# Patient Record
Sex: MALE | Race: ASIAN | NOT HISPANIC OR LATINO
[De-identification: names, ages, dates, MRNs, and addresses within clinical notes are randomized per-mention and may not be internally consistent; named-entity substitution may affect disease eponyms.]

---

## 2015-05-08 RX ORDER — INSULIN GLARGINE 100 [IU]/ML
35 INJECTION, SOLUTION SUBCUTANEOUS
Qty: 0 | Refills: 0 | DISCHARGE
Start: 2015-05-08

## 2017-04-20 ENCOUNTER — APPOINTMENT (OUTPATIENT)
Dept: UROLOGY | Facility: CLINIC | Age: 72
End: 2017-04-20

## 2017-04-20 VITALS
DIASTOLIC BLOOD PRESSURE: 74 MMHG | RESPIRATION RATE: 17 BRPM | BODY MASS INDEX: 24.88 KG/M2 | SYSTOLIC BLOOD PRESSURE: 159 MMHG | WEIGHT: 168 LBS | HEART RATE: 79 BPM | HEIGHT: 69 IN | TEMPERATURE: 98 F

## 2017-10-26 ENCOUNTER — APPOINTMENT (OUTPATIENT)
Dept: UROLOGY | Facility: CLINIC | Age: 72
End: 2017-10-26
Payer: MEDICARE

## 2017-10-26 VITALS
HEART RATE: 80 BPM | TEMPERATURE: 97.9 F | HEIGHT: 69 IN | DIASTOLIC BLOOD PRESSURE: 68 MMHG | BODY MASS INDEX: 24.88 KG/M2 | WEIGHT: 168 LBS | SYSTOLIC BLOOD PRESSURE: 125 MMHG | RESPIRATION RATE: 17 BRPM

## 2017-10-26 PROCEDURE — 99214 OFFICE O/P EST MOD 30 MIN: CPT

## 2018-04-09 ENCOUNTER — APPOINTMENT (OUTPATIENT)
Dept: VASCULAR SURGERY | Facility: CLINIC | Age: 73
End: 2018-04-09

## 2018-05-29 ENCOUNTER — APPOINTMENT (OUTPATIENT)
Dept: VASCULAR SURGERY | Facility: CLINIC | Age: 73
End: 2018-05-29
Payer: MEDICARE

## 2018-05-29 VITALS
TEMPERATURE: 98 F | HEIGHT: 69 IN | SYSTOLIC BLOOD PRESSURE: 165 MMHG | HEART RATE: 71 BPM | WEIGHT: 168 LBS | BODY MASS INDEX: 24.88 KG/M2 | DIASTOLIC BLOOD PRESSURE: 74 MMHG

## 2018-05-29 DIAGNOSIS — Z86.39 PERSONAL HISTORY OF OTHER ENDOCRINE, NUTRITIONAL AND METABOLIC DISEASE: ICD-10-CM

## 2018-05-29 DIAGNOSIS — R07.89 OTHER CHEST PAIN: ICD-10-CM

## 2018-05-29 DIAGNOSIS — E11.9 TYPE 2 DIABETES MELLITUS W/OUT COMPLICATIONS: ICD-10-CM

## 2018-05-29 DIAGNOSIS — Z86.79 PERSONAL HISTORY OF OTHER DISEASES OF THE CIRCULATORY SYSTEM: ICD-10-CM

## 2018-05-29 PROCEDURE — 93923 UPR/LXTR ART STDY 3+ LVLS: CPT

## 2018-05-29 PROCEDURE — 99202 OFFICE O/P NEW SF 15 MIN: CPT

## 2018-10-23 ENCOUNTER — APPOINTMENT (OUTPATIENT)
Dept: UROLOGY | Facility: CLINIC | Age: 73
End: 2018-10-23
Payer: MEDICARE

## 2018-10-23 VITALS
HEIGHT: 69 IN | RESPIRATION RATE: 17 BRPM | SYSTOLIC BLOOD PRESSURE: 152 MMHG | DIASTOLIC BLOOD PRESSURE: 81 MMHG | BODY MASS INDEX: 24.44 KG/M2 | HEART RATE: 78 BPM | WEIGHT: 165 LBS | TEMPERATURE: 97.9 F

## 2018-10-23 DIAGNOSIS — N52.01 ERECTILE DYSFUNCTION DUE TO ARTERIAL INSUFFICIENCY: ICD-10-CM

## 2018-10-23 PROCEDURE — 99213 OFFICE O/P EST LOW 20 MIN: CPT

## 2019-04-01 ENCOUNTER — NON-APPOINTMENT (OUTPATIENT)
Age: 74
End: 2019-04-01

## 2019-04-01 ENCOUNTER — APPOINTMENT (OUTPATIENT)
Dept: CARDIOLOGY | Facility: CLINIC | Age: 74
End: 2019-04-01
Payer: MEDICARE

## 2019-04-01 VITALS
DIASTOLIC BLOOD PRESSURE: 81 MMHG | OXYGEN SATURATION: 99 % | HEART RATE: 76 BPM | HEIGHT: 69 IN | SYSTOLIC BLOOD PRESSURE: 149 MMHG | WEIGHT: 160 LBS | BODY MASS INDEX: 23.7 KG/M2

## 2019-04-01 VITALS — SYSTOLIC BLOOD PRESSURE: 156 MMHG | DIASTOLIC BLOOD PRESSURE: 79 MMHG

## 2019-04-01 VITALS — SYSTOLIC BLOOD PRESSURE: 130 MMHG | DIASTOLIC BLOOD PRESSURE: 70 MMHG

## 2019-04-01 PROCEDURE — 99204 OFFICE O/P NEW MOD 45 MIN: CPT

## 2019-04-01 PROCEDURE — 93000 ELECTROCARDIOGRAM COMPLETE: CPT

## 2019-04-01 RX ORDER — OLMESARTAN MEDOXOMIL-HYDROCHLOROTHIAZIDE 12.5; 2 MG/1; MG/1
20-12.5 TABLET, FILM COATED ORAL
Qty: 90 | Refills: 1 | Status: DISCONTINUED | COMMUNITY
Start: 2019-04-01 | End: 2019-04-01

## 2019-04-01 RX ORDER — BLOOD SUGAR DIAGNOSTIC
STRIP MISCELLANEOUS
Qty: 200 | Refills: 0 | Status: DISCONTINUED | COMMUNITY
Start: 2019-03-19

## 2019-04-01 NOTE — HISTORY OF PRESENT ILLNESS
[FreeTextEntry1] : 74 year old male with about 20 years of CAD, type II diabetes, elevated cholesterol.  s/p  two stenting episodes.  in mid 90s and about 3 years ago.\par lv function appears to be normal based on echo and stress from 3 years ago.\par he appears to be asymptomatic at this time.  took last nitro over a year ago.\par he is establishing care at this time (change from [facility]).\par last carotid 2-3 years ago.  last hgba1c 7%\par lipiids are well controlled.  20 year hx of LBBB.   no syncope.

## 2019-04-01 NOTE — PHYSICAL EXAM
[General Appearance - Well Developed] : well developed [Normal Appearance] : normal appearance [Well Groomed] : well groomed [General Appearance - Well Nourished] : well nourished [No Deformities] : no deformities [General Appearance - In No Acute Distress] : no acute distress [Normal Conjunctiva] : the conjunctiva exhibited no abnormalities [Eyelids - No Xanthelasma] : the eyelids demonstrated no xanthelasmas [Normal Oral Mucosa] : normal oral mucosa [No Oral Pallor] : no oral pallor [No Oral Cyanosis] : no oral cyanosis [Normal Jugular Venous A Waves Present] : normal jugular venous A waves present [Normal Jugular Venous V Waves Present] : normal jugular venous V waves present [No Jugular Venous Engle A Waves] : no jugular venous engle A waves [Respiration, Rhythm And Depth] : normal respiratory rhythm and effort [Exaggerated Use Of Accessory Muscles For Inspiration] : no accessory muscle use [Auscultation Breath Sounds / Voice Sounds] : lungs were clear to auscultation bilaterally [Normal] : normal [Normal Rate] : normal [Rhythm Regular] : regular [Normal S1] : normal S1 [Normal S2] : normal S2 [I] : a grade 1 [Right Carotid Bruit] : no bruit heard over the right carotid [Left Carotid Bruit] : no bruit heard over the left carotid [No Abnormalities] : the abdominal aorta was not enlarged and no bruit was heard [Bruit] : no bruit heard [No Pitting Edema] : no pitting edema present [Rt] : no varicose veins of the right leg [Lt] : no varicose veins of the left leg [Abdomen Soft] : soft [Abdomen Tenderness] : non-tender [Abdomen Mass (___ Cm)] : no abdominal mass palpated [Abnormal Walk] : normal gait [Gait - Sufficient For Exercise Testing] : the gait was sufficient for exercise testing [Nail Clubbing] : no clubbing of the fingernails [Cyanosis, Localized] : no localized cyanosis [Petechial Hemorrhages (___cm)] : no petechial hemorrhages [Skin Color & Pigmentation] : normal skin color and pigmentation [] : no rash [No Venous Stasis] : no venous stasis [Skin Lesions] : no skin lesions [No Skin Ulcers] : no skin ulcer [No Xanthoma] : no  xanthoma was observed [Oriented To Time, Place, And Person] : oriented to person, place, and time [Affect] : the affect was normal [Mood] : the mood was normal [No Anxiety] : not feeling anxious

## 2019-04-01 NOTE — REASON FOR VISIT
[Follow-Up - Clinic] : a clinic follow-up of [Coronary Artery Disease] : coronary artery disease [Hyperlipidemia] : hyperlipidemia [Hypertension] : hypertension [Spouse] : spouse [Family Member] : family member

## 2019-04-01 NOTE — DISCUSSION/SUMMARY
[Coronary Artery Disease] : coronary artery disease [Hyperlipidemia] : hyperlipidemia [None] : none [Essential Hypertension] : essential hypertension [Stable] : stable [___ Month(s)] : [unfilled] month(s) [FreeTextEntry1] : 74 year old with CAD, essential htn, lipids.  all well controlled.  most recent echo and Holter are normal.  no active sx of ischemia.  carotid Doppler. \par hold off on stress test.

## 2019-04-08 ENCOUNTER — MEDICATION RENEWAL (OUTPATIENT)
Age: 74
End: 2019-04-08

## 2019-04-08 ENCOUNTER — APPOINTMENT (OUTPATIENT)
Dept: CARDIOLOGY | Facility: CLINIC | Age: 74
End: 2019-04-08
Payer: MEDICARE

## 2019-04-08 ENCOUNTER — NON-APPOINTMENT (OUTPATIENT)
Age: 74
End: 2019-04-08

## 2019-04-08 VITALS
BODY MASS INDEX: 24.44 KG/M2 | WEIGHT: 165 LBS | DIASTOLIC BLOOD PRESSURE: 66 MMHG | OXYGEN SATURATION: 99 % | HEART RATE: 73 BPM | HEIGHT: 69 IN | SYSTOLIC BLOOD PRESSURE: 137 MMHG

## 2019-04-08 PROCEDURE — 99214 OFFICE O/P EST MOD 30 MIN: CPT

## 2019-04-08 PROCEDURE — 93000 ELECTROCARDIOGRAM COMPLETE: CPT

## 2019-04-08 NOTE — HISTORY OF PRESENT ILLNESS
[FreeTextEntry1] : 74 year old male with about 20 years of CAD, type II diabetes, elevated cholesterol.  s/p  two stenting episodes.  in mid 90s and about 3 years ago.\par lv function appears to be normal based on echo and stress from 3 years ago.\par he appears to be asymptomatic at this time.  took last nitro over a year ago.\par he is establishing care at this time (change from [facility]).\par last carotid 2-3 years ago.  last hgba1c 7%\par lipids are well controlled.  20 year hx of LBBB.   no syncope.\par \par 4/8/2019 presents today for evaluation of left back pain under the left  arm and left scapula that started yesterday as a spasm,.  sx lasted for about an hour and resolved on its own no associated sx.  did not feel like he needed to go to the emergency room.  did not take benicar yesterday and then sx happened afterwards.

## 2019-04-08 NOTE — REASON FOR VISIT
[Follow-Up - Clinic] : a clinic follow-up of [Coronary Artery Disease] : coronary artery disease [Hyperlipidemia] : hyperlipidemia [Hypertension] : hypertension [Spouse] : spouse

## 2019-04-08 NOTE — PHYSICAL EXAM
[General Appearance - Well Developed] : well developed [Normal Appearance] : normal appearance [Well Groomed] : well groomed [General Appearance - Well Nourished] : well nourished [No Deformities] : no deformities [General Appearance - In No Acute Distress] : no acute distress [Normal Conjunctiva] : the conjunctiva exhibited no abnormalities [Eyelids - No Xanthelasma] : the eyelids demonstrated no xanthelasmas [Normal Oral Mucosa] : normal oral mucosa [No Oral Pallor] : no oral pallor [No Oral Cyanosis] : no oral cyanosis [Normal Jugular Venous A Waves Present] : normal jugular venous A waves present [Normal Jugular Venous V Waves Present] : normal jugular venous V waves present [No Jugular Venous Engle A Waves] : no jugular venous engle A waves [Respiration, Rhythm And Depth] : normal respiratory rhythm and effort [Exaggerated Use Of Accessory Muscles For Inspiration] : no accessory muscle use [Auscultation Breath Sounds / Voice Sounds] : lungs were clear to auscultation bilaterally [Normal] : normal [Normal Rate] : normal [Rhythm Regular] : regular [Normal S1] : normal S1 [Normal S2] : normal S2 [No Murmur] : no murmurs heard [Right Carotid Bruit] : no bruit heard over the right carotid [Left Carotid Bruit] : no bruit heard over the left carotid [No Abnormalities] : the abdominal aorta was not enlarged and no bruit was heard [Bruit] : no bruit heard [No Pitting Edema] : no pitting edema present [Rt] : no varicose veins of the right leg [Lt] : no varicose veins of the left leg [Abdomen Soft] : soft [Abdomen Tenderness] : non-tender [Abdomen Mass (___ Cm)] : no abdominal mass palpated [Abnormal Walk] : normal gait [Gait - Sufficient For Exercise Testing] : the gait was sufficient for exercise testing [Nail Clubbing] : no clubbing of the fingernails [Cyanosis, Localized] : no localized cyanosis [Petechial Hemorrhages (___cm)] : no petechial hemorrhages [Skin Color & Pigmentation] : normal skin color and pigmentation [] : no rash [No Venous Stasis] : no venous stasis [Skin Lesions] : no skin lesions [No Skin Ulcers] : no skin ulcer [No Xanthoma] : no  xanthoma was observed [Oriented To Time, Place, And Person] : oriented to person, place, and time [Affect] : the affect was normal [Mood] : the mood was normal [No Anxiety] : not feeling anxious

## 2019-06-21 ENCOUNTER — MEDICATION RENEWAL (OUTPATIENT)
Age: 74
End: 2019-06-21

## 2019-08-16 ENCOUNTER — OTHER (OUTPATIENT)
Age: 74
End: 2019-08-16

## 2019-08-18 ENCOUNTER — FORM ENCOUNTER (OUTPATIENT)
Age: 74
End: 2019-08-18

## 2019-08-19 ENCOUNTER — NON-APPOINTMENT (OUTPATIENT)
Age: 74
End: 2019-08-19

## 2019-08-19 ENCOUNTER — APPOINTMENT (OUTPATIENT)
Dept: ULTRASOUND IMAGING | Facility: HOSPITAL | Age: 74
End: 2019-08-19
Payer: MEDICARE

## 2019-08-19 ENCOUNTER — OUTPATIENT (OUTPATIENT)
Dept: OUTPATIENT SERVICES | Facility: HOSPITAL | Age: 74
LOS: 1 days | End: 2019-08-19
Payer: MEDICARE

## 2019-08-19 ENCOUNTER — APPOINTMENT (OUTPATIENT)
Dept: CARDIOLOGY | Facility: CLINIC | Age: 74
End: 2019-08-19
Payer: MEDICARE

## 2019-08-19 VITALS
OXYGEN SATURATION: 100 % | RESPIRATION RATE: 10 BRPM | SYSTOLIC BLOOD PRESSURE: 168 MMHG | DIASTOLIC BLOOD PRESSURE: 79 MMHG | HEART RATE: 72 BPM

## 2019-08-19 VITALS — DIASTOLIC BLOOD PRESSURE: 74 MMHG | SYSTOLIC BLOOD PRESSURE: 148 MMHG

## 2019-08-19 VITALS
HEIGHT: 69 IN | BODY MASS INDEX: 24.14 KG/M2 | WEIGHT: 163 LBS | SYSTOLIC BLOOD PRESSURE: 176 MMHG | DIASTOLIC BLOOD PRESSURE: 77 MMHG | HEART RATE: 70 BPM | OXYGEN SATURATION: 98 %

## 2019-08-19 VITALS — DIASTOLIC BLOOD PRESSURE: 70 MMHG | SYSTOLIC BLOOD PRESSURE: 144 MMHG

## 2019-08-19 DIAGNOSIS — Z95.5 PRESENCE OF CORONARY ANGIOPLASTY IMPLANT AND GRAFT: Chronic | ICD-10-CM

## 2019-08-19 DIAGNOSIS — Z00.00 ENCOUNTER FOR GENERAL ADULT MEDICAL EXAMINATION WITHOUT ABNORMAL FINDINGS: ICD-10-CM

## 2019-08-19 PROCEDURE — 93880 EXTRACRANIAL BILAT STUDY: CPT | Mod: 26

## 2019-08-19 PROCEDURE — 93000 ELECTROCARDIOGRAM COMPLETE: CPT

## 2019-08-19 PROCEDURE — 93880 EXTRACRANIAL BILAT STUDY: CPT

## 2019-08-19 PROCEDURE — 99214 OFFICE O/P EST MOD 30 MIN: CPT

## 2019-08-19 NOTE — PHYSICAL EXAM
[General Appearance - Well Developed] : well developed [Normal Appearance] : normal appearance [Well Groomed] : well groomed [General Appearance - Well Nourished] : well nourished [No Deformities] : no deformities [General Appearance - In No Acute Distress] : no acute distress [Normal Conjunctiva] : the conjunctiva exhibited no abnormalities [Eyelids - No Xanthelasma] : the eyelids demonstrated no xanthelasmas [Normal Oral Mucosa] : normal oral mucosa [No Oral Pallor] : no oral pallor [No Oral Cyanosis] : no oral cyanosis [Normal Jugular Venous V Waves Present] : normal jugular venous V waves present [Normal Jugular Venous A Waves Present] : normal jugular venous A waves present [No Jugular Venous Engle A Waves] : no jugular venous engle A waves [Exaggerated Use Of Accessory Muscles For Inspiration] : no accessory muscle use [Respiration, Rhythm And Depth] : normal respiratory rhythm and effort [Auscultation Breath Sounds / Voice Sounds] : lungs were clear to auscultation bilaterally [Normal] : normal [Normal Rate] : normal [Rhythm Regular] : regular [Normal S1] : normal S1 [Normal S2] : normal S2 [No Murmur] : no murmurs heard [No Abnormalities] : the abdominal aorta was not enlarged and no bruit was heard [No Pitting Edema] : no pitting edema present [Abdomen Soft] : soft [Abdomen Tenderness] : non-tender [Abdomen Mass (___ Cm)] : no abdominal mass palpated [Abnormal Walk] : normal gait [Gait - Sufficient For Exercise Testing] : the gait was sufficient for exercise testing [Nail Clubbing] : no clubbing of the fingernails [Petechial Hemorrhages (___cm)] : no petechial hemorrhages [Cyanosis, Localized] : no localized cyanosis [Skin Color & Pigmentation] : normal skin color and pigmentation [] : no rash [No Venous Stasis] : no venous stasis [Skin Lesions] : no skin lesions [No Skin Ulcers] : no skin ulcer [No Xanthoma] : no  xanthoma was observed [Oriented To Time, Place, And Person] : oriented to person, place, and time [Affect] : the affect was normal [Mood] : the mood was normal [No Anxiety] : not feeling anxious [Right Carotid Bruit] : no bruit heard over the right carotid [Left Carotid Bruit] : no bruit heard over the left carotid [Bruit] : no bruit heard [Rt] : no varicose veins of the right leg [Lt] : no varicose veins of the left leg

## 2019-08-19 NOTE — REASON FOR VISIT
[Follow-Up - Clinic] : a clinic follow-up of [Hyperlipidemia] : hyperlipidemia [Coronary Artery Disease] : coronary artery disease [Hypertension] : hypertension [Spouse] : spouse

## 2019-08-19 NOTE — DISCUSSION/SUMMARY
[Coronary Artery Disease] : coronary artery disease [Hyperlipidemia] : hyperlipidemia [Essential Hypertension] : essential hypertension [None] : none [Stable] : stable [___ Month(s)] : [unfilled] month(s) [FreeTextEntry1] : 74 year old with CAD, essential htn, lipids.  all well controlled.  most recent echo and Holter are normal.  no active sx of ischemia.  carotid Doppler. \par hold off on stress test.\par \par 8/19/2019  no complaints today.  BP is acceptable after several reading.  follow up on carotid Doppler.

## 2019-08-19 NOTE — HISTORY OF PRESENT ILLNESS
[FreeTextEntry1] : 74 year old male with about 20 years of CAD, type II diabetes, elevated cholesterol.  s/p  two stenting episodes.  in mid 90s and about 3 years ago.\par lv function appears to be normal based on echo and stress from 3 years ago.\par he appears to be asymptomatic at this time.  took last nitro over a year ago.\par he is establishing care at this time (change from [facility]).\par last carotid 2-3 years ago.  last hgba1c 7%\par lipids are well controlled.  20 year hx of LBBB.   no syncope.\par \par 4/8/2019 presents today for evaluation of left back pain under the left  arm and left scapula that started yesterday as a spasm,.  sx lasted for about an hour and resolved on its own no associated sx.  did not feel like he needed to go to the emergency room.  did not take benicar yesterday and then sx happened afterwards. \par \par 8/19/2019  presents today for scheduled checkup.   denies any symptoms.  took all medications and no issues.  had carotid doppler today.

## 2019-08-21 ENCOUNTER — EMERGENCY (EMERGENCY)
Facility: HOSPITAL | Age: 74
LOS: 1 days | Discharge: HOME | End: 2019-08-21
Attending: STUDENT IN AN ORGANIZED HEALTH CARE EDUCATION/TRAINING PROGRAM | Admitting: INTERNAL MEDICINE
Payer: MEDICARE

## 2019-08-21 ENCOUNTER — INPATIENT (INPATIENT)
Facility: HOSPITAL | Age: 74
LOS: 12 days | Discharge: NURSING, REHAB OR LONG-TERM CARE | DRG: 83 | End: 2019-09-03
Attending: SURGERY | Admitting: SURGERY
Payer: MEDICARE

## 2019-08-21 VITALS
RESPIRATION RATE: 18 BRPM | HEART RATE: 101 BPM | TEMPERATURE: 98 F | DIASTOLIC BLOOD PRESSURE: 70 MMHG | SYSTOLIC BLOOD PRESSURE: 147 MMHG | OXYGEN SATURATION: 98 % | HEIGHT: 67 IN | WEIGHT: 179.9 LBS

## 2019-08-21 VITALS
HEART RATE: 96 BPM | OXYGEN SATURATION: 100 % | SYSTOLIC BLOOD PRESSURE: 150 MMHG | RESPIRATION RATE: 16 BRPM | TEMPERATURE: 99 F | DIASTOLIC BLOOD PRESSURE: 74 MMHG

## 2019-08-21 VITALS
TEMPERATURE: 98 F | RESPIRATION RATE: 16 BRPM | HEART RATE: 79 BPM | OXYGEN SATURATION: 100 % | SYSTOLIC BLOOD PRESSURE: 189 MMHG | DIASTOLIC BLOOD PRESSURE: 88 MMHG

## 2019-08-21 DIAGNOSIS — Z95.5 PRESENCE OF CORONARY ANGIOPLASTY IMPLANT AND GRAFT: Chronic | ICD-10-CM

## 2019-08-21 DIAGNOSIS — S06.5X9A TRAUMATIC SUBDURAL HEMORRHAGE WITH LOSS OF CONSCIOUSNESS OF UNSPECIFIED DURATION, INITIAL ENCOUNTER: ICD-10-CM

## 2019-08-21 LAB
ALBUMIN SERPL ELPH-MCNC: 4 G/DL — SIGNIFICANT CHANGE UP (ref 3.3–5)
ALBUMIN SERPL ELPH-MCNC: 4.4 G/DL — SIGNIFICANT CHANGE UP (ref 3.3–5)
ALP SERPL-CCNC: 56 U/L — SIGNIFICANT CHANGE UP (ref 40–120)
ALP SERPL-CCNC: 61 U/L — SIGNIFICANT CHANGE UP (ref 40–120)
ALT FLD-CCNC: 24 U/L — SIGNIFICANT CHANGE UP (ref 10–45)
ALT FLD-CCNC: 26 U/L — SIGNIFICANT CHANGE UP (ref 4–41)
ANION GAP SERPL CALC-SCNC: 14 MMOL/L — SIGNIFICANT CHANGE UP (ref 5–17)
ANION GAP SERPL CALC-SCNC: 15 MMO/L — HIGH (ref 7–14)
APTT BLD: 24.2 SEC — LOW (ref 27.5–36.3)
APTT BLD: 27.9 SEC — SIGNIFICANT CHANGE UP (ref 27.5–36.3)
AST SERPL-CCNC: 31 U/L — SIGNIFICANT CHANGE UP (ref 10–40)
AST SERPL-CCNC: 34 U/L — SIGNIFICANT CHANGE UP (ref 4–40)
BASE EXCESS BLDV CALC-SCNC: -0.1 MMOL/L — SIGNIFICANT CHANGE UP
BASOPHILS # BLD AUTO: 0 K/UL — SIGNIFICANT CHANGE UP (ref 0–0.2)
BASOPHILS # BLD AUTO: 0.03 K/UL — SIGNIFICANT CHANGE UP (ref 0–0.2)
BASOPHILS NFR BLD AUTO: 0.3 % — SIGNIFICANT CHANGE UP (ref 0–2)
BASOPHILS NFR BLD AUTO: 0.4 % — SIGNIFICANT CHANGE UP (ref 0–2)
BILIRUB SERPL-MCNC: 0.9 MG/DL — SIGNIFICANT CHANGE UP (ref 0.2–1.2)
BILIRUB SERPL-MCNC: 1.4 MG/DL — HIGH (ref 0.2–1.2)
BLD GP AB SCN SERPL QL: NEGATIVE — SIGNIFICANT CHANGE UP
BLD GP AB SCN SERPL QL: NEGATIVE — SIGNIFICANT CHANGE UP
BLOOD GAS VENOUS - CREATININE: 1.11 MG/DL — SIGNIFICANT CHANGE UP (ref 0.5–1.3)
BUN SERPL-MCNC: 13 MG/DL — SIGNIFICANT CHANGE UP (ref 7–23)
BUN SERPL-MCNC: 17 MG/DL — SIGNIFICANT CHANGE UP (ref 7–23)
CALCIUM SERPL-MCNC: 8.9 MG/DL — SIGNIFICANT CHANGE UP (ref 8.4–10.5)
CALCIUM SERPL-MCNC: 9.9 MG/DL — SIGNIFICANT CHANGE UP (ref 8.4–10.5)
CHLORIDE BLDV-SCNC: 107 MMOL/L — SIGNIFICANT CHANGE UP (ref 96–108)
CHLORIDE SERPL-SCNC: 105 MMOL/L — SIGNIFICANT CHANGE UP (ref 96–108)
CHLORIDE SERPL-SCNC: 99 MMOL/L — SIGNIFICANT CHANGE UP (ref 98–107)
CO2 SERPL-SCNC: 21 MMOL/L — LOW (ref 22–31)
CO2 SERPL-SCNC: 23 MMOL/L — SIGNIFICANT CHANGE UP (ref 22–31)
CREAT SERPL-MCNC: 0.97 MG/DL — SIGNIFICANT CHANGE UP (ref 0.5–1.3)
CREAT SERPL-MCNC: 1.1 MG/DL — SIGNIFICANT CHANGE UP (ref 0.5–1.3)
EOSINOPHIL # BLD AUTO: 0 K/UL — SIGNIFICANT CHANGE UP (ref 0–0.5)
EOSINOPHIL # BLD AUTO: 0.13 K/UL — SIGNIFICANT CHANGE UP (ref 0–0.5)
EOSINOPHIL NFR BLD AUTO: 0.2 % — SIGNIFICANT CHANGE UP (ref 0–6)
EOSINOPHIL NFR BLD AUTO: 1.6 % — SIGNIFICANT CHANGE UP (ref 0–6)
GAS PNL BLDV: 135 MMOL/L — LOW (ref 136–146)
GLUCOSE BLDV-MCNC: 143 MG/DL — HIGH (ref 70–99)
GLUCOSE SERPL-MCNC: 133 MG/DL — HIGH (ref 70–99)
GLUCOSE SERPL-MCNC: 148 MG/DL — HIGH (ref 70–99)
HCO3 BLDV-SCNC: 24 MMOL/L — SIGNIFICANT CHANGE UP (ref 20–27)
HCT VFR BLD CALC: 38.9 % — LOW (ref 39–50)
HCT VFR BLD CALC: 40 % — SIGNIFICANT CHANGE UP (ref 39–50)
HCT VFR BLDV CALC: 41.9 % — SIGNIFICANT CHANGE UP (ref 39–51)
HGB BLD-MCNC: 13.1 G/DL — SIGNIFICANT CHANGE UP (ref 13–17)
HGB BLD-MCNC: 13.1 G/DL — SIGNIFICANT CHANGE UP (ref 13–17)
HGB BLDV-MCNC: 13.7 G/DL — SIGNIFICANT CHANGE UP (ref 13–17)
IMM GRANULOCYTES NFR BLD AUTO: 0.4 % — SIGNIFICANT CHANGE UP (ref 0–1.5)
INR BLD: 0.99 — SIGNIFICANT CHANGE UP (ref 0.88–1.17)
INR BLD: 1.03 RATIO — SIGNIFICANT CHANGE UP (ref 0.88–1.16)
LACTATE BLDV-MCNC: 2.5 MMOL/L — HIGH (ref 0.5–2)
LYMPHOCYTES # BLD AUTO: 1.12 K/UL — SIGNIFICANT CHANGE UP (ref 1–3.3)
LYMPHOCYTES # BLD AUTO: 1.3 K/UL — SIGNIFICANT CHANGE UP (ref 1–3.3)
LYMPHOCYTES # BLD AUTO: 12 % — LOW (ref 13–44)
LYMPHOCYTES # BLD AUTO: 13.9 % — SIGNIFICANT CHANGE UP (ref 13–44)
MCHC RBC-ENTMCNC: 28.4 PG — SIGNIFICANT CHANGE UP (ref 27–34)
MCHC RBC-ENTMCNC: 29.8 PG — SIGNIFICANT CHANGE UP (ref 27–34)
MCHC RBC-ENTMCNC: 32.8 % — SIGNIFICANT CHANGE UP (ref 32–36)
MCHC RBC-ENTMCNC: 33.7 GM/DL — SIGNIFICANT CHANGE UP (ref 32–36)
MCV RBC AUTO: 86.6 FL — SIGNIFICANT CHANGE UP (ref 80–100)
MCV RBC AUTO: 88.3 FL — SIGNIFICANT CHANGE UP (ref 80–100)
MONOCYTES # BLD AUTO: 0.7 K/UL — SIGNIFICANT CHANGE UP (ref 0–0.9)
MONOCYTES # BLD AUTO: 0.7 K/UL — SIGNIFICANT CHANGE UP (ref 0–0.9)
MONOCYTES NFR BLD AUTO: 6.8 % — SIGNIFICANT CHANGE UP (ref 2–14)
MONOCYTES NFR BLD AUTO: 8.7 % — SIGNIFICANT CHANGE UP (ref 2–14)
NEUTROPHILS # BLD AUTO: 6.06 K/UL — SIGNIFICANT CHANGE UP (ref 1.8–7.4)
NEUTROPHILS # BLD AUTO: 8.6 K/UL — HIGH (ref 1.8–7.4)
NEUTROPHILS NFR BLD AUTO: 75 % — SIGNIFICANT CHANGE UP (ref 43–77)
NEUTROPHILS NFR BLD AUTO: 80.7 % — HIGH (ref 43–77)
NRBC # FLD: 0 K/UL — SIGNIFICANT CHANGE UP (ref 0–0)
PCO2 BLDV: 41 MMHG — SIGNIFICANT CHANGE UP (ref 41–51)
PH BLDV: 7.39 PH — SIGNIFICANT CHANGE UP (ref 7.32–7.43)
PLATELET # BLD AUTO: 156 K/UL — SIGNIFICANT CHANGE UP (ref 150–400)
PLATELET # BLD AUTO: 206 K/UL — SIGNIFICANT CHANGE UP (ref 150–400)
PMV BLD: 10.5 FL — SIGNIFICANT CHANGE UP (ref 7–13)
PO2 BLDV: 122 MMHG — HIGH (ref 35–40)
POTASSIUM BLDV-SCNC: 4.2 MMOL/L — SIGNIFICANT CHANGE UP (ref 3.4–4.5)
POTASSIUM SERPL-MCNC: 3.9 MMOL/L — SIGNIFICANT CHANGE UP (ref 3.5–5.3)
POTASSIUM SERPL-MCNC: 4.4 MMOL/L — SIGNIFICANT CHANGE UP (ref 3.5–5.3)
POTASSIUM SERPL-SCNC: 3.9 MMOL/L — SIGNIFICANT CHANGE UP (ref 3.5–5.3)
POTASSIUM SERPL-SCNC: 4.4 MMOL/L — SIGNIFICANT CHANGE UP (ref 3.5–5.3)
PROT SERPL-MCNC: 7 G/DL — SIGNIFICANT CHANGE UP (ref 6–8.3)
PROT SERPL-MCNC: 7.4 G/DL — SIGNIFICANT CHANGE UP (ref 6–8.3)
PROTHROM AB SERPL-ACNC: 11.3 SEC — SIGNIFICANT CHANGE UP (ref 9.8–13.1)
PROTHROM AB SERPL-ACNC: 11.7 SEC — SIGNIFICANT CHANGE UP (ref 10–12.9)
RBC # BLD: 4.4 M/UL — SIGNIFICANT CHANGE UP (ref 4.2–5.8)
RBC # BLD: 4.62 M/UL — SIGNIFICANT CHANGE UP (ref 4.2–5.8)
RBC # FLD: 13 % — SIGNIFICANT CHANGE UP (ref 10.3–14.5)
RBC # FLD: 13.6 % — SIGNIFICANT CHANGE UP (ref 10.3–14.5)
RH IG SCN BLD-IMP: POSITIVE — SIGNIFICANT CHANGE UP
RH IG SCN BLD-IMP: POSITIVE — SIGNIFICANT CHANGE UP
SAO2 % BLDV: 96.4 % — HIGH (ref 60–85)
SODIUM SERPL-SCNC: 137 MMOL/L — SIGNIFICANT CHANGE UP (ref 135–145)
SODIUM SERPL-SCNC: 140 MMOL/L — SIGNIFICANT CHANGE UP (ref 135–145)
TROPONIN T, HIGH SENSITIVITY: 15 NG/L — SIGNIFICANT CHANGE UP (ref ?–14)
TROPONIN T, HIGH SENSITIVITY: 17 NG/L — SIGNIFICANT CHANGE UP (ref ?–14)
WBC # BLD: 10.7 K/UL — HIGH (ref 3.8–10.5)
WBC # BLD: 8.07 K/UL — SIGNIFICANT CHANGE UP (ref 3.8–10.5)
WBC # FLD AUTO: 10.7 K/UL — HIGH (ref 3.8–10.5)
WBC # FLD AUTO: 8.07 K/UL — SIGNIFICANT CHANGE UP (ref 3.8–10.5)

## 2019-08-21 PROCEDURE — 99291 CRITICAL CARE FIRST HOUR: CPT | Mod: 25

## 2019-08-21 PROCEDURE — 70450 CT HEAD/BRAIN W/O DYE: CPT | Mod: 26

## 2019-08-21 PROCEDURE — 93010 ELECTROCARDIOGRAM REPORT: CPT

## 2019-08-21 PROCEDURE — 99285 EMERGENCY DEPT VISIT HI MDM: CPT

## 2019-08-21 PROCEDURE — 99291 CRITICAL CARE FIRST HOUR: CPT

## 2019-08-21 RX ORDER — ONDANSETRON 8 MG/1
4 TABLET, FILM COATED ORAL ONCE
Refills: 0 | Status: COMPLETED | OUTPATIENT
Start: 2019-08-21 | End: 2019-08-21

## 2019-08-21 RX ORDER — SODIUM CHLORIDE 9 MG/ML
1000 INJECTION INTRAMUSCULAR; INTRAVENOUS; SUBCUTANEOUS ONCE
Refills: 0 | Status: COMPLETED | OUTPATIENT
Start: 2019-08-21 | End: 2019-08-21

## 2019-08-21 RX ORDER — METOCLOPRAMIDE HCL 10 MG
10 TABLET ORAL ONCE
Refills: 0 | Status: COMPLETED | OUTPATIENT
Start: 2019-08-21 | End: 2019-08-21

## 2019-08-21 RX ORDER — NICARDIPINE HYDROCHLORIDE 30 MG/1
5 CAPSULE, EXTENDED RELEASE ORAL
Qty: 40 | Refills: 0 | Status: DISCONTINUED | OUTPATIENT
Start: 2019-08-21 | End: 2019-08-21

## 2019-08-21 RX ORDER — ACETAMINOPHEN 500 MG
650 TABLET ORAL ONCE
Refills: 0 | Status: COMPLETED | OUTPATIENT
Start: 2019-08-21 | End: 2019-08-21

## 2019-08-21 RX ORDER — FAMOTIDINE 10 MG/ML
20 INJECTION INTRAVENOUS ONCE
Refills: 0 | Status: COMPLETED | OUTPATIENT
Start: 2019-08-21 | End: 2019-08-21

## 2019-08-21 RX ADMIN — NICARDIPINE HYDROCHLORIDE 25 MG/HR: 30 CAPSULE, EXTENDED RELEASE ORAL at 12:03

## 2019-08-21 RX ADMIN — ONDANSETRON 4 MILLIGRAM(S): 8 TABLET, FILM COATED ORAL at 12:03

## 2019-08-21 RX ADMIN — Medication 650 MILLIGRAM(S): at 17:18

## 2019-08-21 RX ADMIN — SODIUM CHLORIDE 1000 MILLILITER(S): 9 INJECTION INTRAMUSCULAR; INTRAVENOUS; SUBCUTANEOUS at 11:00

## 2019-08-21 RX ADMIN — Medication 650 MILLIGRAM(S): at 17:59

## 2019-08-21 RX ADMIN — FAMOTIDINE 20 MILLIGRAM(S): 10 INJECTION INTRAVENOUS at 11:00

## 2019-08-21 RX ADMIN — Medication 10 MILLIGRAM(S): at 11:00

## 2019-08-21 RX ADMIN — ONDANSETRON 4 MILLIGRAM(S): 8 TABLET, FILM COATED ORAL at 17:05

## 2019-08-21 NOTE — ED PROVIDER NOTE - CLINICAL SUMMARY MEDICAL DECISION MAKING FREE TEXT BOX
75 yo M, with PMH of CAD w/ x3 stents, IDDM, HTN, HLD, presents to ER s/p unwitnessed fall 2 steps with head injury and LOC a/w today. Elderly male s/p syncope unwitnessed fall, +LOC, head trauma, unable to recall events, hypertensive, actively vomiting, on ASA, concern for ICH. Spoke with CT tech, one patient ahead prior to take patient for urgent CT head. EKG nonischemic, Plan: CT head to r/o ICH, CT cervical, Xray hip/pelvis, labs, trop, CXR, treat hypertension, antiemetic, give IVF, and likely admit for syncope workup.

## 2019-08-21 NOTE — ED PROVIDER NOTE - OBJECTIVE STATEMENT
73 yo M hx CAD s/p PCI on baby asa, HTN,HLD, BPH, DM2, transferred from [facility] for intracranial bleed with increase blood on interval scan. Patient initially presented s/p unwitnessed fall, ? syncopal episode this morning, with complaints of dizziness after fall and initial difficulty speaking. Patient does not recall event. CTH shows L hemispheric SDH with SAH and calvarium fx. Patient was started on cardene drip for elevated BP >180 and received platelets.

## 2019-08-21 NOTE — ED PROVIDER NOTE - ATTENDING CONTRIBUTION TO CARE
GEN: no acute respiratory distress. nontoxic, speaking comfortably in full sentences, ambulating with steady gait.  HEENT: NCAT. face symmetrical. PERRL 4mm, EOMI, normal auditory canal b/l, normal TM b/l. no hemotympanum. nose midline and without discharge,  MMM, oropharynx wnl.  Neck: no JVD, trachea midline, no LAD  CV: RRR. +S1S2, no murmur. 2+ pulses in 4 extremities, cap refill <2 sec  Chest: CTA B/l. no wheezing, rales, rhonchi. no retractions. good air movement. no tenderness. no rash or ecchymosis  ABD: +BS, soft, non distended, non tender. No guarding/rebound. No lesions, ecchymosis, surgical scar  : no cva or suprapubic tenderness  MSK: No clubbing, cyanosis, edema. FROM of all extremities. no tenderness to palpation. No midline or paraspinal tenderness. no spinal step-offs.  Neuro: AOOX3.  CN 2-12 intact; Sensation intact, motor 5/5 throughout. finger-nose/heal-shin intact. no ataxia  SKIN: No erythema, lesions or rash    only asa htn, hld, cad, dm GEN: no acute respiratory distress. nontoxic, speaking comfortably in full sentences, ambulating with steady gait.  HEENT: NCAT. face symmetrical. PERRL 4mm, EOMI, normal auditory canal b/l, normal TM b/l. no hemotympanum. nose midline and without discharge,  MMM, oropharynx wnl.  Neck: no JVD, trachea midline, no LAD  CV: RRR. +S1S2, no murmur. 2+ pulses in 4 extremities, cap refill <2 sec  Chest: CTA B/l. no wheezing, rales, rhonchi. no retractions. good air movement. no tenderness. no rash or ecchymosis  ABD: +BS, soft, non distended, non tender. No guarding/rebound. No lesions, ecchymosis, surgical scar  : no cva or suprapubic tenderness  MSK: No clubbing, cyanosis, edema. FROM of all extremities. no tenderness to palpation. No midline or paraspinal tenderness. no spinal step-offs.  Neuro: AOOX3.  CN 2-12 intact; Sensation intact, motor 5/5 throughout. finger-nose/heal-shin intact. no ataxia  SKIN: No erythema, lesions or rash    75 yo htn, hld, cad, dm on asa with syncope/fall. +HA, nausea, and multiple episodes of vomiting. Was welll prior to episode. Unclear cause or fall, concern for significant head trauma post fall. plan: cv monitoring, labs ct, xr, reassess.   CT showing ICH, non-depressed skull fx. patient started on nicardipine gtt for BP control and Neurosurgery called for eval. GEN: no acute respiratory distress. nontoxic, speaking comfortably in full sentences, ambulating with steady gait.  HEENT: NCAT. face symmetrical. PERRL 4mm, EOMI, normal auditory canal b/l, normal TM b/l. no hemotympanum. nose midline and without discharge,  MMM, oropharynx wnl.  Neck: no JVD, trachea midline, no LAD  CV: RRR. +S1S2, no murmur. 2+ pulses in 4 extremities, cap refill <2 sec  Chest: CTA B/l. no wheezing, rales, rhonchi. no retractions. good air movement. no tenderness. no rash or ecchymosis  ABD: +BS, soft, non distended, non tender. No guarding/rebound. No lesions, ecchymosis, surgical scar  : no cva or suprapubic tenderness  MSK: No clubbing, cyanosis, edema. FROM of all extremities. no tenderness to palpation. No midline or paraspinal tenderness. no spinal step-offs.  Neuro: AOOX3.  CN 2-12 intact; Sensation intact, motor 5/5 throughout. finger-nose/heal-shin intact. no ataxia  SKIN: No erythema, lesions or rash    75 yo htn, hld, cad, dm on asa with syncope/fall. +HA, nausea, and multiple episodes of vomiting. Was well prior to episode. Unclear cause or fall, concern for significant head trauma post fall. plan: cv monitoring, labs ct, xr, reassess.   CT showing ICH, non-depressed skull fx. patient started on nicardipine gtt for BP control and Neurosurgery called for eval.

## 2019-08-21 NOTE — H&P ADULT - NSHPPHYSICALEXAM_GEN_ALL_CORE
PHYSICAL EXAM:  Vital Signs Last 24 Hrs  T(C): 36.6 (08-21-19 @ 16:43)  T(F): 97.8 (08-21-19 @ 16:43), Max: 98.2 (08-21-19 @ 11:12)  HR: 91 (08-21-19 @ 16:43) (75 - 94)  BP: 166/61 (08-21-19 @ 16:43)  BP(mean): --  RR: 16 (08-21-19 @ 16:43) (16 - 18)  SpO2: 100% (08-21-19 @ 16:43) (98% - 100%)  Wt(kg): --    Constitutional: NAD, awake and alert, comfortable in C-collar  EYES: EOMI  ENT:  Normal Hearing, no tonsillar exudates   Neck: Soft and supple , no thyromegaly   Respiratory: Breath sounds are clear bilaterally, No wheezing, rales or rhonchi  Cardiovascular: S1 and S2, regular rate and rhythm, no Murmurs, gallops or rubs, no JVD,    Gastrointestinal: Bowel Sounds present, soft, nontender, nondistended, no guarding, no rebound  Extremities: No cyanosis or clubbing; warm to touch  Vascular: 2+ peripheral pulses lower ex  Neurological: No focal deficits, CN II-XII intact bilaterally, sensation to light touch intact in all extremities, gait intact. Pupils are equally reactive to light and symmetrical in size.   Musculoskeletal: 5/5 strength b/l upper and lower extremities; no joint swelling.  Skin: No rashes  Psych: no depression or anhedonia, AAOx3  HEME: no bruises, no nose bleeds

## 2019-08-21 NOTE — ED ADULT NURSE REASSESSMENT NOTE - NS ED NURSE REASSESS COMMENT FT1
pt alert and oriented to person place time and self. pt able to answer all questions appropriately on neuro exam. no deficits present at this time. bp is in goal range. will continue to monitor.

## 2019-08-21 NOTE — ED PROVIDER NOTE - CLINICAL SUMMARY MEDICAL DECISION MAKING FREE TEXT BOX
73 yo M tx from [facility] for NSGY eval, increased extra-axial hematoma from .5 cm to 1 cm, will obtain repeat CT-H @22:30, repeat CBC and coags

## 2019-08-21 NOTE — ED ADULT NURSE NOTE - NSIMPLEMENTINTERV_GEN_ALL_ED
Implemented All Fall Risk Interventions:  [place] to call system. Call bell, personal items and telephone within reach. Instruct patient to call for assistance. Room bathroom lighting operational. Non-slip footwear when patient is off stretcher. Physically safe environment: no spills, clutter or unnecessary equipment. Stretcher in lowest position, wheels locked, appropriate side rails in place. Provide visual cue, wrist band, yellow gown, etc. Monitor gait and stability. Monitor for mental status changes and reorient to person, place, and time. Review medications for side effects contributing to fall risk. Reinforce activity limits and safety measures with patient and family.

## 2019-08-21 NOTE — H&P ADULT - ATTENDING COMMENTS
Patient to be transferred to [facility] due to traumatic SAH/SDH with worsening ct scan findings  HTN emergency  Neuro checks q1

## 2019-08-21 NOTE — CONSULT NOTE ADULT - ASSESSMENT
74y male s/p unwitnessed fall, + nondisplaced skull fracture, TSAH  -No acute neurosurgical intervention   -Rpt CT head in 24 hrs or sooner if change in mental status  -ASA reversal with platelets  -Monitored in the ER or the ICU for q 1 hour neuro checks x 4 hours,    If stable with no mental status changes can be transferred to the floor for q 2 hour neuro checks x 8 hours   - continuous telemetry monitoring on the floor  -Syncopal work up  -Will d./w attending

## 2019-08-21 NOTE — ED ADULT NURSE NOTE - OBJECTIVE STATEMENT
Pt a&ox3 had unwitnessed fall today, pt has laceration to back of head, not on anti-coagulation, pt c/o nausea, denies any present pain, pt nsr on monitor, denies headache/dizziness, breathing even and unlabored, abd soft, non-tender, non-distended, skin is cool dry and intact, ivl placed, labs sent, md at bedside, will continue to monitor.

## 2019-08-21 NOTE — H&P ADULT - CLICK TO LAUNCH ORM
GASTROINTESTINAL ENDOSCOPY  04/10/2017    schatzki's ring; chronic peptic duodenitis       Family History   Problem Relation Age of Onset    Heart Disease Mother     Asthma Mother     Heart Disease Father        Social History     Tobacco Use    Smoking status: Current Every Day Smoker     Packs/day: 0.50     Years: 50.00     Pack years: 25.00     Types: Cigarettes    Smokeless tobacco: Never Used   Substance Use Topics    Alcohol use: Yes     Alcohol/week: 4.2 oz     Types: 7 Glasses of wine per week     Comment: 1/2 glass wine daily      Current Outpatient Medications   Medication Sig Dispense Refill    gabapentin (NEURONTIN) 100 MG capsule Take 3 tabs at HS and one tablet  capsule 1    Fluticasone Furoate-Vilanterol (BREO ELLIPTA) 200-25 MCG/INH AEPB Inhale 1 Inhaler into the lungs daily 28 each 5    citalopram (CELEXA) 40 MG tablet TAKE ONE TABLET BY MOUTH DAILY 30 tablet 5    pantoprazole (PROTONIX) 40 MG tablet Take 1 tablet by mouth daily 30 tablet 3    albuterol sulfate HFA (PROVENTIL HFA) 108 (90 Base) MCG/ACT inhaler Inhale 1-2 puffs into the lungs every 4 hours as needed for Wheezing or Shortness of Breath (Space out to every 6 hours as symptoms improve) 1 Inhaler 2    Elastic Bandages & Supports (LUMBAR BACK BRACE/SUPPORT PAD) MISC 1 each by Does not apply route daily as needed (pain) 1 each 0    levothyroxine (SYNTHROID) 50 MCG tablet Take 1 tablet daily on Monday through Saturday, Skip on Sunday. 90 tablet 1    acetaminophen (TYLENOL) 325 MG tablet Take 2 tablets by mouth every 4 hours as needed for Pain or Fever 120 tablet 3    clonazePAM (KLONOPIN) 0.5 MG tablet Take 0.5 mg by mouth 3 times daily as needed (tremors) .       propranolol (INDERAL) 10 MG tablet Take 1 tablet by mouth 3 times daily For tremors, tachycardia 90 tablet 3    vitamin B-12 (CYANOCOBALAMIN) 1000 MCG tablet Take 1,000 mcg by mouth daily      Vitamin D (CHOLECALCIFEROL) 1000 UNITS CAPS capsule Take 1,000 Units by mouth daily      folic acid (FOLVITE) 1 MG tablet Take 1 mg by mouth daily      denosumab (PROLIA) 60 MG/ML SOLN SC injection Inject 1 mL into the skin once for 1 dose 1 mL 0     No current facility-administered medications for this visit. Allergies   Allergen Reactions    Bactrim [Sulfamethoxazole-Trimethoprim]     Sulfa Antibiotics     Motrin [Ibuprofen] Other (See Comments)     Diarrhea         Health Maintenance   Topic Date Due    Hepatitis C screen  1947    Lipid screen  12/24/1985    Shingles Vaccine (1 of 2) 12/24/1995    DEXA (modify frequency per FRAX score)  12/24/2010    Colon cancer screen colonoscopy  04/10/2019    TSH testing  06/04/2019    Breast cancer screen  01/21/2021    DTaP/Tdap/Td vaccine (2 - Td) 09/19/2027    Flu vaccine  Completed    Pneumococcal 65+ years Vaccine  Completed       Subjective:      Review of Systems   Eyes: Positive for visual disturbance. Respiratory: Negative for shortness of breath. sleeps a lot day and night. Objective:     /82   Pulse 67   Wt 147 lb 6.4 oz (66.9 kg)   SpO2 95%   BMI 27.85 kg/m²   Physical Exam   Constitutional: No distress. HENT:   Head: Normocephalic. Right Ear: External ear normal.   Left Ear: External ear normal.   Eyes: Pupils are equal, round, and reactive to light. Conjunctivae are normal.   Cardiovascular: Normal rate, regular rhythm and normal heart sounds. Pulmonary/Chest: Effort normal and breath sounds normal. No respiratory distress. She exhibits tenderness (left lower ant ribs and just below the 12 th). Skin: Capillary refill takes less than 2 seconds. She is not diaphoretic. Assessment:       Diagnosis Orders   1. Rib pain on left side     2. Chronic obstructive pulmonary disease, unspecified COPD type ([facility] [address]     3. Other chronic pain  gabapentin (NEURONTIN) 100 MG capsule        Plan:      Return in about 2 months (around 7/23/2019) for rib pain and weight loss.  .  Change gabapentin dose: take 3 at HS and one BID and see if sleepiness is better. Call prn  See eye doctor. No orders of the defined types were placed in this encounter. Orders Placed This Encounter   Medications    gabapentin (NEURONTIN) 100 MG capsule     Sig: Take 3 tabs at HS and one tablet BID     Dispense:  150 capsule     Refill:  1       Patient given educational materials - see patient instructions. Discussed use, benefit, and side effects of prescribed medications. All patient questions answered. Pt voiced understanding. Reviewed health maintenance. Instructed to continue current medications, improve diet. Patient agreed with treatment plan. Follow up as directed.      Electronicallysigned by Mike Lemon MD on 5/23/2019 at 2:56 PM .

## 2019-08-21 NOTE — H&P ADULT - HISTORY OF PRESENT ILLNESS
73 yo man with PMH of CAD s/p PCI (RYAN to Ramus 4/15 and 2 stents 95'), HTN, HLD, BPH, T2DM p/w unwitnessed fall. Patient reports being in normal state of health this morning, was outside and had unwitnessed syncopal event w/ LOC. Pt does not recall event. Per family at bedside, the patient was standing on the steps outside of his house speaking with a  when event occurred The family reports LOC for 2 minutes, when he awoke was not speaking, only in ambulance was able to verbalize what had happened. He denies history of seizure, stroke, syncope. At this time pt with mild headache, no changes in vision, no SOB, no CP, no motor or sensory deficits. In the ED on admission pt afebrile w/ /88 w/ CTH showing holohemispheric left subdural hematoma with sub-arachonoid blood and CT spine showing no acute fractures or subluxations. The patient was started on a cardeine gtt and transferred to MICU for neuro checks.

## 2019-08-21 NOTE — ED PROVIDER NOTE - CARE PLAN
Principal Discharge DX:	Post-traumatic subdural hematoma with loss of consciousness, initial encounter

## 2019-08-21 NOTE — ED PROVIDER NOTE - ATTENDING CONTRIBUTION TO CARE
74 YOM pmh CAD s/p PCI on aspirin 81mg, HTN,HLD, BPH, DM2, transferred from [facility] for intracranial bleed with increase blood on interval scan. Initially presented to [facility] for unwitnessed fall with +LOC. Found to have L hemispheric SDH with SAH and calvarium fx worsening on interval scan. Cspine CT negative for acute fx. Patient was started on cardene drip for elevated BP >180 and received 1U platelets prior to transfer. Here only endorsing headache and dizziness. Denies nausea, vomiting, chest pain, sob, abd pain, numbness, tingling    Vital Signs Stable  Gen: well appearing, NAD  HEENT: PERRL, MM, neck supple  Cardiac: regular rate rhythm, normal S1S2  Chest: CTA BL, no wheezes or crackles  Abdomen: normal BS, soft, non tender non distended  Extremity: no gross deformity, good perfusion  Skin: no rash  Neuro: AOx3, PERRL, moving all extremities, no obvious focal deficits    AP: neurosurgery eval. patient HD stable while on cardene drip. repeat scans, labs. q1h neuro checks. trauma eval as well. anticipate admission

## 2019-08-21 NOTE — H&P ADULT - NSICDXPASTSURGICALHX_GEN_ALL_CORE_FT
PAST SURGICAL HISTORY:  S/P drug eluting coronary stent placement Ramus    S/P primary angioplasty with coronary stent 1990s

## 2019-08-21 NOTE — ED PROVIDER NOTE - OBJECTIVE STATEMENT
73 yo M, with PMH of CAD w/ x3 stents, IDDM, HTN, HLD, presents to ER s/p unwitnessed fall 2 steps with head injury and LOC a/w today. Pt states he doesn't remember what happened. As per wife, patient was standing on the steps outside the house talking with a , found patient lying on his back, unconscious, woke up after 2 min. 73 yo M, with PMH of CAD w/ x3 stents, IDDM, HTN, HLD, presents to ER s/p unwitnessed fall 2 steps with head injury and LOC a/w today. Pt states he doesn't remember what happened. As per wife, patient was standing on the steps outside the house talking with a , found patient lying on his back, unconscious, woke up after 2 min. Pt reports having mild headache, nausea and vomiting after head injury. As per wife, no signs of seizure activity. Denies any visual disturbances, neck pain, chest pain, sob, back pain, abdominal pain, hip pain, leg pain, weakness, numbness, or any other complaints.

## 2019-08-21 NOTE — ED PROVIDER NOTE - PROGRESS NOTE DETAILS
USHA PETERSON: spoke with MICU, accepted patient, states to admit to Dr. Parrsih. Vladislav: PT signed out to me pending repeat CT and MICU evaluation. repeat CT worsened since previous. neurosurgery PA made aware and still no indication for surgery at this time. Pt okay to go to MICU. MICU also notified. Vladislav: PT signed out to me pending repeat CT and MICU evaluation. repeat CT worsened since previous. neurosurgery PA made aware and still no indication for surgery at this time. Pt okay to go to MICU. MICU also notified- discussed with DR Snow. Vladislav: discussed with Dr Valle of trauma at [facility], agreeable with transfer, discussed with ED attending DR Maddox also agreeable. USHA PETERSON: Pt evaluated by neurosurgery, no surgical intervention, recommends neuro checks and repeat head CT. Spoke with MICU, accepted patient, states to admit to Dr. Parrish.

## 2019-08-21 NOTE — ED ADULT NURSE NOTE - OBJECTIVE STATEMENT
74y male arrived to ED transfer from [facility] for SDH. Patient PMHx DM, CAD. Patient had unwitnessed fall today +LOC around 9am. Patient endorses headache. Patient denies CP, SOB, n/v/d, ab pain, chills, fever, blurred vision. Patient neurologically intact, strength and sensation intact. Patient A&Ox4. Patient given 1L NS, 1 unit of platelets, zofran PTA [facility]. Patient arrived on Cardene. Patient VS stable. Family at the bedside.

## 2019-08-21 NOTE — H&P ADULT - ASSESSMENT
73 yo man with PMH of CAD s/p PCI (RYAN to Ramus 4/15 and 2 stents 95'), HTN, HLD, BPH, T2DM p/w unwitnessed fall found to have SDH and subarachoid blood on cardeine gtt for blood pressure control transferred to MICU for further care.     #Neuro  - pt w/ syncopal event CTH showing SDH and subarachnoid blood. No motor or sensory deficts on exam.  - repeat CTH at 11:25 AM on 8/22  - pt reports taking ASA yesterday, did not take dose day  - Q1 neuro checks in ICU, if no change in mental status can be transferred to floor for q2 neuro checks  - unlikely pt had seizure event    #Pulm  - pt b/l lung sounds CTABL  - breathing RR 18, sat 100 on RA    #Cardiac  - pt hx of CAD w/ 2 PCI events. Trop 15-> 17. EkG shwoing NSR w/ LBBB  - no TTE to review  - patient takes coreg 25 mg bid, ASA, and crestor. C/w  coreg 25 mg bid, benecar 20 mg and Crestor, hold ASA  - pt on cardeine gtt for BP control, systolic range 189/88 - 166/61, goal SBP < 180  - can initiate lisinopril 5 mg daily    #GI  - initiate DASH carb-consistent     #Renal  - continue to monitor electrolyte, no abnormalities at this time  - continue to monitor urine output    #Endo  - pt w/ hx of diabetes, on insulin, reports taking 35 units lantus at night    #ID  - afebrile, monitor off abx    #Heme  - blood counts wnl, platelet 156, INR 0.99    #DVT  - hold in setting of active bleed

## 2019-08-21 NOTE — ED PROVIDER NOTE - CARE PLAN
Principal Discharge DX:	SDH (subdural hematoma)  Secondary Diagnosis:	SAH (subarachnoid hemorrhage)  Secondary Diagnosis:	Calvarial fracture

## 2019-08-21 NOTE — ED ADULT TRIAGE NOTE - CHIEF COMPLAINT QUOTE
Pt fell down two steps. Unwitnessed. Pt with laceration to back of head. Pt vomiting. C collar in place. Pt takes baby ASA.

## 2019-08-21 NOTE — ED ADULT NURSE NOTE - NSIMPLEMENTINTERV_GEN_ALL_ED
Implemented All Universal Safety Interventions:  [place] to call system. Call bell, personal items and telephone within reach. Instruct patient to call for assistance. Room bathroom lighting operational. Non-slip footwear when patient is off stretcher. Physically safe environment: no spills, clutter or unnecessary equipment. Stretcher in lowest position, wheels locked, appropriate side rails in place.

## 2019-08-21 NOTE — H&P ADULT - NSHPLABSRESULTS_GEN_ALL_CORE
13.1   8.07  )-----------( 156      ( 21 Aug 2019 11:03 )             40.0   08-21    137  |  99  |  17  ----------------------------<  148<H>  4.4   |  23  |  1.10    Ca    9.9      21 Aug 2019 11:03    TPro  7.4  /  Alb  4.4  /  TBili  0.9  /  DBili  x   /  AST  34  /  ALT  26  /  AlkPhos  56  08-21    < from: CT Head No Cont (08.21.19 @ 11:25) >      IMPRESSION:  Degenerative changes of the spine. No acute fractures or   subluxations.    These findings were discussed with Dr. Dickey at 12:16 PM with read   back confirmation.    < end of copied text >    < from: CT Head No Cont (08.21.19 @ 11:25) >    IMPRESSION:  Holohemispheric left subdural hematoma with adjacent   subarachnoid blood.    Subdural and/or subarachnoid blood within the right inferior frontal   region. Additional areas of subarachnoid blood as above.    < end of copied text >

## 2019-08-21 NOTE — ED ADULT NURSE REASSESSMENT NOTE - NS ED NURSE REASSESS COMMENT FT1
report given to transfer center by previous RN Ozzy pt transferred in no apparent distress aox4 VSS medication running as ordered transferred with critical care medic for medication titration

## 2019-08-21 NOTE — CONSULT NOTE ADULT - SUBJECTIVE AND OBJECTIVE BOX
HPI: 74y male s/p unwitnessed fall down 2 steps today, + hit his head. + laceration to the back of the head, on baby ASA    PAST MEDICAL & SURGICAL HISTORY:  BPH (benign prostatic hypertrophy)  Hyperlipidemia  CAD (coronary artery disease)  Diabetes mellitus: Type 2  HTN (hypertension)  S/P drug eluting coronary stent placement: Ramus  S/P primary angioplasty with coronary stent: 1990s    Allergies    No Known Allergies      niCARdipine Infusion 5 mG/Hr IV Continuous <Continuous>    SOCIAL HISTORY:  FAMILY HISTORY:  Family history of diabetes mellitus (Sibling): 3 brothers alive with Diabetes    Vital Signs Last 24 Hrs  T(C): 36.8 (21 Aug 2019 11:12), Max: 36.8 (21 Aug 2019 11:12)  T(F): 98.2 (21 Aug 2019 11:12), Max: 98.2 (21 Aug 2019 11:12)  HR: 80 (21 Aug 2019 12:49) (75 - 80)  BP: 155/84 (21 Aug 2019 12:49) (155/84 - 189/88)  BP(mean): --  RR: 18 (21 Aug 2019 12:49) (16 - 18)  SpO2: 98% (21 Aug 2019 12:49) (98% - 100%)    PHYSICAL EXAM:  Awake Alert Attentive Affect appropriate, OX3   Cranial Nerves II-XII Intact  Pupils: PERRL  Motor- Moving all extremities well        LABS:                          13.1   8.07  )-----------( 156      ( 21 Aug 2019 11:03 )             40.0     08-21    137  |  99  |  17  ----------------------------<  148<H>  4.4   |  23  |  1.10    Ca    9.9      21 Aug 2019 11:03    TPro  7.4  /  Alb  4.4  /  TBili  0.9  /  DBili  x   /  AST  34  /  ALT  26  /  AlkPhos  56  08-21    PT/INR - ( 21 Aug 2019 11:50 )   PT: 11.3 SEC;   INR: 0.99          PTT - ( 21 Aug 2019 11:50 )  PTT:24.2 SEC      RADIOLOGY & ADDITIONAL STUDIES:    < from: CT Head No Cont (08.21.19 @ 11:25) >    IMPRESSION:  Degenerative changes of the spine. No acute fractures or   subluxations.    These findings were discussed with Dr. Dickey at 12:16 PM with read   back confirmation.    < end of copied text >    < from: CT Head No Cont (08.21.19 @ 11:25) >  IMPRESSION:  Holohemispheric left subdural hematoma with adjacent   subarachnoid blood.    Subdural and/or subarachnoid blood within the right inferior frontal   region. Additional areas of subarachnoid blood as above.    Nondisplaced calvarial fracture.These findings were discussed with Dr. Dickey at 12:16 PM with read back confirmation.    < end of copied text >  < from: CT Head No Cont (08.21.19 @ 11:25) >  CALVARIUM:  Nondisplaced fracture seen through the sagittal suture which   extends into the right parietal bone and slightly into the superior left   parietal bone.    < end of copied text >

## 2019-08-21 NOTE — ED PROVIDER NOTE - CONSTITUTIONAL, MLM
normal... Elderly male in C-collar, awake, alert, oriented to person, place, time/situation and appears in discomfort with nausea & actively vomiting, hypertensive on cardiac monitor.

## 2019-08-21 NOTE — ED PROVIDER NOTE - PHYSICAL EXAMINATION
VITALS: reviewed  GEN: NAD, A & O x 4  HEAD/EYES: NCAT, PERRL, EOMI, anicteric sclerae, no conjunctival pallor  ENT: mucus membranes moist, oropharynx WNL, trachea midline,   RESP: lungs CTA with equal breath sounds bilaterally, chest wall nontender and atraumatic  CV: heart with reg rhythm S1, S2, distal pulses intact and symmetric bilaterally  ABDOMEN:  soft, nondistended, nontender, no palpable masses  : no CVAT  MSK: extremities atraumatic and nontender, no edema, no asymmetry.  SKIN: warm, dry, no rash, no bruising, no cyanosis. color appropriate for ethnicity  NEURO: alert, mentating appropriately, no facial asymmetry. gross sensation, motor, coordination are intact  PSYCH: Affect appropriate

## 2019-08-21 NOTE — ED ADULT NURSE NOTE - PMH
BPH (benign prostatic hypertrophy)    CAD (coronary artery disease)    Diabetes mellitus  Type 2  HTN (hypertension)    Hyperlipidemia

## 2019-08-21 NOTE — ED ADULT NURSE REASSESSMENT NOTE - NS ED NURSE REASSESS COMMENT FT1
Pt started on cardene infusion for bp control, pt awaiting consult from neurosx, pt nauseous and given zofran IVP, nsr on monitor, denies any further complaints, will continue to monitor.

## 2019-08-21 NOTE — ED ADULT NURSE REASSESSMENT NOTE - NS ED NURSE REASSESS COMMENT FT1
Pt a&ox3, no new neuro deficits noted, pt had c-collar removed by MD, pt reports having headache given Tylenol PO, pt also has nausea given zofran ivp, pt remains on cardene infusion infusing at 2.5mg/hr, pt breathing even and unlabored, nsr on monitor, will continue to monitor.

## 2019-08-21 NOTE — ED PROVIDER NOTE - PSH
S/P drug eluting coronary stent placement  Ramus  S/P primary angioplasty with coronary stent  1990s

## 2019-08-21 NOTE — H&P ADULT - NSICDXPASTMEDICALHX_GEN_ALL_CORE_FT
PAST MEDICAL HISTORY:  BPH (benign prostatic hypertrophy)     CAD (coronary artery disease)     Diabetes mellitus Type 2    HTN (hypertension)     Hyperlipidemia

## 2019-08-21 NOTE — H&P ADULT - NSICDXFAMILYHX_GEN_ALL_CORE_FT
FAMILY HISTORY:  Sibling  Still living? Yes, Estimated age: Age Unknown  Family history of diabetes mellitus, Age at diagnosis: Age Unknown

## 2019-08-22 DIAGNOSIS — S06.5X9A TRAUMATIC SUBDURAL HEMORRHAGE WITH LOSS OF CONSCIOUSNESS OF UNSPECIFIED DURATION, INITIAL ENCOUNTER: ICD-10-CM

## 2019-08-22 DIAGNOSIS — E11.69 TYPE 2 DIABETES MELLITUS WITH OTHER SPECIFIED COMPLICATION: ICD-10-CM

## 2019-08-22 DIAGNOSIS — S02.0XXA FRACTURE OF VAULT OF SKULL, INITIAL ENCOUNTER FOR CLOSED FRACTURE: ICD-10-CM

## 2019-08-22 DIAGNOSIS — N40.0 BENIGN PROSTATIC HYPERPLASIA WITHOUT LOWER URINARY TRACT SYMPTOMS: ICD-10-CM

## 2019-08-22 DIAGNOSIS — I25.10 ATHEROSCLEROTIC HEART DISEASE OF NATIVE CORONARY ARTERY WITHOUT ANGINA PECTORIS: ICD-10-CM

## 2019-08-22 DIAGNOSIS — E78.5 HYPERLIPIDEMIA, UNSPECIFIED: ICD-10-CM
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
## 2019-08-22 LAB
ANION GAP SERPL CALC-SCNC: 12 MMOL/L — SIGNIFICANT CHANGE UP (ref 5–17)
BUN SERPL-MCNC: 15 MG/DL — SIGNIFICANT CHANGE UP (ref 7–23)
CALCIUM SERPL-MCNC: 10.2 MG/DL — SIGNIFICANT CHANGE UP (ref 8.4–10.5)
CHLORIDE SERPL-SCNC: 101 MMOL/L — SIGNIFICANT CHANGE UP (ref 96–108)
CO2 SERPL-SCNC: 24 MMOL/L — SIGNIFICANT CHANGE UP (ref 22–31)
CREAT SERPL-MCNC: 1.08 MG/DL — SIGNIFICANT CHANGE UP (ref 0.5–1.3)
GLUCOSE BLDC GLUCOMTR-MCNC: 159 MG/DL — HIGH (ref 70–99)
GLUCOSE BLDC GLUCOMTR-MCNC: 179 MG/DL — HIGH (ref 70–99)
GLUCOSE BLDC GLUCOMTR-MCNC: 208 MG/DL — HIGH (ref 70–99)
GLUCOSE SERPL-MCNC: 190 MG/DL — HIGH (ref 70–99)
HBA1C BLD-MCNC: 7 % — HIGH (ref 4–5.6)
HCT VFR BLD CALC: 41.4 % — SIGNIFICANT CHANGE UP (ref 39–50)
HGB BLD-MCNC: 13.1 G/DL — SIGNIFICANT CHANGE UP (ref 13–17)
MAGNESIUM SERPL-MCNC: 1.7 MG/DL — SIGNIFICANT CHANGE UP (ref 1.6–2.6)
MCHC RBC-ENTMCNC: 27.9 PG — SIGNIFICANT CHANGE UP (ref 27–34)
MCHC RBC-ENTMCNC: 31.6 GM/DL — LOW (ref 32–36)
MCV RBC AUTO: 88.4 FL — SIGNIFICANT CHANGE UP (ref 80–100)
PHOSPHATE SERPL-MCNC: 2.4 MG/DL — LOW (ref 2.5–4.5)
PLATELET # BLD AUTO: 208 K/UL — SIGNIFICANT CHANGE UP (ref 150–400)
POTASSIUM SERPL-MCNC: 3.8 MMOL/L — SIGNIFICANT CHANGE UP (ref 3.5–5.3)
POTASSIUM SERPL-SCNC: 3.8 MMOL/L — SIGNIFICANT CHANGE UP (ref 3.5–5.3)
RBC # BLD: 4.69 M/UL — SIGNIFICANT CHANGE UP (ref 4.2–5.8)
RBC # FLD: 13.1 % — SIGNIFICANT CHANGE UP (ref 10.3–14.5)
SODIUM SERPL-SCNC: 137 MMOL/L — SIGNIFICANT CHANGE UP (ref 135–145)
TROPONIN T, HIGH SENSITIVITY RESULT: 31 NG/L — SIGNIFICANT CHANGE UP (ref 0–51)
WBC # BLD: 8.3 K/UL — SIGNIFICANT CHANGE UP (ref 3.8–10.5)
WBC # FLD AUTO: 8.3 K/UL — SIGNIFICANT CHANGE UP (ref 3.8–10.5)

## 2019-08-22 PROCEDURE — 93306 TTE W/DOPPLER COMPLETE: CPT | Mod: 26

## 2019-08-22 PROCEDURE — 70450 CT HEAD/BRAIN W/O DYE: CPT | Mod: 26

## 2019-08-22 PROCEDURE — 99223 1ST HOSP IP/OBS HIGH 75: CPT

## 2019-08-22 PROCEDURE — 99222 1ST HOSP IP/OBS MODERATE 55: CPT

## 2019-08-22 RX ORDER — FINASTERIDE 5 MG/1
5 TABLET, FILM COATED ORAL DAILY
Refills: 0 | Status: DISCONTINUED | OUTPATIENT
Start: 2019-08-22 | End: 2019-09-03

## 2019-08-22 RX ORDER — MAGNESIUM SULFATE 500 MG/ML
2 VIAL (ML) INJECTION ONCE
Refills: 0 | Status: COMPLETED | OUTPATIENT
Start: 2019-08-22 | End: 2019-08-22

## 2019-08-22 RX ORDER — CARVEDILOL PHOSPHATE 80 MG/1
25 CAPSULE, EXTENDED RELEASE ORAL EVERY 12 HOURS
Refills: 0 | Status: DISCONTINUED | OUTPATIENT
Start: 2019-08-22 | End: 2019-08-29

## 2019-08-22 RX ORDER — TAMSULOSIN HYDROCHLORIDE 0.4 MG/1
0.4 CAPSULE ORAL AT BEDTIME
Refills: 0 | Status: DISCONTINUED | OUTPATIENT
Start: 2019-08-22 | End: 2019-09-03

## 2019-08-22 RX ORDER — SODIUM CHLORIDE 9 MG/ML
1000 INJECTION INTRAMUSCULAR; INTRAVENOUS; SUBCUTANEOUS
Refills: 0 | Status: DISCONTINUED | OUTPATIENT
Start: 2019-08-22 | End: 2019-08-24

## 2019-08-22 RX ORDER — POTASSIUM PHOSPHATE, MONOBASIC POTASSIUM PHOSPHATE, DIBASIC 236; 224 MG/ML; MG/ML
15 INJECTION, SOLUTION INTRAVENOUS ONCE
Refills: 0 | Status: COMPLETED | OUTPATIENT
Start: 2019-08-22 | End: 2019-08-22

## 2019-08-22 RX ORDER — GABAPENTIN 400 MG/1
300 CAPSULE ORAL
Refills: 0 | Status: DISCONTINUED | OUTPATIENT
Start: 2019-08-22 | End: 2019-09-03

## 2019-08-22 RX ORDER — PANTOPRAZOLE SODIUM 20 MG/1
40 TABLET, DELAYED RELEASE ORAL DAILY
Refills: 0 | Status: DISCONTINUED | OUTPATIENT
Start: 2019-08-22 | End: 2019-08-29

## 2019-08-22 RX ORDER — DEXTROSE 50 % IN WATER 50 %
15 SYRINGE (ML) INTRAVENOUS ONCE
Refills: 0 | Status: DISCONTINUED | OUTPATIENT
Start: 2019-08-22 | End: 2019-08-28

## 2019-08-22 RX ORDER — LEVETIRACETAM 250 MG/1
500 TABLET, FILM COATED ORAL EVERY 12 HOURS
Refills: 0 | Status: COMPLETED | OUTPATIENT
Start: 2019-08-22 | End: 2019-08-29

## 2019-08-22 RX ORDER — INSULIN LISPRO 100/ML
VIAL (ML) SUBCUTANEOUS
Refills: 0 | Status: DISCONTINUED | OUTPATIENT
Start: 2019-08-22 | End: 2019-08-29

## 2019-08-22 RX ORDER — INSULIN GLARGINE 100 [IU]/ML
35 INJECTION, SOLUTION SUBCUTANEOUS AT BEDTIME
Refills: 0 | Status: DISCONTINUED | OUTPATIENT
Start: 2019-08-22 | End: 2019-09-03

## 2019-08-22 RX ORDER — ATORVASTATIN CALCIUM 80 MG/1
20 TABLET, FILM COATED ORAL AT BEDTIME
Refills: 0 | Status: DISCONTINUED | OUTPATIENT
Start: 2019-08-22 | End: 2019-09-02

## 2019-08-22 RX ORDER — GLUCAGON INJECTION, SOLUTION 0.5 MG/.1ML
1 INJECTION, SOLUTION SUBCUTANEOUS ONCE
Refills: 0 | Status: DISCONTINUED | OUTPATIENT
Start: 2019-08-22 | End: 2019-08-29

## 2019-08-22 RX ORDER — DEXAMETHASONE 0.5 MG/5ML
10 ELIXIR ORAL ONCE
Refills: 0 | Status: DISCONTINUED | OUTPATIENT
Start: 2019-08-22 | End: 2019-08-22

## 2019-08-22 RX ORDER — NICARDIPINE HYDROCHLORIDE 30 MG/1
5 CAPSULE, EXTENDED RELEASE ORAL
Qty: 40 | Refills: 0 | Status: DISCONTINUED | OUTPATIENT
Start: 2019-08-22 | End: 2019-08-22

## 2019-08-22 RX ORDER — SODIUM CHLORIDE 9 MG/ML
1000 INJECTION, SOLUTION INTRAVENOUS
Refills: 0 | Status: DISCONTINUED | OUTPATIENT
Start: 2019-08-22 | End: 2019-08-22

## 2019-08-22 RX ORDER — DEXTROSE 50 % IN WATER 50 %
25 SYRINGE (ML) INTRAVENOUS ONCE
Refills: 0 | Status: DISCONTINUED | OUTPATIENT
Start: 2019-08-22 | End: 2019-08-28

## 2019-08-22 RX ORDER — ONDANSETRON 8 MG/1
4 TABLET, FILM COATED ORAL ONCE
Refills: 0 | Status: COMPLETED | OUTPATIENT
Start: 2019-08-22 | End: 2019-08-23

## 2019-08-22 RX ORDER — SODIUM CHLORIDE 9 MG/ML
1000 INJECTION, SOLUTION INTRAVENOUS
Refills: 0 | Status: DISCONTINUED | OUTPATIENT
Start: 2019-08-22 | End: 2019-08-28

## 2019-08-22 RX ORDER — INSULIN LISPRO 100/ML
VIAL (ML) SUBCUTANEOUS EVERY 6 HOURS
Refills: 0 | Status: DISCONTINUED | OUTPATIENT
Start: 2019-08-22 | End: 2019-08-22

## 2019-08-22 RX ORDER — LEVETIRACETAM 250 MG/1
500 TABLET, FILM COATED ORAL ONCE
Refills: 0 | Status: COMPLETED | OUTPATIENT
Start: 2019-08-22 | End: 2019-08-22

## 2019-08-22 RX ORDER — DEXTROSE 50 % IN WATER 50 %
12.5 SYRINGE (ML) INTRAVENOUS ONCE
Refills: 0 | Status: DISCONTINUED | OUTPATIENT
Start: 2019-08-22 | End: 2019-08-28

## 2019-08-22 RX ADMIN — TAMSULOSIN HYDROCHLORIDE 0.4 MILLIGRAM(S): 0.4 CAPSULE ORAL at 22:30

## 2019-08-22 RX ADMIN — LEVETIRACETAM 400 MILLIGRAM(S): 250 TABLET, FILM COATED ORAL at 02:07

## 2019-08-22 RX ADMIN — SODIUM CHLORIDE 100 MILLILITER(S): 9 INJECTION INTRAMUSCULAR; INTRAVENOUS; SUBCUTANEOUS at 11:30

## 2019-08-22 RX ADMIN — Medication 50 GRAM(S): at 12:35

## 2019-08-22 RX ADMIN — FINASTERIDE 5 MILLIGRAM(S): 5 TABLET, FILM COATED ORAL at 05:26

## 2019-08-22 RX ADMIN — INSULIN GLARGINE 35 UNIT(S): 100 INJECTION, SOLUTION SUBCUTANEOUS at 22:29

## 2019-08-22 RX ADMIN — CARVEDILOL PHOSPHATE 25 MILLIGRAM(S): 80 CAPSULE, EXTENDED RELEASE ORAL at 18:34

## 2019-08-22 RX ADMIN — POTASSIUM PHOSPHATE, MONOBASIC POTASSIUM PHOSPHATE, DIBASIC 62.5 MILLIMOLE(S): 236; 224 INJECTION, SOLUTION INTRAVENOUS at 12:33

## 2019-08-22 RX ADMIN — GABAPENTIN 300 MILLIGRAM(S): 400 CAPSULE ORAL at 05:25

## 2019-08-22 RX ADMIN — Medication 1: at 06:01

## 2019-08-22 RX ADMIN — NICARDIPINE HYDROCHLORIDE 25 MG/HR: 30 CAPSULE, EXTENDED RELEASE ORAL at 00:39

## 2019-08-22 RX ADMIN — CARVEDILOL PHOSPHATE 25 MILLIGRAM(S): 80 CAPSULE, EXTENDED RELEASE ORAL at 05:26

## 2019-08-22 RX ADMIN — Medication 2: at 18:33

## 2019-08-22 RX ADMIN — LEVETIRACETAM 400 MILLIGRAM(S): 250 TABLET, FILM COATED ORAL at 22:30

## 2019-08-22 RX ADMIN — Medication 1: at 22:28

## 2019-08-22 RX ADMIN — LEVETIRACETAM 400 MILLIGRAM(S): 250 TABLET, FILM COATED ORAL at 11:31

## 2019-08-22 RX ADMIN — Medication 1: at 12:32

## 2019-08-22 RX ADMIN — ATORVASTATIN CALCIUM 20 MILLIGRAM(S): 80 TABLET, FILM COATED ORAL at 22:30

## 2019-08-22 RX ADMIN — GABAPENTIN 300 MILLIGRAM(S): 400 CAPSULE ORAL at 18:34

## 2019-08-22 RX ADMIN — SODIUM CHLORIDE 75 MILLILITER(S): 9 INJECTION, SOLUTION INTRAVENOUS at 05:39

## 2019-08-22 RX ADMIN — PANTOPRAZOLE SODIUM 40 MILLIGRAM(S): 20 TABLET, DELAYED RELEASE ORAL at 05:48

## 2019-08-22 NOTE — H&P ADULT - NSHPLABSRESULTS_GEN_ALL_CORE
Labs:                        13.1   10.7  )-----------( 206      ( 21 Aug 2019 21:54 )             38.9     PT/INR - ( 21 Aug 2019 21:54 )   PT: 11.7 sec;   INR: 1.03 ratio         PTT - ( 21 Aug 2019 21:54 )  PTT:27.9 sec  08-21    140  |  105  |  13  ----------------------------<  133<H>  3.9   |  21<L>  |  0.97    Ca    8.9      21 Aug 2019 21:54    TPro  7.0  /  Alb  4.0  /  TBili  1.4<H>  /  DBili  x   /  AST  31  /  ALT  24  /  AlkPhos  61  08-21            Imaging and other studies:  < from: CT Head No Cont (08.21.19 @ 11:25) >  Holohemispheric left subdural hematoma with adjacent   subarachnoid blood.    Subdural and/or subarachnoid blood within the right inferior frontal   region. Additional areas of subarachnoid blood as above.    Nondisplaced calvarial fracture    C Spine: Degenerative changes of the spine. No acute fractures or   subluxations.    < from: CT Head No Cont (08.21.19 @ 18:19) >    Left holohemispheric acute subdural hemorrhage is without significant   interval change. However, the right anterior/inferior frontal region   extra-axial blood has increased insize, now measuring a maximal   thickness of 1 cm, previously 0.5 cm. Additionally there are new   subcentimeter parenchymal hemorrhagic contusions within the inferior   right frontal lobe.    < from: CT Head No Cont (08.21.19 @ 23:30) >      No interval change.      < end of copied text >

## 2019-08-22 NOTE — CONSULT NOTE ADULT - SUBJECTIVE AND OBJECTIVE BOX
TRAUMA COMANAGEMENT MEDICINE ATTENDING INITIAL CONSULT NOTE    HPI:  TRAUMA SURGERY H&P    HPI:  This is a 75 y/o Male with a h/o CAD s/p PCI, HTN, HLD, BPH, DM II who initially presented to [facility] after a unwitnessed fall. Pt and wife stated that he was in his usual health, went for a 20 minute walk yesterday morning, returned home and while his wife was making tea he began to walk outside and the next thing he remembers is being down on the sidewalk. Does not remember falling. Denied any prodromal symptoms. As per wife, patient was standing on the steps outside the house talking with a , found patient lying on his back, unconscious, woke up after approx 2. No loss of bowel or bladder contents. Pt reports checking his BP in the morning daily and yesterday it was 120's. Pt was brought to [facility] and on CTH was found to have a Holohemispheric left subdural hematoma with adjacent subarachnoid blood intracranial bleed with increased blood on interval scan. At the OSH he was initially confused and altered. Pt was subsequently transferred to [facility] for trauma eval.       SUBJECTIVE: Seen at bedside with wife, daughter present. Reporting some dizziness and headache. No chest pain/SOb, palpitations. No n/V. No fever/chills    Home Medications:  aspirin 81 mg oral delayed release tablet: 1 tab(s) orally once a day (01 Nov 2015 00:47)  carvedilol 25 mg oral tablet: 1 tab(s) orally 2 times a day (22 Aug 2019 04:23)  Crestor 5 mg oral tablet: 1 tab(s) orally once a day (at bedtime) (01 Nov 2015 00:47)  finasteride 5 mg oral tablet: 1 tab(s) orally once a day (01 Nov 2015 00:47)  folic acid:  orally once a day (at bedtime) (01 Nov 2015 00:47)  gabapentin 300 mg oral tablet: orally 2 times a day (22 Aug 2019 04:21)  insulin glargine 100 units/mL subcutaneous solution: 35 unit(s) subcutaneous once a day (at bedtime) (22 Aug 2019 03:46)  Janumet 1000 mg-50 mg oral tablet: 1 tab(s) orally 2 times a day   (01 Nov 2015 00:47)  olmesartan 20 mg oral tablet: 1 tab(s) orally once a day (22 Aug 2019 03:51)  tamsulosin 0.4 mg oral capsule: 1 cap(s) orally once a day (at bedtime) (08 May 2015 13:43)      PAST MEDICAL & SURGICAL HISTORY:  BPH (benign prostatic hypertrophy)  Hyperlipidemia  CAD (coronary artery disease)  Diabetes mellitus: Type 2  HTN (hypertension)  S/P drug eluting coronary stent placement: Ram  S/P primary angioplasty with coronary stent: 1990s      Review of Systems:   CONSTITUTIONAL: No fever, weight loss, or fatigue  EYES: No eye pain, visual disturbances, or discharge  ENMT:  No difficulty hearing, tinnitus, vertigo; No sinus or throat pain  NECK: No pain or stiffness  RESPIRATORY: No cough, wheezing, chills or hemoptysis; No shortness of breath  CARDIOVASCULAR: No chest pain, palpitations, dizziness, or leg swelling  GASTROINTESTINAL: No abdominal or epigastric pain. No nausea, vomiting, or hematemesis; No diarrhea or constipation. No melena or hematochezia.  GENITOURINARY: No dysuria, frequency, hematuria, or incontinence  NEUROLOGICAL: + headache, dizziness, no memory loss, loss of strength, numbness, or tremors  SKIN: No itching, burning, rashes, or lesions   LYMPH NODES: No enlarged glands  MUSCULOSKELETAL: No joint pain or swelling; No muscle, back, or extremity pain  PSYCHIATRIC: No depression, anxiety, mood swings, or difficulty sleeping  HEME/LYMPH: No easy bruising, or bleeding gums  ALLERGY AND IMMUNOLOGIC: No hives or eczema    Allergies    No Known Allergies    Intolerances        Social History: Nonsmoker, no ETOH    FAMILY HISTORY:  Family history of diabetes mellitus (Sibling): 3 brothers alive with Diabetes    Vital Signs Last 24 Hrs  T(C): 36.1 (22 Aug 2019 10:24), Max: 37.2 (22 Aug 2019 04:47)  T(F): 97 (22 Aug 2019 10:24), Max: 98.9 (22 Aug 2019 04:47)  HR: 80 (22 Aug 2019 07:21) (80 - 104)  BP: 153/71 (22 Aug 2019 07:21) (125/60 - 175/65)  BP(mean): --  RR: 18 (22 Aug 2019 07:21) (16 - 20)  SpO2: 96% (22 Aug 2019 07:21) (96% - 100%)  CAPILLARY BLOOD GLUCOSE      POCT Blood Glucose.: 159 mg/dL (22 Aug 2019 12:13)  POCT Blood Glucose.: 179 mg/dL (22 Aug 2019 05:59)  POCT Blood Glucose.: 128 mg/dL (21 Aug 2019 21:21)  POCT Blood Glucose.: 127 mg/dL (21 Aug 2019 15:20)      PHYSICAL EXAM:  GENERAL: NAD, well-developed  HEAD:  NCAT  EYES: EOMI  NECK: Supple, No JVD  CHEST/LUNG: Clear to auscultation bilaterally; No wheeze  HEART: Reg rate. No M/R/G.  ABDOMEN: SNTND, Bowel sounds present?  EXTREMITIES:  2+ Peripheral Pulses, No clubbing, cyanosis, or edema  PSYCH: AAOx3  NEUROLOGY: non-focal  SKIN: No rashes or lesions    LABS:                        13.1   8.3   )-----------( 208      ( 22 Aug 2019 07:03 )             41.4     08-22    137  |  101  |  15  ----------------------------<  190<H>  3.8   |  24  |  1.08    Ca    10.2      22 Aug 2019 07:03  Phos  2.4     08-22  Mg     1.7     08-22    TPro  7.0  /  Alb  4.0  /  TBili  1.4<H>  /  DBili  x   /  AST  31  /  ALT  24  /  AlkPhos  61  08-21    PT/INR - ( 21 Aug 2019 21:54 )   PT: 11.7 sec;   INR: 1.03 ratio         PTT - ( 21 Aug 2019 21:54 )  PTT:27.9 sec            MEDICATIONS  (STANDING):  atorvastatin 20 milliGRAM(s) Oral at bedtime  carvedilol 25 milliGRAM(s) Oral every 12 hours  dextrose 5%. 1000 milliLiter(s) (50 mL/Hr) IV Continuous <Continuous>  dextrose 50% Injectable 12.5 Gram(s) IV Push once  dextrose 50% Injectable 25 Gram(s) IV Push once  dextrose 50% Injectable 25 Gram(s) IV Push once  finasteride 5 milliGRAM(s) Oral daily  gabapentin 300 milliGRAM(s) Oral two times a day  insulin glargine Injectable (LANTUS) 35 Unit(s) SubCutaneous at bedtime  insulin lispro (HumaLOG) corrective regimen sliding scale   SubCutaneous every 6 hours  levETIRAcetam  IVPB 500 milliGRAM(s) IV Intermittent every 12 hours  ondansetron    Tablet 4 milliGRAM(s) Oral once  sodium chloride 0.9%. 1000 milliLiter(s) (100 mL/Hr) IV Continuous <Continuous>  tamsulosin 0.4 milliGRAM(s) Oral at bedtime    MEDICATIONS  (PRN):  dextrose 40% Gel 15 Gram(s) Oral once PRN Blood Glucose LESS THAN 70 milliGRAM(s)/deciliter  glucagon  Injectable 1 milliGRAM(s) IntraMuscular once PRN Glucose LESS THAN 70 milligrams/deciliter  pantoprazole    Tablet 40 milliGRAM(s) Oral daily PRN Reflux    CT HEAD: Holohemispheric left subdural hematoma with adjacent   subarachnoid blood.    Subdural and/or subarachnoid blood within the right inferior frontal   region

## 2019-08-22 NOTE — PHYSICAL THERAPY INITIAL EVALUATION ADULT - GAIT TRAINING, PT EVAL
GOAL: Pt will ambulate 150 feet w/contact guard assist, w/use of appropriate assistive device in 3 weeks.

## 2019-08-22 NOTE — CONSULT NOTE ADULT - PROBLEM SELECTOR RECOMMENDATION 2
CT head with nondisplaced fracture seen through the sagittal suture which   extends into the right parietal bone and slightly into the superior left parietal bone.  - Pain control

## 2019-08-22 NOTE — PHYSICAL THERAPY INITIAL EVALUATION ADULT - TRANSFER TRAINING, PT EVAL
GOAL: Pt will perform ALL transfers with contact guard assist, w/use of appropriate assistive device as needed, in 3 weeks.

## 2019-08-22 NOTE — PHYSICAL THERAPY INITIAL EVALUATION ADULT - LIVES WITH, PROFILE
As per chart pt resides in a private home with spouse, has 3 steps to enter and bedroom on 1st floor.  Prior to admission pt independent with all functional mobility including ambulation without AD./spouse

## 2019-08-22 NOTE — H&P ADULT - HISTORY OF PRESENT ILLNESS
TRAUMA SURGERY H&P    HPI:  74M hx CAD s/p PCI on ASA, HTN,HLD, BPH, DM2, transferred from [facility] s/p unwitnessed fall with finding of intracranial bleed and increased blood on interval scan. Patient initially presented s/p unwitnessed fall, ?syncopal episode this morning, with complaints of dizziness after fall and initial difficulty speaking. Patient does not recall event. CTH shows L hemispheric SDH with SAH and calvarium fx. Patient was started on cardene drip for elevated BP >180 and received platelets.    PMHx: BPH (benign prostatic hypertrophy)  Hyperlipidemia  CAD (coronary artery disease)  Diabetes mellitus  HTN (hypertension)    PSHx: S/P drug eluting coronary stent placement  S/P primary angioplasty with coronary stent    Medications (inpatient): niCARdipine Infusion 5 mG/Hr IV Continuous <Continuous>    Medications (PRN):  Allergies: No Known Allergies  (Intolerances: )  Social Hx:   Family Hx: Family history of diabetes mellitus (Sibling): 3 brothers alive with Diabetes      Physical Exam  T(C): 37  HR: 101 (75 - 104)  BP: 158/68 (125/60 - 189/88)  RR: 18 (16 - 20)  SpO2: 99% (98% - 100%)  Tmax: T(C): , Max: 37 (08-21-19 @ 20:30)    General: well developed, well nourished, NAD  Neuro: alert and oriented, no focal deficits, moves all extremities spontaneously  HEENT: NCAT, EOMI, anicteric, mucosa moist  Respiratory: airway patent, respirations unlabored  CVS: regular rate and rhythm  Abdomen: soft, nontender, nondistended  Extremities: no edema, sensation and movement grossly intact  Skin: warm, dry, appropriate color    Labs:                        13.1   10.7  )-----------( 206      ( 21 Aug 2019 21:54 )             38.9     PT/INR - ( 21 Aug 2019 21:54 )   PT: 11.7 sec;   INR: 1.03 ratio         PTT - ( 21 Aug 2019 21:54 )  PTT:27.9 sec  08-21    140  |  105  |  13  ----------------------------<  133<H>  3.9   |  21<L>  |  0.97    Ca    8.9      21 Aug 2019 21:54    TPro  7.0  /  Alb  4.0  /  TBili  1.4<H>  /  DBili  x   /  AST  31  /  ALT  24  /  AlkPhos  61  08-21            Imaging and other studies: TRAUMA SURGERY H&P    HPI:  74M hx CAD s/p PCI on ASA, HTN,HLD, BPH, DM2, transferred from [facility] s/p unwitnessed fall with finding of intracranial bleed and increased blood on interval scan. Patient initially presented s/p unwitnessed fall, ?syncopal episode this morning, with complaints of dizziness after fall and initial difficulty speaking. Patient does not recall event. CTH shows L hemispheric SDH with SAH and calvarium fx. Patient was started on cardene drip for elevated BP >180 and received platelets.    PMHx: BPH (benign prostatic hypertrophy)  Hyperlipidemia  CAD (coronary artery disease)  Diabetes mellitus  HTN (hypertension)    PSHx: S/P drug eluting coronary stent placement  S/P primary angioplasty with coronary stent    Medications (inpatient): niCARdipine Infusion 5 mG/Hr IV Continuous <Continuous>    Medications (PRN):  Allergies: No Known Allergies  (Intolerances: )  Family Hx: Family history of diabetes mellitus (Sibling): 3 brothers alive with Diabetes TRAUMA SURGERY H&P    HPI:  74M hx CAD s/p PCI on ASA, HTN,HLD, BPH, DM2, transferred from [facility] s/p unwitnessed fall with finding of intracranial bleed and increased blood on interval scan. Patient initially presented s/p unwitnessed fall, ?syncopal episode this morning, with complaints of dizziness after fall and initial difficulty speaking. Patient does not recall event. CTH shows L hemispheric SDH with SAH and calvarium fx. Per family, at the OSH he was initially confused and altered. In the [facility], was complaining of headache and dizziness, no neurologic deficits. Denies any other injuries, denies pain to his chest, abd, pelvis, or extremities. Denies shortness of breath or difficulty breathing.    PMHx: BPH (benign prostatic hypertrophy)  Hyperlipidemia  CAD (coronary artery disease)  Diabetes mellitus  HTN (hypertension)    PSHx: S/P drug eluting coronary stent placement  S/P primary angioplasty with coronary stent    Medications (inpatient): niCARdipine Infusion 5 mG/Hr IV Continuous <Continuous>    Medications (PRN):  Allergies: No Known Allergies  (Intolerances: )  Family Hx: Family history of diabetes mellitus (Sibling): 3 brothers alive with Diabetes TRAUMA SURGERY H&P    HPI:  74M hx CAD s/p PCI on ASA, HTN,HLD, BPH, DM2, on ASA 81mg daily transferred from [facility] s/p unwitnessed fall with finding of intracranial bleed and increased blood on interval scan. Patient initially presented s/p unwitnessed fall, ?syncopal episode this morning, with complaints of dizziness after fall and initial difficulty speaking. Patient does not recall event. CTH shows L hemispheric SDH with SAH and calvarium fx. Per family, at the OSH he was initially confused and altered. In the [facility] ED, was complaining of headache and dizziness since his fall, no associated neurologic deficits, no aggravating/alleviating factors. Denies any other injuries, denies pain to his chest, abd, pelvis, or extremities. Denies shortness of breath or difficulty breathing.    PMHx: BPH (benign prostatic hypertrophy)  Hyperlipidemia  CAD (coronary artery disease)  Diabetes mellitus  HTN (hypertension)    PSHx: S/P drug eluting coronary stent placement  S/P primary angioplasty with coronary stent    Medications (inpatient): niCARdipine Infusion 5 mG/Hr IV Continuous <Continuous>    Medications (PRN):  Allergies: No Known Allergies  (Intolerances: )  Family Hx: Family history of diabetes mellitus (Sibling): 3 brothers alive with Diabetes

## 2019-08-22 NOTE — CONSULT NOTE ADULT - PROBLEM SELECTOR RECOMMENDATION 3
Holohemispheric left subdural hematoma with adjacent subarachnoid blood. Repeat CTH with Left holohemispheric acute subdural hemorrhage is without significant interval change. However, the right anterior/inferior frontal region extra-axial blood has increased in size, now measuring a maximal thickness of 1 cm, previously 0.5cm. Subsequent f/u CT with no interval change.   - Seen by neurosurg who recommend Keppra  - f/u further neurosurg rec's  - Would allow for permissive hypertension given SDH/SAH

## 2019-08-22 NOTE — PHYSICAL THERAPY INITIAL EVALUATION ADULT - GAIT DEVIATIONS NOTED, PT EVAL
decreased jesus alberto/decreased step length/decreased weight-shifting ability/decreased stride length

## 2019-08-22 NOTE — PROGRESS NOTE ADULT - ASSESSMENT
73yo male s/p fall from standing, unclear cause, initially taken to [facility] where pt had CT head and C-spine, demonstrating L SDH and R SAH. Initially admitted to MICU for Cardene drip for SBP >180mm Hg. Per report, patient received platelets Repeat head CT demonstrated increased SAH and new R frontal IPH, for which patient was transferred to [facility]. CT Head this am stable from prior.     - Acute L SDH, R SAH, R frontal PIH:  -- head CT at 11:30pm stable, CT head this AM is stable   -- Hold ASA and Brilinta  -- Start chemical VTE prophylaxis 24 hours after stable head CT  -- Appreciate NSG follow up  -- Q4 neurochecks  -- Keppra for post-traumatic seizure prophylaxis  - continue metoprolol   - Continue statin  - TTE  - PT Evaluation  - NPO      ACS Surgery [phone]

## 2019-08-22 NOTE — PHYSICAL THERAPY INITIAL EVALUATION ADULT - TRANSFER SAFETY CONCERNS NOTED: SIT/STAND, REHAB EVAL
decreased safety awareness/decreased step length/losing balance/inability to maintain weight-bearing restrictions w/o assist

## 2019-08-22 NOTE — CONSULT NOTE ADULT - ASSESSMENT
Markell, Evan  74M pmhx angina status s/p cath w/ RYAN to Ramus (4/30/15), PCI/stents x2 (1995), HTN, CAD, HLD, BPH, DMT2 s/p collapse in driveway, found to have R frontal contusion and L small SDH, on ASA81, received platelets at OSH. Intact on exam.  - blossoming of contusion on interval scan from OSH, repeat interval grossly stable, f/u final read  - Repeat CTH in AM  - q4h neurochecks / keppra 500mg BID  - Medicine for syncopal workup  - Maintain normal sodium levels  - pt may f/u outpatient with Dr. Biggs after discharge if all imaging remains stable

## 2019-08-22 NOTE — H&P ADULT - ATTENDING COMMENTS
Pt seen and examined, agree with above. Pt s/p fall from standing, unclear cause, initially taken to [facility] where pt had CT head and C-spine, demonstrating L SDH and R SAH. Initially admitted to MICU for Cardene drip for SBP >180mm Hg. Per report, patient received platelets, although I cannot find this documented in the EMR. Repeat head CT demonstrated increased SAH and new R frontal IPH, for which patient was transferred to [facility]. Chart and notes from EM and NSG reviewed, images personally reviewed, labs reviewed.    1. Acute L SDH, R SAH, R frontal PIH:  - Repeat head CT at 11:30pm stable, repeat CT this AM appears stable on my review of the images, but will follow up Radiology read  - Hold ASA and Brilinta  - Will try to clarify regarding platelet transfusion, although efficacy of platelet transfusion to reverse effects of antiplatelet agents is unclear  - Start chemical VTE prophylaxis 24 hours after stable head CT  - Appreciate NSG follow up  - Q4 neurochecks  - Keppra for post-traumatic seizure prophylaxis    2. HTN:  - Would prefer to keep SBP on higher side given acute ICH  - Pt on metoprolol, losartan, amlodipine at home  - Would continue metoprolol to avoid beta-blocker withdrawal in patient with CAD, but would hold other medications for now    3. HLD:  - Continue statin    4. DM type 2 on long-term insulin, with hyperglycemia  - As pt is NPO currently, would give 2/3 of home Lantus dose  - SSI  - Check HbA1c  - Hold oral diabetes medications while in-patient    5. CAD s/p multiple stents:  - Hold ASA and Brilinta in setting of acute ICH  - Continue metoprolol as above    6. BPH:  - Continue home meds

## 2019-08-22 NOTE — PROGRESS NOTE ADULT - SUBJECTIVE AND OBJECTIVE BOX
Trauma Team Surgery Progress Note     Subjective/24hour Events: No acute events overnight. Patient is now nauseous this morning on AM rounds with Trauma team. He reports a headache and dizziness. Denies emesis. Has no other complaints. His CT head scans have been stable.     MEDICATIONS  (STANDING):  atorvastatin 20 milliGRAM(s) Oral at bedtime  carvedilol 25 milliGRAM(s) Oral every 12 hours  dextrose 5%. 1000 milliLiter(s) (50 mL/Hr) IV Continuous <Continuous>  dextrose 50% Injectable 12.5 Gram(s) IV Push once  dextrose 50% Injectable 25 Gram(s) IV Push once  dextrose 50% Injectable 25 Gram(s) IV Push once  finasteride 5 milliGRAM(s) Oral daily  gabapentin 300 milliGRAM(s) Oral two times a day  insulin glargine Injectable (LANTUS) 35 Unit(s) SubCutaneous at bedtime  insulin lispro (HumaLOG) corrective regimen sliding scale   SubCutaneous every 6 hours  levETIRAcetam  IVPB 500 milliGRAM(s) IV Intermittent every 12 hours  ondansetron    Tablet 4 milliGRAM(s) Oral once  sodium chloride 0.9%. 1000 milliLiter(s) (100 mL/Hr) IV Continuous <Continuous>  tamsulosin 0.4 milliGRAM(s) Oral at bedtime    MEDICATIONS  (PRN):  dextrose 40% Gel 15 Gram(s) Oral once PRN Blood Glucose LESS THAN 70 milliGRAM(s)/deciliter  glucagon  Injectable 1 milliGRAM(s) IntraMuscular once PRN Glucose LESS THAN 70 milligrams/deciliter  pantoprazole    Tablet 40 milliGRAM(s) Oral daily PRN Reflux      Vital Signs:  Vital Signs Last 24 Hrs  T(C): 36.1 (22 Aug 2019 10:24), Max: 37.2 (22 Aug 2019 04:47)  T(F): 97 (22 Aug 2019 10:24), Max: 98.9 (22 Aug 2019 04:47)  HR: 80 (22 Aug 2019 07:21) (75 - 104)  BP: 153/71 (22 Aug 2019 07:21) (125/60 - 180/78)  BP(mean): --  RR: 18 (22 Aug 2019 07:21) (16 - 20)  SpO2: 96% (22 Aug 2019 07:21) (96% - 100%)    CAPILLARY BLOOD GLUCOSE    POCT Blood Glucose.: 179 mg/dL (22 Aug 2019 05:59)  POCT Blood Glucose.: 128 mg/dL (21 Aug 2019 21:21)  POCT Blood Glucose.: 127 mg/dL (21 Aug 2019 15:20)      I&O's Detail    21 Aug 2019 07:01  -  22 Aug 2019 07:00  --------------------------------------------------------  IN:    lactated ringers.: 75 mL  Total IN: 75 mL    OUT:  Total OUT: 0 mL    Total NET: 75 mL      22 Aug 2019 07:01  -  22 Aug 2019 10:52  --------------------------------------------------------  IN:  Total IN: 0 mL    OUT:    Voided: 200 mL  Total OUT: 200 mL    Total NET: -200 mL        PHYSICAL EXAM:  	General: NAD, sleeping comfortably  	HEENT: Normocephalic, atraumatic  	Neuro: GCS 15, moves all extremities  	Eyes: EOMI, PEERL  	Neck: Soft, midline trachea, no c-collar  	Cardiac: S1, S2, RRR  	Respiratory: Bilateral breath sounds, clear and equal bilaterally  	Abdomen: Soft, non-distended, non-tender, no rebound, no guarding, no masses palpated    Ext: palpable radial pulses b/l palpable DP and PT pulses b/l, motor and sensory grossly intact in all 4 extremities    Labs:    08-22    137  |  101  |  15  ----------------------------<  190<H>  3.8   |  24  |  1.08    Ca    10.2      22 Aug 2019 07:03  Phos  2.4     08-22  Mg     1.7     08-22    TPro  7.0  /  Alb  4.0  /  TBili  1.4<H>  /  DBili  x   /  AST  31  /  ALT  24  /  AlkPhos  61  08-21    LIVER FUNCTIONS - ( 21 Aug 2019 21:54 )  Alb: 4.0 g/dL / Pro: 7.0 g/dL / ALK PHOS: 61 U/L / ALT: 24 U/L / AST: 31 U/L / GGT: x                                 13.1   8.3   )-----------( 208      ( 22 Aug 2019 07:03 )             41.4     PT/INR - ( 21 Aug 2019 21:54 )   PT: 11.7 sec;   INR: 1.03 ratio         PTT - ( 21 Aug 2019 21:54 )  PTT:27.9 sec          Imaging:      EXAM:  CT BRAIN                            PROCEDURE DATE:  08/22/2019            INTERPRETATION:  INDICATIONS:  Patient with SDH, had been expanding but   stable on most recent CT, need follow up    TECHNIQUE:  Serial axial images were obtained from the skull base to the   vertex without intravenous contrast.    COMPARISON EXAMINATION: 8/21/2019    FINDINGS:    VENTRICLES AND SULCI: Symmetric mass effect on the lateral ventricles   related to bilateral extra-axial collections.  INTRA-AXIAL: No change in appearance of right frontal hemorrhagic   contusion compared with the prior. Subtle left parietal hemorrhagic   contusions suggested as well unchanged compared with 8/21/2019.  EXTRA-AXIAL:  No change in extra-axial hemorrhages mixed attenuation in   appearance in the left parietal and right frontal territories. No new   regions of hemorrhage. Left frontal and right frontal subdural fluid   collections noted as well with focal areas of acute hemorrhage noted   within.  VISUALIZED SINUSES:  Clear.  VISUALIZED MASTOIDS:  Clear.  CALVARIUM: Nondisplaced fracture in the region of the cranial vertex   involving the right parietal bone again recognized as described previously  MISCELLANEOUS:  None.    IMPRESSION:  Interval stability compared with patient's prior 8/21/2019                    JUSTYN VALDEZ M.D., ATTENDING RADIOLOGIST  This document has been electronically signed. Aug 22 2019  9:32AM

## 2019-08-22 NOTE — CONSULT NOTE ADULT - PROBLEM SELECTOR RECOMMENDATION 4
On lantus 35u QHS and Janumet  - Would hold Janumet  - Monitor FS, ISS  - A1c 7.0  - WOuld continue lantus at 35u

## 2019-08-22 NOTE — PHYSICAL THERAPY INITIAL EVALUATION ADULT - IMPAIRMENTS FOUND, PT EVAL
Clear bilaterally, pupils equal, round and reactive to light. gait, locomotion, and balance/aerobic capacity/endurance

## 2019-08-22 NOTE — CONSULT NOTE ADULT - PROBLEM SELECTOR RECOMMENDATION 9
With fall at home. Pt does not entirely remember events leading up to fall. Denied prodromal symptoms. Potential syncopal episode given cardiac history.  Saw his cardiologist on 8/19. Was in his usual state of health. Most recently had carotid dopplers which had no significant stenosis. EKG with LBBB although as per chart review pt with chronic LBBB. SBP in 180's on presentation  - Would monitor on tele to r/o arrhythmia  - Check TTE. Last TTE done three years prior  - Check Orthostatics

## 2019-08-22 NOTE — CONSULT NOTE ADULT - PROBLEM SELECTOR RECOMMENDATION 5
s/p two stenting episodes. First in mid 90s and second in 2015. No chest pain at present EKG with LBBB and on chart review this is chronic.  - c/w statin  - Hold Aspirin until cleared from neurosurgical perspective  - TTE pending

## 2019-08-22 NOTE — PHYSICAL THERAPY INITIAL EVALUATION ADULT - ADDITIONAL COMMENTS
CT Head No Cont (08.21.19 @ 18:19) Left holohemispheric acute subdural hemorrhage is without significant interval change. However, the right anterior/inferior frontal region extra-axial blood has increased in size, now measuring a maximal   thickness of 1 cm, previously 0.5 cm. Additionally there are new subcentimeter parenchymal hemorrhagic contusions within the inferior right frontal lobe.  C Spine: Degenerative changes of the spine. No acute fractures or subluxations.  CT Head No Cont (08.21.19 @ 23:30) No interval change.

## 2019-08-22 NOTE — PHYSICAL THERAPY INITIAL EVALUATION ADULT - PERTINENT HX OF CURRENT PROBLEM, REHAB EVAL
73yo male s/p fall from standing, unclear cause, initially taken to [facility] where CT head and C-spine, demonstrating L SDH and R SAH. Initially admitted to MICU for Cardene drip for SBP >180mm Hg. Transferred to [facility] due to imaging showing L SDH that had initially been increasing in size, now stable from most recent scan

## 2019-08-22 NOTE — PHYSICAL THERAPY INITIAL EVALUATION ADULT - CRITERIA FOR SKILLED THERAPEUTIC INTERVENTIONS
impairments found/risk reduction/prevention/therapy frequency/anticipated discharge recommendation/predicted duration of therapy intervention/functional limitations in following categories/rehab potential

## 2019-08-22 NOTE — H&P ADULT - ASSESSMENT
ASSESSMENT  74M hx CAD s/p PCI on ASA, HTN, HLD, BPH, DM2, transferred from [facility] s/p fall, imaging showing L SDH that had initially been increasing in size, now stable from most recent scan    PLAN  - Admit to ACS under Dr. Valle  - NPO IVF until next repeat CT Head  - Cont home medications  - Pain control  - Repeat Head CT in AM  - Appreciate Neurosurgery recs  - Discussed with Dr. Valle    Acute Care Surgery  [id number] ASSESSMENT  74M hx CAD s/p PCI on ASA, HTN, HLD, BPH, DM2, transferred from [facility] s/p fall, imaging showing L SDH that had initially been increasing in size, now stable from most recent scan    PLAN  - Admit to ACS under Dr. Valle  - NPO IVF until next repeat CT Head  - Cont home medications  - Pain control  - Repeat Head CT in AM  - Hold lovenox prior to CT  - Appreciate Neurosurgery recs  - Discussed with Dr. Valle    Acute Care Surgery  [id number] ASSESSMENT  74M hx CAD s/p PCI on ASA and Brilinta, HTN, HLD, BPH, DM2, transferred from [facility] s/p fall, imaging showing L SDH that had initially been increasing in size, now stable from most recent scan    PLAN  - Admit to ACS under Dr. Valle  - NPO IVF until next repeat CT Head  - Cont home medications except ASA and Brilinta  - Pain control  - Repeat Head CT in AM  - Hold lovenox prior to CT; will plan to start 24 hours after stable head CT per Trauma service protocol  - Appreciate Neurosurgery recs  - Discussed with Dr. Valle    Acute Care Surgery  [id number]

## 2019-08-22 NOTE — PHYSICAL THERAPY INITIAL EVALUATION ADULT - MANUAL MUSCLE TESTING RESULTS, REHAB EVAL
grossly assessed due to/RUE 3+/5, LUE 4-/5, BLE grossly at least 4/5; appears effort limited, pt lethargic

## 2019-08-22 NOTE — H&P ADULT - NSHPPHYSICALEXAM_GEN_ALL_CORE
Physical Exam  T(C): 37  HR: 101 (75 - 104)  BP: 158/68 (125/60 - 189/88)  RR: 18 (16 - 20)  SpO2: 99% (98% - 100%)  Tmax: T(C): , Max: 37 (08-21-19 @ 20:30)    General: NAD  HEENT: Normocephalic, atraumatic, EOMI, PEERLA.  Neck: Soft, midline trachea.  Chest: No chest wall tenderness.   Cardiac: S1, S2, RRR  Respiratory: Bilateral breath sounds, clear and equal bilaterally  Abdomen: Soft, non-distended, non-tender, no rebound, no guarding, no masses palpated  Groin: Normal appearing  Ext: palpable radial pulses b/l palpable DP and PT pulses b/l, motor and sensory grossly intact in all 4 extremities Physical Exam  T(C): 37  HR: 101 (75 - 104)  BP: 158/68 (125/60 - 189/88)  RR: 18 (16 - 20)  SpO2: 99% (98% - 100%)  Tmax: T(C): , Max: 37 (08-21-19 @ 20:30)    General: NAD  HEENT: Normocephalic, atraumatic  Neuro: GCS 15, moves all extremities  Eyes: EOMI, PEERL  Neck: Soft, midline trachea.  C-spine: no tenderness, deformities, or step-offs  Chest: No chest wall tenderness  Cardiac: S1, S2, RRR  Respiratory: Bilateral breath sounds, clear and equal bilaterally  Abdomen: Soft, non-distended, non-tender, no rebound, no guarding, no masses palpated  Groin: Normal appearing  Ext: palpable radial pulses b/l palpable DP and PT pulses b/l, motor and sensory grossly intact in all 4 extremities

## 2019-08-22 NOTE — CONSULT NOTE ADULT - ASSESSMENT
73 y/o Male with a h/o CAD s/p PCI, HTN, HLD, BPH, DM II who initially presented to [facility] after a unwitnessed fall found to have SDH and SAH

## 2019-08-23 DIAGNOSIS — Z29.9 ENCOUNTER FOR PROPHYLACTIC MEASURES, UNSPECIFIED: ICD-10-CM

## 2019-08-23 DIAGNOSIS — R42 DIZZINESS AND GIDDINESS: ICD-10-CM

## 2019-08-23 LAB
ANION GAP SERPL CALC-SCNC: 10 MMOL/L — SIGNIFICANT CHANGE UP (ref 5–17)
BUN SERPL-MCNC: 16 MG/DL — SIGNIFICANT CHANGE UP (ref 7–23)
CALCIUM SERPL-MCNC: 8.4 MG/DL — SIGNIFICANT CHANGE UP (ref 8.4–10.5)
CHLORIDE SERPL-SCNC: 106 MMOL/L — SIGNIFICANT CHANGE UP (ref 96–108)
CO2 SERPL-SCNC: 23 MMOL/L — SIGNIFICANT CHANGE UP (ref 22–31)
CREAT SERPL-MCNC: 0.9 MG/DL — SIGNIFICANT CHANGE UP (ref 0.5–1.3)
GLUCOSE BLDC GLUCOMTR-MCNC: 142 MG/DL — HIGH (ref 70–99)
GLUCOSE BLDC GLUCOMTR-MCNC: 164 MG/DL — HIGH (ref 70–99)
GLUCOSE BLDC GLUCOMTR-MCNC: 175 MG/DL — HIGH (ref 70–99)
GLUCOSE BLDC GLUCOMTR-MCNC: 204 MG/DL — HIGH (ref 70–99)
GLUCOSE BLDC GLUCOMTR-MCNC: 220 MG/DL — HIGH (ref 70–99)
GLUCOSE SERPL-MCNC: 162 MG/DL — HIGH (ref 70–99)
HCT VFR BLD CALC: 36.7 % — LOW (ref 39–50)
HGB BLD-MCNC: 12 G/DL — LOW (ref 13–17)
MAGNESIUM SERPL-MCNC: 1.9 MG/DL — SIGNIFICANT CHANGE UP (ref 1.6–2.6)
MCHC RBC-ENTMCNC: 29.2 PG — SIGNIFICANT CHANGE UP (ref 27–34)
MCHC RBC-ENTMCNC: 32.7 GM/DL — SIGNIFICANT CHANGE UP (ref 32–36)
MCV RBC AUTO: 89.4 FL — SIGNIFICANT CHANGE UP (ref 80–100)
PHOSPHATE SERPL-MCNC: 2.3 MG/DL — LOW (ref 2.5–4.5)
PLATELET # BLD AUTO: 159 K/UL — SIGNIFICANT CHANGE UP (ref 150–400)
POTASSIUM SERPL-MCNC: 3.9 MMOL/L — SIGNIFICANT CHANGE UP (ref 3.5–5.3)
POTASSIUM SERPL-SCNC: 3.9 MMOL/L — SIGNIFICANT CHANGE UP (ref 3.5–5.3)
RBC # BLD: 4.11 M/UL — LOW (ref 4.2–5.8)
RBC # FLD: 13.1 % — SIGNIFICANT CHANGE UP (ref 10.3–14.5)
SODIUM SERPL-SCNC: 139 MMOL/L — SIGNIFICANT CHANGE UP (ref 135–145)
WBC # BLD: 8.4 K/UL — SIGNIFICANT CHANGE UP (ref 3.8–10.5)
WBC # FLD AUTO: 8.4 K/UL — SIGNIFICANT CHANGE UP (ref 3.8–10.5)

## 2019-08-23 PROCEDURE — 99223 1ST HOSP IP/OBS HIGH 75: CPT

## 2019-08-23 PROCEDURE — 99232 SBSQ HOSP IP/OBS MODERATE 35: CPT

## 2019-08-23 PROCEDURE — 99222 1ST HOSP IP/OBS MODERATE 55: CPT

## 2019-08-23 RX ORDER — LOSARTAN POTASSIUM 100 MG/1
50 TABLET, FILM COATED ORAL ONCE
Refills: 0 | Status: COMPLETED | OUTPATIENT
Start: 2019-08-23 | End: 2019-08-23

## 2019-08-23 RX ORDER — SODIUM,POTASSIUM PHOSPHATES 278-250MG
1 POWDER IN PACKET (EA) ORAL
Refills: 0 | Status: COMPLETED | OUTPATIENT
Start: 2019-08-23 | End: 2019-08-25

## 2019-08-23 RX ORDER — MECLIZINE HCL 12.5 MG
12.5 TABLET ORAL EVERY 8 HOURS
Refills: 0 | Status: DISCONTINUED | OUTPATIENT
Start: 2019-08-23 | End: 2019-08-29

## 2019-08-23 RX ORDER — ENOXAPARIN SODIUM 100 MG/ML
40 INJECTION SUBCUTANEOUS DAILY
Refills: 0 | Status: DISCONTINUED | OUTPATIENT
Start: 2019-08-23 | End: 2019-09-03

## 2019-08-23 RX ORDER — LOSARTAN POTASSIUM 100 MG/1
50 TABLET, FILM COATED ORAL DAILY
Refills: 0 | Status: DISCONTINUED | OUTPATIENT
Start: 2019-08-23 | End: 2019-08-25

## 2019-08-23 RX ADMIN — Medication 1 PACKET(S): at 17:56

## 2019-08-23 RX ADMIN — ONDANSETRON 4 MILLIGRAM(S): 8 TABLET, FILM COATED ORAL at 16:14

## 2019-08-23 RX ADMIN — CARVEDILOL PHOSPHATE 25 MILLIGRAM(S): 80 CAPSULE, EXTENDED RELEASE ORAL at 18:01

## 2019-08-23 RX ADMIN — Medication 12.5 MILLIGRAM(S): at 16:16

## 2019-08-23 RX ADMIN — ENOXAPARIN SODIUM 40 MILLIGRAM(S): 100 INJECTION SUBCUTANEOUS at 12:58

## 2019-08-23 RX ADMIN — Medication 2: at 13:21

## 2019-08-23 RX ADMIN — FINASTERIDE 5 MILLIGRAM(S): 5 TABLET, FILM COATED ORAL at 12:53

## 2019-08-23 RX ADMIN — LEVETIRACETAM 400 MILLIGRAM(S): 250 TABLET, FILM COATED ORAL at 22:17

## 2019-08-23 RX ADMIN — ATORVASTATIN CALCIUM 20 MILLIGRAM(S): 80 TABLET, FILM COATED ORAL at 22:17

## 2019-08-23 RX ADMIN — TAMSULOSIN HYDROCHLORIDE 0.4 MILLIGRAM(S): 0.4 CAPSULE ORAL at 22:17

## 2019-08-23 RX ADMIN — SODIUM CHLORIDE 100 MILLILITER(S): 9 INJECTION INTRAMUSCULAR; INTRAVENOUS; SUBCUTANEOUS at 06:08

## 2019-08-23 RX ADMIN — GABAPENTIN 300 MILLIGRAM(S): 400 CAPSULE ORAL at 17:56

## 2019-08-23 RX ADMIN — LEVETIRACETAM 400 MILLIGRAM(S): 250 TABLET, FILM COATED ORAL at 12:56

## 2019-08-23 RX ADMIN — Medication 1 PACKET(S): at 15:14

## 2019-08-23 RX ADMIN — Medication 1: at 22:16

## 2019-08-23 RX ADMIN — Medication 12.5 MILLIGRAM(S): at 22:17

## 2019-08-23 RX ADMIN — Medication 1: at 17:55

## 2019-08-23 RX ADMIN — CARVEDILOL PHOSPHATE 25 MILLIGRAM(S): 80 CAPSULE, EXTENDED RELEASE ORAL at 06:08

## 2019-08-23 RX ADMIN — GABAPENTIN 300 MILLIGRAM(S): 400 CAPSULE ORAL at 06:08

## 2019-08-23 RX ADMIN — LOSARTAN POTASSIUM 50 MILLIGRAM(S): 100 TABLET, FILM COATED ORAL at 15:50

## 2019-08-23 RX ADMIN — INSULIN GLARGINE 35 UNIT(S): 100 INJECTION, SOLUTION SUBCUTANEOUS at 22:17

## 2019-08-23 RX ADMIN — SODIUM CHLORIDE 100 MILLILITER(S): 9 INJECTION INTRAMUSCULAR; INTRAVENOUS; SUBCUTANEOUS at 12:58

## 2019-08-23 NOTE — PROGRESS NOTE ADULT - ASSESSMENT
75yo male s/p fall from standing, unclear cause, found to have L SDH and R SAH. Initially admitted to MICU for Cardene drip for SBP >180mm Hg. Per report, patient received platelets Repeat head CT demonstrated increased SAH and new R frontal IPH, for which patient was transferred to [facility]. CT Head stable x2 from prior.    - Acute L SDH, R SAH, R frontal PIH:  -- head CT stable x2   -- Hold ASA and Brilinta  -- Start chemical VTE prophylaxis: 24 hours after stable head CT (8/22/2019)  -- Appreciate NSG follow up  -- Q4 neurochecks  -- Keppra for post-traumatic seizure prophylaxis    - continue metoprolol     - Continue statin    - TTE:  -- Mitral annular calcification and calcified posterior mitral leaflet. Moderate mitral regurgitation.  -- Calcified aortic valve, mild aortic stenosis. Minimal aortic regurgitation.  -- Moderate concentric left ventricular hypertrophy.  -- Moderate diastolic dysfunction (Stage II).  -- interval progressionof the aortic valve calcification.    - PT Evaluation: patient declined 8/22/2019    - Regular Diet: consistent carbohydrates      ACS Surgery [id number] 75yo male s/p fall from standing, unclear cause, found to have L SDH and R SAH. Initially admitted to MICU for Cardene drip for SBP >180mm Hg. Per report, patient received platelets Repeat head CT demonstrated increased SAH and new R frontal IPH, for which patient was transferred to [facility]. CT Head stable x2 from prior.    - Acute L SDH, R SAH, R frontal PIH:  -- head CT stable x2   -- Hold ASA and Brilinta  -- Start chemical VTE prophylaxis: 24 hours after stable head CT (8/22/2019)  -- Appreciate NSG follow up  -- Q4 neurochecks  -- Keppra for post-traumatic seizure prophylaxis    - continue metoprolol     - Continue statin    - TTE:  -- Mitral annular calcification and calcified posterior mitral leaflet. Moderate mitral regurgitation.  -- Calcified aortic valve, mild aortic stenosis. Minimal aortic regurgitation.  -- Moderate concentric left ventricular hypertrophy.  -- Moderate diastolic dysfunction (Stage II).  -- interval progressionof the aortic valve calcification.    - PT Evaluation: patient declined 8/22/2019, re-evaluate today    - Regular Diet: consistent carbohydrates      ACS Surgery [phone]

## 2019-08-23 NOTE — CONSULT NOTE ADULT - ASSESSMENT
74y with dizziness, pending to be seen 74y with vertigo, found to have rotary nystagmus with head turning to the left with aubree krause pike maneuver.

## 2019-08-23 NOTE — PROGRESS NOTE ADULT - SUBJECTIVE AND OBJECTIVE BOX
Patient seen and examined at bedside.    --Anticoagulation--    T(C): 36.7 (08-23-19 @ 01:52), Max: 37.1 (08-22-19 @ 21:05)  HR: 72 (08-23-19 @ 01:52) (69 - 80)  BP: 165/68 (08-23-19 @ 01:52) (146/73 - 167/75)  RR: 18 (08-23-19 @ 01:52) (17 - 18)  SpO2: 95% (08-23-19 @ 01:52) (95% - 97%)  Wt(kg): --    Exam:  AOx3, FC, EOMI, no facial   5/5 throughout, no drift  SILT

## 2019-08-23 NOTE — PROGRESS NOTE ADULT - SUBJECTIVE AND OBJECTIVE BOX
Arnaldo Ha MD  Division of Hospital Medicine  Pager [phone]  If no response or off hours page: [phone]  ---------------------------------------------------------    JHON LUO  74y  Male      Patient is a 74y old  Male who presents with a chief complaint of S/p Fall (23 Aug 2019 11:04)      INTERVAL HPI/OVERNIGHT EVENTS:  Still with dizziness. Did not want to work with PT yesterday as a result      REVIEW OF SYSTEMS: 14 point ROS negative unless listed above    T(C): 36.5 (08-23-19 @ 09:24), Max: 37.1 (08-22-19 @ 21:05)  HR: 65 (08-23-19 @ 05:50) (65 - 74)  BP: 164/68 (08-23-19 @ 05:50) (150/71 - 167/75)  RR: 18 (08-23-19 @ 09:24) (17 - 18)  SpO2: 97% (08-23-19 @ 09:24) (95% - 97%)  Wt(kg): --Vital Signs Last 24 Hrs  T(C): 36.5 (23 Aug 2019 09:24), Max: 37.1 (22 Aug 2019 21:05)  T(F): 97.7 (23 Aug 2019 09:24), Max: 98.7 (22 Aug 2019 21:05)  HR: 65 (23 Aug 2019 05:50) (65 - 74)  BP: 164/68 (23 Aug 2019 05:50) (150/71 - 167/75)  BP(mean): --  RR: 18 (23 Aug 2019 09:24) (17 - 18)  SpO2: 97% (23 Aug 2019 09:24) (95% - 97%)    PHYSICAL EXAM:  GENERAL: NAD, well-developed  HEAD:  NCAT  NECK: Supple, No JVD  CHEST/LUNG: Clear to auscultation bilaterally; No wheeze  HEART: Reg rate. No M/R/G.  ABDOMEN: SNTND, Bowel sounds present  EXTREMITIES:  2+ Peripheral Pulses, No clubbing, cyanosis, or edema  PSYCH: AAOx3  NEUROLOGY: non-focal  SKIN: No rashes or lesions    Consultant(s) Notes Reviewed:  [x ] YES  [ ] NO  Care Discussed with Consultants/Other Providers [ x] YES  [ ] NO    LABS:                        12.0   8.4   )-----------( 159      ( 23 Aug 2019 06:35 )             36.7     08-23    139  |  106  |  16  ----------------------------<  162<H>  3.9   |  23  |  0.90    Ca    8.4      23 Aug 2019 06:35  Phos  2.3     08-23  Mg     1.9     08-23    TPro  7.0  /  Alb  4.0  /  TBili  1.4<H>  /  DBili  x   /  AST  31  /  ALT  24  /  AlkPhos  61  08-21    PT/INR - ( 21 Aug 2019 21:54 )   PT: 11.7 sec;   INR: 1.03 ratio         PTT - ( 21 Aug 2019 21:54 )  PTT:27.9 sec    CAPILLARY BLOOD GLUCOSE      POCT Blood Glucose.: 204 mg/dL (23 Aug 2019 13:03)  POCT Blood Glucose.: 142 mg/dL (23 Aug 2019 09:04)  POCT Blood Glucose.: 179 mg/dL (22 Aug 2019 21:31)  POCT Blood Glucose.: 208 mg/dL (22 Aug 2019 18:26)  POCT Blood Glucose.: 179 mg/dL (22 Aug 2019 17:05)            RADIOLOGY & ADDITIONAL TESTS:    Imaging Personally Reviewed:  [ x] YES  [ ] NO

## 2019-08-23 NOTE — CONSULT NOTE ADULT - SUBJECTIVE AND OBJECTIVE BOX
CC: vertigo     HPI:  Patient is a 74M pmhx angina status s/p cath w/ RYAN to [facility] (4/30/15), PCI/stents x2 (1995), HTN, CAD, HLD, BPH, DMT2 s/p collapse in driveway, found to have R frontal contusion and L small SDH, on ASA81, received platelets at OSH. Intact on exam. ENT called for room spinning symptoms. Pt denies any n/v, tinnitus, ear pain, congestion, recent URI, otorrhea, hearing loss, hx of sx or trauma or recent travel.       PAST MEDICAL & SURGICAL HISTORY:  BPH (benign prostatic hypertrophy)  Hyperlipidemia  CAD (coronary artery disease)  Diabetes mellitus: Type 2  HTN (hypertension)  S/P drug eluting coronary stent placement: [facility]  S/P primary angioplasty with coronary stent: 1990s    Allergies    No Known Allergies    Intolerances      MEDICATIONS  (STANDING):  atorvastatin 20 milliGRAM(s) Oral at bedtime  carvedilol 25 milliGRAM(s) Oral every 12 hours  dextrose 5%. 1000 milliLiter(s) (50 mL/Hr) IV Continuous <Continuous>  dextrose 50% Injectable 12.5 Gram(s) IV Push once  dextrose 50% Injectable 25 Gram(s) IV Push once  dextrose 50% Injectable 25 Gram(s) IV Push once  enoxaparin Injectable 40 milliGRAM(s) SubCutaneous daily  finasteride 5 milliGRAM(s) Oral daily  gabapentin 300 milliGRAM(s) Oral two times a day  insulin glargine Injectable (LANTUS) 35 Unit(s) SubCutaneous at bedtime  insulin lispro (HumaLOG) corrective regimen sliding scale   SubCutaneous Before meals and at bedtime  levETIRAcetam  IVPB 500 milliGRAM(s) IV Intermittent every 12 hours  ondansetron    Tablet 4 milliGRAM(s) Oral once  sodium chloride 0.9%. 1000 milliLiter(s) (100 mL/Hr) IV Continuous <Continuous>  tamsulosin 0.4 milliGRAM(s) Oral at bedtime    MEDICATIONS  (PRN):  dextrose 40% Gel 15 Gram(s) Oral once PRN Blood Glucose LESS THAN 70 milliGRAM(s)/deciliter  glucagon  Injectable 1 milliGRAM(s) IntraMuscular once PRN Glucose LESS THAN 70 milligrams/deciliter  pantoprazole    Tablet 40 milliGRAM(s) Oral daily PRN Reflux      Social History: **??**    Family history: Pt denies any sign FHx    ROS:   ENT: all negative except as noted in HPI   CV: denies palpitations  Pulm: denies SOB, cough, hemoptysis  GI: denies change in apetite, indigestion, n/v  : denies pertinent urinary symptoms, urgency  Neuro: denies numbness/tingling, loss of sensation  Psych: denies anxiety  MS: denies muscle weakness, instability  Heme: denies easy bruising or bleeding  Endo: denies heat/cold intolerance, excessive sweating  Vascular: denies LE edema    Vital Signs Last 24 Hrs  T(C): 36.5 (23 Aug 2019 09:24), Max: 37.1 (22 Aug 2019 21:05)  T(F): 97.7 (23 Aug 2019 09:24), Max: 98.7 (22 Aug 2019 21:05)  HR: 65 (23 Aug 2019 05:50) (65 - 74)  BP: 164/68 (23 Aug 2019 05:50) (150/71 - 167/75)  BP(mean): --  RR: 18 (23 Aug 2019 09:24) (17 - 18)  SpO2: 97% (23 Aug 2019 09:24) (95% - 97%)                          12.0   8.4   )-----------( 159      ( 23 Aug 2019 06:35 )             36.7    08-23    139  |  106  |  16  ----------------------------<  162<H>  3.9   |  23  |  0.90    Ca    8.4      23 Aug 2019 06:35  Phos  2.3     08-23  Mg     1.9     08-23    TPro  7.0  /  Alb  4.0  /  TBili  1.4<H>  /  DBili  x   /  AST  31  /  ALT  24  /  AlkPhos  61  08-21   PT/INR - ( 21 Aug 2019 21:54 )   PT: 11.7 sec;   INR: 1.03 ratio         PTT - ( 21 Aug 2019 21:54 )  PTT:27.9 sec    PHYSICAL EXAM:  Gen: NAD  Skin: No rashes, bruises, or lesions  Head: Normocephalic, Atraumatic  Face: no edema, erythema, or fluctuance. Parotid glands soft without mass  Eyes: no scleral injection  Ears: Right - ear canal clear, TM intact without effusion or erythema. No evidence of any fluid drainage. No mastoid tenderness, erythema, or ear bulging            Left - ear canal clear, TM intact without effusion or erythema. No evidence of any fluid drainage. No mastoid tenderness, erythema, or ear bulging  Nose: Nares bilaterally patent, no discharge  Mouth: No Stridor / Drooling / Trismus.  Mucosa moist, tongue/uvula midline, oropharynx clear  Neck: Flat, supple, no lymphadenopathy, trachea midline, no masses  Lymphatic: No lymphadenopathy  Resp: breathing easily, no stridor  CV: no peripheral edema/cyanosis  GI: nondistended   Peripheral vascular: no JVD or edema  Neuro: facial nerve intact, no facial droop        Diagnostic Nasal Endoscopy: (Scope #2 used)    Fiberoptic Indirect laryngoscopy:  (Scope #2 used)        IMAGING/ADDITIONAL STUDIES: CC: vertigo     HPI:  Patient is a 74M pmhx angina status s/p cath w/ RYAN to [facility] (4/30/15), PCI/stents x2 (1995), HTN, CAD, HLD, BPH, DMT2 s/p collapse in driveway, found to have R frontal contusion and L small SDH, on ASA81, received platelets at OSH. Intact on exam. ENT called for room spinning symptoms. Pt denies any n/v, tinnitus, ear pain, congestion, recent URI, otorrhea, hearing loss, hx of sx or trauma or recent travel.       PAST MEDICAL & SURGICAL HISTORY:  BPH (benign prostatic hypertrophy)  Hyperlipidemia  CAD (coronary artery disease)  Diabetes mellitus: Type 2  HTN (hypertension)  S/P drug eluting coronary stent placement: [facility]  S/P primary angioplasty with coronary stent: 1990s    Allergies    No Known Allergies    Intolerances      MEDICATIONS  (STANDING):  atorvastatin 20 milliGRAM(s) Oral at bedtime  carvedilol 25 milliGRAM(s) Oral every 12 hours  dextrose 5%. 1000 milliLiter(s) (50 mL/Hr) IV Continuous <Continuous>  dextrose 50% Injectable 12.5 Gram(s) IV Push once  dextrose 50% Injectable 25 Gram(s) IV Push once  dextrose 50% Injectable 25 Gram(s) IV Push once  enoxaparin Injectable 40 milliGRAM(s) SubCutaneous daily  finasteride 5 milliGRAM(s) Oral daily  gabapentin 300 milliGRAM(s) Oral two times a day  insulin glargine Injectable (LANTUS) 35 Unit(s) SubCutaneous at bedtime  insulin lispro (HumaLOG) corrective regimen sliding scale   SubCutaneous Before meals and at bedtime  levETIRAcetam  IVPB 500 milliGRAM(s) IV Intermittent every 12 hours  ondansetron    Tablet 4 milliGRAM(s) Oral once  sodium chloride 0.9%. 1000 milliLiter(s) (100 mL/Hr) IV Continuous <Continuous>  tamsulosin 0.4 milliGRAM(s) Oral at bedtime    MEDICATIONS  (PRN):  dextrose 40% Gel 15 Gram(s) Oral once PRN Blood Glucose LESS THAN 70 milliGRAM(s)/deciliter  glucagon  Injectable 1 milliGRAM(s) IntraMuscular once PRN Glucose LESS THAN 70 milligrams/deciliter  pantoprazole    Tablet 40 milliGRAM(s) Oral daily PRN Reflux      Social History: no tobacco, no etoh     Family history: Pt denies any sign FHx    ROS:   ENT: all negative except as noted in HPI   CV: denies palpitations  Pulm: denies SOB, cough, hemoptysis  GI: denies change in apetite, indigestion, n/v  : denies pertinent urinary symptoms, urgency  Neuro: denies numbness/tingling, loss of sensation  Psych: denies anxiety  MS: denies muscle weakness, instability  Heme: denies easy bruising or bleeding  Endo: denies heat/cold intolerance, excessive sweating  Vascular: denies LE edema    Vital Signs Last 24 Hrs  T(C): 36.5 (23 Aug 2019 09:24), Max: 37.1 (22 Aug 2019 21:05)  T(F): 97.7 (23 Aug 2019 09:24), Max: 98.7 (22 Aug 2019 21:05)  HR: 65 (23 Aug 2019 05:50) (65 - 74)  BP: 164/68 (23 Aug 2019 05:50) (150/71 - 167/75)  BP(mean): --  RR: 18 (23 Aug 2019 09:24) (17 - 18)  SpO2: 97% (23 Aug 2019 09:24) (95% - 97%)                          12.0   8.4   )-----------( 159      ( 23 Aug 2019 06:35 )             36.7    08-23    139  |  106  |  16  ----------------------------<  162<H>  3.9   |  23  |  0.90    Ca    8.4      23 Aug 2019 06:35  Phos  2.3     08-23  Mg     1.9     08-23    TPro  7.0  /  Alb  4.0  /  TBili  1.4<H>  /  DBili  x   /  AST  31  /  ALT  24  /  AlkPhos  61  08-21   PT/INR - ( 21 Aug 2019 21:54 )   PT: 11.7 sec;   INR: 1.03 ratio         PTT - ( 21 Aug 2019 21:54 )  PTT:27.9 sec    PHYSICAL EXAM:  Gen: NAD  Skin: No rashes, bruises, or lesions  Head: Normocephalic, Atraumatic  Face: no edema, erythema, or fluctuance. Parotid glands soft without mass  Eyes: no scleral injection, aubree krause pike + rotary nystagmus present with head turning to the left,   Ears: Right - ear canal clear, TM intact without effusion or erythema. No evidence of any fluid drainage. No mastoid tenderness, erythema, or ear bulging            Left - ear canal with minimal cerumen non occluding , TM intact without effusion or erythema. No evidence of any fluid drainage. No mastoid tenderness, erythema, or ear bulging  Nose: Nares bilaterally patent, no discharge  Mouth: No Stridor / Drooling / Trismus.  Mucosa moist, tongue/uvula midline, oropharynx clear  Neck: Flat, supple, no lymphadenopathy, trachea midline, no masses  Lymphatic: No lymphadenopathy  Resp: breathing easily, no stridor  CV: no peripheral edema/cyanosis  GI: nondistended   Peripheral vascular: no JVD or edema  Neuro: facial nerve intact, no facial droop CC: vertigo     HPI:  Patient is a 74M pmhx angina status s/p cath w/ RYAN to [facility] (4/30/15), PCI/stents x2 (1995), HTN, CAD, HLD, BPH, DMT2 s/p collapse in driveway, found to have R frontal contusion and L small SDH, on ASA81, received platelets at OSH. Intact on exam. ENT called for room spinning symptoms. Pt denies any n/v, tinnitus, ear pain, congestion, recent URI, otorrhea, hearing loss, hx of sx or trauma or recent travel.       PAST MEDICAL & SURGICAL HISTORY:  BPH (benign prostatic hypertrophy)  Hyperlipidemia  CAD (coronary artery disease)  Diabetes mellitus: Type 2  HTN (hypertension)  S/P drug eluting coronary stent placement: [facility]  S/P primary angioplasty with coronary stent: 1990s    Allergies    No Known Allergies    Intolerances      MEDICATIONS  (STANDING):  atorvastatin 20 milliGRAM(s) Oral at bedtime  carvedilol 25 milliGRAM(s) Oral every 12 hours  dextrose 5%. 1000 milliLiter(s) (50 mL/Hr) IV Continuous <Continuous>  dextrose 50% Injectable 12.5 Gram(s) IV Push once  dextrose 50% Injectable 25 Gram(s) IV Push once  dextrose 50% Injectable 25 Gram(s) IV Push once  enoxaparin Injectable 40 milliGRAM(s) SubCutaneous daily  finasteride 5 milliGRAM(s) Oral daily  gabapentin 300 milliGRAM(s) Oral two times a day  insulin glargine Injectable (LANTUS) 35 Unit(s) SubCutaneous at bedtime  insulin lispro (HumaLOG) corrective regimen sliding scale   SubCutaneous Before meals and at bedtime  levETIRAcetam  IVPB 500 milliGRAM(s) IV Intermittent every 12 hours  ondansetron    Tablet 4 milliGRAM(s) Oral once  sodium chloride 0.9%. 1000 milliLiter(s) (100 mL/Hr) IV Continuous <Continuous>  tamsulosin 0.4 milliGRAM(s) Oral at bedtime    MEDICATIONS  (PRN):  dextrose 40% Gel 15 Gram(s) Oral once PRN Blood Glucose LESS THAN 70 milliGRAM(s)/deciliter  glucagon  Injectable 1 milliGRAM(s) IntraMuscular once PRN Glucose LESS THAN 70 milligrams/deciliter  pantoprazole    Tablet 40 milliGRAM(s) Oral daily PRN Reflux      Social History: no tobacco, no etoh     Family history: Pt denies any sign FHx    ROS:   ENT: all negative except as noted in HPI   CV: denies palpitations  Pulm: denies SOB, cough, hemoptysis  GI: denies change in apetite, indigestion, n/v  : denies pertinent urinary symptoms, urgency  Neuro: denies numbness/tingling, loss of sensation  Psych: denies anxiety  MS: denies muscle weakness, instability  Heme: denies easy bruising or bleeding  Endo: denies heat/cold intolerance, excessive sweating  Vascular: denies LE edema    Vital Signs Last 24 Hrs  T(C): 36.5 (23 Aug 2019 09:24), Max: 37.1 (22 Aug 2019 21:05)  T(F): 97.7 (23 Aug 2019 09:24), Max: 98.7 (22 Aug 2019 21:05)  HR: 65 (23 Aug 2019 05:50) (65 - 74)  BP: 164/68 (23 Aug 2019 05:50) (150/71 - 167/75)  BP(mean): --  RR: 18 (23 Aug 2019 09:24) (17 - 18)  SpO2: 97% (23 Aug 2019 09:24) (95% - 97%)                          12.0   8.4   )-----------( 159      ( 23 Aug 2019 06:35 )             36.7    08-23    139  |  106  |  16  ----------------------------<  162<H>  3.9   |  23  |  0.90    Ca    8.4      23 Aug 2019 06:35  Phos  2.3     08-23  Mg     1.9     08-23    TPro  7.0  /  Alb  4.0  /  TBili  1.4<H>  /  DBili  x   /  AST  31  /  ALT  24  /  AlkPhos  61  08-21   PT/INR - ( 21 Aug 2019 21:54 )   PT: 11.7 sec;   INR: 1.03 ratio         PTT - ( 21 Aug 2019 21:54 )  PTT:27.9 sec    PHYSICAL EXAM:  Gen: NAD  Skin: No rashes, bruises, or lesions  Head: Normocephalic, Atraumatic  Face: no edema, erythema, or fluctuance. Parotid glands soft without mass  Eyes: no scleral injection,  Ears: Right - ear canal clear, TM intact without effusion or erythema. No evidence of any fluid drainage. No mastoid tenderness, erythema, or ear bulging            Left - ear canal with minimal cerumen non occluding , TM intact without effusion or erythema. No evidence of any fluid drainage. No mastoid tenderness, erythema, or ear bulging  Nose: Nares bilaterally patent, no discharge  Mouth: No Stridor / Drooling / Trismus.  Mucosa moist, tongue/uvula midline, oropharynx clear  Neck: Flat, supple, no lymphadenopathy, trachea midline, no masses  Lymphatic: No lymphadenopathy  Resp: breathing easily, no stridor  CV: no peripheral edema/cyanosis  GI: nondistended   Peripheral vascular: no JVD or edema  Neuro: facial nerve intact, no facial droop,   Aubree Hallpike: rotatory nystagmus with left gaze, full aubree hallpike deferred due to florid positive nystagmus with head turning alone.

## 2019-08-23 NOTE — PROGRESS NOTE ADULT - PROBLEM SELECTOR PLAN 3
Holohemispheric left subdural hematoma with adjacent subarachnoid blood. Repeat CTH with Left holohemispheric acute subdural hemorrhage is without significant interval change. However, the right anterior/inferior frontal region extra-axial blood has increased in size, now measuring a maximal thickness of 1 cm, previously 0.5cm. Subsequent f/u CT with no interval change.   - Seen by neurosurg who recommend Keppra  - f/u further neurosurg rec's

## 2019-08-23 NOTE — CONSULT NOTE ADULT - PROBLEM SELECTOR RECOMMENDATION 9
- pending to be seen - meclazine   - vestibular rehab with pt   - will continue to improve overtime   - no further ent intervention at this time Patient should follow up in ENT office as an outpatient. May see Dr. Johnson or Leon. Call [phone]. - meclizine   - vestibular rehab with PT if possible while in house  - will continue to improve overtime  - no further ent intervention at this time Patient should follow up in ENT office as an outpatient. May see Dr. Johnson or Leon. Call [phone]. - meclizine 12.5 q8hrs  - vestibular rehab with PT if possible while in house  - will continue to improve overtime  - no further ent intervention at this time Patient should follow up in ENT office as an outpatient. May see Dr. Johnson or Leon. Call [phone].

## 2019-08-23 NOTE — CONSULT NOTE ADULT - SUBJECTIVE AND OBJECTIVE BOX
Patient is a 74y old  Male who presents with a chief complaint of S/p Fall (23 Aug 2019 11:04)    Admission HPI:  74M hx CAD s/p PCI on ASA, HTN,HLD, BPH, DM2, on ASA 81mg daily transferred from [facility] s/p unwitnessed fall with finding of intracranial bleed and increased blood on interval scan. Patient initially presented s/p unwitnessed fall, ?syncopal episode this morning, with complaints of dizziness after fall and initial difficulty speaking. Patient does not recall event. CTH shows L hemispheric SDH with SAH and calvarium fx. Per family, at the OSH he was initially confused and altered. In the [facility] ED, was complaining of headache and dizziness since his fall, no associated neurologic deficits, no aggravating/alleviating factors. Denies any other injuries, denies pain to his chest, abd, pelvis, or extremities. Denies shortness of breath or difficulty breathing.    Interval History:  Patient given platelets.  f/u CT stable.  Evaluated by neurosurgery- no surgical intervention deemed necessary.    REVIEW OF SYSTEMS: No chest pain, shortness of breath, nausea, vomiting or diarhea; other ROS neg     PAST MEDICAL & SURGICAL HISTORY  BPH (benign prostatic hypertrophy)  Hyperlipidemia  CAD (coronary artery disease)  Diabetes mellitus  HTN (hypertension)  S/P drug eluting coronary stent placement  S/P primary angioplasty with coronary stent    FUNCTIONAL HISTORY:   Lives w spouse in home w 3 SKYLER.  PTA Independent    FAMILY HISTORY   Family history of diabetes mellitus (Sibling)    RECENT LABS/IMAGING  CBC Full  -  ( 23 Aug 2019 06:35 )  WBC Count : 8.4 K/uL  RBC Count : 4.11 M/uL  Hemoglobin : 12.0 g/dL  Hematocrit : 36.7 %  Platelet Count - Automated : 159 K/uL  Mean Cell Volume : 89.4 fl  Mean Cell Hemoglobin : 29.2 pg  Mean Cell Hemoglobin Concentration : 32.7 gm/dL  Auto Neutrophil # : x  Auto Lymphocyte # : x  Auto Monocyte # : x  Auto Eosinophil # : x  Auto Basophil # : x  Auto Neutrophil % : x  Auto Lymphocyte % : x  Auto Monocyte % : x  Auto Eosinophil % : x  Auto Basophil % : x    08-23    139  |  106  |  16  ----------------------------<  162<H>  3.9   |  23  |  0.90    Ca    8.4      23 Aug 2019 06:35  Phos  2.3     08-23  Mg     1.9     08-23    TPro  7.0  /  Alb  4.0  /  TBili  1.4<H>  /  DBili  x   /  AST  31  /  ALT  24  /  AlkPhos  61  08-21        VITALS  T(C): 36.5 (08-23-19 @ 09:24), Max: 37.1 (08-22-19 @ 21:05)  HR: 65 (08-23-19 @ 05:50) (65 - 74)  BP: 164/68 (08-23-19 @ 05:50) (150/71 - 167/75)  RR: 18 (08-23-19 @ 09:24) (17 - 18)  SpO2: 97% (08-23-19 @ 09:24) (95% - 97%)  Wt(kg): --    ALLERGIES  No Known Allergies      MEDICATIONS   atorvastatin 20 milliGRAM(s) Oral at bedtime  carvedilol 25 milliGRAM(s) Oral every 12 hours  dextrose 40% Gel 15 Gram(s) Oral once PRN  dextrose 5%. 1000 milliLiter(s) IV Continuous <Continuous>  dextrose 50% Injectable 12.5 Gram(s) IV Push once  dextrose 50% Injectable 25 Gram(s) IV Push once  dextrose 50% Injectable 25 Gram(s) IV Push once  enoxaparin Injectable 40 milliGRAM(s) SubCutaneous daily  finasteride 5 milliGRAM(s) Oral daily  gabapentin 300 milliGRAM(s) Oral two times a day  glucagon  Injectable 1 milliGRAM(s) IntraMuscular once PRN  insulin glargine Injectable (LANTUS) 35 Unit(s) SubCutaneous at bedtime  insulin lispro (HumaLOG) corrective regimen sliding scale   SubCutaneous Before meals and at bedtime  levETIRAcetam  IVPB 500 milliGRAM(s) IV Intermittent every 12 hours  ondansetron    Tablet 4 milliGRAM(s) Oral once  pantoprazole    Tablet 40 milliGRAM(s) Oral daily PRN  potassium phosphate / sodium phosphate powder 1 Packet(s) Oral three times a day before meals  sodium chloride 0.9%. 1000 milliLiter(s) IV Continuous <Continuous>  tamsulosin 0.4 milliGRAM(s) Oral at bedtime      ----------------------------------------------------------------------------------------  PHYSICAL EXAM  Constitutional - NAD, Comfortable  HEENT - NCAT, EOMI  Neck - Supple, No limited ROM  Chest - CTA bilaterally, No wheeze, No rhonchi, No crackles  Cardiovascular - RRR, S1S2, No murmurs  Abdomen - BS+, Soft, NTND  Extremities - No C/C/E, No calf tenderness   Neurologic Exam -                    Cognitive - Awake, Alert, AAO to self, place, date, year, situation     Communication - Fluent, No dysarthria, no aphasia     Cranial Nerves - CN 2-12 intact     Motor - No focal deficits      Sensory - Intact to LT     Reflexes - DTR Intact, No primitive reflexive       Psychiatric - Mood stable, Affect WNL    Impression:  73 yo with functional deficits secondary to diagnosis of TBI    Plan:  PT- ROM, Bed Mob, Transfers, Amb w AD and bracing as needed  OT- ADLs, bracing  SLP- Dysphagia eval and treat  Prec- Falls, Cardiac  DVT Prophylaxis- Lovenox  Skin- Turn q2 h  Dispo- Patient is a 74y old  Male who presents with a chief complaint of S/p Fall (23 Aug 2019 11:04)    Admission HPI:  74M hx CAD s/p PCI on ASA, HTN,HLD, BPH, DM2, on ASA 81mg daily transferred from [facility] s/p unwitnessed fall with finding of intracranial bleed and increased blood on interval scan. Patient initially presented s/p unwitnessed fall, ?syncopal episode this morning, with complaints of dizziness after fall and initial difficulty speaking. Patient does not recall event. CTH shows L hemispheric SDH with SAH and calvarium fx. Per family, at the OSH he was initially confused and altered. In the [facility] ED, was complaining of headache and dizziness since his fall, no associated neurologic deficits, no aggravating/alleviating factors. Denies any other injuries, denies pain to his chest, abd, pelvis, or extremities. Denies shortness of breath or difficulty breathing.    Interval History:  Patient given platelets.  f/u CT stable.  Evaluated by neurosurgery- no surgical intervention deemed necessary.    REVIEW OF SYSTEMS: + dizzy, + poor balance, + HA (controlled), No chest pain, shortness of breath, nausea, vomiting or diarhea; other ROS neg     PAST MEDICAL & SURGICAL HISTORY  BPH (benign prostatic hypertrophy)  Hyperlipidemia  CAD (coronary artery disease)  Diabetes mellitus  HTN (hypertension)  S/P drug eluting coronary stent placement  S/P primary angioplasty with coronary stent    FUNCTIONAL HISTORY:   Lives w spouse in home w 3 SKYLER.  PTA Independent    FAMILY HISTORY   Family history of diabetes mellitus (Sibling)    RECENT LABS/IMAGING  CBC Full  -  ( 23 Aug 2019 06:35 )  WBC Count : 8.4 K/uL  RBC Count : 4.11 M/uL  Hemoglobin : 12.0 g/dL  Hematocrit : 36.7 %  Platelet Count - Automated : 159 K/uL  Mean Cell Volume : 89.4 fl  Mean Cell Hemoglobin : 29.2 pg  Mean Cell Hemoglobin Concentration : 32.7 gm/dL  Auto Neutrophil # : x  Auto Lymphocyte # : x  Auto Monocyte # : x  Auto Eosinophil # : x  Auto Basophil # : x  Auto Neutrophil % : x  Auto Lymphocyte % : x  Auto Monocyte % : x  Auto Eosinophil % : x  Auto Basophil % : x    08-23    139  |  106  |  16  ----------------------------<  162<H>  3.9   |  23  |  0.90    Ca    8.4      23 Aug 2019 06:35  Phos  2.3     08-23  Mg     1.9     08-23    TPro  7.0  /  Alb  4.0  /  TBili  1.4<H>  /  DBili  x   /  AST  31  /  ALT  24  /  AlkPhos  61  08-21        VITALS  T(C): 36.5 (08-23-19 @ 09:24), Max: 37.1 (08-22-19 @ 21:05)  HR: 65 (08-23-19 @ 05:50) (65 - 74)  BP: 164/68 (08-23-19 @ 05:50) (150/71 - 167/75)  RR: 18 (08-23-19 @ 09:24) (17 - 18)  SpO2: 97% (08-23-19 @ 09:24) (95% - 97%)  Wt(kg): --    ALLERGIES  No Known Allergies      MEDICATIONS   atorvastatin 20 milliGRAM(s) Oral at bedtime  carvedilol 25 milliGRAM(s) Oral every 12 hours  dextrose 40% Gel 15 Gram(s) Oral once PRN  dextrose 5%. 1000 milliLiter(s) IV Continuous <Continuous>  dextrose 50% Injectable 12.5 Gram(s) IV Push once  dextrose 50% Injectable 25 Gram(s) IV Push once  dextrose 50% Injectable 25 Gram(s) IV Push once  enoxaparin Injectable 40 milliGRAM(s) SubCutaneous daily  finasteride 5 milliGRAM(s) Oral daily  gabapentin 300 milliGRAM(s) Oral two times a day  glucagon  Injectable 1 milliGRAM(s) IntraMuscular once PRN  insulin glargine Injectable (LANTUS) 35 Unit(s) SubCutaneous at bedtime  insulin lispro (HumaLOG) corrective regimen sliding scale   SubCutaneous Before meals and at bedtime  levETIRAcetam  IVPB 500 milliGRAM(s) IV Intermittent every 12 hours  ondansetron    Tablet 4 milliGRAM(s) Oral once  pantoprazole    Tablet 40 milliGRAM(s) Oral daily PRN  potassium phosphate / sodium phosphate powder 1 Packet(s) Oral three times a day before meals  sodium chloride 0.9%. 1000 milliLiter(s) IV Continuous <Continuous>  tamsulosin 0.4 milliGRAM(s) Oral at bedtime      ----------------------------------------------------------------------------------------  PHYSICAL EXAM  Constitutional - NAD, Comfortable  HEENT - NCAT, EOMI  Neck - Supple, No limited ROM  Chest - CTA bilaterally, No wheeze, No rhonchi, No crackles  Cardiovascular - RRR, S1S2, No murmurs  Abdomen - BS+, Soft, NTND  Extremities - No C/C/E, No calf tenderness   Neurologic Exam -                    AAO x 3     Follows verbal instruction     Slow processing     ST memory 0/3; limited insight     VILCHIS x 4 non-focal     DTRs 2+     Psychiatric - Mood stable, Affect WNL    Impression:  75 yo with functional deficits secondary to diagnosis of TBI    Plan:  PT- ROM, Bed Mob, Transfers, Amb w AD and bracing as needed  OT- ADLs, bracing  SLP- Dysphagia eval and treat  Prec- Falls, Cardiac  DVT Prophylaxis- Lovenox  Skin- Turn q2 h  Dispo- Acute TBI Rehab- can tolerate 3h/d PT/OT/SLP and requires daily physician visits  D/W patient rehab options and functional prognosis

## 2019-08-23 NOTE — PROGRESS NOTE ADULT - PROBLEM SELECTOR PLAN 4
On lantus 35u QHS and Janumet. FS relatively well controlled  - Would hold Janumet  - Monitor FS, ISS  - A1c 7.0

## 2019-08-23 NOTE — PROGRESS NOTE ADULT - ASSESSMENT
Markell, Evan  74M pmhx angina status s/p cath w/ RYAN to Ramus (4/30/15), PCI/stents x2 (1995), HTN, CAD, HLD, BPH, DMT2 s/p collapse in driveway, found to have R frontal contusion and L small SDH, on ASA81, received platelets at OSH. Intact on exam.  - Yesterday AM scan stable. No acute NSGY interventions   - US with no significant Carotid artery stenosis  - Keppra 500 bid for 2 weeks   - f/u outpatient with Dr. Camargo

## 2019-08-23 NOTE — PROGRESS NOTE ADULT - SUBJECTIVE AND OBJECTIVE BOX
Trauma Team Surgery Progress Note     Subjective/24hour Events: No acute events overnight. Patient refused physical therapy participation yesterday. Vitally stable, afebrile overnight. Denies emesis. Has no other complaints. His CT head scans have been stable x2.       MEDICATIONS  (STANDING):  atorvastatin 20 milliGRAM(s) Oral at bedtime  carvedilol 25 milliGRAM(s) Oral every 12 hours  dextrose 5%. 1000 milliLiter(s) (50 mL/Hr) IV Continuous <Continuous>  dextrose 50% Injectable 12.5 Gram(s) IV Push once  dextrose 50% Injectable 25 Gram(s) IV Push once  dextrose 50% Injectable 25 Gram(s) IV Push once  finasteride 5 milliGRAM(s) Oral daily  gabapentin 300 milliGRAM(s) Oral two times a day  insulin glargine Injectable (LANTUS) 35 Unit(s) SubCutaneous at bedtime  insulin lispro (HumaLOG) corrective regimen sliding scale   SubCutaneous Before meals and at bedtime  levETIRAcetam  IVPB 500 milliGRAM(s) IV Intermittent every 12 hours  ondansetron    Tablet 4 milliGRAM(s) Oral once  sodium chloride 0.9%. 1000 milliLiter(s) (100 mL/Hr) IV Continuous <Continuous>  tamsulosin 0.4 milliGRAM(s) Oral at bedtime    MEDICATIONS  (PRN):  dextrose 40% Gel 15 Gram(s) Oral once PRN Blood Glucose LESS THAN 70 milliGRAM(s)/deciliter  glucagon  Injectable 1 milliGRAM(s) IntraMuscular once PRN Glucose LESS THAN 70 milligrams/deciliter  pantoprazole    Tablet 40 milliGRAM(s) Oral daily PRN Reflux      Vital Signs:  Vital Signs Last 24 Hrs  T(C): 36.7 (23 Aug 2019 01:52), Max: 37.1 (22 Aug 2019 21:05)  T(F): 98.1 (23 Aug 2019 01:52), Max: 98.7 (22 Aug 2019 21:05)  HR: 72 (23 Aug 2019 01:52) (69 - 80)  BP: 165/68 (23 Aug 2019 01:52) (146/73 - 167/75)  BP(mean): --  RR: 18 (23 Aug 2019 01:52) (17 - 18)  SpO2: 95% (23 Aug 2019 01:52) (95% - 97%)    CAPILLARY BLOOD GLUCOSE      POCT Blood Glucose.: 179 mg/dL (22 Aug 2019 21:31)  POCT Blood Glucose.: 208 mg/dL (22 Aug 2019 18:26)  POCT Blood Glucose.: 179 mg/dL (22 Aug 2019 17:05)  POCT Blood Glucose.: 159 mg/dL (22 Aug 2019 12:13)  POCT Blood Glucose.: 179 mg/dL (22 Aug 2019 05:59)      I&O's Detail    21 Aug 2019 07:01  -  22 Aug 2019 07:00  --------------------------------------------------------  IN:    lactated ringers.: 75 mL  Total IN: 75 mL    OUT:  Total OUT: 0 mL    Total NET: 75 mL      22 Aug 2019 07:01  -  23 Aug 2019 04:47  --------------------------------------------------------  IN:    IV PiggyBack: 300 mL    sodium chloride 0.9%.: 100 mL  Total IN: 400 mL    OUT:    Voided: 900 mL  Total OUT: 900 mL    Total NET: -500 mL      PHYSICAL EXAM:  	General: NAD, sleeping comfortably  	HEENT: Normocephalic, atraumatic  	Neuro: GCS 15, moves all extremities  	Eyes: EOMI, PEERL  	Neck: Soft, midline trachea, no c-collar  	Cardiac: S1, S2, RRR  	Respiratory: Bilateral breath sounds, clear and equal bilaterally  	Abdomen: Soft, non-distended, non-tender, no rebound, no guarding, no masses palpated    Ext: palpable radial pulses b/l palpable DP and PT pulses b/l, motor and sensory grossly intact in all 4 extremities      Labs:    08-22    137  |  101  |  15  ----------------------------<  190<H>  3.8   |  24  |  1.08    Ca    10.2      22 Aug 2019 07:03  Phos  2.4     08-22  Mg     1.7     08-22    TPro  7.0  /  Alb  4.0  /  TBili  1.4<H>  /  DBili  x   /  AST  31  /  ALT  24  /  AlkPhos  61  08-21    LIVER FUNCTIONS - ( 21 Aug 2019 21:54 )  Alb: 4.0 g/dL / Pro: 7.0 g/dL / ALK PHOS: 61 U/L / ALT: 24 U/L / AST: 31 U/L / GGT: x                                 13.1   8.3   )-----------( 208      ( 22 Aug 2019 07:03 )             41.4     PT/INR - ( 21 Aug 2019 21:54 )   PT: 11.7 sec;   INR: 1.03 ratio         PTT - ( 21 Aug 2019 21:54 )  PTT:27.9 sec      Imaging:    Patient name: JHON LUO  ------------------------------------------------------------------------  PROCEDURE: Transthoracic echocardiogram with 2-D, M-Mode  and complete spectral and color flow Doppler.  INDICATION: Syncope and collapse (R55)  ------------------------------------------------------------------------  Conclusions:  1. Mitral annular calcification and calcified posterior  mitral leaflet with normal diastolic opening. Moderate  mitral regurgitation.  2. Calcified aortic valve. Peak transaortic valve gradient  equals 7 mm Hg, estimated aortic valve area equals 1.9 sqcm  (by continuity equation), aortic valve velocity time  integral equals 25 cm, consistent with mild aortic  stenosis. Minimal aortic regurgitation.  3. Moderate concentric left ventricular hypertrophy.  4. Normal left ventricular systolic function. No segmental  wall motion abnormalities.  5. Moderate diastolic dysfunction (Stage II).  6. Normal right ventricular size and function.  *** Compared with echocardiogram of 5/8/2015,  there has  been interval progression of the aortic valve calcification.    ------------------------------------------------------------------------  Confirmed on  8/22/2019 - 17:13:44 by David Rocha M.D.  ------------------------------------------------------------------------        EXAM:  CT BRAIN                          PROCEDURE DATE:  08/22/2019      INTERPRETATION:  INDICATIONS:  Patient with SDH, had been expanding but   stable on most recent CT, need follow up    COMPARISON EXAMINATION: 8/21/2019    FINDINGS:    VENTRICLES AND SULCI: Symmetric mass effect on the lateral ventricles   related to bilateral extra-axial collections.  INTRA-AXIAL: No change in appearance of right frontal hemorrhagic   contusion compared with the prior. Subtle left parietal hemorrhagic   contusions suggested as well unchanged compared with 8/21/2019.  EXTRA-AXIAL:  No change in extra-axial hemorrhages mixed attenuation in   appearance in the left parietal and right frontal territories. No new   regions of hemorrhage. Left frontal and right frontal subdural fluid   collections noted as well with focal areas of acute hemorrhage noted   within.  VISUALIZED SINUSES:  Clear.  VISUALIZED MASTOIDS:  Clear.  CALVARIUM: Nondisplaced fracture in the region of the cranial vertex   involving the right parietal bone again recognized as described previously  MISCELLANEOUS:  None.    IMPRESSION:  Interval stability compared with patient's prior 8/21/2019        JUSTYN VALDEZ M.D., ATTENDING RADIOLOGIST  This document has been electronically signed. Aug 22 2019  9:32AM          EXAM:  CAROTID DUPLEX BILATERAL                        PROCEDURE DATE:  08/19/2019      HISTORY:  Cervical bruit    There is increased tortuosity to the right and left internal carotid   arteries.    There is mild to moderate atheromatous intimal thickening and   irregularity, somewhat more severe on the left, affecting the carotid   arteries in the neck.    Blood flow velocities are as follows:    RIGHT:    PROX CCA = 52 ;  DIST CCA = 47 ;  PROX ICA = 58 ;  DIST ICA =   88 ;  ECA = 85    LEFT   :    PROX CCA = 72 ;  DIST CCA = 76 ;  PROX ICA = 82 ;  DIST ICA =   116 ;  ECA = 69    There is antegrade flow through both vertebral arteries.    IMPRESSION: No hemodynamically significant carotid artery stenoses.    Measurement of carotid stenosis is based on velocity parameters that   correlate the residual internal carotid diameter with that of the more   distal vessel in accordance with a method such as the [place] American   Symptomatic Carotid Endarterectomy Trial (NASCET).      LUCRECIA MCMAHON M.D., ATTENDING RADIOLOGIST  This document has been electronically signed. Aug 19 2019 11:03AM Trauma Team Surgery Progress Note     Subjective/24hour Events: No acute events overnight. Patient refused physical therapy participation yesterday. Vitally stable, afebrile overnight. He reports dizziness this morning but no nausea. Denies emesis. Has no other complaints. His CT head scans have been stable x2.       MEDICATIONS  (STANDING):  atorvastatin 20 milliGRAM(s) Oral at bedtime  carvedilol 25 milliGRAM(s) Oral every 12 hours  dextrose 5%. 1000 milliLiter(s) (50 mL/Hr) IV Continuous <Continuous>  dextrose 50% Injectable 12.5 Gram(s) IV Push once  dextrose 50% Injectable 25 Gram(s) IV Push once  dextrose 50% Injectable 25 Gram(s) IV Push once  finasteride 5 milliGRAM(s) Oral daily  gabapentin 300 milliGRAM(s) Oral two times a day  insulin glargine Injectable (LANTUS) 35 Unit(s) SubCutaneous at bedtime  insulin lispro (HumaLOG) corrective regimen sliding scale   SubCutaneous Before meals and at bedtime  levETIRAcetam  IVPB 500 milliGRAM(s) IV Intermittent every 12 hours  ondansetron    Tablet 4 milliGRAM(s) Oral once  sodium chloride 0.9%. 1000 milliLiter(s) (100 mL/Hr) IV Continuous <Continuous>  tamsulosin 0.4 milliGRAM(s) Oral at bedtime    MEDICATIONS  (PRN):  dextrose 40% Gel 15 Gram(s) Oral once PRN Blood Glucose LESS THAN 70 milliGRAM(s)/deciliter  glucagon  Injectable 1 milliGRAM(s) IntraMuscular once PRN Glucose LESS THAN 70 milligrams/deciliter  pantoprazole    Tablet 40 milliGRAM(s) Oral daily PRN Reflux      Vital Signs:  Vital Signs Last 24 Hrs  T(C): 36.7 (23 Aug 2019 01:52), Max: 37.1 (22 Aug 2019 21:05)  T(F): 98.1 (23 Aug 2019 01:52), Max: 98.7 (22 Aug 2019 21:05)  HR: 72 (23 Aug 2019 01:52) (69 - 80)  BP: 165/68 (23 Aug 2019 01:52) (146/73 - 167/75)  BP(mean): --  RR: 18 (23 Aug 2019 01:52) (17 - 18)  SpO2: 95% (23 Aug 2019 01:52) (95% - 97%)    CAPILLARY BLOOD GLUCOSE  POCT Blood Glucose.: 179 mg/dL (22 Aug 2019 21:31)  POCT Blood Glucose.: 208 mg/dL (22 Aug 2019 18:26)  POCT Blood Glucose.: 179 mg/dL (22 Aug 2019 17:05)  POCT Blood Glucose.: 159 mg/dL (22 Aug 2019 12:13)  POCT Blood Glucose.: 179 mg/dL (22 Aug 2019 05:59)      I&O's Detail    21 Aug 2019 07:01  -  22 Aug 2019 07:00  --------------------------------------------------------  IN:    lactated ringers.: 75 mL  Total IN: 75 mL    OUT:  Total OUT: 0 mL    Total NET: 75 mL      22 Aug 2019 07:01  -  23 Aug 2019 04:47  --------------------------------------------------------  IN:    IV PiggyBack: 300 mL    sodium chloride 0.9%.: 100 mL  Total IN: 400 mL    OUT:    Voided: 900 mL  Total OUT: 900 mL    Total NET: -500 mL      PHYSICAL EXAM:  	General: NAD, sleeping comfortably  	HEENT: Normocephalic, atraumatic  	Neuro: GCS 15, moves all extremities  	Eyes: EOMI, PEERL  	Neck: Soft, midline trachea, no c-collar  	Cardiac: S1, S2, RRR  	Respiratory: Bilateral breath sounds, clear and equal bilaterally  	Abdomen: Soft, non-distended, non-tender, no rebound, no guarding, no masses palpated    Ext: palpable radial pulses b/l palpable DP and PT pulses b/l, motor and sensory grossly intact in all 4 extremities      Labs:    08-22    137  |  101  |  15  ----------------------------<  190<H>  3.8   |  24  |  1.08    Ca    10.2      22 Aug 2019 07:03  Phos  2.4     08-22  Mg     1.7     08-22    TPro  7.0  /  Alb  4.0  /  TBili  1.4<H>  /  DBili  x   /  AST  31  /  ALT  24  /  AlkPhos  61  08-21    LIVER FUNCTIONS - ( 21 Aug 2019 21:54 )  Alb: 4.0 g/dL / Pro: 7.0 g/dL / ALK PHOS: 61 U/L / ALT: 24 U/L / AST: 31 U/L / GGT: x                                 13.1   8.3   )-----------( 208      ( 22 Aug 2019 07:03 )             41.4     PT/INR - ( 21 Aug 2019 21:54 )   PT: 11.7 sec;   INR: 1.03 ratio         PTT - ( 21 Aug 2019 21:54 )  PTT:27.9 sec      Imaging:    Patient name: JHON LUO  ------------------------------------------------------------------------  PROCEDURE: Transthoracic echocardiogram with 2-D, M-Mode  and complete spectral and color flow Doppler.  INDICATION: Syncope and collapse (R55)  ------------------------------------------------------------------------  Conclusions:  1. Mitral annular calcification and calcified posterior  mitral leaflet with normal diastolic opening. Moderate  mitral regurgitation.  2. Calcified aortic valve. Peak transaortic valve gradient  equals 7 mm Hg, estimated aortic valve area equals 1.9 sqcm  (by continuity equation), aortic valve velocity time  integral equals 25 cm, consistent with mild aortic  stenosis. Minimal aortic regurgitation.  3. Moderate concentric left ventricular hypertrophy.  4. Normal left ventricular systolic function. No segmental  wall motion abnormalities.  5. Moderate diastolic dysfunction (Stage II).  6. Normal right ventricular size and function.  *** Compared with echocardiogram of 5/8/2015,  there has  been interval progression of the aortic valve calcification.    ------------------------------------------------------------------------  Confirmed on  8/22/2019 - 17:13:44 by David Rocha M.D.  ------------------------------------------------------------------------        EXAM:  CT BRAIN                          PROCEDURE DATE:  08/22/2019      INTERPRETATION:  INDICATIONS:  Patient with SDH, had been expanding but   stable on most recent CT, need follow up    COMPARISON EXAMINATION: 8/21/2019    FINDINGS:    VENTRICLES AND SULCI: Symmetric mass effect on the lateral ventricles   related to bilateral extra-axial collections.  INTRA-AXIAL: No change in appearance of right frontal hemorrhagic   contusion compared with the prior. Subtle left parietal hemorrhagic   contusions suggested as well unchanged compared with 8/21/2019.  EXTRA-AXIAL:  No change in extra-axial hemorrhages mixed attenuation in   appearance in the left parietal and right frontal territories. No new   regions of hemorrhage. Left frontal and right frontal subdural fluid   collections noted as well with focal areas of acute hemorrhage noted   within.  VISUALIZED SINUSES:  Clear.  VISUALIZED MASTOIDS:  Clear.  CALVARIUM: Nondisplaced fracture in the region of the cranial vertex   involving the right parietal bone again recognized as described previously  MISCELLANEOUS:  None.    IMPRESSION:  Interval stability compared with patient's prior 8/21/2019        JUSTYN VALDEZ M.D., ATTENDING RADIOLOGIST  This document has been electronically signed. Aug 22 2019  9:32AM          EXAM:  CAROTID DUPLEX BILATERAL                        PROCEDURE DATE:  08/19/2019      HISTORY:  Cervical bruit    There is increased tortuosity to the right and left internal carotid   arteries.    There is mild to moderate atheromatous intimal thickening and   irregularity, somewhat more severe on the left, affecting the carotid   arteries in the neck.    Blood flow velocities are as follows:    RIGHT:    PROX CCA = 52 ;  DIST CCA = 47 ;  PROX ICA = 58 ;  DIST ICA =   88 ;  ECA = 85    LEFT   :    PROX CCA = 72 ;  DIST CCA = 76 ;  PROX ICA = 82 ;  DIST ICA =   116 ;  ECA = 69    There is antegrade flow through both vertebral arteries.    IMPRESSION: No hemodynamically significant carotid artery stenoses.    Measurement of carotid stenosis is based on velocity parameters that   correlate the residual internal carotid diameter with that of the more   distal vessel in accordance with a method such as the North American   Symptomatic Carotid Endarterectomy Trial (NASCET).      LUCRECIA MCMAHON M.D., ATTENDING RADIOLOGIST  This document has been electronically signed. Aug 19 2019 11:03AM

## 2019-08-23 NOTE — PROGRESS NOTE ADULT - ASSESSMENT
75 y/o Male with a h/o CAD s/p PCI, HTN, HLD, BPH, DM II who initially presented to [facility] after a unwitnessed fall found to have SDH and SAH

## 2019-08-23 NOTE — PROGRESS NOTE ADULT - PROBLEM SELECTOR PLAN 5
s/p two stenting episodes. First in mid 90s and second in 2015. No chest pain at present EKG with LBBB and on chart review this is chronic.  - c/w statin  - Hold Aspirin until cleared from neurosurgical perspective

## 2019-08-23 NOTE — PROGRESS NOTE ADULT - PROBLEM SELECTOR PLAN 1
With fall at home. Pt does not entirely remember events leading up to fall. Denied prodromal symptoms. Potential syncopal episode given cardiac history.  Saw his cardiologist on 8/19. Was in his usual state of health. Most recently had carotid dopplers which had no significant stenosis. EKG with LBBB although as per chart review pt with chronic LBBB. SBP in 180's on presentation  - c/w tele to monitor for arrhythmia  - TTE grossly similar to prior, however now with mild AS  - Check Orthostatics.  - May require outpatient cardiology f/u for potential holter to r/o arrhythmia

## 2019-08-24 LAB
ANION GAP SERPL CALC-SCNC: 11 MMOL/L — SIGNIFICANT CHANGE UP (ref 5–17)
BUN SERPL-MCNC: 11 MG/DL — SIGNIFICANT CHANGE UP (ref 7–23)
CALCIUM SERPL-MCNC: 8.9 MG/DL — SIGNIFICANT CHANGE UP (ref 8.4–10.5)
CHLORIDE SERPL-SCNC: 107 MMOL/L — SIGNIFICANT CHANGE UP (ref 96–108)
CO2 SERPL-SCNC: 24 MMOL/L — SIGNIFICANT CHANGE UP (ref 22–31)
CREAT SERPL-MCNC: 0.94 MG/DL — SIGNIFICANT CHANGE UP (ref 0.5–1.3)
GLUCOSE BLDC GLUCOMTR-MCNC: 138 MG/DL — HIGH (ref 70–99)
GLUCOSE BLDC GLUCOMTR-MCNC: 153 MG/DL — HIGH (ref 70–99)
GLUCOSE BLDC GLUCOMTR-MCNC: 172 MG/DL — HIGH (ref 70–99)
GLUCOSE BLDC GLUCOMTR-MCNC: 219 MG/DL — HIGH (ref 70–99)
GLUCOSE SERPL-MCNC: 129 MG/DL — HIGH (ref 70–99)
HCT VFR BLD CALC: 37.1 % — LOW (ref 39–50)
HGB BLD-MCNC: 11.6 G/DL — LOW (ref 13–17)
MAGNESIUM SERPL-MCNC: 1.8 MG/DL — SIGNIFICANT CHANGE UP (ref 1.6–2.6)
MCHC RBC-ENTMCNC: 28.1 PG — SIGNIFICANT CHANGE UP (ref 27–34)
MCHC RBC-ENTMCNC: 31.4 GM/DL — LOW (ref 32–36)
MCV RBC AUTO: 89.5 FL — SIGNIFICANT CHANGE UP (ref 80–100)
PHOSPHATE SERPL-MCNC: 2.6 MG/DL — SIGNIFICANT CHANGE UP (ref 2.5–4.5)
PLATELET # BLD AUTO: 159 K/UL — SIGNIFICANT CHANGE UP (ref 150–400)
POTASSIUM SERPL-MCNC: 3.8 MMOL/L — SIGNIFICANT CHANGE UP (ref 3.5–5.3)
POTASSIUM SERPL-SCNC: 3.8 MMOL/L — SIGNIFICANT CHANGE UP (ref 3.5–5.3)
RBC # BLD: 4.15 M/UL — LOW (ref 4.2–5.8)
RBC # FLD: 13.1 % — SIGNIFICANT CHANGE UP (ref 10.3–14.5)
SODIUM SERPL-SCNC: 142 MMOL/L — SIGNIFICANT CHANGE UP (ref 135–145)
WBC # BLD: 8.3 K/UL — SIGNIFICANT CHANGE UP (ref 3.8–10.5)
WBC # FLD AUTO: 8.3 K/UL — SIGNIFICANT CHANGE UP (ref 3.8–10.5)

## 2019-08-24 PROCEDURE — 70450 CT HEAD/BRAIN W/O DYE: CPT | Mod: 26

## 2019-08-24 PROCEDURE — 99232 SBSQ HOSP IP/OBS MODERATE 35: CPT

## 2019-08-24 RX ORDER — HYDROCHLOROTHIAZIDE 25 MG
12.5 TABLET ORAL DAILY
Refills: 0 | Status: DISCONTINUED | OUTPATIENT
Start: 2019-08-24 | End: 2019-08-28

## 2019-08-24 RX ORDER — HYDRALAZINE HCL 50 MG
10 TABLET ORAL ONCE
Refills: 0 | Status: COMPLETED | OUTPATIENT
Start: 2019-08-24 | End: 2019-08-24

## 2019-08-24 RX ORDER — ONDANSETRON 8 MG/1
4 TABLET, FILM COATED ORAL ONCE
Refills: 0 | Status: COMPLETED | OUTPATIENT
Start: 2019-08-24 | End: 2019-08-24

## 2019-08-24 RX ORDER — AMLODIPINE BESYLATE 2.5 MG/1
2.5 TABLET ORAL DAILY
Refills: 0 | Status: DISCONTINUED | OUTPATIENT
Start: 2019-08-24 | End: 2019-08-25

## 2019-08-24 RX ORDER — ACETAMINOPHEN 500 MG
1000 TABLET ORAL ONCE
Refills: 0 | Status: COMPLETED | OUTPATIENT
Start: 2019-08-24 | End: 2019-08-24

## 2019-08-24 RX ORDER — HYDRALAZINE HCL 50 MG
29 TABLET ORAL ONCE
Refills: 0 | Status: DISCONTINUED | OUTPATIENT
Start: 2019-08-24 | End: 2019-08-24

## 2019-08-24 RX ADMIN — SODIUM CHLORIDE 100 MILLILITER(S): 9 INJECTION INTRAMUSCULAR; INTRAVENOUS; SUBCUTANEOUS at 09:11

## 2019-08-24 RX ADMIN — Medication 1: at 22:04

## 2019-08-24 RX ADMIN — GABAPENTIN 300 MILLIGRAM(S): 400 CAPSULE ORAL at 06:17

## 2019-08-24 RX ADMIN — AMLODIPINE BESYLATE 2.5 MILLIGRAM(S): 2.5 TABLET ORAL at 15:25

## 2019-08-24 RX ADMIN — ONDANSETRON 4 MILLIGRAM(S): 8 TABLET, FILM COATED ORAL at 18:01

## 2019-08-24 RX ADMIN — Medication 10 MILLIGRAM(S): at 16:54

## 2019-08-24 RX ADMIN — CARVEDILOL PHOSPHATE 25 MILLIGRAM(S): 80 CAPSULE, EXTENDED RELEASE ORAL at 06:17

## 2019-08-24 RX ADMIN — LOSARTAN POTASSIUM 50 MILLIGRAM(S): 100 TABLET, FILM COATED ORAL at 06:17

## 2019-08-24 RX ADMIN — Medication 2: at 12:40

## 2019-08-24 RX ADMIN — LEVETIRACETAM 400 MILLIGRAM(S): 250 TABLET, FILM COATED ORAL at 12:42

## 2019-08-24 RX ADMIN — FINASTERIDE 5 MILLIGRAM(S): 5 TABLET, FILM COATED ORAL at 12:39

## 2019-08-24 RX ADMIN — Medication 1000 MILLIGRAM(S): at 19:10

## 2019-08-24 RX ADMIN — PANTOPRAZOLE SODIUM 40 MILLIGRAM(S): 20 TABLET, DELAYED RELEASE ORAL at 12:39

## 2019-08-24 RX ADMIN — TAMSULOSIN HYDROCHLORIDE 0.4 MILLIGRAM(S): 0.4 CAPSULE ORAL at 22:03

## 2019-08-24 RX ADMIN — Medication 12.5 MILLIGRAM(S): at 06:17

## 2019-08-24 RX ADMIN — Medication 1 PACKET(S): at 12:39

## 2019-08-24 RX ADMIN — Medication 12.5 MILLIGRAM(S): at 12:39

## 2019-08-24 RX ADMIN — INSULIN GLARGINE 35 UNIT(S): 100 INJECTION, SOLUTION SUBCUTANEOUS at 22:03

## 2019-08-24 RX ADMIN — SODIUM CHLORIDE 100 MILLILITER(S): 9 INJECTION INTRAMUSCULAR; INTRAVENOUS; SUBCUTANEOUS at 12:44

## 2019-08-24 RX ADMIN — LEVETIRACETAM 400 MILLIGRAM(S): 250 TABLET, FILM COATED ORAL at 22:07

## 2019-08-24 RX ADMIN — ENOXAPARIN SODIUM 40 MILLIGRAM(S): 100 INJECTION SUBCUTANEOUS at 12:41

## 2019-08-24 RX ADMIN — Medication 12.5 MILLIGRAM(S): at 22:03

## 2019-08-24 RX ADMIN — Medication 12.5 MILLIGRAM(S): at 14:22

## 2019-08-24 RX ADMIN — Medication 400 MILLIGRAM(S): at 18:03

## 2019-08-24 RX ADMIN — Medication 1 PACKET(S): at 09:11

## 2019-08-24 NOTE — PROGRESS NOTE ADULT - ASSESSMENT
Problem/Plan - 1:  ·  Problem: Fall.  Plan: With fall at home. Pt does not entirely remember events leading up to fall. Denied prodromal symptoms. Potential syncopal episode given cardiac history.  Saw his cardiologist on 8/19. Was in his usual state of health. Most recently had carotid dopplers which had no significant stenosis. EKG with LBBB although as per chart review pt with chronic LBBB. SBP in 180's on presentation  - c/w tele to monitor for arrhythmia  - TTE grossly similar to prior, however now with mild AS  - Check Orthostatics.  - May require outpatient cardiology f/u for potential holter to r/o arrhythmia.     Problem/Plan - 2:  ·  Problem: Closed fracture of vault of skull, initial encounter.  Plan: CT head with nondisplaced fracture seen through the sagittal suture which   extends into the right parietal bone and slightly into the superior left parietal bone.  - Pain control.  - ENT consulted for vertigo- recommending meclizine and vestibular rehab.     Problem/Plan - 3:  ·  Problem: SDH (subdural hematoma).  Plan: Holohemispheric left subdural hematoma with adjacent subarachnoid blood. Repeat CTH with Left holohemispheric acute subdural hemorrhage is without significant interval change. However, the right anterior/inferior frontal region extra-axial blood has increased in size, now measuring a maximal thickness of 1 cm, previously 0.5cm. Subsequent f/u CT with no interval change.   - Seen by neurosurg who recommend Keppra  - f/u further neurosurg rec's.     Problem/Plan - 4:  ·  Problem: Type 2 diabetes mellitus with other specified complication, with long-term current use of insulin.  Plan: On lantus 35u QHS and Janumet. FS relatively well controlled  - Would hold Janumet  - Monitor FS, ISS  - A1c 7.0.     Problem/Plan - 5:  ·  Problem: CAD (coronary artery disease).  Plan: s/p two stenting episodes. First in mid 90s and second in 2015. No chest pain at present EKG with LBBB and on chart review this is chronic.  - c/w statin  - Hold Aspirin until cleared from neurosurgical perspective.   - HCTZ added for high BP     Problem/Plan - 6:  Problem: Hyperlipidemia. Plan: c/w statin.    Problem/Plan - 7:  ·  Problem: BPH (benign prostatic hyperplasia).  Plan: c/w Flomax and Finasteride.

## 2019-08-24 NOTE — PROGRESS NOTE ADULT - SUBJECTIVE AND OBJECTIVE BOX
Trauma Team Surgery Progress Note     Subjective/24hour Events: No acute events overnight. Patient refused physical therapy participation yesterday. Vitally stable, afebrile overnight. He reports dizziness this morning but no nausea. Denies emesis. Has no other complaints. His CT head scans have been stable x2.     MEDICATIONS  (STANDING):  atorvastatin 20 milliGRAM(s) Oral at bedtime  carvedilol 25 milliGRAM(s) Oral every 12 hours  dextrose 5%. 1000 milliLiter(s) (50 mL/Hr) IV Continuous <Continuous>  dextrose 50% Injectable 12.5 Gram(s) IV Push once  dextrose 50% Injectable 25 Gram(s) IV Push once  dextrose 50% Injectable 25 Gram(s) IV Push once  enoxaparin Injectable 40 milliGRAM(s) SubCutaneous daily  finasteride 5 milliGRAM(s) Oral daily  gabapentin 300 milliGRAM(s) Oral two times a day  hydrochlorothiazide 12.5 milliGRAM(s) Oral daily  insulin glargine Injectable (LANTUS) 35 Unit(s) SubCutaneous at bedtime  insulin lispro (HumaLOG) corrective regimen sliding scale   SubCutaneous Before meals and at bedtime  levETIRAcetam  IVPB 500 milliGRAM(s) IV Intermittent every 12 hours  losartan 50 milliGRAM(s) Oral daily  meclizine 12.5 milliGRAM(s) Oral every 8 hours  potassium phosphate / sodium phosphate powder 1 Packet(s) Oral three times a day before meals  sodium chloride 0.9%. 1000 milliLiter(s) (100 mL/Hr) IV Continuous <Continuous>  tamsulosin 0.4 milliGRAM(s) Oral at bedtime    MEDICATIONS  (PRN):  dextrose 40% Gel 15 Gram(s) Oral once PRN Blood Glucose LESS THAN 70 milliGRAM(s)/deciliter  glucagon  Injectable 1 milliGRAM(s) IntraMuscular once PRN Glucose LESS THAN 70 milligrams/deciliter  pantoprazole    Tablet 40 milliGRAM(s) Oral daily PRN Reflux      Vital Signs:  Vital Signs Last 24 Hrs  T(C): 36.9 (24 Aug 2019 09:14), Max: 37.1 (23 Aug 2019 22:04)  T(F): 98.5 (24 Aug 2019 09:14), Max: 98.8 (23 Aug 2019 22:04)  HR: 62 (24 Aug 2019 09:14) (62 - 76)  BP: 176/70 (24 Aug 2019 09:14) (161/65 - 184/69)  BP(mean): --  RR: 18 (24 Aug 2019 09:14) (17 - 18)  SpO2: 96% (24 Aug 2019 09:14) (92% - 99%)    CAPILLARY BLOOD GLUCOSE      POCT Blood Glucose.: 219 mg/dL (24 Aug 2019 12:33)  POCT Blood Glucose.: 138 mg/dL (24 Aug 2019 09:02)  POCT Blood Glucose.: 175 mg/dL (23 Aug 2019 22:00)  POCT Blood Glucose.: 220 mg/dL (23 Aug 2019 18:49)  POCT Blood Glucose.: 164 mg/dL (23 Aug 2019 17:52)      I&O's Detail    23 Aug 2019 07:01  -  24 Aug 2019 07:00  --------------------------------------------------------  IN:    Oral Fluid: 930 mL    sodium chloride 0.9%.: 1900 mL  Total IN: 2830 mL    OUT:    Voided: 900 mL  Total OUT: 900 mL    Total NET: 1930 mL      24 Aug 2019 07:01  -  24 Aug 2019 13:33  --------------------------------------------------------  IN:    Oral Fluid: 720 mL  Total IN: 720 mL    OUT:    Voided: 500 mL  Total OUT: 500 mL    Total NET: 220 mL      PHYSICAL EXAM:  	General: NAD, sleeping comfortably  	HEENT: Normocephalic, atraumatic  	Neuro: GCS 15, moves all extremities  	Eyes: EOMI, PEERL  	Neck: Soft, midline trachea, no c-collar  	Cardiac: S1, S2, RRR  	Respiratory: Bilateral breath sounds, clear and equal bilaterally  	Abdomen: Soft, non-distended, non-tender, no rebound, no guarding, no masses palpated    Ext: palpable radial pulses b/l palpable DP and PT pulses b/l, motor and sensory grossly intact in all 4 extremities      Labs:    08-24    142  |  107  |  11  ----------------------------<  129<H>  3.8   |  24  |  0.94    Ca    8.9      24 Aug 2019 07:40  Phos  2.6     08-24  Mg     1.8     08-24                              11.6   8.3   )-----------( 159      ( 24 Aug 2019 07:40 )             37.1 Trauma Team Surgery Progress Note     Subjective/24hour Events: No acute events overnight. Worked with PT and PMR, recommended TBI rehab. Vitally stable, afebrile overnight. He reports dizziness this morning but no nausea. Denies emesis. Has no other complaints. His CT head scans have been stable x2. His blood pressure had been elevated last evening.     MEDICATIONS  (STANDING):  atorvastatin 20 milliGRAM(s) Oral at bedtime  carvedilol 25 milliGRAM(s) Oral every 12 hours  dextrose 5%. 1000 milliLiter(s) (50 mL/Hr) IV Continuous <Continuous>  dextrose 50% Injectable 12.5 Gram(s) IV Push once  dextrose 50% Injectable 25 Gram(s) IV Push once  dextrose 50% Injectable 25 Gram(s) IV Push once  enoxaparin Injectable 40 milliGRAM(s) SubCutaneous daily  finasteride 5 milliGRAM(s) Oral daily  gabapentin 300 milliGRAM(s) Oral two times a day  hydrochlorothiazide 12.5 milliGRAM(s) Oral daily  insulin glargine Injectable (LANTUS) 35 Unit(s) SubCutaneous at bedtime  insulin lispro (HumaLOG) corrective regimen sliding scale   SubCutaneous Before meals and at bedtime  levETIRAcetam  IVPB 500 milliGRAM(s) IV Intermittent every 12 hours  losartan 50 milliGRAM(s) Oral daily  meclizine 12.5 milliGRAM(s) Oral every 8 hours  potassium phosphate / sodium phosphate powder 1 Packet(s) Oral three times a day before meals  sodium chloride 0.9%. 1000 milliLiter(s) (100 mL/Hr) IV Continuous <Continuous>  tamsulosin 0.4 milliGRAM(s) Oral at bedtime    MEDICATIONS  (PRN):  dextrose 40% Gel 15 Gram(s) Oral once PRN Blood Glucose LESS THAN 70 milliGRAM(s)/deciliter  glucagon  Injectable 1 milliGRAM(s) IntraMuscular once PRN Glucose LESS THAN 70 milligrams/deciliter  pantoprazole    Tablet 40 milliGRAM(s) Oral daily PRN Reflux      Vital Signs:  Vital Signs Last 24 Hrs  T(C): 36.9 (24 Aug 2019 09:14), Max: 37.1 (23 Aug 2019 22:04)  T(F): 98.5 (24 Aug 2019 09:14), Max: 98.8 (23 Aug 2019 22:04)  HR: 62 (24 Aug 2019 09:14) (62 - 76)  BP: 176/70 (24 Aug 2019 09:14) (161/65 - 184/69)  BP(mean): --  RR: 18 (24 Aug 2019 09:14) (17 - 18)  SpO2: 96% (24 Aug 2019 09:14) (92% - 99%)    CAPILLARY BLOOD GLUCOSE  POCT Blood Glucose.: 219 mg/dL (24 Aug 2019 12:33)  POCT Blood Glucose.: 138 mg/dL (24 Aug 2019 09:02)  POCT Blood Glucose.: 175 mg/dL (23 Aug 2019 22:00)  POCT Blood Glucose.: 220 mg/dL (23 Aug 2019 18:49)  POCT Blood Glucose.: 164 mg/dL (23 Aug 2019 17:52)      I&O's Detail    23 Aug 2019 07:01  -  24 Aug 2019 07:00  --------------------------------------------------------  IN:    Oral Fluid: 930 mL    sodium chloride 0.9%.: 1900 mL  Total IN: 2830 mL    OUT:    Voided: 900 mL  Total OUT: 900 mL    Total NET: 1930 mL      24 Aug 2019 07:01  -  24 Aug 2019 13:33  --------------------------------------------------------  IN:    Oral Fluid: 720 mL  Total IN: 720 mL    OUT:    Voided: 500 mL  Total OUT: 500 mL    Total NET: 220 mL      PHYSICAL EXAM:  	General: NAD, drowsy, speaking with eyes closed  	HEENT: Normocephalic, atraumatic  	Neuro: GCS 15, moves all extremities  	Eyes: EOMI  	Neck: Soft, midline trachea, no c-collar  	Cardiac: S1, S2, RRR  	Respiratory: Bilateral breath sounds, clear and equal bilaterally  	Abdomen: Soft, non-distended, non-tender, no rebound, no guarding, no masses palpated    Ext: palpable radial pulses b/l palpable DP and PT pulses b/l, motor and sensory grossly intact in all 4 extremities      Labs:    08-24    142  |  107  |  11  ----------------------------<  129<H>  3.8   |  24  |  0.94    Ca    8.9      24 Aug 2019 07:40  Phos  2.6     08-24  Mg     1.8     08-24                              11.6   8.3   )-----------( 159      ( 24 Aug 2019 07:40 )             37.1

## 2019-08-24 NOTE — PROVIDER CONTACT NOTE (OTHER) - SITUATION
Pt's BP uncontrollable by regular hypertensive medications. Hydrochlorothiazide 12.5mg given at 1239. BP  184/70 at 1422. 186/70 at 1716. Pt's BP uncontrollable by regular hypertensive medications. Additional medication, Hydrochlorothiazide 12.5mg given at 1239.  184/70 at 1422. Ortho /100, 206/80,0188/76 at 1612

## 2019-08-24 NOTE — PROGRESS NOTE ADULT - SUBJECTIVE AND OBJECTIVE BOX
Patient is a 74y old  Male who presents with a chief complaint of S/p Fall (24 Aug 2019 10:58)      INTERVAL HPI/OVERNIGHT EVENTS:  T(C): 36.9 (08-24-19 @ 09:14), Max: 37.1 (08-23-19 @ 22:04)  HR: 62 (08-24-19 @ 09:14) (62 - 76)  BP: 176/70 (08-24-19 @ 09:14) (161/65 - 184/69)  RR: 18 (08-24-19 @ 09:14) (17 - 18)  SpO2: 96% (08-24-19 @ 09:14) (92% - 99%)  Wt(kg): --  I&O's Summary    23 Aug 2019 07:01  -  24 Aug 2019 07:00  --------------------------------------------------------  IN: 2830 mL / OUT: 900 mL / NET: 1930 mL        LABS:                        11.6   8.3   )-----------( 159      ( 24 Aug 2019 07:40 )             37.1     08-24    142  |  107  |  11  ----------------------------<  129<H>  3.8   |  24  |  0.94    Ca    8.9      24 Aug 2019 07:40  Phos  2.6     08-24  Mg     1.8     08-24          CAPILLARY BLOOD GLUCOSE      POCT Blood Glucose.: 138 mg/dL (24 Aug 2019 09:02)  POCT Blood Glucose.: 175 mg/dL (23 Aug 2019 22:00)  POCT Blood Glucose.: 220 mg/dL (23 Aug 2019 18:49)  POCT Blood Glucose.: 164 mg/dL (23 Aug 2019 17:52)  POCT Blood Glucose.: 204 mg/dL (23 Aug 2019 13:03)            MEDICATIONS  (STANDING):  atorvastatin 20 milliGRAM(s) Oral at bedtime  carvedilol 25 milliGRAM(s) Oral every 12 hours  dextrose 5%. 1000 milliLiter(s) (50 mL/Hr) IV Continuous <Continuous>  dextrose 50% Injectable 12.5 Gram(s) IV Push once  dextrose 50% Injectable 25 Gram(s) IV Push once  dextrose 50% Injectable 25 Gram(s) IV Push once  enoxaparin Injectable 40 milliGRAM(s) SubCutaneous daily  finasteride 5 milliGRAM(s) Oral daily  gabapentin 300 milliGRAM(s) Oral two times a day  hydrochlorothiazide 12.5 milliGRAM(s) Oral daily  insulin glargine Injectable (LANTUS) 35 Unit(s) SubCutaneous at bedtime  insulin lispro (HumaLOG) corrective regimen sliding scale   SubCutaneous Before meals and at bedtime  levETIRAcetam  IVPB 500 milliGRAM(s) IV Intermittent every 12 hours  losartan 50 milliGRAM(s) Oral daily  meclizine 12.5 milliGRAM(s) Oral every 8 hours  potassium phosphate / sodium phosphate powder 1 Packet(s) Oral three times a day before meals  sodium chloride 0.9%. 1000 milliLiter(s) (100 mL/Hr) IV Continuous <Continuous>  tamsulosin 0.4 milliGRAM(s) Oral at bedtime    MEDICATIONS  (PRN):  dextrose 40% Gel 15 Gram(s) Oral once PRN Blood Glucose LESS THAN 70 milliGRAM(s)/deciliter  glucagon  Injectable 1 milliGRAM(s) IntraMuscular once PRN Glucose LESS THAN 70 milligrams/deciliter  pantoprazole    Tablet 40 milliGRAM(s) Oral daily PRN Reflux          PHYSICAL EXAM:  GENERAL: NAD, well-groomed, well-developed  HEAD:  Atraumatic, Normocephalic  CHEST/LUNG: Clear to percussion bilaterally; No rales, rhonchi, wheezing, or rubs  HEART: Regular rate and rhythm; No murmurs, rubs, or gallops  ABDOMEN: Soft, Nontender, Nondistended; Bowel sounds present  EXTREMITIES:  2+ Peripheral Pulses, No clubbing, cyanosis, or edema  LYMPH: No lymphadenopathy noted  SKIN: No rashes or lesions    Care Discussed with Consultants/Other Providers [ ] YES  [ ] NO

## 2019-08-24 NOTE — PROGRESS NOTE ADULT - ASSESSMENT
75yo male s/p fall from standing, unclear cause, found to have L SDH and R SAH. Initially admitted to MICU for Cardene drip for SBP >180mm Hg. Per report, patient received platelets Repeat head CT demonstrated increased SAH and new R frontal IPH, for which patient was transferred to [facility]. CT Head stable x2 from prior.    - Acute L SDH, R SAH, R frontal PIH:  -- head CT stable x2   -- Hold ASA and Brilinta  -- Start chemical VTE prophylaxis: 24 hours after stable head CT (8/22/2019)  -- Appreciate NSG follow up  -- Q4 neurochecks  -- Keppra for post-traumatic seizure prophylaxis    - continue metoprolol, add losartan, add HCTZ    - Continue statin    - TTE:  -- Mitral annular calcification and calcified posterior mitral leaflet. Moderate mitral regurgitation.  -- Calcified aortic valve, mild aortic stenosis. Minimal aortic regurgitation.  -- Moderate concentric left ventricular hypertrophy.  -- Moderate diastolic dysfunction (Stage II).  -- interval progressionof the aortic valve calcification.    - PT Evaluation: patient declined 8/22/2019, re-evaluate today    - Regular Diet: consistent carbohydrates      ACS Surgery [phone] 73yo male s/p fall from standing, unclear cause, found to have L SDH and R SAH. Initially admitted to MICU for Cardene drip for SBP >180mm Hg. Per report, patient received platelets Repeat head CT demonstrated increased SAH and new R frontal IPH, for which patient was transferred to [facility]. CT Head stable x2 from prior.    - Acute L SDH, R SAH, R frontal PIH:  -- head CT stable x2   -- Hold ASA and Brilinta  -- Start chemical VTE prophylaxis: 24 hours after stable head CT (8/22/2019)  -- Appreciate NSG follow up  -- Q4 neurochecks  -- Keppra for post-traumatic seizure prophylaxis    - continue carvedilol, losartan,   -- add HCTZ  -- add amlodipine    - Continue statin    - TTE:  -- Mitral annular calcification and calcified posterior mitral leaflet. Moderate mitral regurgitation.  -- Calcified aortic valve, mild aortic stenosis. Minimal aortic regurgitation.  -- Moderate concentric left ventricular hypertrophy.  -- Moderate diastolic dysfunction (Stage II).  -- interval progression of the aortic valve calcification.    - PT Evaluation: TBI Rehab  - PMR: TBI Rehab    - Regular Diet: consistent carbohydrates      ACS Surgery [phone]

## 2019-08-25 LAB
ANION GAP SERPL CALC-SCNC: 12 MMOL/L — SIGNIFICANT CHANGE UP (ref 5–17)
BUN SERPL-MCNC: 10 MG/DL — SIGNIFICANT CHANGE UP (ref 7–23)
CALCIUM SERPL-MCNC: 9.3 MG/DL — SIGNIFICANT CHANGE UP (ref 8.4–10.5)
CHLORIDE SERPL-SCNC: 102 MMOL/L — SIGNIFICANT CHANGE UP (ref 96–108)
CO2 SERPL-SCNC: 26 MMOL/L — SIGNIFICANT CHANGE UP (ref 22–31)
CREAT SERPL-MCNC: 0.98 MG/DL — SIGNIFICANT CHANGE UP (ref 0.5–1.3)
GLUCOSE BLDC GLUCOMTR-MCNC: 114 MG/DL — HIGH (ref 70–99)
GLUCOSE BLDC GLUCOMTR-MCNC: 133 MG/DL — HIGH (ref 70–99)
GLUCOSE BLDC GLUCOMTR-MCNC: 156 MG/DL — HIGH (ref 70–99)
GLUCOSE BLDC GLUCOMTR-MCNC: 238 MG/DL — HIGH (ref 70–99)
GLUCOSE SERPL-MCNC: 108 MG/DL — HIGH (ref 70–99)
HCT VFR BLD CALC: 37.7 % — LOW (ref 39–50)
HGB BLD-MCNC: 12 G/DL — LOW (ref 13–17)
MAGNESIUM SERPL-MCNC: 1.8 MG/DL — SIGNIFICANT CHANGE UP (ref 1.6–2.6)
MCHC RBC-ENTMCNC: 28.9 PG — SIGNIFICANT CHANGE UP (ref 27–34)
MCHC RBC-ENTMCNC: 31.9 GM/DL — LOW (ref 32–36)
MCV RBC AUTO: 90.4 FL — SIGNIFICANT CHANGE UP (ref 80–100)
PHOSPHATE SERPL-MCNC: 3 MG/DL — SIGNIFICANT CHANGE UP (ref 2.5–4.5)
PLATELET # BLD AUTO: 162 K/UL — SIGNIFICANT CHANGE UP (ref 150–400)
POTASSIUM SERPL-MCNC: 3.5 MMOL/L — SIGNIFICANT CHANGE UP (ref 3.5–5.3)
POTASSIUM SERPL-SCNC: 3.5 MMOL/L — SIGNIFICANT CHANGE UP (ref 3.5–5.3)
RBC # BLD: 4.17 M/UL — LOW (ref 4.2–5.8)
RBC # FLD: 13 % — SIGNIFICANT CHANGE UP (ref 10.3–14.5)
SODIUM SERPL-SCNC: 140 MMOL/L — SIGNIFICANT CHANGE UP (ref 135–145)
WBC # BLD: 7.6 K/UL — SIGNIFICANT CHANGE UP (ref 3.8–10.5)
WBC # FLD AUTO: 7.6 K/UL — SIGNIFICANT CHANGE UP (ref 3.8–10.5)

## 2019-08-25 PROCEDURE — 99232 SBSQ HOSP IP/OBS MODERATE 35: CPT

## 2019-08-25 RX ORDER — AMLODIPINE BESYLATE 2.5 MG/1
5 TABLET ORAL DAILY
Refills: 0 | Status: DISCONTINUED | OUTPATIENT
Start: 2019-08-25 | End: 2019-08-29

## 2019-08-25 RX ORDER — ACETAMINOPHEN 500 MG
650 TABLET ORAL ONCE
Refills: 0 | Status: COMPLETED | OUTPATIENT
Start: 2019-08-25 | End: 2019-08-25

## 2019-08-25 RX ORDER — HYDRALAZINE HCL 50 MG
10 TABLET ORAL EVERY 6 HOURS
Refills: 0 | Status: DISCONTINUED | OUTPATIENT
Start: 2019-08-25 | End: 2019-09-03

## 2019-08-25 RX ORDER — SUMATRIPTAN SUCCINATE 4 MG/.5ML
25 INJECTION, SOLUTION SUBCUTANEOUS DAILY
Refills: 0 | Status: DISCONTINUED | OUTPATIENT
Start: 2019-08-25 | End: 2019-08-29

## 2019-08-25 RX ORDER — LOSARTAN POTASSIUM 100 MG/1
50 TABLET, FILM COATED ORAL ONCE
Refills: 0 | Status: COMPLETED | OUTPATIENT
Start: 2019-08-25 | End: 2019-08-25

## 2019-08-25 RX ORDER — AMLODIPINE BESYLATE 2.5 MG/1
2.5 TABLET ORAL ONCE
Refills: 0 | Status: COMPLETED | OUTPATIENT
Start: 2019-08-25 | End: 2019-08-25

## 2019-08-25 RX ORDER — ACETAMINOPHEN 500 MG
1000 TABLET ORAL ONCE
Refills: 0 | Status: COMPLETED | OUTPATIENT
Start: 2019-08-25 | End: 2019-08-25

## 2019-08-25 RX ORDER — HYDRALAZINE HCL 50 MG
10 TABLET ORAL ONCE
Refills: 0 | Status: COMPLETED | OUTPATIENT
Start: 2019-08-25 | End: 2019-08-25

## 2019-08-25 RX ORDER — POTASSIUM CHLORIDE 20 MEQ
10 PACKET (EA) ORAL
Refills: 0 | Status: COMPLETED | OUTPATIENT
Start: 2019-08-25 | End: 2019-08-25

## 2019-08-25 RX ORDER — LOSARTAN POTASSIUM 100 MG/1
100 TABLET, FILM COATED ORAL DAILY
Refills: 0 | Status: DISCONTINUED | OUTPATIENT
Start: 2019-08-25 | End: 2019-08-29

## 2019-08-25 RX ADMIN — LOSARTAN POTASSIUM 100 MILLIGRAM(S): 100 TABLET, FILM COATED ORAL at 22:11

## 2019-08-25 RX ADMIN — CARVEDILOL PHOSPHATE 25 MILLIGRAM(S): 80 CAPSULE, EXTENDED RELEASE ORAL at 14:10

## 2019-08-25 RX ADMIN — LEVETIRACETAM 400 MILLIGRAM(S): 250 TABLET, FILM COATED ORAL at 13:16

## 2019-08-25 RX ADMIN — GABAPENTIN 300 MILLIGRAM(S): 400 CAPSULE ORAL at 04:56

## 2019-08-25 RX ADMIN — INSULIN GLARGINE 35 UNIT(S): 100 INJECTION, SOLUTION SUBCUTANEOUS at 22:12

## 2019-08-25 RX ADMIN — Medication 100 MILLIEQUIVALENT(S): at 19:02

## 2019-08-25 RX ADMIN — Medication 1000 MILLIGRAM(S): at 07:10

## 2019-08-25 RX ADMIN — LOSARTAN POTASSIUM 50 MILLIGRAM(S): 100 TABLET, FILM COATED ORAL at 17:28

## 2019-08-25 RX ADMIN — Medication 12.5 MILLIGRAM(S): at 13:11

## 2019-08-25 RX ADMIN — LOSARTAN POTASSIUM 50 MILLIGRAM(S): 100 TABLET, FILM COATED ORAL at 04:56

## 2019-08-25 RX ADMIN — FINASTERIDE 5 MILLIGRAM(S): 5 TABLET, FILM COATED ORAL at 11:49

## 2019-08-25 RX ADMIN — Medication 1 PACKET(S): at 09:06

## 2019-08-25 RX ADMIN — GABAPENTIN 300 MILLIGRAM(S): 400 CAPSULE ORAL at 17:29

## 2019-08-25 RX ADMIN — Medication 650 MILLIGRAM(S): at 16:15

## 2019-08-25 RX ADMIN — Medication 12.5 MILLIGRAM(S): at 04:57

## 2019-08-25 RX ADMIN — PANTOPRAZOLE SODIUM 40 MILLIGRAM(S): 20 TABLET, DELAYED RELEASE ORAL at 09:08

## 2019-08-25 RX ADMIN — Medication 100 MILLIEQUIVALENT(S): at 13:17

## 2019-08-25 RX ADMIN — AMLODIPINE BESYLATE 2.5 MILLIGRAM(S): 2.5 TABLET ORAL at 17:28

## 2019-08-25 RX ADMIN — Medication 1 PACKET(S): at 13:42

## 2019-08-25 RX ADMIN — Medication 2: at 22:11

## 2019-08-25 RX ADMIN — Medication 100 MILLIEQUIVALENT(S): at 11:48

## 2019-08-25 RX ADMIN — Medication 12.5 MILLIGRAM(S): at 22:11

## 2019-08-25 RX ADMIN — AMLODIPINE BESYLATE 2.5 MILLIGRAM(S): 2.5 TABLET ORAL at 04:58

## 2019-08-25 RX ADMIN — AMLODIPINE BESYLATE 5 MILLIGRAM(S): 2.5 TABLET ORAL at 22:11

## 2019-08-25 RX ADMIN — ENOXAPARIN SODIUM 40 MILLIGRAM(S): 100 INJECTION SUBCUTANEOUS at 11:49

## 2019-08-25 RX ADMIN — Medication 12.5 MILLIGRAM(S): at 11:49

## 2019-08-25 RX ADMIN — SUMATRIPTAN SUCCINATE 25 MILLIGRAM(S): 4 INJECTION, SOLUTION SUBCUTANEOUS at 17:15

## 2019-08-25 RX ADMIN — TAMSULOSIN HYDROCHLORIDE 0.4 MILLIGRAM(S): 0.4 CAPSULE ORAL at 22:11

## 2019-08-25 RX ADMIN — Medication 1: at 19:40

## 2019-08-25 RX ADMIN — Medication 650 MILLIGRAM(S): at 15:32

## 2019-08-25 RX ADMIN — Medication 400 MILLIGRAM(S): at 06:51

## 2019-08-25 RX ADMIN — SUMATRIPTAN SUCCINATE 25 MILLIGRAM(S): 4 INJECTION, SOLUTION SUBCUTANEOUS at 16:32

## 2019-08-25 RX ADMIN — Medication 10 MILLIGRAM(S): at 19:41

## 2019-08-25 RX ADMIN — LEVETIRACETAM 400 MILLIGRAM(S): 250 TABLET, FILM COATED ORAL at 22:11

## 2019-08-25 NOTE — PROGRESS NOTE ADULT - ASSESSMENT
Problem/Plan - 1:  ·  Problem: Fall.  Plan: With fall at home. Pt does not entirely remember events leading up to fall. Denied prodromal symptoms. Potential syncopal episode given cardiac history.  Saw his cardiologist on 8/19. Was in his usual state of health. Most recently had carotid dopplers which had no significant stenosis. EKG with LBBB although as per chart review pt with chronic LBBB. SBP in 180's on presentation  - c/w tele to monitor for arrhythmia  - TTE grossly similar to prior, however now with mild AS  - Check Orthostatics.  - May require outpatient cardiology f/u for potential holter to r/o arrhythmia.     Problem/Plan - 2:  ·  Problem: Closed fracture of vault of skull, initial encounter.  Plan: CT head with nondisplaced fracture seen through the sagittal suture which   extends into the right parietal bone and slightly into the superior left parietal bone.  - Pain control.  - ENT consulted for vertigo- recommending meclizine and vestibular rehab.     Problem/Plan - 3:  ·  Problem: SDH (subdural hematoma).  Plan: Holohemispheric left subdural hematoma with adjacent subarachnoid blood. Repeat CTH with Left holohemispheric acute subdural hemorrhage is without significant interval change. However, the right anterior/inferior frontal region extra-axial blood has increased in size, now measuring a maximal thickness of 1 cm, previously 0.5cm. Subsequent f/u CT with no interval change.   - Seen by neurosurg who recommend Keppra  - f/u further neurosurg rec's.     Problem/Plan - 4:  ·  Problem: Type 2 diabetes mellitus with other specified complication, with long-term current use of insulin.  Plan: On lantus 35u QHS and Janumet. FS relatively well controlled  - Would hold Janumet  - Monitor FS, ISS  Problem/Plan - 5:  ·  Problem: CAD (coronary artery disease).  Plan: s/p two stenting episodes. First in mid 90s and second in 2015. No chest pain at present EKG with LBBB and on chart review this is chronic.  - c/w statin  - Hold Aspirin until cleared from neurosurgical perspective.     Problem/Plan - 6:  Problem: Hyperlipidemia. Plan: c/w statin.    Problem/Plan - 7:  ·  Problem: HTN.  Plan: increase nprvasc to 5 daily and increase and losartan to 100 daily  cw carvedilol and HCTZ

## 2019-08-25 NOTE — OCCUPATIONAL THERAPY INITIAL EVALUATION ADULT - LIVES WITH, PROFILE
Pt lives with his wife in a private home, 3 steps to enter. Pt was independent with ADLs and functional mobility prior to admission without AD./spouse

## 2019-08-25 NOTE — OCCUPATIONAL THERAPY INITIAL EVALUATION ADULT - PERTINENT HX OF CURRENT PROBLEM, REHAB EVAL
74M hx CAD s/p PCI on ASA, HTN,HLD, BPH, DM2, on ASA 81mg daily transferred from [facility] s/p unwitnessed fall w/ finding of intracranial bleed & increased blood on interval scan. Pt initially presented s/p unwitnessed fall, ?syncopal episode this morning, w/ c/o dizziness after fall & initial difficulty speaking. Pt does not recall event. CTH shows L hemispheric SDH w/ SAH & calvarium fx. See below.

## 2019-08-25 NOTE — OCCUPATIONAL THERAPY INITIAL EVALUATION ADULT - BALANCE TRAINING, PT EVAL
Goal: Pt will demonstrate G dynamic standing balance in 2 weeks to increase safety for ADL/IADL completion.

## 2019-08-25 NOTE — OCCUPATIONAL THERAPY INITIAL EVALUATION ADULT - DIAGNOSIS, OT EVAL
Pt with impaired cognition, strength, balance, coordination impacting pt's ability to complete ADLs, IADLs, functional mobility.

## 2019-08-25 NOTE — PROGRESS NOTE ADULT - SUBJECTIVE AND OBJECTIVE BOX
Trauma Team Surgery Progress Note     Subjective/24hour Events: No acute events overnight. New CT Head obtained for increased nausea and confusion in the setting of known brain hemorrhage, results are stable compared to previous. Nausea last evening and elevated BP.    MEDICATIONS  (STANDING):  amLODIPine   Tablet 2.5 milliGRAM(s) Oral daily  atorvastatin 20 milliGRAM(s) Oral at bedtime  carvedilol 25 milliGRAM(s) Oral every 12 hours  dextrose 5%. 1000 milliLiter(s) (50 mL/Hr) IV Continuous <Continuous>  dextrose 50% Injectable 12.5 Gram(s) IV Push once  dextrose 50% Injectable 25 Gram(s) IV Push once  dextrose 50% Injectable 25 Gram(s) IV Push once  enoxaparin Injectable 40 milliGRAM(s) SubCutaneous daily  finasteride 5 milliGRAM(s) Oral daily  gabapentin 300 milliGRAM(s) Oral two times a day  hydrochlorothiazide 12.5 milliGRAM(s) Oral daily  insulin glargine Injectable (LANTUS) 35 Unit(s) SubCutaneous at bedtime  insulin lispro (HumaLOG) corrective regimen sliding scale   SubCutaneous Before meals and at bedtime  levETIRAcetam  IVPB 500 milliGRAM(s) IV Intermittent every 12 hours  losartan 50 milliGRAM(s) Oral daily  meclizine 12.5 milliGRAM(s) Oral every 8 hours  potassium phosphate / sodium phosphate powder 1 Packet(s) Oral three times a day before meals  tamsulosin 0.4 milliGRAM(s) Oral at bedtime    MEDICATIONS  (PRN):  dextrose 40% Gel 15 Gram(s) Oral once PRN Blood Glucose LESS THAN 70 milliGRAM(s)/deciliter  glucagon  Injectable 1 milliGRAM(s) IntraMuscular once PRN Glucose LESS THAN 70 milligrams/deciliter  pantoprazole    Tablet 40 milliGRAM(s) Oral daily PRN Reflux      Vital Signs:  Vital Signs Last 24 Hrs  T(C): 37.2 (25 Aug 2019 01:46), Max: 37.8 (24 Aug 2019 16:12)  T(F): 98.9 (25 Aug 2019 01:46), Max: 100.1 (24 Aug 2019 16:12)  HR: 63 (25 Aug 2019 01:46) (57 - 76)  BP: 154/65 (25 Aug 2019 01:46) (144/50 - 186/70)  BP(mean): --  RR: 18 (25 Aug 2019 01:46) (17 - 18)  SpO2: 96% (25 Aug 2019 01:46) (92% - 98%)    CAPILLARY BLOOD GLUCOSE  POCT Blood Glucose.: 153 mg/dL (24 Aug 2019 21:48)  POCT Blood Glucose.: 172 mg/dL (24 Aug 2019 17:43)  POCT Blood Glucose.: 219 mg/dL (24 Aug 2019 12:33)  POCT Blood Glucose.: 138 mg/dL (24 Aug 2019 09:02)      I&O's Detail    23 Aug 2019 07:01  -  24 Aug 2019 07:00  --------------------------------------------------------  IN:    Oral Fluid: 930 mL    sodium chloride 0.9%: 1900 mL  Total IN: 2830 mL    OUT:    Voided: 900 mL  Total OUT: 900 mL    Total NET: 1930 mL      24 Aug 2019 07:01  -  25 Aug 2019 04:54  --------------------------------------------------------  IN:    IV PiggyBack: 100 mL    Oral Fluid: 1120 mL    sodium chloride 0.9%: 1000 mL  Total IN: 2220 mL    OUT:    Voided: 2000 mL  Total OUT: 2000 mL    Total NET: 220 mL        PHYSICAL EXAM:  	General: NAD, drowsy, speaking with eyes closed  	HEENT: Normocephalic, atraumatic  	Neuro: GCS 15, moves all extremities  	Eyes: EOMI  	Neck: Soft, midline trachea, no c-collar  	Cardiac: S1, S2, RRR  	Respiratory: Bilateral breath sounds, clear and equal bilaterally  	Abdomen: Soft, non-distended, non-tender, no rebound, no guarding, no masses palpated    Ext: palpable radial pulses b/l palpable DP and PT pulses b/l, motor and sensory grossly intact in all 4 extremities    Labs:    08-24    142  |  107  |  11  ----------------------------<  129<H>  3.8   |  24  |  0.94    Ca    8.9      24 Aug 2019 07:40  Phos  2.6     08-24  Mg     1.8     08-24                              11.6   8.3   )-----------( 159      ( 24 Aug 2019 07:40 )             37.1         Imaging: *** Trauma Team Surgery Progress Note     Subjective: Yesterday with elevated SBP 180s w/ associated nausea and confusion, repeat CTH stable. This AM with persistent nausea and elevated BP 170s, given carvedilol and hydralazine. Does not want to get out of bed. Family with concerns about elevated BP.       MEDICATIONS  (STANDING):  amLODIPine   Tablet 2.5 milliGRAM(s) Oral daily  atorvastatin 20 milliGRAM(s) Oral at bedtime  carvedilol 25 milliGRAM(s) Oral every 12 hours  dextrose 5%. 1000 milliLiter(s) (50 mL/Hr) IV Continuous <Continuous>  dextrose 50% Injectable 12.5 Gram(s) IV Push once  dextrose 50% Injectable 25 Gram(s) IV Push once  dextrose 50% Injectable 25 Gram(s) IV Push once  enoxaparin Injectable 40 milliGRAM(s) SubCutaneous daily  finasteride 5 milliGRAM(s) Oral daily  gabapentin 300 milliGRAM(s) Oral two times a day  hydrochlorothiazide 12.5 milliGRAM(s) Oral daily  insulin glargine Injectable (LANTUS) 35 Unit(s) SubCutaneous at bedtime  insulin lispro (HumaLOG) corrective regimen sliding scale   SubCutaneous Before meals and at bedtime  levETIRAcetam  IVPB 500 milliGRAM(s) IV Intermittent every 12 hours  losartan 50 milliGRAM(s) Oral daily  meclizine 12.5 milliGRAM(s) Oral every 8 hours  potassium phosphate / sodium phosphate powder 1 Packet(s) Oral three times a day before meals  tamsulosin 0.4 milliGRAM(s) Oral at bedtime    MEDICATIONS  (PRN):  dextrose 40% Gel 15 Gram(s) Oral once PRN Blood Glucose LESS THAN 70 milliGRAM(s)/deciliter  glucagon  Injectable 1 milliGRAM(s) IntraMuscular once PRN Glucose LESS THAN 70 milligrams/deciliter  pantoprazole    Tablet 40 milliGRAM(s) Oral daily PRN Reflux      Vital Signs Last 24 Hrs  T(C): 36.8 (25 Aug 2019 08:32), Max: 37.8 (24 Aug 2019 16:12)  T(F): 98.2 (25 Aug 2019 08:32), Max: 100.1 (24 Aug 2019 16:12)  HR: 54 (25 Aug 2019 08:32) (54 - 76)  BP: 165/69 (25 Aug 2019 08:32) (144/50 - 186/70)  BP(mean): --  RR: 18 (25 Aug 2019 08:32) (18 - 18)  SpO2: 98% (25 Aug 2019 08:32) (96% - 98%)    PHYSICAL EXAM:  	General: NAD, drowsy, speaking with eyes closed  	HEENT: Normocephalic, atraumatic  	Neuro: GCS 15, moves all extremities  	Eyes: EOMI  	Neck: Soft, midline trachea, no c-collar  	Cardiac: S1, S2, RRR  	Respiratory: no increased WOB, regular respiratory rate  	Abdomen: Soft, non-distended, non-tender    Ext: moves all extremities equally    Labs:    08-25    140  |  102  |  10  ----------------------------<  108<H>  3.5   |  26  |  0.98    Ca    9.3      25 Aug 2019 07:15  Phos  3.0     08-25  Mg     1.8     08-25                              12.0   7.6   )-----------( 162      ( 25 Aug 2019 07:16 )             37.7         Imaging::      EXAM:  CT BRAIN                            PROCEDURE DATE:  08/24/2019            INTERPRETATION:  CLINICAL INFORMATION: Trauma, intracranial hemorrhage,   now with new disorientation and nausea    TECHNIQUE: Noncontrast axial CT images were acquired through the head.   Two-dimensional sagittal and coronal reformats were generated.    COMPARISON STUDY: CT head 8/22/2019.    FINDINGS:     Redemonstration of a nondisplaced right frontal parietal parietal bone   fracture. Right frontal hemorrhagic contusion and with associated edema   and mass effect without significant change. A smaller hemorrhagic   contusion also noted at the anterior right temporal lobe. Small areas of   subarachnoid hemorrhage along the right temporal and parietal regions. A   small amount of left temporal subdural or subarachnoid hemorrhage is   identified, decreased compared with 8/22/2019. No midline shift or   central herniation.     Ventricular size is unchanged. Small vessel white matter ischemic changes   are noted.      Previously noted bilateral extra-axial fluid collections are unchanged.     The visualized paranasal sinuses are clear. The mastoid air cells and   middle ear cavities are clear. Bilateral orbital lens replacement.      IMPRESSION:     No significant interval change in the appearance of anterior right   frontal and temporal lobe hemorrhagic contusions compared with 8/22/2019.   No change in small amount of subarachnoid and left subdural hemorrhage.    No midline shift or central herniation. No hydrocephalus.

## 2019-08-25 NOTE — PROGRESS NOTE ADULT - ASSESSMENT
73yo male s/p fall from standing, unclear cause, found to have L SDH and R SAH. Initially admitted to MICU for Cardene drip for SBP >180mm Hg. Per report, patient received platelets Repeat head CT demonstrated increased SAH and new R frontal IPH, for which patient was transferred to [facility]. CT Head stable x2 from prior.    - Acute L SDH, R SAH, R frontal PIH:  -- head CT stable x2   -- Hold ASA and Brilinta  -- Start chemical VTE prophylaxis: 24 hours after stable head CT (8/22/2019)  -- Appreciate NSG follow up  -- Q4 neurochecks  -- Keppra for post-traumatic seizure prophylaxis    - continue carvedilol, losartan,   -- add HCTZ  -- add amlodipine    - Continue statin    - TTE:  -- Mitral annular calcification and calcified posterior mitral leaflet. Moderate mitral regurgitation.  -- Calcified aortic valve, mild aortic stenosis. Minimal aortic regurgitation.  -- Moderate concentric left ventricular hypertrophy.  -- Moderate diastolic dysfunction (Stage II).  -- interval progression of the aortic valve calcification.    - PT Evaluation: TBI Rehab  - PMR: TBI Rehab    - Regular Diet: consistent carbohydrates      ACS Surgery [phone] 73yo male s/p fall from standing, unclear cause, found to have L SDH and R SAH. Initially admitted to MICU for Cardene drip for SBP >180mm Hg. Repeat head CT demonstrated increased SAH and new R frontal IPH, for which patient was transferred to [facility]. CT Head stable x2 from prior.    - Acute L SDH, R SAH, R frontal PIH:  -- head CT stable x2   -- Hold ASA and Brilinta  -- chemical VTE prophylaxis started 24 hours after stable head CT (8/22/2019)  -- Appreciate NSG follow up  -- Q4 neurochecks  -- Keppra for post-traumatic seizure prophylaxis 7 day total    - continue carvedilol, losartan,   -- add home HCTZ  -- add home amlodipine    - Continue statin  - TTE completed with mitral and atrial calcification and mild regurg,moderate diastolic dysfunction.    - PT/PMR Evaluation: TBI Rehab  - Regular Diet: consistent carbohydrates      ACS Surgery [phone]

## 2019-08-25 NOTE — OCCUPATIONAL THERAPY INITIAL EVALUATION ADULT - PRECAUTIONS/LIMITATIONS, REHAB EVAL
Per family, at OSH he was initially confused & altered. In [facility] ED, was c/o headache & dizziness, no associated neurologic deficits, no aggravating/alleviating factors. Denies any other injuries, pain, SOB. fall precautions/Per family, at OSH he was initially confused & altered. In [facility] ED, was c/o headache & dizziness, no associated neurologic deficits, no aggravating/alleviating factors. Denies any other injuries, pain, SOB.

## 2019-08-25 NOTE — PROGRESS NOTE ADULT - SUBJECTIVE AND OBJECTIVE BOX
Patient is a 74y old  Male who presents with a chief complaint of S/p Fall (25 Aug 2019 04:54)      INTERVAL HPI/OVERNIGHT EVENTS:  T(C): 36.7 (08-25-19 @ 17:22), Max: 37.2 (08-25-19 @ 01:46)  HR: 60 (08-25-19 @ 17:22) (54 - 64)  BP: 190/70 (08-25-19 @ 17:39) (144/50 - 216/86)  RR: 16 (08-25-19 @ 17:22) (16 - 18)  SpO2: 97% (08-25-19 @ 17:22) (96% - 98%)  Wt(kg): --  I&O's Summary    24 Aug 2019 07:01  -  25 Aug 2019 07:00  --------------------------------------------------------  IN: 2220 mL / OUT: 2600 mL / NET: -380 mL    25 Aug 2019 07:01  -  25 Aug 2019 18:07  --------------------------------------------------------  IN: 680 mL / OUT: 1000 mL / NET: -320 mL        LABS:                        12.0   7.6   )-----------( 162      ( 25 Aug 2019 07:16 )             37.7     08-25    140  |  102  |  10  ----------------------------<  108<H>  3.5   |  26  |  0.98    Ca    9.3      25 Aug 2019 07:15  Phos  3.0     08-25  Mg     1.8     08-25          CAPILLARY BLOOD GLUCOSE      POCT Blood Glucose.: 114 mg/dL (25 Aug 2019 13:33)  POCT Blood Glucose.: 133 mg/dL (25 Aug 2019 09:01)  POCT Blood Glucose.: 153 mg/dL (24 Aug 2019 21:48)            MEDICATIONS  (STANDING):  amLODIPine   Tablet 5 milliGRAM(s) Oral daily  atorvastatin 20 milliGRAM(s) Oral at bedtime  carvedilol 25 milliGRAM(s) Oral every 12 hours  dextrose 5%. 1000 milliLiter(s) (50 mL/Hr) IV Continuous <Continuous>  dextrose 50% Injectable 12.5 Gram(s) IV Push once  dextrose 50% Injectable 25 Gram(s) IV Push once  dextrose 50% Injectable 25 Gram(s) IV Push once  enoxaparin Injectable 40 milliGRAM(s) SubCutaneous daily  finasteride 5 milliGRAM(s) Oral daily  gabapentin 300 milliGRAM(s) Oral two times a day  hydrochlorothiazide 12.5 milliGRAM(s) Oral daily  insulin glargine Injectable (LANTUS) 35 Unit(s) SubCutaneous at bedtime  insulin lispro (HumaLOG) corrective regimen sliding scale   SubCutaneous Before meals and at bedtime  levETIRAcetam  IVPB 500 milliGRAM(s) IV Intermittent every 12 hours  losartan 100 milliGRAM(s) Oral daily  meclizine 12.5 milliGRAM(s) Oral every 8 hours  potassium chloride  10 mEq/100 mL IVPB 10 milliEquivalent(s) IV Intermittent every 1 hour  SUMAtriptan 25 milliGRAM(s) Oral daily  tamsulosin 0.4 milliGRAM(s) Oral at bedtime    MEDICATIONS  (PRN):  dextrose 40% Gel 15 Gram(s) Oral once PRN Blood Glucose LESS THAN 70 milliGRAM(s)/deciliter  glucagon  Injectable 1 milliGRAM(s) IntraMuscular once PRN Glucose LESS THAN 70 milligrams/deciliter  pantoprazole    Tablet 40 milliGRAM(s) Oral daily PRN Reflux          PHYSICAL EXAM:  GENERAL: NAD, well-groomed, well-developed  HEAD:  Atraumatic, Normocephalic  CHEST/LUNG: Clear to percussion bilaterally; No rales, rhonchi, wheezing, or rubs  HEART: Regular rate and rhythm; No murmurs, rubs, or gallops  ABDOMEN: Soft, Nontender, Nondistended; Bowel sounds present  EXTREMITIES:  2+ Peripheral Pulses, No clubbing, cyanosis, or edema  LYMPH: No lymphadenopathy noted  SKIN: No rashes or lesions    Care Discussed with Consultants/Other Providers [ ] YES  [ ] NO

## 2019-08-25 NOTE — OCCUPATIONAL THERAPY INITIAL EVALUATION ADULT - ADDITIONAL COMMENTS
CT Head 8/24/19: No significant interval change in the appearance of anterior right frontal and temporal lobe hemorrhagic contusions compared with 8/22/2019. No change in small amount of subarachnoid and left subdural hemorrhage. No midline shift or central herniation. No hydrocephalus.  X-Ray Pelvis 8/21 (-)  CT C Spine 8/21: Degenerative changes of spine.

## 2019-08-25 NOTE — OCCUPATIONAL THERAPY INITIAL EVALUATION ADULT - PLANNED THERAPY INTERVENTIONS, OT EVAL
cognitive, visual perceptual/transfer training/ADL retraining/IADL retraining/balance training/bed mobility training

## 2019-08-25 NOTE — OCCUPATIONAL THERAPY INITIAL EVALUATION ADULT - RANGE OF MOTION EXAMINATION, LOWER EXTREMITY
Pt complaint that she had burning sensation and her cheeks are red, she thinks that she had allergic reaction with medications given in ER, she does not like to take generic medications, MD DR Anne aragon and gave her benadryl 25 mg PO bilateral LE Active ROM was WFL  (within functional limits)

## 2019-08-26 LAB
ANION GAP SERPL CALC-SCNC: 13 MMOL/L — SIGNIFICANT CHANGE UP (ref 5–17)
BASOPHILS # BLD AUTO: 0 K/UL — SIGNIFICANT CHANGE UP (ref 0–0.2)
BASOPHILS NFR BLD AUTO: 0 % — SIGNIFICANT CHANGE UP (ref 0–2)
BUN SERPL-MCNC: 12 MG/DL — SIGNIFICANT CHANGE UP (ref 7–23)
CALCIUM SERPL-MCNC: 9.6 MG/DL — SIGNIFICANT CHANGE UP (ref 8.4–10.5)
CHLORIDE SERPL-SCNC: 98 MMOL/L — SIGNIFICANT CHANGE UP (ref 96–108)
CO2 SERPL-SCNC: 24 MMOL/L — SIGNIFICANT CHANGE UP (ref 22–31)
CREAT SERPL-MCNC: 0.95 MG/DL — SIGNIFICANT CHANGE UP (ref 0.5–1.3)
EOSINOPHIL # BLD AUTO: 0 K/UL — SIGNIFICANT CHANGE UP (ref 0–0.5)
EOSINOPHIL NFR BLD AUTO: 0.3 % — SIGNIFICANT CHANGE UP (ref 0–6)
GLUCOSE BLDC GLUCOMTR-MCNC: 141 MG/DL — HIGH (ref 70–99)
GLUCOSE BLDC GLUCOMTR-MCNC: 147 MG/DL — HIGH (ref 70–99)
GLUCOSE BLDC GLUCOMTR-MCNC: 186 MG/DL — HIGH (ref 70–99)
GLUCOSE BLDC GLUCOMTR-MCNC: 237 MG/DL — HIGH (ref 70–99)
GLUCOSE SERPL-MCNC: 159 MG/DL — HIGH (ref 70–99)
HCT VFR BLD CALC: 42.6 % — SIGNIFICANT CHANGE UP (ref 39–50)
HGB BLD-MCNC: 13.4 G/DL — SIGNIFICANT CHANGE UP (ref 13–17)
LYMPHOCYTES # BLD AUTO: 1.4 K/UL — SIGNIFICANT CHANGE UP (ref 1–3.3)
LYMPHOCYTES # BLD AUTO: 19.3 % — SIGNIFICANT CHANGE UP (ref 13–44)
MAGNESIUM SERPL-MCNC: 1.7 MG/DL — SIGNIFICANT CHANGE UP (ref 1.6–2.6)
MCHC RBC-ENTMCNC: 27.9 PG — SIGNIFICANT CHANGE UP (ref 27–34)
MCHC RBC-ENTMCNC: 31.4 GM/DL — LOW (ref 32–36)
MCV RBC AUTO: 88.8 FL — SIGNIFICANT CHANGE UP (ref 80–100)
MONOCYTES # BLD AUTO: 0.8 K/UL — SIGNIFICANT CHANGE UP (ref 0–0.9)
MONOCYTES NFR BLD AUTO: 11.9 % — SIGNIFICANT CHANGE UP (ref 2–14)
NEUTROPHILS # BLD AUTO: 4.9 K/UL — SIGNIFICANT CHANGE UP (ref 1.8–7.4)
NEUTROPHILS NFR BLD AUTO: 68.5 % — SIGNIFICANT CHANGE UP (ref 43–77)
PHOSPHATE SERPL-MCNC: 3.5 MG/DL — SIGNIFICANT CHANGE UP (ref 2.5–4.5)
PLATELET # BLD AUTO: 174 K/UL — SIGNIFICANT CHANGE UP (ref 150–400)
POTASSIUM SERPL-MCNC: 3.9 MMOL/L — SIGNIFICANT CHANGE UP (ref 3.5–5.3)
POTASSIUM SERPL-SCNC: 3.9 MMOL/L — SIGNIFICANT CHANGE UP (ref 3.5–5.3)
RBC # BLD: 4.8 M/UL — SIGNIFICANT CHANGE UP (ref 4.2–5.8)
RBC # FLD: 13.1 % — SIGNIFICANT CHANGE UP (ref 10.3–14.5)
SODIUM SERPL-SCNC: 135 MMOL/L — SIGNIFICANT CHANGE UP (ref 135–145)
WBC # BLD: 7.1 K/UL — SIGNIFICANT CHANGE UP (ref 3.8–10.5)
WBC # FLD AUTO: 7.1 K/UL — SIGNIFICANT CHANGE UP (ref 3.8–10.5)

## 2019-08-26 RX ORDER — MAGNESIUM SULFATE 500 MG/ML
2 VIAL (ML) INJECTION ONCE
Refills: 0 | Status: COMPLETED | OUTPATIENT
Start: 2019-08-26 | End: 2019-08-26

## 2019-08-26 RX ORDER — ACETAMINOPHEN 500 MG
650 TABLET ORAL ONCE
Refills: 0 | Status: COMPLETED | OUTPATIENT
Start: 2019-08-26 | End: 2019-08-26

## 2019-08-26 RX ORDER — ONDANSETRON 8 MG/1
4 TABLET, FILM COATED ORAL EVERY 6 HOURS
Refills: 0 | Status: DISCONTINUED | OUTPATIENT
Start: 2019-08-26 | End: 2019-08-29

## 2019-08-26 RX ORDER — ACETAMINOPHEN 500 MG
975 TABLET ORAL EVERY 6 HOURS
Refills: 0 | Status: DISCONTINUED | OUTPATIENT
Start: 2019-08-26 | End: 2019-09-03

## 2019-08-26 RX ORDER — DOCUSATE SODIUM 100 MG
100 CAPSULE ORAL THREE TIMES A DAY
Refills: 0 | Status: DISCONTINUED | OUTPATIENT
Start: 2019-08-26 | End: 2019-09-03

## 2019-08-26 RX ORDER — POLYETHYLENE GLYCOL 3350 17 G/17G
17 POWDER, FOR SOLUTION ORAL ONCE
Refills: 0 | Status: COMPLETED | OUTPATIENT
Start: 2019-08-26 | End: 2019-08-26

## 2019-08-26 RX ADMIN — Medication 100 MILLIGRAM(S): at 09:51

## 2019-08-26 RX ADMIN — ENOXAPARIN SODIUM 40 MILLIGRAM(S): 100 INJECTION SUBCUTANEOUS at 12:34

## 2019-08-26 RX ADMIN — Medication 975 MILLIGRAM(S): at 16:15

## 2019-08-26 RX ADMIN — SUMATRIPTAN SUCCINATE 25 MILLIGRAM(S): 4 INJECTION, SOLUTION SUBCUTANEOUS at 13:15

## 2019-08-26 RX ADMIN — Medication 12.5 MILLIGRAM(S): at 06:32

## 2019-08-26 RX ADMIN — Medication 12.5 MILLIGRAM(S): at 13:16

## 2019-08-26 RX ADMIN — SUMATRIPTAN SUCCINATE 25 MILLIGRAM(S): 4 INJECTION, SOLUTION SUBCUTANEOUS at 14:00

## 2019-08-26 RX ADMIN — AMLODIPINE BESYLATE 5 MILLIGRAM(S): 2.5 TABLET ORAL at 06:34

## 2019-08-26 RX ADMIN — ONDANSETRON 4 MILLIGRAM(S): 8 TABLET, FILM COATED ORAL at 23:43

## 2019-08-26 RX ADMIN — ONDANSETRON 4 MILLIGRAM(S): 8 TABLET, FILM COATED ORAL at 18:12

## 2019-08-26 RX ADMIN — Medication 100 MILLIGRAM(S): at 22:08

## 2019-08-26 RX ADMIN — FINASTERIDE 5 MILLIGRAM(S): 5 TABLET, FILM COATED ORAL at 12:34

## 2019-08-26 RX ADMIN — Medication 975 MILLIGRAM(S): at 21:55

## 2019-08-26 RX ADMIN — Medication 975 MILLIGRAM(S): at 15:19

## 2019-08-26 RX ADMIN — POLYETHYLENE GLYCOL 3350 17 GRAM(S): 17 POWDER, FOR SOLUTION ORAL at 14:10

## 2019-08-26 RX ADMIN — GABAPENTIN 300 MILLIGRAM(S): 400 CAPSULE ORAL at 18:12

## 2019-08-26 RX ADMIN — LEVETIRACETAM 400 MILLIGRAM(S): 250 TABLET, FILM COATED ORAL at 23:43

## 2019-08-26 RX ADMIN — Medication 650 MILLIGRAM(S): at 01:45

## 2019-08-26 RX ADMIN — Medication 975 MILLIGRAM(S): at 21:37

## 2019-08-26 RX ADMIN — Medication 12.5 MILLIGRAM(S): at 22:08

## 2019-08-26 RX ADMIN — CARVEDILOL PHOSPHATE 25 MILLIGRAM(S): 80 CAPSULE, EXTENDED RELEASE ORAL at 06:34

## 2019-08-26 RX ADMIN — PANTOPRAZOLE SODIUM 40 MILLIGRAM(S): 20 TABLET, DELAYED RELEASE ORAL at 01:12

## 2019-08-26 RX ADMIN — LEVETIRACETAM 400 MILLIGRAM(S): 250 TABLET, FILM COATED ORAL at 12:37

## 2019-08-26 RX ADMIN — TAMSULOSIN HYDROCHLORIDE 0.4 MILLIGRAM(S): 0.4 CAPSULE ORAL at 22:08

## 2019-08-26 RX ADMIN — INSULIN GLARGINE 35 UNIT(S): 100 INJECTION, SOLUTION SUBCUTANEOUS at 22:07

## 2019-08-26 RX ADMIN — Medication 50 GRAM(S): at 09:50

## 2019-08-26 RX ADMIN — CARVEDILOL PHOSPHATE 25 MILLIGRAM(S): 80 CAPSULE, EXTENDED RELEASE ORAL at 00:15

## 2019-08-26 RX ADMIN — Medication 650 MILLIGRAM(S): at 01:12

## 2019-08-26 RX ADMIN — GABAPENTIN 300 MILLIGRAM(S): 400 CAPSULE ORAL at 06:32

## 2019-08-26 RX ADMIN — Medication 2: at 22:07

## 2019-08-26 RX ADMIN — LOSARTAN POTASSIUM 100 MILLIGRAM(S): 100 TABLET, FILM COATED ORAL at 06:32

## 2019-08-26 RX ADMIN — CARVEDILOL PHOSPHATE 25 MILLIGRAM(S): 80 CAPSULE, EXTENDED RELEASE ORAL at 18:12

## 2019-08-26 NOTE — PROGRESS NOTE ADULT - ASSESSMENT
Problem/Plan - 1:  ·  Problem: Fall.  Plan: With fall at home. Pt does not entirely remember events leading up to fall. Denied prodromal symptoms. Potential syncopal episode given cardiac history.  Saw his cardiologist on 8/19. Was in his usual state of health. Most recently had carotid dopplers which had no significant stenosis. EKG with LBBB although as per chart review pt with chronic LBBB. SBP in 180's on presentation  - c/w tele to monitor for arrhythmia  - TTE grossly similar to prior, however now with mild AS  - Check Orthostatics.  - May require outpatient cardiology f/u for potential holter to r/o arrhythmia.     Problem/Plan - 2:  ·  Problem: Closed fracture of vault of skull, initial encounter.  Plan: CT head with nondisplaced fracture seen through the sagittal suture which   extends into the right parietal bone and slightly into the superior left parietal bone.  - Pain control.  - ENT consulted for vertigo- recommending meclizine and vestibular rehab.     Problem/Plan - 3:  ·  Problem: SDH (subdural hematoma).  Plan: Holohemispheric left subdural hematoma with adjacent subarachnoid blood. Repeat CTH with Left holohemispheric acute subdural hemorrhage is without significant interval change. However, the right anterior/inferior frontal region extra-axial blood has increased in size, now measuring a maximal thickness of 1 cm, previously 0.5cm. Subsequent f/u CT with no interval change.   - Seen by neurosurg who recommend Keppra  - f/u further neurosurg rec's.     Problem/Plan - 4:  ·  Problem: Type 2 diabetes mellitus with other specified complication, with long-term current use of insulin.  Plan: On lantus 35u QHS and Janumet. FS relatively well controlled  - Would hold Janumet  - Monitor FS, ISS  Problem/Plan - 5:  ·  Problem: CAD (coronary artery disease).  Plan: s/p two stenting episodes. First in mid 90s and second in 2015. No chest pain at present EKG with LBBB and on chart review this is chronic.  - c/w statin  - Hold Aspirin until cleared from neurosurgical perspective.   Problem/Plan - 6:  Problem: Hyperlipidemia. Plan: c/w statin.    Problem/Plan - 7:  ·  Problem: HTN.  Plan: increased norvasc to 5 daily  and losartan to 100 daily  cw carvedilol and HCTZ    monitor BP

## 2019-08-26 NOTE — PROGRESS NOTE ADULT - SUBJECTIVE AND OBJECTIVE BOX
Patient is a 74y old  Male who presents with a chief complaint of S/p Fall (25 Aug 2019 18:07)      INTERVAL HPI/OVERNIGHT EVENTS:  T(C): 37.6 (08-26-19 @ 09:07), Max: 37.7 (08-25-19 @ 23:58)  HR: 67 (08-26-19 @ 09:07) (60 - 76)  BP: 153/76 (08-26-19 @ 09:07) (146/59 - 216/86)  RR: 16 (08-26-19 @ 09:07) (16 - 18)  SpO2: 97% (08-26-19 @ 09:07) (95% - 99%)  Wt(kg): --  I&O's Summary    25 Aug 2019 07:01  -  26 Aug 2019 07:00  --------------------------------------------------------  IN: 1040 mL / OUT: 1900 mL / NET: -860 mL    26 Aug 2019 07:01  -  26 Aug 2019 11:49  --------------------------------------------------------  IN: 240 mL / OUT: 700 mL / NET: -460 mL        LABS:                        13.4   7.1   )-----------( 174      ( 26 Aug 2019 07:26 )             42.6     08-26    135  |  98  |  12  ----------------------------<  159<H>  3.9   |  24  |  0.95    Ca    9.6      26 Aug 2019 07:26  Phos  3.5     08-26  Mg     1.7     08-26          CAPILLARY BLOOD GLUCOSE      POCT Blood Glucose.: 141 mg/dL (26 Aug 2019 09:46)  POCT Blood Glucose.: 238 mg/dL (25 Aug 2019 21:27)  POCT Blood Glucose.: 156 mg/dL (25 Aug 2019 19:24)  POCT Blood Glucose.: 114 mg/dL (25 Aug 2019 13:33)            MEDICATIONS  (STANDING):  amLODIPine   Tablet 5 milliGRAM(s) Oral daily  atorvastatin 20 milliGRAM(s) Oral at bedtime  carvedilol 25 milliGRAM(s) Oral every 12 hours  dextrose 5%. 1000 milliLiter(s) (50 mL/Hr) IV Continuous <Continuous>  dextrose 50% Injectable 12.5 Gram(s) IV Push once  dextrose 50% Injectable 25 Gram(s) IV Push once  dextrose 50% Injectable 25 Gram(s) IV Push once  docusate sodium 100 milliGRAM(s) Oral three times a day  enoxaparin Injectable 40 milliGRAM(s) SubCutaneous daily  finasteride 5 milliGRAM(s) Oral daily  gabapentin 300 milliGRAM(s) Oral two times a day  hydrochlorothiazide 12.5 milliGRAM(s) Oral daily  insulin glargine Injectable (LANTUS) 35 Unit(s) SubCutaneous at bedtime  insulin lispro (HumaLOG) corrective regimen sliding scale   SubCutaneous Before meals and at bedtime  levETIRAcetam  IVPB 500 milliGRAM(s) IV Intermittent every 12 hours  losartan 100 milliGRAM(s) Oral daily  meclizine 12.5 milliGRAM(s) Oral every 8 hours  SUMAtriptan 25 milliGRAM(s) Oral daily  tamsulosin 0.4 milliGRAM(s) Oral at bedtime    MEDICATIONS  (PRN):  dextrose 40% Gel 15 Gram(s) Oral once PRN Blood Glucose LESS THAN 70 milliGRAM(s)/deciliter  glucagon  Injectable 1 milliGRAM(s) IntraMuscular once PRN Glucose LESS THAN 70 milligrams/deciliter  hydrALAZINE Injectable 10 milliGRAM(s) IV Push every 6 hours PRN SBP >180  pantoprazole    Tablet 40 milliGRAM(s) Oral daily PRN Reflux          PHYSICAL EXAM:  GENERAL: NAD, well-groomed, well-developed  HEAD:  Atraumatic, Normocephalic  CHEST/LUNG: Clear to percussion bilaterally; No rales, rhonchi, wheezing, or rubs  HEART: Regular rate and rhythm; No murmurs, rubs, or gallops  ABDOMEN: Soft, Nontender, Nondistended; Bowel sounds present  EXTREMITIES:  2+ Peripheral Pulses, No clubbing, cyanosis, or edema  LYMPH: No lymphadenopathy noted  SKIN: No rashes or lesions    Care Discussed with Consultants/Other Providers [ ] YES  [ ] NO

## 2019-08-26 NOTE — PROGRESS NOTE ADULT - ASSESSMENT
75yo male s/p fall from standing, unclear cause, found to have L SDH and R SAH. Initially admitted to MICU for Cardene drip for SBP >180mm Hg. Repeat head CT demonstrated increased SAH and new R frontal IPH, for which patient was transferred to [facility]. CT Head stable x2 from prior.    - Acute L SDH, R SAH, R frontal PIH:  -- head CT stable x2   -- Hold ASA and Brilinta  -- chemical VTE prophylaxis  -- Appreciate NSG follow up  -- Q4 neurochecks  -- Keppra for post-traumatic seizure prophylaxis 7 day total    - continue carvedilol, losartan, amlodipine and HCTZ   -- PRN iv hydralizine     - Continue statin  - TTE completed with mitral and atrial calcification and mild regurg,moderate diastolic dysfunction.    - PT/PMR Evaluation: TBI Rehab  - Regular Diet: consistent carbohydrates

## 2019-08-26 NOTE — PROGRESS NOTE ADULT - SUBJECTIVE AND OBJECTIVE BOX
ACUTE CARE SURGERY PROGRESS NOTE     SUBJECTIVE:  Yesterday with elevated SBP low 200s, improved with 10mg IV hydralizine overnight, asymptomatic. repeat. This am no compaints.       MEDICATIONS  (STANDING):  amLODIPine   Tablet 5 milliGRAM(s) Oral daily  atorvastatin 20 milliGRAM(s) Oral at bedtime  carvedilol 25 milliGRAM(s) Oral every 12 hours  dextrose 5%. 1000 milliLiter(s) (50 mL/Hr) IV Continuous <Continuous>  dextrose 50% Injectable 12.5 Gram(s) IV Push once  dextrose 50% Injectable 25 Gram(s) IV Push once  dextrose 50% Injectable 25 Gram(s) IV Push once  docusate sodium 100 milliGRAM(s) Oral three times a day  enoxaparin Injectable 40 milliGRAM(s) SubCutaneous daily  finasteride 5 milliGRAM(s) Oral daily  gabapentin 300 milliGRAM(s) Oral two times a day  hydrochlorothiazide 12.5 milliGRAM(s) Oral daily  insulin glargine Injectable (LANTUS) 35 Unit(s) SubCutaneous at bedtime  insulin lispro (HumaLOG) corrective regimen sliding scale   SubCutaneous Before meals and at bedtime  levETIRAcetam  IVPB 500 milliGRAM(s) IV Intermittent every 12 hours  losartan 100 milliGRAM(s) Oral daily  meclizine 12.5 milliGRAM(s) Oral every 8 hours  SUMAtriptan 25 milliGRAM(s) Oral daily  tamsulosin 0.4 milliGRAM(s) Oral at bedtime    MEDICATIONS  (PRN):  dextrose 40% Gel 15 Gram(s) Oral once PRN Blood Glucose LESS THAN 70 milliGRAM(s)/deciliter  glucagon  Injectable 1 milliGRAM(s) IntraMuscular once PRN Glucose LESS THAN 70 milligrams/deciliter  hydrALAZINE Injectable 10 milliGRAM(s) IV Push every 6 hours PRN SBP >180  pantoprazole    Tablet 40 milliGRAM(s) Oral daily PRN Reflux      OBJECTIVE:    Vital Signs Last 24 Hrs  T(C): 37.6 (26 Aug 2019 09:07), Max: 37.7 (25 Aug 2019 23:58)  T(F): 99.7 (26 Aug 2019 09:07), Max: 99.9 (25 Aug 2019 23:58)  HR: 67 (26 Aug 2019 09:07) (60 - 76)  BP: 153/76 (26 Aug 2019 09:07) (146/59 - 216/86)  BP(mean): --  RR: 16 (26 Aug 2019 09:07) (16 - 18)  SpO2: 97% (26 Aug 2019 09:07) (95% - 99%)    PHYSICAL EXAM:  	General: NAD  	HEENT: Normocephalic, atraumatic  	Neuro: GCS 15, moves all extremities  	Eyes: EOMI  	Neck: Soft, midline trachea, no c-collar  	Cardiac: S1, S2, RRR  	Respiratory: no increased WOB, regular respiratory rate  	Abdomen: Soft, non-distended, non-tender      I&O's Summary    25 Aug 2019 07:01  -  26 Aug 2019 07:00  --------------------------------------------------------  IN: 1040 mL / OUT: 1900 mL / NET: -860 mL    26 Aug 2019 07:01  -  26 Aug 2019 12:29  --------------------------------------------------------  IN: 240 mL / OUT: 700 mL / NET: -460 mL      I&O's Detail    25 Aug 2019 07:01  -  26 Aug 2019 07:00  --------------------------------------------------------  IN:    IV PiggyBack: 200 mL    Oral Fluid: 840 mL  Total IN: 1040 mL    OUT:    Voided: 1900 mL  Total OUT: 1900 mL    Total NET: -860 mL      26 Aug 2019 07:01  -  26 Aug 2019 12:29  --------------------------------------------------------  IN:    Oral Fluid: 240 mL  Total IN: 240 mL    OUT:    Voided: 700 mL  Total OUT: 700 mL    Total NET: -460 mL      LABS:                        13.4   7.1   )-----------( 174      ( 26 Aug 2019 07:26 )             42.6     08-26    135  |  98  |  12  ----------------------------<  159<H>  3.9   |  24  |  0.95    Ca    9.6      26 Aug 2019 07:26  Phos  3.5     08-26  Mg     1.7     08-26            RADIOLOGY & ADDITIONAL STUDIES:

## 2019-08-27 ENCOUNTER — TRANSCRIPTION ENCOUNTER (OUTPATIENT)
Age: 74
End: 2019-08-27

## 2019-08-27 LAB
ALBUMIN SERPL ELPH-MCNC: 3.6 G/DL — SIGNIFICANT CHANGE UP (ref 3.3–5)
ALP SERPL-CCNC: 57 U/L — SIGNIFICANT CHANGE UP (ref 40–120)
ALT FLD-CCNC: 24 U/L — SIGNIFICANT CHANGE UP (ref 10–45)
ANION GAP SERPL CALC-SCNC: 12 MMOL/L — SIGNIFICANT CHANGE UP (ref 5–17)
AST SERPL-CCNC: 15 U/L — SIGNIFICANT CHANGE UP (ref 10–40)
BILIRUB SERPL-MCNC: 1.4 MG/DL — HIGH (ref 0.2–1.2)
BUN SERPL-MCNC: 15 MG/DL — SIGNIFICANT CHANGE UP (ref 7–23)
CALCIUM SERPL-MCNC: 9.3 MG/DL — SIGNIFICANT CHANGE UP (ref 8.4–10.5)
CHLORIDE SERPL-SCNC: 90 MMOL/L — LOW (ref 96–108)
CO2 SERPL-SCNC: 25 MMOL/L — SIGNIFICANT CHANGE UP (ref 22–31)
CREAT ?TM UR-MCNC: 33 MG/DL — SIGNIFICANT CHANGE UP
CREAT SERPL-MCNC: 0.84 MG/DL — SIGNIFICANT CHANGE UP (ref 0.5–1.3)
GLUCOSE BLDC GLUCOMTR-MCNC: 182 MG/DL — HIGH (ref 70–99)
GLUCOSE BLDC GLUCOMTR-MCNC: 191 MG/DL — HIGH (ref 70–99)
GLUCOSE BLDC GLUCOMTR-MCNC: 215 MG/DL — HIGH (ref 70–99)
GLUCOSE BLDC GLUCOMTR-MCNC: 244 MG/DL — HIGH (ref 70–99)
GLUCOSE SERPL-MCNC: 226 MG/DL — HIGH (ref 70–99)
HCT VFR BLD CALC: 40.7 % — SIGNIFICANT CHANGE UP (ref 39–50)
HGB BLD-MCNC: 13.2 G/DL — SIGNIFICANT CHANGE UP (ref 13–17)
MAGNESIUM SERPL-MCNC: 1.8 MG/DL — SIGNIFICANT CHANGE UP (ref 1.6–2.6)
MCHC RBC-ENTMCNC: 28.8 PG — SIGNIFICANT CHANGE UP (ref 27–34)
MCHC RBC-ENTMCNC: 32.4 GM/DL — SIGNIFICANT CHANGE UP (ref 32–36)
MCV RBC AUTO: 89 FL — SIGNIFICANT CHANGE UP (ref 80–100)
OSMOLALITY UR: 252 MOS/KG — LOW (ref 300–900)
PHOSPHATE SERPL-MCNC: 2.9 MG/DL — SIGNIFICANT CHANGE UP (ref 2.5–4.5)
PLATELET # BLD AUTO: 190 K/UL — SIGNIFICANT CHANGE UP (ref 150–400)
POTASSIUM SERPL-MCNC: 3.7 MMOL/L — SIGNIFICANT CHANGE UP (ref 3.5–5.3)
POTASSIUM SERPL-SCNC: 3.7 MMOL/L — SIGNIFICANT CHANGE UP (ref 3.5–5.3)
PROT SERPL-MCNC: 7 G/DL — SIGNIFICANT CHANGE UP (ref 6–8.3)
RBC # BLD: 4.57 M/UL — SIGNIFICANT CHANGE UP (ref 4.2–5.8)
RBC # FLD: 12.9 % — SIGNIFICANT CHANGE UP (ref 10.3–14.5)
SODIUM SERPL-SCNC: 127 MMOL/L — LOW (ref 135–145)
SODIUM UR-SCNC: 49 MMOL/L — SIGNIFICANT CHANGE UP
UUN UR-MCNC: 304 MG/DL — SIGNIFICANT CHANGE UP
WBC # BLD: 8.9 K/UL — SIGNIFICANT CHANGE UP (ref 3.8–10.5)
WBC # FLD AUTO: 8.9 K/UL — SIGNIFICANT CHANGE UP (ref 3.8–10.5)

## 2019-08-27 PROCEDURE — 93010 ELECTROCARDIOGRAM REPORT: CPT

## 2019-08-27 PROCEDURE — 71045 X-RAY EXAM CHEST 1 VIEW: CPT | Mod: 26

## 2019-08-27 RX ORDER — MAGNESIUM OXIDE 400 MG ORAL TABLET 241.3 MG
400 TABLET ORAL DAILY
Refills: 0 | Status: DISCONTINUED | OUTPATIENT
Start: 2019-08-27 | End: 2019-08-29

## 2019-08-27 RX ORDER — SODIUM CHLORIDE 9 MG/ML
2 INJECTION INTRAMUSCULAR; INTRAVENOUS; SUBCUTANEOUS ONCE
Refills: 0 | Status: COMPLETED | OUTPATIENT
Start: 2019-08-27 | End: 2019-08-28

## 2019-08-27 RX ORDER — AMANTADINE HCL 100 MG
100 CAPSULE ORAL
Refills: 0 | Status: DISCONTINUED | OUTPATIENT
Start: 2019-08-27 | End: 2019-09-03

## 2019-08-27 RX ADMIN — PANTOPRAZOLE SODIUM 40 MILLIGRAM(S): 20 TABLET, DELAYED RELEASE ORAL at 06:06

## 2019-08-27 RX ADMIN — Medication 12.5 MILLIGRAM(S): at 21:38

## 2019-08-27 RX ADMIN — Medication 1: at 14:19

## 2019-08-27 RX ADMIN — TAMSULOSIN HYDROCHLORIDE 0.4 MILLIGRAM(S): 0.4 CAPSULE ORAL at 21:39

## 2019-08-27 RX ADMIN — SUMATRIPTAN SUCCINATE 25 MILLIGRAM(S): 4 INJECTION, SOLUTION SUBCUTANEOUS at 15:00

## 2019-08-27 RX ADMIN — Medication 975 MILLIGRAM(S): at 06:07

## 2019-08-27 RX ADMIN — Medication 975 MILLIGRAM(S): at 15:00

## 2019-08-27 RX ADMIN — Medication 100 MILLIGRAM(S): at 21:38

## 2019-08-27 RX ADMIN — Medication 12.5 MILLIGRAM(S): at 06:07

## 2019-08-27 RX ADMIN — GABAPENTIN 300 MILLIGRAM(S): 400 CAPSULE ORAL at 18:20

## 2019-08-27 RX ADMIN — CARVEDILOL PHOSPHATE 25 MILLIGRAM(S): 80 CAPSULE, EXTENDED RELEASE ORAL at 18:21

## 2019-08-27 RX ADMIN — LOSARTAN POTASSIUM 100 MILLIGRAM(S): 100 TABLET, FILM COATED ORAL at 06:06

## 2019-08-27 RX ADMIN — FINASTERIDE 5 MILLIGRAM(S): 5 TABLET, FILM COATED ORAL at 14:18

## 2019-08-27 RX ADMIN — Medication 975 MILLIGRAM(S): at 06:30

## 2019-08-27 RX ADMIN — CARVEDILOL PHOSPHATE 25 MILLIGRAM(S): 80 CAPSULE, EXTENDED RELEASE ORAL at 06:06

## 2019-08-27 RX ADMIN — MAGNESIUM OXIDE 400 MG ORAL TABLET 400 MILLIGRAM(S): 241.3 TABLET ORAL at 18:23

## 2019-08-27 RX ADMIN — SUMATRIPTAN SUCCINATE 25 MILLIGRAM(S): 4 INJECTION, SOLUTION SUBCUTANEOUS at 14:21

## 2019-08-27 RX ADMIN — Medication 100 MILLIGRAM(S): at 06:06

## 2019-08-27 RX ADMIN — LEVETIRACETAM 400 MILLIGRAM(S): 250 TABLET, FILM COATED ORAL at 13:32

## 2019-08-27 RX ADMIN — Medication 2: at 18:39

## 2019-08-27 RX ADMIN — Medication 12.5 MILLIGRAM(S): at 06:06

## 2019-08-27 RX ADMIN — Medication 975 MILLIGRAM(S): at 14:23

## 2019-08-27 RX ADMIN — Medication 975 MILLIGRAM(S): at 19:05

## 2019-08-27 RX ADMIN — GABAPENTIN 300 MILLIGRAM(S): 400 CAPSULE ORAL at 06:06

## 2019-08-27 RX ADMIN — ONDANSETRON 4 MILLIGRAM(S): 8 TABLET, FILM COATED ORAL at 11:26

## 2019-08-27 RX ADMIN — INSULIN GLARGINE 35 UNIT(S): 100 INJECTION, SOLUTION SUBCUTANEOUS at 21:39

## 2019-08-27 RX ADMIN — ENOXAPARIN SODIUM 40 MILLIGRAM(S): 100 INJECTION SUBCUTANEOUS at 14:19

## 2019-08-27 RX ADMIN — Medication 2: at 21:40

## 2019-08-27 RX ADMIN — Medication 100 MILLIGRAM(S): at 14:20

## 2019-08-27 RX ADMIN — Medication 975 MILLIGRAM(S): at 19:50

## 2019-08-27 RX ADMIN — ONDANSETRON 4 MILLIGRAM(S): 8 TABLET, FILM COATED ORAL at 18:21

## 2019-08-27 RX ADMIN — AMLODIPINE BESYLATE 5 MILLIGRAM(S): 2.5 TABLET ORAL at 06:06

## 2019-08-27 RX ADMIN — Medication 1: at 09:46

## 2019-08-27 RX ADMIN — ONDANSETRON 4 MILLIGRAM(S): 8 TABLET, FILM COATED ORAL at 06:06

## 2019-08-27 RX ADMIN — Medication 12.5 MILLIGRAM(S): at 14:20

## 2019-08-27 RX ADMIN — Medication 100 MILLIGRAM(S): at 18:21

## 2019-08-27 RX ADMIN — Medication 10 MILLIGRAM(S): at 13:35

## 2019-08-27 NOTE — DISCHARGE NOTE PROVIDER - CARE PROVIDERS DIRECT ADDRESSES
[email or web address]

## 2019-08-27 NOTE — CONSULT NOTE ADULT - SUBJECTIVE AND OBJECTIVE BOX
HPI:  74M hx CAD s/p PCI on ASA, HTN,HLD, BPH, DM2, on ASA 81mg daily transferred from [facility] s/p unwitnessed fall with finding of intracranial bleed and increased blood on interval scan. Patient initially presented s/p unwitnessed fall, ?syncopal episode this  with complaints of dizziness after fall and initial difficulty speaking. Patient does not recall event. Per family, at the OSH he was initially confused and altered. In the [facility] ED, was complaining of headache and dizziness since his fall, no associated neurologic deficits, no aggravating/alleviating factors. Pt with CTH showing right subdural hemorrhage left subarachnoid hemorrhage, and nondisplaced right frontal parietal parietal bone fracture, and right frontal and right temporal hemorrhagic contusion with imaging stable and showing no interval changes. Neurology consulted for worsening headache.     Pt and wife both provide hx. Pt states that he has headache since admission but that it has been worsening. He describes it as a frontal headache that feels dull with 9/10 pain and associated nausea. Pt keeps attempting to vomit but is unable to and is not tolerating PO. As per his wife at bedside, yesterday Tylenol and Zofran helped with headache and pt did not appear this debilitated. Pt denies any alleviating or exacerbating factors but prefers to keep his eyes closed because of headache. Wife states that earlier he was complaining of dizziness and the room spinning, but at present, pt denies all of this. Interview and exam with pt somewhat limited as he is lethargic, although arousable to touch with fluctuating alertness and limited participation 2/2 pain and incomprehension.     PMHx: BPH (benign prostatic hypertrophy)  Hyperlipidemia  CAD (coronary artery disease)  Diabetes mellitus  HTN (hypertension)    PSHx: S/P drug eluting coronary stent placement  S/P primary angioplasty with coronary stent    Medications (inpatient): niCARdipine Infusion 5 mG/Hr IV Continuous <Continuous>    Medications (PRN):  Allergies: No Known Allergies  (Intolerances: )  Family Hx: Family history of diabetes mellitus (Sibling): 3 brothers alive with Diabetes (22 Aug 2019 02:21)    PAST MEDICAL & SURGICAL HISTORY:  BPH (benign prostatic hypertrophy)  Hyperlipidemia  CAD (coronary artery disease)  Diabetes mellitus: Type 2  HTN (hypertension)  S/P drug eluting coronary stent placement: Ramus  S/P primary angioplasty with coronary stent: 1990s    FAMILY HISTORY:  Family history of diabetes mellitus (Sibling): 3 brothers alive with Diabetes    No history of dementia, strokes, or seizures     SOCIAL HISTORY:   No history of tobacco or alcohol use     MEDICATIONS (HOME):  Home Medications:  aspirin 81 mg oral delayed release tablet: 1 tab(s) orally once a day (01 Nov 2015 00:47)  carvedilol 25 mg oral tablet: 1 tab(s) orally 2 times a day (22 Aug 2019 04:23)  Crestor 5 mg oral tablet: 1 tab(s) orally once a day (at bedtime) (01 Nov 2015 00:47)  finasteride 5 mg oral tablet: 1 tab(s) orally once a day (01 Nov 2015 00:47)  folic acid:  orally once a day (at bedtime) (01 Nov 2015 00:47)  gabapentin 300 mg oral tablet: orally 2 times a day (22 Aug 2019 04:21)  insulin glargine 100 units/mL subcutaneous solution: 35 unit(s) subcutaneous once a day (at bedtime) (22 Aug 2019 03:46)  Janumet 1000 mg-50 mg oral tablet: 1 tab(s) orally 2 times a day   (01 Nov 2015 00:47)  olmesartan 20 mg oral tablet: 1 tab(s) orally once a day (22 Aug 2019 03:51)  tamsulosin 0.4 mg oral capsule: 1 cap(s) orally once a day (at bedtime) (08 May 2015 13:43)    MEDICATIONS  (STANDING):  amantadine 100 milliGRAM(s) Oral two times a day  amLODIPine   Tablet 5 milliGRAM(s) Oral daily  atorvastatin 20 milliGRAM(s) Oral at bedtime  carvedilol 25 milliGRAM(s) Oral every 12 hours  dextrose 5%. 1000 milliLiter(s) (50 mL/Hr) IV Continuous <Continuous>  dextrose 50% Injectable 12.5 Gram(s) IV Push once  dextrose 50% Injectable 25 Gram(s) IV Push once  dextrose 50% Injectable 25 Gram(s) IV Push once  docusate sodium 100 milliGRAM(s) Oral three times a day  enoxaparin Injectable 40 milliGRAM(s) SubCutaneous daily  finasteride 5 milliGRAM(s) Oral daily  gabapentin 300 milliGRAM(s) Oral two times a day  hydrochlorothiazide 12.5 milliGRAM(s) Oral daily  insulin glargine Injectable (LANTUS) 35 Unit(s) SubCutaneous at bedtime  insulin lispro (HumaLOG) corrective regimen sliding scale   SubCutaneous Before meals and at bedtime  levETIRAcetam  IVPB 500 milliGRAM(s) IV Intermittent every 12 hours  losartan 100 milliGRAM(s) Oral daily  magnesium oxide 400 milliGRAM(s) Oral daily  meclizine 12.5 milliGRAM(s) Oral every 8 hours  ondansetron   Disintegrating Tablet 4 milliGRAM(s) Oral every 6 hours  SUMAtriptan 25 milliGRAM(s) Oral daily  tamsulosin 0.4 milliGRAM(s) Oral at bedtime    MEDICATIONS  (PRN):  acetaminophen   Tablet .. 975 milliGRAM(s) Oral every 6 hours PRN Moderate Pain (4 - 6)  dextrose 40% Gel 15 Gram(s) Oral once PRN Blood Glucose LESS THAN 70 milliGRAM(s)/deciliter  glucagon  Injectable 1 milliGRAM(s) IntraMuscular once PRN Glucose LESS THAN 70 milligrams/deciliter  hydrALAZINE Injectable 10 milliGRAM(s) IV Push every 6 hours PRN SBP >180  pantoprazole    Tablet 40 milliGRAM(s) Oral daily PRN Reflux    ALLERGIES/INTOLERANCES:  Allergies  No Known Allergies    Intolerances    VITALS & EXAMINATION:  Vital Signs Last 24 Hrs  T(C): 36.6 (27 Aug 2019 19:51), Max: 37.2 (27 Aug 2019 06:00)  T(F): 97.8 (27 Aug 2019 19:51), Max: 99 (27 Aug 2019 06:00)  HR: 71 (27 Aug 2019 19:51) (64 - 78)  BP: 153/66 (27 Aug 2019 19:51) (129/69 - 182/71)  BP(mean): --  RR: 17 (27 Aug 2019 19:51) (16 - 19)  SpO2: 96% (27 Aug 2019 19:51) (94% - 98%)    PHYSICAL EXAMINATION  Constitutional: Pt in moderate distress, lethargic, lying in hospital chair.   Head: Normocephalic & atraumatic. No tenderness upon palpation. No edema, contusions, or abrasions noted.   Extremities: No edema, clubbing, cyanosis  Skin: No rashes    Neurological:  Mental Status: Lethargic, arousable to touch with fluctuating alertness. Oriented to self, date, and place but requires questioning multiple times in order to answer questions. Follows some commands but then is unable to comprehend due to lethargy, pain, and fluctuating alertness.     Language: Speech is gargled, not fluent. Pt with poor comprehension. Unable to repeat sentence.      Cranial Nerves: PERRLA although pupils small. EOMI. No nystagmus, no diplopia. V1-3 intact to light touch. No facial asymmetry. Full eye closure strength B/L. Hearing grossly normal B/L. Symmetric palate elevation. Gag reflex deferred. Head turning & shoulder shrug intact B/L. Tongue midline.    Motor: Normal muscle bulk & tone. No noticeable tremor or seizure. No pronator drift.              Deltoid	Biceps	Triceps	Wrist	Finger ABd	   R	5	5	5	5	5		5 	  L	5	5	5	5	5		5    	H-Flex	H-Ext   K-Flex	K-Ext	D-Flex	P-Flex motor strength in lower extremities appears pain limited  R	4+	4+	4+	4+	5	5	 	   L	4+	4+	4+	4+	5	5	     Sensation: grossly intact to light touch. Pt unable to maintain alertness and fully comprehend instructions when assessing sensation to position and vibration.     Reflexes:              Biceps(C5)       BR(C6)     Triceps(C7)               Patellar(L4)    Achilles(S1)    Plantar Resp  R	2+	          2+             2+		        2+		    1+		Down   L	2+	          2+             2+		        2+		    1+		Down     Coordination: No dysmetria with rapid alternating movements.     Gait: unable to assess as pt too lethargic to get up    LABORATORY:  CBC                       13.4   7.1   )-----------( 174      ( 26 Aug 2019 07:26 )             42.6     Chem 08-26    135  |  98  |  12  ----------------------------<  159<H>  3.9   |  24  |  0.95    Ca    9.6      26 Aug 2019 07:26  Phos  3.5     08-26  Mg     1.7     08-26      LFTs   Coagulopathy   Lipid Panel   A1c 08-22 [id number] 7.0    Cardiac enzymes     U/A         < from: CT Head No Cont (08.24.19 @ 19:23) >  EXAM:  CT BRAIN                            PROCEDURE DATE:  08/24/2019            INTERPRETATION:  CLINICAL INFORMATION: Trauma, intracranial hemorrhage,   now with new disorientation and nausea    TECHNIQUE: Noncontrast axial CT images were acquired through the head.   Two-dimensional sagittal and coronal reformats were generated.    COMPARISON STUDY: CT head 8/22/2019.    FINDINGS:     Redemonstration of a nondisplaced right frontal parietal parietal bone   fracture. Right frontal hemorrhagiccontusion and with associated edema   and mass effect without significant change. A smaller hemorrhagic   contusion also noted at the anterior right temporal lobe. Small areas of   subarachnoid hemorrhage along the right temporal and parietal regions. A   small amount of left temporal subdural or subarachnoid hemorrhage is   identified, decreased compared with 8/22/2019. No midline shift or   central herniation.     Ventricular size is unchanged. Small vessel white matter ischemic changes   are noted.      Previously noted bilateral extra-axial fluid collections are unchanged.     The visualized paranasal sinuses are clear. The mastoid air cells and   middle ear cavities are clear. Bilateral orbital lens replacement.      IMPRESSION:     No significant interval change in the appearance of anterior right   frontal and temporal lobe hemorrhagic contusions compared with 8/22/2019.   No change in small amount of subarachnoid and left subdural hemorrhage.    No midline shift or central herniation. No hydrocephalus.                COLTON PATTERSON M.D., RADIOLOGY RESIDENT  This document has been electronically signed.  ENRIQUE FUENTES M.D., ATTENDING RADIOLOGIST  This document has been electronically signed. Aug 25 2019  8:43AM    < end of copied text >

## 2019-08-27 NOTE — PROGRESS NOTE ADULT - SUBJECTIVE AND OBJECTIVE BOX
Patient is a 74y old  Male who presents with a chief complaint of S/p Fall (27 Aug 2019 13:29)      INTERVAL HPI/OVERNIGHT EVENTS:  T(C): 37.1 (08-27-19 @ 13:19), Max: 37.2 (08-27-19 @ 06:00)  HR: 78 (08-27-19 @ 14:05) (64 - 78)  BP: 161/67 (08-27-19 @ 14:05) (129/69 - 182/71)  RR: 19 (08-27-19 @ 13:19) (16 - 19)  SpO2: 97% (08-27-19 @ 13:19) (94% - 98%)  Wt(kg): --  I&O's Summary    26 Aug 2019 07:01  -  27 Aug 2019 07:00  --------------------------------------------------------  IN: 1420 mL / OUT: 2050 mL / NET: -630 mL    27 Aug 2019 07:01  -  27 Aug 2019 17:15  --------------------------------------------------------  IN: 0 mL / OUT: 0 mL / NET: 0 mL        LABS:                        13.4   7.1   )-----------( 174      ( 26 Aug 2019 07:26 )             42.6     08-26    135  |  98  |  12  ----------------------------<  159<H>  3.9   |  24  |  0.95    Ca    9.6      26 Aug 2019 07:26  Phos  3.5     08-26  Mg     1.7     08-26          CAPILLARY BLOOD GLUCOSE      POCT Blood Glucose.: 182 mg/dL (27 Aug 2019 14:09)  POCT Blood Glucose.: 191 mg/dL (27 Aug 2019 09:41)  POCT Blood Glucose.: 237 mg/dL (26 Aug 2019 21:56)  POCT Blood Glucose.: 147 mg/dL (26 Aug 2019 18:21)            MEDICATIONS  (STANDING):  amantadine 100 milliGRAM(s) Oral two times a day  amLODIPine   Tablet 5 milliGRAM(s) Oral daily  atorvastatin 20 milliGRAM(s) Oral at bedtime  carvedilol 25 milliGRAM(s) Oral every 12 hours  dextrose 5%. 1000 milliLiter(s) (50 mL/Hr) IV Continuous <Continuous>  dextrose 50% Injectable 12.5 Gram(s) IV Push once  dextrose 50% Injectable 25 Gram(s) IV Push once  dextrose 50% Injectable 25 Gram(s) IV Push once  docusate sodium 100 milliGRAM(s) Oral three times a day  enoxaparin Injectable 40 milliGRAM(s) SubCutaneous daily  finasteride 5 milliGRAM(s) Oral daily  gabapentin 300 milliGRAM(s) Oral two times a day  hydrochlorothiazide 12.5 milliGRAM(s) Oral daily  insulin glargine Injectable (LANTUS) 35 Unit(s) SubCutaneous at bedtime  insulin lispro (HumaLOG) corrective regimen sliding scale   SubCutaneous Before meals and at bedtime  levETIRAcetam  IVPB 500 milliGRAM(s) IV Intermittent every 12 hours  losartan 100 milliGRAM(s) Oral daily  magnesium oxide 400 milliGRAM(s) Oral daily  meclizine 12.5 milliGRAM(s) Oral every 8 hours  ondansetron   Disintegrating Tablet 4 milliGRAM(s) Oral every 6 hours  SUMAtriptan 25 milliGRAM(s) Oral daily  tamsulosin 0.4 milliGRAM(s) Oral at bedtime    MEDICATIONS  (PRN):  acetaminophen   Tablet .. 975 milliGRAM(s) Oral every 6 hours PRN Moderate Pain (4 - 6)  dextrose 40% Gel 15 Gram(s) Oral once PRN Blood Glucose LESS THAN 70 milliGRAM(s)/deciliter  glucagon  Injectable 1 milliGRAM(s) IntraMuscular once PRN Glucose LESS THAN 70 milligrams/deciliter  hydrALAZINE Injectable 10 milliGRAM(s) IV Push every 6 hours PRN SBP >180  pantoprazole    Tablet 40 milliGRAM(s) Oral daily PRN Reflux          PHYSICAL EXAM:  GENERAL: NAD, well-groomed, well-developed  HEAD:  Atraumatic, Normocephalic  CHEST/LUNG: Clear to percussion bilaterally; No rales, rhonchi, wheezing, or rubs  HEART: Regular rate and rhythm; No murmurs, rubs, or gallops  ABDOMEN: Soft, Nontender, Nondistended; Bowel sounds present  EXTREMITIES:  2+ Peripheral Pulses, No clubbing, cyanosis, or edema  LYMPH: No lymphadenopathy noted  SKIN: No rashes or lesions    Care Discussed with Consultants/Other Providers [ ] YES  [ ] NO

## 2019-08-27 NOTE — DISCHARGE NOTE PROVIDER - HOSPITAL COURSE
8/22: Pt admitted after unwitnessed fall resulting in a SDH         8/23: CTH x2 negative        8/24: Orthostatics conducted today, BP elevated to 180s/70s. Carvedilol, Losartan (added 8/23), HCTZ (added 8/24), amlodipine (added 8/24), hydralazine (8/24) used to control BP. Down to 144/50. Obtained CT head for worsening nausea and disorientation, discussion with radiologist, stable no new changes; ENT consulted for vertigo, meclizine        8/24-25: headaches continue, problems controlling hypertension with SBP to 200s. Medicine recommended increasing norvasc to 5 and losartan to 100.         8/26: Overnight hypertension controlled with Hydralazine        8/27: Amantadine started, pt seen by PT        Pt stable at discharge afebrile for >24hrs hemodynamically controlled 8/22: Pt admitted after unwitnessed fall resulting in a SDH         8/23: CTH x2 negative        8/24: Orthostatics conducted today, BP elevated to 180s/70s. Carvedilol, Losartan (added 8/23), HCTZ (added 8/24), amlodipine (added 8/24), hydralazine (8/24) used to control BP. Down to 144/50. Obtained CT head for worsening nausea and disorientation, discussion with radiologist, stable no new changes; ENT consulted for vertigo, meclizine        8/24-25: headaches continue, problems controlling hypertension with SBP to 200s. Medicine recommended increasing norvasc to 5 and losartan to 100.         8/26: Overnight hypertension controlled with Hydralazine        8/27: Amantadine started, pt seen by PT        8/29: Transferred back to floors        8/31: Glucerna added        Pt stable at discharge afebrile for >24hrs hemodynamically controlled

## 2019-08-27 NOTE — DISCHARGE NOTE PROVIDER - PROVIDER TOKENS
PROVIDER:[TOKEN:[[id number]]],PROVIDER:[TOKEN:[[id number]]],PROVIDER:[TOKEN:[[id number]]],PROVIDER:[TOKEN:[[id number]]]

## 2019-08-27 NOTE — PROGRESS NOTE ADULT - SUBJECTIVE AND OBJECTIVE BOX
GENERAL SURGERY DAILY PROGRESS NOTE:    Interval:  No acute events overnight endorsed.    Subjective:  Patient seen and examined this am. Endorses HA, bl LE cramping pain. Alleviated a little by tylenol and ice pack. Complains of nausea. Received zofran. +Tolerating diet, ate little. -OOB. Counselled rehab working w PT discussed folic acid DM neuropathy    Vital Signs Last 24 Hrs  T(C): 36.7 (27 Aug 2019 09:00), Max: 37.6 (26 Aug 2019 13:36)  T(F): 98.1 (27 Aug 2019 09:00), Max: 99.6 (26 Aug 2019 13:36)  HR: 65 (27 Aug 2019 09:00) (64 - 68)  BP: 153/67 (27 Aug 2019 09:00) (129/69 - 164/79)  BP(mean): --  RR: 16 (27 Aug 2019 09:00) (16 - 16)  SpO2: 96% (27 Aug 2019 09:00) (94% - 98%)    Exam:  Gen: Laying in bed, alert and responding appropriately  Resp: Airway patent, non-labored respirations  Abd: Soft, ND, NTTP x 4 quadrants, no rebound or guarding.   Ext: No edema, WWP, LEs: SILT S/S/SP/DP MI Q/H/TA/G/S/FHL/EHL  Neuro: AAOx3, no focal deficits    I&O's Detail    26 Aug 2019 07:01  -  27 Aug 2019 07:00  --------------------------------------------------------  IN:    IV PiggyBack: 100 mL    Oral Fluid: 1320 mL  Total IN: 1420 mL    OUT:    Voided: 2050 mL  Total OUT: 2050 mL    Total NET: -630 mL          Daily     Daily     MEDICATIONS  (STANDING):  amLODIPine   Tablet 5 milliGRAM(s) Oral daily  atorvastatin 20 milliGRAM(s) Oral at bedtime  carvedilol 25 milliGRAM(s) Oral every 12 hours  dextrose 5%. 1000 milliLiter(s) (50 mL/Hr) IV Continuous <Continuous>  dextrose 50% Injectable 12.5 Gram(s) IV Push once  dextrose 50% Injectable 25 Gram(s) IV Push once  dextrose 50% Injectable 25 Gram(s) IV Push once  docusate sodium 100 milliGRAM(s) Oral three times a day  enoxaparin Injectable 40 milliGRAM(s) SubCutaneous daily  finasteride 5 milliGRAM(s) Oral daily  gabapentin 300 milliGRAM(s) Oral two times a day  hydrochlorothiazide 12.5 milliGRAM(s) Oral daily  insulin glargine Injectable (LANTUS) 35 Unit(s) SubCutaneous at bedtime  insulin lispro (HumaLOG) corrective regimen sliding scale   SubCutaneous Before meals and at bedtime  levETIRAcetam  IVPB 500 milliGRAM(s) IV Intermittent every 12 hours  losartan 100 milliGRAM(s) Oral daily  meclizine 12.5 milliGRAM(s) Oral every 8 hours  ondansetron   Disintegrating Tablet 4 milliGRAM(s) Oral every 6 hours  SUMAtriptan 25 milliGRAM(s) Oral daily  tamsulosin 0.4 milliGRAM(s) Oral at bedtime    MEDICATIONS  (PRN):  acetaminophen   Tablet .. 975 milliGRAM(s) Oral every 6 hours PRN Moderate Pain (4 - 6)  dextrose 40% Gel 15 Gram(s) Oral once PRN Blood Glucose LESS THAN 70 milliGRAM(s)/deciliter  glucagon  Injectable 1 milliGRAM(s) IntraMuscular once PRN Glucose LESS THAN 70 milligrams/deciliter  hydrALAZINE Injectable 10 milliGRAM(s) IV Push every 6 hours PRN SBP >180  pantoprazole    Tablet 40 milliGRAM(s) Oral daily PRN Reflux      LABS:                        13.4   7.1   )-----------( 174      ( 26 Aug 2019 07:26 )             42.6     08-26    135  |  98  |  12  ----------------------------<  159<H>  3.9   |  24  |  0.95    Ca    9.6      26 Aug 2019 07:26  Phos  3.5     08-26  Mg     1.7     08-26            JORI Plascencia, PGY-1  [facility]  [id number] with any questions GENERAL SURGERY DAILY PROGRESS NOTE:    Interval:  No acute events overnight endorsed.    Subjective:  Patient seen and examined this am. Endorses HA, bl LE cramping pain. Alleviated a little by tylenol and ice pack. Complains of nausea. Received zofran. +Tolerating diet, ate little. -OOB. Counselled rehab working w PT discussed folic acid DM neuropathy    Vital Signs Last 24 Hrs  T(C): 36.7 (27 Aug 2019 09:00), Max: 37.6 (26 Aug 2019 13:36)  T(F): 98.1 (27 Aug 2019 09:00), Max: 99.6 (26 Aug 2019 13:36)  HR: 65 (27 Aug 2019 09:00) (64 - 68)  BP: 153/67 (27 Aug 2019 09:00) (129/69 - 164/79)  BP(mean): --  RR: 16 (27 Aug 2019 09:00) (16 - 16)  SpO2: 96% (27 Aug 2019 09:00) (94% - 98%)    Exam:  Gen: Laying in bed, alert and responding appropriately  Resp: Airway patent, non-labored respirations  Abd: Soft, ND, NTTP x 4 quadrants, no rebound or guarding.   Ext: No edema, WWP, LEs: SILT S/S/SP/DP MI Q/H/TA/G/S/FHL/EHL  Neuro: AAOx3, no focal deficits    I&O's Detail    26 Aug 2019 07:01  -  27 Aug 2019 07:00  --------------------------------------------------------  IN:    IV PiggyBack: 100 mL    Oral Fluid: 1320 mL  Total IN: 1420 mL    OUT:    Voided: 2050 mL  Total OUT: 2050 mL    Total NET: -630 mL          Daily     Daily     MEDICATIONS  (STANDING):  amLODIPine   Tablet 5 milliGRAM(s) Oral daily  atorvastatin 20 milliGRAM(s) Oral at bedtime  carvedilol 25 milliGRAM(s) Oral every 12 hours  dextrose 5%. 1000 milliLiter(s) (50 mL/Hr) IV Continuous <Continuous>  dextrose 50% Injectable 12.5 Gram(s) IV Push once  dextrose 50% Injectable 25 Gram(s) IV Push once  dextrose 50% Injectable 25 Gram(s) IV Push once  docusate sodium 100 milliGRAM(s) Oral three times a day  enoxaparin Injectable 40 milliGRAM(s) SubCutaneous daily  finasteride 5 milliGRAM(s) Oral daily  gabapentin 300 milliGRAM(s) Oral two times a day  hydrochlorothiazide 12.5 milliGRAM(s) Oral daily  insulin glargine Injectable (LANTUS) 35 Unit(s) SubCutaneous at bedtime  insulin lispro (HumaLOG) corrective regimen sliding scale   SubCutaneous Before meals and at bedtime  levETIRAcetam  IVPB 500 milliGRAM(s) IV Intermittent every 12 hours  losartan 100 milliGRAM(s) Oral daily  meclizine 12.5 milliGRAM(s) Oral every 8 hours  ondansetron   Disintegrating Tablet 4 milliGRAM(s) Oral every 6 hours  SUMAtriptan 25 milliGRAM(s) Oral daily  tamsulosin 0.4 milliGRAM(s) Oral at bedtime    MEDICATIONS  (PRN):  acetaminophen   Tablet .. 975 milliGRAM(s) Oral every 6 hours PRN Moderate Pain (4 - 6)  dextrose 40% Gel 15 Gram(s) Oral once PRN Blood Glucose LESS THAN 70 milliGRAM(s)/deciliter  glucagon  Injectable 1 milliGRAM(s) IntraMuscular once PRN Glucose LESS THAN 70 milligrams/deciliter  hydrALAZINE Injectable 10 milliGRAM(s) IV Push every 6 hours PRN SBP >180  pantoprazole    Tablet 40 milliGRAM(s) Oral daily PRN Reflux      LABS:                        13.4   7.1   )-----------( 174      ( 26 Aug 2019 07:26 )             42.6     08-26    135  |  98  |  12  ----------------------------<  159<H>  3.9   |  24  |  0.95    Ca    9.6      26 Aug 2019 07:26  Phos  3.5     08-26  Mg     1.7     08-26            JORI Plascencia, PGY-1  [facility]  [id number] with any questions

## 2019-08-27 NOTE — CONSULT NOTE ADULT - ASSESSMENT
4M hx CAD s/p PCI on ASA, HTN,HLD, BPH, DM2, on ASA 81mg daily admitted s/p fall with SDH, SAH, hemorrhagic contusions, and calvarial fracture with stable imaging now being seen by neurology for worsening headaches. Upon exam, pt endorses worsening headaches with nausea and poor PO intake, no focal deficits, however, significant lethargy with fluctuating alertness and comprehension. Headaches and lethargy likely 2/2 irritation from significant bleeds he has suffered from trauma. As per wife, Tylenol previously helpful.     PLAN:   1. Tylenol 1000 mg IV q6H for pain.   2. Solumedrol 1 g IV for pain x1.   3. Repeat CTH if mental status deteriorates.

## 2019-08-27 NOTE — DISCHARGE NOTE PROVIDER - NSDCCPCAREPLAN_GEN_ALL_CORE_FT
PRINCIPAL DISCHARGE DIAGNOSIS  Diagnosis: SDH (subdural hematoma)  Assessment and Plan of Treatment:       SECONDARY DISCHARGE DIAGNOSES  Diagnosis: Calvarial fracture  Assessment and Plan of Treatment:     Diagnosis: SAH (subarachnoid hemorrhage)  Assessment and Plan of Treatment:

## 2019-08-27 NOTE — DISCHARGE NOTE PROVIDER - CARE PROVIDER_API CALL
Cecilia Valle)  Surgery; Surgical Critical Care  [address] [facility], [address] [address]  Phone: [phone]  Fax: [phone]  Follow Up Time:     Felipe Camargo)  Neurological Surgery  [address] [facility], [address] [place] [address]  Phone: [phone]  Fax: [phone]  Follow Up Time:     Tayla Johnson)  Otolaryngology  [address] [place] [address]  Phone: [phone]  Fax: [phone]  Follow Up Time:     Luisito Quintero)  Otolaryngology  [address] [place] [address], [facility] [address]  [place] [address]  Phone: [phone]  Fax: [phone]  Follow Up Time:

## 2019-08-27 NOTE — PROGRESS NOTE ADULT - ASSESSMENT
Problem/Plan - 1:  ·  Problem: Fall.  Plan: With fall at home. Pt does not entirely remember events leading up to fall. Denied prodromal symptoms. Potential syncopal episode given cardiac history.  Saw his cardiologist on 8/19. Was in his usual state of health. Most recently had carotid dopplers which had no significant stenosis. EKG with LBBB although as per chart review pt with chronic LBBB. SBP in 180's on presentation  - c/w tele to monitor for arrhythmia  - TTE grossly similar to prior, however now with mild AS  - Check Orthostatics.  - May require outpatient cardiology f/u for potential holter to r/o arrhythmia.     Problem/Plan - 2:  ·  Problem: Closed fracture of vault of skull, initial encounter.  Plan: CT head with nondisplaced fracture seen through the sagittal suture which   extends into the right parietal bone and slightly into the superior left parietal bone.  - Pain control.  - ENT consulted for vertigo- recommending meclizine and vestibular rehab.     Problem/Plan - 3:  ·  Problem: SDH (subdural hematoma).  Plan: Holohemispheric left subdural hematoma with adjacent subarachnoid blood. Repeat CTH with Left holohemispheric acute subdural hemorrhage is without significant interval change. However, the right anterior/inferior frontal region extra-axial blood has increased in size, now measuring a maximal thickness of 1 cm, previously 0.5cm. Subsequent f/u CT with no interval change.   - Seen by neurosurg who recommend Keppra  - f/u further neurosurg rec's.     Problem/Plan - 4:  ·  Problem: Type 2 diabetes mellitus with other specified complication, with long-term current use of insulin.  Plan: On lantus 35u QHS and Janumet. FS relatively well controlled  - Would hold Janumet  - Monitor FS, ISS  Problem/Plan - 5:  ·  Problem: CAD (coronary artery disease).  Plan: s/p two stenting episodes. First in mid 90s and second in 2015. No chest pain at present EKG with LBBB and on chart review this is chronic.  - c/w statin  - Hold Aspirin until cleared from neurosurgical perspective.   Problem/Plan - 6:  Problem: Hyperlipidemia. Plan: c/w statin.    Problem/Plan - 7:  ·  Problem: Hypertensive emergency .  Plan: increased norvasc to 5 daily  and losartan to 100 daily  cw carvedilol and HCTZ    monitor BP

## 2019-08-27 NOTE — CHART NOTE - NSCHARTNOTEFT_GEN_A_CORE
nurse called about family member's complaint of lethargy     pt seen and examined at bedside. c/o tired and sleepy. denies HA, vision change, slurred speech, dizziness, SOB, CP or palpitations.     /62, IN 69, T98.4F, O2Sat 95% on RA    PE  Gen: NAD, A&O x 3  Pulm: non labor breathing, CTA b/l  Heart: RRR  abd: soft, ND/NT  Neuro: C2-11 intact. no focal neurological deficit.     Plan  - CBC/CMP/Mag/phos, ECG, CXR    d/w ACS team  [id number] nurse called about family member's complaint of lethargy     pt seen and examined at bedside. c/o tired and sleepy. denies HA, vision change, slurred speech, dizziness, SOB, CP or palpitations.     /62, UT 69, T98.4F, O2Sat 95% on RA    PE  Gen: NAD, A&O x 3  Pulm: non labor breathing, CTA b/l  Heart: RRR  abd: soft, ND/NT  Neuro: C2-11 intact. no focal neurological deficit.     Plan  - CBC/CMP/Mag/phos, ECG, CXR    d/w ACS team  [id number]    Addendum    CBC wnl  Na 127: salt tab 2 mg x 1 dose, repeat BMP in AM.   f/u ECG ang CXR.

## 2019-08-27 NOTE — PROGRESS NOTE ADULT - ASSESSMENT
75yo male s/p fall from standing, unclear cause, found to have L SDH and R SAH. Initially admitted to MICU for Cardene drip for SBP >180mm Hg. Repeat head CT demonstrated increased SAH and new R frontal IPH, for which patient was transferred to [facility]. CT Head stable x2 from prior.    - Acute L SDH, R SAH, R frontal PIH:  -- head CT stable x2   -- Hold ASA and Brilinta  -- chemical VTE prophylaxis  -- Appreciate NSG follow up  -- Q4 neurochecks  -- Keppra for post-traumatic seizure prophylaxis 7 day total    - continue carvedilol, losartan, amlodipine and HCTZ   -- PRN iv hydralizine     - Continue statin  - TTE completed with mitral and atrial calcification and mild regurg,moderate diastolic dysfunction.    - PT/PMR Evaluation: TBI Rehab, PT to try to see pt again today 8/27 after attempting to see pt yesterday 8/26 but was refused  -- Pt counselled to please work w PT  - Regular Diet: consistent carbohydrates

## 2019-08-28 LAB
ANION GAP SERPL CALC-SCNC: 11 MMOL/L — SIGNIFICANT CHANGE UP (ref 5–17)
ANION GAP SERPL CALC-SCNC: 12 MMOL/L — SIGNIFICANT CHANGE UP (ref 5–17)
ANION GAP SERPL CALC-SCNC: 13 MMOL/L — SIGNIFICANT CHANGE UP (ref 5–17)
BUN SERPL-MCNC: 16 MG/DL — SIGNIFICANT CHANGE UP (ref 7–23)
BUN SERPL-MCNC: 17 MG/DL — SIGNIFICANT CHANGE UP (ref 7–23)
BUN SERPL-MCNC: 18 MG/DL — SIGNIFICANT CHANGE UP (ref 7–23)
CALCIUM SERPL-MCNC: 9.2 MG/DL — SIGNIFICANT CHANGE UP (ref 8.4–10.5)
CALCIUM SERPL-MCNC: 9.3 MG/DL — SIGNIFICANT CHANGE UP (ref 8.4–10.5)
CALCIUM SERPL-MCNC: 9.7 MG/DL — SIGNIFICANT CHANGE UP (ref 8.4–10.5)
CHLORIDE SERPL-SCNC: 88 MMOL/L — LOW (ref 96–108)
CHLORIDE SERPL-SCNC: 89 MMOL/L — LOW (ref 96–108)
CHLORIDE SERPL-SCNC: 89 MMOL/L — LOW (ref 96–108)
CO2 SERPL-SCNC: 26 MMOL/L — SIGNIFICANT CHANGE UP (ref 22–31)
CO2 SERPL-SCNC: 27 MMOL/L — SIGNIFICANT CHANGE UP (ref 22–31)
CO2 SERPL-SCNC: 28 MMOL/L — SIGNIFICANT CHANGE UP (ref 22–31)
CREAT ?TM UR-MCNC: 88 MG/DL — SIGNIFICANT CHANGE UP
CREAT SERPL-MCNC: 0.86 MG/DL — SIGNIFICANT CHANGE UP (ref 0.5–1.3)
CREAT SERPL-MCNC: 0.86 MG/DL — SIGNIFICANT CHANGE UP (ref 0.5–1.3)
CREAT SERPL-MCNC: 0.88 MG/DL — SIGNIFICANT CHANGE UP (ref 0.5–1.3)
GLUCOSE BLDC GLUCOMTR-MCNC: 191 MG/DL — HIGH (ref 70–99)
GLUCOSE BLDC GLUCOMTR-MCNC: 192 MG/DL — HIGH (ref 70–99)
GLUCOSE BLDC GLUCOMTR-MCNC: 197 MG/DL — HIGH (ref 70–99)
GLUCOSE BLDC GLUCOMTR-MCNC: 274 MG/DL — HIGH (ref 70–99)
GLUCOSE SERPL-MCNC: 178 MG/DL — HIGH (ref 70–99)
GLUCOSE SERPL-MCNC: 201 MG/DL — HIGH (ref 70–99)
GLUCOSE SERPL-MCNC: 223 MG/DL — HIGH (ref 70–99)
MAGNESIUM SERPL-MCNC: 1.8 MG/DL — SIGNIFICANT CHANGE UP (ref 1.6–2.6)
OSMOLALITY UR: 611 MOS/KG — SIGNIFICANT CHANGE UP (ref 300–900)
PHOSPHATE SERPL-MCNC: 2.5 MG/DL — SIGNIFICANT CHANGE UP (ref 2.5–4.5)
POTASSIUM SERPL-MCNC: 3.6 MMOL/L — SIGNIFICANT CHANGE UP (ref 3.5–5.3)
POTASSIUM SERPL-MCNC: 3.6 MMOL/L — SIGNIFICANT CHANGE UP (ref 3.5–5.3)
POTASSIUM SERPL-MCNC: 3.9 MMOL/L — SIGNIFICANT CHANGE UP (ref 3.5–5.3)
POTASSIUM SERPL-SCNC: 3.6 MMOL/L — SIGNIFICANT CHANGE UP (ref 3.5–5.3)
POTASSIUM SERPL-SCNC: 3.6 MMOL/L — SIGNIFICANT CHANGE UP (ref 3.5–5.3)
POTASSIUM SERPL-SCNC: 3.9 MMOL/L — SIGNIFICANT CHANGE UP (ref 3.5–5.3)
SODIUM SERPL-SCNC: 127 MMOL/L — LOW (ref 135–145)
SODIUM SERPL-SCNC: 127 MMOL/L — LOW (ref 135–145)
SODIUM SERPL-SCNC: 129 MMOL/L — LOW (ref 135–145)
SODIUM UR-SCNC: 107 MMOL/L — SIGNIFICANT CHANGE UP
UUN UR-MCNC: 636 MG/DL — SIGNIFICANT CHANGE UP

## 2019-08-28 RX ORDER — SODIUM CHLORIDE 5 G/100ML
500 INJECTION, SOLUTION INTRAVENOUS
Refills: 0 | Status: DISCONTINUED | OUTPATIENT
Start: 2019-08-28 | End: 2019-08-30

## 2019-08-28 RX ORDER — SODIUM CHLORIDE 9 MG/ML
4 INJECTION INTRAMUSCULAR; INTRAVENOUS; SUBCUTANEOUS ONCE
Refills: 0 | Status: COMPLETED | OUTPATIENT
Start: 2019-08-28 | End: 2019-08-28

## 2019-08-28 RX ORDER — SODIUM CHLORIDE 9 MG/ML
4 INJECTION INTRAMUSCULAR; INTRAVENOUS; SUBCUTANEOUS ONCE
Refills: 0 | Status: DISCONTINUED | OUTPATIENT
Start: 2019-08-28 | End: 2019-08-28

## 2019-08-28 RX ORDER — SODIUM CHLORIDE 5 G/100ML
500 INJECTION, SOLUTION INTRAVENOUS
Refills: 0 | Status: DISCONTINUED | OUTPATIENT
Start: 2019-08-28 | End: 2019-08-28

## 2019-08-28 RX ADMIN — TAMSULOSIN HYDROCHLORIDE 0.4 MILLIGRAM(S): 0.4 CAPSULE ORAL at 21:35

## 2019-08-28 RX ADMIN — ONDANSETRON 4 MILLIGRAM(S): 8 TABLET, FILM COATED ORAL at 12:29

## 2019-08-28 RX ADMIN — CARVEDILOL PHOSPHATE 25 MILLIGRAM(S): 80 CAPSULE, EXTENDED RELEASE ORAL at 18:00

## 2019-08-28 RX ADMIN — SUMATRIPTAN SUCCINATE 25 MILLIGRAM(S): 4 INJECTION, SOLUTION SUBCUTANEOUS at 12:29

## 2019-08-28 RX ADMIN — FINASTERIDE 5 MILLIGRAM(S): 5 TABLET, FILM COATED ORAL at 12:29

## 2019-08-28 RX ADMIN — SODIUM CHLORIDE 4 GRAM(S): 9 INJECTION INTRAMUSCULAR; INTRAVENOUS; SUBCUTANEOUS at 15:32

## 2019-08-28 RX ADMIN — GABAPENTIN 300 MILLIGRAM(S): 400 CAPSULE ORAL at 18:00

## 2019-08-28 RX ADMIN — Medication 1: at 18:00

## 2019-08-28 RX ADMIN — Medication 975 MILLIGRAM(S): at 16:01

## 2019-08-28 RX ADMIN — ONDANSETRON 4 MILLIGRAM(S): 8 TABLET, FILM COATED ORAL at 00:08

## 2019-08-28 RX ADMIN — Medication 3: at 12:30

## 2019-08-28 RX ADMIN — ATORVASTATIN CALCIUM 20 MILLIGRAM(S): 80 TABLET, FILM COATED ORAL at 21:35

## 2019-08-28 RX ADMIN — Medication 975 MILLIGRAM(S): at 07:01

## 2019-08-28 RX ADMIN — ONDANSETRON 4 MILLIGRAM(S): 8 TABLET, FILM COATED ORAL at 06:02

## 2019-08-28 RX ADMIN — AMLODIPINE BESYLATE 5 MILLIGRAM(S): 2.5 TABLET ORAL at 06:00

## 2019-08-28 RX ADMIN — Medication 975 MILLIGRAM(S): at 06:01

## 2019-08-28 RX ADMIN — PANTOPRAZOLE SODIUM 40 MILLIGRAM(S): 20 TABLET, DELAYED RELEASE ORAL at 06:00

## 2019-08-28 RX ADMIN — SODIUM CHLORIDE 2 GRAM(S): 9 INJECTION INTRAMUSCULAR; INTRAVENOUS; SUBCUTANEOUS at 00:05

## 2019-08-28 RX ADMIN — Medication 100 MILLIGRAM(S): at 18:00

## 2019-08-28 RX ADMIN — Medication 100 MILLIGRAM(S): at 12:29

## 2019-08-28 RX ADMIN — Medication 975 MILLIGRAM(S): at 15:32

## 2019-08-28 RX ADMIN — INSULIN GLARGINE 35 UNIT(S): 100 INJECTION, SOLUTION SUBCUTANEOUS at 21:35

## 2019-08-28 RX ADMIN — Medication 12.5 MILLIGRAM(S): at 12:29

## 2019-08-28 RX ADMIN — Medication 12.5 MILLIGRAM(S): at 06:00

## 2019-08-28 RX ADMIN — Medication 975 MILLIGRAM(S): at 21:37

## 2019-08-28 RX ADMIN — LEVETIRACETAM 400 MILLIGRAM(S): 250 TABLET, FILM COATED ORAL at 00:06

## 2019-08-28 RX ADMIN — ONDANSETRON 4 MILLIGRAM(S): 8 TABLET, FILM COATED ORAL at 18:00

## 2019-08-28 RX ADMIN — Medication 12.5 MILLIGRAM(S): at 06:02

## 2019-08-28 RX ADMIN — LOSARTAN POTASSIUM 100 MILLIGRAM(S): 100 TABLET, FILM COATED ORAL at 06:06

## 2019-08-28 RX ADMIN — Medication 1: at 21:35

## 2019-08-28 RX ADMIN — GABAPENTIN 300 MILLIGRAM(S): 400 CAPSULE ORAL at 06:00

## 2019-08-28 RX ADMIN — MAGNESIUM OXIDE 400 MG ORAL TABLET 400 MILLIGRAM(S): 241.3 TABLET ORAL at 12:29

## 2019-08-28 RX ADMIN — Medication 975 MILLIGRAM(S): at 22:35

## 2019-08-28 RX ADMIN — Medication 12.5 MILLIGRAM(S): at 21:35

## 2019-08-28 RX ADMIN — Medication 100 MILLIGRAM(S): at 06:00

## 2019-08-28 RX ADMIN — Medication 100 MILLIGRAM(S): at 21:35

## 2019-08-28 RX ADMIN — CARVEDILOL PHOSPHATE 25 MILLIGRAM(S): 80 CAPSULE, EXTENDED RELEASE ORAL at 06:00

## 2019-08-28 RX ADMIN — Medication 100 MILLIGRAM(S): at 06:01

## 2019-08-28 RX ADMIN — Medication 1: at 09:11

## 2019-08-28 RX ADMIN — ENOXAPARIN SODIUM 40 MILLIGRAM(S): 100 INJECTION SUBCUTANEOUS at 12:29

## 2019-08-28 RX ADMIN — LEVETIRACETAM 400 MILLIGRAM(S): 250 TABLET, FILM COATED ORAL at 12:28

## 2019-08-28 NOTE — PROVIDER CONTACT NOTE (CHANGE IN STATUS NOTIFICATION) - ACTION/TREATMENT ORDERED:
blood chemistry revealed , Na+ level of 127,  2 sodium tablets x 1 dose ordered and administered. BMP will be repeated in AM.

## 2019-08-28 NOTE — DIETITIAN INITIAL EVALUATION ADULT. - PHYSICAL APPEARANCE
NFPE conducted with Pt's consent. Pt presents with patellar region muscle wasting, however, wife reports that is how the patient usually looks.  Edema: None noted, as per flow sheets.  Skin: Intact, as per flow sheets. other (specify)

## 2019-08-28 NOTE — PROGRESS NOTE ADULT - SUBJECTIVE AND OBJECTIVE BOX
Patient is a 74y old  Male who presents with a chief complaint of S/p Fall (28 Aug 2019 12:09)      INTERVAL HPI/OVERNIGHT EVENTS:  T(C): 36.9 (08-28-19 @ 13:35), Max: 36.9 (08-27-19 @ 21:10)  HR: 65 (08-28-19 @ 13:35) (61 - 71)  BP: 174/65 (08-28-19 @ 13:35) (138/62 - 185/76)  RR: 16 (08-28-19 @ 13:35) (16 - 18)  SpO2: 97% (08-28-19 @ 13:35) (95% - 97%)  Wt(kg): --  I&O's Summary    27 Aug 2019 07:01  -  28 Aug 2019 07:00  --------------------------------------------------------  IN: 480 mL / OUT: 1100 mL / NET: -620 mL    28 Aug 2019 07:01  -  28 Aug 2019 17:54  --------------------------------------------------------  IN: 460 mL / OUT: 950 mL / NET: -490 mL        LABS:                        13.2   8.9   )-----------( 190      ( 27 Aug 2019 22:36 )             40.7     08-28    129<L>  |  88<L>  |  18  ----------------------------<  223<H>  3.9   |  28  |  0.88    Ca    9.7      28 Aug 2019 14:24  Phos  2.5     08-28  Mg     1.8     08-28    TPro  7.0  /  Alb  3.6  /  TBili  1.4<H>  /  DBili  x   /  AST  15  /  ALT  24  /  AlkPhos  57  08-27        CAPILLARY BLOOD GLUCOSE      POCT Blood Glucose.: 191 mg/dL (28 Aug 2019 17:47)  POCT Blood Glucose.: 274 mg/dL (28 Aug 2019 12:23)  POCT Blood Glucose.: 192 mg/dL (28 Aug 2019 08:57)  POCT Blood Glucose.: 215 mg/dL (27 Aug 2019 21:19)  POCT Blood Glucose.: 244 mg/dL (27 Aug 2019 18:23)            MEDICATIONS  (STANDING):  amantadine 100 milliGRAM(s) Oral two times a day  amLODIPine   Tablet 5 milliGRAM(s) Oral daily  atorvastatin 20 milliGRAM(s) Oral at bedtime  carvedilol 25 milliGRAM(s) Oral every 12 hours  dextrose 5%. 1000 milliLiter(s) (50 mL/Hr) IV Continuous <Continuous>  dextrose 50% Injectable 12.5 Gram(s) IV Push once  dextrose 50% Injectable 25 Gram(s) IV Push once  dextrose 50% Injectable 25 Gram(s) IV Push once  docusate sodium 100 milliGRAM(s) Oral three times a day  enoxaparin Injectable 40 milliGRAM(s) SubCutaneous daily  finasteride 5 milliGRAM(s) Oral daily  gabapentin 300 milliGRAM(s) Oral two times a day  insulin glargine Injectable (LANTUS) 35 Unit(s) SubCutaneous at bedtime  insulin lispro (HumaLOG) corrective regimen sliding scale   SubCutaneous Before meals and at bedtime  levETIRAcetam  IVPB 500 milliGRAM(s) IV Intermittent every 12 hours  losartan 100 milliGRAM(s) Oral daily  magnesium oxide 400 milliGRAM(s) Oral daily  meclizine 12.5 milliGRAM(s) Oral every 8 hours  ondansetron   Disintegrating Tablet 4 milliGRAM(s) Oral every 6 hours  SUMAtriptan 25 milliGRAM(s) Oral daily  tamsulosin 0.4 milliGRAM(s) Oral at bedtime    MEDICATIONS  (PRN):  acetaminophen   Tablet .. 975 milliGRAM(s) Oral every 6 hours PRN Moderate Pain (4 - 6)  dextrose 40% Gel 15 Gram(s) Oral once PRN Blood Glucose LESS THAN 70 milliGRAM(s)/deciliter  glucagon  Injectable 1 milliGRAM(s) IntraMuscular once PRN Glucose LESS THAN 70 milligrams/deciliter  hydrALAZINE Injectable 10 milliGRAM(s) IV Push every 6 hours PRN SBP >180  pantoprazole    Tablet 40 milliGRAM(s) Oral daily PRN Reflux          PHYSICAL EXAM:  GENERAL: NAD, well-groomed, well-developed  HEAD:  Atraumatic, Normocephalic  CHEST/LUNG: Clear to percussion bilaterally; No rales, rhonchi, wheezing, or rubs  HEART: Regular rate and rhythm; No murmurs, rubs, or gallops  ABDOMEN: Soft, Nontender, Nondistended; Bowel sounds present  EXTREMITIES:  2+ Peripheral Pulses, No clubbing, cyanosis, or edema  LYMPH: No lymphadenopathy noted  SKIN: No rashes or lesions    Care Discussed with Consultants/Other Providers [ ] YES  [ ] NO

## 2019-08-28 NOTE — CONSULT NOTE ADULT - ASSESSMENT
74M hx CAD s/p PCI on ASA, HTN,HLD, BPH, DM2 pw with SDH and now hyponatremia.  The hyponatremia seems to be chronologically related to the HCTZ  The urine indices at present will be erroneous due to the HCTZ  Doubt dietary factors  Due to the sudden we can bring up fast without the worry for osmotic demyelination syndrome (formerly known as CPM)    1 Renal -DC the salt pills and start 3 % NS for 10 hours and check serum sodium in 6 hours.    2 CVS- Off HCTZ at present   3 Neuro-COnt keppra

## 2019-08-28 NOTE — PROGRESS NOTE ADULT - SUBJECTIVE AND OBJECTIVE BOX
Trauma Surgery Progress Note    SUBJECTIVE: Pt seen and examined at bedside. Overnight patient was seen for lethargy. During that time neurologic exam was conducted with no pertinent positive findings. His sodium was found to be 127 and received sodium tablets. Denies numbness or tingling. He denies chest pain, SOB, nausea or vomiting at this time. He is less lethargic this morning per wife.      MEDICATIONS  (STANDING):  amantadine 100 milliGRAM(s) Oral two times a day  amLODIPine   Tablet 5 milliGRAM(s) Oral daily  atorvastatin 20 milliGRAM(s) Oral at bedtime  carvedilol 25 milliGRAM(s) Oral every 12 hours  dextrose 5%. 1000 milliLiter(s) (50 mL/Hr) IV Continuous <Continuous>  dextrose 50% Injectable 12.5 Gram(s) IV Push once  dextrose 50% Injectable 25 Gram(s) IV Push once  dextrose 50% Injectable 25 Gram(s) IV Push once  docusate sodium 100 milliGRAM(s) Oral three times a day  enoxaparin Injectable 40 milliGRAM(s) SubCutaneous daily  finasteride 5 milliGRAM(s) Oral daily  gabapentin 300 milliGRAM(s) Oral two times a day  hydrochlorothiazide 12.5 milliGRAM(s) Oral daily  insulin glargine Injectable (LANTUS) 35 Unit(s) SubCutaneous at bedtime  insulin lispro (HumaLOG) corrective regimen sliding scale   SubCutaneous Before meals and at bedtime  levETIRAcetam  IVPB 500 milliGRAM(s) IV Intermittent every 12 hours  losartan 100 milliGRAM(s) Oral daily  magnesium oxide 400 milliGRAM(s) Oral daily  meclizine 12.5 milliGRAM(s) Oral every 8 hours  ondansetron   Disintegrating Tablet 4 milliGRAM(s) Oral every 6 hours  SUMAtriptan 25 milliGRAM(s) Oral daily  tamsulosin 0.4 milliGRAM(s) Oral at bedtime    MEDICATIONS  (PRN):  acetaminophen   Tablet .. 975 milliGRAM(s) Oral every 6 hours PRN Moderate Pain (4 - 6)  dextrose 40% Gel 15 Gram(s) Oral once PRN Blood Glucose LESS THAN 70 milliGRAM(s)/deciliter  glucagon  Injectable 1 milliGRAM(s) IntraMuscular once PRN Glucose LESS THAN 70 milligrams/deciliter  hydrALAZINE Injectable 10 milliGRAM(s) IV Push every 6 hours PRN SBP >180  pantoprazole    Tablet 40 milliGRAM(s) Oral daily PRN Reflux      OBJECTIVE:    Vital Signs Last 24 Hrs  T(C): 36.3 (28 Aug 2019 05:46), Max: 37.1 (27 Aug 2019 13:19)  T(F): 97.4 (28 Aug 2019 05:46), Max: 98.7 (27 Aug 2019 13:19)  HR: 66 (28 Aug 2019 05:46) (65 - 78)  BP: 185/76 (28 Aug 2019 05:46) (138/62 - 185/76)  BP(mean): --  RR: 18 (28 Aug 2019 05:46) (16 - 19)  SpO2: 97% (28 Aug 2019 05:46) (95% - 98%)    General Appearance: NAD, laying in bed.   Neck: Supple  Chest: non-labored breathing, no respiratory distress  CV: Pulse regular presently  Abdomen: Soft, non-tender, non-distended  Extremities: warm and well perfused. Spontaneously moving all extremities  Neuro: CN 2-11 intact. No focal neurological deficit     I&O's Summary    27 Aug 2019 07:01  -  28 Aug 2019 07:00  --------------------------------------------------------  IN: 480 mL / OUT: 1100 mL / NET: -620 mL      I&O's Detail    27 Aug 2019 07:01  -  28 Aug 2019 07:00  --------------------------------------------------------  IN:    Oral Fluid: 480 mL  Total IN: 480 mL    OUT:    Voided: 1100 mL  Total OUT: 1100 mL    Total NET: -620 mL            LABS:                        13.2   8.9   )-----------( 190      ( 27 Aug 2019 22:36 )             40.7     08-27    127<L>  |  90<L>  |  15  ----------------------------<  226<H>  3.7   |  25  |  0.84    Ca    9.3      27 Aug 2019 22:36  Phos  2.9     08-27  Mg     1.8     08-27    TPro  7.0  /  Alb  3.6  /  TBili  1.4<H>  /  DBili  x   /  AST  15  /  ALT  24  /  AlkPhos  57  08-27          RADIOLOGY & ADDITIONAL STUDIES: Trauma Surgery Progress Note    SUBJECTIVE: Pt seen and examined at bedside. Overnight patient was seen for lethargy. During that time neurologic exam was conducted with no pertinent positive findings. His sodium was found to be 127 and received sodium tablets. Hypertensive to 180s SBP. Patient found to have altered mental status this morning. Patient does wake up and respond to questions. Denies numbness or tingling. He denies chest pain, SOB, nausea or vomiting at this time.     MEDICATIONS  (STANDING):  amantadine 100 milliGRAM(s) Oral two times a day  amLODIPine   Tablet 5 milliGRAM(s) Oral daily  atorvastatin 20 milliGRAM(s) Oral at bedtime  carvedilol 25 milliGRAM(s) Oral every 12 hours  dextrose 5%. 1000 milliLiter(s) (50 mL/Hr) IV Continuous <Continuous>  dextrose 50% Injectable 12.5 Gram(s) IV Push once  dextrose 50% Injectable 25 Gram(s) IV Push once  dextrose 50% Injectable 25 Gram(s) IV Push once  docusate sodium 100 milliGRAM(s) Oral three times a day  enoxaparin Injectable 40 milliGRAM(s) SubCutaneous daily  finasteride 5 milliGRAM(s) Oral daily  gabapentin 300 milliGRAM(s) Oral two times a day  hydrochlorothiazide 12.5 milliGRAM(s) Oral daily  insulin glargine Injectable (LANTUS) 35 Unit(s) SubCutaneous at bedtime  insulin lispro (HumaLOG) corrective regimen sliding scale   SubCutaneous Before meals and at bedtime  levETIRAcetam  IVPB 500 milliGRAM(s) IV Intermittent every 12 hours  losartan 100 milliGRAM(s) Oral daily  magnesium oxide 400 milliGRAM(s) Oral daily  meclizine 12.5 milliGRAM(s) Oral every 8 hours  ondansetron   Disintegrating Tablet 4 milliGRAM(s) Oral every 6 hours  SUMAtriptan 25 milliGRAM(s) Oral daily  tamsulosin 0.4 milliGRAM(s) Oral at bedtime    MEDICATIONS  (PRN):  acetaminophen   Tablet .. 975 milliGRAM(s) Oral every 6 hours PRN Moderate Pain (4 - 6)  dextrose 40% Gel 15 Gram(s) Oral once PRN Blood Glucose LESS THAN 70 milliGRAM(s)/deciliter  glucagon  Injectable 1 milliGRAM(s) IntraMuscular once PRN Glucose LESS THAN 70 milligrams/deciliter  hydrALAZINE Injectable 10 milliGRAM(s) IV Push every 6 hours PRN SBP >180  pantoprazole    Tablet 40 milliGRAM(s) Oral daily PRN Reflux      OBJECTIVE:    Vital Signs Last 24 Hrs  T(C): 36.3 (28 Aug 2019 05:46), Max: 37.1 (27 Aug 2019 13:19)  T(F): 97.4 (28 Aug 2019 05:46), Max: 98.7 (27 Aug 2019 13:19)  HR: 66 (28 Aug 2019 05:46) (65 - 78)  BP: 185/76 (28 Aug 2019 05:46) (138/62 - 185/76)  BP(mean): --  RR: 18 (28 Aug 2019 05:46) (16 - 19)  SpO2: 97% (28 Aug 2019 05:46) (95% - 98%)    General Appearance: NAD, laying in bed.   Neck: Supple  Chest: non-labored breathing, no respiratory distress  CV: Pulse regular presently  Abdomen: Soft, non-tender, non-distended  Extremities: warm and well perfused. Spontaneously moving all extremities  Neuro: CN 2-11 intact. No focal neurological deficit     I&O's Summary    27 Aug 2019 07:01  -  28 Aug 2019 07:00  --------------------------------------------------------  IN: 480 mL / OUT: 1100 mL / NET: -620 mL      I&O's Detail    27 Aug 2019 07:01  -  28 Aug 2019 07:00  --------------------------------------------------------  IN:    Oral Fluid: 480 mL  Total IN: 480 mL    OUT:    Voided: 1100 mL  Total OUT: 1100 mL    Total NET: -620 mL            LABS:                        13.2   8.9   )-----------( 190      ( 27 Aug 2019 22:36 )             40.7     08-27    127<L>  |  90<L>  |  15  ----------------------------<  226<H>  3.7   |  25  |  0.84    Ca    9.3      27 Aug 2019 22:36  Phos  2.9     08-27  Mg     1.8     08-27    TPro  7.0  /  Alb  3.6  /  TBili  1.4<H>  /  DBili  x   /  AST  15  /  ALT  24  /  AlkPhos  57  08-27          RADIOLOGY & ADDITIONAL STUDIES:

## 2019-08-28 NOTE — PROGRESS NOTE ADULT - ASSESSMENT
73yo male s/p fall from standing, unclear cause, found to have L SDH and R SAH. Initially admitted to MICU for Cardene drip for SBP >180mm Hg. Repeat head CT demonstrated increased SAH and new R frontal IPH, for which patient was transferred to [facility]. CT Head stable x2 from prior.     - Acute L SDH, R SAH, R frontal PIH:  - head CT stable x2   - Hold ASA and Brilinta  - chemical VTE prophylaxis  - Appreciate NSG follow up  - Q4 neurochecks  - Keppra for post-traumatic seizure prophylaxis 7 day total  - continue carvedilol, losartan, amlodipine and HCTZ   - PRN iv hydralizine   - Continue statin  - TTE completed with mitral and atrial calcification and mild regurg,moderate diastolic dysfunction.    - PT/PMR Evaluation: TBI Rehab,   - Pt counselled to please work w PT  - Regular Diet: consistent carbohydrates  - F/U AM labs and urine lytes   - Sodium 127 on 8/28, received 2 sodium tablets, fluid restriction   - Neurology headache specialist consulted    ACS [id number] 73yo male s/p fall from standing, unclear cause, found to have L SDH and R SAH. Initially admitted to MICU for Cardene drip for SBP >180mm Hg. Repeat head CT demonstrated increased SAH and new R frontal IPH, for which patient was transferred to [facility]. CT Head stable x2 from prior.     - Acute L SDH, R SAH, R frontal PIH:  - head CT stable x2   - Hold ASA and Brilinta  - chemical VTE prophylaxis  - Appreciate NSG follow up  - Q4 neurochecks  - Keppra for post-traumatic seizure prophylaxis 7 day total  - continue carvedilol, losartan, amlodipine and HCTZ   - PRN iv hydralizine   - Continue statin  - TTE completed with mitral and atrial calcification and mild regurg,moderate diastolic dysfunction.    - PT/PMR Evaluation: TBI Rehab,   - Regular Diet: consistent carbohydrates  - F/U urine lytes   - Sodium 127 on 8/28, received 2 sodium tablets, fluid restriction. Plan to give 4 more sodium tablets and recheck BMP at 2pm  - Neurology headache specialist consulted  - Consulted SICU for altered mental status with known TBI and hyponatremia, and blood pressure control    ACS [id number]

## 2019-08-28 NOTE — CONSULT NOTE ADULT - CONSULT REASON
Hyponatremia
Evaluate Rehabilitation Needs
Hyponatremia
head trauma
headache s/p SDH, SAH, and PIH
vertigo
Medical co-management

## 2019-08-28 NOTE — DIETITIAN INITIAL EVALUATION ADULT. - OTHER INFO
Source: Comprehensive chart review, patient, Pt's family at bedside    INFORMATION PTA    Diet PTA: Pt's wife reports Pt follows a well-balanced diet PTA. Consumes fruit, rice, beans, vegetables, soups. Does not consume meat due to personal preferences. Watches "sugar and sodium" intake.    Nutrition Status PTA: Pt with type 2 DM, checks his blood glucose levels daily. Per wife, values would range from 90-110mg/dL. Does not experience episodes of hypoglycemia. Per this admission, Pt's HgbA1c from 8/22 is 7.0%. Per H&P, Pt takes insulin and Janumet at home for medical DM management.     Nutrition Supplements PTA: Pt's wife states Pt takes beta cell vitamin and magnesium supplement at home PTA.    Food Allergies: Confirms no known food allergies.     Weight History PTA: Pt reports his usual body weight is 160 pounds. However, admission weight documented at 180 pounds (patient looks more like 160 pounds). No other weights documented in flow sheets. Bed scale weight attained at interview, indicating current weight at 166 pounds. Pt's wife reports Pt does not look like he lost any weight, however appears weaker due to hospitalization.     INFORMATION THIS ADMISSION    Last BM: Per flow sheets, Pt's last BM was on 8/25. On prescribed bowel regimen to assist with BM's.    Other Subjective Information: Pt's wife reports Pt has been consuming about half of his meals in-house. Offered Pt Glucerna Shake to optimize PO intake, however declined. Agreed to trying diet healthy shake 4oz twice daily. For breakfast this morning, Pt consume cornflakes, milk, 1/2 banana and hard boiled egg. No reports of nausea/vomiting. Denies any difficulties chewing/swallowing.     Therapeutic Diet Education Provided: Provided Pt with extensive verbal and written therapeutic diet education regarding heart healthy, well-balanced meals, food sources high/low in carbohydrates/protein/fat/sodium, carbohydrate counting, hyperglycemia/hypoglycemia, portion control. Educated on fluid restriction.

## 2019-08-28 NOTE — DIETITIAN INITIAL EVALUATION ADULT. - DIET TYPE
1200ml/low sodium/consistent carbohydrate (evening snack) PO diet (consistent carbohydrates with snack, fluid restriction 1200cc)

## 2019-08-28 NOTE — DIETITIAN INITIAL EVALUATION ADULT. - REASON INDICATOR FOR ASSESSMENT
Pt seen for initial nutrition assessment for length of stay. Pt is a 74 year old male admitted s/p fall. Presents with low sodium, sodium tablets given. Episodes of hypertension. TTE completed, mild AS. Plan to d/c to rehab.

## 2019-08-28 NOTE — CONSULT NOTE ADULT - ASSESSMENT
ASSESSMENT:     PLAN:  - 4g Sodium Chloride tablets to be administered now  - Repeat BMP at 1430  - W  -- ASSESSMENT: 74M hx CAD s/p PCI on ASA, HTN, HLD, BPH, DM2, on ASA 81mg s/p unwitnessed mechanical fall now w/ intracranial bleeds c/b hyponatremia and AMS    PLAN:  - 4g Sodium Chloride tablets to be administered now  - Repeat BMP at 1430  - Plan discussed w/ Dr. Matson and Dr. Luigi MICHELLE  [id number]

## 2019-08-28 NOTE — CONSULT NOTE ADULT - ATTENDING COMMENTS
VASCULAR NEUROLOGY ATTENDING  The patient is seen and examined the history and imaging are reviewed. I agree with the resident note unless otherwise noted. Suspect traumatic ICH with TBI. Supportive care. Serial CT imaging and outpatient neurology follow-up for MRI/A.
74yr M recent ICH recovering, now with lethargy, hyponatremia and headaches. Na 127 down from 134. pt reports having constant headaches since accident, responding to pain regimen. reports poor intake and drinks only water. no diarrhea, abd pain. denies focal weakness, and according to wife, at baseline mental status.  exam notable for intact neurologic function, soft abd. transiently hypertensive, but now resolved.    recommendations:  - defer on brain imaging to trauma team  - increase oral salt tabs, and repeat BMP, given no indication for rapid correction  - restrict water intake  - pain management for headache per trauma  - hold off na wasting diuretics  - if hyponatremia persists, patient is appropriate for close monitoring in ICU

## 2019-08-28 NOTE — CONSULT NOTE ADULT - SUBJECTIVE AND OBJECTIVE BOX
NEPHROLOGY - [facility]    Patient seen and examined.    HPI:  TRAUMA SURGERY H&P    HPI:  74M hx CAD s/p PCI on ASA, HTN,HLD, BPH, DM2, on ASA 81mg daily transferred from [facility] s/p unwitnessed fall with finding of intracranial bleed and increased blood on interval scan. Patient initially presented s/p unwitnessed fall, ?syncopal episode this morning, with complaints of dizziness after fall and initial difficulty speaking. Patient does not recall event. CTH shows L hemispheric SDH with SAH and calvarium fx. Per family, at the OSH he was initially confused and altered. In the [facility] ED, was complaining of headache and dizziness since his fall, no associated neurologic deficits, no aggravating/alleviating factors. Denies any other injuries, denies pain to his chest, abd, pelvis, or extremities. Denies shortness of breath or difficulty breathing.  His BP historically has been controlled.  He has been on HCTZ as an outpt but would take this medication once a week.  DM was daignosed in 1987 and shortly after that he developed HTN.  No CVA and he also denies retinopathy as well.  He has no issues with hyponatremia in the past and is not ingesting more liquid then usual.  He has no nausea.  There is no hematuria or bubbles in the urine.  No history of NSAIDS or nephrolithisis.  The patient urinates once or twice in the night and there is no incontinence.  No family hx or renal disease or back pain.    No recent abx use.  No alleviating or aggravating factors with respect to the kidneys.   As far as the pt knows he has a preserved LV and 2 cardiac stents     PMHx: BPH (benign prostatic hypertrophy)  Hyperlipidemia  CAD (coronary artery disease)  Diabetes mellitus  HTN (hypertension)    PSHx: S/P drug eluting coronary stent placement  S/P primary angioplasty with coronary stent    Medications (inpatient): niCARdipine Infusion 5 mG/Hr IV Continuous <Continuous>    Medications (PRN):  Allergies: No Known Allergies  (Intolerances: )  Family Hx: Family history of diabetes mellitus (Sibling): 3 brothers alive with Diabetes (22 Aug 2019 02:21)      PAST MEDICAL & SURGICAL HISTORY:  BPH (benign prostatic hypertrophy)  Hyperlipidemia  CAD (coronary artery disease)  Diabetes mellitus: Type 2  HTN (hypertension)  S/P drug eluting coronary stent placement: Ramus  S/P primary angioplasty with coronary stent: 1990s      MEDICATIONS  (STANDING):  amantadine 100 milliGRAM(s) Oral two times a day  amLODIPine   Tablet 5 milliGRAM(s) Oral daily  atorvastatin 20 milliGRAM(s) Oral at bedtime  carvedilol 25 milliGRAM(s) Oral every 12 hours  dextrose 5%. 1000 milliLiter(s) (50 mL/Hr) IV Continuous <Continuous>  dextrose 50% Injectable 12.5 Gram(s) IV Push once  dextrose 50% Injectable 25 Gram(s) IV Push once  dextrose 50% Injectable 25 Gram(s) IV Push once  docusate sodium 100 milliGRAM(s) Oral three times a day  enoxaparin Injectable 40 milliGRAM(s) SubCutaneous daily  finasteride 5 milliGRAM(s) Oral daily  gabapentin 300 milliGRAM(s) Oral two times a day  insulin glargine Injectable (LANTUS) 35 Unit(s) SubCutaneous at bedtime  insulin lispro (HumaLOG) corrective regimen sliding scale   SubCutaneous Before meals and at bedtime  levETIRAcetam  IVPB 500 milliGRAM(s) IV Intermittent every 12 hours  losartan 100 milliGRAM(s) Oral daily  magnesium oxide 400 milliGRAM(s) Oral daily  meclizine 12.5 milliGRAM(s) Oral every 8 hours  ondansetron   Disintegrating Tablet 4 milliGRAM(s) Oral every 6 hours  sodium chloride 4 Gram(s) Oral once  SUMAtriptan 25 milliGRAM(s) Oral daily  tamsulosin 0.4 milliGRAM(s) Oral at bedtime      Allergies    No Known Allergies    Intolerances        SOCIAL HISTORY:  Denies alcohol abuse, drug abuse or tobacco usage.     FAMILY HISTORY:  Family history of diabetes mellitus (Sibling): 3 brothers alive with Diabetes      VITALS:  T(C): 36.7 (08-28-19 @ 09:03), Max: 37.1 (08-27-19 @ 13:19)  HR: 61 (08-28-19 @ 09:03) (61 - 78)  BP: 148/61 (08-28-19 @ 09:03) (138/62 - 185/76)  RR: 16 (08-28-19 @ 09:03) (16 - 19)  SpO2: 97% (08-28-19 @ 09:03) (95% - 98%)    REVIEW OF SYSTEMS:  Denies chest pain, SOB, focal weakness, hematuria or dysuria.  All other pertinent systems are reviewed and are negative.    PHYSICAL EXAM:  Constitutional: NAD  HEENT: EOMI  Neck:  No JVD, supple   Respiratory: CTA B/L  Cardiovascular: S1 and S2, RRR  Gastrointestinal: + BS, soft, NT, ND  Extremities: No peripheral edema, + peripheral pulses  Neurological: A/O x 3, CN2-12 intact  Psychiatric: Normal mood, normal affect  : No So  Skin: No rashes, C/D/I  Access: Not applicable    I and O's:    08-26 @ 07:01  -  08-27 @ 07:00  --------------------------------------------------------  IN: 1420 mL / OUT: 2050 mL / NET: -630 mL    08-27 @ 07:01 - 08-28 @ 07:00  --------------------------------------------------------  IN: 480 mL / OUT: 1100 mL / NET: -620 mL    08-28 @ 07:01 - 08-28 @ 12:10  --------------------------------------------------------  IN: 180 mL / OUT: 700 mL / NET: -520 mL          LABS:                        13.2   8.9   )-----------( 190      ( 27 Aug 2019 22:36 )             40.7     08-28    127<L>  |  89<L>  |  16  ----------------------------<  178<H>  3.6   |  26  |  0.86    Ca    9.2      28 Aug 2019 06:55  Phos  2.5     08-28  Mg     1.8     08-28    TPro  7.0  /  Alb  3.6  /  TBili  1.4<H>  /  DBili  x   /  AST  15  /  ALT  24  /  AlkPhos  57  08-27         RADIOLOGY & ADDITIONAL STUDIES:  < from: CT Head No Cont (08.24.19 @ 19:23) >  < from: CT Head No Cont (08.24.19 @ 19:23) >    EXAM:  CT BRAIN                            PROCEDURE DATE:  08/24/2019            INTERPRETATION:  CLINICAL INFORMATION: Trauma, intracranial hemorrhage,   now with new disorientation and nausea    TECHNIQUE: Noncontrast axial CT images were acquired through the head.   Two-dimensional sagittal and coronal reformats were generated.    COMPARISON STUDY: CT head 8/22/2019.    FINDINGS:     Redemonstration of a nondisplaced right frontal parietal parietal bone   fracture. Right frontal hemorrhagiccontusion and with associated edema   and mass effect without significant change. A smaller hemorrhagic   contusion also noted at the anterior right temporal lobe. Small areas of   subarachnoid hemorrhage along the right temporal and parietal regions. A   small amount of left temporal subdural or subarachnoid hemorrhage is   identified, decreased compared with 8/22/2019. No midline shift or   central herniation.     Ventricular size is unchanged. Small vessel white matter ischemic changes   are noted.      Previously noted bilateral extra-axial fluid collections are unchanged.     The visualized paranasal sinuses are clear. The mastoid air cells and   middle ear cavities are clear. Bilateral orbital lens replacement.      IMPRESSION:     No significant interval change in the appearance of anterior right   frontal and temporal lobe hemorrhagic contusions compared with 8/22/2019.   No change in small amount of subarachnoid and left subdural hemorrhage.    No midline shift or central herniation. No hydrocephalus.                COLTON PATTERSON M.D., RADIOLOGY RESIDENT  This document has been electronically signed.  ENRIQUE FUENTES M.D., ATTENDING RADIOLOGIST  This document has been electronically signed. Aug 25 2019  8:43AM                < end of copied text >

## 2019-08-28 NOTE — PROGRESS NOTE ADULT - ASSESSMENT
Problem/Plan - 1:  ·  Problem: Fall.  Plan: With fall at home. Pt does not entirely remember events leading up to fall. Denied prodromal symptoms. Potential syncopal episode given cardiac history.  Saw his cardiologist on 8/19. Was in his usual state of health. Most recently had carotid dopplers which had no significant stenosis. EKG with LBBB although as per chart review pt with chronic LBBB. SBP in 180's on presentation  - c/w tele to monitor for arrhythmia  - TTE grossly similar to prior, however now with mild AS  - Check Orthostatics.  - May require outpatient cardiology f/u for potential holter to r/o arrhythmia.     Problem/Plan - 2:  ·  Problem: Closed fracture of vault of skull, initial encounter.  Plan: CT head with nondisplaced fracture seen through the sagittal suture which   extends into the right parietal bone and slightly into the superior left parietal bone.  - Pain control.  - ENT consulted for vertigo- recommending meclizine and vestibular rehab.     Problem/Plan - 3:  ·  Problem: SDH (subdural hematoma).  Plan: Holohemispheric left subdural hematoma with adjacent subarachnoid blood. Repeat CTH with Left holohemispheric acute subdural hemorrhage is without significant interval change. However, the right anterior/inferior frontal region extra-axial blood has increased in size, now measuring a maximal thickness of 1 cm, previously 0.5cm. Subsequent f/u CT with no interval change.   - Seen by neurosurg who recommend Keppra  - f/u further neurosurg rec's.     Problem/Plan - 4:  ·  Problem: Type 2 diabetes mellitus with other specified complication, with long-term current use of insulin.  Plan: On lantus 35u QHS and Janumet. FS relatively well controlled  - Would hold Janumet  - Monitor FS, ISS  Problem/Plan - 5:  ·  Problem: CAD (coronary artery disease).  Plan: s/p two stenting episodes. First in mid 90s and second in 2015. No chest pain at present EKG with LBBB and on chart review this is chronic.  - c/w statin  - Hold Aspirin until cleared from neurosurgical perspective.   Problem/Plan - 6:  Problem: Hyponatremia Plan: dc HCTZ  Renal called     Problem/Plan - 7:  ·  Problem: Hypertensive emergency .  Plan: increased norvasc to 5 daily  and losartan to 100 daily  cw carvedilol and dc HCTZ    monitor BP

## 2019-08-28 NOTE — DIETITIAN INITIAL EVALUATION ADULT. - ADD RECOMMEND
1. Recommend change diet order to consistent carbohydrates with snack, low sodium, fluid restriction 1200cc (defer consistency/fluids to medical team). 2. Will add diet mighty shake twice daily (237cc total). 3. Monitor pt's PO intake, weight, skin, edema, GI distress. 4. RD to remain available.

## 2019-08-28 NOTE — CONSULT NOTE ADULT - SUBJECTIVE AND OBJECTIVE BOX
HISTORY OF PRESENT ILLNESS:  74M hx CAD s/p PCI on ASA, HTN,HLD, BPH, DM2, on ASA 81mg daily transferred from [facility] s/p unwitnessed fall with finding of intracranial bleed and increased blood on interval scan. Patient initially presented s/p unwitnessed fall, ?syncopal episode this  with complaints of dizziness after fall and initial difficulty speaking. Patient does not recall event. Per family, at the OSH he was initially confused and altered. In the [facility] ED, was complaining of headache and dizziness since his fall, no associated neurologic deficits, no aggravating/alleviating factors. Pt with CTH showing right subdural hemorrhage left subarachnoid hemorrhage, and nondisplaced right frontal parietal parietal bone fracture, and right frontal and right temporal hemorrhagic contusion with imaging stable and showing no interval changes. Pt at wife report HA and lethargy increasing over the past several days, evaluated by neurology yesterday, both HA and lethargy improved this AM according to patient and wife. Wife also reports patient is often improved in AM. Yesterday patient was found to be hyponatremia to 127 on AM labs, 2g salt tabs ordered but not administered until 12pm. Fluid restriction in place but patient w/ minimal PO intake. AM labs today w/ repeat . Patient requiring PRN hydralazine for BP control. SICU consulted for AMS and lethargy and hyponatremia.       PAST MEDICAL HISTORY: BPH (benign prostatic hypertrophy)  Hyperlipidemia  CAD (coronary artery disease)  Diabetes mellitus  HTN (hypertension)      PAST SURGICAL HISTORY: S/P drug eluting coronary stent placement  S/P primary angioplasty with coronary stent      FAMILY HISTORY: Family history of diabetes mellitus (Sibling)    HOME MEDICATIONS:    ALLERGIES: No Known Allergies      VITAL SIGNS:  ICU Vital Signs Last 24 Hrs  T(C): 36.7 (28 Aug 2019 09:03), Max: 37.1 (27 Aug 2019 13:19)  T(F): 98 (28 Aug 2019 09:03), Max: 98.7 (27 Aug 2019 13:19)  HR: 61 (28 Aug 2019 09:03) (61 - 78)  BP: 148/61 (28 Aug 2019 09:03) (138/62 - 185/76)  BP(mean): --  ABP: --  ABP(mean): --  RR: 16 (28 Aug 2019 09:03) (16 - 19)  SpO2: 97% (28 Aug 2019 09:03) (95% - 98%)      NEURO  Exam:  acetaminophen   Tablet .. 975 milliGRAM(s) Oral every 6 hours PRN Moderate Pain (4 - 6)  amantadine 100 milliGRAM(s) Oral two times a day  gabapentin 300 milliGRAM(s) Oral two times a day  levETIRAcetam  IVPB 500 milliGRAM(s) IV Intermittent every 12 hours  meclizine 12.5 milliGRAM(s) Oral every 8 hours  ondansetron   Disintegrating Tablet 4 milliGRAM(s) Oral every 6 hours  SUMAtriptan 25 milliGRAM(s) Oral daily      RESPIRATORY  Mechanical Ventilation:     Exam:      CARDIOVASCULAR    Exam:  Cardiac Rhythm:  amLODIPine   Tablet 5 milliGRAM(s) Oral daily  carvedilol 25 milliGRAM(s) Oral every 12 hours  hydrALAZINE Injectable 10 milliGRAM(s) IV Push every 6 hours PRN SBP >180  losartan 100 milliGRAM(s) Oral daily  tamsulosin 0.4 milliGRAM(s) Oral at bedtime      GI/NUTRITION  Exam:  Diet:  docusate sodium 100 milliGRAM(s) Oral three times a day  pantoprazole    Tablet 40 milliGRAM(s) Oral daily PRN Reflux      GENITOURINARY/RENAL  dextrose 5%. 1000 milliLiter(s) IV Continuous <Continuous>  magnesium oxide 400 milliGRAM(s) Oral daily  sodium chloride 4 Gram(s) Oral once      08-27 @ 07:01 - 08-28 @ 07:00  --------------------------------------------------------  IN:    Oral Fluid: 480 mL  Total IN: 480 mL    OUT:    Voided: 1100 mL  Total OUT: 1100 mL    Total NET: -620 mL      08-28 @ 07:01 - 08-28 @ 11:48  --------------------------------------------------------  IN:    Oral Fluid: 180 mL  Total IN: 180 mL    OUT:    Voided: 400 mL  Total OUT: 400 mL    Total NET: -220 mL          08-28    127<L>  |  89<L>  |  16  ----------------------------<  178<H>  3.6   |  26  |  0.86    Ca    9.2      28 Aug 2019 06:55  Phos  2.5     08-28  Mg     1.8     08-28    TPro  7.0  /  Alb  3.6  /  TBili  1.4<H>  /  DBili  x   /  AST  15  /  ALT  24  /  AlkPhos  57  08-27    [ ] So catheter, indication: urine output monitoring in critically ill patient    HEMATOLOGIC  [ ] VTE Prophylaxis:  enoxaparin Injectable 40 milliGRAM(s) SubCutaneous daily                          13.2   8.9   )-----------( 190      ( 27 Aug 2019 22:36 )             40.7       Transfusion: [ ] PRBC	[ ] Platelets	[ ] FFP	[ ] Cryoprecipitate      INFECTIOUS DISEASES    RECENT CULTURES:      ENDOCRINE  atorvastatin 20 milliGRAM(s) Oral at bedtime  dextrose 40% Gel 15 Gram(s) Oral once PRN  dextrose 50% Injectable 12.5 Gram(s) IV Push once  dextrose 50% Injectable 25 Gram(s) IV Push once  dextrose 50% Injectable 25 Gram(s) IV Push once  finasteride 5 milliGRAM(s) Oral daily  glucagon  Injectable 1 milliGRAM(s) IntraMuscular once PRN  insulin glargine Injectable (LANTUS) 35 Unit(s) SubCutaneous at bedtime  insulin lispro (HumaLOG) corrective regimen sliding scale   SubCutaneous Before meals and at bedtime    CAPILLARY BLOOD GLUCOSE      POCT Blood Glucose.: 192 mg/dL (28 Aug 2019 08:57)  POCT Blood Glucose.: 215 mg/dL (27 Aug 2019 21:19)  POCT Blood Glucose.: 244 mg/dL (27 Aug 2019 18:23)  POCT Blood Glucose.: 182 mg/dL (27 Aug 2019 14:09)      PATIENT CARE ACCESS DEVICES:  [ ] Peripheral IV  [ ] Central Venous Line	[ ] R	[ ] L	[ ] IJ	[ ] Fem	[ ] SC	Placed:   [ ] Arterial Line		[ ] R	[ ] L	[ ] Fem	[ ] Rad	[ ] Ax	Placed:   [ ] PICC:					[ ] Mediport  [ ] Urinary Catheter, Date Placed:   [x] Necessity of urinary, arterial, and venous catheters discussed    OTHER MEDICATIONS:     IMAGING STUDIES:    < from: CT Head No Cont (08.24.19 @ 19:23) >  XAM:  CT BRAIN                            PROCEDURE DATE:  08/24/2019            INTERPRETATION:  CLINICAL INFORMATION: Trauma, intracranial hemorrhage,   now with new disorientation and nausea    TECHNIQUE: Noncontrast axial CT images were acquired through the head.   Two-dimensional sagittal and coronal reformats were generated.    COMPARISON STUDY: CT head 8/22/2019.    FINDINGS:     Redemonstration of a nondisplaced right frontal parietal parietal bone   fracture. Right frontal hemorrhagiccontusion and with associated edema   and mass effect without significant change. A smaller hemorrhagic   contusion also noted at the anterior right temporal lobe. Small areas of   subarachnoid hemorrhage along the right temporal and parietal regions. A   small amount of left temporal subdural or subarachnoid hemorrhage is   identified, decreased compared with 8/22/2019. No midline shift or   central herniation.     Ventricular size is unchanged. Small vessel white matter ischemic changes   are noted.      Previously noted bilateral extra-axial fluid collections are unchanged.     The visualized paranasal sinuses are clear. The mastoid air cells and   middle ear cavities are clear. Bilateral orbital lens replacement.      IMPRESSION:     No significant interval change in the appearance of anterior right   frontal and temporal lobe hemorrhagic contusions compared with 8/22/2019.   No change in small amount of subarachnoid and left subdural hemorrhage.    No midline shift or central herniation. No hydrocephalus.      < end of copied text > HISTORY OF PRESENT ILLNESS:    74M hx CAD s/p PCI on ASA, HTN,HLD, BPH, DM2, on ASA 81mg daily transferred from [facility] s/p unwitnessed fall with finding of intracranial bleed and increased blood on interval scan. Patient initially presented s/p unwitnessed fall, ?syncopal episode this  with complaints of dizziness after fall and initial difficulty speaking. Patient does not recall event. Per family, at the OSH he was initially confused and altered. In the [facility] ED, was complaining of headache and dizziness since his fall, no associated neurologic deficits, no aggravating/alleviating factors. Pt with CTH showing right subdural hemorrhage left subarachnoid hemorrhage, and nondisplaced right frontal parietal parietal bone fracture, and right frontal and right temporal hemorrhagic contusion with imaging stable and showing no interval changes. Pt at wife report HA and lethargy increasing over the past several days, evaluated by neurology yesterday, both HA and lethargy improved this AM according to patient and wife. Wife also reports patient is often improved in AM. Yesterday patient was found to be hyponatremia to 127 on AM labs, 2g salt tabs ordered but not administered until 12pm. Fluid restriction in place but patient w/ minimal PO intake. AM labs today w/ repeat . Patient requiring PRN hydralazine for BP control. SICU consulted for AMS and lethargy and hyponatremia.       PAST MEDICAL HISTORY: BPH (benign prostatic hypertrophy)  Hyperlipidemia  CAD (coronary artery disease)  Diabetes mellitus  HTN (hypertension)      PAST SURGICAL HISTORY: S/P drug eluting coronary stent placement  S/P primary angioplasty with coronary stent      FAMILY HISTORY: Family history of diabetes mellitus (Sibling)    HOME MEDICATIONS:    ALLERGIES: No Known Allergies      VITAL SIGNS:  ICU Vital Signs Last 24 Hrs  T(C): 36.7 (28 Aug 2019 09:03), Max: 37.1 (27 Aug 2019 13:19)  T(F): 98 (28 Aug 2019 09:03), Max: 98.7 (27 Aug 2019 13:19)  HR: 61 (28 Aug 2019 09:03) (61 - 78)  BP: 148/61 (28 Aug 2019 09:03) (138/62 - 185/76)  BP(mean): --  ABP: --  ABP(mean): --  RR: 16 (28 Aug 2019 09:03) (16 - 19)  SpO2: 97% (28 Aug 2019 09:03) (95% - 98%)      NEURO  Exam: Awake, cooperative, responsive, A&Ox3    acetaminophen   Tablet .. 975 milliGRAM(s) Oral every 6 hours PRN Moderate Pain (4 - 6)  amantadine 100 milliGRAM(s) Oral two times a day  gabapentin 300 milliGRAM(s) Oral two times a day  levETIRAcetam  IVPB 500 milliGRAM(s) IV Intermittent every 12 hours  meclizine 12.5 milliGRAM(s) Oral every 8 hours  ondansetron   Disintegrating Tablet 4 milliGRAM(s) Oral every 6 hours  SUMAtriptan 25 milliGRAM(s) Oral daily      RESPIRATORY  Comfortable on RA      CARDIOVASCULAR  amLODIPine   Tablet 5 milliGRAM(s) Oral daily  carvedilol 25 milliGRAM(s) Oral every 12 hours  hydrALAZINE Injectable 10 milliGRAM(s) IV Push every 6 hours PRN SBP >180  losartan 100 milliGRAM(s) Oral daily  tamsulosin 0.4 milliGRAM(s) Oral at bedtime      GI/NUTRITION  Diet:  docusate sodium 100 milliGRAM(s) Oral three times a day  pantoprazole    Tablet 40 milliGRAM(s) Oral daily PRN Reflux      GENITOURINARY/RENAL  dextrose 5%. 1000 milliLiter(s) IV Continuous <Continuous>  magnesium oxide 400 milliGRAM(s) Oral daily  sodium chloride 4 Gram(s) Oral once      08-27 @ 07:01 - 08-28 @ 07:00  --------------------------------------------------------  IN:    Oral Fluid: 480 mL  Total IN: 480 mL    OUT:    Voided: 1100 mL  Total OUT: 1100 mL    Total NET: -620 mL      08-28 @ 07:01 - 08-28 @ 11:48  --------------------------------------------------------  IN:    Oral Fluid: 180 mL  Total IN: 180 mL    OUT:    Voided: 400 mL  Total OUT: 400 mL    Total NET: -220 mL          08-28    127<L>  |  89<L>  |  16  ----------------------------<  178<H>  3.6   |  26  |  0.86    Ca    9.2      28 Aug 2019 06:55  Phos  2.5     08-28  Mg     1.8     08-28    TPro  7.0  /  Alb  3.6  /  TBili  1.4<H>  /  DBili  x   /  AST  15  /  ALT  24  /  AlkPhos  57  08-27      HEMATOLOGIC  [ ] VTE Prophylaxis:  enoxaparin Injectable 40 milliGRAM(s) SubCutaneous daily                          13.2   8.9   )-----------( 190      ( 27 Aug 2019 22:36 )             40.7       Transfusion: [ ] PRBC	[ ] Platelets	[ ] FFP	[ ] Cryoprecipitate      INFECTIOUS DISEASES    RECENT CULTURES:      ENDOCRINE  atorvastatin 20 milliGRAM(s) Oral at bedtime  dextrose 40% Gel 15 Gram(s) Oral once PRN  dextrose 50% Injectable 12.5 Gram(s) IV Push once  dextrose 50% Injectable 25 Gram(s) IV Push once  dextrose 50% Injectable 25 Gram(s) IV Push once  finasteride 5 milliGRAM(s) Oral daily  glucagon  Injectable 1 milliGRAM(s) IntraMuscular once PRN  insulin glargine Injectable (LANTUS) 35 Unit(s) SubCutaneous at bedtime  insulin lispro (HumaLOG) corrective regimen sliding scale   SubCutaneous Before meals and at bedtime    CAPILLARY BLOOD GLUCOSE      POCT Blood Glucose.: 192 mg/dL (28 Aug 2019 08:57)  POCT Blood Glucose.: 215 mg/dL (27 Aug 2019 21:19)  POCT Blood Glucose.: 244 mg/dL (27 Aug 2019 18:23)  POCT Blood Glucose.: 182 mg/dL (27 Aug 2019 14:09)      PATIENT CARE ACCESS DEVICES:  [ ] Peripheral IV  [ ] Central Venous Line	[ ] R	[ ] L	[ ] IJ	[ ] Fem	[ ] SC	Placed:   [ ] Arterial Line		[ ] R	[ ] L	[ ] Fem	[ ] Rad	[ ] Ax	Placed:   [ ] PICC:					[ ] Mediport  [ ] Urinary Catheter, Date Placed:   [x] Necessity of urinary, arterial, and venous catheters discussed    OTHER MEDICATIONS:     IMAGING STUDIES:    < from: CT Head No Cont (08.24.19 @ 19:23) >  XAM:  CT BRAIN                            PROCEDURE DATE:  08/24/2019            INTERPRETATION:  CLINICAL INFORMATION: Trauma, intracranial hemorrhage,   now with new disorientation and nausea    TECHNIQUE: Noncontrast axial CT images were acquired through the head.   Two-dimensional sagittal and coronal reformats were generated.    COMPARISON STUDY: CT head 8/22/2019.    FINDINGS:     Redemonstration of a nondisplaced right frontal parietal parietal bone   fracture. Right frontal hemorrhagiccontusion and with associated edema   and mass effect without significant change. A smaller hemorrhagic   contusion also noted at the anterior right temporal lobe. Small areas of   subarachnoid hemorrhage along the right temporal and parietal regions. A   small amount of left temporal subdural or subarachnoid hemorrhage is   identified, decreased compared with 8/22/2019. No midline shift or   central herniation.     Ventricular size is unchanged. Small vessel white matter ischemic changes   are noted.      Previously noted bilateral extra-axial fluid collections are unchanged.     The visualized paranasal sinuses are clear. The mastoid air cells and   middle ear cavities are clear. Bilateral orbital lens replacement.      IMPRESSION:     No significant interval change in the appearance of anterior right   frontal and temporal lobe hemorrhagic contusions compared with 8/22/2019.   No change in small amount of subarachnoid and left subdural hemorrhage.    No midline shift or central herniation. No hydrocephalus.      < end of copied text >

## 2019-08-28 NOTE — CONSULT NOTE ADULT - CONSULT REQUESTED DATE/TIME
22-Aug-2019 01:21
23-Aug-2019 11:04
23-Aug-2019 13:58
27-Aug-2019 20:57
28-Aug-2019 11:47
28-Aug-2019
22-Aug-2019 13:37

## 2019-08-29 LAB
ANION GAP SERPL CALC-SCNC: 12 MMOL/L — SIGNIFICANT CHANGE UP (ref 5–17)
BUN SERPL-MCNC: 17 MG/DL — SIGNIFICANT CHANGE UP (ref 7–23)
CALCIUM SERPL-MCNC: 9.1 MG/DL — SIGNIFICANT CHANGE UP (ref 8.4–10.5)
CALCIUM SERPL-MCNC: 9.1 MG/DL — SIGNIFICANT CHANGE UP (ref 8.4–10.5)
CALCIUM SERPL-MCNC: 9.2 MG/DL — SIGNIFICANT CHANGE UP (ref 8.4–10.5)
CHLORIDE SERPL-SCNC: 90 MMOL/L — LOW (ref 96–108)
CHLORIDE SERPL-SCNC: 91 MMOL/L — LOW (ref 96–108)
CHLORIDE SERPL-SCNC: 93 MMOL/L — LOW (ref 96–108)
CO2 SERPL-SCNC: 25 MMOL/L — SIGNIFICANT CHANGE UP (ref 22–31)
CREAT SERPL-MCNC: 0.78 MG/DL — SIGNIFICANT CHANGE UP (ref 0.5–1.3)
CREAT SERPL-MCNC: 0.79 MG/DL — SIGNIFICANT CHANGE UP (ref 0.5–1.3)
CREAT SERPL-MCNC: 0.88 MG/DL — SIGNIFICANT CHANGE UP (ref 0.5–1.3)
GLUCOSE BLDC GLUCOMTR-MCNC: 149 MG/DL — HIGH (ref 70–99)
GLUCOSE BLDC GLUCOMTR-MCNC: 165 MG/DL — HIGH (ref 70–99)
GLUCOSE BLDC GLUCOMTR-MCNC: 165 MG/DL — HIGH (ref 70–99)
GLUCOSE BLDC GLUCOMTR-MCNC: 211 MG/DL — HIGH (ref 70–99)
GLUCOSE BLDC GLUCOMTR-MCNC: 226 MG/DL — HIGH (ref 70–99)
GLUCOSE SERPL-MCNC: 171 MG/DL — HIGH (ref 70–99)
GLUCOSE SERPL-MCNC: 175 MG/DL — HIGH (ref 70–99)
GLUCOSE SERPL-MCNC: 265 MG/DL — HIGH (ref 70–99)
HCT VFR BLD CALC: 39.2 % — SIGNIFICANT CHANGE UP (ref 39–50)
HGB BLD-MCNC: 13.3 G/DL — SIGNIFICANT CHANGE UP (ref 13–17)
MAGNESIUM SERPL-MCNC: 1.9 MG/DL — SIGNIFICANT CHANGE UP (ref 1.6–2.6)
MCHC RBC-ENTMCNC: 29.9 PG — SIGNIFICANT CHANGE UP (ref 27–34)
MCHC RBC-ENTMCNC: 34 GM/DL — SIGNIFICANT CHANGE UP (ref 32–36)
MCV RBC AUTO: 87.8 FL — SIGNIFICANT CHANGE UP (ref 80–100)
PHOSPHATE SERPL-MCNC: 2.3 MG/DL — LOW (ref 2.5–4.5)
PLATELET # BLD AUTO: 180 K/UL — SIGNIFICANT CHANGE UP (ref 150–400)
POTASSIUM SERPL-MCNC: 3.7 MMOL/L — SIGNIFICANT CHANGE UP (ref 3.5–5.3)
POTASSIUM SERPL-MCNC: 4.1 MMOL/L — SIGNIFICANT CHANGE UP (ref 3.5–5.3)
POTASSIUM SERPL-MCNC: 4.1 MMOL/L — SIGNIFICANT CHANGE UP (ref 3.5–5.3)
POTASSIUM SERPL-SCNC: 3.7 MMOL/L — SIGNIFICANT CHANGE UP (ref 3.5–5.3)
POTASSIUM SERPL-SCNC: 4.1 MMOL/L — SIGNIFICANT CHANGE UP (ref 3.5–5.3)
POTASSIUM SERPL-SCNC: 4.1 MMOL/L — SIGNIFICANT CHANGE UP (ref 3.5–5.3)
RBC # BLD: 4.46 M/UL — SIGNIFICANT CHANGE UP (ref 4.2–5.8)
RBC # FLD: 12.8 % — SIGNIFICANT CHANGE UP (ref 10.3–14.5)
SODIUM SERPL-SCNC: 127 MMOL/L — LOW (ref 135–145)
SODIUM SERPL-SCNC: 128 MMOL/L — LOW (ref 135–145)
SODIUM SERPL-SCNC: 130 MMOL/L — LOW (ref 135–145)
WBC # BLD: 7.5 K/UL — SIGNIFICANT CHANGE UP (ref 3.8–10.5)
WBC # FLD AUTO: 7.5 K/UL — SIGNIFICANT CHANGE UP (ref 3.8–10.5)

## 2019-08-29 RX ORDER — LOSARTAN POTASSIUM 100 MG/1
100 TABLET, FILM COATED ORAL
Refills: 0 | Status: DISCONTINUED | OUTPATIENT
Start: 2019-08-29 | End: 2019-09-03

## 2019-08-29 RX ORDER — POTASSIUM CHLORIDE 20 MEQ
20 PACKET (EA) ORAL
Refills: 0 | Status: COMPLETED | OUTPATIENT
Start: 2019-08-29 | End: 2019-08-29

## 2019-08-29 RX ORDER — ONDANSETRON 8 MG/1
4 TABLET, FILM COATED ORAL EVERY 6 HOURS
Refills: 0 | Status: DISCONTINUED | OUTPATIENT
Start: 2019-08-29 | End: 2019-09-03

## 2019-08-29 RX ORDER — FOLIC ACID 0.8 MG
1 TABLET ORAL DAILY
Refills: 0 | Status: DISCONTINUED | OUTPATIENT
Start: 2019-08-29 | End: 2019-09-03

## 2019-08-29 RX ORDER — CARVEDILOL PHOSPHATE 80 MG/1
25 CAPSULE, EXTENDED RELEASE ORAL
Refills: 0 | Status: DISCONTINUED | OUTPATIENT
Start: 2019-08-29 | End: 2019-09-03

## 2019-08-29 RX ORDER — INSULIN LISPRO 100/ML
VIAL (ML) SUBCUTANEOUS AT BEDTIME
Refills: 0 | Status: DISCONTINUED | OUTPATIENT
Start: 2019-08-29 | End: 2019-09-03

## 2019-08-29 RX ORDER — MAGNESIUM SULFATE 500 MG/ML
2 VIAL (ML) INJECTION ONCE
Refills: 0 | Status: COMPLETED | OUTPATIENT
Start: 2019-08-29 | End: 2019-08-29

## 2019-08-29 RX ORDER — INSULIN LISPRO 100/ML
VIAL (ML) SUBCUTANEOUS
Refills: 0 | Status: DISCONTINUED | OUTPATIENT
Start: 2019-08-29 | End: 2019-09-03

## 2019-08-29 RX ORDER — PANTOPRAZOLE SODIUM 20 MG/1
40 TABLET, DELAYED RELEASE ORAL
Refills: 0 | Status: DISCONTINUED | OUTPATIENT
Start: 2019-08-29 | End: 2019-08-29

## 2019-08-29 RX ORDER — SUMATRIPTAN SUCCINATE 4 MG/.5ML
50 INJECTION, SOLUTION SUBCUTANEOUS EVERY 6 HOURS
Refills: 0 | Status: DISCONTINUED | OUTPATIENT
Start: 2019-08-29 | End: 2019-09-03

## 2019-08-29 RX ORDER — MECLIZINE HCL 12.5 MG
12.5 TABLET ORAL EVERY 8 HOURS
Refills: 0 | Status: DISCONTINUED | OUTPATIENT
Start: 2019-08-29 | End: 2019-09-03

## 2019-08-29 RX ORDER — SODIUM CHLORIDE 9 MG/ML
1 INJECTION INTRAMUSCULAR; INTRAVENOUS; SUBCUTANEOUS THREE TIMES A DAY
Refills: 0 | Status: DISCONTINUED | OUTPATIENT
Start: 2019-08-29 | End: 2019-09-03

## 2019-08-29 RX ORDER — AMLODIPINE BESYLATE 2.5 MG/1
5 TABLET ORAL
Refills: 0 | Status: DISCONTINUED | OUTPATIENT
Start: 2019-08-29 | End: 2019-09-02

## 2019-08-29 RX ADMIN — Medication 12.5 MILLIGRAM(S): at 05:04

## 2019-08-29 RX ADMIN — SODIUM CHLORIDE 1 GRAM(S): 9 INJECTION INTRAMUSCULAR; INTRAVENOUS; SUBCUTANEOUS at 13:09

## 2019-08-29 RX ADMIN — TAMSULOSIN HYDROCHLORIDE 0.4 MILLIGRAM(S): 0.4 CAPSULE ORAL at 21:36

## 2019-08-29 RX ADMIN — Medication 166.67 MILLIMOLE(S): at 06:41

## 2019-08-29 RX ADMIN — Medication 975 MILLIGRAM(S): at 13:52

## 2019-08-29 RX ADMIN — ONDANSETRON 4 MILLIGRAM(S): 8 TABLET, FILM COATED ORAL at 05:05

## 2019-08-29 RX ADMIN — Medication 4: at 12:37

## 2019-08-29 RX ADMIN — CARVEDILOL PHOSPHATE 25 MILLIGRAM(S): 80 CAPSULE, EXTENDED RELEASE ORAL at 18:28

## 2019-08-29 RX ADMIN — SUMATRIPTAN SUCCINATE 50 MILLIGRAM(S): 4 INJECTION, SOLUTION SUBCUTANEOUS at 21:34

## 2019-08-29 RX ADMIN — SODIUM CHLORIDE 1 GRAM(S): 9 INJECTION INTRAMUSCULAR; INTRAVENOUS; SUBCUTANEOUS at 21:36

## 2019-08-29 RX ADMIN — SODIUM CHLORIDE 50 MILLILITER(S): 5 INJECTION, SOLUTION INTRAVENOUS at 21:36

## 2019-08-29 RX ADMIN — ENOXAPARIN SODIUM 40 MILLIGRAM(S): 100 INJECTION SUBCUTANEOUS at 12:34

## 2019-08-29 RX ADMIN — Medication 975 MILLIGRAM(S): at 22:50

## 2019-08-29 RX ADMIN — ONDANSETRON 4 MILLIGRAM(S): 8 TABLET, FILM COATED ORAL at 22:22

## 2019-08-29 RX ADMIN — PANTOPRAZOLE SODIUM 40 MILLIGRAM(S): 20 TABLET, DELAYED RELEASE ORAL at 06:15

## 2019-08-29 RX ADMIN — Medication 20 MILLIEQUIVALENT(S): at 07:31

## 2019-08-29 RX ADMIN — Medication 975 MILLIGRAM(S): at 14:30

## 2019-08-29 RX ADMIN — GABAPENTIN 300 MILLIGRAM(S): 400 CAPSULE ORAL at 05:05

## 2019-08-29 RX ADMIN — Medication 100 MILLIGRAM(S): at 13:09

## 2019-08-29 RX ADMIN — CARVEDILOL PHOSPHATE 25 MILLIGRAM(S): 80 CAPSULE, EXTENDED RELEASE ORAL at 05:04

## 2019-08-29 RX ADMIN — SODIUM CHLORIDE 50 MILLILITER(S): 5 INJECTION, SOLUTION INTRAVENOUS at 07:31

## 2019-08-29 RX ADMIN — Medication 12.5 MILLIGRAM(S): at 22:46

## 2019-08-29 RX ADMIN — FINASTERIDE 5 MILLIGRAM(S): 5 TABLET, FILM COATED ORAL at 12:34

## 2019-08-29 RX ADMIN — ATORVASTATIN CALCIUM 20 MILLIGRAM(S): 80 TABLET, FILM COATED ORAL at 21:36

## 2019-08-29 RX ADMIN — Medication 1 MILLIGRAM(S): at 12:34

## 2019-08-29 RX ADMIN — SUMATRIPTAN SUCCINATE 50 MILLIGRAM(S): 4 INJECTION, SOLUTION SUBCUTANEOUS at 22:05

## 2019-08-29 RX ADMIN — LEVETIRACETAM 400 MILLIGRAM(S): 250 TABLET, FILM COATED ORAL at 00:05

## 2019-08-29 RX ADMIN — Medication 975 MILLIGRAM(S): at 22:21

## 2019-08-29 RX ADMIN — Medication 20 MILLIEQUIVALENT(S): at 06:15

## 2019-08-29 RX ADMIN — LOSARTAN POTASSIUM 100 MILLIGRAM(S): 100 TABLET, FILM COATED ORAL at 04:29

## 2019-08-29 RX ADMIN — Medication 975 MILLIGRAM(S): at 04:29

## 2019-08-29 RX ADMIN — AMLODIPINE BESYLATE 5 MILLIGRAM(S): 2.5 TABLET ORAL at 05:05

## 2019-08-29 RX ADMIN — GABAPENTIN 300 MILLIGRAM(S): 400 CAPSULE ORAL at 18:28

## 2019-08-29 RX ADMIN — Medication 10 MILLIGRAM(S): at 13:09

## 2019-08-29 RX ADMIN — SODIUM CHLORIDE 50 MILLILITER(S): 5 INJECTION, SOLUTION INTRAVENOUS at 00:05

## 2019-08-29 RX ADMIN — Medication 975 MILLIGRAM(S): at 05:00

## 2019-08-29 RX ADMIN — Medication 100 MILLIGRAM(S): at 18:28

## 2019-08-29 RX ADMIN — Medication 50 GRAM(S): at 06:15

## 2019-08-29 RX ADMIN — Medication 12.5 MILLIGRAM(S): at 13:09

## 2019-08-29 RX ADMIN — Medication 4: at 08:55

## 2019-08-29 RX ADMIN — INSULIN GLARGINE 35 UNIT(S): 100 INJECTION, SOLUTION SUBCUTANEOUS at 22:22

## 2019-08-29 RX ADMIN — Medication 100 MILLIGRAM(S): at 05:06

## 2019-08-29 RX ADMIN — Medication 100 MILLIGRAM(S): at 05:04

## 2019-08-29 RX ADMIN — Medication 2: at 16:18

## 2019-08-29 NOTE — PROGRESS NOTE ADULT - SUBJECTIVE AND OBJECTIVE BOX
[facility] [phone]        Patient seen and examined         MEDICATIONS  (STANDING):  amantadine 100 milliGRAM(s) Oral two times a day  amLODIPine   Tablet 5 milliGRAM(s) Oral <User Schedule>  atorvastatin 20 milliGRAM(s) Oral at bedtime  carvedilol 25 milliGRAM(s) Oral <User Schedule>  docusate sodium 100 milliGRAM(s) Oral three times a day  enoxaparin Injectable 40 milliGRAM(s) SubCutaneous daily  finasteride 5 milliGRAM(s) Oral daily  folic acid 1 milliGRAM(s) Oral daily  gabapentin 300 milliGRAM(s) Oral two times a day  insulin glargine Injectable (LANTUS) 35 Unit(s) SubCutaneous at bedtime  insulin lispro (HumaLOG) corrective regimen sliding scale   SubCutaneous three times a day before meals  insulin lispro (HumaLOG) corrective regimen sliding scale   SubCutaneous at bedtime  losartan 100 milliGRAM(s) Oral <User Schedule>  meclizine 12.5 milliGRAM(s) Oral every 8 hours  sodium chloride 1 Gram(s) Oral three times a day  sodium chloride 1.5%. 500 milliLiter(s) (50 mL/Hr) IV Continuous <Continuous>  tamsulosin 0.4 milliGRAM(s) Oral at bedtime      VITAL:  T(C): , Max: 36.9 (08-28-19 @ 13:35)  T(F): , Max: 98.4 (08-28-19 @ 13:35)  HR: 60 (08-29-19 @ 12:00)  BP: 170/75 (08-29-19 @ 12:00)  BP(mean): 108 (08-29-19 @ 12:00)  RR: 14 (08-29-19 @ 12:00)  SpO2: 98% (08-29-19 @ 12:00)  Wt(kg): --    I and O's:    08-28 @ 07:01  -  08-29 @ 07:00  --------------------------------------------------------  IN: 2169 mL / OUT: 1700 mL / NET: 469 mL    08-29 @ 07:01  -  08-29 @ 13:01  --------------------------------------------------------  IN: 968 mL / OUT: 400 mL / NET: 568 mL          PHYSICAL EXAM:    Constitutional: NAD  Neck:  No JVD  Respiratory: CTAB/L  Cardiovascular: S1 and S2  Gastrointestinal: BS+, soft, NT/ND  Extremities: No peripheral edema  Neurological: A/O x 3, no focal deficits  Psychiatric: Normal mood, normal affect  : No So  Skin: No rashes  Access: Not applicable    LABS:                        13.3   7.5   )-----------( 180      ( 29 Aug 2019 05:08 )             39.2     08-29    127<L>  |  90<L>  |  17  ----------------------------<  265<H>  4.1   |  25  |  0.79    Ca    9.1      29 Aug 2019 11:15  Phos  2.3     08-29  Mg     1.9     08-29    TPro  7.0  /  Alb  3.6  /  TBili  1.4<H>  /  DBili  x   /  AST  15  /  ALT  24  /  AlkPhos  57  08-27          Urine Studies:    Creatinine, Random Urine: 88 mg/dL (08-28 @ 11:30)  Osmolality, Random Urine: 611 mos/kg (08-28 @ 11:30)  Sodium, Random Urine: 107 mmol/L (08-28 @ 11:30)        RADIOLOGY & ADDITIONAL STUDIES:

## 2019-08-29 NOTE — PROGRESS NOTE ADULT - SUBJECTIVE AND OBJECTIVE BOX
TRAUMA SURGERY DAILY PROGRESS NOTE:    Interval:  No acute events overnight endorsed.    Subjective:  Patient seen and examined this am. +Nausea. +OOB, sitting. Counselled nausea OOB Na bleed ICU dc    Vital Signs Last 24 Hrs  T(C): 36.7 (29 Aug 2019 11:00), Max: 36.8 (28 Aug 2019 23:20)  T(F): 98 (29 Aug 2019 11:00), Max: 98.3 (28 Aug 2019 23:20)  HR: 60 (29 Aug 2019 13:00) (56 - 86)  BP: 187/79 (29 Aug 2019 13:00) (133/62 - 187/79)  BP(mean): 114 (29 Aug 2019 13:00) (89 - 131)  RR: 14 (29 Aug 2019 13:00) (13 - 22)  SpO2: 99% (29 Aug 2019 13:00) (95% - 99%)    Exam:  Gen: NAD, resting in bed, alert and responding appropriately  Resp: Airway patent, non-labored respirations  Abd: Soft, ND, NTTP x 4 quadrants, no rebound or guarding. Incisions c/d/i  Ext: No edema, WWP  Neuro: AAOx3, no focal deficits    I&O's Detail    28 Aug 2019 07:  -  29 Aug 2019 07:00  --------------------------------------------------------  IN:    IV PiggyBack: 584 mL    Oral Fluid: 1185 mL    sodium chloride 1.5%.: 400 mL  Total IN: 2169 mL    OUT:    Voided: 1700 mL  Total OUT: 1700 mL    Total NET: 469 mL      29 Aug 2019 07:  -  29 Aug 2019 14:15  --------------------------------------------------------  IN:    IV PiggyBack: 668 mL    Oral Fluid: 50 mL    sodium chloride 1.5%.: 300 mL  Total IN: 1018 mL    OUT:    Voided: 400 mL  Total OUT: 400 mL    Total NET: 618 mL          Daily     Daily Weight in k.4 (29 Aug 2019 00:00)    MEDICATIONS  (STANDING):  amantadine 100 milliGRAM(s) Oral two times a day  amLODIPine   Tablet 5 milliGRAM(s) Oral <User Schedule>  atorvastatin 20 milliGRAM(s) Oral at bedtime  carvedilol 25 milliGRAM(s) Oral <User Schedule>  docusate sodium 100 milliGRAM(s) Oral three times a day  enoxaparin Injectable 40 milliGRAM(s) SubCutaneous daily  finasteride 5 milliGRAM(s) Oral daily  folic acid 1 milliGRAM(s) Oral daily  gabapentin 300 milliGRAM(s) Oral two times a day  insulin glargine Injectable (LANTUS) 35 Unit(s) SubCutaneous at bedtime  insulin lispro (HumaLOG) corrective regimen sliding scale   SubCutaneous three times a day before meals  insulin lispro (HumaLOG) corrective regimen sliding scale   SubCutaneous at bedtime  losartan 100 milliGRAM(s) Oral <User Schedule>  meclizine 12.5 milliGRAM(s) Oral every 8 hours  sodium chloride 1 Gram(s) Oral three times a day  sodium chloride 1.5%. 500 milliLiter(s) (50 mL/Hr) IV Continuous <Continuous>  tamsulosin 0.4 milliGRAM(s) Oral at bedtime    MEDICATIONS  (PRN):  acetaminophen   Tablet .. 975 milliGRAM(s) Oral every 6 hours PRN Moderate Pain (4 - 6)  hydrALAZINE Injectable 10 milliGRAM(s) IV Push every 6 hours PRN SBP >180  ondansetron Injectable 4 milliGRAM(s) IV Push every 6 hours PRN Nausea and/or Vomiting  SUMAtriptan 50 milliGRAM(s) Oral every 6 hours PRN Headache      LABS:                        13.3   7.5   )-----------( 180      ( 29 Aug 2019 05:08 )             39.2     -    127<L>  |  90<L>  |  17  ----------------------------<  265<H>  4.1   |  25  |  0.79    Ca    9.1      29 Aug 2019 11:15  Phos  2.3     -  Mg     1.9         TPro  7.0  /  Alb  3.6  /  TBili  1.4<H>  /  DBili  x   /  AST  15  /  ALT  24  /  AlkPhos  57  08-          JORI Plascencia, PGY-1  [facility] Surgery  [id number] with any questions

## 2019-08-29 NOTE — PROGRESS NOTE ADULT - ASSESSMENT
74M hx CAD s/p PCI on ASA, HTN,HLD, BPH, DM2 pw with SDH and now hyponatremia.  The hyponatremia seems to be chronologically related to the HCTZ  The urine indices at present will be erroneous due to the HCTZ  Doubt dietary factors  Due to the sudden we can bring up fast without the worry for osmotic demyelination syndrome (formerly known as CPM)    1 Renal - Consider increasing 1.5% to 100cc/hr or add 3% at 50cc/hr.  Serum sodium q  6 hours.    2 CVS- Off HCTZ at present and will not re-introduce;  May need to increase Norvasc to 7.5mg po qd for tighter BP control    3 Neuro-Cont keppra

## 2019-08-29 NOTE — PROGRESS NOTE ADULT - ASSESSMENT
ASSESSMENT: 74M hx CAD s/p PCI on ASA, HTN, HLD, BPH, DM2, on ASA 81mg s/p unwitnessed mechanical fall now w/ intracranial bleeds c/b hyponatremia and AMS ASSESSMENT: 74M hx CAD s/p PCI on ASA, HTN, HLD, BPH, DM2, on ASA 81mg s/p unwitnessed mechanical fall now w/ intracranial bleeds c/b hyponatremia and AMS    Neuro: AMS  -A&Ox3  -Repeat imaging unchanged  -Pain control w/ tylenol     Respiratory:  - No acute issues    CV: Hypertensive  - c/w PRN hydralizine  - HCTZ discontinued  - c/w home medications    GI:  - Regular diet  - Fluid restriction 1200mL    Renal: hyponatremia   - 1/5% sodium chloride at 50mL  - q4 BMP    Heme:   - DVT ppx     ID:  - Nothing to do    Endo:   - 35lantus at bedside  - Sliding scale ASSESSMENT: 74M hx CAD s/p PCI on ASA, HTN, HLD, BPH, DM2, on ASA 81mg s/p unwitnessed mechanical fall now w/ intracranial bleeds c/b hyponatremia and AMS    Neuro: AMS  -A&Ox3  -Repeat imaging unchanged  -Pain control w/ tylenol     Respiratory:  - No acute issues    CV: Hypertensive  - c/w PRN hydralizine  - HCTZ discontinued  - c/w home medications    GI:  - Regular diet  - Fluid restriction 1200mL    Renal: hyponatremia   - 1/5% sodium chloride at 50mL  - q4 BMP    Heme:   - DVT ppx     ID:  - Nothing to do    Endo:   - CC diet  - 35lantus at bedtime  - Sliding scale

## 2019-08-29 NOTE — PROGRESS NOTE ADULT - SUBJECTIVE AND OBJECTIVE BOX
HISTORY  74M hx CAD s/p PCI on ASA, HTN,HLD, BPH, DM2, on ASA 81mg daily transferred from [facility] s/p unwitnessed fall with finding of intracranial bleed and increased blood on interval scan. Patient initially presented s/p unwitnessed fall, ?syncopal episode this  with complaints of dizziness after fall and initial difficulty speaking. Patient does not recall event. Per family, at the OSH he was initially confused and altered. In the [facility] ED, was complaining of headache and dizziness since his fall, no associated neurologic deficits, no aggravating/alleviating factors. Pt with CTH showing right subdural hemorrhage left subarachnoid hemorrhage, and nondisplaced right frontal parietal parietal bone fracture, and right frontal and right temporal hemorrhagic contusion with imaging stable and showing no interval changes. Pt at wife report HA and lethargy increasing over the past several days, evaluated by neurology yesterday, both HA and lethargy improved this AM according to patient and wife. Wife also reports patient is often improved in AM. Yesterday patient was found to be hyponatremia to 127 on AM labs, 2g salt tabs ordered but not administered until 12pm. Fluid restriction in place but patient w/ minimal PO intake. AM labs today w/ repeat . Patient requiring PRN hydralazine for BP control. SICU consulted for AMS and lethargy and hyponatremia.     24 HOUR EVENTS:  Renal consulted, while plan was being re-evaluated repeat timed BMP was drawn before any intervention with interval improvement of sodium to 129. Repeat sodium 127 after 4g Sodium Chloride tablets, patient was transferred to ICU for 1.5% sodium chloride at 50mL/hr and frequent lab draws. Patient continues to have headache, but responding appropriately. HCTZ discontinued per renal    SUBJECTIVE/ROS:  [x ] A ten-point review of systems was otherwise negative except as noted.  [ ] Due to altered mental status/intubation, subjective information were not able to be obtained from the patient. History was obtained, to the extent possible, from review of the chart and collateral sources of information.      NEURO  GCS: 15  Exam: arousable, appropriate, A&Ox3  Meds: acetaminophen   Tablet .. 975 milliGRAM(s) Oral every 6 hours PRN Moderate Pain (4 - 6)  amantadine 100 milliGRAM(s) Oral two times a day  gabapentin 300 milliGRAM(s) Oral two times a day  meclizine 12.5 milliGRAM(s) Oral every 8 hours  ondansetron   Disintegrating Tablet 4 milliGRAM(s) Oral every 6 hours  SUMAtriptan 25 milliGRAM(s) Oral daily    [x] Adequacy of sedation and pain control has been assessed and adjusted      RESPIRATORY  RR: 14 (08-29-19 @ 04:00) (14 - 22)  SpO2: 95% (08-29-19 @ 04:00) (95% - 99%)  Wt(kg): --  Exam: unlabored, clear to auscultation bilaterally  Mechanical Ventilation: NA    [N/A] Extubation Readiness Assessed  Meds:       CARDIOVASCULAR  HR: 60 (08-29-19 @ 04:00) (60 - 86)  BP: 167/71 (08-29-19 @ 04:00) (135/54 - 185/76)  BP(mean): 102 (08-29-19 @ 04:00) (94 - 124)  ABP: --  ABP(mean): --  Wt(kg): --  CVP(cm H2O): --      Exam: regular rate and rhythm  Cardiac Rhythm: sinus  Perfusion     [x]Adequate   [ ]Inadequate  Mentation   [x]Normal       [ ]Reduced  Extremities  [x]Warm         [ ]Cool  Volume Status [ ]Hypervolemic []Euvolemic [x ]Hypovolemic  Meds: amLODIPine   Tablet 5 milliGRAM(s) Oral daily  carvedilol 25 milliGRAM(s) Oral every 12 hours  hydrALAZINE Injectable 10 milliGRAM(s) IV Push every 6 hours PRN SBP >180  losartan 100 milliGRAM(s) Oral daily  tamsulosin 0.4 milliGRAM(s) Oral at bedtime        GI/NUTRITION  Exam: soft, nontender, nondistended, incision C/D/I  Diet:  Meds: docusate sodium 100 milliGRAM(s) Oral three times a day  pantoprazole    Tablet 40 milliGRAM(s) Oral daily PRN Reflux      GENITOURINARY  I&O's Detail    08-27 @ 07:01  -  08-28 @ 07:00  --------------------------------------------------------  IN:    Oral Fluid: 480 mL  Total IN: 480 mL    OUT:    Voided: 1100 mL  Total OUT: 1100 mL    Total NET: -620 mL      08-28 @ 07:01  -  08-29 @ 04:07  --------------------------------------------------------  IN:    IV PiggyBack: 200 mL    Oral Fluid: 860 mL    sodium chloride 1.5%.: 200 mL  Total IN: 1260 mL    OUT:    Voided: 1500 mL  Total OUT: 1500 mL    Total NET: -240 mL          08-28    127<L>  |  89<L>  |  17  ----------------------------<  201<H>  3.6   |  27  |  0.86    Ca    9.3      28 Aug 2019 20:29  Phos  2.5     08-28  Mg     1.8     08-28    TPro  7.0  /  Alb  3.6  /  TBili  1.4<H>  /  DBili  x   /  AST  15  /  ALT  24  /  AlkPhos  57  08-27    [ ] So catheter, indication: N/A  Meds: magnesium oxide 400 milliGRAM(s) Oral daily  sodium chloride 1.5%. 500 milliLiter(s) IV Continuous <Continuous>        HEMATOLOGIC  Meds: enoxaparin Injectable 40 milliGRAM(s) SubCutaneous daily    [x] VTE Prophylaxis                        13.2   8.9   )-----------( 190      ( 27 Aug 2019 22:36 )             40.7       Transfusion     [ ] PRBC   [ ] Platelets   [ ] FFP   [ ] Cryoprecipitate      INFECTIOUS DISEASES    RECENT CULTURES:    Meds:       ENDOCRINE  CAPILLARY BLOOD GLUCOSE      POCT Blood Glucose.: 197 mg/dL (28 Aug 2019 21:21)  POCT Blood Glucose.: 191 mg/dL (28 Aug 2019 17:47)  POCT Blood Glucose.: 274 mg/dL (28 Aug 2019 12:23)  POCT Blood Glucose.: 192 mg/dL (28 Aug 2019 08:57)    Meds: atorvastatin 20 milliGRAM(s) Oral at bedtime  finasteride 5 milliGRAM(s) Oral daily  insulin glargine Injectable (LANTUS) 35 Unit(s) SubCutaneous at bedtime  insulin lispro (HumaLOG) corrective regimen sliding scale   SubCutaneous Before meals and at bedtime        ACCESS DEVICES:  [x ] Peripheral IV  [ ] Central Venous Line	[ ] R	[ ] L	[ ] IJ	[ ] Fem	[ ] SC	Placed:   [ ] Arterial Line		[ ] R	[ ] L	[ ] Fem	[ ] Rad	[ ] Ax	Placed:   [ ] PICC:					[ ] Mediport  [ ] Urinary Catheter, Date Placed:   [x] Necessity of urinary, arterial, and venous catheters discussed    OTHER MEDICATIONS:      CODE STATUS: Full      IMAGING:  < from: CT Head No Cont (08.24.19 @ 19:23) >  XAM:  CT BRAIN                            PROCEDURE DATE:  08/24/2019            INTERPRETATION:  CLINICAL INFORMATION: Trauma, intracranial hemorrhage,   now with new disorientation and nausea    TECHNIQUE: Noncontrast axial CT images were acquired through the head.   Two-dimensional sagittal and coronal reformats were generated.    COMPARISON STUDY: CT head 8/22/2019.    FINDINGS:     Redemonstration of a nondisplaced right frontal parietal parietal bone   fracture. Right frontal hemorrhagiccontusion and with associated edema   and mass effect without significant change. A smaller hemorrhagic   contusion also noted at the anterior right temporal lobe. Small areas of   subarachnoid hemorrhage along the right temporal and parietal regions. A   small amount of left temporal subdural or subarachnoid hemorrhage is   identified, decreased compared with 8/22/2019. No midline shift or   central herniation.     Ventricular size is unchanged. Small vessel white matter ischemic changes   are noted.      Previously noted bilateral extra-axial fluid collections are unchanged.     The visualized paranasal sinuses are clear. The mastoid air cells and   middle ear cavities are clear. Bilateral orbital lens replacement.      IMPRESSION:     No significant interval change in the appearance of anterior right   frontal and temporal lobe hemorrhagic contusions compared with 8/22/2019.   No change in small amount of subarachnoid and left subdural hemorrhage.    No midline shift or central herniation. No hydrocephalus.    < end of copied text > HISTORY  74M hx CAD s/p PCI on ASA, HTN,HLD, BPH, DM2, on ASA 81mg daily transferred from [facility] s/p unwitnessed fall with finding of intracranial bleed and increased blood on interval scan. Patient initially presented s/p unwitnessed fall, ?syncopal episode this  with complaints of dizziness after fall and initial difficulty speaking. Patient does not recall event. Per family, at the OSH he was initially confused and altered. In the [facility] ED, was complaining of headache and dizziness since his fall, no associated neurologic deficits, no aggravating/alleviating factors. Pt with CTH showing right subdural hemorrhage left subarachnoid hemorrhage, and nondisplaced right frontal parietal parietal bone fracture, and right frontal and right temporal hemorrhagic contusion with imaging stable and showing no interval changes. Pt at wife report HA and lethargy increasing over the past several days, evaluated by neurology yesterday, both HA and lethargy improved this AM according to patient and wife. Wife also reports patient is often improved in AM. Yesterday patient was found to be hyponatremia to 127 on AM labs, 2g salt tabs ordered but not administered until 12pm. Fluid restriction in place but patient w/ minimal PO intake. AM labs today w/ repeat . Patient requiring PRN hydralazine for BP control. SICU consulted for AMS and lethargy and hyponatremia.     24 HOUR EVENTS:  Nephro consulted, while plan was being re-evaluated repeat timed BMP was drawn before any intervention with interval improvement of sodium to 129. Repeat sodium 127 after 4g Sodium Chloride tablets, patient was transferred to ICU for 1.5% sodium chloride at 50mL/hr and frequent lab draws. Patient continues to have headache, but responding appropriately. HCTZ discontinued per renal    SUBJECTIVE/ROS:  [x ] A ten-point review of systems was otherwise negative except as noted.  [ ] Due to altered mental status/intubation, subjective information were not able to be obtained from the patient. History was obtained, to the extent possible, from review of the chart and collateral sources of information.      NEURO  GCS: 15  Exam: arousable, appropriate, A&Ox3  Meds: acetaminophen   Tablet .. 975 milliGRAM(s) Oral every 6 hours PRN Moderate Pain (4 - 6)  amantadine 100 milliGRAM(s) Oral two times a day  gabapentin 300 milliGRAM(s) Oral two times a day  meclizine 12.5 milliGRAM(s) Oral every 8 hours  ondansetron   Disintegrating Tablet 4 milliGRAM(s) Oral every 6 hours  SUMAtriptan 25 milliGRAM(s) Oral daily    [x] Adequacy of sedation and pain control has been assessed and adjusted      RESPIRATORY  RR: 14 (08-29-19 @ 04:00) (14 - 22)  SpO2: 95% (08-29-19 @ 04:00) (95% - 99%)  Wt(kg): --  Exam: unlabored, clear to auscultation bilaterally  Mechanical Ventilation: NA    [N/A] Extubation Readiness Assessed  Meds:       CARDIOVASCULAR  HR: 60 (08-29-19 @ 04:00) (60 - 86)  BP: 167/71 (08-29-19 @ 04:00) (135/54 - 185/76)  BP(mean): 102 (08-29-19 @ 04:00) (94 - 124)  ABP: --  ABP(mean): --  Wt(kg): --  CVP(cm H2O): --      Exam: regular rate and rhythm  Cardiac Rhythm: sinus  Perfusion     [x]Adequate   [ ]Inadequate  Mentation   [x]Normal       [ ]Reduced  Extremities  [x]Warm         [ ]Cool  Volume Status [ ]Hypervolemic []Euvolemic [x ]Hypovolemic  Meds: amLODIPine   Tablet 5 milliGRAM(s) Oral daily  carvedilol 25 milliGRAM(s) Oral every 12 hours  hydrALAZINE Injectable 10 milliGRAM(s) IV Push every 6 hours PRN SBP >180  losartan 100 milliGRAM(s) Oral daily  tamsulosin 0.4 milliGRAM(s) Oral at bedtime        GI/NUTRITION  Exam: soft, nontender, nondistended, incision C/D/I  Diet:  Meds: docusate sodium 100 milliGRAM(s) Oral three times a day  pantoprazole    Tablet 40 milliGRAM(s) Oral daily PRN Reflux      GENITOURINARY  I&O's Detail    08-27 @ 07:01  -  08-28 @ 07:00  --------------------------------------------------------  IN:    Oral Fluid: 480 mL  Total IN: 480 mL    OUT:    Voided: 1100 mL  Total OUT: 1100 mL    Total NET: -620 mL      08-28 @ 07:01 - 08-29 @ 04:07  --------------------------------------------------------  IN:    IV PiggyBack: 200 mL    Oral Fluid: 860 mL    sodium chloride 1.5%.: 200 mL  Total IN: 1260 mL    OUT:    Voided: 1500 mL  Total OUT: 1500 mL    Total NET: -240 mL          08-28    127<L>  |  89<L>  |  17  ----------------------------<  201<H>  3.6   |  27  |  0.86    Ca    9.3      28 Aug 2019 20:29  Phos  2.5     08-28  Mg     1.8     08-28    TPro  7.0  /  Alb  3.6  /  TBili  1.4<H>  /  DBili  x   /  AST  15  /  ALT  24  /  AlkPhos  57  08-27    [ ] So catheter, indication: N/A  Meds: magnesium oxide 400 milliGRAM(s) Oral daily  sodium chloride 1.5%. 500 milliLiter(s) IV Continuous <Continuous>        HEMATOLOGIC  Meds: enoxaparin Injectable 40 milliGRAM(s) SubCutaneous daily    [x] VTE Prophylaxis                        13.2   8.9   )-----------( 190      ( 27 Aug 2019 22:36 )             40.7       Transfusion     [ ] PRBC   [ ] Platelets   [ ] FFP   [ ] Cryoprecipitate      INFECTIOUS DISEASES    RECENT CULTURES:    Meds:       ENDOCRINE  CAPILLARY BLOOD GLUCOSE      POCT Blood Glucose.: 197 mg/dL (28 Aug 2019 21:21)  POCT Blood Glucose.: 191 mg/dL (28 Aug 2019 17:47)  POCT Blood Glucose.: 274 mg/dL (28 Aug 2019 12:23)  POCT Blood Glucose.: 192 mg/dL (28 Aug 2019 08:57)    Meds: atorvastatin 20 milliGRAM(s) Oral at bedtime  finasteride 5 milliGRAM(s) Oral daily  insulin glargine Injectable (LANTUS) 35 Unit(s) SubCutaneous at bedtime  insulin lispro (HumaLOG) corrective regimen sliding scale   SubCutaneous Before meals and at bedtime        ACCESS DEVICES:  [x ] Peripheral IV  [ ] Central Venous Line	[ ] R	[ ] L	[ ] IJ	[ ] Fem	[ ] SC	Placed:   [ ] Arterial Line		[ ] R	[ ] L	[ ] Fem	[ ] Rad	[ ] Ax	Placed:   [ ] PICC:					[ ] Mediport  [ ] Urinary Catheter, Date Placed:   [x] Necessity of urinary, arterial, and venous catheters discussed    OTHER MEDICATIONS:      CODE STATUS: Full      IMAGING:  < from: CT Head No Cont (08.24.19 @ 19:23) >  XAM:  CT BRAIN                            PROCEDURE DATE:  08/24/2019            INTERPRETATION:  CLINICAL INFORMATION: Trauma, intracranial hemorrhage,   now with new disorientation and nausea    TECHNIQUE: Noncontrast axial CT images were acquired through the head.   Two-dimensional sagittal and coronal reformats were generated.    COMPARISON STUDY: CT head 8/22/2019.    FINDINGS:     Redemonstration of a nondisplaced right frontal parietal parietal bone   fracture. Right frontal hemorrhagiccontusion and with associated edema   and mass effect without significant change. A smaller hemorrhagic   contusion also noted at the anterior right temporal lobe. Small areas of   subarachnoid hemorrhage along the right temporal and parietal regions. A   small amount of left temporal subdural or subarachnoid hemorrhage is   identified, decreased compared with 8/22/2019. No midline shift or   central herniation.     Ventricular size is unchanged. Small vessel white matter ischemic changes   are noted.      Previously noted bilateral extra-axial fluid collections are unchanged.     The visualized paranasal sinuses are clear. The mastoid air cells and   middle ear cavities are clear. Bilateral orbital lens replacement.      IMPRESSION:     No significant interval change in the appearance of anterior right   frontal and temporal lobe hemorrhagic contusions compared with 8/22/2019.   No change in small amount of subarachnoid and left subdural hemorrhage.    No midline shift or central herniation. No hydrocephalus.    < end of copied text >

## 2019-08-29 NOTE — PROGRESS NOTE ADULT - ASSESSMENT
73yo male s/p fall from standing, unclear cause, found to have L SDH and R SAH. Initially admitted to MICU for Cardene drip for SBP >180mm Hg. Repeat head CT demonstrated increased SAH and new R frontal IPH, for which patient was transferred to [facility]. CT Head stable x2 from prior.     - Acute L SDH, R SAH, R frontal PIH:  - head CT stable x2   - Hold ASA and Brilinta  - chemical VTE prophylaxis  - Appreciate NSG follow up  - Q4 neurochecks  - Keppra for post-traumatic seizure prophylaxis 7 day total  - continue carvedilol, losartan, amlodipine and HCTZ   - PRN iv hydralizine   - Continue statin  - TTE completed with mitral and atrial calcification and mild regurg,moderate diastolic dysfunction.    - PT/PMR Evaluation: TBI Rehab,   - Regular Diet: consistent carbohydrates  - F/U urine lytes   - Neurology headache specialist consulted  - Care per SICU    ACS [id number]

## 2019-08-29 NOTE — CHART NOTE - NSCHARTNOTEFT_GEN_A_CORE
GENERAL SURGERY NOTE:    Interval:  Pt moved from SICU to [facility].    Subjective:  Patient seen and examined. Reports pain is well controlled.     Exam:  Gen: NAD, resting in bed, alert and responding appropriately  Resp: Airway patent, non-labored respirations  Abd: Soft, ND, NTTP x 4 quadrants, no rebound or guarding.   Ext: No edema, WWP  Neuro: AAOx3, no focal deficits    Vital Signs Last 24 Hrs  T(C): 37.1 (29 Aug 2019 20:23), Max: 37.1 (29 Aug 2019 20:23)  T(F): 98.7 (29 Aug 2019 20:23), Max: 98.7 (29 Aug 2019 20:23)  HR: 64 (29 Aug 2019 20:23) (56 - 86)  BP: 149/67 (29 Aug 2019 20:23) (133/62 - 187/79)  BP(mean): 103 (29 Aug 2019 15:00) (89 - 131)  RR: 18 (29 Aug 2019 20:23) (13 - 22)  SpO2: 99% (29 Aug 2019 20:23) (95% - 99%)    I&O's Detail    28 Aug 2019 07:01  -  29 Aug 2019 07:00  --------------------------------------------------------  IN:    IV PiggyBack: 584 mL    Oral Fluid: 1185 mL    sodium chloride 1.5%.: 400 mL  Total IN: 2169 mL    OUT:    Voided: 1700 mL  Total OUT: 1700 mL    Total NET: 469 mL      29 Aug 2019 07:01  -  29 Aug 2019 20:59  --------------------------------------------------------  IN:    IV PiggyBack: 668 mL    Oral Fluid: 50 mL    sodium chloride 1.5%.: 450 mL  Total IN: 1168 mL    OUT:    Voided: 1700 mL  Total OUT: 1700 mL    Total NET: -532 mL          Daily     Daily Weight in k.4 (29 Aug 2019 00:00)    MEDICATIONS  (STANDING):  amantadine 100 milliGRAM(s) Oral two times a day  amLODIPine   Tablet 5 milliGRAM(s) Oral <User Schedule>  atorvastatin 20 milliGRAM(s) Oral at bedtime  carvedilol 25 milliGRAM(s) Oral <User Schedule>  docusate sodium 100 milliGRAM(s) Oral three times a day  enoxaparin Injectable 40 milliGRAM(s) SubCutaneous daily  finasteride 5 milliGRAM(s) Oral daily  folic acid 1 milliGRAM(s) Oral daily  gabapentin 300 milliGRAM(s) Oral two times a day  insulin glargine Injectable (LANTUS) 35 Unit(s) SubCutaneous at bedtime  insulin lispro (HumaLOG) corrective regimen sliding scale   SubCutaneous three times a day before meals  insulin lispro (HumaLOG) corrective regimen sliding scale   SubCutaneous at bedtime  losartan 100 milliGRAM(s) Oral <User Schedule>  meclizine 12.5 milliGRAM(s) Oral every 8 hours  sodium chloride 1 Gram(s) Oral three times a day  sodium chloride 1.5%. 500 milliLiter(s) (50 mL/Hr) IV Continuous <Continuous>  tamsulosin 0.4 milliGRAM(s) Oral at bedtime    MEDICATIONS  (PRN):  acetaminophen   Tablet .. 975 milliGRAM(s) Oral every 6 hours PRN Moderate Pain (4 - 6)  hydrALAZINE Injectable 10 milliGRAM(s) IV Push every 6 hours PRN SBP >180  ondansetron Injectable 4 milliGRAM(s) IV Push every 6 hours PRN Nausea and/or Vomiting  SUMAtriptan 50 milliGRAM(s) Oral every 6 hours PRN Headache      LABS:                        13.3   7.5   )-----------( 180      ( 29 Aug 2019 05:08 )             39.2     08-29    130<L>  |  93<L>  |  17  ----------------------------<  171<H>  4.1   |  25  |  0.88    Ca    9.1      29 Aug 2019 16:30  Phos  2.3     08-29  Mg     1.9     -    TPro  7.0  /  Alb  3.6  /  TBili  1.4<H>  /  DBili  x   /  AST  15  /  ALT  24  /  AlkPhos  57  08-          Plan:   - 1.5% NS   - BMP q12 0600 1800  - Sw cm for TBI     JORI Plascencia, PGY-1  [facility] Surgery  [id number] with any questions

## 2019-08-30 LAB
ANION GAP SERPL CALC-SCNC: 10 MMOL/L — SIGNIFICANT CHANGE UP (ref 5–17)
ANION GAP SERPL CALC-SCNC: 10 MMOL/L — SIGNIFICANT CHANGE UP (ref 5–17)
BUN SERPL-MCNC: 13 MG/DL — SIGNIFICANT CHANGE UP (ref 7–23)
BUN SERPL-MCNC: 14 MG/DL — SIGNIFICANT CHANGE UP (ref 7–23)
CALCIUM SERPL-MCNC: 8.9 MG/DL — SIGNIFICANT CHANGE UP (ref 8.4–10.5)
CALCIUM SERPL-MCNC: 9.1 MG/DL — SIGNIFICANT CHANGE UP (ref 8.4–10.5)
CHLORIDE SERPL-SCNC: 100 MMOL/L — SIGNIFICANT CHANGE UP (ref 96–108)
CHLORIDE SERPL-SCNC: 99 MMOL/L — SIGNIFICANT CHANGE UP (ref 96–108)
CO2 SERPL-SCNC: 22 MMOL/L — SIGNIFICANT CHANGE UP (ref 22–31)
CO2 SERPL-SCNC: 26 MMOL/L — SIGNIFICANT CHANGE UP (ref 22–31)
CREAT SERPL-MCNC: 0.78 MG/DL — SIGNIFICANT CHANGE UP (ref 0.5–1.3)
CREAT SERPL-MCNC: 0.88 MG/DL — SIGNIFICANT CHANGE UP (ref 0.5–1.3)
GLUCOSE BLDC GLUCOMTR-MCNC: 142 MG/DL — HIGH (ref 70–99)
GLUCOSE BLDC GLUCOMTR-MCNC: 149 MG/DL — HIGH (ref 70–99)
GLUCOSE BLDC GLUCOMTR-MCNC: 154 MG/DL — HIGH (ref 70–99)
GLUCOSE BLDC GLUCOMTR-MCNC: 86 MG/DL — SIGNIFICANT CHANGE UP (ref 70–99)
GLUCOSE SERPL-MCNC: 130 MG/DL — HIGH (ref 70–99)
GLUCOSE SERPL-MCNC: 93 MG/DL — SIGNIFICANT CHANGE UP (ref 70–99)
POTASSIUM SERPL-MCNC: 3.6 MMOL/L — SIGNIFICANT CHANGE UP (ref 3.5–5.3)
POTASSIUM SERPL-MCNC: 3.9 MMOL/L — SIGNIFICANT CHANGE UP (ref 3.5–5.3)
POTASSIUM SERPL-SCNC: 3.6 MMOL/L — SIGNIFICANT CHANGE UP (ref 3.5–5.3)
POTASSIUM SERPL-SCNC: 3.9 MMOL/L — SIGNIFICANT CHANGE UP (ref 3.5–5.3)
SODIUM SERPL-SCNC: 132 MMOL/L — LOW (ref 135–145)
SODIUM SERPL-SCNC: 135 MMOL/L — SIGNIFICANT CHANGE UP (ref 135–145)

## 2019-08-30 PROCEDURE — 99232 SBSQ HOSP IP/OBS MODERATE 35: CPT

## 2019-08-30 RX ORDER — POTASSIUM CHLORIDE 20 MEQ
40 PACKET (EA) ORAL ONCE
Refills: 0 | Status: COMPLETED | OUTPATIENT
Start: 2019-08-30 | End: 2019-08-30

## 2019-08-30 RX ADMIN — SODIUM CHLORIDE 1 GRAM(S): 9 INJECTION INTRAMUSCULAR; INTRAVENOUS; SUBCUTANEOUS at 06:01

## 2019-08-30 RX ADMIN — ENOXAPARIN SODIUM 40 MILLIGRAM(S): 100 INJECTION SUBCUTANEOUS at 11:22

## 2019-08-30 RX ADMIN — Medication 975 MILLIGRAM(S): at 05:00

## 2019-08-30 RX ADMIN — SODIUM CHLORIDE 1 GRAM(S): 9 INJECTION INTRAMUSCULAR; INTRAVENOUS; SUBCUTANEOUS at 21:46

## 2019-08-30 RX ADMIN — Medication 1 MILLIGRAM(S): at 11:23

## 2019-08-30 RX ADMIN — AMLODIPINE BESYLATE 5 MILLIGRAM(S): 2.5 TABLET ORAL at 06:00

## 2019-08-30 RX ADMIN — Medication 975 MILLIGRAM(S): at 16:56

## 2019-08-30 RX ADMIN — FINASTERIDE 5 MILLIGRAM(S): 5 TABLET, FILM COATED ORAL at 11:23

## 2019-08-30 RX ADMIN — ONDANSETRON 4 MILLIGRAM(S): 8 TABLET, FILM COATED ORAL at 16:24

## 2019-08-30 RX ADMIN — INSULIN GLARGINE 35 UNIT(S): 100 INJECTION, SOLUTION SUBCUTANEOUS at 21:46

## 2019-08-30 RX ADMIN — Medication 100 MILLIGRAM(S): at 17:36

## 2019-08-30 RX ADMIN — Medication 12.5 MILLIGRAM(S): at 06:00

## 2019-08-30 RX ADMIN — Medication 100 MILLIGRAM(S): at 06:00

## 2019-08-30 RX ADMIN — Medication 40 MILLIEQUIVALENT(S): at 11:23

## 2019-08-30 RX ADMIN — GABAPENTIN 300 MILLIGRAM(S): 400 CAPSULE ORAL at 17:36

## 2019-08-30 RX ADMIN — Medication 975 MILLIGRAM(S): at 04:24

## 2019-08-30 RX ADMIN — Medication 2: at 17:46

## 2019-08-30 RX ADMIN — SODIUM CHLORIDE 1 GRAM(S): 9 INJECTION INTRAMUSCULAR; INTRAVENOUS; SUBCUTANEOUS at 14:38

## 2019-08-30 RX ADMIN — Medication 12.5 MILLIGRAM(S): at 21:46

## 2019-08-30 RX ADMIN — GABAPENTIN 300 MILLIGRAM(S): 400 CAPSULE ORAL at 06:01

## 2019-08-30 RX ADMIN — LOSARTAN POTASSIUM 100 MILLIGRAM(S): 100 TABLET, FILM COATED ORAL at 06:01

## 2019-08-30 RX ADMIN — Medication 100 MILLIGRAM(S): at 14:38

## 2019-08-30 RX ADMIN — Medication 975 MILLIGRAM(S): at 16:27

## 2019-08-30 RX ADMIN — Medication 12.5 MILLIGRAM(S): at 14:38

## 2019-08-30 RX ADMIN — CARVEDILOL PHOSPHATE 25 MILLIGRAM(S): 80 CAPSULE, EXTENDED RELEASE ORAL at 17:37

## 2019-08-30 RX ADMIN — CARVEDILOL PHOSPHATE 25 MILLIGRAM(S): 80 CAPSULE, EXTENDED RELEASE ORAL at 06:00

## 2019-08-30 RX ADMIN — TAMSULOSIN HYDROCHLORIDE 0.4 MILLIGRAM(S): 0.4 CAPSULE ORAL at 21:46

## 2019-08-30 NOTE — PROGRESS NOTE ADULT - SUBJECTIVE AND OBJECTIVE BOX
admission HPI:  74M hx CAD s/p PCI on ASA, HTN,HLD, BPH, DM2, on ASA 81mg daily transferred from [facility] s/p unwitnessed fall with finding of intracranial bleed and increased blood on interval scan. Patient initially presented s/p unwitnessed fall, ?syncopal episode this morning, with complaints of dizziness after fall and initial difficulty speaking. Patient does not recall event. CTH shows L hemispheric SDH with SAH and calvarium fx. Per family, at the OSH he was initially confused and altered. In the [facility] ED, was complaining of headache and dizziness since his fall, no associated neurologic deficits, no aggravating/alleviating factors. Denies any other injuries, denies pain to his chest, abd, pelvis, or extremities. Denies shortness of breath or difficulty breathing.      Interval history: pt reports 5/10 headache, reports he wants to go to rehab. Family at bedside.  Pt hypertensive to 170s today, 180s yesterday.  Amlodopine started yesterday.        REVIEW OF SYSTEMS: + headache, + poor balance, No chest pain, shortness of breath, nausea, vomiting or diarhea.      PAST MEDICAL & SURGICAL HISTORY  BPH (benign prostatic hypertrophy)  Hyperlipidemia  CAD (coronary artery disease)  Diabetes mellitus  HTN (hypertension)  S/P drug eluting coronary stent placement  S/P primary angioplasty with coronary stent      SOCIAL HISTORY  Smoking - Denied, EtOH - Denied, Drugs - Denied    FUNCTIONAL HISTORY:   Lives w spouse in home w 3 SKYLER.  PTA Independent    CURRENT FUNCTIONAL STATUS:  sit to stand RW min assist  ambulated 15' RW min assist    FAMILY HISTORY   Family history of diabetes mellitus (Sibling)      RECENT LABS/IMAGING  CBC Full  -  ( 29 Aug 2019 05:08 )  WBC Count : 7.5 K/uL  RBC Count : 4.46 M/uL  Hemoglobin : 13.3 g/dL  Hematocrit : 39.2 %  Platelet Count - Automated : 180 K/uL  Mean Cell Volume : 87.8 fl  Mean Cell Hemoglobin : 29.9 pg  Mean Cell Hemoglobin Concentration : 34.0 gm/dL  Auto Neutrophil # : x  Auto Lymphocyte # : x  Auto Monocyte # : x  Auto Eosinophil # : x  Auto Basophil # : x  Auto Neutrophil % : x  Auto Lymphocyte % : x  Auto Monocyte % : x  Auto Eosinophil % : x  Auto Basophil % : x    08-30    135  |  99  |  13  ----------------------------<  93  3.6   |  26  |  0.88    Ca    9.1      30 Aug 2019 06:59  Phos  2.3     08-29  Mg     1.9     08-29          VITALS  T(C): 36.8 (08-30-19 @ 11:10), Max: 37.1 (08-29-19 @ 20:23)  HR: 63 (08-30-19 @ 12:04) (61 - 94)  BP: 172/76 (08-30-19 @ 12:04) (149/67 - 173/70)  RR: 18 (08-30-19 @ 12:04) (15 - 22)  SpO2: 99% (08-30-19 @ 12:04) (94% - 100%)  Wt(kg): --    ALLERGIES  No Known Allergies      MEDICATIONS   acetaminophen   Tablet .. 975 milliGRAM(s) Oral every 6 hours PRN  amantadine 100 milliGRAM(s) Oral two times a day  amLODIPine   Tablet 5 milliGRAM(s) Oral <User Schedule>  atorvastatin 20 milliGRAM(s) Oral at bedtime  carvedilol 25 milliGRAM(s) Oral <User Schedule>  docusate sodium 100 milliGRAM(s) Oral three times a day  enoxaparin Injectable 40 milliGRAM(s) SubCutaneous daily  finasteride 5 milliGRAM(s) Oral daily  folic acid 1 milliGRAM(s) Oral daily  gabapentin 300 milliGRAM(s) Oral two times a day  hydrALAZINE Injectable 10 milliGRAM(s) IV Push every 6 hours PRN  insulin glargine Injectable (LANTUS) 35 Unit(s) SubCutaneous at bedtime  insulin lispro (HumaLOG) corrective regimen sliding scale   SubCutaneous three times a day before meals  insulin lispro (HumaLOG) corrective regimen sliding scale   SubCutaneous at bedtime  losartan 100 milliGRAM(s) Oral <User Schedule>  meclizine 12.5 milliGRAM(s) Oral every 8 hours  ondansetron Injectable 4 milliGRAM(s) IV Push every 6 hours PRN  sodium chloride 1 Gram(s) Oral three times a day  SUMAtriptan 50 milliGRAM(s) Oral every 6 hours PRN  tamsulosin 0.4 milliGRAM(s) Oral at bedtime      ----------------------------------------------------------------------------------------  PHYSICAL EXAM  Constitutional - NAD, Comfortable  HEENT - NCAT, EOMI  Neck - Supple, No limited ROM  Chest - CTA bilaterally  Cardiovascular - RRR, S1S2, No murmurs  Abdomen - BS+, Soft, NTND  Extremities - No C/C/E, No calf tenderness   Neurologic Exam -                    Cognitive - Awake, Alert, AAO to self, place, date, year, situation     Communication - Fluent, No dysarthria, no aphasia, answers all questions appropriately     Cranial Nerves - CN 2-12 intact     Motor - 4/5 strength              Sensory - Intact to LT     Reflexes - DTR Intact, No primitive reflexive     Balance - WNL Static  Psychiatric - Mood stable, Affect WNL      73 yo with functional deficits secondary to diagnosis of TBI    Plan:  PT- ROM, Bed Mob, Transfers, Amb w AD and bracing as needed  OT- ADLs, bracing  SLP- Dysphagia eval and treat  Prec- Falls, Cardiac  DVT Prophylaxis- Lovenox  Skin- Turn q2 h  Dispo- Acute TBI Rehab when medically stable- can tolerate 3h/d PT/OT/SLP and requires daily physician visits.  patient hypertensive at times, with headache, would recommend optimize bp meds prior to discharge to rehab.

## 2019-08-30 NOTE — PROGRESS NOTE ADULT - SUBJECTIVE AND OBJECTIVE BOX
TRAUMA SURGERY DAILY PROGRESS NOTE:    Interval:  No acute events overnight endorsed.    Subjective:  Patient seen and examined this am. Counselled [facility] TBI rehab    Vital Signs Last 24 Hrs  T(C): 36.8 (30 Aug 2019 11:10), Max: 37.1 (29 Aug 2019 20:23)  T(F): 98.2 (30 Aug 2019 11:10), Max: 98.7 (29 Aug 2019 20:23)  HR: 63 (30 Aug 2019 12:04) (61 - 94)  BP: 172/76 (30 Aug 2019 12:04) (149/67 - 173/70)  BP(mean): 103 (29 Aug 2019 15:00) (103 - 104)  RR: 18 (30 Aug 2019 12:04) (15 - 22)  SpO2: 99% (30 Aug 2019 12:04) (94% - 100%)    Exam:  Gen: NAD, resting in bed, alert and responding appropriately  Resp: Airway patent, non-labored respirations  Abd: Soft, ND, NTTP x 4 quadrants, no rebound or guarding. Incisions c/d/i  Ext: No edema, WWP  Neuro: AAOx3, no focal deficits    I&O's Detail    29 Aug 2019 07:01  -  30 Aug 2019 07:00  --------------------------------------------------------  IN:    IV PiggyBack: 668 mL    Oral Fluid: 290 mL    sodium chloride 1.5%: 900 mL  Total IN: 1858 mL    OUT:    Voided: 2800 mL  Total OUT: 2800 mL    Total NET: -942 mL          Daily     Daily     MEDICATIONS  (STANDING):  amantadine 100 milliGRAM(s) Oral two times a day  amLODIPine   Tablet 5 milliGRAM(s) Oral <User Schedule>  atorvastatin 20 milliGRAM(s) Oral at bedtime  carvedilol 25 milliGRAM(s) Oral <User Schedule>  docusate sodium 100 milliGRAM(s) Oral three times a day  enoxaparin Injectable 40 milliGRAM(s) SubCutaneous daily  finasteride 5 milliGRAM(s) Oral daily  folic acid 1 milliGRAM(s) Oral daily  gabapentin 300 milliGRAM(s) Oral two times a day  insulin glargine Injectable (LANTUS) 35 Unit(s) SubCutaneous at bedtime  insulin lispro (HumaLOG) corrective regimen sliding scale   SubCutaneous three times a day before meals  insulin lispro (HumaLOG) corrective regimen sliding scale   SubCutaneous at bedtime  losartan 100 milliGRAM(s) Oral <User Schedule>  meclizine 12.5 milliGRAM(s) Oral every 8 hours  sodium chloride 1 Gram(s) Oral three times a day  tamsulosin 0.4 milliGRAM(s) Oral at bedtime    MEDICATIONS  (PRN):  acetaminophen   Tablet .. 975 milliGRAM(s) Oral every 6 hours PRN Moderate Pain (4 - 6)  hydrALAZINE Injectable 10 milliGRAM(s) IV Push every 6 hours PRN SBP >180  ondansetron Injectable 4 milliGRAM(s) IV Push every 6 hours PRN Nausea and/or Vomiting  SUMAtriptan 50 milliGRAM(s) Oral every 6 hours PRN Headache      LABS:                        13.3   7.5   )-----------( 180      ( 29 Aug 2019 05:08 )             39.2     08-30    135  |  99  |  13  ----------------------------<  93  3.6   |  26  |  0.88    Ca    9.1      30 Aug 2019 06:59  Phos  2.3     08-29  Mg     1.9     08-29            JORI Plascencia, PGY-1  [facility] Surgery  [id number] with any questions

## 2019-08-30 NOTE — PROGRESS NOTE ADULT - SUBJECTIVE AND OBJECTIVE BOX
NEPHROLOGY-[facility] [phone]        Patient seen and examined in bed.  He was in good spirits and offered no complaints         MEDICATIONS  (STANDING):  amantadine 100 milliGRAM(s) Oral two times a day  amLODIPine   Tablet 5 milliGRAM(s) Oral <User Schedule>  atorvastatin 20 milliGRAM(s) Oral at bedtime  carvedilol 25 milliGRAM(s) Oral <User Schedule>  docusate sodium 100 milliGRAM(s) Oral three times a day  enoxaparin Injectable 40 milliGRAM(s) SubCutaneous daily  finasteride 5 milliGRAM(s) Oral daily  folic acid 1 milliGRAM(s) Oral daily  gabapentin 300 milliGRAM(s) Oral two times a day  insulin glargine Injectable (LANTUS) 35 Unit(s) SubCutaneous at bedtime  insulin lispro (HumaLOG) corrective regimen sliding scale   SubCutaneous three times a day before meals  insulin lispro (HumaLOG) corrective regimen sliding scale   SubCutaneous at bedtime  losartan 100 milliGRAM(s) Oral <User Schedule>  meclizine 12.5 milliGRAM(s) Oral every 8 hours  sodium chloride 1 Gram(s) Oral three times a day  tamsulosin 0.4 milliGRAM(s) Oral at bedtime      VITAL:  T(C): , Max: 37.1 (08-29-19 @ 20:23)  T(F): , Max: 98.7 (08-29-19 @ 20:23)  HR: 94 (08-30-19 @ 11:10)  BP: 156/66 (08-30-19 @ 11:10)  BP(mean): 103 (08-29-19 @ 15:00)  RR: 16 (08-30-19 @ 11:10)  SpO2: 94% (08-30-19 @ 11:10)  Wt(kg): --    I and O's:    08-29 @ 07:01  -  08-30 @ 07:00  --------------------------------------------------------  IN: 1858 mL / OUT: 2800 mL / NET: -942 mL          PHYSICAL EXAM:    Constitutional: NAD  Neck:  No JVD  Respiratory: CTAB/L  Cardiovascular: S1 and S2  Gastrointestinal: BS+, soft, NT/ND  Extremities: No peripheral edema  Neurological: A/O x 3, no focal deficits  Psychiatric: Normal mood, normal affect  : No So  Skin: No rashes  Access: Not applicable    LABS:                        13.3   7.5   )-----------( 180      ( 29 Aug 2019 05:08 )             39.2     08-30    135  |  99  |  13  ----------------------------<  93  3.6   |  26  |  0.88    Ca    9.1      30 Aug 2019 06:59  Phos  2.3     08-29  Mg     1.9     08-29            Urine Studies:          RADIOLOGY & ADDITIONAL STUDIES:

## 2019-08-30 NOTE — PROGRESS NOTE ADULT - ASSESSMENT
74M hx CAD s/p PCI on ASA, HTN,HLD, BPH, DM2 pw with SDH and now hyponatremia.  The hyponatremia seems to be chronologically related to the HCTZ  The urine indices at present will be erroneous due to the HCTZ  Doubt dietary factors  Due to the sudden we can bring up fast without the worry for osmotic demyelination syndrome (formerly known as CPM)    1 Renal - Consider DC  1.5% now as the serum sodium has normalized   2 CVS- Off HCTZ at present and will not re-introduce;  May need to increase Norvasc to 7.5mg po qd for tighter BP control    3 Neuro-Cont keppra     Eventual rehab

## 2019-08-30 NOTE — PROGRESS NOTE ADULT - ASSESSMENT
73yo male s/p fall from standing, unclear cause, found to have L SDH and R SAH. Initially admitted to MICU for Cardene drip for SBP >180mm Hg. Repeat head CT demonstrated increased SAH and new R frontal IPH, for which patient was transferred to [facility]. CT Head stable x2 from prior.     - Acute L SDH, R SAH, R frontal PIH:  - head CT stable x2   - Hold ASA and Brilinta  - chemical VTE prophylaxis  - Appreciate NSG follow up  - Q4 neurochecks  - Keppra for post-traumatic seizure prophylaxis 7 day total  - continue carvedilol, losartan, amlodipine and HCTZ   - PRN iv hydralizine   - Continue statin  - TTE completed with mitral and atrial calcification and mild regurg,moderate diastolic dysfunction.    - PT/PMR Evaluation: TBI Rehab, cleared for dc  - Regular Diet: consistent carbohydrates  - F/U urine lytes   - Neurology headache specialist consulted  - Dc 1.5 NS  - Replete K for incidentally low K    ACS [id number]

## 2019-08-31 LAB
ANION GAP SERPL CALC-SCNC: 11 MMOL/L — SIGNIFICANT CHANGE UP (ref 5–17)
BUN SERPL-MCNC: 11 MG/DL — SIGNIFICANT CHANGE UP (ref 7–23)
CALCIUM SERPL-MCNC: 9.5 MG/DL — SIGNIFICANT CHANGE UP (ref 8.4–10.5)
CHLORIDE SERPL-SCNC: 101 MMOL/L — SIGNIFICANT CHANGE UP (ref 96–108)
CO2 SERPL-SCNC: 24 MMOL/L — SIGNIFICANT CHANGE UP (ref 22–31)
CREAT SERPL-MCNC: 0.82 MG/DL — SIGNIFICANT CHANGE UP (ref 0.5–1.3)
GLUCOSE BLDC GLUCOMTR-MCNC: 103 MG/DL — HIGH (ref 70–99)
GLUCOSE BLDC GLUCOMTR-MCNC: 189 MG/DL — HIGH (ref 70–99)
GLUCOSE BLDC GLUCOMTR-MCNC: 219 MG/DL — HIGH (ref 70–99)
GLUCOSE BLDC GLUCOMTR-MCNC: 236 MG/DL — HIGH (ref 70–99)
GLUCOSE BLDC GLUCOMTR-MCNC: 66 MG/DL — LOW (ref 70–99)
GLUCOSE BLDC GLUCOMTR-MCNC: 71 MG/DL — SIGNIFICANT CHANGE UP (ref 70–99)
GLUCOSE SERPL-MCNC: 65 MG/DL — LOW (ref 70–99)
HCT VFR BLD CALC: 38.8 % — LOW (ref 39–50)
HGB BLD-MCNC: 12.8 G/DL — LOW (ref 13–17)
MAGNESIUM SERPL-MCNC: 2 MG/DL — SIGNIFICANT CHANGE UP (ref 1.6–2.6)
MCHC RBC-ENTMCNC: 28.1 PG — SIGNIFICANT CHANGE UP (ref 27–34)
MCHC RBC-ENTMCNC: 33 GM/DL — SIGNIFICANT CHANGE UP (ref 32–36)
MCV RBC AUTO: 85.1 FL — SIGNIFICANT CHANGE UP (ref 80–100)
PHOSPHATE SERPL-MCNC: 2.2 MG/DL — LOW (ref 2.5–4.5)
PHOSPHATE SERPL-MCNC: 3.3 MG/DL — SIGNIFICANT CHANGE UP (ref 2.5–4.5)
PLATELET # BLD AUTO: 211 K/UL — SIGNIFICANT CHANGE UP (ref 150–400)
POTASSIUM SERPL-MCNC: 3.8 MMOL/L — SIGNIFICANT CHANGE UP (ref 3.5–5.3)
POTASSIUM SERPL-SCNC: 3.8 MMOL/L — SIGNIFICANT CHANGE UP (ref 3.5–5.3)
RBC # BLD: 4.56 M/UL — SIGNIFICANT CHANGE UP (ref 4.2–5.8)
RBC # FLD: 13.7 % — SIGNIFICANT CHANGE UP (ref 10.3–14.5)
SODIUM SERPL-SCNC: 136 MMOL/L — SIGNIFICANT CHANGE UP (ref 135–145)
WBC # BLD: 6.82 K/UL — SIGNIFICANT CHANGE UP (ref 3.8–10.5)
WBC # FLD AUTO: 6.82 K/UL — SIGNIFICANT CHANGE UP (ref 3.8–10.5)

## 2019-08-31 PROCEDURE — 99232 SBSQ HOSP IP/OBS MODERATE 35: CPT

## 2019-08-31 RX ORDER — POTASSIUM CHLORIDE 20 MEQ
20 PACKET (EA) ORAL
Refills: 0 | Status: COMPLETED | OUTPATIENT
Start: 2019-08-31 | End: 2019-08-31

## 2019-08-31 RX ADMIN — CARVEDILOL PHOSPHATE 25 MILLIGRAM(S): 80 CAPSULE, EXTENDED RELEASE ORAL at 17:29

## 2019-08-31 RX ADMIN — SUMATRIPTAN SUCCINATE 50 MILLIGRAM(S): 4 INJECTION, SOLUTION SUBCUTANEOUS at 03:20

## 2019-08-31 RX ADMIN — Medication 1 MILLIGRAM(S): at 12:20

## 2019-08-31 RX ADMIN — Medication 975 MILLIGRAM(S): at 21:00

## 2019-08-31 RX ADMIN — Medication 4: at 18:20

## 2019-08-31 RX ADMIN — Medication 975 MILLIGRAM(S): at 06:36

## 2019-08-31 RX ADMIN — FINASTERIDE 5 MILLIGRAM(S): 5 TABLET, FILM COATED ORAL at 12:20

## 2019-08-31 RX ADMIN — TAMSULOSIN HYDROCHLORIDE 0.4 MILLIGRAM(S): 0.4 CAPSULE ORAL at 22:27

## 2019-08-31 RX ADMIN — GABAPENTIN 300 MILLIGRAM(S): 400 CAPSULE ORAL at 17:29

## 2019-08-31 RX ADMIN — AMLODIPINE BESYLATE 5 MILLIGRAM(S): 2.5 TABLET ORAL at 06:04

## 2019-08-31 RX ADMIN — SODIUM CHLORIDE 1 GRAM(S): 9 INJECTION INTRAMUSCULAR; INTRAVENOUS; SUBCUTANEOUS at 13:15

## 2019-08-31 RX ADMIN — INSULIN GLARGINE 35 UNIT(S): 100 INJECTION, SOLUTION SUBCUTANEOUS at 22:27

## 2019-08-31 RX ADMIN — LOSARTAN POTASSIUM 100 MILLIGRAM(S): 100 TABLET, FILM COATED ORAL at 05:11

## 2019-08-31 RX ADMIN — SUMATRIPTAN SUCCINATE 50 MILLIGRAM(S): 4 INJECTION, SOLUTION SUBCUTANEOUS at 02:49

## 2019-08-31 RX ADMIN — GABAPENTIN 300 MILLIGRAM(S): 400 CAPSULE ORAL at 05:11

## 2019-08-31 RX ADMIN — Medication 10 MILLIGRAM(S): at 02:50

## 2019-08-31 RX ADMIN — Medication 975 MILLIGRAM(S): at 20:13

## 2019-08-31 RX ADMIN — Medication 12.5 MILLIGRAM(S): at 22:27

## 2019-08-31 RX ADMIN — Medication 975 MILLIGRAM(S): at 14:06

## 2019-08-31 RX ADMIN — Medication 85 MILLIMOLE(S): at 14:45

## 2019-08-31 RX ADMIN — CARVEDILOL PHOSPHATE 25 MILLIGRAM(S): 80 CAPSULE, EXTENDED RELEASE ORAL at 06:04

## 2019-08-31 RX ADMIN — Medication 975 MILLIGRAM(S): at 01:00

## 2019-08-31 RX ADMIN — ENOXAPARIN SODIUM 40 MILLIGRAM(S): 100 INJECTION SUBCUTANEOUS at 12:20

## 2019-08-31 RX ADMIN — ONDANSETRON 4 MILLIGRAM(S): 8 TABLET, FILM COATED ORAL at 00:20

## 2019-08-31 RX ADMIN — Medication 12.5 MILLIGRAM(S): at 05:11

## 2019-08-31 RX ADMIN — Medication 20 MILLIEQUIVALENT(S): at 13:36

## 2019-08-31 RX ADMIN — Medication 2: at 13:15

## 2019-08-31 RX ADMIN — Medication 975 MILLIGRAM(S): at 13:36

## 2019-08-31 RX ADMIN — Medication 975 MILLIGRAM(S): at 00:24

## 2019-08-31 RX ADMIN — Medication 100 MILLIGRAM(S): at 05:11

## 2019-08-31 RX ADMIN — Medication 100 MILLIGRAM(S): at 22:27

## 2019-08-31 RX ADMIN — Medication 100 MILLIGRAM(S): at 17:29

## 2019-08-31 RX ADMIN — Medication 975 MILLIGRAM(S): at 06:04

## 2019-08-31 RX ADMIN — SODIUM CHLORIDE 1 GRAM(S): 9 INJECTION INTRAMUSCULAR; INTRAVENOUS; SUBCUTANEOUS at 22:27

## 2019-08-31 RX ADMIN — SODIUM CHLORIDE 1 GRAM(S): 9 INJECTION INTRAMUSCULAR; INTRAVENOUS; SUBCUTANEOUS at 05:11

## 2019-08-31 RX ADMIN — Medication 12.5 MILLIGRAM(S): at 13:15

## 2019-08-31 RX ADMIN — Medication 20 MILLIEQUIVALENT(S): at 16:43

## 2019-08-31 NOTE — PROGRESS NOTE ADULT - ASSESSMENT
73yo male s/p fall from standing, unclear cause, found to have L SDH and R SAH. Initially admitted to MICU for Cardene drip for SBP >180mm Hg. Repeat head CT demonstrated increased SAH and new R frontal IPH, for which patient was transferred to [facility]. CT Head stable x2 from prior.     - Acute L SDH, R SAH, R frontal PIH:  - head CT stable x2   - Hold ASA and Brilinta  - chemical VTE prophylaxis  - Appreciate NSG follow up  - Q4 neurochecks  - Keppra for post-traumatic seizure prophylaxis 7 day total  - continue carvedilol, losartan, amlodipine and HCTZ   - PRN iv hydralizine   - Continue statin  - TTE completed with mitral and atrial calcification and mild regurg,moderate diastolic dysfunction.    - PT/PMR Evaluation: TBI Rehab, cleared for dc  - Regular Diet: consistent carbohydrates  - F/U urine lytes   - Neurology headache specialist consulted  - Dc 1.5 NS  - Follow Na  - Replete Na K Phos for incidentally from hypophosphatemia  - Glucerna 3x per day    ACS [id number]

## 2019-08-31 NOTE — PROGRESS NOTE ADULT - ASSESSMENT
ASSESSMENT/RECOMMEND:  74M hx CAD s/p PCI on ASA, HTN,HLD, BPH, DM2 pw with SDH and now hyponatremia.  The hyponatremia seems to be chronologically related to the HCTZ  The urine indices at present will be erroneous due to the HCTZ  Doubt dietary factors  Due to the sudden we can bring up fast without the worry for osmotic demyelination syndrome (formerly known as CPM)    1 Renal - S/p IVF with 1.5%; serum sodium has normalized   2 CVS- Off HCTZ at present and will not re-introduce;  May need to increase Norvasc to 7.5mg po qd for tighter BP control    3 Neuro-Cont keppra     Eventual rehab     Delma Mathews NP-C  [facility]  [phone] ASSESSMENT/RECOMMEND:  74M hx CAD s/p PCI on ASA, HTN,HLD, BPH, DM2 pw with SDH and now hyponatremia.  The hyponatremia seems to be chronologically related to the HCTZ  The urine indices at present will be erroneous due to the HCTZ  Doubt dietary factors  Due to the sudden we can bring up fast without the worry for osmotic demyelination syndrome (formerly known as CPM)  Hypophosphatemia - s/p repletion with sodium phosphate 30mmol IV x 1d dose today     1 Renal - S/p IVF with NS 1.5%; serum sodium has normalized   2 CVS- Off HCTZ at present and will not re-introduce;  May need to increase Norvasc to 7.5mg po qd for tighter BP control    3 Neuro-Cont keppra     Eventual rehab     Delma Mathews NP-C  [facility]  [phone]

## 2019-08-31 NOTE — PROGRESS NOTE ADULT - SUBJECTIVE AND OBJECTIVE BOX
NEPHROLOGY       MEDICATIONS  (STANDING):  amantadine 100 milliGRAM(s) Oral two times a day  amLODIPine   Tablet 5 milliGRAM(s) Oral <User Schedule>  atorvastatin 20 milliGRAM(s) Oral at bedtime  carvedilol 25 milliGRAM(s) Oral <User Schedule>  docusate sodium 100 milliGRAM(s) Oral three times a day  enoxaparin Injectable 40 milliGRAM(s) SubCutaneous daily  finasteride 5 milliGRAM(s) Oral daily  folic acid 1 milliGRAM(s) Oral daily  gabapentin 300 milliGRAM(s) Oral two times a day  insulin glargine Injectable (LANTUS) 35 Unit(s) SubCutaneous at bedtime  insulin lispro (HumaLOG) corrective regimen sliding scale   SubCutaneous three times a day before meals  insulin lispro (HumaLOG) corrective regimen sliding scale   SubCutaneous at bedtime  losartan 100 milliGRAM(s) Oral <User Schedule>  meclizine 12.5 milliGRAM(s) Oral every 8 hours  sodium chloride 1 Gram(s) Oral three times a day  tamsulosin 0.4 milliGRAM(s) Oral at bedtime    VITALS:  T(C): , Max: 36.8 (08-30-19 @ 11:10)  T(F): , Max: 98.2 (08-30-19 @ 11:10)  HR: 64 (08-31-19 @ 04:57)  BP: 130/50 (08-31-19 @ 06:57)  RR: 16 (08-31-19 @ 04:57)  SpO2: 97% (08-31-19 @ 04:57)    I and O's:    08-30 @ 07:01  -  08-31 @ 07:00  --------------------------------------------------------  IN: 900 mL / OUT: 1950 mL / NET: -1050 mL    PHYSICAL EXAM:  Constitutional: NAD  Neck:  No JVD  Respiratory: CTAB/L  Cardiovascular: S1 and S2  Gastrointestinal: BS+, soft, NT/ND  Extremities: No peripheral edema  Neurological: A/O x 3, no focal deficits  Psychiatric: Normal mood, normal affect  : No So  Skin: No rashes  Access: Not applicable    LABS:    08-31    136  |  101  |  11  ----------------------------<  65<L>  3.8   |  24  |  0.82    Ca    9.5      31 Aug 2019 07:25  Phos  2.2     08-31  Mg     2.0     08-31 NEPHROLOGY     Pt seen and examined. Pt seen sitting in chair with family at bedside. Pt reports feeling fine, however does complaints of poor appetite.     MEDICATIONS  (STANDING):  amantadine 100 milliGRAM(s) Oral two times a day  amLODIPine   Tablet 5 milliGRAM(s) Oral <User Schedule>  atorvastatin 20 milliGRAM(s) Oral at bedtime  carvedilol 25 milliGRAM(s) Oral <User Schedule>  docusate sodium 100 milliGRAM(s) Oral three times a day  enoxaparin Injectable 40 milliGRAM(s) SubCutaneous daily  finasteride 5 milliGRAM(s) Oral daily  folic acid 1 milliGRAM(s) Oral daily  gabapentin 300 milliGRAM(s) Oral two times a day  insulin glargine Injectable (LANTUS) 35 Unit(s) SubCutaneous at bedtime  insulin lispro (HumaLOG) corrective regimen sliding scale   SubCutaneous three times a day before meals  insulin lispro (HumaLOG) corrective regimen sliding scale   SubCutaneous at bedtime  losartan 100 milliGRAM(s) Oral <User Schedule>  meclizine 12.5 milliGRAM(s) Oral every 8 hours  sodium chloride 1 Gram(s) Oral three times a day  tamsulosin 0.4 milliGRAM(s) Oral at bedtime    VITALS:  T(C): , Max: 36.8 (08-30-19 @ 11:10)  T(F): , Max: 98.2 (08-30-19 @ 11:10)  HR: 64 (08-31-19 @ 04:57)  BP: 130/50 (08-31-19 @ 06:57)  RR: 16 (08-31-19 @ 04:57)  SpO2: 97% (08-31-19 @ 04:57)    I and O's:    08-30 @ 07:01  -  08-31 @ 07:00  --------------------------------------------------------  IN: 900 mL / OUT: 1950 mL / NET: -1050 mL    PHYSICAL EXAM:  Constitutional: NAD  Neck:  No JVD  Respiratory: CTAB/L  Cardiovascular: S1 and S2  Gastrointestinal: BS+, soft, NT/ND  Extremities: No peripheral edema  Neurological: A/O x 3, no focal deficits  Psychiatric: Normal mood, normal affect  : No So  Skin: No rashes  Access: Not applicable    LABS:    08-31    136  |  101  |  11  ----------------------------<  65<L>  3.8   |  24  |  0.82    Ca    9.5      31 Aug 2019 07:25  Phos  2.2     08-31  Mg     2.0     08-31 NEPHROLOGY     Pt seen and examined. Pt seen sitting in chair with family at bedside. Pt reports feeling fine, however does complain of poor appetite.     MEDICATIONS  (STANDING):  amantadine 100 milliGRAM(s) Oral two times a day  amLODIPine   Tablet 5 milliGRAM(s) Oral <User Schedule>  atorvastatin 20 milliGRAM(s) Oral at bedtime  carvedilol 25 milliGRAM(s) Oral <User Schedule>  docusate sodium 100 milliGRAM(s) Oral three times a day  enoxaparin Injectable 40 milliGRAM(s) SubCutaneous daily  finasteride 5 milliGRAM(s) Oral daily  folic acid 1 milliGRAM(s) Oral daily  gabapentin 300 milliGRAM(s) Oral two times a day  insulin glargine Injectable (LANTUS) 35 Unit(s) SubCutaneous at bedtime  insulin lispro (HumaLOG) corrective regimen sliding scale   SubCutaneous three times a day before meals  insulin lispro (HumaLOG) corrective regimen sliding scale   SubCutaneous at bedtime  losartan 100 milliGRAM(s) Oral <User Schedule>  meclizine 12.5 milliGRAM(s) Oral every 8 hours  sodium chloride 1 Gram(s) Oral three times a day  tamsulosin 0.4 milliGRAM(s) Oral at bedtime    VITALS:  T(C): , Max: 36.8 (08-30-19 @ 11:10)  T(F): , Max: 98.2 (08-30-19 @ 11:10)  HR: 64 (08-31-19 @ 04:57)  BP: 130/50 (08-31-19 @ 06:57)  RR: 16 (08-31-19 @ 04:57)  SpO2: 97% (08-31-19 @ 04:57)    I and O's:    08-30 @ 07:01  -  08-31 @ 07:00  --------------------------------------------------------  IN: 900 mL / OUT: 1950 mL / NET: -1050 mL    PHYSICAL EXAM:  Constitutional: NAD  Neck:  No JVD  Respiratory: CTAB/L  Cardiovascular: S1 and S2  Gastrointestinal: BS+, soft, NT/ND  Extremities: No peripheral edema  Neurological: A/O x 3, no focal deficits  Psychiatric: Normal mood, normal affect  : No So  Skin: No rashes  Access: Not applicable    LABS:    08-31    136  |  101  |  11  ----------------------------<  65<L>  3.8   |  24  |  0.82    Ca    9.5      31 Aug 2019 07:25  Phos  2.2     08-31  Mg     2.0     08-31

## 2019-08-31 NOTE — PROGRESS NOTE ADULT - SUBJECTIVE AND OBJECTIVE BOX
TRAUMA SURGERY DAILY PROGRESS NOTE:    Interval:  No acute events overnight endorsed.    Subjective:  Patient seen and examined this am. Endorses HA pain. No other complaints. Per wife pt is nauseous and has low appetite.    Vital Signs Last 24 Hrs  T(C): 37.1 (31 Aug 2019 13:05), Max: 37.1 (31 Aug 2019 13:05)  T(F): 98.7 (31 Aug 2019 13:05), Max: 98.7 (31 Aug 2019 13:05)  HR: 61 (31 Aug 2019 13:05) (60 - 69)  BP: 161/74 (31 Aug 2019 13:05) (130/50 - 174/62)  BP(mean): --  RR: 18 (31 Aug 2019 13:05) (16 - 18)  SpO2: 99% (31 Aug 2019 13:05) (97% - 100%)    Exam:  Gen: NAD, resting in bed, alert and responding appropriately  Resp: Airway patent, non-labored respirations  Abd: Soft, ND, NTTP x 4 quadrants, no rebound or guarding.   Ext: No edema, WWP  Neuro: AAOx3, no focal deficits    I&O's Detail    30 Aug 2019 07:01  -  31 Aug 2019 07:00  --------------------------------------------------------  IN:    Oral Fluid: 900 mL  Total IN: 900 mL    OUT:    Voided: 1950 mL  Total OUT: 1950 mL    Total NET: -1050 mL          Daily     Daily     MEDICATIONS  (STANDING):  amantadine 100 milliGRAM(s) Oral two times a day  amLODIPine   Tablet 5 milliGRAM(s) Oral <User Schedule>  atorvastatin 20 milliGRAM(s) Oral at bedtime  carvedilol 25 milliGRAM(s) Oral <User Schedule>  docusate sodium 100 milliGRAM(s) Oral three times a day  enoxaparin Injectable 40 milliGRAM(s) SubCutaneous daily  finasteride 5 milliGRAM(s) Oral daily  folic acid 1 milliGRAM(s) Oral daily  gabapentin 300 milliGRAM(s) Oral two times a day  insulin glargine Injectable (LANTUS) 35 Unit(s) SubCutaneous at bedtime  insulin lispro (HumaLOG) corrective regimen sliding scale   SubCutaneous three times a day before meals  insulin lispro (HumaLOG) corrective regimen sliding scale   SubCutaneous at bedtime  losartan 100 milliGRAM(s) Oral <User Schedule>  meclizine 12.5 milliGRAM(s) Oral every 8 hours  potassium chloride    Tablet ER 20 milliEquivalent(s) Oral every 2 hours  sodium chloride 1 Gram(s) Oral three times a day  sodium phosphate IVPB 30 milliMole(s) IV Intermittent once  tamsulosin 0.4 milliGRAM(s) Oral at bedtime    MEDICATIONS  (PRN):  acetaminophen   Tablet .. 975 milliGRAM(s) Oral every 6 hours PRN Moderate Pain (4 - 6)  hydrALAZINE Injectable 10 milliGRAM(s) IV Push every 6 hours PRN SBP >180  ondansetron Injectable 4 milliGRAM(s) IV Push every 6 hours PRN Nausea and/or Vomiting  SUMAtriptan 50 milliGRAM(s) Oral every 6 hours PRN Headache      LABS:                        12.8   6.82  )-----------( 211      ( 31 Aug 2019 10:30 )             38.8     08-31    136  |  101  |  11  ----------------------------<  65<L>  3.8   |  24  |  0.82    Ca    9.5      31 Aug 2019 07:25  Phos  2.2     08-31  Mg     2.0     08-31            JORI Plascencia, PGY-1  [facility] Team Surgery  [id number] with any questions

## 2019-09-01 LAB
ANION GAP SERPL CALC-SCNC: 10 MMOL/L — SIGNIFICANT CHANGE UP (ref 5–17)
BUN SERPL-MCNC: 10 MG/DL — SIGNIFICANT CHANGE UP (ref 7–23)
CALCIUM SERPL-MCNC: 9.7 MG/DL — SIGNIFICANT CHANGE UP (ref 8.4–10.5)
CHLORIDE SERPL-SCNC: 101 MMOL/L — SIGNIFICANT CHANGE UP (ref 96–108)
CO2 SERPL-SCNC: 25 MMOL/L — SIGNIFICANT CHANGE UP (ref 22–31)
CREAT SERPL-MCNC: 0.87 MG/DL — SIGNIFICANT CHANGE UP (ref 0.5–1.3)
GLUCOSE BLDC GLUCOMTR-MCNC: 127 MG/DL — HIGH (ref 70–99)
GLUCOSE BLDC GLUCOMTR-MCNC: 194 MG/DL — HIGH (ref 70–99)
GLUCOSE BLDC GLUCOMTR-MCNC: 283 MG/DL — HIGH (ref 70–99)
GLUCOSE BLDC GLUCOMTR-MCNC: 288 MG/DL — HIGH (ref 70–99)
GLUCOSE SERPL-MCNC: 169 MG/DL — HIGH (ref 70–99)
HCT VFR BLD CALC: 38.2 % — LOW (ref 39–50)
HGB BLD-MCNC: 12.8 G/DL — LOW (ref 13–17)
MAGNESIUM SERPL-MCNC: 2 MG/DL — SIGNIFICANT CHANGE UP (ref 1.6–2.6)
MCHC RBC-ENTMCNC: 28.7 PG — SIGNIFICANT CHANGE UP (ref 27–34)
MCHC RBC-ENTMCNC: 33.5 GM/DL — SIGNIFICANT CHANGE UP (ref 32–36)
MCV RBC AUTO: 85.7 FL — SIGNIFICANT CHANGE UP (ref 80–100)
PHOSPHATE SERPL-MCNC: 2.6 MG/DL — SIGNIFICANT CHANGE UP (ref 2.5–4.5)
PLATELET # BLD AUTO: 229 K/UL — SIGNIFICANT CHANGE UP (ref 150–400)
POTASSIUM SERPL-MCNC: 4.3 MMOL/L — SIGNIFICANT CHANGE UP (ref 3.5–5.3)
POTASSIUM SERPL-SCNC: 4.3 MMOL/L — SIGNIFICANT CHANGE UP (ref 3.5–5.3)
RBC # BLD: 4.46 M/UL — SIGNIFICANT CHANGE UP (ref 4.2–5.8)
RBC # FLD: 13.5 % — SIGNIFICANT CHANGE UP (ref 10.3–14.5)
SODIUM SERPL-SCNC: 136 MMOL/L — SIGNIFICANT CHANGE UP (ref 135–145)
WBC # BLD: 7 K/UL — SIGNIFICANT CHANGE UP (ref 3.8–10.5)
WBC # FLD AUTO: 7 K/UL — SIGNIFICANT CHANGE UP (ref 3.8–10.5)

## 2019-09-01 PROCEDURE — 99232 SBSQ HOSP IP/OBS MODERATE 35: CPT

## 2019-09-01 RX ADMIN — GABAPENTIN 300 MILLIGRAM(S): 400 CAPSULE ORAL at 05:40

## 2019-09-01 RX ADMIN — Medication 2: at 13:29

## 2019-09-01 RX ADMIN — Medication 12.5 MILLIGRAM(S): at 13:29

## 2019-09-01 RX ADMIN — Medication 10 MILLIGRAM(S): at 08:09

## 2019-09-01 RX ADMIN — Medication 12.5 MILLIGRAM(S): at 05:40

## 2019-09-01 RX ADMIN — SUMATRIPTAN SUCCINATE 50 MILLIGRAM(S): 4 INJECTION, SOLUTION SUBCUTANEOUS at 12:11

## 2019-09-01 RX ADMIN — Medication 12.5 MILLIGRAM(S): at 22:23

## 2019-09-01 RX ADMIN — Medication 975 MILLIGRAM(S): at 09:37

## 2019-09-01 RX ADMIN — SUMATRIPTAN SUCCINATE 50 MILLIGRAM(S): 4 INJECTION, SOLUTION SUBCUTANEOUS at 18:50

## 2019-09-01 RX ADMIN — CARVEDILOL PHOSPHATE 25 MILLIGRAM(S): 80 CAPSULE, EXTENDED RELEASE ORAL at 05:40

## 2019-09-01 RX ADMIN — Medication 100 MILLIGRAM(S): at 17:19

## 2019-09-01 RX ADMIN — SUMATRIPTAN SUCCINATE 50 MILLIGRAM(S): 4 INJECTION, SOLUTION SUBCUTANEOUS at 06:30

## 2019-09-01 RX ADMIN — TAMSULOSIN HYDROCHLORIDE 0.4 MILLIGRAM(S): 0.4 CAPSULE ORAL at 22:23

## 2019-09-01 RX ADMIN — SODIUM CHLORIDE 1 GRAM(S): 9 INJECTION INTRAMUSCULAR; INTRAVENOUS; SUBCUTANEOUS at 05:42

## 2019-09-01 RX ADMIN — Medication 10 MILLIGRAM(S): at 22:22

## 2019-09-01 RX ADMIN — Medication 1 MILLIGRAM(S): at 11:39

## 2019-09-01 RX ADMIN — ENOXAPARIN SODIUM 40 MILLIGRAM(S): 100 INJECTION SUBCUTANEOUS at 11:39

## 2019-09-01 RX ADMIN — SODIUM CHLORIDE 1 GRAM(S): 9 INJECTION INTRAMUSCULAR; INTRAVENOUS; SUBCUTANEOUS at 22:23

## 2019-09-01 RX ADMIN — Medication 85 MILLIMOLE(S): at 20:00

## 2019-09-01 RX ADMIN — Medication 100 MILLIGRAM(S): at 22:23

## 2019-09-01 RX ADMIN — SUMATRIPTAN SUCCINATE 50 MILLIGRAM(S): 4 INJECTION, SOLUTION SUBCUTANEOUS at 05:50

## 2019-09-01 RX ADMIN — Medication 100 MILLIGRAM(S): at 05:40

## 2019-09-01 RX ADMIN — Medication 975 MILLIGRAM(S): at 09:07

## 2019-09-01 RX ADMIN — INSULIN GLARGINE 35 UNIT(S): 100 INJECTION, SOLUTION SUBCUTANEOUS at 22:22

## 2019-09-01 RX ADMIN — Medication 975 MILLIGRAM(S): at 23:55

## 2019-09-01 RX ADMIN — Medication 975 MILLIGRAM(S): at 17:49

## 2019-09-01 RX ADMIN — CARVEDILOL PHOSPHATE 25 MILLIGRAM(S): 80 CAPSULE, EXTENDED RELEASE ORAL at 17:19

## 2019-09-01 RX ADMIN — AMLODIPINE BESYLATE 5 MILLIGRAM(S): 2.5 TABLET ORAL at 05:40

## 2019-09-01 RX ADMIN — GABAPENTIN 300 MILLIGRAM(S): 400 CAPSULE ORAL at 17:19

## 2019-09-01 RX ADMIN — Medication 975 MILLIGRAM(S): at 17:19

## 2019-09-01 RX ADMIN — Medication 6: at 18:31

## 2019-09-01 RX ADMIN — Medication 2: at 22:23

## 2019-09-01 RX ADMIN — SODIUM CHLORIDE 1 GRAM(S): 9 INJECTION INTRAMUSCULAR; INTRAVENOUS; SUBCUTANEOUS at 13:29

## 2019-09-01 RX ADMIN — Medication 975 MILLIGRAM(S): at 02:53

## 2019-09-01 RX ADMIN — SUMATRIPTAN SUCCINATE 50 MILLIGRAM(S): 4 INJECTION, SOLUTION SUBCUTANEOUS at 11:41

## 2019-09-01 RX ADMIN — Medication 975 MILLIGRAM(S): at 02:02

## 2019-09-01 RX ADMIN — FINASTERIDE 5 MILLIGRAM(S): 5 TABLET, FILM COATED ORAL at 11:39

## 2019-09-01 RX ADMIN — LOSARTAN POTASSIUM 100 MILLIGRAM(S): 100 TABLET, FILM COATED ORAL at 05:40

## 2019-09-01 RX ADMIN — SUMATRIPTAN SUCCINATE 50 MILLIGRAM(S): 4 INJECTION, SOLUTION SUBCUTANEOUS at 19:20

## 2019-09-01 NOTE — PROVIDER CONTACT NOTE (OTHER) - ACTION/TREATMENT ORDERED:
Losartan 50mg PO now and daily
Hydralazine 10mg IV x 1
MD Plascencia made aware, MD stated to recheck BP in one hour after Tylenol to see if headache influencing high blood pressure. No hydralazine to be admin at this time. Pt safety maint, will cont to monitor
Tylenol  IV ordered and  administered.  pt is now sleeping , spouse is at bedside.
USHA Tamez stating to medicate for pain as ordered and medicate for BP>180 as ordered. Cont kvng checks as ordered. No further intervention ordered at this time. Will cont to monitor pt.
will provide tylenol.

## 2019-09-01 NOTE — PROVIDER CONTACT NOTE (OTHER) - ASSESSMENT
Pt w. elevated /68, VS otherwise stable. Pt w. c/o headache. Pt admin Tylenol as ordered for c/o headache.

## 2019-09-01 NOTE — PROVIDER CONTACT NOTE (OTHER) - ASSESSMENT
ATP team made aware mult times over several shifts pt w. c/o headache and elevated BP. Pt w. no neurological changes, denies blurred vision, neuro exam remains unchanged. Pt w. c/o headache partially relieved w. PRN pain medications as ordered. Pt w. last head CT from Aug. 24th, CT stable at that time.

## 2019-09-01 NOTE — PROVIDER CONTACT NOTE (OTHER) - RECOMMENDATIONS
Repeat head CT, USHA Tamez made aware by day VLADIMIR Ragsdale and current RN of pt merlene. continued elevated BP and c/o headache.

## 2019-09-01 NOTE — PROGRESS NOTE ADULT - ASSESSMENT
73yo male s/p fall from standing, unclear cause, found to have L SDH and R SAH. Initially admitted to MICU for Cardene drip for SBP >180mm Hg. Repeat head CT demonstrated increased SAH and new R frontal IPH, for which patient was transferred to [facility]. CT Head stable x2 from prior.     - Acute L SDH, R SAH, R frontal PIH:  - head CT stable x2   - Hold ASA and Brilinta  - chemical VTE prophylaxis  - Appreciate NSG follow up  - Q4 neurochecks  - Keppra for post-traumatic seizure prophylaxis 7 day total  - continue carvedilol, losartan, amlodipine and HCTZ   - PRN iv hydralizine   - Continue statin  - TTE completed with mitral and atrial calcification and mild regurg,moderate diastolic dysfunction.    - PT/PMR Evaluation: TBI Rehab, cleared for dc  - Regular Diet: consistent carbohydrates  - F/U urine lytes   - Neurology headache specialist consulted  - Dc 1.5 NS  - Follow Na  - Replete Na K Phos for incidentally from hypophosphatemia  - Glucerna 3x per day    ACS [id number]

## 2019-09-02 LAB
ANION GAP SERPL CALC-SCNC: 12 MMOL/L — SIGNIFICANT CHANGE UP (ref 5–17)
BUN SERPL-MCNC: 10 MG/DL — SIGNIFICANT CHANGE UP (ref 7–23)
CALCIUM SERPL-MCNC: 9.8 MG/DL — SIGNIFICANT CHANGE UP (ref 8.4–10.5)
CHLORIDE SERPL-SCNC: 99 MMOL/L — SIGNIFICANT CHANGE UP (ref 96–108)
CO2 SERPL-SCNC: 25 MMOL/L — SIGNIFICANT CHANGE UP (ref 22–31)
CREAT SERPL-MCNC: 0.86 MG/DL — SIGNIFICANT CHANGE UP (ref 0.5–1.3)
GLUCOSE BLDC GLUCOMTR-MCNC: 155 MG/DL — HIGH (ref 70–99)
GLUCOSE BLDC GLUCOMTR-MCNC: 187 MG/DL — HIGH (ref 70–99)
GLUCOSE BLDC GLUCOMTR-MCNC: 194 MG/DL — HIGH (ref 70–99)
GLUCOSE BLDC GLUCOMTR-MCNC: 234 MG/DL — HIGH (ref 70–99)
GLUCOSE SERPL-MCNC: 134 MG/DL — HIGH (ref 70–99)
HCT VFR BLD CALC: 39.8 % — SIGNIFICANT CHANGE UP (ref 39–50)
HGB BLD-MCNC: 13.4 G/DL — SIGNIFICANT CHANGE UP (ref 13–17)
MAGNESIUM SERPL-MCNC: 2 MG/DL — SIGNIFICANT CHANGE UP (ref 1.6–2.6)
MCHC RBC-ENTMCNC: 28.4 PG — SIGNIFICANT CHANGE UP (ref 27–34)
MCHC RBC-ENTMCNC: 33.7 GM/DL — SIGNIFICANT CHANGE UP (ref 32–36)
MCV RBC AUTO: 84.3 FL — SIGNIFICANT CHANGE UP (ref 80–100)
PHOSPHATE SERPL-MCNC: 3.8 MG/DL — SIGNIFICANT CHANGE UP (ref 2.5–4.5)
PLATELET # BLD AUTO: 246 K/UL — SIGNIFICANT CHANGE UP (ref 150–400)
POTASSIUM SERPL-MCNC: 3.9 MMOL/L — SIGNIFICANT CHANGE UP (ref 3.5–5.3)
POTASSIUM SERPL-SCNC: 3.9 MMOL/L — SIGNIFICANT CHANGE UP (ref 3.5–5.3)
RBC # BLD: 4.72 M/UL — SIGNIFICANT CHANGE UP (ref 4.2–5.8)
RBC # FLD: 13.4 % — SIGNIFICANT CHANGE UP (ref 10.3–14.5)
SODIUM SERPL-SCNC: 136 MMOL/L — SIGNIFICANT CHANGE UP (ref 135–145)
WBC # BLD: 7 K/UL — SIGNIFICANT CHANGE UP (ref 3.8–10.5)
WBC # FLD AUTO: 7 K/UL — SIGNIFICANT CHANGE UP (ref 3.8–10.5)

## 2019-09-02 PROCEDURE — 99231 SBSQ HOSP IP/OBS SF/LOW 25: CPT

## 2019-09-02 RX ORDER — AMLODIPINE BESYLATE 2.5 MG/1
10 TABLET ORAL DAILY
Refills: 0 | Status: DISCONTINUED | OUTPATIENT
Start: 2019-09-02 | End: 2019-09-02

## 2019-09-02 RX ORDER — AMLODIPINE BESYLATE 2.5 MG/1
5 TABLET ORAL DAILY
Refills: 0 | Status: DISCONTINUED | OUTPATIENT
Start: 2019-09-02 | End: 2019-09-03

## 2019-09-02 RX ORDER — ROSUVASTATIN CALCIUM 5 MG/1
5 TABLET ORAL AT BEDTIME
Refills: 0 | Status: DISCONTINUED | OUTPATIENT
Start: 2019-09-02 | End: 2019-09-03

## 2019-09-02 RX ORDER — POTASSIUM CHLORIDE 20 MEQ
20 PACKET (EA) ORAL ONCE
Refills: 0 | Status: COMPLETED | OUTPATIENT
Start: 2019-09-02 | End: 2019-09-02

## 2019-09-02 RX ADMIN — ROSUVASTATIN CALCIUM 5 MILLIGRAM(S): 5 TABLET ORAL at 21:31

## 2019-09-02 RX ADMIN — SODIUM CHLORIDE 1 GRAM(S): 9 INJECTION INTRAMUSCULAR; INTRAVENOUS; SUBCUTANEOUS at 20:45

## 2019-09-02 RX ADMIN — Medication 100 MILLIGRAM(S): at 05:56

## 2019-09-02 RX ADMIN — Medication 12.5 MILLIGRAM(S): at 05:56

## 2019-09-02 RX ADMIN — Medication 975 MILLIGRAM(S): at 05:56

## 2019-09-02 RX ADMIN — GABAPENTIN 300 MILLIGRAM(S): 400 CAPSULE ORAL at 16:33

## 2019-09-02 RX ADMIN — Medication 975 MILLIGRAM(S): at 13:44

## 2019-09-02 RX ADMIN — Medication 20 MILLIEQUIVALENT(S): at 16:32

## 2019-09-02 RX ADMIN — Medication 975 MILLIGRAM(S): at 13:04

## 2019-09-02 RX ADMIN — LOSARTAN POTASSIUM 100 MILLIGRAM(S): 100 TABLET, FILM COATED ORAL at 05:56

## 2019-09-02 RX ADMIN — Medication 975 MILLIGRAM(S): at 21:15

## 2019-09-02 RX ADMIN — Medication 100 MILLIGRAM(S): at 13:03

## 2019-09-02 RX ADMIN — Medication 100 MILLIGRAM(S): at 05:55

## 2019-09-02 RX ADMIN — CARVEDILOL PHOSPHATE 25 MILLIGRAM(S): 80 CAPSULE, EXTENDED RELEASE ORAL at 18:08

## 2019-09-02 RX ADMIN — ENOXAPARIN SODIUM 40 MILLIGRAM(S): 100 INJECTION SUBCUTANEOUS at 13:05

## 2019-09-02 RX ADMIN — Medication 2: at 13:05

## 2019-09-02 RX ADMIN — TAMSULOSIN HYDROCHLORIDE 0.4 MILLIGRAM(S): 0.4 CAPSULE ORAL at 20:47

## 2019-09-02 RX ADMIN — Medication 1 MILLIGRAM(S): at 13:03

## 2019-09-02 RX ADMIN — SODIUM CHLORIDE 1 GRAM(S): 9 INJECTION INTRAMUSCULAR; INTRAVENOUS; SUBCUTANEOUS at 13:03

## 2019-09-02 RX ADMIN — GABAPENTIN 300 MILLIGRAM(S): 400 CAPSULE ORAL at 05:56

## 2019-09-02 RX ADMIN — Medication 2: at 09:26

## 2019-09-02 RX ADMIN — Medication 975 MILLIGRAM(S): at 06:30

## 2019-09-02 RX ADMIN — INSULIN GLARGINE 35 UNIT(S): 100 INJECTION, SOLUTION SUBCUTANEOUS at 22:59

## 2019-09-02 RX ADMIN — CARVEDILOL PHOSPHATE 25 MILLIGRAM(S): 80 CAPSULE, EXTENDED RELEASE ORAL at 05:56

## 2019-09-02 RX ADMIN — AMLODIPINE BESYLATE 5 MILLIGRAM(S): 2.5 TABLET ORAL at 05:56

## 2019-09-02 RX ADMIN — Medication 975 MILLIGRAM(S): at 20:45

## 2019-09-02 RX ADMIN — SUMATRIPTAN SUCCINATE 50 MILLIGRAM(S): 4 INJECTION, SOLUTION SUBCUTANEOUS at 03:45

## 2019-09-02 RX ADMIN — Medication 2: at 18:08

## 2019-09-02 RX ADMIN — SUMATRIPTAN SUCCINATE 50 MILLIGRAM(S): 4 INJECTION, SOLUTION SUBCUTANEOUS at 22:59

## 2019-09-02 RX ADMIN — Medication 100 MILLIGRAM(S): at 16:33

## 2019-09-02 RX ADMIN — SUMATRIPTAN SUCCINATE 50 MILLIGRAM(S): 4 INJECTION, SOLUTION SUBCUTANEOUS at 10:44

## 2019-09-02 RX ADMIN — SUMATRIPTAN SUCCINATE 50 MILLIGRAM(S): 4 INJECTION, SOLUTION SUBCUTANEOUS at 10:14

## 2019-09-02 RX ADMIN — Medication 12.5 MILLIGRAM(S): at 20:47

## 2019-09-02 RX ADMIN — SUMATRIPTAN SUCCINATE 50 MILLIGRAM(S): 4 INJECTION, SOLUTION SUBCUTANEOUS at 16:31

## 2019-09-02 RX ADMIN — FINASTERIDE 5 MILLIGRAM(S): 5 TABLET, FILM COATED ORAL at 13:03

## 2019-09-02 RX ADMIN — Medication 975 MILLIGRAM(S): at 00:30

## 2019-09-02 RX ADMIN — AMLODIPINE BESYLATE 5 MILLIGRAM(S): 2.5 TABLET ORAL at 16:32

## 2019-09-02 RX ADMIN — Medication 12.5 MILLIGRAM(S): at 13:03

## 2019-09-02 RX ADMIN — SUMATRIPTAN SUCCINATE 50 MILLIGRAM(S): 4 INJECTION, SOLUTION SUBCUTANEOUS at 23:30

## 2019-09-02 RX ADMIN — SUMATRIPTAN SUCCINATE 50 MILLIGRAM(S): 4 INJECTION, SOLUTION SUBCUTANEOUS at 03:04

## 2019-09-02 RX ADMIN — SODIUM CHLORIDE 1 GRAM(S): 9 INJECTION INTRAMUSCULAR; INTRAVENOUS; SUBCUTANEOUS at 05:56

## 2019-09-02 NOTE — PROGRESS NOTE ADULT - ATTENDING COMMENTS
Patient seen and examined on AM rounds. Chart reviewed. Resident note confirmed. Pt is a 74 year old male s/p fall from standing. Work up revealed left SDH/right SAH/right IPH. ASA and brilinta were held. Pt continues on Keppra. CT head has been stable x24 hours and lovenox has been started. Pt continues to report vertigo.  No acute events overnight.    Doing well  Pain well controlled  Adv diet  VSS  Echocardiogram and carotid duplex reviewed    Continue keppra  ENT consult for vertigo appreciated  Meclizine started  Carotid duplex and echo were unrevealing  Orthostatic texting  Resume HCTZ and Losartan  Monitor vitals  Medical follow up  Regular diet   PT evaluation  Continue supportive care  Pt will require TBI & vestibular rehab.
Patient seen and examined on AM rounds. Chart reviewed. Resident note confirmed. Pt is a 74 year old male s/p fall from standing. Work up revealed left SDH/right SAH/right IPH. Hyponatremia is improved.  He continues on salt tabs. No acute events overnight.    Doing well  Pain well controlled  Adv diet  VSS  Echocardiogram and carotid duplex reviewed    Continue Meclizine  hypertension, improved. Continue oral meds  Medicine follow up   Monitor vitals  Regular diet/fluid restriction  Add glucerna for poor PO intake  PT evaluation  Continue supportive care  Pt will require TBI & vestibular rehab.
Pt seen and examined, agree with above. Pt doing well, awaiting acute rehab.
gradual improvement in mental status  hyponatremia essentially corrected  plan for TBI placement
Patient seen and examined on AM rounds. Chart reviewed. Resident note confirmed. Pt is a 74 year old male s/p fall from standing. Work up revealed left SDH/right SAH/right IPH. ASA and brilinta were held. Pt continues on Keppra. CT head has been stable x24 hours and lovenox has been started. Pt continues to report vertigo.  No acute events overnight.    Doing well  Pain well controlled  Adv diet  VSS    Continue keppra  ENT consult for vertigo  Carotid duplex and echo were unrevealing  Monitor vitals  Adv diet   PT evaluation  Continue supportive care  Pt will require DORI
Patient seen and examined on AM rounds. Chart reviewed. Resident note confirmed. Pt is a 74 year old male s/p fall from standing. Work up revealed left SDH/right SAH/right IPH. ASA and brilinta were held. Pt continues on Keppra. CT head has been stable x24 hours and lovenox has been started. Pt continues to report vertigo.  Pt with AMS overnight. Repeat CT head was unchanged.    Doing well  Pain well controlled  Adv diet  VSS  Echocardiogram and carotid duplex reviewed    Continue keppra  Continue Meclizine  Orthostatic testing  Remains hypertensive despite restarting home meds  Medicine follow up for mgmt. of HTN  Monitor vitals  Regular diet   PT evaluation  Continue supportive care  Pt will require TBI & vestibular rehab.
some degree of lethargy consistent with known TBI  otherwise oriented  on oral salt tabs with gradual improvement now up to 129  will benefit from TBI
- awake and alert oriented, keep same headache regime  - bradycardia, with no symptoms, continue same regimen  - dc protonix, as pt eating and not home med  - Na 127, on 1.5%, PO salt tabs added and repeat BMP  - PT OT  - hyperglycemia, uptitrate to medium scale  - discuss with trauma regarding transfer to floor

## 2019-09-02 NOTE — PROGRESS NOTE ADULT - ASSESSMENT
73yo male s/p fall from standing, unclear cause, found to have L SDH and R SAH. Initially admitted to MICU for Cardene drip for SBP >180mm Hg. Repeat head CT demonstrated increased SAH and new R frontal IPH, for which patient was transferred to [facility]. CT Head stable x2 from prior.     - Acute L SDH, R SAH, R frontal PIH:  - head CT stable x2   - Hold ASA and Brilinta  - chemical VTE prophylaxis  - Appreciate NSG follow up  - Q4 neurochecks  - Keppra for post-traumatic seizure prophylaxis 7 day total  - continue carvedilol, losartan, amlodipine and HCTZ   - PRN iv hydralizine   - Continue statin  - TTE completed with mitral and atrial calcification and mild regurg,moderate diastolic dysfunction.    - PT/PMR Evaluation: TBI Rehab, cleared for dc  - Regular Diet: consistent carbohydrates  - F/U urine lytes   - Neurology headache specialist consulted  - Dc 1.5 NS  - Follow Na  - Replete Na K for incidentally found slightly decreased levels  - Glucerna 3x per day  - Fu endo    ACS [id number]

## 2019-09-02 NOTE — PROGRESS NOTE ADULT - SUBJECTIVE AND OBJECTIVE BOX
TRAUMA SURGERY DAILY PROGRESS NOTE:    Interval:  Hyperglycemia and htn >180 ovn trauma was not contacted about htn episode htn spontaneously resolved.    Subjective:  Patient seen this am.     Vital Signs Last 24 Hrs  T(C): 36.6 (02 Sep 2019 13:10), Max: 36.9 (02 Sep 2019 08:29)  T(F): 97.8 (02 Sep 2019 13:10), Max: 98.5 (02 Sep 2019 08:29)  HR: 61 (02 Sep 2019 13:10) (61 - 72)  BP: 176/80 (02 Sep 2019 13:10) (134/71 - 181/77)  BP(mean): --  RR: 16 (02 Sep 2019 13:10) (16 - 18)  SpO2: 98% (02 Sep 2019 13:10) (97% - 98%)    Exam:  Gen: NAD, resting in bed  Resp: Airway patent, non-labored respirations  Abd: Soft, ND, NTTP x 4 quadrants, no rebound or guarding.   Ext: No edema, WWP    I&O's Detail    01 Sep 2019 07:01  -  02 Sep 2019 07:00  --------------------------------------------------------  IN:    IV PiggyBack: 500 mL    Oral Fluid: 720 mL  Total IN: 1220 mL    OUT:    Voided: 900 mL  Total OUT: 900 mL    Total NET: 320 mL      02 Sep 2019 07:01  -  02 Sep 2019 13:26  --------------------------------------------------------  IN:    Oral Fluid: 480 mL  Total IN: 480 mL    OUT:  Total OUT: 0 mL    Total NET: 480 mL          Daily     Daily     MEDICATIONS  (STANDING):  amantadine 100 milliGRAM(s) Oral two times a day  amLODIPine   Tablet 5 milliGRAM(s) Oral daily  atorvastatin 20 milliGRAM(s) Oral at bedtime  carvedilol 25 milliGRAM(s) Oral <User Schedule>  docusate sodium 100 milliGRAM(s) Oral three times a day  enoxaparin Injectable 40 milliGRAM(s) SubCutaneous daily  finasteride 5 milliGRAM(s) Oral daily  folic acid 1 milliGRAM(s) Oral daily  gabapentin 300 milliGRAM(s) Oral two times a day  insulin glargine Injectable (LANTUS) 35 Unit(s) SubCutaneous at bedtime  insulin lispro (HumaLOG) corrective regimen sliding scale   SubCutaneous three times a day before meals  insulin lispro (HumaLOG) corrective regimen sliding scale   SubCutaneous at bedtime  losartan 100 milliGRAM(s) Oral <User Schedule>  meclizine 12.5 milliGRAM(s) Oral every 8 hours  potassium chloride    Tablet ER 20 milliEquivalent(s) Oral once  rosuvastatin 5 milliGRAM(s) Oral at bedtime  sodium chloride 1 Gram(s) Oral three times a day  tamsulosin 0.4 milliGRAM(s) Oral at bedtime    MEDICATIONS  (PRN):  acetaminophen   Tablet .. 975 milliGRAM(s) Oral every 6 hours PRN Moderate Pain (4 - 6)  hydrALAZINE Injectable 10 milliGRAM(s) IV Push every 6 hours PRN SBP >180  ondansetron Injectable 4 milliGRAM(s) IV Push every 6 hours PRN Nausea and/or Vomiting  SUMAtriptan 50 milliGRAM(s) Oral every 6 hours PRN Headache      LABS:                        13.4   7.00  )-----------( 246      ( 02 Sep 2019 09:01 )             39.8     09-02    136  |  99  |  10  ----------------------------<  134<H>  3.9   |  25  |  0.86    Ca    9.8      02 Sep 2019 07:02  Phos  3.8     09-02  Mg     2.0     09-02            JORI Plascencia, PGY-1  ATP Team Surgery  [id number] with any questions

## 2019-09-03 ENCOUNTER — TRANSCRIPTION ENCOUNTER (OUTPATIENT)
Age: 74
End: 2019-09-03

## 2019-09-03 ENCOUNTER — INPATIENT (INPATIENT)
Facility: HOSPITAL | Age: 74
LOS: 9 days | Discharge: HOME HEALTH | DRG: 949 | End: 2019-09-13
Attending: STUDENT IN AN ORGANIZED HEALTH CARE EDUCATION/TRAINING PROGRAM | Admitting: STUDENT IN AN ORGANIZED HEALTH CARE EDUCATION/TRAINING PROGRAM
Payer: MEDICARE

## 2019-09-03 VITALS
OXYGEN SATURATION: 99 % | HEART RATE: 66 BPM | TEMPERATURE: 98 F | DIASTOLIC BLOOD PRESSURE: 73 MMHG | SYSTOLIC BLOOD PRESSURE: 159 MMHG | RESPIRATION RATE: 17 BRPM

## 2019-09-03 VITALS
OXYGEN SATURATION: 99 % | DIASTOLIC BLOOD PRESSURE: 72 MMHG | RESPIRATION RATE: 15 BRPM | HEIGHT: 67 IN | WEIGHT: 161.82 LBS | SYSTOLIC BLOOD PRESSURE: 166 MMHG | HEART RATE: 77 BPM | TEMPERATURE: 98 F

## 2019-09-03 DIAGNOSIS — Z95.5 PRESENCE OF CORONARY ANGIOPLASTY IMPLANT AND GRAFT: Chronic | ICD-10-CM

## 2019-09-03 DIAGNOSIS — I62.00 NONTRAUMATIC SUBDURAL HEMORRHAGE, UNSPECIFIED: ICD-10-CM

## 2019-09-03 LAB
ANION GAP SERPL CALC-SCNC: 10 MMOL/L — SIGNIFICANT CHANGE UP (ref 5–17)
BUN SERPL-MCNC: 12 MG/DL — SIGNIFICANT CHANGE UP (ref 7–23)
CALCIUM SERPL-MCNC: 9.9 MG/DL — SIGNIFICANT CHANGE UP (ref 8.4–10.5)
CHLORIDE SERPL-SCNC: 101 MMOL/L — SIGNIFICANT CHANGE UP (ref 96–108)
CO2 SERPL-SCNC: 28 MMOL/L — SIGNIFICANT CHANGE UP (ref 22–31)
CREAT SERPL-MCNC: 0.91 MG/DL — SIGNIFICANT CHANGE UP (ref 0.5–1.3)
GLUCOSE BLDC GLUCOMTR-MCNC: 109 MG/DL — HIGH (ref 70–99)
GLUCOSE BLDC GLUCOMTR-MCNC: 190 MG/DL — HIGH (ref 70–99)
GLUCOSE BLDC GLUCOMTR-MCNC: 287 MG/DL — HIGH (ref 70–99)
GLUCOSE BLDC GLUCOMTR-MCNC: 297 MG/DL — HIGH (ref 70–99)
GLUCOSE SERPL-MCNC: 165 MG/DL — HIGH (ref 70–99)
HCT VFR BLD CALC: 39.2 % — SIGNIFICANT CHANGE UP (ref 39–50)
HGB BLD-MCNC: 13 G/DL — SIGNIFICANT CHANGE UP (ref 13–17)
MAGNESIUM SERPL-MCNC: 2 MG/DL — SIGNIFICANT CHANGE UP (ref 1.6–2.6)
MCHC RBC-ENTMCNC: 28.9 PG — SIGNIFICANT CHANGE UP (ref 27–34)
MCHC RBC-ENTMCNC: 33.2 GM/DL — SIGNIFICANT CHANGE UP (ref 32–36)
MCV RBC AUTO: 87.1 FL — SIGNIFICANT CHANGE UP (ref 80–100)
PHOSPHATE SERPL-MCNC: 2.6 MG/DL — SIGNIFICANT CHANGE UP (ref 2.5–4.5)
PLATELET # BLD AUTO: 258 K/UL — SIGNIFICANT CHANGE UP (ref 150–400)
POTASSIUM SERPL-MCNC: 4.8 MMOL/L — SIGNIFICANT CHANGE UP (ref 3.5–5.3)
POTASSIUM SERPL-SCNC: 4.8 MMOL/L — SIGNIFICANT CHANGE UP (ref 3.5–5.3)
RBC # BLD: 4.5 M/UL — SIGNIFICANT CHANGE UP (ref 4.2–5.8)
RBC # FLD: 13.9 % — SIGNIFICANT CHANGE UP (ref 10.3–14.5)
SODIUM SERPL-SCNC: 139 MMOL/L — SIGNIFICANT CHANGE UP (ref 135–145)
WBC # BLD: 7.4 K/UL — SIGNIFICANT CHANGE UP (ref 3.8–10.5)
WBC # FLD AUTO: 7.4 K/UL — SIGNIFICANT CHANGE UP (ref 3.8–10.5)

## 2019-09-03 PROCEDURE — 99285 EMERGENCY DEPT VISIT HI MDM: CPT | Mod: 25

## 2019-09-03 PROCEDURE — 93306 TTE W/DOPPLER COMPLETE: CPT

## 2019-09-03 PROCEDURE — 85730 THROMBOPLASTIN TIME PARTIAL: CPT

## 2019-09-03 PROCEDURE — 82570 ASSAY OF URINE CREATININE: CPT

## 2019-09-03 PROCEDURE — 82962 GLUCOSE BLOOD TEST: CPT

## 2019-09-03 PROCEDURE — 84484 ASSAY OF TROPONIN QUANT: CPT

## 2019-09-03 PROCEDURE — 96374 THER/PROPH/DIAG INJ IV PUSH: CPT

## 2019-09-03 PROCEDURE — 80048 BASIC METABOLIC PNL TOTAL CA: CPT

## 2019-09-03 PROCEDURE — 70450 CT HEAD/BRAIN W/O DYE: CPT

## 2019-09-03 PROCEDURE — 83036 HEMOGLOBIN GLYCOSYLATED A1C: CPT

## 2019-09-03 PROCEDURE — G0515: CPT

## 2019-09-03 PROCEDURE — 84540 ASSAY OF URINE/UREA-N: CPT

## 2019-09-03 PROCEDURE — 85027 COMPLETE CBC AUTOMATED: CPT

## 2019-09-03 PROCEDURE — 71045 X-RAY EXAM CHEST 1 VIEW: CPT

## 2019-09-03 PROCEDURE — 97116 GAIT TRAINING THERAPY: CPT

## 2019-09-03 PROCEDURE — 84300 ASSAY OF URINE SODIUM: CPT

## 2019-09-03 PROCEDURE — 97530 THERAPEUTIC ACTIVITIES: CPT

## 2019-09-03 PROCEDURE — 84100 ASSAY OF PHOSPHORUS: CPT

## 2019-09-03 PROCEDURE — 97162 PT EVAL MOD COMPLEX 30 MIN: CPT

## 2019-09-03 PROCEDURE — 97112 NEUROMUSCULAR REEDUCATION: CPT

## 2019-09-03 PROCEDURE — 86900 BLOOD TYPING SEROLOGIC ABO: CPT

## 2019-09-03 PROCEDURE — 93005 ELECTROCARDIOGRAM TRACING: CPT

## 2019-09-03 PROCEDURE — 80053 COMPREHEN METABOLIC PANEL: CPT

## 2019-09-03 PROCEDURE — 85610 PROTHROMBIN TIME: CPT

## 2019-09-03 PROCEDURE — 97166 OT EVAL MOD COMPLEX 45 MIN: CPT

## 2019-09-03 PROCEDURE — 83935 ASSAY OF URINE OSMOLALITY: CPT

## 2019-09-03 PROCEDURE — 97535 SELF CARE MNGMENT TRAINING: CPT

## 2019-09-03 PROCEDURE — 96375 TX/PRO/DX INJ NEW DRUG ADDON: CPT

## 2019-09-03 PROCEDURE — 86901 BLOOD TYPING SEROLOGIC RH(D): CPT

## 2019-09-03 PROCEDURE — 86850 RBC ANTIBODY SCREEN: CPT

## 2019-09-03 PROCEDURE — 83735 ASSAY OF MAGNESIUM: CPT

## 2019-09-03 RX ORDER — INSULIN LISPRO 100/ML
5 VIAL (ML) SUBCUTANEOUS
Refills: 0 | Status: DISCONTINUED | OUTPATIENT
Start: 2019-09-03 | End: 2019-09-03

## 2019-09-03 RX ORDER — CARVEDILOL PHOSPHATE 80 MG/1
25 CAPSULE, EXTENDED RELEASE ORAL
Refills: 0 | Status: DISCONTINUED | OUTPATIENT
Start: 2019-09-03 | End: 2019-09-13

## 2019-09-03 RX ORDER — INSULIN LISPRO 100/ML
VIAL (ML) SUBCUTANEOUS
Refills: 0 | Status: DISCONTINUED | OUTPATIENT
Start: 2019-09-03 | End: 2019-09-13

## 2019-09-03 RX ORDER — INSULIN GLARGINE 100 [IU]/ML
35 INJECTION, SOLUTION SUBCUTANEOUS AT BEDTIME
Refills: 0 | Status: DISCONTINUED | OUTPATIENT
Start: 2019-09-03 | End: 2019-09-06

## 2019-09-03 RX ORDER — FOLIC ACID 0.8 MG
1 TABLET ORAL DAILY
Refills: 0 | Status: DISCONTINUED | OUTPATIENT
Start: 2019-09-03 | End: 2019-09-13

## 2019-09-03 RX ORDER — SODIUM CHLORIDE 9 MG/ML
1000 INJECTION, SOLUTION INTRAVENOUS
Refills: 0 | Status: DISCONTINUED | OUTPATIENT
Start: 2019-09-03 | End: 2019-09-13

## 2019-09-03 RX ORDER — LOSARTAN POTASSIUM 100 MG/1
100 TABLET, FILM COATED ORAL
Refills: 0 | Status: DISCONTINUED | OUTPATIENT
Start: 2019-09-03 | End: 2019-09-13

## 2019-09-03 RX ORDER — SODIUM CHLORIDE 9 MG/ML
1 INJECTION INTRAMUSCULAR; INTRAVENOUS; SUBCUTANEOUS THREE TIMES A DAY
Refills: 0 | Status: DISCONTINUED | OUTPATIENT
Start: 2019-09-03 | End: 2019-09-09

## 2019-09-03 RX ORDER — INSULIN LISPRO 100/ML
5 VIAL (ML) SUBCUTANEOUS
Refills: 0 | Status: DISCONTINUED | OUTPATIENT
Start: 2019-09-03 | End: 2019-09-05

## 2019-09-03 RX ORDER — AMLODIPINE BESYLATE 2.5 MG/1
5 TABLET ORAL DAILY
Refills: 0 | Status: DISCONTINUED | OUTPATIENT
Start: 2019-09-03 | End: 2019-09-04

## 2019-09-03 RX ORDER — GLUCAGON INJECTION, SOLUTION 0.5 MG/.1ML
1 INJECTION, SOLUTION SUBCUTANEOUS ONCE
Refills: 0 | Status: DISCONTINUED | OUTPATIENT
Start: 2019-09-03 | End: 2019-09-13

## 2019-09-03 RX ORDER — DEXTROSE 50 % IN WATER 50 %
25 SYRINGE (ML) INTRAVENOUS ONCE
Refills: 0 | Status: DISCONTINUED | OUTPATIENT
Start: 2019-09-03 | End: 2019-09-03

## 2019-09-03 RX ORDER — GABAPENTIN 400 MG/1
300 CAPSULE ORAL
Refills: 0 | Status: DISCONTINUED | OUTPATIENT
Start: 2019-09-03 | End: 2019-09-11

## 2019-09-03 RX ORDER — SUMATRIPTAN SUCCINATE 4 MG/.5ML
1 INJECTION, SOLUTION SUBCUTANEOUS
Qty: 0 | Refills: 0 | DISCHARGE
Start: 2019-09-03

## 2019-09-03 RX ORDER — MENTHOL AND METHYL SALICYLATE 10; 30 G/100G; G/100G
1 STICK TOPICAL EVERY 6 HOURS
Refills: 0 | Status: DISCONTINUED | OUTPATIENT
Start: 2019-09-03 | End: 2019-09-13

## 2019-09-03 RX ORDER — SODIUM CHLORIDE 9 MG/ML
1000 INJECTION, SOLUTION INTRAVENOUS
Refills: 0 | Status: DISCONTINUED | OUTPATIENT
Start: 2019-09-03 | End: 2019-09-03

## 2019-09-03 RX ORDER — DEXTROSE 50 % IN WATER 50 %
12.5 SYRINGE (ML) INTRAVENOUS ONCE
Refills: 0 | Status: DISCONTINUED | OUTPATIENT
Start: 2019-09-03 | End: 2019-09-13

## 2019-09-03 RX ORDER — INSULIN LISPRO 100/ML
VIAL (ML) SUBCUTANEOUS AT BEDTIME
Refills: 0 | Status: DISCONTINUED | OUTPATIENT
Start: 2019-09-03 | End: 2019-09-13

## 2019-09-03 RX ORDER — ENOXAPARIN SODIUM 100 MG/ML
40 INJECTION SUBCUTANEOUS DAILY
Refills: 0 | Status: DISCONTINUED | OUTPATIENT
Start: 2019-09-03 | End: 2019-09-03

## 2019-09-03 RX ORDER — INSULIN LISPRO 100/ML
VIAL (ML) SUBCUTANEOUS
Refills: 0 | Status: DISCONTINUED | OUTPATIENT
Start: 2019-09-03 | End: 2019-09-03

## 2019-09-03 RX ORDER — SUMATRIPTAN SUCCINATE 4 MG/.5ML
50 INJECTION, SOLUTION SUBCUTANEOUS EVERY 6 HOURS
Refills: 0 | Status: DISCONTINUED | OUTPATIENT
Start: 2019-09-03 | End: 2019-09-06

## 2019-09-03 RX ORDER — DEXTROSE 50 % IN WATER 50 %
15 SYRINGE (ML) INTRAVENOUS ONCE
Refills: 0 | Status: DISCONTINUED | OUTPATIENT
Start: 2019-09-03 | End: 2019-09-13

## 2019-09-03 RX ORDER — AMANTADINE HCL 100 MG
100 CAPSULE ORAL
Refills: 0 | Status: DISCONTINUED | OUTPATIENT
Start: 2019-09-03 | End: 2019-09-04

## 2019-09-03 RX ORDER — ENOXAPARIN SODIUM 100 MG/ML
40 INJECTION SUBCUTANEOUS DAILY
Refills: 0 | Status: DISCONTINUED | OUTPATIENT
Start: 2019-09-03 | End: 2019-09-13

## 2019-09-03 RX ORDER — ROSUVASTATIN CALCIUM 5 MG/1
5 TABLET ORAL AT BEDTIME
Refills: 0 | Status: DISCONTINUED | OUTPATIENT
Start: 2019-09-03 | End: 2019-09-13

## 2019-09-03 RX ORDER — DEXTROSE 50 % IN WATER 50 %
25 SYRINGE (ML) INTRAVENOUS ONCE
Refills: 0 | Status: DISCONTINUED | OUTPATIENT
Start: 2019-09-03 | End: 2019-09-13

## 2019-09-03 RX ORDER — TAMSULOSIN HYDROCHLORIDE 0.4 MG/1
0.4 CAPSULE ORAL AT BEDTIME
Refills: 0 | Status: DISCONTINUED | OUTPATIENT
Start: 2019-09-03 | End: 2019-09-13

## 2019-09-03 RX ORDER — DEXTROSE 50 % IN WATER 50 %
15 SYRINGE (ML) INTRAVENOUS ONCE
Refills: 0 | Status: DISCONTINUED | OUTPATIENT
Start: 2019-09-03 | End: 2019-09-03

## 2019-09-03 RX ORDER — MECLIZINE HCL 12.5 MG
12.5 TABLET ORAL EVERY 8 HOURS
Refills: 0 | Status: DISCONTINUED | OUTPATIENT
Start: 2019-09-03 | End: 2019-09-06

## 2019-09-03 RX ORDER — FINASTERIDE 5 MG/1
5 TABLET, FILM COATED ORAL DAILY
Refills: 0 | Status: DISCONTINUED | OUTPATIENT
Start: 2019-09-03 | End: 2019-09-13

## 2019-09-03 RX ORDER — DEXTROSE 50 % IN WATER 50 %
12.5 SYRINGE (ML) INTRAVENOUS ONCE
Refills: 0 | Status: DISCONTINUED | OUTPATIENT
Start: 2019-09-03 | End: 2019-09-03

## 2019-09-03 RX ORDER — DOCUSATE SODIUM 100 MG
100 CAPSULE ORAL THREE TIMES A DAY
Refills: 0 | Status: DISCONTINUED | OUTPATIENT
Start: 2019-09-03 | End: 2019-09-13

## 2019-09-03 RX ORDER — ACETAMINOPHEN 500 MG
975 TABLET ORAL EVERY 6 HOURS
Refills: 0 | Status: DISCONTINUED | OUTPATIENT
Start: 2019-09-03 | End: 2019-09-09

## 2019-09-03 RX ORDER — GLUCAGON INJECTION, SOLUTION 0.5 MG/.1ML
1 INJECTION, SOLUTION SUBCUTANEOUS ONCE
Refills: 0 | Status: DISCONTINUED | OUTPATIENT
Start: 2019-09-03 | End: 2019-09-03

## 2019-09-03 RX ADMIN — SUMATRIPTAN SUCCINATE 50 MILLIGRAM(S): 4 INJECTION, SOLUTION SUBCUTANEOUS at 21:41

## 2019-09-03 RX ADMIN — Medication 975 MILLIGRAM(S): at 22:57

## 2019-09-03 RX ADMIN — ENOXAPARIN SODIUM 40 MILLIGRAM(S): 100 INJECTION SUBCUTANEOUS at 11:38

## 2019-09-03 RX ADMIN — AMLODIPINE BESYLATE 5 MILLIGRAM(S): 2.5 TABLET ORAL at 05:46

## 2019-09-03 RX ADMIN — INSULIN GLARGINE 35 UNIT(S): 100 INJECTION, SOLUTION SUBCUTANEOUS at 21:17

## 2019-09-03 RX ADMIN — Medication 12.5 MILLIGRAM(S): at 21:19

## 2019-09-03 RX ADMIN — Medication 1 MILLIGRAM(S): at 11:38

## 2019-09-03 RX ADMIN — Medication 100 MILLIGRAM(S): at 13:13

## 2019-09-03 RX ADMIN — Medication 12.5 MILLIGRAM(S): at 05:46

## 2019-09-03 RX ADMIN — SUMATRIPTAN SUCCINATE 50 MILLIGRAM(S): 4 INJECTION, SOLUTION SUBCUTANEOUS at 22:40

## 2019-09-03 RX ADMIN — Medication 3: at 18:36

## 2019-09-03 RX ADMIN — SODIUM CHLORIDE 1 GRAM(S): 9 INJECTION INTRAMUSCULAR; INTRAVENOUS; SUBCUTANEOUS at 05:46

## 2019-09-03 RX ADMIN — SUMATRIPTAN SUCCINATE 50 MILLIGRAM(S): 4 INJECTION, SOLUTION SUBCUTANEOUS at 11:38

## 2019-09-03 RX ADMIN — Medication 975 MILLIGRAM(S): at 14:59

## 2019-09-03 RX ADMIN — Medication 100 MILLIGRAM(S): at 21:18

## 2019-09-03 RX ADMIN — Medication 100 MILLIGRAM(S): at 05:46

## 2019-09-03 RX ADMIN — CARVEDILOL PHOSPHATE 25 MILLIGRAM(S): 80 CAPSULE, EXTENDED RELEASE ORAL at 05:47

## 2019-09-03 RX ADMIN — SODIUM CHLORIDE 1 GRAM(S): 9 INJECTION INTRAMUSCULAR; INTRAVENOUS; SUBCUTANEOUS at 13:13

## 2019-09-03 RX ADMIN — FINASTERIDE 5 MILLIGRAM(S): 5 TABLET, FILM COATED ORAL at 11:38

## 2019-09-03 RX ADMIN — Medication 975 MILLIGRAM(S): at 23:50

## 2019-09-03 RX ADMIN — CARVEDILOL PHOSPHATE 25 MILLIGRAM(S): 80 CAPSULE, EXTENDED RELEASE ORAL at 19:45

## 2019-09-03 RX ADMIN — Medication 975 MILLIGRAM(S): at 04:10

## 2019-09-03 RX ADMIN — Medication 975 MILLIGRAM(S): at 15:30

## 2019-09-03 RX ADMIN — ROSUVASTATIN CALCIUM 5 MILLIGRAM(S): 5 TABLET ORAL at 21:18

## 2019-09-03 RX ADMIN — Medication 1: at 13:13

## 2019-09-03 RX ADMIN — LOSARTAN POTASSIUM 100 MILLIGRAM(S): 100 TABLET, FILM COATED ORAL at 05:46

## 2019-09-03 RX ADMIN — Medication 12.5 MILLIGRAM(S): at 13:13

## 2019-09-03 RX ADMIN — TAMSULOSIN HYDROCHLORIDE 0.4 MILLIGRAM(S): 0.4 CAPSULE ORAL at 21:18

## 2019-09-03 RX ADMIN — Medication 100 MILLIGRAM(S): at 19:44

## 2019-09-03 RX ADMIN — Medication 1: at 21:17

## 2019-09-03 RX ADMIN — SODIUM CHLORIDE 1 GRAM(S): 9 INJECTION INTRAMUSCULAR; INTRAVENOUS; SUBCUTANEOUS at 21:19

## 2019-09-03 RX ADMIN — GABAPENTIN 300 MILLIGRAM(S): 400 CAPSULE ORAL at 19:44

## 2019-09-03 RX ADMIN — Medication 975 MILLIGRAM(S): at 03:40

## 2019-09-03 RX ADMIN — GABAPENTIN 300 MILLIGRAM(S): 400 CAPSULE ORAL at 05:46

## 2019-09-03 NOTE — DISCHARGE NOTE NURSING/CASE MANAGEMENT/SOCIAL WORK - NSDCPEPTSTRK_GEN_ALL_CORE
Stroke warning signs and symptoms/Prescribed medications/Risk factors for stroke/Stroke support groups for patients, families, and friends/Signs and symptoms of stroke/Call 911 for stroke/Need for follow up after discharge/Stroke education booklet

## 2019-09-03 NOTE — PROGRESS NOTE ADULT - REASON FOR ADMISSION
S/p Fall

## 2019-09-03 NOTE — H&P ADULT - NSHPLABSRESULTS_GEN_ALL_CORE
13.0   7.40  )-----------( 258      ( 03 Sep 2019 10:01 )             39.2   09-03    139  |  101  |  12  ----------------------------<  165<H>  4.8   |  28  |  0.91    Ca    9.9      03 Sep 2019 07:11  Phos  2.6     09-03  Mg     2.0     09-03    CT Head No Cont (08.24.19 @ 19:23)  IMPRESSION: No significant interval change in the appearance of anterior right   frontal and temporal lobe hemorrhagic contusions compared with 8/22/2019.   No change in small amount of subarachnoid and left subdural hemorrhage.  No midline shift or central herniation. No hydrocephalus.    CT Head No Cont (08.21.19 @ 11:25)   IMPRESSION:  Holohemispheric left subdural hematoma with adjacent   subarachnoid blood.  Subdural and/or subarachnoid blood within the right inferior frontal   region. Additional areas of subarachnoid blood as above.  Nondisplaced calvarial fracture.

## 2019-09-03 NOTE — PROGRESS NOTE ADULT - SUBJECTIVE AND OBJECTIVE BOX
Patient is a 74y old  Male who presents with a chief complaint of S/p Fall (03 Sep 2019 16:01)      INTERVAL HPI/OVERNIGHT EVENTS:  T(C): 36.4 (09-03-19 @ 13:06), Max: 37.2 (09-03-19 @ 00:23)  HR: 66 (09-03-19 @ 13:06) (66 - 69)  BP: 159/73 (09-03-19 @ 13:06) (140/62 - 163/62)  RR: 17 (09-03-19 @ 13:06) (16 - 17)  SpO2: 99% (09-03-19 @ 13:06) (98% - 99%)  Wt(kg): --  I&O's Summary    02 Sep 2019 07:01  -  03 Sep 2019 07:00  --------------------------------------------------------  IN: 1320 mL / OUT: 1125 mL / NET: 195 mL    03 Sep 2019 07:01  -  03 Sep 2019 16:39  --------------------------------------------------------  IN: 680 mL / OUT: 0 mL / NET: 680 mL        LABS:                        13.0   7.40  )-----------( 258      ( 03 Sep 2019 10:01 )             39.2     09-03    139  |  101  |  12  ----------------------------<  165<H>  4.8   |  28  |  0.91    Ca    9.9      03 Sep 2019 07:11  Phos  2.6     09-03  Mg     2.0     09-03          CAPILLARY BLOOD GLUCOSE      POCT Blood Glucose.: 190 mg/dL (03 Sep 2019 13:04)  POCT Blood Glucose.: 109 mg/dL (03 Sep 2019 09:09)  POCT Blood Glucose.: 234 mg/dL (02 Sep 2019 22:26)  POCT Blood Glucose.: 187 mg/dL (02 Sep 2019 17:53)            MEDICATIONS  (STANDING):  amantadine 100 milliGRAM(s) Oral two times a day  amLODIPine   Tablet 5 milliGRAM(s) Oral daily  carvedilol 25 milliGRAM(s) Oral <User Schedule>  dextrose 5%. 1000 milliLiter(s) (50 mL/Hr) IV Continuous <Continuous>  dextrose 50% Injectable 12.5 Gram(s) IV Push once  dextrose 50% Injectable 25 Gram(s) IV Push once  dextrose 50% Injectable 25 Gram(s) IV Push once  docusate sodium 100 milliGRAM(s) Oral three times a day  enoxaparin Injectable 40 milliGRAM(s) SubCutaneous daily  finasteride 5 milliGRAM(s) Oral daily  folic acid 1 milliGRAM(s) Oral daily  gabapentin 300 milliGRAM(s) Oral two times a day  insulin glargine Injectable (LANTUS) 35 Unit(s) SubCutaneous at bedtime  insulin lispro (HumaLOG) corrective regimen sliding scale   SubCutaneous three times a day before meals  insulin lispro Injectable (HumaLOG) 5 Unit(s) SubCutaneous before breakfast  insulin lispro Injectable (HumaLOG) 5 Unit(s) SubCutaneous before lunch  insulin lispro Injectable (HumaLOG) 5 Unit(s) SubCutaneous before dinner  losartan 100 milliGRAM(s) Oral <User Schedule>  meclizine 12.5 milliGRAM(s) Oral every 8 hours  rosuvastatin 5 milliGRAM(s) Oral at bedtime  sodium chloride 1 Gram(s) Oral three times a day  tamsulosin 0.4 milliGRAM(s) Oral at bedtime    MEDICATIONS  (PRN):  acetaminophen   Tablet .. 975 milliGRAM(s) Oral every 6 hours PRN Moderate Pain (4 - 6)  dextrose 40% Gel 15 Gram(s) Oral once PRN Blood Glucose LESS THAN 70 milliGRAM(s)/deciliter  glucagon  Injectable 1 milliGRAM(s) IntraMuscular once PRN Glucose LESS THAN 70 milligrams/deciliter  hydrALAZINE Injectable 10 milliGRAM(s) IV Push every 6 hours PRN SBP >180  ondansetron Injectable 4 milliGRAM(s) IV Push every 6 hours PRN Nausea and/or Vomiting  SUMAtriptan 50 milliGRAM(s) Oral every 6 hours PRN Headache          PHYSICAL EXAM:  GENERAL: NAD, well-groomed, well-developed  HEAD:  Atraumatic, Normocephalic  CHEST/LUNG: Clear to percussion bilaterally; No rales, rhonchi, wheezing, or rubs  HEART: Regular rate and rhythm; No murmurs, rubs, or gallops  ABDOMEN: Soft, Nontender, Nondistended; Bowel sounds present  EXTREMITIES:  2+ Peripheral Pulses, No clubbing, cyanosis, or edema  LYMPH: No lymphadenopathy noted  SKIN: No rashes or lesions    Care Discussed with Consultants/Other Providers [ ] YES  [ ] NO

## 2019-09-03 NOTE — H&P ADULT - HISTORY OF PRESENT ILLNESS
74M pmh CAD s/p PCI on ASA, HTN, HLD, BPH, DM2, on ASA 81mg daily transferred from [facility] unwitnessed fall with finding of intracranial bleed and increased blood on interval scan. Patient initially presented to [facility]/ unwitnessed fall, + LOC ?syncopal episode 8/21/19, with complaints of dizziness after fall and initial difficulty speaking. Patient did not recall event. CTH showed L hemispheric SDH with SAH and calvarium fx. Per family, he was initially confused and altered. In the [facility] ED, was complaining of headache and dizziness since his fall.  ENT consulted for dizziness, started meclizine. Neurology was consulted for persistent headache, was given IV acetaminophen and solumedrol acutely, then sumatriptan added. Nephrology consulted for hyponatremia, felt is was due to chronic HCTZ use which was discontinued.

## 2019-09-03 NOTE — PROGRESS NOTE ADULT - SUBJECTIVE AND OBJECTIVE BOX
TRAUMA SURGERY DAILY PROGRESS NOTE:    Interval:  No acute events overnight endorsed.    Subjective:  Patient seen and examined this am. Counselled dc    Vital Signs Last 24 Hrs  T(C): 36.4 (03 Sep 2019 13:06), Max: 37.2 (03 Sep 2019 00:23)  T(F): 97.6 (03 Sep 2019 13:06), Max: 99 (03 Sep 2019 00:23)  HR: 66 (03 Sep 2019 13:06) (66 - 69)  BP: 159/73 (03 Sep 2019 13:06) (140/62 - 163/62)  BP(mean): --  RR: 17 (03 Sep 2019 13:06) (16 - 17)  SpO2: 99% (03 Sep 2019 13:06) (98% - 99%)    Exam:  Gen: NAD, resting in bed, alert and responding appropriately  Resp: Airway patent, non-labored respirations  Abd: Soft, ND, NTTP x 4 quadrants, no rebound or guarding.   Ext: No edema, WWP  Neuro: AAOx3, no focal deficits    I&O's Detail    02 Sep 2019 07:01  -  03 Sep 2019 07:00  --------------------------------------------------------  IN:    Oral Fluid: 1320 mL  Total IN: 1320 mL    OUT:    Voided: 1125 mL  Total OUT: 1125 mL    Total NET: 195 mL      03 Sep 2019 07:01  -  03 Sep 2019 16:02  --------------------------------------------------------  IN:    Oral Fluid: 680 mL  Total IN: 680 mL    OUT:  Total OUT: 0 mL    Total NET: 680 mL          Daily     Daily     MEDICATIONS  (STANDING):  amantadine 100 milliGRAM(s) Oral two times a day  amLODIPine   Tablet 5 milliGRAM(s) Oral daily  carvedilol 25 milliGRAM(s) Oral <User Schedule>  dextrose 5%. 1000 milliLiter(s) (50 mL/Hr) IV Continuous <Continuous>  dextrose 50% Injectable 12.5 Gram(s) IV Push once  dextrose 50% Injectable 25 Gram(s) IV Push once  dextrose 50% Injectable 25 Gram(s) IV Push once  docusate sodium 100 milliGRAM(s) Oral three times a day  enoxaparin Injectable 40 milliGRAM(s) SubCutaneous daily  finasteride 5 milliGRAM(s) Oral daily  folic acid 1 milliGRAM(s) Oral daily  gabapentin 300 milliGRAM(s) Oral two times a day  insulin glargine Injectable (LANTUS) 35 Unit(s) SubCutaneous at bedtime  insulin lispro (HumaLOG) corrective regimen sliding scale   SubCutaneous three times a day before meals  insulin lispro Injectable (HumaLOG) 5 Unit(s) SubCutaneous before breakfast  insulin lispro Injectable (HumaLOG) 5 Unit(s) SubCutaneous before lunch  insulin lispro Injectable (HumaLOG) 5 Unit(s) SubCutaneous before dinner  losartan 100 milliGRAM(s) Oral <User Schedule>  meclizine 12.5 milliGRAM(s) Oral every 8 hours  rosuvastatin 5 milliGRAM(s) Oral at bedtime  sodium chloride 1 Gram(s) Oral three times a day  tamsulosin 0.4 milliGRAM(s) Oral at bedtime    MEDICATIONS  (PRN):  acetaminophen   Tablet .. 975 milliGRAM(s) Oral every 6 hours PRN Moderate Pain (4 - 6)  dextrose 40% Gel 15 Gram(s) Oral once PRN Blood Glucose LESS THAN 70 milliGRAM(s)/deciliter  glucagon  Injectable 1 milliGRAM(s) IntraMuscular once PRN Glucose LESS THAN 70 milligrams/deciliter  hydrALAZINE Injectable 10 milliGRAM(s) IV Push every 6 hours PRN SBP >180  ondansetron Injectable 4 milliGRAM(s) IV Push every 6 hours PRN Nausea and/or Vomiting  SUMAtriptan 50 milliGRAM(s) Oral every 6 hours PRN Headache      LABS:                        13.0   7.40  )-----------( 258      ( 03 Sep 2019 10:01 )             39.2     09-03    139  |  101  |  12  ----------------------------<  165<H>  4.8   |  28  |  0.91    Ca    9.9      03 Sep 2019 07:11  Phos  2.6     09-03  Mg     2.0     09-03            JORI Plascencia, PGY-1  ATP Team Surgery  [id number] with any questions

## 2019-09-03 NOTE — PROGRESS NOTE ADULT - PROVIDER SPECIALTY LIST ADULT
Hospitalist
Internal Medicine
Nephrology
Neurosurgery
SICU
Surgery
Surgery
Trauma Surgery
Rehab Medicine
Trauma Surgery

## 2019-09-03 NOTE — PROGRESS NOTE ADULT - ASSESSMENT
Problem/Plan - 1:  ·  Problem: Fall.  Plan: With fall at home. Pt does not entirely remember events leading up to fall. Denied prodromal symptoms. Potential syncopal episode given cardiac history.  Saw his cardiologist on 8/19. Was in his usual state of health. Most recently had carotid dopplers which had no significant stenosis. EKG with LBBB although as per chart review pt with chronic LBBB. SBP in 180's on presentation  - c/w tele to monitor for arrhythmia  - TTE grossly similar to prior, however now with mild AS  - Check Orthostatics.  - May require outpatient cardiology f/u for potential holter to r/o arrhythmia.     Problem/Plan - 2:  ·  Problem: Closed fracture of vault of skull, initial encounter.  Plan: CT head with nondisplaced fracture seen through the sagittal suture which   extends into the right parietal bone and slightly into the superior left parietal bone.  - Pain control.  - ENT consulted for vertigo- recommending meclizine and vestibular rehab.     Problem/Plan - 3:  ·  Problem: SDH (subdural hematoma).  Plan: Holohemispheric left subdural hematoma with adjacent subarachnoid blood. Repeat CTH with Left holohemispheric acute subdural hemorrhage is without significant interval change. However, the right anterior/inferior frontal region extra-axial blood has increased in size, now measuring a maximal thickness of 1 cm, previously 0.5cm. Subsequent f/u CT with no interval change.   - Seen by neurosurg who recommend Keppra  - f/u further neurosurg rec's.     Problem/Plan - 4:  ·  Problem: Type 2 diabetes mellitus with other specified complication, with long-term current use of insulin.  Plan: On lantus 35u QHS and Janumet. FS relatively well controlled  - Would hold Janumet  - Monitor FS, ISS  Problem/Plan - 5:·  Problem: CAD (coronary artery disease).  Plan: s/p two stenting episodes. First in mid 90s and second in 2015. No chest pain at present EKG with LBBB and on chart review this is chronic.  - c/w statin  - Hold Aspirin until cleared from neurosurgical perspective.   Problem/Plan - 6:  Problem: Hyponatremia Plan: dc HCTZ  Renal called     Problem/Plan - 7:  ·  Problem: Hypertensive emergency .  Plan: increased norvasc to 5 daily  and losartan to 100 daily  cw carvedilol and dc HCTZ    monitor BP

## 2019-09-03 NOTE — H&P ADULT - NSHPPHYSICALEXAM_GEN_ALL_CORE
Vital Signs  T(C): 36.4 (09-03-19 @ 13:06), Max: 37.2 (09-03-19 @ 00:23)  HR: 66 (09-03-19 @ 13:06) (66 - 70)  BP: 159/73 (09-03-19 @ 13:06) (140/62 - 163/62)  RR: 17 (09-03-19 @ 13:06) (16 - 17)  SpO2: 99% (09-03-19 @ 13:06) (98% - 99%)    Gen - NAD, Comfortable  HEENT - NCAT, EOMI, MMM  Neck - Supple, No limited ROM  Pulm - CTAB, No wheeze, No rhonchi, No crackles  Cardiovascular - RRR, S1S2, No m/r/g  Abdomen - Soft, NT/ND, +BS  Extremities - No C/C/E, No calf tenderness  Neuro-     Cognitive - AAOx3     Communication - Fluent, No dysarthria     Attention: Intact      Judgement: Good evidence of judgement     Memory: Recall 3 objects immediate and 3 min later         Cranial Nerves - CN 2-12 intact     Motor - No focal deficits                    LEFT    UE - ShAB 5/5, EF 5/5, EE 5/5, WE 5/5,  5/5                    RIGHT UE - ShAB 5/5, EF 5/5, EE 5/5, WE 5/5,  5/5                    LEFT    LE - HF 5/5, KE 5/5, DF 5/5, PF 5/5                    RIGHT LE - HF 5/5, KE 5/5, DF 5/5, PF 5/5        Sensory - Intact to LT     Reflexes - DTR Intact, No primitive reflexive     Coordination - FTN intact     Tone - normal  Psychiatric - Mood stable, Affect WNL  Skin:  all skin intact    Wounds: None Present Vital Signs Last 24 Hrs  T(C): 36.7 (03 Sep 2019 17:31), Max: 37.2 (03 Sep 2019 00:23)  T(F): 98 (03 Sep 2019 17:31), Max: 99 (03 Sep 2019 00:23)  HR: 77 (03 Sep 2019 17:31) (66 - 77)  BP: 166/72 (03 Sep 2019 17:31) (140/62 - 166/72)  BP(mean): --  RR: 15 (03 Sep 2019 17:31) (15 - 17)  SpO2: 99% (03 Sep 2019 17:31) (98% - 99%)    Gen - NAD, Comfortable  HEENT - NCAT, EOMI, MMM  Neck - Supple, No limited ROM  Pulm - CTAB, No wheeze, No rhonchi, No crackles  Cardiovascular - RRR, S1S2, No m/r/g  Abdomen - Soft, NT/ND, +BS  Extremities - No C/C/E, No calf tenderness  Neuro-     Cognitive - AAOx3     Communication - Fluent, No dysarthria     Attention: Intact      Judgement: Good evidence of judgement     Memory: Recall 3 objects immediate and 3 min later         Cranial Nerves - CN 2-12 intact     Motor - No focal deficits                    LEFT    UE - ShAB 5/5, EF 5/5, EE 5/5, WE 5/5,  5/5                    RIGHT UE - ShAB 5/5, EF 5/5, EE 5/5, WE 5/5,  5/5                    LEFT    LE - HF 5/5, KE 5/5, DF 5/5, PF 5/5                    RIGHT LE - HF 5/5, KE 5/5, DF 5/5, PF 5/5        Sensory - Intact to LT     Reflexes - DTR Intact, No primitive reflexive     Coordination - FTN intact     Tone - normal  Psychiatric - Mood stable, Affect WNL  Skin:  all skin intact    Wounds: None Present Vital Signs Last 24 Hrs  T(C): 36.7 (03 Sep 2019 17:31), Max: 37.2 (03 Sep 2019 00:23)  T(F): 98 (03 Sep 2019 17:31), Max: 99 (03 Sep 2019 00:23)  HR: 77 (03 Sep 2019 17:31) (66 - 77)  BP: 166/72 (03 Sep 2019 17:31) (140/62 - 166/72)  BP(mean): --  RR: 15 (03 Sep 2019 17:31) (15 - 17)  SpO2: 99% (03 Sep 2019 17:31) (98% - 99%)    Gen - NAD, Comfortable  HEENT - NCAT, EOMI, MMM  Neck - Supple, No limited ROM  Pulm - CTAB, No wheeze, No rhonchi, No crackles  Cardiovascular - RRR, S1S2, No m/r/g  Abdomen - Soft, NT/ND, +BS  Extremities - No C/C/E, No calf tenderness  Neuro-     Cognitive - AAOx3     Communication - Mild dysarthria     Attention: Intact      Judgement: Good evidence of judgement     Memory: Recall 3 objects immediate and 3 min later         Cranial Nerves - CN 2-12 intact exc. dysarthria     Motor - No focal deficits                    LEFT    UE - ShAB 5/5, EF 5/5, EE 5/5, WE 5/5,  5/5                    RIGHT UE - ShAB 5/5, EF 5/5, EE 5/5, WE 5/5,  5/5                    LEFT    LE - HF 5/5, KE 5/5, DF 5/5, PF 5/5                    RIGHT LE - HF 5/5, KE 5/5, DF 5/5, PF 5/5        Sensory - Intact to LT     Reflexes - DTR Intact, No primitive reflexive     Coordination - FTN intact     Tone - normal  Psychiatric - Mood stable, Affect WNL  Skin:  all skin intact    Wounds: None Present

## 2019-09-03 NOTE — H&P ADULT - NSHPREVIEWOFSYSTEMS_GEN_ALL_CORE
REVIEW OF SYSTEMS  Constitutional: No fever, No Chills, No fatigue  HEENT: No eye pain, No visual disturbances, No difficulty hearing, No Dysphagia   Pulm: No cough,  No shortness of breath  Cardio: No chest pain, No palpitations  GI:  No abdominal pain, No nausea, No vomiting, No diarrhea, No constipation, No incontinence, Last Bowel Movement on   : No dysuria, No frequency, No hematuria, No incontinence  Neuro: No headaches, No memory loss, No loss of strength, No numbness, No tremors  Skin: No itching, No rashes, No lesions   Endo: No temperature intolerance  MSK: No joint pain, No joint swelling, No muscle pain, No Neck or back pain  Psych:  No depression, No anxiety    Any Major Surgery within past 100 days? No / Yes     Two or more Falls within past one year?  No / Yes                   One Fall with injury past one year?           No / Yes REVIEW OF SYSTEMS  Constitutional: No fever, No Chills, No fatigue  HEENT: No eye pain, No visual disturbances, No difficulty hearing, No Dysphagia   Pulm: No cough,  No shortness of breath  Cardio: No chest pain, No palpitations  GI:  No abdominal pain, No nausea, No vomiting, No diarrhea, No constipation, No incontinence, LBM today around Noon   : No dysuria, No frequency, No hematuria, No incontinence  Neuro: +frontal headache 5/10 in severity - imitrex/tylenol regimen effective, weakness from deconditioning, no focal deficit, +nocturnal leg cramps which are improving after statin discontinued   Skin: No itching, No rashes, No lesions   Endo: No temperature intolerance  MSK: No joint pain, No joint swelling, No muscle pain, No Neck or back pain  Psych:  No depression, No anxiety    Any Major Surgery within past 100 days?    Two or more Falls within past one year?  Yes                   One Fall with injury past one year?         Yes

## 2019-09-03 NOTE — DISCHARGE NOTE NURSING/CASE MANAGEMENT/SOCIAL WORK - PATIENT PORTAL LINK FT
You can access the FollowMyHealth Patient Portal offered by [facility] by registering at the following website: [email or web address]. By joining [employer]’s FollowMyHealth portal, you will also be able to view your health information using other applications (apps) compatible with our system.

## 2019-09-03 NOTE — H&P ADULT - ATTENDING COMMENTS
Pt. seen 9/4/19 AM.  Agree with documentation above as per resident on call with amendments made as appropriate. Patient medically stable.     Patient had episode of orthostatic hypotension this AM where SBP decreased to 92/56, when stood up for therapy.  Improved when brought to bed.  Pt. was later able to participate in therapy.  c/o headache pain intermittently--last night had headache affecting sleep.  Improved with sumatriptan.  ALso c/o cramping in LEs at night which was better with PRN tylenol.  Nursing logs report pt. slept last night.  Wife at bedside    Vital Signs Last 24 Hrs  T(C): 36.8 (04 Sep 2019 07:34), Max: 36.8 (04 Sep 2019 07:34)  T(F): 98.2 (04 Sep 2019 07:34), Max: 98.2 (04 Sep 2019 07:34)  HR: 56 (04 Sep 2019 08:44) (56 - 82)  BP: 118/62 (04 Sep 2019 08:44) (92/56 - 166/72)  BP(mean): --  RR: 14 (04 Sep 2019 07:34) (14 - 15)  SpO2: 97% (04 Sep 2019 07:34) (97% - 99%)    Physical exam--  Awake, ALert --Ox3,  Mild dysarthria, mild memory impairment.    Following commands and answering questions  CN intact  Motor: 5/5 bilat UE and LE  Sens Intact    74M who suffered a L SDH, R SAH and R frontal IPH after a fall, now with decreased functional mobility, dysphagia, aphasia, hemiplegia, gait instability and ADL impairments.    Orthostatic hypotension--d/w Hospitalist, Dr. Watts.  Moved amlodipine dose to later in day.  Added holding parameter.  Monitor orthostatics.    Headache--AMantadine may be worsening headache pain.  Pt. awake and alert,  Will taper off.  Decreased to once a day in AM.

## 2019-09-03 NOTE — H&P ADULT - ASSESSMENT
ASSESSMENT/PLAN  JHON LUO is a 74M who suffered a L SDH, R SAH and R frontal IPH after a fall, now with decreased functional mobility, dysphagia, aphasia, hemiplegia, gait instability and ADL impairments.    Rehab: Gait Instability, ADL impairments and Functional impairments: start Comprehensive Rehab Program of PT/OT/ST    Acute L SDH, R SAH and R frontal IPH:  - Holding ASA and brillinta  - Amantadine 100mg BID  - Meclizine prn for dizziness  - s/p keppra ppx x1 week, no seizures  - Strict BP control as below  - Statin  - Sumatriptan PRN headache    Pain: Tylenol PRN, Gabapentin 300 BID    CAD s/p RYAN:  - Rosuvastatin 5mg QHS    Diastolic CHF:  - Carvedilol    HTN:  - Amlodipine 5mg daily  - Carvedilol  - Losartan 100mg    GI/Bowel: Colace, Senna, Miralax PRN    Diet: Regular    Renal/Bladder: Voiding independently, PVR  - BPH: c/w Tamsulosin, Finasteride    Hyponatremia: Nephrology consulted at [facility], felt it was d/t HCTZ which was not resumed.   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 1hr/day       O.T. 1hr/day      S.L.P. 1hr/day     P&O Unnecessary     EXP FREQUENCY: 5 days per 7 day period     PRESCREEN COMPARISON:   I have reviewed the prescreen information and I have found no relevant changes between the preadmission screening and my post admission evaluation     RATIONALE FOR INPATIENT ADMISSION - Patient demonstrates the following: (check all that apply)  [X] Medically appropriate for rehabilitation admission  [X] Has attainable rehab goals with an appropriate initial discharge plan  [X] Has rehabilitation potential (expected to make a significant improvement within a reasonable period of time)   [X] Requires close medical management by a rehab physician, rehab nursing care, Hospitalist and comprehensive interdisciplinary team (including PT, OT, & or SLP, Prosthetics and Orthotics) ASSESSMENT/PLAN  JHON LUO is a 74M who suffered a L SDH, R SAH and R frontal IPH after a fall, now with decreased functional mobility, dysphagia, aphasia, hemiplegia, gait instability and ADL impairments.    Rehab: Gait Instability, ADL impairments and Functional impairments: start Comprehensive Rehab Program of PT/OT/ST    Acute L SDH, R SAH and R frontal IPH:  - Holding ASA and brillinta  - Amantadine 100mg BID  - Meclizine prn for dizziness  - s/p keppra ppx x1 week, no seizures  - Strict BP control as below  - Statin  - Sumatriptan PRN headache    Pain: Tylenol PRN, Gabapentin 300 BID    CAD s/p RYAN:  - Rosuvastatin 5mg QHS    Diastolic CHF:  - Carvedilol    HTN:  - Amlodipine 5mg daily  - Carvedilol 25mg BID  - Losartan 100mg daily    GI/Bowel: Colace, Senna, Miralax PRN    Diet: Regular, CC    Renal/Bladder: Voiding independently, PVR  - BPH: c/w Tamsulosin, Finasteride    Hyponatremia: Nephrology consulted at [facility], felt it was d/t HCTZ which was not resumed.   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 1hr/day       O.T. 1hr/day      S.L.P. 1hr/day     P&O Unnecessary     EXP FREQUENCY: 5 days per 7 day period     PRESCREEN COMPARISON:   I have reviewed the prescreen information and I have found no relevant changes between the preadmission screening and my post admission evaluation     RATIONALE FOR INPATIENT ADMISSION - Patient demonstrates the following: (check all that apply)  [X] Medically appropriate for rehabilitation admission  [X] Has attainable rehab goals with an appropriate initial discharge plan  [X] Has rehabilitation potential (expected to make a significant improvement within a reasonable period of time)   [X] Requires close medical management by a rehab physician, rehab nursing care, Hospitalist and comprehensive interdisciplinary team (including PT, OT, & or SLP, Prosthetics and Orthotics) ASSESSMENT/PLAN  JHON LUO is a 74M who suffered a L SDH, R SAH and R frontal IPH after a fall, now with decreased functional mobility, dysphagia, aphasia, hemiplegia, gait instability and ADL impairments.    Rehab: Gait Instability, ADL impairments and Functional impairments: start Comprehensive Rehab Program of PT/OT/ST    Acute L SDH, R SAH and R frontal IPH:  - Holding ASA and brillinta  - Amantadine 100mg BID  - Meclizine prn for dizziness  - s/p keppra ppx x1 week, no seizures  - Strict BP control as below  - Statin  - Sumatriptan PRN headache    Pain: Tylenol PRN, Gabapentin 300 BID    CAD s/p RYAN:  - Rosuvastatin 5mg QHS     Diastolic CHF:  - Carvedilol    HTN:  - Amlodipine 5mg daily  - Carvedilol 25mg BID  - Losartan 100mg daily    GI/Bowel: Colace, Senna, Miralax PRN    Diet: Regular, CC    Renal/Bladder: Voiding independently, PVR  - BPH: c/w Tamsulosin, Finasteride    Hyponatremia: Nephrology consulted at [facility], felt it was d/t HCTZ which was not resumed.   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 1hr/day       O.T. 1hr/day      S.L.P. 1hr/day     P&O Unnecessary     EXP FREQUENCY: 5 days per 7 day period     PRESCREEN COMPARISON:   I have reviewed the prescreen information and I have found no relevant changes between the preadmission screening and my post admission evaluation     RATIONALE FOR INPATIENT ADMISSION - Patient demonstrates the following: (check all that apply)  [X] Medically appropriate for rehabilitation admission  [X] Has attainable rehab goals with an appropriate initial discharge plan  [X] Has rehabilitation potential (expected to make a significant improvement within a reasonable period of time)   [X] Requires close medical management by a rehab physician, rehab nursing care, Hospitalist and comprehensive interdisciplinary team (including PT, OT, & or SLP, Prosthetics and Orthotics)

## 2019-09-03 NOTE — H&P ADULT - NSHPSOCIALHISTORY_GEN_ALL_CORE
SOCIAL HISTORY  Smoking - Denied, EtOH - Denied, Drugs - Denied    FUNCTIONAL HISTORY:   Lives w spouse in home w 3 SKYLER.  PTA Independent    CURRENT FUNCTIONAL STATUS:  sit to stand RW min assist  ambulated 15' RW min assist SOCIAL HISTORY  Smoking - Denied, EtOH - Denied, Drugs - Denied    FUNCTIONAL HISTORY:   Lives w spouse in home w 3-4 SKYLER.  There is a basement level in the home w/staircase which patient doesn't use.    PTA Independent    CURRENT FUNCTIONAL STATUS:  sit to stand RW min assist  ambulated 15' RW min assist

## 2019-09-03 NOTE — PROGRESS NOTE ADULT - ASSESSMENT
75yo male s/p fall from standing, unclear cause, found to have L SDH and R SAH. Initially admitted to MICU for Cardene drip for SBP >180mm Hg. Repeat head CT demonstrated increased SAH and new R frontal IPH, for which patient was transferred to [facility]. CT Head stable x2 from prior.     - Acute L SDH, R SAH, R frontal PIH:  - head CT stable x2   - Hold ASA and Brilinta  - chemical VTE prophylaxis  - Appreciate NSG follow up  - Q4 neurochecks  - Keppra for post-traumatic seizure prophylaxis 7 day total  - continue carvedilol, losartan, amlodipine and HCTZ   - PRN iv hydralizine   - Continue statin  - TTE completed with mitral and atrial calcification and mild regurg,moderate diastolic dysfunction.    - PT/PMR Evaluation: TBI Rehab, cleared for dc  - Regular Diet: consistent carbohydrates  - F/U urine lytes   - Neurology headache specialist consulted  - Dc 1.5 NS  - Follow Na  - Replete Na K for incidentally found slightly decreased levels  - Glucerna 3x per day  - Fu endo  - Dc to TBI rehab today 9/3/2019    ACS [id number]

## 2019-09-04 LAB
ALBUMIN SERPL ELPH-MCNC: 3 G/DL — LOW (ref 3.3–5)
ALP SERPL-CCNC: 63 U/L — SIGNIFICANT CHANGE UP (ref 40–120)
ALT FLD-CCNC: 25 U/L DA — SIGNIFICANT CHANGE UP (ref 10–45)
ANION GAP SERPL CALC-SCNC: 3 MMOL/L — LOW (ref 5–17)
AST SERPL-CCNC: 15 U/L — SIGNIFICANT CHANGE UP (ref 10–40)
BASOPHILS # BLD AUTO: 0.04 K/UL — SIGNIFICANT CHANGE UP (ref 0–0.2)
BASOPHILS NFR BLD AUTO: 0.6 % — SIGNIFICANT CHANGE UP (ref 0–2)
BILIRUB SERPL-MCNC: 0.9 MG/DL — SIGNIFICANT CHANGE UP (ref 0.2–1.2)
BUN SERPL-MCNC: 17 MG/DL — SIGNIFICANT CHANGE UP (ref 7–23)
CALCIUM SERPL-MCNC: 9.2 MG/DL — SIGNIFICANT CHANGE UP (ref 8.4–10.5)
CHLORIDE SERPL-SCNC: 102 MMOL/L — SIGNIFICANT CHANGE UP (ref 96–108)
CO2 SERPL-SCNC: 31 MMOL/L — SIGNIFICANT CHANGE UP (ref 22–31)
CREAT SERPL-MCNC: 1.06 MG/DL — SIGNIFICANT CHANGE UP (ref 0.5–1.3)
EOSINOPHIL # BLD AUTO: 0.13 K/UL — SIGNIFICANT CHANGE UP (ref 0–0.5)
EOSINOPHIL NFR BLD AUTO: 1.9 % — SIGNIFICANT CHANGE UP (ref 0–6)
GLUCOSE BLDC GLUCOMTR-MCNC: 147 MG/DL — HIGH (ref 70–99)
GLUCOSE BLDC GLUCOMTR-MCNC: 230 MG/DL — HIGH (ref 70–99)
GLUCOSE BLDC GLUCOMTR-MCNC: 257 MG/DL — HIGH (ref 70–99)
GLUCOSE BLDC GLUCOMTR-MCNC: 308 MG/DL — HIGH (ref 70–99)
GLUCOSE SERPL-MCNC: 167 MG/DL — HIGH (ref 70–99)
HCT VFR BLD CALC: 36.8 % — LOW (ref 39–50)
HGB BLD-MCNC: 12.3 G/DL — LOW (ref 13–17)
IMM GRANULOCYTES NFR BLD AUTO: 0.3 % — SIGNIFICANT CHANGE UP (ref 0–1.5)
LYMPHOCYTES # BLD AUTO: 1.69 K/UL — SIGNIFICANT CHANGE UP (ref 1–3.3)
LYMPHOCYTES # BLD AUTO: 25.1 % — SIGNIFICANT CHANGE UP (ref 13–44)
MCHC RBC-ENTMCNC: 28.5 PG — SIGNIFICANT CHANGE UP (ref 27–34)
MCHC RBC-ENTMCNC: 33.4 GM/DL — SIGNIFICANT CHANGE UP (ref 32–36)
MCV RBC AUTO: 85.4 FL — SIGNIFICANT CHANGE UP (ref 80–100)
MONOCYTES # BLD AUTO: 0.73 K/UL — SIGNIFICANT CHANGE UP (ref 0–0.9)
MONOCYTES NFR BLD AUTO: 10.8 % — SIGNIFICANT CHANGE UP (ref 2–14)
NEUTROPHILS # BLD AUTO: 4.13 K/UL — SIGNIFICANT CHANGE UP (ref 1.8–7.4)
NEUTROPHILS NFR BLD AUTO: 61.3 % — SIGNIFICANT CHANGE UP (ref 43–77)
NRBC # BLD: 0 /100 WBCS — SIGNIFICANT CHANGE UP (ref 0–0)
PLATELET # BLD AUTO: 249 K/UL — SIGNIFICANT CHANGE UP (ref 150–400)
POTASSIUM SERPL-MCNC: 4.3 MMOL/L — SIGNIFICANT CHANGE UP (ref 3.5–5.3)
POTASSIUM SERPL-SCNC: 4.3 MMOL/L — SIGNIFICANT CHANGE UP (ref 3.5–5.3)
PROT SERPL-MCNC: 7 G/DL — SIGNIFICANT CHANGE UP (ref 6–8.3)
RBC # BLD: 4.31 M/UL — SIGNIFICANT CHANGE UP (ref 4.2–5.8)
RBC # FLD: 14 % — SIGNIFICANT CHANGE UP (ref 10.3–14.5)
SODIUM SERPL-SCNC: 136 MMOL/L — SIGNIFICANT CHANGE UP (ref 135–145)
WBC # BLD: 6.74 K/UL — SIGNIFICANT CHANGE UP (ref 3.8–10.5)
WBC # FLD AUTO: 6.74 K/UL — SIGNIFICANT CHANGE UP (ref 3.8–10.5)

## 2019-09-04 PROCEDURE — 93010 ELECTROCARDIOGRAM REPORT: CPT

## 2019-09-04 PROCEDURE — 99223 1ST HOSP IP/OBS HIGH 75: CPT

## 2019-09-04 RX ORDER — AMANTADINE HCL 100 MG
100 CAPSULE ORAL
Refills: 0 | Status: DISCONTINUED | OUTPATIENT
Start: 2019-09-04 | End: 2019-09-09

## 2019-09-04 RX ORDER — AMLODIPINE BESYLATE 2.5 MG/1
5 TABLET ORAL
Refills: 0 | Status: DISCONTINUED | OUTPATIENT
Start: 2019-09-04 | End: 2019-09-06

## 2019-09-04 RX ADMIN — Medication 2: at 21:26

## 2019-09-04 RX ADMIN — Medication 2: at 11:55

## 2019-09-04 RX ADMIN — Medication 5 UNIT(S): at 16:47

## 2019-09-04 RX ADMIN — Medication 12.5 MILLIGRAM(S): at 21:25

## 2019-09-04 RX ADMIN — Medication 100 MILLIGRAM(S): at 06:08

## 2019-09-04 RX ADMIN — ENOXAPARIN SODIUM 40 MILLIGRAM(S): 100 INJECTION SUBCUTANEOUS at 11:57

## 2019-09-04 RX ADMIN — GABAPENTIN 300 MILLIGRAM(S): 400 CAPSULE ORAL at 06:08

## 2019-09-04 RX ADMIN — Medication 3: at 16:47

## 2019-09-04 RX ADMIN — SODIUM CHLORIDE 1 GRAM(S): 9 INJECTION INTRAMUSCULAR; INTRAVENOUS; SUBCUTANEOUS at 13:13

## 2019-09-04 RX ADMIN — Medication 12.5 MILLIGRAM(S): at 13:13

## 2019-09-04 RX ADMIN — CARVEDILOL PHOSPHATE 25 MILLIGRAM(S): 80 CAPSULE, EXTENDED RELEASE ORAL at 17:56

## 2019-09-04 RX ADMIN — Medication 5 UNIT(S): at 11:55

## 2019-09-04 RX ADMIN — Medication 975 MILLIGRAM(S): at 12:18

## 2019-09-04 RX ADMIN — INSULIN GLARGINE 35 UNIT(S): 100 INJECTION, SOLUTION SUBCUTANEOUS at 21:26

## 2019-09-04 RX ADMIN — Medication 1 MILLIGRAM(S): at 11:58

## 2019-09-04 RX ADMIN — Medication 12.5 MILLIGRAM(S): at 06:09

## 2019-09-04 RX ADMIN — AMLODIPINE BESYLATE 5 MILLIGRAM(S): 2.5 TABLET ORAL at 06:08

## 2019-09-04 RX ADMIN — Medication 975 MILLIGRAM(S): at 13:08

## 2019-09-04 RX ADMIN — Medication 100 MILLIGRAM(S): at 13:13

## 2019-09-04 RX ADMIN — LOSARTAN POTASSIUM 100 MILLIGRAM(S): 100 TABLET, FILM COATED ORAL at 06:08

## 2019-09-04 RX ADMIN — Medication 5 UNIT(S): at 07:53

## 2019-09-04 RX ADMIN — GABAPENTIN 300 MILLIGRAM(S): 400 CAPSULE ORAL at 17:56

## 2019-09-04 RX ADMIN — FINASTERIDE 5 MILLIGRAM(S): 5 TABLET, FILM COATED ORAL at 11:58

## 2019-09-04 RX ADMIN — SODIUM CHLORIDE 1 GRAM(S): 9 INJECTION INTRAMUSCULAR; INTRAVENOUS; SUBCUTANEOUS at 06:09

## 2019-09-04 RX ADMIN — CARVEDILOL PHOSPHATE 25 MILLIGRAM(S): 80 CAPSULE, EXTENDED RELEASE ORAL at 06:08

## 2019-09-04 RX ADMIN — TAMSULOSIN HYDROCHLORIDE 0.4 MILLIGRAM(S): 0.4 CAPSULE ORAL at 21:25

## 2019-09-04 RX ADMIN — Medication 975 MILLIGRAM(S): at 20:44

## 2019-09-04 RX ADMIN — Medication 975 MILLIGRAM(S): at 21:44

## 2019-09-04 RX ADMIN — ROSUVASTATIN CALCIUM 5 MILLIGRAM(S): 5 TABLET ORAL at 21:25

## 2019-09-04 RX ADMIN — SODIUM CHLORIDE 1 GRAM(S): 9 INJECTION INTRAMUSCULAR; INTRAVENOUS; SUBCUTANEOUS at 21:26

## 2019-09-04 RX ADMIN — Medication 100 MILLIGRAM(S): at 21:26

## 2019-09-04 NOTE — DIETITIAN INITIAL EVALUATION ADULT. - PERTINENT LABORATORY DATA
(9/4) H/H 12.3/36.8(L0, Na136,K4.3,BUN17< Cr1.06, Etomrim021(H), (8/22) A1c 7.0%  POCT 230,147,[id number]

## 2019-09-04 NOTE — DIETITIAN INITIAL EVALUATION ADULT. - PHYSICAL APPEARANCE
Ht 5'7" Wt 161/73.4kg/other (specify) hollowing in temporal region , wife reports usual physical look.

## 2019-09-04 NOTE — DIETITIAN INITIAL EVALUATION ADULT. - OTHER INFO
74M who suffered a L SDH, R SAH and R frontal IPH after a fall, now with decreased functional mobility, dysphagia, aphasia, hemiplegia, gait instability and ADL impairments.  Patient reports a fair  appetite receiving po Glucerna  2/2 same., UBW reported 160lbs. Food preferences obtained, patient consumed no beef,no pork , however reports consuming chicken.

## 2019-09-04 NOTE — CONSULT NOTE ADULT - SUBJECTIVE AND OBJECTIVE BOX
Patient is a 74y old  Male who presents with a chief complaint of IPH (03 Sep 2019 13:34)    74M pmh CAD s/p PCI on ASA, HTN, HLD, BPH, DM2, on ASA 81mg daily transferred from [facility] unwitnessed fall with finding of intracranial bleed and increased blood on interval scan. Patient initially presented to [facility] unwitnessed fall, + LOC ?syncopal episode 8/21/19, with complaints of dizziness after fall and initial difficulty speaking. Patient did not recall event. CTH showed L hemispheric SDH with SAH and calvarium fx. Per family, he was initially confused and altered. In the [facility] ED, was complaining of headache and dizziness since his fall.  ENT consulted for dizziness, started meclizine. Neurology was consulted for persistent headache, was given IV acetaminophen and solumedrol acutely, then sumatriptan added. Nephrology consulted for hyponatremia, felt is was due to chronic HCTZ use which was discontinued.     24 hour events - patient was admitted to [facility] 9/3.  9/4 after moving his bowels patient ambulated to a chair and felt sudden weakness.  He denies lose of consciousness but felt loss of power, denies having any vision changes, or diaphoresis.  RN took BP and it was 92/56.  His energy returned within approximately 1 minute.     Patient seen and examined at bedside.  REVIEW OF SYSTEMS:  CONSTITUTIONAL: No fever, weight loss, +fatigue  EYES: No eye pain, visual disturbances, or discharge  ENMT:  No difficulty hearing, tinnitus, vertigo; No sinus or throat pain  NECK: No pain or stiffness  BREASTS: No pain, masses, or nipple discharge  RESPIRATORY: No cough, wheezing, chills or hemoptysis; No shortness of breath  CARDIOVASCULAR: No chest pain, palpitations, dizziness, or leg swelling  GASTROINTESTINAL: No abdominal or epigastric pain. No nausea, vomiting, or hematemesis; No diarrhea or constipation. No melena or hematochezia.  GENITOURINARY: No dysuria, frequency, hematuria, or incontinence  NEUROLOGICAL: No headaches, memory loss, +loss of strength, denies numbness, or tremors  SKIN: No itching, burning, rashes, or lesions   LYMPH NODES: No enlarged glands  ENDOCRINE: No heat or cold intolerance; No hair loss  MUSCULOSKELETAL: No joint pain or swelling; No muscle, back, or extremity pain  PSYCHIATRIC: No depression, anxiety, mood swings, or difficulty sleeping  HEME/LYMPH: No easy bruising, or bleeding gums  ALLERY AND IMMUNOLOGIC: No hives or eczema    ALL ROS REVIEWED AND NORMAL EXCEPT AS STATED ABOVE  ALLERGIES:  No Known Allergies    MEDICATIONS:  enoxaparin Injectable 40 milliGRAM(s) SubCutaneous daily  methyl salicylate 14%/menthol 6% Topical Ointment 1 Application(s) Topical every 6 hours PRN  rosuvastatin 5 milliGRAM(s) Oral at bedtime    Vital Signs Last 24 Hrs  T(F): 98.2 (04 Sep 2019 07:34), Max: 98.2 (04 Sep 2019 07:34)  HR: 56 (04 Sep 2019 08:44) (56 - 82)  BP: 118/62 (04 Sep 2019 08:44) (92/56 - 166/72)  RR: 14 (04 Sep 2019 07:34) (14 - 17)  SpO2: 97% (04 Sep 2019 07:34) (97% - 99%)  I&O's Summary    03 Sep 2019 07:01  -  04 Sep 2019 07:00  --------------------------------------------------------  IN: 0 mL / OUT: 450 mL / NET: -450 mL        PHYSICAL EXAM:  General: NAD, A/O x 3  ENT: MMM  Neck: Supple, No JVD  Lungs: Clear to auscultation bilaterally  Cardio: RRR, S1/S2,2/6 systolic murmur  Abdomen: Soft, Nontender, Nondistended; Bowel sounds present  Extremities: No cyanosis, No edema    LABS:                        12.3   6.74  )-----------( 249      ( 04 Sep 2019 05:22 )             36.8     09-04    136  |  102  |  17  ----------------------------<  167  4.3   |  31  |  1.06    Ca    9.2      04 Sep 2019 05:22  Phos  2.6     09-03  Mg     2.0     09-03    TPro  7.0  /  Alb  3.0  /  TBili  0.9  /  DBili  x   /  AST  15  /  ALT  25  /  AlkPhos  63  09-04    eGFR if Non African American: 68 mL/min/1.73M2 (09-04-19 @ 05:22)  eGFR if : 79 mL/min/1.73M2 (09-04-19 @ 05:22)                          POCT Blood Glucose.: 147 mg/dL (04 Sep 2019 07:51)  POCT Blood Glucose.: 297 mg/dL (03 Sep 2019 21:12)  POCT Blood Glucose.: 287 mg/dL (03 Sep 2019 17:40)  POCT Blood Glucose.: 190 mg/dL (03 Sep 2019 13:04)  POCT Blood Glucose.: 109 mg/dL (03 Sep 2019 09:09)    08-22 [id number] 7.0      EKG was repeated showing 1st degree AV block and LBBB that was found on previous EKGs    RADIOLOGY & ADDITIONAL TESTS:    Care Discussed with Consultants/Other Providers: Patient is a 74y old  Male who presents with a chief complaint of IPH (03 Sep 2019 13:34)    74M pmh CAD s/p PCI on ASA, HTN, HLD, BPH, DM2, on ASA 81mg daily transferred from [facility] unwitnessed fall with finding of intracranial bleed and increased blood on interval scan. Patient initially presented to [facility] unwitnessed fall, + LOC ?syncopal episode 8/21/19, with complaints of dizziness after fall and initial difficulty speaking. Patient did not recall event. CTH showed L hemispheric SDH with SAH and calvarium fx. Per family, he was initially confused and altered. In the [facility] ED, was complaining of headache and dizziness since his fall.  ENT consulted for dizziness, started meclizine. Neurology was consulted for persistent headache, was given IV acetaminophen and solumedrol acutely, then sumatriptan added. Nephrology consulted for hyponatremia, felt is was due to chronic HCTZ use which was discontinued.     24 hour events - patient was admitted to [facility] 9/3.  9/4 after moving his bowels patient ambulated to a chair and felt sudden weakness.  He denies lose of consciousness but felt loss of power, denies having any vision changes, or diaphoresis.  RN took BP and it was 92/56.  His energy returned within approximately 1 minute.     Patient seen and examined at bedside.  REVIEW OF SYSTEMS:  CONSTITUTIONAL: No fever, weight loss, +fatigue  EYES: No eye pain, visual disturbances, or discharge  ENMT:  No difficulty hearing, tinnitus, vertigo; No sinus or throat pain  NECK: No pain or stiffness  BREASTS: No pain, masses, or nipple discharge  RESPIRATORY: No cough, wheezing, chills or hemoptysis; No shortness of breath  CARDIOVASCULAR: No chest pain, palpitations, dizziness, or leg swelling  GASTROINTESTINAL: No abdominal or epigastric pain. No nausea, vomiting, or hematemesis; No diarrhea or constipation. No melena or hematochezia.  GENITOURINARY: No dysuria, frequency, hematuria, or incontinence  NEUROLOGICAL: No headaches, memory loss, +loss of strength, denies numbness, or tremors  SKIN: No itching, burning, rashes, or lesions   LYMPH NODES: No enlarged glands  ENDOCRINE: No heat or cold intolerance; No hair loss  MUSCULOSKELETAL: No joint pain or swelling; No muscle, back, or extremity pain  PSYCHIATRIC: No depression, anxiety, mood swings, or difficulty sleeping  HEME/LYMPH: No easy bruising, or bleeding gums  ALLERY AND IMMUNOLOGIC: No hives or eczema    ALL ROS REVIEWED AND NORMAL EXCEPT AS STATED ABOVE  ALLERGIES:  No Known Allergies    MEDICATIONS  (STANDING):  amantadine 100 milliGRAM(s) Oral two times a day  amLODIPine   Tablet 5 milliGRAM(s) Oral daily  carvedilol 25 milliGRAM(s) Oral <User Schedule>  dextrose 5%. 1000 milliLiter(s) (50 mL/Hr) IV Continuous <Continuous>  dextrose 50% Injectable 12.5 Gram(s) IV Push once  dextrose 50% Injectable 25 Gram(s) IV Push once  docusate sodium 100 milliGRAM(s) Oral three times a day  enoxaparin Injectable 40 milliGRAM(s) SubCutaneous daily  finasteride 5 milliGRAM(s) Oral daily  folic acid 1 milliGRAM(s) Oral daily  gabapentin 300 milliGRAM(s) Oral two times a day  insulin glargine Injectable (LANTUS) 35 Unit(s) SubCutaneous at bedtime  insulin lispro (HumaLOG) corrective regimen sliding scale   SubCutaneous three times a day before meals  insulin lispro (HumaLOG) corrective regimen sliding scale   SubCutaneous at bedtime  insulin lispro Injectable (HumaLOG) 5 Unit(s) SubCutaneous three times a day before meals  losartan 100 milliGRAM(s) Oral <User Schedule>  meclizine 12.5 milliGRAM(s) Oral every 8 hours  rosuvastatin 5 milliGRAM(s) Oral at bedtime  sodium chloride 1 Gram(s) Oral three times a day  tamsulosin 0.4 milliGRAM(s) Oral at bedtime    MEDICATIONS  (PRN):  acetaminophen   Tablet .. 975 milliGRAM(s) Oral every 6 hours PRN Moderate Pain (4 - 6)  dextrose 40% Gel 15 Gram(s) Oral once PRN Blood Glucose LESS THAN 70 milliGRAM(s)/deciliter  glucagon  Injectable 1 milliGRAM(s) IntraMuscular once PRN Glucose LESS THAN 70 milligrams/deciliter  methyl salicylate 14%/menthol 6% Topical Ointment 1 Application(s) Topical every 6 hours PRN leg pain  SUMAtriptan 50 milliGRAM(s) Oral every 6 hours PRN Headache      Vital Signs Last 24 Hrs  T(F): 98.2 (04 Sep 2019 07:34), Max: 98.2 (04 Sep 2019 07:34)  HR: 56 (04 Sep 2019 08:44) (56 - 82)  BP: 118/62 (04 Sep 2019 08:44) (92/56 - 166/72)  RR: 14 (04 Sep 2019 07:34) (14 - 17)  SpO2: 97% (04 Sep 2019 07:34) (97% - 99%)  I&O's Summary    03 Sep 2019 07:01  -  04 Sep 2019 07:00  --------------------------------------------------------  IN: 0 mL / OUT: 450 mL / NET: -450 mL        PHYSICAL EXAM:  General: NAD, A/O x 3  ENT: MMM  Neck: Supple, No JVD  Lungs: Clear to auscultation bilaterally  Cardio: RRR, S1/S2,2/6 systolic murmur  Abdomen: Soft, Nontender, Nondistended; Bowel sounds present  Extremities: No cyanosis, No edema    LABS:                        12.3   6.74  )-----------( 249      ( 04 Sep 2019 05:22 )             36.8     09-04    136  |  102  |  17  ----------------------------<  167  4.3   |  31  |  1.06    Ca    9.2      04 Sep 2019 05:22  Phos  2.6     09-03  Mg     2.0     09-03    TPro  7.0  /  Alb  3.0  /  TBili  0.9  /  DBili  x   /  AST  15  /  ALT  25  /  AlkPhos  63  09-04    eGFR if Non African American: 68 mL/min/1.73M2 (09-04-19 @ 05:22)  eGFR if : 79 mL/min/1.73M2 (09-04-19 @ 05:22)                          POCT Blood Glucose.: 147 mg/dL (04 Sep 2019 07:51)  POCT Blood Glucose.: 297 mg/dL (03 Sep 2019 21:12)  POCT Blood Glucose.: 287 mg/dL (03 Sep 2019 17:40)  POCT Blood Glucose.: 190 mg/dL (03 Sep 2019 13:04)  POCT Blood Glucose.: 109 mg/dL (03 Sep 2019 09:09)    08-22 [id number] 7.0      EKG was repeated showing 1st degree AV block and LBBB that was found on previous EKGs    RADIOLOGY & ADDITIONAL TESTS:    Care Discussed with Consultants/Other Providers:

## 2019-09-04 NOTE — DIETITIAN INITIAL EVALUATION ADULT. - PERTINENT MEDS FT
Lantus, Lispro, Norvasc, Tylenol, Symetrel, Coreg,Proscar, Folic acid, Neurontin, Cozaar, Antivert, Crestor, Sodium chloride, Imitrex, Flomax

## 2019-09-04 NOTE — CONSULT NOTE ADULT - ASSESSMENT
ASSESSMENT/PLAN  JHON LUO is a 74M who suffered a L SDH, R SAH and R frontal IPH after a fall, now with decreased functional mobility, dysphagia, aphasia, hemiplegia, gait instability and ADL impairments.    Rehab: Gait Instability, ADL impairments and Functional impairments: start Comprehensive Rehab Program of PT/OT/ST    Acute L SDH, R SAH and R frontal IPH:  - Holding ASA and brillinta  - Amantadine 100mg BID  - Meclizine prn for dizziness  - s/p keppra ppx x1 week, no seizures  - Strict BP control as below  - Statin  - Sumatriptan PRN headache    Pain: Tylenol PRN, Gabapentin 300 BID    CAD s/p RYAN:  - Rosuvastatin 5mg QHS     Diastolic CHF:  - Carvedilol    HTN:  - Amlodipine 5mg daily  - Carvedilol 25mg BID  - Losartan 100mg daily    GI/Bowel: Colace, Senna, Miralax PRN    Diet: Regular, CC    Renal/Bladder: Voiding independently, PVR  - BPH: c/w Tamsulosin, Finasteride    Hyponatremia: Nephrology consulted at [facility], felt it was d/t HCTZ which was not resumed.   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 1hr/day       O.T. 1hr/day      S.L.P. 1hr/day     P&O Unnecessary     EXP FREQUENCY: 5 days per 7 day period     PRESCREEN COMPARISON:   I have reviewed the prescreen information and I have found no relevant changes between the preadmission screening and my post admission evaluation     RATIONALE FOR INPATIENT ADMISSION - Patient demonstrates the following: (check all that apply)  [X] Medically appropriate for rehabilitation admission  [X] Has attainable rehab goals with an appropriate initial discharge plan  [X] Has rehabilitation potential (expected to make a significant improvement within a reasonable period of time)   [X] Requires close medical management by a rehab physician, rehab nursing care, Hospitalist and comprehensive interdisciplinary team (including PT, OT, & or SLP, Prosthetics and Orthotics) ASSESSMENT/PLAN  JHON LUO is a 74M who suffered a L SDH, R SAH and R frontal IPH after a fall, now with decreased functional mobility, dysphagia, aphasia, hemiplegia, gait instability and ADL impairments.    Presyncope/orthostatic hypotension  -educated on leg pumps when changing positions  -will move amlodipine admin time to 12pm   -continue to monitor closely     Rehab: Gait Instability, ADL impairments and Functional impairments: start Comprehensive Rehab Program of PT/OT/ST    Acute L SDH, R SAH and R frontal IPH:  - Holding ASA and brillinta  - Amantadine 100mg BID  - Meclizine prn for dizziness  - s/p keppra ppx x1 week, no seizures  - Strict BP control as below  - Statin  - Sumatriptan PRN headache    Pain: Tylenol PRN, Gabapentin 300 BID    CAD s/p RYAN:  - Rosuvastatin 5mg QHS     Diastolic CHF:  - Carvedilol    HTN:  - Amlodipine 5mg daily  - Carvedilol 25mg BID  - Losartan 100mg daily    GI/Bowel: Colace, Senna, Miralax PRN    Diet: Regular, CC    Renal/Bladder: Voiding independently, PVR  - BPH: c/w Tamsulosin, Finasteride    Hyponatremia: Nephrology consulted at [facility], felt it was d/t HCTZ which was not resumed.   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

## 2019-09-05 LAB
GLUCOSE BLDC GLUCOMTR-MCNC: 164 MG/DL — HIGH (ref 70–99)
GLUCOSE BLDC GLUCOMTR-MCNC: 182 MG/DL — HIGH (ref 70–99)
GLUCOSE BLDC GLUCOMTR-MCNC: 203 MG/DL — HIGH (ref 70–99)
GLUCOSE BLDC GLUCOMTR-MCNC: 278 MG/DL — HIGH (ref 70–99)

## 2019-09-05 PROCEDURE — 99232 SBSQ HOSP IP/OBS MODERATE 35: CPT

## 2019-09-05 RX ORDER — INSULIN LISPRO 100/ML
7 VIAL (ML) SUBCUTANEOUS
Refills: 0 | Status: DISCONTINUED | OUTPATIENT
Start: 2019-09-05 | End: 2019-09-13

## 2019-09-05 RX ORDER — INSULIN LISPRO 100/ML
5 VIAL (ML) SUBCUTANEOUS
Refills: 0 | Status: DISCONTINUED | OUTPATIENT
Start: 2019-09-05 | End: 2019-09-13

## 2019-09-05 RX ADMIN — TAMSULOSIN HYDROCHLORIDE 0.4 MILLIGRAM(S): 0.4 CAPSULE ORAL at 21:23

## 2019-09-05 RX ADMIN — SODIUM CHLORIDE 1 GRAM(S): 9 INJECTION INTRAMUSCULAR; INTRAVENOUS; SUBCUTANEOUS at 13:57

## 2019-09-05 RX ADMIN — Medication 100 MILLIGRAM(S): at 21:23

## 2019-09-05 RX ADMIN — Medication 2: at 17:04

## 2019-09-05 RX ADMIN — Medication 1 MILLIGRAM(S): at 12:04

## 2019-09-05 RX ADMIN — Medication 100 MILLIGRAM(S): at 05:50

## 2019-09-05 RX ADMIN — SODIUM CHLORIDE 1 GRAM(S): 9 INJECTION INTRAMUSCULAR; INTRAVENOUS; SUBCUTANEOUS at 05:50

## 2019-09-05 RX ADMIN — Medication 1: at 12:03

## 2019-09-05 RX ADMIN — ROSUVASTATIN CALCIUM 5 MILLIGRAM(S): 5 TABLET ORAL at 21:23

## 2019-09-05 RX ADMIN — Medication 12.5 MILLIGRAM(S): at 05:50

## 2019-09-05 RX ADMIN — SODIUM CHLORIDE 1 GRAM(S): 9 INJECTION INTRAMUSCULAR; INTRAVENOUS; SUBCUTANEOUS at 21:23

## 2019-09-05 RX ADMIN — Medication 7 UNIT(S): at 17:04

## 2019-09-05 RX ADMIN — Medication 12.5 MILLIGRAM(S): at 21:23

## 2019-09-05 RX ADMIN — Medication 100 MILLIGRAM(S): at 13:57

## 2019-09-05 RX ADMIN — AMLODIPINE BESYLATE 5 MILLIGRAM(S): 2.5 TABLET ORAL at 12:04

## 2019-09-05 RX ADMIN — LOSARTAN POTASSIUM 100 MILLIGRAM(S): 100 TABLET, FILM COATED ORAL at 05:50

## 2019-09-05 RX ADMIN — Medication 975 MILLIGRAM(S): at 22:23

## 2019-09-05 RX ADMIN — GABAPENTIN 300 MILLIGRAM(S): 400 CAPSULE ORAL at 05:50

## 2019-09-05 RX ADMIN — ENOXAPARIN SODIUM 40 MILLIGRAM(S): 100 INJECTION SUBCUTANEOUS at 12:04

## 2019-09-05 RX ADMIN — Medication 1: at 21:22

## 2019-09-05 RX ADMIN — GABAPENTIN 300 MILLIGRAM(S): 400 CAPSULE ORAL at 17:03

## 2019-09-05 RX ADMIN — Medication 975 MILLIGRAM(S): at 05:57

## 2019-09-05 RX ADMIN — Medication 12.5 MILLIGRAM(S): at 13:57

## 2019-09-05 RX ADMIN — INSULIN GLARGINE 35 UNIT(S): 100 INJECTION, SOLUTION SUBCUTANEOUS at 21:21

## 2019-09-05 RX ADMIN — Medication 5 UNIT(S): at 08:10

## 2019-09-05 RX ADMIN — Medication 1: at 08:10

## 2019-09-05 RX ADMIN — Medication 975 MILLIGRAM(S): at 06:50

## 2019-09-05 RX ADMIN — CARVEDILOL PHOSPHATE 25 MILLIGRAM(S): 80 CAPSULE, EXTENDED RELEASE ORAL at 05:51

## 2019-09-05 RX ADMIN — FINASTERIDE 5 MILLIGRAM(S): 5 TABLET, FILM COATED ORAL at 12:04

## 2019-09-05 RX ADMIN — Medication 7 UNIT(S): at 12:04

## 2019-09-05 RX ADMIN — CARVEDILOL PHOSPHATE 25 MILLIGRAM(S): 80 CAPSULE, EXTENDED RELEASE ORAL at 18:08

## 2019-09-05 RX ADMIN — Medication 975 MILLIGRAM(S): at 21:23

## 2019-09-05 NOTE — PROGRESS NOTE ADULT - ASSESSMENT
ASSESSMENT/PLAN  JHON LUO is a 74M who suffered a L SDH, R SAH and R frontal IPH after a fall, now with decreased functional mobility, dysphagia, aphasia, hemiplegia, gait instability and ADL impairments.    Presyncope/orthostatic hypotension  -educated on leg pumps when changing positions  -will move amlodipine admin time to 12pm   -continue to monitor closely     Rehab: Gait Instability, ADL impairments and Functional impairments: start Comprehensive Rehab Program of PT/OT/ST    Acute L SDH, R SAH and R frontal IPH:  - Holding ASA and brillinta  - Amantadine 100mg BID  - Meclizine prn for dizziness  - s/p keppra ppx x1 week, no seizures  - Strict BP control as below  - Statin  - Sumatriptan PRN headache    Pain: Tylenol PRN, Gabapentin 300 BID    CAD s/p RYAN:  - Rosuvastatin 5mg QHS     Diastolic CHF:  - Carvedilol    HTN:  - Amlodipine 5mg daily  - Carvedilol 25mg BID  - Losartan 100mg daily    GI/Bowel: Colace, Senna, Miralax PRN    Diet: Regular, CC    Renal/Bladder: Voiding independently, PVR  - BPH: c/w Tamsulosin, Finasteride    Hyponatremia: Nephrology consulted at [facility], felt it was d/t HCTZ which was not resumed.   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    #Acute L SDH, R SAH and R frontal IPH:  -continue Statin, no ASA due to bleed  - Amantadine 100mg BID  - Meclizine prn for dizziness  - Strict BP control as below  - Sumatriptan PRN headache    #Presyncope/orthostatic hypotension:  - leg pumps when changing positions, Norvasc dosing changed to 12 pm    #CAD s/p RYAN:  - Rosuvastatin 5mg QHS, ASA and Brilinta held due to bleed     #Diastolic CHF: last echo done 8/19; normal LV function, mild AS, grade 2 diastolic dysfunction  - Carvedilol    #Essential HTN:  - Amlodipine 5mg daily  - Carvedilol 25mg BID  - Losartan 100mg daily    #BPH: c/w Tamsulosin, Finasteride    #Hyponatremia, resolved: Nephrology consulted at [facility], felt it was d/t HCTZ which was not resumed.   - c/w NaCl tabs TID, follow Na levels    Type II IDDM w Hyperglycemia: A1c 7.0%; Home meds Janumet 1000/50 BID and Glargine 30U   - c/w Lantus 35 QHS and premeal Insulin, ISS; will continue to adjust Insulin if hyperglycemia continues

## 2019-09-05 NOTE — PROGRESS NOTE ADULT - SUBJECTIVE AND OBJECTIVE BOX
Patient is a 74y old  Male who presents with a chief complaint of IP (04 Sep 2019 08:46)      Patient seen and examined at bedside.    ALLERGIES:  No Known Allergies    MEDICATIONS  (STANDING):  amantadine 100 milliGRAM(s) Oral <User Schedule>  amLODIPine   Tablet 5 milliGRAM(s) Oral <User Schedule>  carvedilol 25 milliGRAM(s) Oral <User Schedule>  dextrose 5%. 1000 milliLiter(s) (50 mL/Hr) IV Continuous <Continuous>  dextrose 50% Injectable 12.5 Gram(s) IV Push once  dextrose 50% Injectable 25 Gram(s) IV Push once  docusate sodium 100 milliGRAM(s) Oral three times a day  enoxaparin Injectable 40 milliGRAM(s) SubCutaneous daily  finasteride 5 milliGRAM(s) Oral daily  folic acid 1 milliGRAM(s) Oral daily  gabapentin 300 milliGRAM(s) Oral two times a day  insulin glargine Injectable (LANTUS) 35 Unit(s) SubCutaneous at bedtime  insulin lispro (HumaLOG) corrective regimen sliding scale   SubCutaneous three times a day before meals  insulin lispro (HumaLOG) corrective regimen sliding scale   SubCutaneous at bedtime  insulin lispro Injectable (HumaLOG) 5 Unit(s) SubCutaneous before breakfast  insulin lispro Injectable (HumaLOG) 7 Unit(s) SubCutaneous before lunch  insulin lispro Injectable (HumaLOG) 7 Unit(s) SubCutaneous before dinner  losartan 100 milliGRAM(s) Oral <User Schedule>  meclizine 12.5 milliGRAM(s) Oral every 8 hours  rosuvastatin 5 milliGRAM(s) Oral at bedtime  sodium chloride 1 Gram(s) Oral three times a day  tamsulosin 0.4 milliGRAM(s) Oral at bedtime    MEDICATIONS  (PRN):  acetaminophen   Tablet .. 975 milliGRAM(s) Oral every 6 hours PRN Moderate Pain (4 - 6)  dextrose 40% Gel 15 Gram(s) Oral once PRN Blood Glucose LESS THAN 70 milliGRAM(s)/deciliter  glucagon  Injectable 1 milliGRAM(s) IntraMuscular once PRN Glucose LESS THAN 70 milligrams/deciliter  methyl salicylate 14%/menthol 6% Topical Ointment 1 Application(s) Topical every 6 hours PRN leg pain  SUMAtriptan 50 milliGRAM(s) Oral every 6 hours PRN Headache    Vital Signs Last 24 Hrs  T(F): 97.8 (05 Sep 2019 08:12), Max: 98.3 (04 Sep 2019 20:09)  HR: 65 (05 Sep 2019 08:12) (65 - 74)  BP: 114/65 (05 Sep 2019 08:12) (114/65 - 170/69)  RR: 14 (05 Sep 2019 08:12) (14 - 14)  SpO2: 98% (05 Sep 2019 08:12) (98% - 99%)  I&O's Summary    04 Sep 2019 07:01  -  05 Sep 2019 07:00  --------------------------------------------------------  IN: 0 mL / OUT: 1 mL / NET: -1 mL      PHYSICAL EXAM:  General: NAD, A/O x 3  ENT: MMM  Neck: Supple, No JVD  Lungs: Clear to auscultation bilaterally  Cardio: RRR, S1/S2, No murmurs  Abdomen: Soft, Nontender, Nondistended; Bowel sounds present  Extremities: No calf tenderness, No pitting edema    LABS:                        12.3   6.74  )-----------( 249      ( 04 Sep 2019 05:22 )             36.8     09-04    136  |  102  |  17  ----------------------------<  167  4.3   |  31  |  1.06    Ca    9.2      04 Sep 2019 05:22  Phos  2.6     09-03  Mg     2.0     09-03    TPro  7.0  /  Alb  3.0  /  TBili  0.9  /  DBili  x   /  AST  15  /  ALT  25  /  AlkPhos  63  09-04    eGFR if Non African American: 68 mL/min/1.73M2 (09-04-19 @ 05:22)  eGFR if : 79 mL/min/1.73M2 (09-04-19 @ 05:22)        POCT Blood Glucose.: 164 mg/dL (05 Sep 2019 08:07)  POCT Blood Glucose.: 308 mg/dL (04 Sep 2019 21:23)  POCT Blood Glucose.: 257 mg/dL (04 Sep 2019 16:44)  POCT Blood Glucose.: 230 mg/dL (04 Sep 2019 11:54)    08-22 [id number] 7.0          RADIOLOGY & ADDITIONAL TESTS:    Care Discussed with Consultants/Other Providers: Patient is a 74y old  Male who presents with a chief complaint of IPH (04 Sep 2019 08:46)  Patient seen and examined at bedside.  Pt sitting in chair; family at bedside, feeling better overall.  No complaints.    ALLERGIES:  No Known Allergies    MEDICATIONS  (STANDING):  amantadine 100 milliGRAM(s) Oral <User Schedule>  amLODIPine   Tablet 5 milliGRAM(s) Oral <User Schedule>  carvedilol 25 milliGRAM(s) Oral <User Schedule>  docusate sodium 100 milliGRAM(s) Oral three times a day  enoxaparin Injectable 40 milliGRAM(s) SubCutaneous daily  finasteride 5 milliGRAM(s) Oral daily  folic acid 1 milliGRAM(s) Oral daily  gabapentin 300 milliGRAM(s) Oral two times a day  insulin glargine Injectable (LANTUS) 35 Unit(s) SubCutaneous at bedtime  insulin lispro (HumaLOG) corrective regimen sliding scale   SubCutaneous three times a day before meals  insulin lispro (HumaLOG) corrective regimen sliding scale   SubCutaneous at bedtime  insulin lispro Injectable (HumaLOG) 5 Unit(s) SubCutaneous before breakfast  insulin lispro Injectable (HumaLOG) 7 Unit(s) SubCutaneous before lunch  insulin lispro Injectable (HumaLOG) 7 Unit(s) SubCutaneous before dinner  losartan 100 milliGRAM(s) Oral <User Schedule>  meclizine 12.5 milliGRAM(s) Oral every 8 hours  rosuvastatin 5 milliGRAM(s) Oral at bedtime  sodium chloride 1 Gram(s) Oral three times a day  tamsulosin 0.4 milliGRAM(s) Oral at bedtime    MEDICATIONS  (PRN):  acetaminophen   Tablet .. 975 milliGRAM(s) Oral every 6 hours PRN Moderate Pain (4 - 6)  dextrose 40% Gel 15 Gram(s) Oral once PRN Blood Glucose LESS THAN 70 milliGRAM(s)/deciliter  glucagon  Injectable 1 milliGRAM(s) IntraMuscular once PRN Glucose LESS THAN 70 milligrams/deciliter  methyl salicylate 14%/menthol 6% Topical Ointment 1 Application(s) Topical every 6 hours PRN leg pain  SUMAtriptan 50 milliGRAM(s) Oral every 6 hours PRN Headache    Vital Signs Last 24 Hrs  T(F): 97.8 (05 Sep 2019 08:12), Max: 98.3 (04 Sep 2019 20:09)  HR: 65 (05 Sep 2019 08:12) (65 - 74)  BP: 114/65 (05 Sep 2019 08:12) (114/65 - 170/69)  RR: 14 (05 Sep 2019 08:12) (14 - 14)  SpO2: 98% (05 Sep 2019 08:12) (98% - 99%)  I&O's Summary    04 Sep 2019 07:01  -  05 Sep 2019 07:00  --------------------------------------------------------  IN: 0 mL / OUT: 1 mL / NET: -1 mL      PHYSICAL EXAM:  General: NAD, A/O x 3  ENT: MMM  Neck: Supple, No JVD  Lungs: Clear to auscultation bilaterally  Cardio: RRR, S1/S2, No murmurs  Abdomen: Soft, Nontender, Nondistended; Bowel sounds present  Extremities: No calf tenderness, No pitting edema  Neuro: mild dysarthria, follows commands, moving exts well    LABS:                        12.3   6.74  )-----------( 249      ( 04 Sep 2019 05:22 )             36.8     09-04    136  |  102  |  17  ----------------------------<  167  4.3   |  31  |  1.06    Ca    9.2      04 Sep 2019 05:22  Phos  2.6     09-03  Mg     2.0     09-03    TPro  7.0  /  Alb  3.0  /  TBili  0.9  /  DBili  x   /  AST  15  /  ALT  25  /  AlkPhos  63  09-04    eGFR if Non African American: 68 mL/min/1.73M2 (09-04-19 @ 05:22)  eGFR if : 79 mL/min/1.73M2 (09-04-19 @ 05:22)        POCT Blood Glucose.: 164 mg/dL (05 Sep 2019 08:07)  POCT Blood Glucose.: 308 mg/dL (04 Sep 2019 21:23)  POCT Blood Glucose.: 257 mg/dL (04 Sep 2019 16:44)  POCT Blood Glucose.: 230 mg/dL (04 Sep 2019 11:54)    08-22 [id number] 7.0          RADIOLOGY & ADDITIONAL TESTS:    Care Discussed with Consultants/Other Providers:

## 2019-09-05 NOTE — PROGRESS NOTE ADULT - SUBJECTIVE AND OBJECTIVE BOX
DAILY PROGRESS NOTE:  HPI: 74M pmh CAD s/p PCI on ASA, HTN, HLD, BPH, DM2, on ASA 81mg daily transferred from [facility] unwitnessed fall with finding of intracranial bleed and increased blood on interval scan. Patient initially presented to [facility] unwitnessed fall, + LOC ?syncopal episode 19, with complaints of dizziness after fall and initial difficulty speaking. Patient did not recall event. CTH showed L hemispheric SDH with SAH and calvarium fx. Per family, he was initially confused and altered. In the [facility] ED, was complaining of headache and dizziness since his fall.  ENT consulted for dizziness, started meclizine. Neurology was consulted for persistent headache, was given IV acetaminophen and solumedrol acutely, then sumatriptan added. Nephrology consulted for hyponatremia, felt is was due to chronic HCTZ use which was discontinued. (03 Sep 2019 13:34)    Subjective: Patient seen and examined at bedside with wife present. Did not take imitrex last night for headache. Does report that two nights ago after he took imitrex he had bilateral leg cramping. Refused amantadine this morning because he wanted to speak to the doctor about it. Informed patient that amantadine was being tapered off because of his improved arousal, also it may be causing or exacerbating his headaches.     ROS: Headache, improving    Physical Exam:  Vital Signs Last 24 Hrs  T(C): 36.6 (05 Sep 2019 08:12), Max: 36.8 (04 Sep 2019 20:09)  T(F): 97.8 (05 Sep 2019 08:12), Max: 98.3 (04 Sep 2019 20:09)  HR: 65 (05 Sep 2019 08:12) (65 - 74)  BP: 114/65 (05 Sep 2019 08:12) (114/65 - 170/69)  BP(mean): --  RR: 14 (05 Sep 2019 08:12) (14 - 14)  SpO2: 98% (05 Sep 2019 08:12) (98% - 99%)    Constitutional - NAD, Comfortable  Chest - CTAB  Cardiovascular - RRR, S1S2, no m/r/g  Abdomen - BS+, Soft, NTND  Extremities - No C/C/E, No calf tenderness   Neurologic Exam -                    Cognitive - Awake, Alert, AAO to self, place, date, year, situation     Communication - Fluent, No dysarthria     Motor - Stable     Sensory - Intact to LT     Reflexes - 2+ b/l patellar, symmetric  Psychiatric - Mood stable, Affect WNL    Recent Labs/Imagin.3   6.74  )-----------( 249      ( 04 Sep 2019 05:22 )             36.8         136  |  102  |  17  ----------------------------<  167<H>  4.3   |  31  |  1.06    Ca    9.2      04 Sep 2019 05:22    TPro  7.0  /  Alb  3.0<L>  /  TBili  0.9  /  DBili  x   /  AST  15  /  ALT  25  /  AlkPhos  63      POCT Blood Glucose.: 164 mg/dL (19 @ 08:07)  POCT Blood Glucose.: 308 mg/dL (19 @ 21:23)  POCT Blood Glucose.: 257 mg/dL (19 @ 16:44)  POCT Blood Glucose.: 230 mg/dL (19 @ 11:54)    MEDICATIONS  (STANDING):  amantadine 100 milliGRAM(s) Oral <User Schedule>  amLODIPine   Tablet 5 milliGRAM(s) Oral <User Schedule>  carvedilol 25 milliGRAM(s) Oral <User Schedule>  dextrose 5%. 1000 milliLiter(s) (50 mL/Hr) IV Continuous <Continuous>  dextrose 50% Injectable 12.5 Gram(s) IV Push once  dextrose 50% Injectable 25 Gram(s) IV Push once  docusate sodium 100 milliGRAM(s) Oral three times a day  enoxaparin Injectable 40 milliGRAM(s) SubCutaneous daily  finasteride 5 milliGRAM(s) Oral daily  folic acid 1 milliGRAM(s) Oral daily  gabapentin 300 milliGRAM(s) Oral two times a day  insulin glargine Injectable (LANTUS) 35 Unit(s) SubCutaneous at bedtime  insulin lispro (HumaLOG) corrective regimen sliding scale   SubCutaneous three times a day before meals  insulin lispro (HumaLOG) corrective regimen sliding scale   SubCutaneous at bedtime  insulin lispro Injectable (HumaLOG) 5 Unit(s) SubCutaneous before breakfast  insulin lispro Injectable (HumaLOG) 7 Unit(s) SubCutaneous before lunch  insulin lispro Injectable (HumaLOG) 7 Unit(s) SubCutaneous before dinner  losartan 100 milliGRAM(s) Oral <User Schedule>  meclizine 12.5 milliGRAM(s) Oral every 8 hours  rosuvastatin 5 milliGRAM(s) Oral at bedtime  sodium chloride 1 Gram(s) Oral three times a day  tamsulosin 0.4 milliGRAM(s) Oral at bedtime    MEDICATIONS  (PRN):  acetaminophen   Tablet .. 975 milliGRAM(s) Oral every 6 hours PRN Moderate Pain (4 - 6)  dextrose 40% Gel 15 Gram(s) Oral once PRN Blood Glucose LESS THAN 70 milliGRAM(s)/deciliter  glucagon  Injectable 1 milliGRAM(s) IntraMuscular once PRN Glucose LESS THAN 70 milligrams/deciliter  methyl salicylate 14%/menthol 6% Topical Ointment 1 Application(s) Topical every 6 hours PRN leg pain  SUMAtriptan 50 milliGRAM(s) Oral every 6 hours PRN Headache    Assessment/Plan:  JHON LUO is a 74M who suffered a L SDH, R SAH and R frontal IPH after a fall, now with decreased functional mobility, dysphagia, aphasia, hemiplegia, gait instability and ADL impairments.    Rehab: Gait Instability, ADL impairments and Functional impairments: start Comprehensive Rehab Program of PT/OT/ST    Acute L SDH, R SAH and R frontal IPH:  - Holding ASA and brillinta  - Tapering off Amantadine as patient's arousal is much improved  - Meclizine prn for dizziness  - s/p keppra ppx x1 week, no seizures  - Strict BP control as below  - Statin  - Sumatriptan PRN headache    Pain: Tylenol PRN, Gabapentin 300 BID    CAD s/p RYAN:  - Rosuvastatin 5mg QHS     Diastolic CHF:  - Carvedilol    HTN:  - Amlodipine 5mg daily  - Carvedilol 25mg BID  - Losartan 100mg daily    GI/Bowel: Colace, Senna, Miralax PRN    Diet: Regular, CC    Renal/Bladder: Voiding independently, d/c PVR  - BPH: c/w Tamsulosin, Finasteride    Hyponatremia: Nephrology consulted at [facility], felt it was d/t HCTZ which was not resumed.   - Na 136   - c/w NaCl tabs TID     Type II IDDM: A1c 7.0%; Home meds Janumet 1000/50 BID and Glargine 30U   - FS elevated in evening 200's-low 300's.  - c/w Lantus 35 QHS  - Increased pre-lunch and pre-dinner humalog to 7U. c/w pre-breakfast humalog 5U.  - c/w sliding scale  - c/w hypoglycemia protocol    Precautions / PROPHYLAXIS:   - Falls, Cardiac, Sternal, Spinal, Seizure   - Ortho: Weight bearing status: WBAT   - Lungs: Aspiration, Incentive Spirometer   - Pressure injury/Skin: Turn Q2hrs while in bed, OOB to Chair, PT/OT    - DVT: Lovenox, SCDs, TEDs     Dispo: IDT meeting 19: Supervision with eating and grooming, Min A for dressing and toilet transfer, Min A for transfers and amb 75ft RW, mild cog and attention deficits. Goals for Mod I with Transfers, Ambulation with RW and ADLs.   STEVAN: 19

## 2019-09-06 DIAGNOSIS — S06.5X9A TRAUMATIC SUBDURAL HEMORRHAGE WITH LOSS OF CONSCIOUSNESS OF UNSPECIFIED DURATION, INITIAL ENCOUNTER: ICD-10-CM

## 2019-09-06 LAB
GLUCOSE BLDC GLUCOMTR-MCNC: 189 MG/DL — HIGH (ref 70–99)
GLUCOSE BLDC GLUCOMTR-MCNC: 210 MG/DL — HIGH (ref 70–99)
GLUCOSE BLDC GLUCOMTR-MCNC: 230 MG/DL — HIGH (ref 70–99)
GLUCOSE BLDC GLUCOMTR-MCNC: 281 MG/DL — HIGH (ref 70–99)

## 2019-09-06 PROCEDURE — 99232 SBSQ HOSP IP/OBS MODERATE 35: CPT

## 2019-09-06 PROCEDURE — 99233 SBSQ HOSP IP/OBS HIGH 50: CPT

## 2019-09-06 RX ORDER — MECLIZINE HCL 12.5 MG
12.5 TABLET ORAL EVERY 8 HOURS
Refills: 0 | Status: DISCONTINUED | OUTPATIENT
Start: 2019-09-06 | End: 2019-09-09

## 2019-09-06 RX ORDER — AMLODIPINE BESYLATE 2.5 MG/1
10 TABLET ORAL
Refills: 0 | Status: DISCONTINUED | OUTPATIENT
Start: 2019-09-06 | End: 2019-09-13

## 2019-09-06 RX ORDER — INSULIN GLARGINE 100 [IU]/ML
40 INJECTION, SOLUTION SUBCUTANEOUS AT BEDTIME
Refills: 0 | Status: DISCONTINUED | OUTPATIENT
Start: 2019-09-06 | End: 2019-09-13

## 2019-09-06 RX ADMIN — TAMSULOSIN HYDROCHLORIDE 0.4 MILLIGRAM(S): 0.4 CAPSULE ORAL at 22:03

## 2019-09-06 RX ADMIN — Medication 2: at 17:06

## 2019-09-06 RX ADMIN — Medication 7 UNIT(S): at 17:06

## 2019-09-06 RX ADMIN — CARVEDILOL PHOSPHATE 25 MILLIGRAM(S): 80 CAPSULE, EXTENDED RELEASE ORAL at 06:07

## 2019-09-06 RX ADMIN — ROSUVASTATIN CALCIUM 5 MILLIGRAM(S): 5 TABLET ORAL at 22:03

## 2019-09-06 RX ADMIN — Medication 2: at 12:29

## 2019-09-06 RX ADMIN — SODIUM CHLORIDE 1 GRAM(S): 9 INJECTION INTRAMUSCULAR; INTRAVENOUS; SUBCUTANEOUS at 06:07

## 2019-09-06 RX ADMIN — Medication 1: at 22:06

## 2019-09-06 RX ADMIN — Medication 100 MILLIGRAM(S): at 06:07

## 2019-09-06 RX ADMIN — SODIUM CHLORIDE 1 GRAM(S): 9 INJECTION INTRAMUSCULAR; INTRAVENOUS; SUBCUTANEOUS at 13:05

## 2019-09-06 RX ADMIN — Medication 975 MILLIGRAM(S): at 14:45

## 2019-09-06 RX ADMIN — Medication 100 MILLIGRAM(S): at 07:49

## 2019-09-06 RX ADMIN — ENOXAPARIN SODIUM 40 MILLIGRAM(S): 100 INJECTION SUBCUTANEOUS at 12:29

## 2019-09-06 RX ADMIN — Medication 12.5 MILLIGRAM(S): at 22:03

## 2019-09-06 RX ADMIN — Medication 975 MILLIGRAM(S): at 22:55

## 2019-09-06 RX ADMIN — FINASTERIDE 5 MILLIGRAM(S): 5 TABLET, FILM COATED ORAL at 12:29

## 2019-09-06 RX ADMIN — AMLODIPINE BESYLATE 10 MILLIGRAM(S): 2.5 TABLET ORAL at 13:05

## 2019-09-06 RX ADMIN — INSULIN GLARGINE 40 UNIT(S): 100 INJECTION, SOLUTION SUBCUTANEOUS at 22:03

## 2019-09-06 RX ADMIN — Medication 100 MILLIGRAM(S): at 13:05

## 2019-09-06 RX ADMIN — Medication 12.5 MILLIGRAM(S): at 13:05

## 2019-09-06 RX ADMIN — Medication 1 MILLIGRAM(S): at 12:29

## 2019-09-06 RX ADMIN — Medication 100 MILLIGRAM(S): at 22:03

## 2019-09-06 RX ADMIN — Medication 12.5 MILLIGRAM(S): at 06:07

## 2019-09-06 RX ADMIN — LOSARTAN POTASSIUM 100 MILLIGRAM(S): 100 TABLET, FILM COATED ORAL at 06:07

## 2019-09-06 RX ADMIN — GABAPENTIN 300 MILLIGRAM(S): 400 CAPSULE ORAL at 18:07

## 2019-09-06 RX ADMIN — SODIUM CHLORIDE 1 GRAM(S): 9 INJECTION INTRAMUSCULAR; INTRAVENOUS; SUBCUTANEOUS at 22:03

## 2019-09-06 RX ADMIN — Medication 975 MILLIGRAM(S): at 14:07

## 2019-09-06 RX ADMIN — GABAPENTIN 300 MILLIGRAM(S): 400 CAPSULE ORAL at 06:08

## 2019-09-06 RX ADMIN — Medication 5 UNIT(S): at 07:49

## 2019-09-06 RX ADMIN — Medication 1: at 07:50

## 2019-09-06 RX ADMIN — Medication 7 UNIT(S): at 12:29

## 2019-09-06 RX ADMIN — Medication 975 MILLIGRAM(S): at 22:16

## 2019-09-06 RX ADMIN — CARVEDILOL PHOSPHATE 25 MILLIGRAM(S): 80 CAPSULE, EXTENDED RELEASE ORAL at 18:07

## 2019-09-06 NOTE — PROGRESS NOTE ADULT - SUBJECTIVE AND OBJECTIVE BOX
Patient is a 74y old  Male who presents with a chief complaint of IPH.  Pt doing better to day than yesterday, Dizziness is improved .  pt showered and up in chair awaiting breakfast.        Patient seen and examined at bedside.    ALLERGIES:  No Known Allergies    MEDICATIONS  (STANDING):  amantadine 100 milliGRAM(s) Oral <User Schedule>  amLODIPine   Tablet 5 milliGRAM(s) Oral <User Schedule>  carvedilol 25 milliGRAM(s) Oral <User Schedule>  dextrose 5%. 1000 milliLiter(s) (50 mL/Hr) IV Continuous <Continuous>  dextrose 50% Injectable 12.5 Gram(s) IV Push once  dextrose 50% Injectable 25 Gram(s) IV Push once  docusate sodium 100 milliGRAM(s) Oral three times a day  enoxaparin Injectable 40 milliGRAM(s) SubCutaneous daily  finasteride 5 milliGRAM(s) Oral daily  folic acid 1 milliGRAM(s) Oral daily  gabapentin 300 milliGRAM(s) Oral two times a day  insulin glargine Injectable (LANTUS) 35 Unit(s) SubCutaneous at bedtime  insulin lispro (HumaLOG) corrective regimen sliding scale   SubCutaneous three times a day before meals  insulin lispro (HumaLOG) corrective regimen sliding scale   SubCutaneous at bedtime  insulin lispro Injectable (HumaLOG) 5 Unit(s) SubCutaneous before breakfast  insulin lispro Injectable (HumaLOG) 7 Unit(s) SubCutaneous before lunch  insulin lispro Injectable (HumaLOG) 7 Unit(s) SubCutaneous before dinner  losartan 100 milliGRAM(s) Oral <User Schedule>  meclizine 12.5 milliGRAM(s) Oral every 8 hours  rosuvastatin 5 milliGRAM(s) Oral at bedtime  sodium chloride 1 Gram(s) Oral three times a day  tamsulosin 0.4 milliGRAM(s) Oral at bedtime    MEDICATIONS  (PRN):  acetaminophen   Tablet .. 975 milliGRAM(s) Oral every 6 hours PRN Moderate Pain (4 - 6)  dextrose 40% Gel 15 Gram(s) Oral once PRN Blood Glucose LESS THAN 70 milliGRAM(s)/deciliter  glucagon  Injectable 1 milliGRAM(s) IntraMuscular once PRN Glucose LESS THAN 70 milligrams/deciliter  methyl salicylate 14%/menthol 6% Topical Ointment 1 Application(s) Topical every 6 hours PRN leg pain  SUMAtriptan 50 milliGRAM(s) Oral every 6 hours PRN Headache    Vital Signs Last 24 Hrs  T(F): 98 (06 Sep 2019 08:41), Max: 98.1 (05 Sep 2019 19:24)  HR: 72 (06 Sep 2019 08:41) (67 - 83)  BP: 123/73 (06 Sep 2019 08:41) (123/73 - 165/71)  RR: 14 (06 Sep 2019 08:41) (14 - 14)  SpO2: 98% (06 Sep 2019 08:41) (96% - 98%)  I&O's Summary    PHYSICAL EXAM:  General: NAD, A/O x 3  ENT: MMM  Neck: Supple, No JVD  Lungs: Clear to auscultation bilaterally, Non labored breathing   Cardio: RRR, S1/S2, No murmurs  Abdomen: Soft, Nontender, Nondistended; Bowel sounds present  Extremities: No calf tenderness, No pitting edema  Skin :  no rashes, wounds or lesions     LABS:                        12.3   6.74  )-----------( 249      ( 04 Sep 2019 05:22 )             36.8     09-04    136  |  102  |  17  ----------------------------<  167  4.3   |  31  |  1.06    Ca    9.2      04 Sep 2019 05:22    TPro  7.0  /  Alb  3.0  /  TBili  0.9  /  DBili  x   /  AST  15  /  ALT  25  /  AlkPhos  63  09-04    eGFR if Non African American: 68 mL/min/1.73M2 (09-04-19 @ 05:22)  eGFR if : 79 mL/min/1.73M2 (09-04-19 @ 05:22)                          POCT Blood Glucose.: 189 mg/dL (06 Sep 2019 07:46)  POCT Blood Glucose.: 278 mg/dL (05 Sep 2019 21:20)  POCT Blood Glucose.: 203 mg/dL (05 Sep 2019 17:00)  POCT Blood Glucose.: 182 mg/dL (05 Sep 2019 12:01)    08-22 [id number] 7.0        RADIOLOGY & ADDITIONAL TESTS:    Care Discussed with Consultants/Other Providers:

## 2019-09-06 NOTE — PROGRESS NOTE ADULT - ASSESSMENT
ASSESSMENT/PLAN:  74M who suffered a L SDH, R SAH and R frontal IPH after a fall, now with decreased functional mobility, dysphagia, aphasia, hemiplegia, gait instability and ADL impairments.    #Acute L SDH, R SAH and R frontal IPH:  -continue Statin, no ASA-- secondary to bleed  to bleed  - Amantadine 100mg BID  - Meclizine prn for dizziness  - Strict BP control as below  - Sumatriptan PRN headache    #Presyncope/orthostatic hypotension:-- improved today   - leg pumps when changing positions, Norvasc dosing changed to 12 pm    #CAD s/p RYAN:  - Rosuvastatin 5mg QHS, (ASA and Brilinta held due to bleed )    #Diastolic CHF: last echo done 8/19; normal LV function, mild AS, grade 2 diastolic dysfunction  - Carvedilol    #Essential HTN: -165   - Amlodipine 5mg daily  - Carvedilol 25mg BID  - Losartan 100mg daily  continue to monitor for now     #BPH: c/w Tamsulosin, Finasteride    #Hyponatremia, resolved: Nephrology consulted at [facility], felt it was d/t HCTZ which was not resumed.   - c/w NaCl tabs TID, follow Na levels    Type II IDDM w Hyperglycemia: A1c 7.0%; Home meds Janumet 1000/50 BID and Glargine 30U   - increase  Lantus to  40 QHS and premeal Insulin (5-7-7), ISS; will continue monitor and adjust as needed

## 2019-09-06 NOTE — PROGRESS NOTE ADULT - SUBJECTIVE AND OBJECTIVE BOX
DAILY PROGRESS NOTE:  HPI: 74M pmh CAD s/p PCI on ASA, HTN, HLD, BPH, DM2, on ASA 81mg daily transferred from [facility] unwitnessed fall with finding of intracranial bleed and increased blood on interval scan. Patient initially presented to [facility] unwitnessed fall, + LOC ?syncopal episode 19, with complaints of dizziness after fall and initial difficulty speaking. Patient did not recall event. CTH showed L hemispheric SDH with SAH and calvarium fx. Per family, he was initially confused and altered. In the [facility] ED, was complaining of headache and dizziness since his fall.  ENT consulted for dizziness, started meclizine. Neurology was consulted for persistent headache, was given IV acetaminophen and solumedrol acutely, then sumatriptan added. Nephrology consulted for hyponatremia, felt is was due to chronic HCTZ use which was discontinued. (03 Sep 2019 13:34)    Subjective: Patient seen and examined at bedside. Reports headache has improved. Slept last night, however with frequent awakenings. Deferred melatonin, would like to take as little medication as possible.     ROS: Headache, improving    Physical Exam:  Vital Signs Last 24 Hrs  T(C): 36.7 (06 Sep 2019 08:41), Max: 36.7 (05 Sep 2019 19:24)  T(F): 98 (06 Sep 2019 08:41), Max: 98.1 (05 Sep 2019 19:24)  HR: 72 (06 Sep 2019 08:41) (70 - 83)  BP: 123/73 (06 Sep 2019 08:41) (123/73 - 165/71)  BP(mean): --  RR: 14 (06 Sep 2019 08:41) (14 - 14)  SpO2: 98% (06 Sep 2019 08:41) (96% - 98%)    Constitutional - NAD, Comfortable  Chest - CTAB  Cardiovascular - RRR, S1S2, no m/r/g  Abdomen - BS+, Soft, NTND  Extremities - No C/C/E, No calf tenderness   Neurologic Exam -                    Cognitive - Awake, Alert, AAO to self, place, date, year, situation     Communication - Fluent, No dysarthria     Motor - Stable     Sensory - Intact to LT     Reflexes - 2+ b/l patellar, symmetric  Psychiatric - Mood stable, Affect WNL    Recent Labs/Imagin.3   6.74  )-----------( 249      ( 04 Sep 2019 05:22 )             36.8     09-    136  |  102  |  17  ----------------------------<  167<H>  4.3   |  31  |  1.06    Ca    9.2      04 Sep 2019 05:22    TPro  7.0  /  Alb  3.0<L>  /  TBili  0.9  /  DBili  x   /  AST  15  /  ALT  25  /  AlkPhos  63      CAPILLARY BLOOD GLUCOSE  POCT Blood Glucose.: 189 mg/dL (06 Sep 2019 07:46)  POCT Blood Glucose.: 278 mg/dL (05 Sep 2019 21:20)  POCT Blood Glucose.: 203 mg/dL (05 Sep 2019 17:00)    MEDICATIONS  (STANDING):  amantadine 100 milliGRAM(s) Oral <User Schedule>  amLODIPine   Tablet 5 milliGRAM(s) Oral <User Schedule>  carvedilol 25 milliGRAM(s) Oral <User Schedule>  dextrose 5%. 1000 milliLiter(s) (50 mL/Hr) IV Continuous <Continuous>  dextrose 50% Injectable 12.5 Gram(s) IV Push once  dextrose 50% Injectable 25 Gram(s) IV Push once  docusate sodium 100 milliGRAM(s) Oral three times a day  enoxaparin Injectable 40 milliGRAM(s) SubCutaneous daily  finasteride 5 milliGRAM(s) Oral daily  folic acid 1 milliGRAM(s) Oral daily  gabapentin 300 milliGRAM(s) Oral two times a day  insulin glargine Injectable (LANTUS) 40 Unit(s) SubCutaneous at bedtime  insulin lispro (HumaLOG) corrective regimen sliding scale   SubCutaneous three times a day before meals  insulin lispro (HumaLOG) corrective regimen sliding scale   SubCutaneous at bedtime  insulin lispro Injectable (HumaLOG) 5 Unit(s) SubCutaneous before breakfast  insulin lispro Injectable (HumaLOG) 7 Unit(s) SubCutaneous before lunch  insulin lispro Injectable (HumaLOG) 7 Unit(s) SubCutaneous before dinner  losartan 100 milliGRAM(s) Oral <User Schedule>  meclizine 12.5 milliGRAM(s) Oral every 8 hours  rosuvastatin 5 milliGRAM(s) Oral at bedtime  sodium chloride 1 Gram(s) Oral three times a day  tamsulosin 0.4 milliGRAM(s) Oral at bedtime    MEDICATIONS  (PRN):  acetaminophen   Tablet .. 975 milliGRAM(s) Oral every 6 hours PRN Moderate Pain (4 - 6)  dextrose 40% Gel 15 Gram(s) Oral once PRN Blood Glucose LESS THAN 70 milliGRAM(s)/deciliter  glucagon  Injectable 1 milliGRAM(s) IntraMuscular once PRN Glucose LESS THAN 70 milligrams/deciliter  methyl salicylate 14%/menthol 6% Topical Ointment 1 Application(s) Topical every 6 hours PRN leg pain    Assessment/Plan:  JHON LUO is a 74M who suffered a L SDH, R SAH and R frontal IPH after a fall, now with decreased functional mobility, dysphagia, aphasia, hemiplegia, gait instability and ADL impairments.    Rehab: Gait Instability, ADL impairments and Functional impairments: continue Comprehensive Rehab Program of PT/OT/ST    Acute L SDH, R SAH and R frontal IPH:  - Holding ASA and brillinta  - Tapering off Amantadine as patient's arousal is much improved  - Meclizine prn for dizziness  - s/p keppra ppx x1 week, no seizures  - Strict BP control as below  - Statin  - Sumatriptan d/c, headache improved and patient felt was giving him leg cramps as side effect.     Pain: Tylenol PRN, Gabapentin 300 BID    CAD s/p RYAN:  - Rosuvastatin 5mg QHS     Diastolic CHF:  - Carvedilol    HTN: SBP ranging 114-165  - Increase Amlodipine to 10mg for better control.   - Carvedilol 25mg BID  - Losartan 100mg daily    GI/Bowel: Colace, Senna, Miralax PRN    Diet: Regular, CC    Renal/Bladder: Voiding independently, d/c PVR  - BPH: c/w Tamsulosin, Finasteride    Hyponatremia: Nephrology consulted at [facility], felt it was d/t HCTZ which was not resumed.   - Na 136 9/4  - c/w NaCl tabs TID     Type II IDDM: A1c 7.0%; Home meds Janumet 1000/50 BID and Glargine 30U   - FS elevated in evening 200's-low 300's.  - Lantus increased from 35U to 40U by hospitalist NP  - Increased pre-lunch and pre-dinner humalog to 7U. c/w pre-breakfast humalog 5U.  - c/w sliding scale  - c/w hypoglycemia protocol    Precautions / PROPHYLAXIS:   - Falls, Cardiac, Sternal, Spinal, Seizure   - Ortho: Weight bearing status: WBAT   - Lungs: Aspiration, Incentive Spirometer   - Pressure injury/Skin: Turn Q2hrs while in bed, OOB to Chair, PT/OT    - DVT: Lovenox, SCDs, TEDs     Dispo: IDT meeting 19: Supervision with eating and grooming, Min A for dressing and toilet transfer, Min A for transfers and amb 75ft RW, mild cog and attention deficits. Goals for Mod I with Transfers, Ambulation with RW and ADLs.   STEVAN: 19

## 2019-09-07 LAB
GLUCOSE BLDC GLUCOMTR-MCNC: 105 MG/DL — HIGH (ref 70–99)
GLUCOSE BLDC GLUCOMTR-MCNC: 139 MG/DL — HIGH (ref 70–99)
GLUCOSE BLDC GLUCOMTR-MCNC: 274 MG/DL — HIGH (ref 70–99)
GLUCOSE BLDC GLUCOMTR-MCNC: 276 MG/DL — HIGH (ref 70–99)

## 2019-09-07 PROCEDURE — 99232 SBSQ HOSP IP/OBS MODERATE 35: CPT

## 2019-09-07 RX ADMIN — SODIUM CHLORIDE 1 GRAM(S): 9 INJECTION INTRAMUSCULAR; INTRAVENOUS; SUBCUTANEOUS at 14:48

## 2019-09-07 RX ADMIN — CARVEDILOL PHOSPHATE 25 MILLIGRAM(S): 80 CAPSULE, EXTENDED RELEASE ORAL at 06:10

## 2019-09-07 RX ADMIN — Medication 100 MILLIGRAM(S): at 21:03

## 2019-09-07 RX ADMIN — GABAPENTIN 300 MILLIGRAM(S): 400 CAPSULE ORAL at 06:10

## 2019-09-07 RX ADMIN — LOSARTAN POTASSIUM 100 MILLIGRAM(S): 100 TABLET, FILM COATED ORAL at 06:10

## 2019-09-07 RX ADMIN — INSULIN GLARGINE 40 UNIT(S): 100 INJECTION, SOLUTION SUBCUTANEOUS at 21:13

## 2019-09-07 RX ADMIN — Medication 3: at 17:14

## 2019-09-07 RX ADMIN — Medication 975 MILLIGRAM(S): at 21:03

## 2019-09-07 RX ADMIN — Medication 975 MILLIGRAM(S): at 13:10

## 2019-09-07 RX ADMIN — Medication 12.5 MILLIGRAM(S): at 14:48

## 2019-09-07 RX ADMIN — Medication 1 MILLIGRAM(S): at 12:23

## 2019-09-07 RX ADMIN — CARVEDILOL PHOSPHATE 25 MILLIGRAM(S): 80 CAPSULE, EXTENDED RELEASE ORAL at 18:31

## 2019-09-07 RX ADMIN — Medication 100 MILLIGRAM(S): at 14:48

## 2019-09-07 RX ADMIN — Medication 7 UNIT(S): at 17:13

## 2019-09-07 RX ADMIN — Medication 12.5 MILLIGRAM(S): at 06:10

## 2019-09-07 RX ADMIN — SODIUM CHLORIDE 1 GRAM(S): 9 INJECTION INTRAMUSCULAR; INTRAVENOUS; SUBCUTANEOUS at 21:03

## 2019-09-07 RX ADMIN — ROSUVASTATIN CALCIUM 5 MILLIGRAM(S): 5 TABLET ORAL at 21:03

## 2019-09-07 RX ADMIN — Medication 12.5 MILLIGRAM(S): at 21:03

## 2019-09-07 RX ADMIN — Medication 5 UNIT(S): at 07:59

## 2019-09-07 RX ADMIN — Medication 1: at 21:12

## 2019-09-07 RX ADMIN — TAMSULOSIN HYDROCHLORIDE 0.4 MILLIGRAM(S): 0.4 CAPSULE ORAL at 21:03

## 2019-09-07 RX ADMIN — SODIUM CHLORIDE 1 GRAM(S): 9 INJECTION INTRAMUSCULAR; INTRAVENOUS; SUBCUTANEOUS at 06:10

## 2019-09-07 RX ADMIN — Medication 975 MILLIGRAM(S): at 12:18

## 2019-09-07 RX ADMIN — Medication 100 MILLIGRAM(S): at 07:59

## 2019-09-07 RX ADMIN — Medication 100 MILLIGRAM(S): at 06:10

## 2019-09-07 RX ADMIN — FINASTERIDE 5 MILLIGRAM(S): 5 TABLET, FILM COATED ORAL at 12:23

## 2019-09-07 RX ADMIN — ENOXAPARIN SODIUM 40 MILLIGRAM(S): 100 INJECTION SUBCUTANEOUS at 12:23

## 2019-09-07 RX ADMIN — GABAPENTIN 300 MILLIGRAM(S): 400 CAPSULE ORAL at 18:31

## 2019-09-07 RX ADMIN — Medication 975 MILLIGRAM(S): at 21:30

## 2019-09-07 NOTE — PROGRESS NOTE ADULT - SUBJECTIVE AND OBJECTIVE BOX
Patient is a 74y old  Male who presents with a chief complaint of IP (06 Sep 2019 12:11)No acute events overnight. Wife at bedside       Patient seen and examined at bedside.    ALLERGIES:  No Known Allergies    MEDICATIONS  (STANDING):  amantadine 100 milliGRAM(s) Oral <User Schedule>  amLODIPine   Tablet 10 milliGRAM(s) Oral <User Schedule>  carvedilol 25 milliGRAM(s) Oral <User Schedule>  dextrose 5%. 1000 milliLiter(s) (50 mL/Hr) IV Continuous <Continuous>  dextrose 50% Injectable 12.5 Gram(s) IV Push once  dextrose 50% Injectable 25 Gram(s) IV Push once  docusate sodium 100 milliGRAM(s) Oral three times a day  enoxaparin Injectable 40 milliGRAM(s) SubCutaneous daily  finasteride 5 milliGRAM(s) Oral daily  folic acid 1 milliGRAM(s) Oral daily  gabapentin 300 milliGRAM(s) Oral two times a day  insulin glargine Injectable (LANTUS) 40 Unit(s) SubCutaneous at bedtime  insulin lispro (HumaLOG) corrective regimen sliding scale   SubCutaneous three times a day before meals  insulin lispro (HumaLOG) corrective regimen sliding scale   SubCutaneous at bedtime  insulin lispro Injectable (HumaLOG) 5 Unit(s) SubCutaneous before breakfast  insulin lispro Injectable (HumaLOG) 7 Unit(s) SubCutaneous before lunch  insulin lispro Injectable (HumaLOG) 7 Unit(s) SubCutaneous before dinner  losartan 100 milliGRAM(s) Oral <User Schedule>  meclizine 12.5 milliGRAM(s) Oral every 8 hours  rosuvastatin 5 milliGRAM(s) Oral at bedtime  sodium chloride 1 Gram(s) Oral three times a day  tamsulosin 0.4 milliGRAM(s) Oral at bedtime    MEDICATIONS  (PRN):  acetaminophen   Tablet .. 975 milliGRAM(s) Oral every 6 hours PRN Moderate Pain (4 - 6)  dextrose 40% Gel 15 Gram(s) Oral once PRN Blood Glucose LESS THAN 70 milliGRAM(s)/deciliter  glucagon  Injectable 1 milliGRAM(s) IntraMuscular once PRN Glucose LESS THAN 70 milligrams/deciliter  methyl salicylate 14%/menthol 6% Topical Ointment 1 Application(s) Topical every 6 hours PRN leg pain    Vital Signs Last 24 Hrs  T(F): 98 (07 Sep 2019 07:43), Max: 98 (06 Sep 2019 20:21)  HR: 67 (07 Sep 2019 07:43) (65 - 78)  BP: 135/79 (07 Sep 2019 07:43) (133/69 - 151/54)  RR: 14 (07 Sep 2019 07:43) (14 - 14)  SpO2: 99% (07 Sep 2019 07:43) (97% - 99%)  I&O's Summary    BMI (kg/m2): 25.3 (09-03-19 @ 17:31)  PHYSICAL EXAM:  General: NAD, A/O x 3  ENT: MMM  Neck: Supple, No JVD  Lungs: Clear to auscultation bilaterally  Cardio: RRR, S1/S2,     Abdomen: Soft, Nontender, Nondistended; Bowel sounds present  Extremities: No calf tenderness, No pitting edema    LABS:                                      POCT Blood Glucose.: 139 mg/dL (07 Sep 2019 07:35)  POCT Blood Glucose.: 281 mg/dL (06 Sep 2019 22:01)  POCT Blood Glucose.: 210 mg/dL (06 Sep 2019 17:02)  POCT Blood Glucose.: 230 mg/dL (06 Sep 2019 12:15)    08-22 [id number] 7.0          RADIOLOGY & ADDITIONAL TESTS:    Care Discussed with Consultants/Other Providers: Patient is a 74y old  Male who presents with a chief complaint of IP (06 Sep 2019 12:11)No acute events overnight. Wife at bedside       Patient seen and examined at bedside.    ALLERGIES:  No Known Allergies    MEDICATIONS  (STANDING):  amantadine 100 milliGRAM(s) Oral <User Schedule>  amLODIPine   Tablet 10 milliGRAM(s) Oral <User Schedule>  carvedilol 25 milliGRAM(s) Oral <User Schedule>  dextrose 5%. 1000 milliLiter(s) (50 mL/Hr) IV Continuous <Continuous>  dextrose 50% Injectable 12.5 Gram(s) IV Push once  dextrose 50% Injectable 25 Gram(s) IV Push once  docusate sodium 100 milliGRAM(s) Oral three times a day  enoxaparin Injectable 40 milliGRAM(s) SubCutaneous daily  finasteride 5 milliGRAM(s) Oral daily  folic acid 1 milliGRAM(s) Oral daily  gabapentin 300 milliGRAM(s) Oral two times a day  insulin glargine Injectable (LANTUS) 40 Unit(s) SubCutaneous at bedtime  insulin lispro (HumaLOG) corrective regimen sliding scale   SubCutaneous three times a day before meals  insulin lispro (HumaLOG) corrective regimen sliding scale   SubCutaneous at bedtime  insulin lispro Injectable (HumaLOG) 5 Unit(s) SubCutaneous before breakfast  insulin lispro Injectable (HumaLOG) 7 Unit(s) SubCutaneous before lunch  insulin lispro Injectable (HumaLOG) 7 Unit(s) SubCutaneous before dinner  losartan 100 milliGRAM(s) Oral <User Schedule>  meclizine 12.5 milliGRAM(s) Oral every 8 hours  rosuvastatin 5 milliGRAM(s) Oral at bedtime  sodium chloride 1 Gram(s) Oral three times a day  tamsulosin 0.4 milliGRAM(s) Oral at bedtime    MEDICATIONS  (PRN):  acetaminophen   Tablet .. 975 milliGRAM(s) Oral every 6 hours PRN Moderate Pain (4 - 6)  dextrose 40% Gel 15 Gram(s) Oral once PRN Blood Glucose LESS THAN 70 milliGRAM(s)/deciliter  glucagon  Injectable 1 milliGRAM(s) IntraMuscular once PRN Glucose LESS THAN 70 milligrams/deciliter  methyl salicylate 14%/menthol 6% Topical Ointment 1 Application(s) Topical every 6 hours PRN leg pain    Vital Signs Last 24 Hrs  T(F): 98 (07 Sep 2019 07:43), Max: 98 (06 Sep 2019 20:21)  HR: 67 (07 Sep 2019 07:43) (65 - 78)  BP: 135/79 (07 Sep 2019 07:43) (133/69 - 151/54)  RR: 14 (07 Sep 2019 07:43) (14 - 14)  SpO2: 99% (07 Sep 2019 07:43) (97% - 99%)  I&O's Summary    BMI (kg/m2): 25.3 (09-03-19 @ 17:31)  PHYSICAL EXAM:  General: NAD, A/O x 3  ENT: MMM  Neck: Supple, No JVD  Lungs: Clear to auscultation bilaterally  Cardio: RRR, S1/S2,   Abdomen: Soft, Nontender, Nondistended; Bowel sounds present  Extremities: No calf tenderness, No pitting edema    LABS:                                      POCT Blood Glucose.: 139 mg/dL (07 Sep 2019 07:35)  POCT Blood Glucose.: 281 mg/dL (06 Sep 2019 22:01)  POCT Blood Glucose.: 210 mg/dL (06 Sep 2019 17:02)  POCT Blood Glucose.: 230 mg/dL (06 Sep 2019 12:15)    08-22 [id number] 7.0          RADIOLOGY & ADDITIONAL TESTS:    Care Discussed with Consultants/Other Providers:

## 2019-09-07 NOTE — PROGRESS NOTE ADULT - SUBJECTIVE AND OBJECTIVE BOX
Chief complaint: no new complaints    Patient is a 74y old  Male who presents with a chief complaint of IPH (07 Sep 2019 10:21)                  PAST MEDICAL & SURGICAL HISTORY:  BPH (benign prostatic hypertrophy)  Hyperlipidemia  CAD (coronary artery disease)  Diabetes mellitus: Type 2  HTN (hypertension)  S/P drug eluting coronary stent placement: Ramus  S/P primary angioplasty with coronary stent: 1990s          VITALS  Vital Signs Last 24 Hrs  T(C): 36.7 (07 Sep 2019 07:43), Max: 36.7 (06 Sep 2019 20:21)  T(F): 98 (07 Sep 2019 07:43), Max: 98 (06 Sep 2019 20:21)  HR: 70 (07 Sep 2019 12:31) (65 - 78)  BP: 116/74 (07 Sep 2019 12:31) (116/74 - 151/54)  BP(mean): --  RR: 14 (07 Sep 2019 07:43) (14 - 14)  SpO2: 99% (07 Sep 2019 07:43) (97% - 99%)      PHYSICAL EXAM  Constitutional - NAD, Comfortable  HEENT - NCAT, EOMI  Neck - Supple, No limited ROM  Chest - CTA bilaterally  Cardiovascular - RRR, S1S2, No murmurs  Abdomen - BS+, Soft, NTND  Extremities - No C/C/E, No calf tenderness   Neurologic Exam -                    Cognitive - Awake, Alert     No new focal deficits                  CURRENT MEDICATIONS    MEDICATIONS  (STANDING):  amantadine 100 milliGRAM(s) Oral <User Schedule>  amLODIPine   Tablet 10 milliGRAM(s) Oral <User Schedule>  carvedilol 25 milliGRAM(s) Oral <User Schedule>  dextrose 5%. 1000 milliLiter(s) (50 mL/Hr) IV Continuous <Continuous>  dextrose 50% Injectable 12.5 Gram(s) IV Push once  dextrose 50% Injectable 25 Gram(s) IV Push once  docusate sodium 100 milliGRAM(s) Oral three times a day  enoxaparin Injectable 40 milliGRAM(s) SubCutaneous daily  finasteride 5 milliGRAM(s) Oral daily  folic acid 1 milliGRAM(s) Oral daily  gabapentin 300 milliGRAM(s) Oral two times a day  insulin glargine Injectable (LANTUS) 40 Unit(s) SubCutaneous at bedtime  insulin lispro (HumaLOG) corrective regimen sliding scale   SubCutaneous three times a day before meals  insulin lispro (HumaLOG) corrective regimen sliding scale   SubCutaneous at bedtime  insulin lispro Injectable (HumaLOG) 5 Unit(s) SubCutaneous before breakfast  insulin lispro Injectable (HumaLOG) 7 Unit(s) SubCutaneous before lunch  insulin lispro Injectable (HumaLOG) 7 Unit(s) SubCutaneous before dinner  losartan 100 milliGRAM(s) Oral <User Schedule>  meclizine 12.5 milliGRAM(s) Oral every 8 hours  rosuvastatin 5 milliGRAM(s) Oral at bedtime  sodium chloride 1 Gram(s) Oral three times a day  tamsulosin 0.4 milliGRAM(s) Oral at bedtime    MEDICATIONS  (PRN):  acetaminophen   Tablet .. 975 milliGRAM(s) Oral every 6 hours PRN Moderate Pain (4 - 6)  dextrose 40% Gel 15 Gram(s) Oral once PRN Blood Glucose LESS THAN 70 milliGRAM(s)/deciliter  glucagon  Injectable 1 milliGRAM(s) IntraMuscular once PRN Glucose LESS THAN 70 milligrams/deciliter  methyl salicylate 14%/menthol 6% Topical Ointment 1 Application(s) Topical every 6 hours PRN leg pain    ASSESSMENT & PLAN          GI/Bowel Management - Dulcolax PRN, Fleet PRN   Management - Toilet Q2  Skin - Turn Q2  Pain - Tylenol PRN  DVT PPX - Lovenox      Continue comprehensive acute rehab program consisting of 3hrs/day of OT/PT and SLP.

## 2019-09-07 NOTE — PROGRESS NOTE ADULT - ASSESSMENT
75yo M s/p SDH, R SAH and R frontal IPH after a fall, now with decreased functional mobility, dysphagia, aphasia, hemiplegia, gait instability and ADL impairments.    #Acute L SDH, R SAH and R frontal IPH:  -continue Statin, no ASA-- secondary to bleed  to bleed  - Amantadine 100mg BID  - Meclizine prn for dizziness  - Strict BP control as below  - Sumatriptan PRN headache    #Presyncope/orthostatic hypotension:-- improved overall  - leg pumps when changing positions, Norvasc dosing changed to 12 pm    #CAD s/p RYAN:  - Rosuvastatin 5mg QHS, (ASA and Brilinta held due to bleed )    #Diastolic CHF: last echo done 8/19; normal LV function, mild AS, grade 2 diastolic dysfunction  - Carvedilol    #Essential HTN: -165   - Amlodipine 5mg daily  - Carvedilol 25mg BID  - Losartan 100mg daily  continue to monitor for now     #BPH: c/w Tamsulosin, Finasteride    #Hyponatremia, resolved: Nephrology consulted at [facility], felt it was d/t HCTZ which was not resumed.   - c/w NaCl tabs TID, follow Na levels    Type II IDDM w Hyperglycemia: A1c 7.0%;   - on Lantus 40 QHS and premeal Insulin (5-7-7), ISS; will continue monitor and adjust as needed    DTV ppx: lovenox

## 2019-09-08 LAB
GLUCOSE BLDC GLUCOMTR-MCNC: 115 MG/DL — HIGH (ref 70–99)
GLUCOSE BLDC GLUCOMTR-MCNC: 127 MG/DL — HIGH (ref 70–99)
GLUCOSE BLDC GLUCOMTR-MCNC: 163 MG/DL — HIGH (ref 70–99)
GLUCOSE BLDC GLUCOMTR-MCNC: 169 MG/DL — HIGH (ref 70–99)

## 2019-09-08 PROCEDURE — 99233 SBSQ HOSP IP/OBS HIGH 50: CPT

## 2019-09-08 PROCEDURE — 99232 SBSQ HOSP IP/OBS MODERATE 35: CPT

## 2019-09-08 RX ADMIN — GABAPENTIN 300 MILLIGRAM(S): 400 CAPSULE ORAL at 18:43

## 2019-09-08 RX ADMIN — TAMSULOSIN HYDROCHLORIDE 0.4 MILLIGRAM(S): 0.4 CAPSULE ORAL at 21:27

## 2019-09-08 RX ADMIN — LOSARTAN POTASSIUM 100 MILLIGRAM(S): 100 TABLET, FILM COATED ORAL at 05:41

## 2019-09-08 RX ADMIN — Medication 100 MILLIGRAM(S): at 07:53

## 2019-09-08 RX ADMIN — Medication 5 UNIT(S): at 07:53

## 2019-09-08 RX ADMIN — Medication 975 MILLIGRAM(S): at 21:50

## 2019-09-08 RX ADMIN — Medication 7 UNIT(S): at 12:07

## 2019-09-08 RX ADMIN — Medication 100 MILLIGRAM(S): at 13:42

## 2019-09-08 RX ADMIN — GABAPENTIN 300 MILLIGRAM(S): 400 CAPSULE ORAL at 05:40

## 2019-09-08 RX ADMIN — ENOXAPARIN SODIUM 40 MILLIGRAM(S): 100 INJECTION SUBCUTANEOUS at 12:04

## 2019-09-08 RX ADMIN — Medication 1: at 17:15

## 2019-09-08 RX ADMIN — Medication 100 MILLIGRAM(S): at 05:41

## 2019-09-08 RX ADMIN — SODIUM CHLORIDE 1 GRAM(S): 9 INJECTION INTRAMUSCULAR; INTRAVENOUS; SUBCUTANEOUS at 05:40

## 2019-09-08 RX ADMIN — CARVEDILOL PHOSPHATE 25 MILLIGRAM(S): 80 CAPSULE, EXTENDED RELEASE ORAL at 18:43

## 2019-09-08 RX ADMIN — Medication 1 MILLIGRAM(S): at 12:04

## 2019-09-08 RX ADMIN — AMLODIPINE BESYLATE 10 MILLIGRAM(S): 2.5 TABLET ORAL at 12:04

## 2019-09-08 RX ADMIN — Medication 12.5 MILLIGRAM(S): at 21:28

## 2019-09-08 RX ADMIN — Medication 12.5 MILLIGRAM(S): at 13:42

## 2019-09-08 RX ADMIN — Medication 100 MILLIGRAM(S): at 21:27

## 2019-09-08 RX ADMIN — Medication 12.5 MILLIGRAM(S): at 05:40

## 2019-09-08 RX ADMIN — SODIUM CHLORIDE 1 GRAM(S): 9 INJECTION INTRAMUSCULAR; INTRAVENOUS; SUBCUTANEOUS at 21:27

## 2019-09-08 RX ADMIN — Medication 975 MILLIGRAM(S): at 21:28

## 2019-09-08 RX ADMIN — INSULIN GLARGINE 40 UNIT(S): 100 INJECTION, SOLUTION SUBCUTANEOUS at 21:26

## 2019-09-08 RX ADMIN — FINASTERIDE 5 MILLIGRAM(S): 5 TABLET, FILM COATED ORAL at 12:03

## 2019-09-08 RX ADMIN — SODIUM CHLORIDE 1 GRAM(S): 9 INJECTION INTRAMUSCULAR; INTRAVENOUS; SUBCUTANEOUS at 13:42

## 2019-09-08 RX ADMIN — Medication 7 UNIT(S): at 17:15

## 2019-09-08 RX ADMIN — ROSUVASTATIN CALCIUM 5 MILLIGRAM(S): 5 TABLET ORAL at 21:27

## 2019-09-08 RX ADMIN — CARVEDILOL PHOSPHATE 25 MILLIGRAM(S): 80 CAPSULE, EXTENDED RELEASE ORAL at 06:19

## 2019-09-08 NOTE — PROGRESS NOTE ADULT - SUBJECTIVE AND OBJECTIVE BOX
Patient is a 74y old  Male who presents with a chief complaint of [facility] (07 Sep 2019 16:25). No acute issues overnight       Patient seen and examined at bedside.    ALLERGIES:  No Known Allergies    MEDICATIONS  (STANDING):  amantadine 100 milliGRAM(s) Oral <User Schedule>  amLODIPine   Tablet 10 milliGRAM(s) Oral <User Schedule>  carvedilol 25 milliGRAM(s) Oral <User Schedule>  dextrose 5%. 1000 milliLiter(s) (50 mL/Hr) IV Continuous <Continuous>  dextrose 50% Injectable 12.5 Gram(s) IV Push once  dextrose 50% Injectable 25 Gram(s) IV Push once  docusate sodium 100 milliGRAM(s) Oral three times a day  enoxaparin Injectable 40 milliGRAM(s) SubCutaneous daily  finasteride 5 milliGRAM(s) Oral daily  folic acid 1 milliGRAM(s) Oral daily  gabapentin 300 milliGRAM(s) Oral two times a day  insulin glargine Injectable (LANTUS) 40 Unit(s) SubCutaneous at bedtime  insulin lispro (HumaLOG) corrective regimen sliding scale   SubCutaneous three times a day before meals  insulin lispro (HumaLOG) corrective regimen sliding scale   SubCutaneous at bedtime  insulin lispro Injectable (HumaLOG) 5 Unit(s) SubCutaneous before breakfast  insulin lispro Injectable (HumaLOG) 7 Unit(s) SubCutaneous before lunch  insulin lispro Injectable (HumaLOG) 7 Unit(s) SubCutaneous before dinner  losartan 100 milliGRAM(s) Oral <User Schedule>  meclizine 12.5 milliGRAM(s) Oral every 8 hours  rosuvastatin 5 milliGRAM(s) Oral at bedtime  sodium chloride 1 Gram(s) Oral three times a day  tamsulosin 0.4 milliGRAM(s) Oral at bedtime    MEDICATIONS  (PRN):  acetaminophen   Tablet .. 975 milliGRAM(s) Oral every 6 hours PRN Moderate Pain (4 - 6)  dextrose 40% Gel 15 Gram(s) Oral once PRN Blood Glucose LESS THAN 70 milliGRAM(s)/deciliter  glucagon  Injectable 1 milliGRAM(s) IntraMuscular once PRN Glucose LESS THAN 70 milligrams/deciliter  methyl salicylate 14%/menthol 6% Topical Ointment 1 Application(s) Topical every 6 hours PRN leg pain    Vital Signs Last 24 Hrs  T(F): 97.9 (08 Sep 2019 08:45), Max: 98 (07 Sep 2019 19:30)  HR: 68 (08 Sep 2019 08:45) (62 - 77)  BP: 144/75 (08 Sep 2019 08:45) (116/74 - 161/73)  RR: 14 (08 Sep 2019 08:45) (14 - 14)  SpO2: 98% (08 Sep 2019 08:45) (97% - 98%)  I&O's Summary    08 Sep 2019 07:01  -  08 Sep 2019 11:35  --------------------------------------------------------  IN: 240 mL / OUT: 0 mL / NET: 240 mL      BMI (kg/m2): 25.3 (09-03-19 @ 17:31)  PHYSICAL EXAM:  General: NAD, A/O x 3  ENT: MMM  Neck: Supple, No JVD  Lungs: Clear to auscultation bilaterally  Cardio: RRR, S1/S2,   Abdomen: Soft, Nontender, Nondistended; Bowel sounds present  Extremities: No calf tenderness, No pitting edema    LABS:                                      POCT Blood Glucose.: 127 mg/dL (08 Sep 2019 07:52)  POCT Blood Glucose.: 274 mg/dL (07 Sep 2019 21:01)  POCT Blood Glucose.: 276 mg/dL (07 Sep 2019 17:10)  POCT Blood Glucose.: 105 mg/dL (07 Sep 2019 12:22)    08-22 [id number] 7.0          RADIOLOGY & ADDITIONAL TESTS:    Care Discussed with Consultants/Other Providers:

## 2019-09-08 NOTE — PROGRESS NOTE ADULT - ASSESSMENT
73yo M s/p SDH, R SAH and R frontal IPH after a fall, now with decreased functional mobility, dysphagia, aphasia, hemiplegia, gait instability and ADL impairments.    #Acute L SDH, R SAH and R frontal IPH:  -continue Statin, no ASA-- secondary to bleed  to bleed  - Amantadine 100mg BID  - Meclizine prn for dizziness  - Strict BP control as below  - Sumatriptan PRN headache    #Presyncope/orthostatic hypotension:-- improved overall  Norvasc dosing changed to 12 pm    #CAD s/p RYAN:  - Rosuvastatin 5mg QHS, (ASA and Brilinta held due to bleed )    #Diastolic CHF: last echo done 8/19; normal LV function, mild AS, grade 2 diastolic dysfunction  - Carvedilol    #Essential HTN: -165   - Amlodipine 5mg daily  - Carvedilol 25mg BID  - Losartan 100mg daily  continue to monitor for now     #BPH:   c/w Tamsulosin, Finasteride    #Hyponatremia:  Resolved: Nephrology consulted at [facility], felt it was d/t HCTZ which was not resumed.   - c/w NaCl tabs TID, follow Na levels    Type II IDDM w Hyperglycemia: A1c 7.0%;   - on Lantus 40 QHS and premeal Insulin (5-7-7), ISS; will continue monitor and adjust as needed    DTV ppx: lovenox

## 2019-09-08 NOTE — PROGRESS NOTE ADULT - SUBJECTIVE AND OBJECTIVE BOX
Chief complaint: no acute events overnight    Patient is a 74y old  Male who presents with a chief complaint of IPH (08 Sep 2019 11:30)    PAST MEDICAL & SURGICAL HISTORY:  BPH (benign prostatic hypertrophy)  Hyperlipidemia  CAD (coronary artery disease)  Diabetes mellitus: Type 2  HTN (hypertension)  S/P drug eluting coronary stent placement: Ramus  S/P primary angioplasty with coronary stent: 1990s      VITALS  Vital Signs Last 24 Hrs  T(C): 36.6 (08 Sep 2019 08:45), Max: 36.7 (07 Sep 2019 19:30)  T(F): 97.9 (08 Sep 2019 08:45), Max: 98 (07 Sep 2019 19:30)  HR: 73 (08 Sep 2019 12:12) (62 - 77)  BP: 149/70 (08 Sep 2019 12:12) (144/75 - 161/73)  BP(mean): --  RR: 14 (08 Sep 2019 08:45) (14 - 14)  SpO2: 98% (08 Sep 2019 08:45) (97% - 98%)      PHYSICAL EXAM  Constitutional - NAD, Comfortable  HEENT - NCAT, EOMI  Neck - Supple, No limited ROM  Chest - CTA bilaterally  Cardiovascular - RRR, S1S2, No murmurs  Abdomen - BS+, Soft, NTND  Extremities - No C/C/E, No calf tenderness   Neurologic Exam -                    Cognitive - Awake, Alert     No new focal deficits                                       CURRENT MEDICATIONS    MEDICATIONS  (STANDING):  amantadine 100 milliGRAM(s) Oral <User Schedule>  amLODIPine   Tablet 10 milliGRAM(s) Oral <User Schedule>  carvedilol 25 milliGRAM(s) Oral <User Schedule>  dextrose 5%. 1000 milliLiter(s) (50 mL/Hr) IV Continuous <Continuous>  dextrose 50% Injectable 12.5 Gram(s) IV Push once  dextrose 50% Injectable 25 Gram(s) IV Push once  docusate sodium 100 milliGRAM(s) Oral three times a day  enoxaparin Injectable 40 milliGRAM(s) SubCutaneous daily  finasteride 5 milliGRAM(s) Oral daily  folic acid 1 milliGRAM(s) Oral daily  gabapentin 300 milliGRAM(s) Oral two times a day  insulin glargine Injectable (LANTUS) 40 Unit(s) SubCutaneous at bedtime  insulin lispro (HumaLOG) corrective regimen sliding scale   SubCutaneous three times a day before meals  insulin lispro (HumaLOG) corrective regimen sliding scale   SubCutaneous at bedtime  insulin lispro Injectable (HumaLOG) 5 Unit(s) SubCutaneous before breakfast  insulin lispro Injectable (HumaLOG) 7 Unit(s) SubCutaneous before lunch  insulin lispro Injectable (HumaLOG) 7 Unit(s) SubCutaneous before dinner  losartan 100 milliGRAM(s) Oral <User Schedule>  meclizine 12.5 milliGRAM(s) Oral every 8 hours  rosuvastatin 5 milliGRAM(s) Oral at bedtime  sodium chloride 1 Gram(s) Oral three times a day  tamsulosin 0.4 milliGRAM(s) Oral at bedtime    MEDICATIONS  (PRN):  acetaminophen   Tablet .. 975 milliGRAM(s) Oral every 6 hours PRN Moderate Pain (4 - 6)  dextrose 40% Gel 15 Gram(s) Oral once PRN Blood Glucose LESS THAN 70 milliGRAM(s)/deciliter  glucagon  Injectable 1 milliGRAM(s) IntraMuscular once PRN Glucose LESS THAN 70 milligrams/deciliter  methyl salicylate 14%/menthol 6% Topical Ointment 1 Application(s) Topical every 6 hours PRN leg pain    ASSESSMENT & PLAN          GI/Bowel Management - Dulcolax PRN, Fleet PRN   Management - Toilet Q2  Skin - Turn Q2  Pain - Tylenol PRN  DVT PPX - Lovenox      Continue comprehensive acute rehab program consisting of 3hrs/day of OT/PT and SLP.

## 2019-09-09 LAB
ANION GAP SERPL CALC-SCNC: 7 MMOL/L — SIGNIFICANT CHANGE UP (ref 5–17)
BUN SERPL-MCNC: 13 MG/DL — SIGNIFICANT CHANGE UP (ref 7–23)
CALCIUM SERPL-MCNC: 9.4 MG/DL — SIGNIFICANT CHANGE UP (ref 8.4–10.5)
CHLORIDE SERPL-SCNC: 104 MMOL/L — SIGNIFICANT CHANGE UP (ref 96–108)
CK SERPL-CCNC: 39 U/L — SIGNIFICANT CHANGE UP (ref 30–200)
CO2 SERPL-SCNC: 27 MMOL/L — SIGNIFICANT CHANGE UP (ref 22–31)
CREAT SERPL-MCNC: 1.08 MG/DL — SIGNIFICANT CHANGE UP (ref 0.5–1.3)
GLUCOSE BLDC GLUCOMTR-MCNC: 147 MG/DL — HIGH (ref 70–99)
GLUCOSE BLDC GLUCOMTR-MCNC: 153 MG/DL — HIGH (ref 70–99)
GLUCOSE BLDC GLUCOMTR-MCNC: 188 MG/DL — HIGH (ref 70–99)
GLUCOSE BLDC GLUCOMTR-MCNC: 86 MG/DL — SIGNIFICANT CHANGE UP (ref 70–99)
GLUCOSE SERPL-MCNC: 131 MG/DL — HIGH (ref 70–99)
HCT VFR BLD CALC: 37 % — LOW (ref 39–50)
HGB BLD-MCNC: 12.3 G/DL — LOW (ref 13–17)
MCHC RBC-ENTMCNC: 29.4 PG — SIGNIFICANT CHANGE UP (ref 27–34)
MCHC RBC-ENTMCNC: 33.2 GM/DL — SIGNIFICANT CHANGE UP (ref 32–36)
MCV RBC AUTO: 88.5 FL — SIGNIFICANT CHANGE UP (ref 80–100)
NRBC # BLD: 0 /100 WBCS — SIGNIFICANT CHANGE UP (ref 0–0)
PLATELET # BLD AUTO: 233 K/UL — SIGNIFICANT CHANGE UP (ref 150–400)
POTASSIUM SERPL-MCNC: 4.2 MMOL/L — SIGNIFICANT CHANGE UP (ref 3.5–5.3)
POTASSIUM SERPL-SCNC: 4.2 MMOL/L — SIGNIFICANT CHANGE UP (ref 3.5–5.3)
RBC # BLD: 4.18 M/UL — LOW (ref 4.2–5.8)
RBC # FLD: 14.3 % — SIGNIFICANT CHANGE UP (ref 10.3–14.5)
SODIUM SERPL-SCNC: 138 MMOL/L — SIGNIFICANT CHANGE UP (ref 135–145)
WBC # BLD: 5.55 K/UL — SIGNIFICANT CHANGE UP (ref 3.8–10.5)
WBC # FLD AUTO: 5.55 K/UL — SIGNIFICANT CHANGE UP (ref 3.8–10.5)

## 2019-09-09 PROCEDURE — 99232 SBSQ HOSP IP/OBS MODERATE 35: CPT

## 2019-09-09 RX ORDER — FERROUS SULFATE 325(65) MG
325 TABLET ORAL DAILY
Refills: 0 | Status: DISCONTINUED | OUTPATIENT
Start: 2019-09-10 | End: 2019-09-13

## 2019-09-09 RX ORDER — FERROUS SULFATE 325(65) MG
325 TABLET ORAL
Refills: 0 | Status: DISCONTINUED | OUTPATIENT
Start: 2019-09-09 | End: 2019-09-09

## 2019-09-09 RX ORDER — SODIUM CHLORIDE 9 MG/ML
1 INJECTION INTRAMUSCULAR; INTRAVENOUS; SUBCUTANEOUS
Refills: 0 | Status: DISCONTINUED | OUTPATIENT
Start: 2019-09-09 | End: 2019-09-11

## 2019-09-09 RX ORDER — ACETAMINOPHEN 500 MG
650 TABLET ORAL EVERY 6 HOURS
Refills: 0 | Status: DISCONTINUED | OUTPATIENT
Start: 2019-09-09 | End: 2019-09-13

## 2019-09-09 RX ADMIN — Medication 650 MILLIGRAM(S): at 21:07

## 2019-09-09 RX ADMIN — SODIUM CHLORIDE 1 GRAM(S): 9 INJECTION INTRAMUSCULAR; INTRAVENOUS; SUBCUTANEOUS at 06:19

## 2019-09-09 RX ADMIN — ROSUVASTATIN CALCIUM 5 MILLIGRAM(S): 5 TABLET ORAL at 21:07

## 2019-09-09 RX ADMIN — SODIUM CHLORIDE 1 GRAM(S): 9 INJECTION INTRAMUSCULAR; INTRAVENOUS; SUBCUTANEOUS at 17:30

## 2019-09-09 RX ADMIN — CARVEDILOL PHOSPHATE 25 MILLIGRAM(S): 80 CAPSULE, EXTENDED RELEASE ORAL at 17:14

## 2019-09-09 RX ADMIN — GABAPENTIN 300 MILLIGRAM(S): 400 CAPSULE ORAL at 17:13

## 2019-09-09 RX ADMIN — ENOXAPARIN SODIUM 40 MILLIGRAM(S): 100 INJECTION SUBCUTANEOUS at 12:06

## 2019-09-09 RX ADMIN — Medication 12.5 MILLIGRAM(S): at 06:19

## 2019-09-09 RX ADMIN — Medication 1: at 07:38

## 2019-09-09 RX ADMIN — CARVEDILOL PHOSPHATE 25 MILLIGRAM(S): 80 CAPSULE, EXTENDED RELEASE ORAL at 06:19

## 2019-09-09 RX ADMIN — Medication 1 MILLIGRAM(S): at 12:06

## 2019-09-09 RX ADMIN — Medication 100 MILLIGRAM(S): at 13:39

## 2019-09-09 RX ADMIN — Medication 100 MILLIGRAM(S): at 21:07

## 2019-09-09 RX ADMIN — Medication 7 UNIT(S): at 12:06

## 2019-09-09 RX ADMIN — Medication 100 MILLIGRAM(S): at 06:19

## 2019-09-09 RX ADMIN — Medication 650 MILLIGRAM(S): at 21:30

## 2019-09-09 RX ADMIN — Medication 5 UNIT(S): at 07:38

## 2019-09-09 RX ADMIN — GABAPENTIN 300 MILLIGRAM(S): 400 CAPSULE ORAL at 06:19

## 2019-09-09 RX ADMIN — FINASTERIDE 5 MILLIGRAM(S): 5 TABLET, FILM COATED ORAL at 12:06

## 2019-09-09 RX ADMIN — LOSARTAN POTASSIUM 100 MILLIGRAM(S): 100 TABLET, FILM COATED ORAL at 06:19

## 2019-09-09 RX ADMIN — Medication 100 MILLIGRAM(S): at 07:37

## 2019-09-09 RX ADMIN — TAMSULOSIN HYDROCHLORIDE 0.4 MILLIGRAM(S): 0.4 CAPSULE ORAL at 21:07

## 2019-09-09 RX ADMIN — INSULIN GLARGINE 40 UNIT(S): 100 INJECTION, SOLUTION SUBCUTANEOUS at 21:07

## 2019-09-09 NOTE — PROGRESS NOTE ADULT - SUBJECTIVE AND OBJECTIVE BOX
DAILY PROGRESS NOTE:  HPI: 74M pmh CAD s/p PCI on ASA, HTN, HLD, BPH, DM2, on ASA 81mg daily transferred from [facility] unwitnessed fall with finding of intracranial bleed and increased blood on interval scan. Patient initially presented to [facility] unwitnessed fall, + LOC ?syncopal episode 19, with complaints of dizziness after fall and initial difficulty speaking. Patient did not recall event. CTH showed L hemispheric SDH with SAH and calvarium fx. Per family, he was initially confused and altered. In the [facility] ED, was complaining of headache and dizziness since his fall.  ENT consulted for dizziness, started meclizine. Neurology was consulted for persistent headache, was given IV acetaminophen and solumedrol acutely, then sumatriptan added. Nephrology consulted for hyponatremia, felt is was due to chronic HCTZ use which was discontinued. (03 Sep 2019 13:34)    Subjective: Patient seen and examined at bedside, wife present. Headaches and dizziness much improved. No pain at present. SBPs improved per patient and wife. No cardiopulmonary complaints. Moving bowels regularly.     ROS: Headache, improving    Physical Exam:  Vital Signs Last 24 Hrs  T(C): 36.5 (09 Sep 2019 07:33), Max: 36.7 (08 Sep 2019 20:16)  T(F): 97.7 (09 Sep 2019 07:33), Max: 98 (08 Sep 2019 20:16)  HR: 74 (09 Sep 2019 07:33) (71 - 84)  BP: 116/68 (09 Sep 2019 07:33) (116/68 - 156/68)  RR: 15 (09 Sep 2019 07:33) (14 - 15)  SpO2: 98% (09 Sep 2019 07:33) (98% - 98%)    Constitutional - NAD, Comfortable  Chest - CTAB  Cardiovascular - RRR, S1S2, no m/r/g  Abdomen - BS+, Soft, NTND  Extremities - No C/C/E, No calf tenderness   Neurologic Exam -                    Cognitive - Awake, Alert, AAO to self, place, date, year, situation     Communication - Fluent, No dysarthria     Motor - Stable     Sensory - Intact to LT     Reflexes - 2+ b/l patellar, symmetric  Psychiatric - Mood stable, Affect WNL    Recent Labs/Imagin-09    138  |  104  |  13  ----------------------------<  131<H>  4.2   |  27  |  1.08    Ca    9.4      09 Sep 2019 06:15                          12.3   5.55  )-----------( 233      ( 09 Sep 2019 06:15 )             37.0     CAPILLARY BLOOD GLUCOSE  POCT  Blood Glucose (19 @ 07:32)    POCT Blood Glucose.: 153 mg/dL  POCT  Blood Glucose (19 @ 21:25)    POCT Blood Glucose.: 163 mg/dL  POCT  Blood Glucose (19 @ 16:49)    POCT Blood Glucose.: 169 mg/dL  POCT  Blood Glucose (19 @ 12:02)    POCT Blood Glucose.: 115 mg/dL      MEDICATIONS  (STANDING):  amLODIPine   Tablet 10 milliGRAM(s) Oral <User Schedule>  carvedilol 25 milliGRAM(s) Oral <User Schedule>  dextrose 5%. 1000 milliLiter(s) (50 mL/Hr) IV Continuous <Continuous>  dextrose 50% Injectable 12.5 Gram(s) IV Push once  dextrose 50% Injectable 25 Gram(s) IV Push once  docusate sodium 100 milliGRAM(s) Oral three times a day  enoxaparin Injectable 40 milliGRAM(s) SubCutaneous daily  ferrous    sulfate 325 milliGRAM(s) Oral daily  finasteride 5 milliGRAM(s) Oral daily  folic acid 1 milliGRAM(s) Oral daily  gabapentin 300 milliGRAM(s) Oral two times a day  insulin glargine Injectable (LANTUS) 40 Unit(s) SubCutaneous at bedtime  insulin lispro (HumaLOG) corrective regimen sliding scale   SubCutaneous three times a day before meals  insulin lispro (HumaLOG) corrective regimen sliding scale   SubCutaneous at bedtime  insulin lispro Injectable (HumaLOG) 5 Unit(s) SubCutaneous before breakfast  insulin lispro Injectable (HumaLOG) 7 Unit(s) SubCutaneous before lunch  insulin lispro Injectable (HumaLOG) 7 Unit(s) SubCutaneous before dinner  losartan 100 milliGRAM(s) Oral <User Schedule>  meclizine 12.5 milliGRAM(s) Oral every 8 hours  rosuvastatin 5 milliGRAM(s) Oral at bedtime  sodium chloride 1 Gram(s) Oral two times a day  tamsulosin 0.4 milliGRAM(s) Oral at bedtime    MEDICATIONS  (PRN):  acetaminophen   Tablet .. 650 milliGRAM(s) Oral every 6 hours PRN Temp greater or equal to 38C (100.4F), Mild Pain (1 - 3)  dextrose 40% Gel 15 Gram(s) Oral once PRN Blood Glucose LESS THAN 70 milliGRAM(s)/deciliter  glucagon  Injectable 1 milliGRAM(s) IntraMuscular once PRN Glucose LESS THAN 70 milligrams/deciliter  methyl salicylate 14%/menthol 6% Topical Ointment 1 Application(s) Topical every 6 hours PRN leg pain      Assessment/Plan:  JHON LUO is a 74M who suffered a Left SDH, R SAH and Right frontal IPH after a fall, now with decreased functional mobility, dysphagia, aphasia, hemiplegia, gait instability and ADL impairments.    Rehab: Gait Instability, ADL impairments and Functional impairments: continue Comprehensive Rehab Program of PT/OT/ST    Acute Left SDH, R SAH and R frontal IPH:  - Holding ASA and brillinta  - Tapering off Amantadine as patient's arousal is much improved-amantadine discontinued 19  - Meclizine for dizziness-dizziness greatly improved, will discontinue and monitor   - s/p keppra ppx x1 week, no seizures  - Strict BP control as below  - Statin  - Sumatriptan discontinued last week, headache improved and patient felt was giving him leg cramps as side effect.     Pain: Tylenol PRN, Gabapentin 300 BID    CAD s/p RYAN:  - Rosuvastatin 5mg QHS     Diastolic CHF:  - Carvedilol    HTN:   - Increased Amlodipine to 10mg for better control on , SBPs 110s-150s, occasional 160   - Carvedilol 25mg BID  - Losartan 100mg daily    GI/Bowel: Colace, Senna, Miralax PRN    Diet: Regular, CC    Renal/Bladder: Voiding independently, d/c PVR  - BPH: c/w Tamsulosin, Finasteride    Hyponatremia: Nephrology consulted at [facility], felt it was d/t HCTZ which was not resumed.   - Na 138   -decrease Na Tabs to BID     Type II IDDM: A1c 7.0%; Home meds Janumet 1000/50 BID and Glargine 30U   - FS improved  - Lantus increased from 35U to 40U by hospitalist NP last week   - Increased pre-lunch and pre-dinner humalog to 7U. c/w pre-breakfast humalog 5U last week  - c/w sliding scale  - c/w hypoglycemia protocol    Precautions / PROPHYLAXIS:   - Falls, Cardiac, Sternal, Spinal, Seizure   - Ortho: Weight bearing status: WBAT   - Lungs: Aspiration, Incentive Spirometer   - Pressure injury/Skin: Turn Q2hrs while in bed, OOB to Chair, PT/OT    - DVT: Lovenox, SCDs, TEDs     Dispo: IDT meeting 19: Supervision with eating and grooming, Min A for dressing and toilet transfer, Min A for transfers and amb 75ft RW, mild cog and attention deficits. Goals for Mod I with Transfers, Ambulation with RW and ADLs.   STEVAN: 19

## 2019-09-09 NOTE — PROGRESS NOTE ADULT - SUBJECTIVE AND OBJECTIVE BOX
Patient is a 74y old  Male who presents with a chief complaint of IPH.  Without Complaints, Pt doing well, dizziness controlled.  Pts wife at bedside.. Pt requesting change in tylenol dose and frequency.  Will address as requested.      Patient seen and examined at bedside.  labs reviewed    ALLERGIES:  No Known Allergies    MEDICATIONS  (STANDING):  amantadine 100 milliGRAM(s) Oral <User Schedule>  amLODIPine   Tablet 10 milliGRAM(s) Oral <User Schedule>  carvedilol 25 milliGRAM(s) Oral <User Schedule>  dextrose 5%. 1000 milliLiter(s) (50 mL/Hr) IV Continuous <Continuous>  dextrose 50% Injectable 12.5 Gram(s) IV Push once  dextrose 50% Injectable 25 Gram(s) IV Push once  docusate sodium 100 milliGRAM(s) Oral three times a day  enoxaparin Injectable 40 milliGRAM(s) SubCutaneous daily  ferrous    sulfate 325 milliGRAM(s) Oral two times a day  finasteride 5 milliGRAM(s) Oral daily  folic acid 1 milliGRAM(s) Oral daily  gabapentin 300 milliGRAM(s) Oral two times a day  insulin glargine Injectable (LANTUS) 40 Unit(s) SubCutaneous at bedtime  insulin lispro (HumaLOG) corrective regimen sliding scale   SubCutaneous three times a day before meals  insulin lispro (HumaLOG) corrective regimen sliding scale   SubCutaneous at bedtime  insulin lispro Injectable (HumaLOG) 5 Unit(s) SubCutaneous before breakfast  insulin lispro Injectable (HumaLOG) 7 Unit(s) SubCutaneous before lunch  insulin lispro Injectable (HumaLOG) 7 Unit(s) SubCutaneous before dinner  losartan 100 milliGRAM(s) Oral <User Schedule>  meclizine 12.5 milliGRAM(s) Oral every 8 hours  rosuvastatin 5 milliGRAM(s) Oral at bedtime  sodium chloride 1 Gram(s) Oral three times a day  tamsulosin 0.4 milliGRAM(s) Oral at bedtime    MEDICATIONS  (PRN):  acetaminophen   Tablet .. 650 milliGRAM(s) Oral every 6 hours PRN Temp greater or equal to 38C (100.4F), Mild Pain (1 - 3)  dextrose 40% Gel 15 Gram(s) Oral once PRN Blood Glucose LESS THAN 70 milliGRAM(s)/deciliter  glucagon  Injectable 1 milliGRAM(s) IntraMuscular once PRN Glucose LESS THAN 70 milligrams/deciliter  methyl salicylate 14%/menthol 6% Topical Ointment 1 Application(s) Topical every 6 hours PRN leg pain    Vital Signs Last 24 Hrs  T(F): 97.7 (09 Sep 2019 07:33), Max: 98 (08 Sep 2019 20:16)  HR: 74 (09 Sep 2019 07:33) (68 - 84)  BP: 116/68 (09 Sep 2019 07:33) (116/68 - 156/68)  RR: 15 (09 Sep 2019 07:33) (14 - 15)  SpO2: 98% (09 Sep 2019 07:33) (98% - 98%)  I&O's Summary    08 Sep 2019 07:01  -  09 Sep 2019 07:00  --------------------------------------------------------  IN: 600 mL / OUT: 0 mL / NET: 600 mL      PHYSICAL EXAM:  General: NAD, A/O x 3  ENT: MMM  Neck: Supple, No JVD  Lungs: Clear to auscultation bilaterally, Non labored breathing   Cardio: RRR, S1/S2, No murmurs  Abdomen: Soft, Nontender, Nondistended; Bowel sounds present  Extremities: No calf tenderness, No pitting edema  Skin:  no rashes     LABS:                        12.3   5.55  )-----------( 233      ( 09 Sep 2019 06:15 )             37.0     09-09    138  |  104  |  13  ----------------------------<  131  4.2   |  27  |  1.08    Ca    9.4      09 Sep 2019 06:15      eGFR if Non African American: 67 mL/min/1.73M2 (09-09-19 @ 06:15)  eGFR if African American: 77 mL/min/1.73M2 (09-09-19 @ 06:15)        CARDIAC MARKERS ( 09 Sep 2019 05:11 )  x     / x     / 39 U/L / x     / x                        POCT Blood Glucose.: 153 mg/dL (09 Sep 2019 07:32)  POCT Blood Glucose.: 163 mg/dL (08 Sep 2019 21:25)  POCT Blood Glucose.: 169 mg/dL (08 Sep 2019 16:49)  POCT Blood Glucose.: 115 mg/dL (08 Sep 2019 12:02)    08-22 [id number] 7.0        RADIOLOGY & ADDITIONAL TESTS:    Care Discussed with Consultants/Other Providers:

## 2019-09-09 NOTE — PROGRESS NOTE ADULT - ASSESSMENT
75yo M s/p SDH, R SAH and R frontal IPH after a fall, now with decreased functional mobility, dysphagia, aphasia, hemiplegia, gait instability and ADL impairments.    #Acute L SDH, R SAH and R frontal IPH:  -continue Statin, no ASA-- secondary to bleed    - Amantadine 100mg BID  - Meclizine prn for dizziness  - Strict BP control as below  - Sumatriptan PRN headache-- gives leg cramps  Taking tylenol prn for headaches     #Presyncope/orthostatic hypotension:-- improved  Norvasc dosing changed to 12 pm    #CAD s/p RYAN:  - Rosuvastatin 5mg QHS, (ASA and Brilinta held due to bleed )    #Diastolic CHF: last echo done 8/19; normal LV function, mild AS, grade 2 diastolic dysfunction  - Carvedilol    #Essential HTN: -165   - Amlodipine 5mg daily  - Carvedilol 25mg BID  - Losartan 100mg daily  continue to monitor for now     #BPH:   c/w Tamsulosin, Finasteride    #Hyponatremia:  Resolved: Nephrology consulted at [facility], felt it was d/t HCTZ which was not resumed.   - c/w NaCl tabs TID, follow Na levels--resolved back to baseline 138 NA    Type II IDDM w Hyperglycemia: A1c 7.0%;   - on Lantus 40 QHS and premeal Insulin (5-7-7), ISS; will continue monitor and adjust as needed-- 116- 153 BS    DTV ppx: lovenox

## 2019-09-09 NOTE — CHART NOTE - NSCHARTNOTEFT_GEN_A_CORE
Nutrition Follow Up Note  Hospital Course (Per Electronic Medical Record):   Source: Medical Record [X] Patient [X] Family [X] Nursing Staff [X]     Diet: Consistent Carbohydrate 1200cc FR, Glucerna shake TID    Patient noted tolerating current diet , po intake has noted to improved since admission. Patient noted consuming 100% of meals.     Current Weight: (9/5) 162/73.5kg, stable weight since admission    Pertinent Medications: MEDICATIONS  (STANDING):  amLODIPine   Tablet 10 milliGRAM(s) Oral <User Schedule>  carvedilol 25 milliGRAM(s) Oral <User Schedule>  dextrose 5%. 1000 milliLiter(s) (50 mL/Hr) IV Continuous <Continuous>  dextrose 50% Injectable 12.5 Gram(s) IV Push once  dextrose 50% Injectable 25 Gram(s) IV Push once  docusate sodium 100 milliGRAM(s) Oral three times a day  enoxaparin Injectable 40 milliGRAM(s) SubCutaneous daily  ferrous    sulfate 325 milliGRAM(s) Oral daily  finasteride 5 milliGRAM(s) Oral daily  folic acid 1 milliGRAM(s) Oral daily  gabapentin 300 milliGRAM(s) Oral two times a day  insulin glargine Injectable (LANTUS) 40 Unit(s) SubCutaneous at bedtime  insulin lispro (HumaLOG) corrective regimen sliding scale   SubCutaneous three times a day before meals  insulin lispro (HumaLOG) corrective regimen sliding scale   SubCutaneous at bedtime  insulin lispro Injectable (HumaLOG) 5 Unit(s) SubCutaneous before breakfast  insulin lispro Injectable (HumaLOG) 7 Unit(s) SubCutaneous before lunch  insulin lispro Injectable (HumaLOG) 7 Unit(s) SubCutaneous before dinner  losartan 100 milliGRAM(s) Oral <User Schedule>  rosuvastatin 5 milliGRAM(s) Oral at bedtime  sodium chloride 1 Gram(s) Oral two times a day  tamsulosin 0.4 milliGRAM(s) Oral at bedtime    MEDICATIONS  (PRN):  acetaminophen   Tablet .. 650 milliGRAM(s) Oral every 6 hours PRN Temp greater or equal to 38C (100.4F), Mild Pain (1 - 3)  dextrose 40% Gel 15 Gram(s) Oral once PRN Blood Glucose LESS THAN 70 milliGRAM(s)/deciliter  glucagon  Injectable 1 milliGRAM(s) IntraMuscular once PRN Glucose LESS THAN 70 milligrams/deciliter  methyl salicylate 14%/menthol 6% Topical Ointment 1 Application(s) Topical every 6 hours PRN leg pain      Pertinent Labs:  09-09 Na138 mmol/L Glu 131 mg/dL<H> K+ 4.2 mmol/L Cr  1.08 mg/dL BUN 13 mg/dL 09-03 Phos 2.6 mg/dL 09-04 Alb 3.0 g/dL<L> 08-22 [id number] 7.0 %<H>  POCT 153,163,169,115      Skin: intact    Edema: none    Last BM: on (9/7)    Estimated Needs:   [X] No Change since Previous Assessment  [X] Recalculated:     Previous Nutrition Diagnosis: Inadequate Oral nutrition intake    Nutrition Diagnosis is [X] Resolved       New Nutrition Diagnosis: [X] Not Applicable      Interventions:   1. Recommend continue current diet regimen,  2. Suggest liberalize FR as medically appropriate    Monitoring & Evaluation: will monitor:  [X] Weights   [X] PO Intake   [X] Follow Up (Per Protocol)  [X] Tolerance to Diet Prescription       RD to follow as per Nutrition protocol  Debbie Sparrow RDN

## 2019-09-10 LAB
GLUCOSE BLDC GLUCOMTR-MCNC: 143 MG/DL — HIGH (ref 70–99)
GLUCOSE BLDC GLUCOMTR-MCNC: 144 MG/DL — HIGH (ref 70–99)
GLUCOSE BLDC GLUCOMTR-MCNC: 183 MG/DL — HIGH (ref 70–99)
GLUCOSE BLDC GLUCOMTR-MCNC: 215 MG/DL — HIGH (ref 70–99)

## 2019-09-10 PROCEDURE — 99232 SBSQ HOSP IP/OBS MODERATE 35: CPT

## 2019-09-10 RX ADMIN — CARVEDILOL PHOSPHATE 25 MILLIGRAM(S): 80 CAPSULE, EXTENDED RELEASE ORAL at 18:03

## 2019-09-10 RX ADMIN — LOSARTAN POTASSIUM 100 MILLIGRAM(S): 100 TABLET, FILM COATED ORAL at 06:00

## 2019-09-10 RX ADMIN — ENOXAPARIN SODIUM 40 MILLIGRAM(S): 100 INJECTION SUBCUTANEOUS at 11:36

## 2019-09-10 RX ADMIN — INSULIN GLARGINE 40 UNIT(S): 100 INJECTION, SOLUTION SUBCUTANEOUS at 22:19

## 2019-09-10 RX ADMIN — ROSUVASTATIN CALCIUM 5 MILLIGRAM(S): 5 TABLET ORAL at 22:18

## 2019-09-10 RX ADMIN — Medication 7 UNIT(S): at 16:54

## 2019-09-10 RX ADMIN — Medication 100 MILLIGRAM(S): at 13:04

## 2019-09-10 RX ADMIN — Medication 325 MILLIGRAM(S): at 11:36

## 2019-09-10 RX ADMIN — Medication 1 MILLIGRAM(S): at 11:36

## 2019-09-10 RX ADMIN — Medication 0: at 22:19

## 2019-09-10 RX ADMIN — AMLODIPINE BESYLATE 10 MILLIGRAM(S): 2.5 TABLET ORAL at 11:36

## 2019-09-10 RX ADMIN — Medication 100 MILLIGRAM(S): at 22:18

## 2019-09-10 RX ADMIN — SODIUM CHLORIDE 1 GRAM(S): 9 INJECTION INTRAMUSCULAR; INTRAVENOUS; SUBCUTANEOUS at 18:03

## 2019-09-10 RX ADMIN — Medication 650 MILLIGRAM(S): at 23:15

## 2019-09-10 RX ADMIN — TAMSULOSIN HYDROCHLORIDE 0.4 MILLIGRAM(S): 0.4 CAPSULE ORAL at 22:18

## 2019-09-10 RX ADMIN — Medication 5 UNIT(S): at 08:04

## 2019-09-10 RX ADMIN — Medication 650 MILLIGRAM(S): at 22:18

## 2019-09-10 RX ADMIN — Medication 2: at 16:54

## 2019-09-10 RX ADMIN — GABAPENTIN 300 MILLIGRAM(S): 400 CAPSULE ORAL at 18:03

## 2019-09-10 RX ADMIN — SODIUM CHLORIDE 1 GRAM(S): 9 INJECTION INTRAMUSCULAR; INTRAVENOUS; SUBCUTANEOUS at 06:00

## 2019-09-10 RX ADMIN — Medication 100 MILLIGRAM(S): at 06:00

## 2019-09-10 RX ADMIN — Medication 7 UNIT(S): at 12:16

## 2019-09-10 RX ADMIN — FINASTERIDE 5 MILLIGRAM(S): 5 TABLET, FILM COATED ORAL at 11:36

## 2019-09-10 RX ADMIN — CARVEDILOL PHOSPHATE 25 MILLIGRAM(S): 80 CAPSULE, EXTENDED RELEASE ORAL at 06:00

## 2019-09-10 RX ADMIN — GABAPENTIN 300 MILLIGRAM(S): 400 CAPSULE ORAL at 06:00

## 2019-09-10 NOTE — PROGRESS NOTE ADULT - SUBJECTIVE AND OBJECTIVE BOX
DAILY PROGRESS NOTE:  HPI: 74M pmh CAD s/p PCI on ASA, HTN, HLD, BPH, DM2, on ASA 81mg daily transferred from [facility] unwitnessed fall with finding of intracranial bleed and increased blood on interval scan. Patient initially presented to [facility] unwitnessed fall, + LOC ?syncopal episode 19, with complaints of dizziness after fall and initial difficulty speaking. Patient did not recall event. CTH showed L hemispheric SDH with SAH and calvarium fx. Per family, he was initially confused and altered. In the [facility] ED, was complaining of headache and dizziness since his fall.  ENT consulted for dizziness, started meclizine. Neurology was consulted for persistent headache, was given IV acetaminophen and solumedrol acutely, then sumatriptan added. Nephrology consulted for hyponatremia, felt is was due to chronic HCTZ use which was discontinued. (03 Sep 2019 13:34)    Subjective: Patient seen and examined in SLP, wife present. No pain at present. No cardiopulmonary complaints. Moving bowels regularly.    ROS: Headache, improving    Physical Exam:  Vital Signs Last 24 Hrs  T(C): 36.7 (10 Sep 2019 08:18), Max: 36.7 (09 Sep 2019 20:42)  T(F): 98 (10 Sep 2019 08:18), Max: 98 (09 Sep 2019 20:42)  HR: 74 (10 Sep 2019 08:18) (71 - 74)  BP: 160/72 (10 Sep 2019 08:18) (141/68 - 165/75)  BP(mean): --  RR: 14 (10 Sep 2019 08:18) (14 - 14)  SpO2: 98% (10 Sep 2019 08:18) (98% - 98%)    Constitutional - NAD, Comfortable  Chest - CTAB  Cardiovascular - RRR, S1S2, no m/r/g  Abdomen - BS+, Soft, NTND  Extremities - No C/C/E, No calf tenderness   Neurologic Exam -                    Cognitive - Awake, Alert, AAO to self, place, date, year, situation, left inattention     Communication - Fluent, No dysarthria     Motor - Stable     Sensory - Intact to LT     Reflexes - 2+ b/l patellar, symmetric  Psychiatric - Mood stable, Affect WNL    Recent Labs/Imagin-09    138  |  104  |  13  ----------------------------<  131<H>  4.2   |  27  |  1.08    Ca    9.4      09 Sep 2019 06:15                          12.3   5.55  )-----------( 233      ( 09 Sep 2019 06:15 )             37.0     CAPILLARY BLOOD GLUCOSE  POCT Blood Glucose.: 143 mg/dL (10 Sep 2019 11:48)  POCT Blood Glucose.: 144 mg/dL (10 Sep 2019 07:24)  POCT Blood Glucose.: 188 mg/dL (09 Sep 2019 21:03)  POCT Blood Glucose.: 86 mg/dL (09 Sep 2019 17:03)    MEDICATIONS  (STANDING):  amLODIPine   Tablet 10 milliGRAM(s) Oral <User Schedule>  carvedilol 25 milliGRAM(s) Oral <User Schedule>  dextrose 5%. 1000 milliLiter(s) (50 mL/Hr) IV Continuous <Continuous>  dextrose 50% Injectable 12.5 Gram(s) IV Push once  dextrose 50% Injectable 25 Gram(s) IV Push once  docusate sodium 100 milliGRAM(s) Oral three times a day  enoxaparin Injectable 40 milliGRAM(s) SubCutaneous daily  ferrous    sulfate 325 milliGRAM(s) Oral daily  finasteride 5 milliGRAM(s) Oral daily  folic acid 1 milliGRAM(s) Oral daily  gabapentin 300 milliGRAM(s) Oral two times a day  insulin glargine Injectable (LANTUS) 40 Unit(s) SubCutaneous at bedtime  insulin lispro (HumaLOG) corrective regimen sliding scale   SubCutaneous three times a day before meals  insulin lispro (HumaLOG) corrective regimen sliding scale   SubCutaneous at bedtime  insulin lispro Injectable (HumaLOG) 5 Unit(s) SubCutaneous before breakfast  insulin lispro Injectable (HumaLOG) 7 Unit(s) SubCutaneous before lunch  insulin lispro Injectable (HumaLOG) 7 Unit(s) SubCutaneous before dinner  losartan 100 milliGRAM(s) Oral <User Schedule>  rosuvastatin 5 milliGRAM(s) Oral at bedtime  sodium chloride 1 Gram(s) Oral two times a day  tamsulosin 0.4 milliGRAM(s) Oral at bedtime    MEDICATIONS  (PRN):  acetaminophen   Tablet .. 650 milliGRAM(s) Oral every 6 hours PRN Temp greater or equal to 38C (100.4F), Mild Pain (1 - 3)  dextrose 40% Gel 15 Gram(s) Oral once PRN Blood Glucose LESS THAN 70 milliGRAM(s)/deciliter  glucagon  Injectable 1 milliGRAM(s) IntraMuscular once PRN Glucose LESS THAN 70 milligrams/deciliter  methyl salicylate 14%/menthol 6% Topical Ointment 1 Application(s) Topical every 6 hours PRN leg pain    Assessment/Plan:  JHON LUO is a 74M who suffered a Left SDH, R SAH and Right frontal IPH after a fall, now with decreased functional mobility, dysphagia, aphasia, hemiplegia, gait instability and ADL impairments.    Rehab: Gait Instability, ADL impairments and Functional impairments: continue Comprehensive Rehab Program of PT/OT/ST    Acute Left SDH, R SAH and R frontal IPH:  - Holding ASA and brillinta  - Tapering off Amantadine as patient's arousal is much improved-amantadine discontinued 19  - Meclizine for dizziness-dizziness greatly improved, will discontinue and monitor   - s/p keppra ppx x1 week, no seizures  - Strict BP control as below  - Statin  - Sumatriptan discontinued last week, headache improved and patient felt was giving him leg cramps as side effect.     Pain: Tylenol PRN, Gabapentin 300 BID    CAD s/p RYAN:  - Rosuvastatin 5mg QHS     Diastolic CHF:  - Carvedilol    HTN:   - Increased Amlodipine to 10mg for better control on , SBPs 110s-150s, occasional 160   - Carvedilol 25mg BID  - Losartan 100mg daily    GI/Bowel: Colace, Senna, Miralax PRN    Diet: Regular, CC    Renal/Bladder: Voiding independently, d/c PVR  - BPH: c/w Tamsulosin, Finasteride    Hyponatremia: Nephrology consulted at [facility], felt it was d/t HCTZ which was not resumed.   - Na 138   - decrease Na Tabs to BID     Type II IDDM: A1c 7.0%; Home meds Janumet 1000/50 BID and Glargine 30U   - FS improved  - Lantus increased from 35U to 40U by hospitalist NP last week   - Increased pre-lunch and pre-dinner humalog to 7U. c/w pre-breakfast humalog 5U last week  - c/w sliding scale  - c/w hypoglycemia protocol    Precautions / PROPHYLAXIS:   - Falls  - Ortho: Weight bearing status: WBAT   - Lungs: Aspiration, Incentive Spirometer   - Pressure injury/Skin: Turn Q2hrs while in bed, OOB to Chair, PT/OT    - DVT: Lovenox, SCDs, TEDs     Dispo: IDT meeting 19: Supervision with eating and grooming, Min A for dressing and toilet transfer, Min A for transfers and amb 75ft RW, mild cog and attention deficits. Goals for Mod I with Transfers, Ambulation with RW and ADLs.   STEVAN: 19 DAILY PROGRESS NOTE:  HPI: 74M pmh CAD s/p PCI on ASA, HTN, HLD, BPH, DM2, on ASA 81mg daily transferred from [facility] unwitnessed fall with finding of intracranial bleed and increased blood on interval scan. Patient initially presented to [facility] unwitnessed fall, + LOC ?syncopal episode 19, with complaints of dizziness after fall and initial difficulty speaking. Patient did not recall event. CTH showed L hemispheric SDH with SAH and calvarium fx. Per family, he was initially confused and altered. In the [facility] ED, was complaining of headache and dizziness since his fall.  ENT consulted for dizziness, started meclizine. Neurology was consulted for persistent headache, was given IV acetaminophen and solumedrol acutely, then sumatriptan added. Nephrology consulted for hyponatremia, felt is was due to chronic HCTZ use which was discontinued. (03 Sep 2019 13:34)    Subjective: Patient seen and examined in SLP, wife present. No pain at present. No cardiopulmonary complaints. Moving bowels regularly.    ROS: Headache, improving    Physical Exam:  Vital Signs Last 24 Hrs  T(C): 36.7 (10 Sep 2019 08:18), Max: 36.7 (09 Sep 2019 20:42)  T(F): 98 (10 Sep 2019 08:18), Max: 98 (09 Sep 2019 20:42)  HR: 74 (10 Sep 2019 08:18) (71 - 74)  BP: 160/72 (10 Sep 2019 08:18) (141/68 - 165/75)  BP(mean): --  RR: 14 (10 Sep 2019 08:18) (14 - 14)  SpO2: 98% (10 Sep 2019 08:18) (98% - 98%)    Constitutional - NAD, Comfortable  Chest - CTAB  Cardiovascular - RRR, S1S2, no m/r/g  Abdomen - BS+, Soft, NTND  Extremities - No C/C/E, No calf tenderness   Neurologic Exam -                    Cognitive - Awake, Alert, AAO to self, place, date, year, situation, left inattention     Communication - Fluent, No dysarthria     Motor - Stable     Sensory - Intact to LT     Reflexes - 2+ b/l patellar, symmetric  Psychiatric - Mood stable, Affect WNL    Recent Labs/Imagin-09    138  |  104  |  13  ----------------------------<  131<H>  4.2   |  27  |  1.08    Ca    9.4      09 Sep 2019 06:15                          12.3   5.55  )-----------( 233      ( 09 Sep 2019 06:15 )             37.0     CAPILLARY BLOOD GLUCOSE  POCT Blood Glucose.: 143 mg/dL (10 Sep 2019 11:48)  POCT Blood Glucose.: 144 mg/dL (10 Sep 2019 07:24)  POCT Blood Glucose.: 188 mg/dL (09 Sep 2019 21:03)  POCT Blood Glucose.: 86 mg/dL (09 Sep 2019 17:03)    MEDICATIONS  (STANDING):  amLODIPine   Tablet 10 milliGRAM(s) Oral <User Schedule>  carvedilol 25 milliGRAM(s) Oral <User Schedule>  dextrose 5%. 1000 milliLiter(s) (50 mL/Hr) IV Continuous <Continuous>  dextrose 50% Injectable 12.5 Gram(s) IV Push once  dextrose 50% Injectable 25 Gram(s) IV Push once  docusate sodium 100 milliGRAM(s) Oral three times a day  enoxaparin Injectable 40 milliGRAM(s) SubCutaneous daily  ferrous    sulfate 325 milliGRAM(s) Oral daily  finasteride 5 milliGRAM(s) Oral daily  folic acid 1 milliGRAM(s) Oral daily  gabapentin 300 milliGRAM(s) Oral two times a day  insulin glargine Injectable (LANTUS) 40 Unit(s) SubCutaneous at bedtime  insulin lispro (HumaLOG) corrective regimen sliding scale   SubCutaneous three times a day before meals  insulin lispro (HumaLOG) corrective regimen sliding scale   SubCutaneous at bedtime  insulin lispro Injectable (HumaLOG) 5 Unit(s) SubCutaneous before breakfast  insulin lispro Injectable (HumaLOG) 7 Unit(s) SubCutaneous before lunch  insulin lispro Injectable (HumaLOG) 7 Unit(s) SubCutaneous before dinner  losartan 100 milliGRAM(s) Oral <User Schedule>  rosuvastatin 5 milliGRAM(s) Oral at bedtime  sodium chloride 1 Gram(s) Oral two times a day  tamsulosin 0.4 milliGRAM(s) Oral at bedtime    MEDICATIONS  (PRN):  acetaminophen   Tablet .. 650 milliGRAM(s) Oral every 6 hours PRN Temp greater or equal to 38C (100.4F), Mild Pain (1 - 3)  dextrose 40% Gel 15 Gram(s) Oral once PRN Blood Glucose LESS THAN 70 milliGRAM(s)/deciliter  glucagon  Injectable 1 milliGRAM(s) IntraMuscular once PRN Glucose LESS THAN 70 milligrams/deciliter  methyl salicylate 14%/menthol 6% Topical Ointment 1 Application(s) Topical every 6 hours PRN leg pain    Assessment/Plan:  JHON LUO is a 74M who suffered a Left SDH, R SAH and Right frontal IPH after a fall, now with decreased functional mobility, dysphagia, aphasia, hemiplegia, gait instability and ADL impairments.    Rehab: Gait Instability, ADL impairments and Functional impairments: continue Comprehensive Rehab Program of PT/OT/ST    Acute Left SDH, R SAH and R frontal IPH:  - Holding ASA and brillinta  - Stopped Amantadine and Meclizine-- no symptoms of headache or dizziness  - Strict BP control as below  - Statin      Pain: Tylenol PRN, Gabapentin 300 BID    CAD s/p RYAN:  - Rosuvastatin 5mg QHS     Diastolic CHF:  - Carvedilol    HTN:   - Increased Amlodipine to 10mg for better control on , SBPs 110s-150s, occasional 160   - Carvedilol 25mg BID  - Losartan 100mg daily    GI/Bowel: Colace, Senna, Miralax PRN    Diet: Regular, CC    Renal/Bladder: Voiding independently, d/c PVR  - BPH: c/w Tamsulosin, Finasteride    Hyponatremia: Nephrology consulted at [facility], felt it was d/t HCTZ which was not resumed.   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 Lantus  40U by hospitalist NP last week   - Increased pre-lunch and pre-dinner humalog to 7U. c/w pre-breakfast humalog 5U last week  - c/w sliding scale  - c/w hypoglycemia protocol    Precautions / PROPHYLAXIS:   - Falls  - Lungs: Aspiration, Incentive Spirometer   - Pressure injury/Skin: Turn Q2hrs while in bed, OOB to Chair, PT/OT    - DVT: Lovenox, SCDs, TEDs     Dispo: IDT meeting 19: Supervision with eating and grooming, Min A for dressing and toilet transfer, Min A for transfers and amb 75ft RW, mild cog and attention deficits. Goals for Mod I with Transfers, Ambulation with RW and ADLs.   STEVAN: 19

## 2019-09-11 ENCOUNTER — TRANSCRIPTION ENCOUNTER (OUTPATIENT)
Age: 74
End: 2019-09-11

## 2019-09-11 LAB
ANION GAP SERPL CALC-SCNC: 7 MMOL/L — SIGNIFICANT CHANGE UP (ref 5–17)
BUN SERPL-MCNC: 15 MG/DL — SIGNIFICANT CHANGE UP (ref 7–23)
CALCIUM SERPL-MCNC: 9.6 MG/DL — SIGNIFICANT CHANGE UP (ref 8.4–10.5)
CHLORIDE SERPL-SCNC: 104 MMOL/L — SIGNIFICANT CHANGE UP (ref 96–108)
CO2 SERPL-SCNC: 29 MMOL/L — SIGNIFICANT CHANGE UP (ref 22–31)
CREAT SERPL-MCNC: 1.05 MG/DL — SIGNIFICANT CHANGE UP (ref 0.5–1.3)
GLUCOSE BLDC GLUCOMTR-MCNC: 123 MG/DL — HIGH (ref 70–99)
GLUCOSE BLDC GLUCOMTR-MCNC: 127 MG/DL — HIGH (ref 70–99)
GLUCOSE BLDC GLUCOMTR-MCNC: 176 MG/DL — HIGH (ref 70–99)
GLUCOSE BLDC GLUCOMTR-MCNC: 177 MG/DL — HIGH (ref 70–99)
GLUCOSE BLDC GLUCOMTR-MCNC: 181 MG/DL — HIGH (ref 70–99)
GLUCOSE SERPL-MCNC: 108 MG/DL — HIGH (ref 70–99)
POTASSIUM SERPL-MCNC: 3.8 MMOL/L — SIGNIFICANT CHANGE UP (ref 3.5–5.3)
POTASSIUM SERPL-SCNC: 3.8 MMOL/L — SIGNIFICANT CHANGE UP (ref 3.5–5.3)
SODIUM SERPL-SCNC: 140 MMOL/L — SIGNIFICANT CHANGE UP (ref 135–145)

## 2019-09-11 PROCEDURE — 99232 SBSQ HOSP IP/OBS MODERATE 35: CPT

## 2019-09-11 RX ORDER — GABAPENTIN 400 MG/1
1 CAPSULE ORAL
Qty: 0 | Refills: 0 | DISCHARGE
Start: 2019-09-11

## 2019-09-11 RX ORDER — CARVEDILOL PHOSPHATE 80 MG/1
1 CAPSULE, EXTENDED RELEASE ORAL
Qty: 0 | Refills: 0 | DISCHARGE
Start: 2019-09-11

## 2019-09-11 RX ORDER — AMLODIPINE BESYLATE 2.5 MG/1
1 TABLET ORAL
Qty: 30 | Refills: 0
Start: 2019-09-11 | End: 2019-10-10

## 2019-09-11 RX ORDER — ROSUVASTATIN CALCIUM 5 MG/1
1 TABLET ORAL
Qty: 30 | Refills: 0
Start: 2019-09-11 | End: 2019-10-10

## 2019-09-11 RX ORDER — LOSARTAN POTASSIUM 100 MG/1
1 TABLET, FILM COATED ORAL
Qty: 30 | Refills: 0
Start: 2019-09-11 | End: 2019-10-10

## 2019-09-11 RX ORDER — CARVEDILOL PHOSPHATE 80 MG/1
1 CAPSULE, EXTENDED RELEASE ORAL
Qty: 60 | Refills: 0
Start: 2019-09-11 | End: 2019-10-10

## 2019-09-11 RX ORDER — SODIUM CHLORIDE 9 MG/ML
1 INJECTION INTRAMUSCULAR; INTRAVENOUS; SUBCUTANEOUS DAILY
Refills: 0 | Status: DISCONTINUED | OUTPATIENT
Start: 2019-09-11 | End: 2019-09-13

## 2019-09-11 RX ORDER — AMLODIPINE BESYLATE 2.5 MG/1
1 TABLET ORAL
Qty: 0 | Refills: 0 | DISCHARGE
Start: 2019-09-11

## 2019-09-11 RX ORDER — TAMSULOSIN HYDROCHLORIDE 0.4 MG/1
1 CAPSULE ORAL
Qty: 0 | Refills: 0 | DISCHARGE
Start: 2019-09-11

## 2019-09-11 RX ORDER — LOSARTAN POTASSIUM 100 MG/1
1 TABLET, FILM COATED ORAL
Qty: 0 | Refills: 0 | DISCHARGE
Start: 2019-09-11

## 2019-09-11 RX ORDER — GABAPENTIN 400 MG/1
300 CAPSULE ORAL AT BEDTIME
Refills: 0 | Status: DISCONTINUED | OUTPATIENT
Start: 2019-09-11 | End: 2019-09-13

## 2019-09-11 RX ORDER — TAMSULOSIN HYDROCHLORIDE 0.4 MG/1
1 CAPSULE ORAL
Qty: 30 | Refills: 0
Start: 2019-09-11 | End: 2019-10-10

## 2019-09-11 RX ORDER — SITAGLIPTIN AND METFORMIN HYDROCHLORIDE 500; 50 MG/1; MG/1
1 TABLET, FILM COATED ORAL
Qty: 60 | Refills: 0
Start: 2019-09-11 | End: 2019-10-10

## 2019-09-11 RX ORDER — GABAPENTIN 400 MG/1
1 CAPSULE ORAL
Qty: 7 | Refills: 0
Start: 2019-09-11 | End: 2019-09-17

## 2019-09-11 RX ORDER — GABAPENTIN 400 MG/1
0 CAPSULE ORAL
Qty: 0 | Refills: 0 | DISCHARGE

## 2019-09-11 RX ORDER — CARVEDILOL PHOSPHATE 80 MG/1
1 CAPSULE, EXTENDED RELEASE ORAL
Qty: 0 | Refills: 0 | DISCHARGE

## 2019-09-11 RX ORDER — FERROUS SULFATE 325(65) MG
365 TABLET ORAL
Qty: 30 | Refills: 0
Start: 2019-09-11 | End: 2019-10-10

## 2019-09-11 RX ORDER — FOLIC ACID 0.8 MG
1 TABLET ORAL
Qty: 0 | Refills: 0 | DISCHARGE
Start: 2019-09-11

## 2019-09-11 RX ORDER — SODIUM CHLORIDE 9 MG/ML
1 INJECTION INTRAMUSCULAR; INTRAVENOUS; SUBCUTANEOUS
Qty: 0 | Refills: 0 | DISCHARGE
Start: 2019-09-11

## 2019-09-11 RX ORDER — OLMESARTAN MEDOXOMIL 5 MG/1
1 TABLET, FILM COATED ORAL
Qty: 0 | Refills: 0 | DISCHARGE

## 2019-09-11 RX ORDER — ROSUVASTATIN CALCIUM 5 MG/1
1 TABLET ORAL
Qty: 0 | Refills: 0 | DISCHARGE
Start: 2019-09-11

## 2019-09-11 RX ORDER — FINASTERIDE 5 MG/1
1 TABLET, FILM COATED ORAL
Qty: 0 | Refills: 0 | DISCHARGE
Start: 2019-09-11

## 2019-09-11 RX ORDER — FINASTERIDE 5 MG/1
1 TABLET, FILM COATED ORAL
Qty: 30 | Refills: 0
Start: 2019-09-11 | End: 2019-10-10

## 2019-09-11 RX ADMIN — Medication 7 UNIT(S): at 12:11

## 2019-09-11 RX ADMIN — Medication 1: at 17:01

## 2019-09-11 RX ADMIN — Medication 0: at 21:20

## 2019-09-11 RX ADMIN — GABAPENTIN 300 MILLIGRAM(S): 400 CAPSULE ORAL at 21:20

## 2019-09-11 RX ADMIN — Medication 5 UNIT(S): at 07:55

## 2019-09-11 RX ADMIN — CARVEDILOL PHOSPHATE 25 MILLIGRAM(S): 80 CAPSULE, EXTENDED RELEASE ORAL at 18:31

## 2019-09-11 RX ADMIN — Medication 1 MILLIGRAM(S): at 12:11

## 2019-09-11 RX ADMIN — FINASTERIDE 5 MILLIGRAM(S): 5 TABLET, FILM COATED ORAL at 12:11

## 2019-09-11 RX ADMIN — TAMSULOSIN HYDROCHLORIDE 0.4 MILLIGRAM(S): 0.4 CAPSULE ORAL at 21:19

## 2019-09-11 RX ADMIN — AMLODIPINE BESYLATE 10 MILLIGRAM(S): 2.5 TABLET ORAL at 12:11

## 2019-09-11 RX ADMIN — LOSARTAN POTASSIUM 100 MILLIGRAM(S): 100 TABLET, FILM COATED ORAL at 06:17

## 2019-09-11 RX ADMIN — ENOXAPARIN SODIUM 40 MILLIGRAM(S): 100 INJECTION SUBCUTANEOUS at 12:11

## 2019-09-11 RX ADMIN — Medication 650 MILLIGRAM(S): at 21:18

## 2019-09-11 RX ADMIN — GABAPENTIN 300 MILLIGRAM(S): 400 CAPSULE ORAL at 06:17

## 2019-09-11 RX ADMIN — Medication 7 UNIT(S): at 17:01

## 2019-09-11 RX ADMIN — CARVEDILOL PHOSPHATE 25 MILLIGRAM(S): 80 CAPSULE, EXTENDED RELEASE ORAL at 06:17

## 2019-09-11 RX ADMIN — Medication 100 MILLIGRAM(S): at 06:17

## 2019-09-11 RX ADMIN — ROSUVASTATIN CALCIUM 5 MILLIGRAM(S): 5 TABLET ORAL at 21:17

## 2019-09-11 RX ADMIN — Medication 650 MILLIGRAM(S): at 22:15

## 2019-09-11 RX ADMIN — Medication 100 MILLIGRAM(S): at 21:19

## 2019-09-11 RX ADMIN — Medication 0: at 07:54

## 2019-09-11 RX ADMIN — Medication 325 MILLIGRAM(S): at 12:11

## 2019-09-11 RX ADMIN — INSULIN GLARGINE 40 UNIT(S): 100 INJECTION, SOLUTION SUBCUTANEOUS at 21:17

## 2019-09-11 RX ADMIN — SODIUM CHLORIDE 1 GRAM(S): 9 INJECTION INTRAMUSCULAR; INTRAVENOUS; SUBCUTANEOUS at 06:17

## 2019-09-11 RX ADMIN — Medication 1: at 12:10

## 2019-09-11 NOTE — DISCHARGE NOTE PROVIDER - CARE PROVIDERS DIRECT ADDRESSES
[email or web address]

## 2019-09-11 NOTE — DISCHARGE NOTE PROVIDER - NSDCCPCAREPLAN_GEN_ALL_CORE_FT
PRINCIPAL DISCHARGE DIAGNOSIS  Diagnosis: Traumatic brain injury  Assessment and Plan of Treatment: Left SDH, R SAH, and R frontal IPH after a fall  - Aphasia resolved  - Continue PT, OT, and SLP  - Holding aspirin and brillinta  - Strict Blood pressure control  - Continue taking Rosuvastatin 5mg Daily  - Follow up with Dr. Pitts (PM&R/Brain Injury Medicine)  - Take Gabapentin for 7 more days at bedtime, then stop.      SECONDARY DISCHARGE DIAGNOSES  Diagnosis: Insulin dependent diabetes mellitus  Assessment and Plan of Treatment: - Resume home regimen of Janumet 1000mg/50mg twice daily and Glargine 30U daily   - Monitor blood glucose, keep a log  - Drink juice or have a snack is blood sugar <80.  - Follow up with your PCP/Endocrinologist    Diagnosis: Hyponatremia  Assessment and Plan of Treatment: Resolving  - Continue Salt tab 1mg daily   - Follow up with PCP to taper off    Diagnosis: Benign prostate hyperplasia  Assessment and Plan of Treatment: - Continue tamsulosin and finasteride    Diagnosis: Hypertension  Assessment and Plan of Treatment: - Continue Amlodipine 10mg daily  - Continue Carvedilol 25mg twice daily  - Cotninue Losartan 100mg daily    Diagnosis: Chronic diastolic heart failure  Assessment and Plan of Treatment: - Continue carvedilol    Diagnosis: CAD, multiple vessel  Assessment and Plan of Treatment: - Continue Rosuvastatin 5mg Daily

## 2019-09-11 NOTE — DISCHARGE NOTE PROVIDER - CARE PROVIDER_API CALL
Felipe Camargo)  Neurological Surgery  [address] [facility], [address] [place] [address]  Phone: [phone]  Fax: [phone]  Follow Up Time:     Elissa Pitts (DO)  Brain Injury Medicine; PhysicalRehab Medicine  [address] [place] [address]  Phone: [phone]  Fax: [phone]  Follow Up Time:

## 2019-09-11 NOTE — DISCHARGE NOTE PROVIDER - NSDCFUSCHEDAPPT_GEN_ALL_CORE_FT
JHON LUO ; 10/03/2019 ; NPP PhysMed [facility] Erik JHON LUO ; 10/03/2019 ; NPP PhysMed [facility] Erik JHON LUO ; 10/03/2019 ; NPP PhysMed [facility] JHON Miller ; 10/14/2019 ; NPP NeuroSurg [facility]

## 2019-09-11 NOTE — DISCHARGE NOTE PROVIDER - HOSPITAL COURSE
Hospital Course:    Mr. Grant is a 74 year old man with past medical history of CAD s/p PCI on ASA, HTN, HLD, BPH, DM2, on ASA 81mg daily transferred from [facility] to [facility] s/p unwitnessed fall with finding of intracranial bleed and increased blood on interval scan. Patient initially presented to [facility] s/p unwitnessed fall, + LOC ?syncopal episode 8/21/19, with complaints of dizziness after fall and initial difficulty speaking. Patient did not recall event. CTH showed L hemispheric SDH with SAH and calvarium fx. Per family, he was initially confused and altered. In the [facility] ED, was complaining of headache and dizziness since his fall.    ENT consulted for dizziness, started meclizine. Neurology was consulted for persistent headache, was given IV acetaminophen and solumedrol acutely, then sumatriptan added. Nephrology consulted for hyponatremia, felt is was due to chronic HCTZ use which was discontinued.        Rehab Course:    Mr. Grant was admitted to [facility] on 9/3/19 for acute rehabilitation. He completed a comprehensive program consisting of PT, OT, and SLP, making functional progress in therapy. His course was complicated by pre-syncope, found to have positive orthostatic blood pressure readings. He improved with adjustments to his blood pressure medications. Amantadine was tapered off as his arousal had significantly improved. Meclizine was stopped as dizziness subsided. Salt tabs were decreased as sodium levels stabilized. Gabapentin taper was started as patient exhibited no signs or symptoms of neuropathic pain. At the time of discharge he was modified independent for transfers, ambulation with rolling walker and ADL's. He is medically stable to be discharged to home with home care. Hospital Course:    Mr. Guillaume is a 74 year old man with past medical history of CAD s/p PCI on ASA, HTN, HLD, BPH, DM2, on ASA 81mg daily transferred from [facility] to [facility] s/p unwitnessed fall with finding of intracranial bleed and increased blood on interval scan. Patient initially presented to [facility] s/p unwitnessed fall, + LOC ?syncopal episode 8/21/19, with complaints of dizziness after fall and initial difficulty speaking. Patient did not recall event. CTH showed L hemispheric SDH with SAH and calvarium fx. Per family, he was initially confused and altered. In the [facility] ED, was complaining of headache and dizziness since his fall.    ENT consulted for dizziness, started meclizine. Neurology was consulted for persistent headache, was given IV acetaminophen and solumedrol acutely, then sumatriptan added. Nephrology consulted for hyponatremia, felt is was due to chronic HCTZ use which was discontinued.        Rehab Course:    Mr. Guillaume was admitted to [facility] on 9/3/19 for acute rehabilitation. He completed a comprehensive program consisting of PT, OT, and SLP, making functional progress in therapy. His course was complicated by pre-syncope, found to have positive orthostatic blood pressure readings. He improved with adjustments to his blood pressure medications. Amantadine was tapered off as his arousal had significantly improved. Meclizine was stopped as dizziness subsided. Salt tabs were decreased as sodium levels stabilized. Gabapentin taper was started as patient exhibited no signs or symptoms of neuropathic pain. At the time of discharge he was modified independent for transfers, ambulation with rolling walker and ADL's. He is medically stable to be discharged to home with home care.

## 2019-09-11 NOTE — PROGRESS NOTE ADULT - ASSESSMENT
74M who suffered a Left SDH, R SAH and Right frontal IPH after a fall, now with decreased functional mobility, dysphagia, aphasia, hemiplegia, gait instability and ADL impairments.    Rehab: Gait Instability, ADL impairments and Functional impairments: continue Comprehensive Rehab Program of PT/OT/ST    Acute Left SDH, R SAH and R frontal IPH:  - Holding ASA and brillinta  - Stopped Amantadine and Meclizine-- no symptoms of headache or dizziness  - Strict BP control as below  - Statin      Pain: Tylenol PRN, Gabapentin 300 BID--no sx of neuropathic pain--taper gabapentin to 300mg qhs    CAD s/p RYAN:  - Rosuvastatin 5mg QHS     Diastolic CHF:  - Carvedilol    HTN: BP controlled  - Cont. Amlodipine   - Carvedilol 25mg BID  - Losartan 100mg daily    GI/Bowel: Colace, Senna, Miralax PRN    Diet: Regular, CC    Renal/Bladder: Voiding independently,   - BPH: c/w Tamsulosin, Finasteride    Hyponatremia: Nephrology consulted at [facility], felt it was d/t HCTZ which was not resumed.   - Na WNL  - d/c NaCl tab    Type II IDDM: A1c 7.0%; Home meds Janumet 1000/50 BID and Glargine 30U   - FS stable  - Cont. Lantus  40U  and pre-meal humalog  Upon discharge home pt. can resume Janumet and Solostar Pen--no need for premeal insulin--d/w hospitalist.   - c/w sliding scale  - c/w hypoglycemia protocol    Precautions / PROPHYLAXIS:   - Falls  - Lungs: Aspiration, Incentive Spirometer   - Pressure injury/Skin: Turn Q2hrs while in bed, OOB to Chair, PT/OT    - DVT: Lovenox, SCDs, TEDs     Dispo: IDT meeting 9/5/19: Supervision with eating and grooming, Min A for dressing and toilet transfer, Min A for transfers and amb 75ft RW, mild cog and attention deficits. Goals for Mod I with Transfers, Ambulation with RW and ADLs.   STEVAN: 9/14/19 74M who suffered a Left SDH, R SAH and Right frontal IPH after a fall, now with decreased functional mobility, aphasia,  gait instability and ADL impairments.  Dysphagia & hemiplegia ruled out.  Aphasia resolved.     Rehab: Gait Instability, ADL impairments and Functional impairments: continue Comprehensive Rehab Program of PT/OT/ST    Acute Left SDH, R SAH and R frontal IPH:  - Holding ASA and brillinta  - Stopped Amantadine and Meclizine-- no symptoms of headache or dizziness  - Strict BP control as below  - Statin      Pain: Tylenol PRN, Gabapentin 300 BID--no sx of neuropathic pain--taper gabapentin to 300mg qhs    CAD s/p RYAN:  - Rosuvastatin 5mg QHS     Diastolic CHF:  - Carvedilol    HTN: BP controlled  - Cont. Amlodipine   - Carvedilol 25mg BID  - Losartan 100mg daily    GI/Bowel: Colace, Senna, Miralax PRN    Diet: Regular, CC    Renal/Bladder: Voiding independently,   - BPH: c/w Tamsulosin, Finasteride    Hyponatremia: Nephrology consulted at [facility], felt it was d/t HCTZ which was not resumed.   - Na WNL  - d/c NaCl tab    Type II IDDM: A1c 7.0%; Home meds Janumet 1000/50 BID and Glargine 30U   - FS stable  - Cont. Lantus  40U  and pre-meal humalog  Upon discharge home pt. can resume Janumet and Solostar Pen--no need for premeal insulin--d/w hospitalist.   - c/w sliding scale  - c/w hypoglycemia protocol    Precautions / PROPHYLAXIS:   - Falls  - Lungs: Aspiration, Incentive Spirometer   - Pressure injury/Skin: Turn Q2hrs while in bed, OOB to Chair, PT/OT    - DVT: Lovenox, SCDs, TEDs     Dispo: IDT meeting 9/5/19: Supervision with eating and grooming, Min A for dressing and toilet transfer, Min A for transfers and amb 75ft RW, mild cog and attention deficits. Goals for Mod I with Transfers, Ambulation with RW and ADLs.   STEVAN: 9/14/19

## 2019-09-11 NOTE — PROGRESS NOTE ADULT - SUBJECTIVE AND OBJECTIVE BOX
HPI:  74M pmh CAD s/p PCI on ASA, HTN, HLD, BPH, DM2, on ASA 81mg daily transferred from [facility] unwitnessed fall with finding of intracranial bleed and increased blood on interval scan. Patient initially presented to [facility] unwitnessed fall, + LOC ?syncopal episode 8/21/19, with complaints of dizziness after fall and initial difficulty speaking. Patient did not recall event. CTH showed L hemispheric SDH with SAH and calvarium fx. Per family, he was initially confused and altered. In the [facility] ED, was complaining of headache and dizziness since his fall.  ENT consulted for dizziness, started meclizine. Neurology was consulted for persistent headache, was given IV acetaminophen and solumedrol acutely, then sumatriptan added. Nephrology consulted for hyponatremia, felt is was due to chronic HCTZ use which was discontinued. (03 Sep 2019 13:34)      PAST MEDICAL & SURGICAL HISTORY:  BPH (benign prostatic hypertrophy)  Hyperlipidemia  CAD (coronary artery disease)  Diabetes mellitus: Type 2  HTN (hypertension)  S/P drug eluting coronary stent placement: Ramus  S/P primary angioplasty with coronary stent: 1990s      Subjective:  No new complaints.  Occasional mild headache controlled with PRN tylenol, and occasional dizziness.  Tolerating therapy, making good progress. sleeping at night.  No other pain.  Has questions regarding medications and discharge.       REVIEW OF SYMPTOMS  ROS: Headache--resolving      VITALS  Vital Signs Last 24 Hrs  T(C): 36.5 (11 Sep 2019 07:35), Max: 36.7 (10 Sep 2019 20:27)  T(F): 97.7 (11 Sep 2019 07:35), Max: 98.1 (10 Sep 2019 20:27)  HR: 73 (11 Sep 2019 07:35) (71 - 74)  BP: 143/73 (11 Sep 2019 07:35) (143/73 - 160/71)  BP(mean): --  RR: 14 (11 Sep 2019 07:35) (14 - 16)  SpO2: 97% (11 Sep 2019 07:35) (97% - 100%)      PHYSICAL EXAM  Constitutional - NAD, Comfortable  Chest - CTAB  Cardiovascular - RRR, S1S2,  Abdomen - BS+, Soft, NTND  Extremities - No edema, No calf tenderness   Neurologic Exam -                    Cognitive - Awake, Alert, AAO to self, place, date, year, situation, left inattention     Communication - Fluent, No dysarthria     Motor - Stable     Sensory - Intact to LT  Psychiatric - Mood stable, Affect WNL    RECENT LABS    09-11    140  |  104  |  15  ----------------------------<  108<H>  3.8   |  29  |  1.05    Ca    9.6      11 Sep 2019 05:15              RADIOLOGY/OTHER RESULTS      MEDICATIONS  (STANDING):  amLODIPine   Tablet 10 milliGRAM(s) Oral <User Schedule>  carvedilol 25 milliGRAM(s) Oral <User Schedule>  dextrose 5%. 1000 milliLiter(s) (50 mL/Hr) IV Continuous <Continuous>  dextrose 50% Injectable 12.5 Gram(s) IV Push once  dextrose 50% Injectable 25 Gram(s) IV Push once  docusate sodium 100 milliGRAM(s) Oral three times a day  enoxaparin Injectable 40 milliGRAM(s) SubCutaneous daily  ferrous    sulfate 325 milliGRAM(s) Oral daily  finasteride 5 milliGRAM(s) Oral daily  folic acid 1 milliGRAM(s) Oral daily  gabapentin 300 milliGRAM(s) Oral two times a day  insulin glargine Injectable (LANTUS) 40 Unit(s) SubCutaneous at bedtime  insulin lispro (HumaLOG) corrective regimen sliding scale   SubCutaneous three times a day before meals  insulin lispro (HumaLOG) corrective regimen sliding scale   SubCutaneous at bedtime  insulin lispro Injectable (HumaLOG) 5 Unit(s) SubCutaneous before breakfast  insulin lispro Injectable (HumaLOG) 7 Unit(s) SubCutaneous before lunch  insulin lispro Injectable (HumaLOG) 7 Unit(s) SubCutaneous before dinner  losartan 100 milliGRAM(s) Oral <User Schedule>  rosuvastatin 5 milliGRAM(s) Oral at bedtime  sodium chloride 1 Gram(s) Oral two times a day  tamsulosin 0.4 milliGRAM(s) Oral at bedtime    MEDICATIONS  (PRN):  acetaminophen   Tablet .. 650 milliGRAM(s) Oral every 6 hours PRN Temp greater or equal to 38C (100.4F), Mild Pain (1 - 3)  dextrose 40% Gel 15 Gram(s) Oral once PRN Blood Glucose LESS THAN 70 milliGRAM(s)/deciliter  glucagon  Injectable 1 milliGRAM(s) IntraMuscular once PRN Glucose LESS THAN 70 milligrams/deciliter  methyl salicylate 14%/menthol 6% Topical Ointment 1 Application(s) Topical every 6 hours PRN leg pain

## 2019-09-11 NOTE — DISCHARGE NOTE PROVIDER - NSDCACTIVITY_GEN_ALL_CORE
Stairs allowed/Do not make important decisions/Do not drive or operate machinery/Walking - Indoors allowed/Bathing allowed

## 2019-09-12 LAB
ANION GAP SERPL CALC-SCNC: 5 MMOL/L — SIGNIFICANT CHANGE UP (ref 5–17)
BUN SERPL-MCNC: 18 MG/DL — SIGNIFICANT CHANGE UP (ref 7–23)
CALCIUM SERPL-MCNC: 9.3 MG/DL — SIGNIFICANT CHANGE UP (ref 8.4–10.5)
CHLORIDE SERPL-SCNC: 104 MMOL/L — SIGNIFICANT CHANGE UP (ref 96–108)
CO2 SERPL-SCNC: 31 MMOL/L — SIGNIFICANT CHANGE UP (ref 22–31)
CREAT SERPL-MCNC: 1.12 MG/DL — SIGNIFICANT CHANGE UP (ref 0.5–1.3)
GLUCOSE BLDC GLUCOMTR-MCNC: 135 MG/DL — HIGH (ref 70–99)
GLUCOSE BLDC GLUCOMTR-MCNC: 157 MG/DL — HIGH (ref 70–99)
GLUCOSE BLDC GLUCOMTR-MCNC: 227 MG/DL — HIGH (ref 70–99)
GLUCOSE BLDC GLUCOMTR-MCNC: 231 MG/DL — HIGH (ref 70–99)
GLUCOSE SERPL-MCNC: 133 MG/DL — HIGH (ref 70–99)
HCT VFR BLD CALC: 37.7 % — LOW (ref 39–50)
HGB BLD-MCNC: 12.2 G/DL — LOW (ref 13–17)
MCHC RBC-ENTMCNC: 28.4 PG — SIGNIFICANT CHANGE UP (ref 27–34)
MCHC RBC-ENTMCNC: 32.4 GM/DL — SIGNIFICANT CHANGE UP (ref 32–36)
MCV RBC AUTO: 87.7 FL — SIGNIFICANT CHANGE UP (ref 80–100)
NRBC # BLD: 0 /100 WBCS — SIGNIFICANT CHANGE UP (ref 0–0)
PLATELET # BLD AUTO: 194 K/UL — SIGNIFICANT CHANGE UP (ref 150–400)
POTASSIUM SERPL-MCNC: 4.2 MMOL/L — SIGNIFICANT CHANGE UP (ref 3.5–5.3)
POTASSIUM SERPL-SCNC: 4.2 MMOL/L — SIGNIFICANT CHANGE UP (ref 3.5–5.3)
RBC # BLD: 4.3 M/UL — SIGNIFICANT CHANGE UP (ref 4.2–5.8)
RBC # FLD: 14.4 % — SIGNIFICANT CHANGE UP (ref 10.3–14.5)
SODIUM SERPL-SCNC: 140 MMOL/L — SIGNIFICANT CHANGE UP (ref 135–145)
WBC # BLD: 6.02 K/UL — SIGNIFICANT CHANGE UP (ref 3.8–10.5)
WBC # FLD AUTO: 6.02 K/UL — SIGNIFICANT CHANGE UP (ref 3.8–10.5)

## 2019-09-12 PROCEDURE — 99232 SBSQ HOSP IP/OBS MODERATE 35: CPT

## 2019-09-12 RX ORDER — TAMSULOSIN HYDROCHLORIDE 0.4 MG/1
1 CAPSULE ORAL
Qty: 30 | Refills: 0
Start: 2019-09-12

## 2019-09-12 RX ORDER — CARVEDILOL PHOSPHATE 80 MG/1
1 CAPSULE, EXTENDED RELEASE ORAL
Qty: 60 | Refills: 0
Start: 2019-09-12

## 2019-09-12 RX ORDER — FERROUS SULFATE 325(65) MG
365 TABLET ORAL
Qty: 30 | Refills: 0
Start: 2019-09-12 | End: 2019-10-11

## 2019-09-12 RX ORDER — ROSUVASTATIN CALCIUM 5 MG/1
1 TABLET ORAL
Qty: 30 | Refills: 0
Start: 2019-09-12 | End: 2019-10-11

## 2019-09-12 RX ORDER — SITAGLIPTIN AND METFORMIN HYDROCHLORIDE 500; 50 MG/1; MG/1
1 TABLET, FILM COATED ORAL
Qty: 60 | Refills: 0
Start: 2019-09-12 | End: 2019-10-11

## 2019-09-12 RX ORDER — AMLODIPINE BESYLATE 2.5 MG/1
1 TABLET ORAL
Qty: 30 | Refills: 0
Start: 2019-09-12 | End: 2019-10-11

## 2019-09-12 RX ORDER — LOSARTAN POTASSIUM 100 MG/1
1 TABLET, FILM COATED ORAL
Qty: 30 | Refills: 0
Start: 2019-09-12 | End: 2019-10-11

## 2019-09-12 RX ORDER — GABAPENTIN 400 MG/1
1 CAPSULE ORAL
Qty: 7 | Refills: 0
Start: 2019-09-12 | End: 2019-09-18

## 2019-09-12 RX ORDER — FINASTERIDE 5 MG/1
1 TABLET, FILM COATED ORAL
Qty: 30 | Refills: 0
Start: 2019-09-12

## 2019-09-12 RX ORDER — SODIUM CHLORIDE 9 MG/ML
1 INJECTION INTRAMUSCULAR; INTRAVENOUS; SUBCUTANEOUS
Qty: 30 | Refills: 0
Start: 2019-09-12

## 2019-09-12 RX ADMIN — Medication 100 MILLIGRAM(S): at 06:22

## 2019-09-12 RX ADMIN — Medication 2: at 16:43

## 2019-09-12 RX ADMIN — ROSUVASTATIN CALCIUM 5 MILLIGRAM(S): 5 TABLET ORAL at 21:48

## 2019-09-12 RX ADMIN — SODIUM CHLORIDE 1 GRAM(S): 9 INJECTION INTRAMUSCULAR; INTRAVENOUS; SUBCUTANEOUS at 11:38

## 2019-09-12 RX ADMIN — Medication 100 MILLIGRAM(S): at 21:48

## 2019-09-12 RX ADMIN — GABAPENTIN 300 MILLIGRAM(S): 400 CAPSULE ORAL at 21:48

## 2019-09-12 RX ADMIN — Medication 650 MILLIGRAM(S): at 21:48

## 2019-09-12 RX ADMIN — Medication 100 MILLIGRAM(S): at 13:23

## 2019-09-12 RX ADMIN — Medication 0: at 21:48

## 2019-09-12 RX ADMIN — Medication 5 UNIT(S): at 07:58

## 2019-09-12 RX ADMIN — ENOXAPARIN SODIUM 40 MILLIGRAM(S): 100 INJECTION SUBCUTANEOUS at 11:39

## 2019-09-12 RX ADMIN — Medication 2: at 11:38

## 2019-09-12 RX ADMIN — Medication 1 MILLIGRAM(S): at 11:38

## 2019-09-12 RX ADMIN — Medication 325 MILLIGRAM(S): at 11:38

## 2019-09-12 RX ADMIN — TAMSULOSIN HYDROCHLORIDE 0.4 MILLIGRAM(S): 0.4 CAPSULE ORAL at 21:48

## 2019-09-12 RX ADMIN — Medication 7 UNIT(S): at 16:44

## 2019-09-12 RX ADMIN — CARVEDILOL PHOSPHATE 25 MILLIGRAM(S): 80 CAPSULE, EXTENDED RELEASE ORAL at 18:08

## 2019-09-12 RX ADMIN — INSULIN GLARGINE 40 UNIT(S): 100 INJECTION, SOLUTION SUBCUTANEOUS at 21:48

## 2019-09-12 RX ADMIN — Medication 0: at 07:58

## 2019-09-12 RX ADMIN — Medication 7 UNIT(S): at 11:38

## 2019-09-12 RX ADMIN — Medication 650 MILLIGRAM(S): at 22:45

## 2019-09-12 RX ADMIN — AMLODIPINE BESYLATE 10 MILLIGRAM(S): 2.5 TABLET ORAL at 11:38

## 2019-09-12 RX ADMIN — LOSARTAN POTASSIUM 100 MILLIGRAM(S): 100 TABLET, FILM COATED ORAL at 06:22

## 2019-09-12 RX ADMIN — CARVEDILOL PHOSPHATE 25 MILLIGRAM(S): 80 CAPSULE, EXTENDED RELEASE ORAL at 06:23

## 2019-09-12 RX ADMIN — FINASTERIDE 5 MILLIGRAM(S): 5 TABLET, FILM COATED ORAL at 11:38

## 2019-09-12 NOTE — PROGRESS NOTE ADULT - ASSESSMENT
This is a 74M who suffered a Left SDH, Right SAH and Right frontal IPH after a fall, now with decreased functional mobility, aphasia,  gait instability and ADL impairments.  Dysphagia & hemiplegia ruled out.  Aphasia resolved.     Rehab: Gait Instability, ADL impairments and Functional impairments: continue Comprehensive Rehab Program of PT/OT/ST    Acute Left SDH, Right SAH and Right frontal IPH:  - Holding ASA and brillinta  - Stopped Amantadine and Meclizine-- no symptoms of headache or dizziness  - Strict BP control as below  - Statin      Pain: Tylenol PRN, Gabapentin 300 BID--no sx of neuropathic pain--taper gabapentin to 300mg qhs    CAD s/p RYAN:  - Rosuvastatin 5mg QHS     Diastolic CHF:  - Carvedilol    HTN:   - Cont. Amlodipine 10mg  - Carvedilol 25mg BID  - Losartan 100mg daily  -Reveiwed BP trends, SBPs generally 130 to 150 with some 160s. Norvasc only held twice since admission for SBP in 110s (hold parameters set to hold for SBP <120). Will continue norvasc at 10mg for now and will likely send home on same dose considering that SBPs are generally above 130 and then patient to follow up with PCP BP management as outpatient. Will d/c with Dr Pitts.     GI/Bowel: Colace, Senna, Miralax PRN    Diet: Regular, CC    Renal/Bladder: Voiding independently,   - BPH: c/w Tamsulosin, Finasteride    Hyponatremia: Nephrology consulted at [facility], felt it was d/t HCTZ which was not resumed.   - Na WNL  - off NaCl tab    Type II IDDM: A1c 7.0%; Home meds Janumet 1000/50 BID and Glargine 30U   - FS stable  - Cont. Lantus  40U  and pre-meal humalog  Upon discharge home pt. can resume Janumet and Solostar Pen--no need for premeal insulin--d/w hospitalist.   - c/w sliding scale  - c/w hypoglycemia protocol    Precautions / PROPHYLAXIS:   - Falls  - Lungs: Aspiration, Incentive Spirometer   - Pressure injury/Skin: Turn Q2hrs while in bed, OOB to Chair, PT/OT    - DVT: Lovenox, SCDs, TEDs     Dispo: IDT meeting 9/5/19: Supervision with eating and grooming, Min A for dressing and toilet transfer, Min A for transfers and amb 75ft RW, mild cog and attention deficits. Goals for Mod I with Transfers, Ambulation with RW and ADLs.   STEVAN: 9/14/19 This is a 74M who suffered a Left SDH, Right SAH and Right frontal IPH after a fall, now with decreased functional mobility, aphasia,  gait instability and ADL impairments.  Dysphagia & hemiplegia ruled out.  Aphasia resolved.     Rehab: Gait Instability, ADL impairments and Functional impairments: continue Comprehensive Rehab Program of PT/OT/ST    Acute Left SDH, Right SAH and Right frontal IPH:  - Holding ASA and brillinta  - Stopped Amantadine and Meclizine-- no symptoms of headache or dizziness  - Strict BP control as below  - Statin      Pain: Tylenol PRN, Gabapentin 300 BID--no sx of neuropathic pain--taper gabapentin to 300mg qhs    CAD s/p RYAN:  - Rosuvastatin 5mg QHS     Diastolic CHF:  - Carvedilol    HTN:   - Cont. Amlodipine 10mg  - Carvedilol 25mg BID  - Losartan 100mg daily  -Reveiwed BP trends, SBPs generally 130 to 150 with some 160s. Norvasc only held twice since admission for SBP in 110s (hold parameters set to hold for SBP <120). Will continue norvasc at 10mg for now and will likely send home on same dose considering that SBPs are generally above 130 and then patient to follow up with PCP BP management as outpatient. Will d/w with Dr Pitts.     GI/Bowel: Colace, Senna, Miralax PRN    Diet: Regular, CC    Renal/Bladder: Voiding independently,   - BPH: c/w Tamsulosin, Finasteride    Hyponatremia: Nephrology consulted at [facility], felt it was d/t HCTZ which was not resumed.   - Na WNL  - off NaCl tab    Type II IDDM: A1c 7.0%; Home meds Janumet 1000/50 BID and Glargine 30U   - FS stable  - Cont. Lantus  40U  and pre-meal humalog  Upon discharge home pt. can resume Janumet and Solostar Pen--no need for premeal insulin--d/w hospitalist.   - c/w sliding scale  - c/w hypoglycemia protocol    Precautions / PROPHYLAXIS:   - Falls  - Lungs: Aspiration, Incentive Spirometer   - Pressure injury/Skin: Turn Q2hrs while in bed, OOB to Chair, PT/OT    - DVT: Lovenox, SCDs, TEDs     Dispo: IDT meeting 9/5/19: Supervision with eating and grooming, Min A for dressing and toilet transfer, Min A for transfers and amb 75ft RW, mild cog and attention deficits. Goals for Mod I with Transfers, Ambulation with RW and ADLs.   STEVAN: 9/14/19

## 2019-09-12 NOTE — PROGRESS NOTE ADULT - SUBJECTIVE AND OBJECTIVE BOX
HPI:  74M pmh CAD s/p PCI on ASA, HTN, HLD, BPH, DM2, on ASA 81mg daily transferred from [facility] unwitnessed fall with finding of intracranial bleed and increased blood on interval scan. Patient initially presented to [facility] unwitnessed fall, + LOC ?syncopal episode 8/21/19, with complaints of dizziness after fall and initial difficulty speaking. Patient did not recall event. CTH showed L hemispheric SDH with SAH and calvarium fx. Per family, he was initially confused and altered. In the [facility] ED, was complaining of headache and dizziness since his fall.  ENT consulted for dizziness, started meclizine. Neurology was consulted for persistent headache, was given IV acetaminophen and solumedrol acutely, then sumatriptan added. Nephrology consulted for hyponatremia, felt is was due to chronic HCTZ use which was discontinued. (03 Sep 2019 13:34)      PAST MEDICAL & SURGICAL HISTORY:  BPH (benign prostatic hypertrophy)  Hyperlipidemia  CAD (coronary artery disease)  Diabetes mellitus: Type 2  HTN (hypertension)  S/P drug eluting coronary stent placement: Ramus  S/P primary angioplasty with coronary stent: 1990s      Subjective:  No new complaints.  Tolerating therapy, making good progress. sleeping at night.  No other pain.  Wondering if he needs to lower dose of norvasc-reports that norvasc has been held a couple of times by nursing.       REVIEW OF SYMPTOMS  ROS: Headache--resolving      Vital Signs Last 24 Hrs  T(C): 36.7 (12 Sep 2019 07:25), Max: 36.7 (11 Sep 2019 20:16)  T(F): 98.1 (12 Sep 2019 07:25), Max: 98.1 (11 Sep 2019 20:16)  HR: 73 (12 Sep 2019 07:25) (68 - 88)  BP: 131/66 (12 Sep 2019 07:25) (118/74 - 164/75)  RR: 14 (12 Sep 2019 07:25) (14 - 14)  SpO2: 97% (12 Sep 2019 07:25) (96% - 99%)      PHYSICAL EXAM  Constitutional - NAD, Comfortable  Chest - CTAB  Cardiovascular - RRR, S1S2,  Abdomen - BS+, Soft, NTND  Extremities - No edema, No calf tenderness   Neurologic Exam -                    Cognitive - Awake, Alert, AAO to self, place, date, year, situation, left inattention     Communication - Fluent, No dysarthria     Motor - Stable     Sensory - Intact to LT  Psychiatric - Mood stable, Affect WNL    RECENT LABS    09-12    140  |  104  |  18  ----------------------------<  133<H>  4.2   |  31  |  1.12    Ca    9.3      12 Sep 2019 05:55                          12.2   6.02  )-----------( 194      ( 12 Sep 2019 05:55 )             37.7       MEDICATIONS  (STANDING):  amLODIPine   Tablet 10 milliGRAM(s) Oral <User Schedule>  carvedilol 25 milliGRAM(s) Oral <User Schedule>  dextrose 5%. 1000 milliLiter(s) (50 mL/Hr) IV Continuous <Continuous>  dextrose 50% Injectable 12.5 Gram(s) IV Push once  dextrose 50% Injectable 25 Gram(s) IV Push once  docusate sodium 100 milliGRAM(s) Oral three times a day  enoxaparin Injectable 40 milliGRAM(s) SubCutaneous daily  ferrous    sulfate 325 milliGRAM(s) Oral daily  finasteride 5 milliGRAM(s) Oral daily  folic acid 1 milliGRAM(s) Oral daily  gabapentin 300 milliGRAM(s) Oral at bedtime  insulin glargine Injectable (LANTUS) 40 Unit(s) SubCutaneous at bedtime  insulin lispro (HumaLOG) corrective regimen sliding scale   SubCutaneous three times a day before meals  insulin lispro (HumaLOG) corrective regimen sliding scale   SubCutaneous at bedtime  insulin lispro Injectable (HumaLOG) 5 Unit(s) SubCutaneous before breakfast  insulin lispro Injectable (HumaLOG) 7 Unit(s) SubCutaneous before lunch  insulin lispro Injectable (HumaLOG) 7 Unit(s) SubCutaneous before dinner  losartan 100 milliGRAM(s) Oral <User Schedule>  rosuvastatin 5 milliGRAM(s) Oral at bedtime  sodium chloride 1 Gram(s) Oral daily  tamsulosin 0.4 milliGRAM(s) Oral at bedtime    MEDICATIONS  (PRN):  acetaminophen   Tablet .. 650 milliGRAM(s) Oral every 6 hours PRN Temp greater or equal to 38C (100.4F), Mild Pain (1 - 3)  dextrose 40% Gel 15 Gram(s) Oral once PRN Blood Glucose LESS THAN 70 milliGRAM(s)/deciliter  glucagon  Injectable 1 milliGRAM(s) IntraMuscular once PRN Glucose LESS THAN 70 milligrams/deciliter  methyl salicylate 14%/menthol 6% Topical Ointment 1 Application(s) Topical every 6 hours PRN leg pain HPI:  74M pmh CAD s/p PCI on ASA, HTN, HLD, BPH, DM2, on ASA 81mg daily transferred from [facility] unwitnessed fall with finding of intracranial bleed and increased blood on interval scan. Patient initially presented to [facility] unwitnessed fall, + LOC ?syncopal episode 8/21/19, with complaints of dizziness after fall and initial difficulty speaking. Patient did not recall event. CTH showed L hemispheric SDH with SAH and calvarium fx. Per family, he was initially confused and altered. In the [facility] ED, was complaining of headache and dizziness since his fall.  ENT consulted for dizziness, started meclizine. Neurology was consulted for persistent headache, was given IV acetaminophen and solumedrol acutely, then sumatriptan added. Nephrology consulted for hyponatremia, felt is was due to chronic HCTZ use which was discontinued. (03 Sep 2019 13:34)      PAST MEDICAL & SURGICAL HISTORY:  BPH (benign prostatic hypertrophy)  Hyperlipidemia  CAD (coronary artery disease)  Diabetes mellitus: Type 2  HTN (hypertension)  S/P drug eluting coronary stent placement: Ramus  S/P primary angioplasty with coronary stent: 1990s      Subjective:  No new complaints.  Tolerating therapy, making good progress. sleeping at night.  No other pain.  Wondering if he needs to lower dose of norvasc-reports that norvasc has been held a couple of times by nursing. Upon review of MAR, pt. has been receiving amlodipine mostly daily except held once in last 5 days.       REVIEW OF SYMPTOMS  ROS: Headache--resolving      Vital Signs Last 24 Hrs  T(C): 36.7 (12 Sep 2019 07:25), Max: 36.7 (11 Sep 2019 20:16)  T(F): 98.1 (12 Sep 2019 07:25), Max: 98.1 (11 Sep 2019 20:16)  HR: 73 (12 Sep 2019 07:25) (68 - 88)  BP: 131/66 (12 Sep 2019 07:25) (118/74 - 164/75)  RR: 14 (12 Sep 2019 07:25) (14 - 14)  SpO2: 97% (12 Sep 2019 07:25) (96% - 99%)      PHYSICAL EXAM  Constitutional - NAD, Comfortable  Chest - CTAB  Cardiovascular - RRR, S1S2,  Abdomen - BS+, Soft, NTND  Extremities - No edema, No calf tenderness   Neurologic Exam -                    Cognitive - Awake, Alert, AAO to self, place, date, year, situation, left inattention     Communication - Fluent, No dysarthria     Motor - Stable     Sensory - Intact to LT  Psychiatric - Mood stable, Affect WNL    RECENT LABS    09-12    140  |  104  |  18  ----------------------------<  133<H>  4.2   |  31  |  1.12    Ca    9.3      12 Sep 2019 05:55                          12.2   6.02  )-----------( 194      ( 12 Sep 2019 05:55 )             37.7       MEDICATIONS  (STANDING):  amLODIPine   Tablet 10 milliGRAM(s) Oral <User Schedule>  carvedilol 25 milliGRAM(s) Oral <User Schedule>  dextrose 5%. 1000 milliLiter(s) (50 mL/Hr) IV Continuous <Continuous>  dextrose 50% Injectable 12.5 Gram(s) IV Push once  dextrose 50% Injectable 25 Gram(s) IV Push once  docusate sodium 100 milliGRAM(s) Oral three times a day  enoxaparin Injectable 40 milliGRAM(s) SubCutaneous daily  ferrous    sulfate 325 milliGRAM(s) Oral daily  finasteride 5 milliGRAM(s) Oral daily  folic acid 1 milliGRAM(s) Oral daily  gabapentin 300 milliGRAM(s) Oral at bedtime  insulin glargine Injectable (LANTUS) 40 Unit(s) SubCutaneous at bedtime  insulin lispro (HumaLOG) corrective regimen sliding scale   SubCutaneous three times a day before meals  insulin lispro (HumaLOG) corrective regimen sliding scale   SubCutaneous at bedtime  insulin lispro Injectable (HumaLOG) 5 Unit(s) SubCutaneous before breakfast  insulin lispro Injectable (HumaLOG) 7 Unit(s) SubCutaneous before lunch  insulin lispro Injectable (HumaLOG) 7 Unit(s) SubCutaneous before dinner  losartan 100 milliGRAM(s) Oral <User Schedule>  rosuvastatin 5 milliGRAM(s) Oral at bedtime  sodium chloride 1 Gram(s) Oral daily  tamsulosin 0.4 milliGRAM(s) Oral at bedtime    MEDICATIONS  (PRN):  acetaminophen   Tablet .. 650 milliGRAM(s) Oral every 6 hours PRN Temp greater or equal to 38C (100.4F), Mild Pain (1 - 3)  dextrose 40% Gel 15 Gram(s) Oral once PRN Blood Glucose LESS THAN 70 milliGRAM(s)/deciliter  glucagon  Injectable 1 milliGRAM(s) IntraMuscular once PRN Glucose LESS THAN 70 milligrams/deciliter  methyl salicylate 14%/menthol 6% Topical Ointment 1 Application(s) Topical every 6 hours PRN leg pain

## 2019-09-12 NOTE — PROGRESS NOTE ADULT - SUBJECTIVE AND OBJECTIVE BOX
Patient is a 74y old  Male who presents with a chief complaint of IPH (12 Sep 2019 09:17). Eager to go home.      Patient seen and examined at bedside.    ALLERGIES:  No Known Allergies    MEDICATIONS  (STANDING):  amLODIPine   Tablet 10 milliGRAM(s) Oral <User Schedule>  carvedilol 25 milliGRAM(s) Oral <User Schedule>  dextrose 5%. 1000 milliLiter(s) (50 mL/Hr) IV Continuous <Continuous>  dextrose 50% Injectable 12.5 Gram(s) IV Push once  dextrose 50% Injectable 25 Gram(s) IV Push once  docusate sodium 100 milliGRAM(s) Oral three times a day  enoxaparin Injectable 40 milliGRAM(s) SubCutaneous daily  ferrous    sulfate 325 milliGRAM(s) Oral daily  finasteride 5 milliGRAM(s) Oral daily  folic acid 1 milliGRAM(s) Oral daily  gabapentin 300 milliGRAM(s) Oral at bedtime  insulin glargine Injectable (LANTUS) 40 Unit(s) SubCutaneous at bedtime  insulin lispro (HumaLOG) corrective regimen sliding scale   SubCutaneous three times a day before meals  insulin lispro (HumaLOG) corrective regimen sliding scale   SubCutaneous at bedtime  insulin lispro Injectable (HumaLOG) 5 Unit(s) SubCutaneous before breakfast  insulin lispro Injectable (HumaLOG) 7 Unit(s) SubCutaneous before lunch  insulin lispro Injectable (HumaLOG) 7 Unit(s) SubCutaneous before dinner  losartan 100 milliGRAM(s) Oral <User Schedule>  rosuvastatin 5 milliGRAM(s) Oral at bedtime  sodium chloride 1 Gram(s) Oral daily  tamsulosin 0.4 milliGRAM(s) Oral at bedtime    MEDICATIONS  (PRN):  acetaminophen   Tablet .. 650 milliGRAM(s) Oral every 6 hours PRN Temp greater or equal to 38C (100.4F), Mild Pain (1 - 3)  dextrose 40% Gel 15 Gram(s) Oral once PRN Blood Glucose LESS THAN 70 milliGRAM(s)/deciliter  glucagon  Injectable 1 milliGRAM(s) IntraMuscular once PRN Glucose LESS THAN 70 milligrams/deciliter  methyl salicylate 14%/menthol 6% Topical Ointment 1 Application(s) Topical every 6 hours PRN leg pain    Vital Signs Last 24 Hrs  T(F): 98.1 (12 Sep 2019 07:25), Max: 98.1 (11 Sep 2019 20:16)  HR: 73 (12 Sep 2019 07:25) (68 - 88)  BP: 131/66 (12 Sep 2019 07:25) (118/74 - 164/75)  RR: 14 (12 Sep 2019 07:25) (14 - 14)  SpO2: 97% (12 Sep 2019 07:25) (96% - 99%)  I&O's Summary      PHYSICAL EXAM:  General: NAD, A/O x 3, speaks fluently   ENT: MMM  Neck: Supple, No JVD  Lungs: Clear to auscultation bilaterally  Cardio: RRR, S1/S2,   Abdomen: Soft, Nontender, Nondistended; Bowel sounds present  Extremities: No calf tenderness, No pitting edema    LABS:                        12.2   6.02  )-----------( 194      ( 12 Sep 2019 05:55 )             37.7       09-12    140  |  104  |  18  ----------------------------<  133  4.2   |  31  |  1.12    Ca    9.3      12 Sep 2019 05:55       eGFR if Non African American: 64 mL/min/1.73M2 (09-12-19 @ 05:55)  eGFR if African American: 74 mL/min/1.73M2 (09-12-19 @ 05:55)                           POCT Blood Glucose.: 135 mg/dL (12 Sep 2019 07:24)  POCT Blood Glucose.: 177 mg/dL (11 Sep 2019 21:13)  POCT Blood Glucose.: 181 mg/dL (11 Sep 2019 16:26)  POCT Blood Glucose.: 176 mg/dL (11 Sep 2019 11:59)    08-22 [id number] 7.0          RADIOLOGY & ADDITIONAL TESTS:    Care Discussed with Consultants/Other Providers:

## 2019-09-12 NOTE — PROGRESS NOTE ADULT - ASSESSMENT
73yo M s/p SDH, R SAH and R frontal IPH after a fall, now with decreased functional mobility, dysphagia, aphasia, hemiplegia, gait instability and ADL impairments. Working on discharge planning    #Acute L SDH, R SAH and R frontal IPH:  -continue Statin, no ASA-- secondary to bleed    - Amantadine 100mg BID  - Meclizine prn for dizziness  - Strict BP control as below  - Sumatriptan PRN headache-- gives leg cramps  Taking tylenol prn for headaches     #Presyncope/orthostatic hypotension:-- improved  Norvasc dosing changed to 12 pm    #CAD s/p RYAN:  - Rosuvastatin 5mg QHS, (ASA and Brilinta held due to bleed )    #Diastolic CHF: last echo done 8/19; normal LV function, mild AS, grade 2 diastolic dysfunction  - Carvedilol    #Essential HTN:   - Amlodipine 10mg daily  - Carvedilol 25mg BID  - Losartan 100mg daily  continue to monitor for now     #BPH:   c/w Tamsulosin, Finasteride    #Hyponatremia:  Resolved: Nephrology consulted at [facility], felt it was d/t HCTZ which was not resumed.   - c/w NaCl tabs  1 gm daily    Type II IDDM w Hyperglycemia: A1c 7.0%;   - on Lantus 40 QHS and premeal Insulin (5-7-7), ISS; will continue monitor and adjust as needed--    DTV ppx: lovenox

## 2019-09-13 ENCOUNTER — TRANSCRIPTION ENCOUNTER (OUTPATIENT)
Age: 74
End: 2019-09-13

## 2019-09-13 VITALS
DIASTOLIC BLOOD PRESSURE: 76 MMHG | HEART RATE: 73 BPM | OXYGEN SATURATION: 98 % | RESPIRATION RATE: 14 BRPM | TEMPERATURE: 98 F | SYSTOLIC BLOOD PRESSURE: 152 MMHG

## 2019-09-13 LAB — GLUCOSE BLDC GLUCOMTR-MCNC: 144 MG/DL — HIGH (ref 70–99)

## 2019-09-13 PROCEDURE — 99238 HOSP IP/OBS DSCHRG MGMT 30/<: CPT

## 2019-09-13 RX ORDER — ENOXAPARIN SODIUM 100 MG/ML
40 INJECTION SUBCUTANEOUS
Qty: 100 | Refills: 0
Start: 2019-09-13 | End: 2019-10-12

## 2019-09-13 RX ADMIN — LOSARTAN POTASSIUM 100 MILLIGRAM(S): 100 TABLET, FILM COATED ORAL at 06:30

## 2019-09-13 RX ADMIN — Medication 100 MILLIGRAM(S): at 06:30

## 2019-09-13 RX ADMIN — CARVEDILOL PHOSPHATE 25 MILLIGRAM(S): 80 CAPSULE, EXTENDED RELEASE ORAL at 06:30

## 2019-09-13 NOTE — PROGRESS NOTE ADULT - ASSESSMENT
This is a 74M who suffered a Left SDH, Right SAH and Right frontal IPH after a fall, now with decreased functional mobility, aphasia,  gait instability and ADL impairments.  Dysphagia & hemiplegia ruled out.  Aphasia resolved.     Rehab: Gait Instability, ADL impairments and Functional impairments: continue Comprehensive Rehab Program of PT/OT/ST    Acute Left SDH, Right SAH and Right frontal IPH:  - Holding ASA and brillinta  - Stopped Amantadine and Meclizine-- no symptoms of headache or dizziness  - Strict BP control as below  - Statin      Pain: Tylenol PRN, Gabapentin 300 BID--no sx of neuropathic pain--taper gabapentin to 300mg qhs    CAD s/p RYAN:  - Rosuvastatin 5mg QHS     Diastolic CHF:  - Carvedilol    HTN:   - Cont. Amlodipine 10mg  - Carvedilol 25mg BID  - Losartan 100mg daily      GI/Bowel: Colace, Senna, Miralax PRN    Diet: Regular, CC    Renal/Bladder: Voiding independently,   - BPH: c/w Tamsulosin, Finasteride    Hyponatremia: Nephrology consulted at [facility], felt it was d/t HCTZ which was not resumed.   - Na WNL  -off Na Chlor tabs    Type II IDDM: A1c 7.0%; Home meds Janumet 1000/50 BID and Glargine 30U   - FS stable  - Cont. Lantus  40U, off pre-meal humalog  Upon discharge home pt. can resume Janumet and Solostar Pen--no need for premeal insulin--d/w hospitalist.   - c/w sliding scale  - c/w hypoglycemia protocol    Precautions / PROPHYLAXIS:   - Falls  - Lungs: Aspiration, Incentive Spirometer   - Pressure injury/Skin: Turn Q2hrs while in bed, OOB to Chair, PT/OT    - DVT: Lovenox, SCDs, TEDs     Medically stable to discharge home today with wife. This is a 74M who suffered a Left SDH, Right SAH and Right frontal IPH after a fall, now with decreased functional mobility, aphasia,  gait instability and ADL impairments.  Dysphagia & hemiplegia ruled out.  Aphasia resolved.     Rehab: Gait Instability, ADL impairments and Functional impairments: continue Comprehensive Rehab Program of PT/OT/ST with home care    Acute Left SDH, Right SAH and Right frontal IPH:  - Holding ASA and brillinta  - Stopped Amantadine and Meclizine-- no symptoms of headache or dizziness  - Strict BP control as below  - Statin      Pain: Tylenol PRN, Gabapentin 300 qhs x 7 days    CAD s/p RYAN:  - Rosuvastatin 5mg QHS     Diastolic CHF:  - Carvedilol    HTN:   - Cont. Amlodipine 10mg  - Carvedilol 25mg BID  - Losartan 100mg daily      GI/Bowel: Colace, Senna, Miralax PRN    Diet: Regular, CC    Renal/Bladder: Voiding independently,   - BPH: c/w Tamsulosin, Finasteride    Hyponatremia: Nephrology consulted at [facility], felt it was d/t HCTZ which was not resumed.   - Na WNL  -off Na Chlor tabs    Type II IDDM: A1c 7.0%; Home meds Janumet 1000/50 BID and Glargine 30U   - FS stable  - Cont. Lantus  40U, off pre-meal humalog  Upon discharge home pt. can resume Janumet and Solostar Pen--no need for premeal insulin--d/w hospitalist.       Precautions / PROPHYLAXIS:   - Falls      Medically stable to discharge home today with wife.

## 2019-09-13 NOTE — PROGRESS NOTE ADULT - ATTENDING COMMENTS
Pt. seen with NP.  Agree with documentation above as per NP. Patient medically stable. Making progress towards rehab goals.     No headache or dizziness.   d/c amantadine and Meclizine.      BP controlled.  tolerating therapy.      Hyponatremia--Na WNL.  Taper down Na Cl tab to BID.  f/u bmp on 9/11
Pt. seen with resident 9/6/19 AM.  Agree with documentation above as per resident. Patient medically stable. Making progress towards rehab goals.
Pt. seen with resident.  Agree with documentation above as per resident. Patient medically stable. Making progress towards rehab goals.
Pt. seen with resident.  Agree with documentation above as per resident with amendments made as appropriate. Patient medically stable. Making progress towards rehab goals.
Pt. seen with NP.  Agree with documentation above as per NP with amendments made as appropriate. Patient medically stable. Making progress towards rehab goals.     Cont. current BP meds.    d/c planning to home tomorrow.
Pt. seen this AM.  Agree with documentation above as per NP with amendments made as appropriate.     Patient medically stable for discharge to home.  Discharge medications and instructions reviewed with patient and pt's family.  All questions answered.

## 2019-09-13 NOTE — PROGRESS NOTE ADULT - SUBJECTIVE AND OBJECTIVE BOX
HPI:  74M pmh CAD s/p PCI on ASA, HTN, HLD, BPH, DM2, on ASA 81mg daily transferred from [facility] unwitnessed fall with finding of intracranial bleed and increased blood on interval scan. Patient initially presented to [facility] unwitnessed fall, + LOC ?syncopal episode 8/21/19, with complaints of dizziness after fall and initial difficulty speaking. Patient did not recall event. CTH showed L hemispheric SDH with SAH and calvarium fx. Per family, he was initially confused and altered. In the [facility] ED, was complaining of headache and dizziness since his fall.  ENT consulted for dizziness, started meclizine. Neurology was consulted for persistent headache, was given IV acetaminophen and solumedrol acutely, then sumatriptan added. Nephrology consulted for hyponatremia, felt is was due to chronic HCTZ use which was discontinued. (03 Sep 2019 13:34)      PAST MEDICAL & SURGICAL HISTORY:  BPH (benign prostatic hypertrophy)  Hyperlipidemia  CAD (coronary artery disease)  Diabetes mellitus: Type 2  HTN (hypertension)  S/P drug eluting coronary stent placement: [facility]  S/P primary angioplasty with coronary stent: 1990s      Subjective/ROS:  No new complaints.  Ready to d/c home today. All medications reviewed. Patient and wife with no new questions or concerns.   Patient denies HA, dizziness, CP, SOB, bowel or bladder issues.         Vital Signs Last 24 Hrs  T(C): 36.6 (13 Sep 2019 08:48), Max: 36.8 (12 Sep 2019 21:45)  T(F): 97.9 (13 Sep 2019 08:48), Max: 98.2 (12 Sep 2019 21:45)  HR: 73 (13 Sep 2019 08:48) (72 - 82)  BP: 152/76 (13 Sep 2019 08:48) (122/68 - 152/76)  RR: 14 (13 Sep 2019 08:48) (14 - 16)  SpO2: 98% (13 Sep 2019 08:48) (97% - 98%)      PHYSICAL EXAM  Constitutional - NAD, Comfortable  Chest - CTAB  Cardiovascular - RRR, S1S2,  Abdomen - BS+, Soft, NTND  Extremities - No edema, No calf tenderness   Neurologic Exam -                    Cognitive - Awake, Alert, AAO to self, place, date, year, situation, left inattention     Communication - Fluent, No dysarthria     Motor - Stable     Sensory - Intact to LT  Psychiatric - Mood stable, Affect WNL    RECENT LABS    09-12    140  |  104  |  18  ----------------------------<  133<H>  4.2   |  31  |  1.12    Ca    9.3      12 Sep 2019 05:55                          12.2   6.02  )-----------( 194      ( 12 Sep 2019 05:55 )             37.7       MEDICATIONS  (STANDING):  amLODIPine   Tablet 10 milliGRAM(s) Oral <User Schedule>  carvedilol 25 milliGRAM(s) Oral <User Schedule>  dextrose 5%. 1000 milliLiter(s) (50 mL/Hr) IV Continuous <Continuous>  dextrose 50% Injectable 12.5 Gram(s) IV Push once  dextrose 50% Injectable 25 Gram(s) IV Push once  docusate sodium 100 milliGRAM(s) Oral three times a day  enoxaparin Injectable 40 milliGRAM(s) SubCutaneous daily  ferrous    sulfate 325 milliGRAM(s) Oral daily  finasteride 5 milliGRAM(s) Oral daily  folic acid 1 milliGRAM(s) Oral daily  gabapentin 300 milliGRAM(s) Oral at bedtime  insulin glargine Injectable (LANTUS) 40 Unit(s) SubCutaneous at bedtime  insulin lispro (HumaLOG) corrective regimen sliding scale   SubCutaneous three times a day before meals  insulin lispro (HumaLOG) corrective regimen sliding scale   SubCutaneous at bedtime  insulin lispro Injectable (HumaLOG) 5 Unit(s) SubCutaneous before breakfast  insulin lispro Injectable (HumaLOG) 7 Unit(s) SubCutaneous before lunch  insulin lispro Injectable (HumaLOG) 7 Unit(s) SubCutaneous before dinner  losartan 100 milliGRAM(s) Oral <User Schedule>  rosuvastatin 5 milliGRAM(s) Oral at bedtime  sodium chloride 1 Gram(s) Oral daily  tamsulosin 0.4 milliGRAM(s) Oral at bedtime    MEDICATIONS  (PRN):  acetaminophen   Tablet .. 650 milliGRAM(s) Oral every 6 hours PRN Temp greater or equal to 38C (100.4F), Mild Pain (1 - 3)  dextrose 40% Gel 15 Gram(s) Oral once PRN Blood Glucose LESS THAN 70 milliGRAM(s)/deciliter  glucagon  Injectable 1 milliGRAM(s) IntraMuscular once PRN Glucose LESS THAN 70 milligrams/deciliter  methyl salicylate 14%/menthol 6% Topical Ointment 1 Application(s) Topical every 6 hours PRN leg pain HPI:  74M pmh CAD s/p PCI on ASA, HTN, HLD, BPH, DM2, on ASA 81mg daily transferred from [facility] unwitnessed fall with finding of intracranial bleed and increased blood on interval scan. Patient initially presented to [facility] unwitnessed fall, + LOC ?syncopal episode 8/21/19, with complaints of dizziness after fall and initial difficulty speaking. Patient did not recall event. CTH showed L hemispheric SDH with SAH and calvarium fx. Per family, he was initially confused and altered. In the [facility] ED, was complaining of headache and dizziness since his fall.  ENT consulted for dizziness, started meclizine. Neurology was consulted for persistent headache, was given IV acetaminophen and solumedrol acutely, then sumatriptan added. Nephrology consulted for hyponatremia, felt is was due to chronic HCTZ use which was discontinued. (03 Sep 2019 13:34)      PAST MEDICAL & SURGICAL HISTORY:  BPH (benign prostatic hypertrophy)  Hyperlipidemia  CAD (coronary artery disease)  Diabetes mellitus: Type 2  HTN (hypertension)  S/P drug eluting coronary stent placement: [facility]  S/P primary angioplasty with coronary stent: 1990s      Subjective/ROS:  No new complaints.  Ready to d/c home today. All medications reviewed. Patient and wife with no new questions or concerns.   Patient denies HA, dizziness, CP, SOB, bowel or bladder issues.       Vital Signs Last 24 Hrs  T(C): 36.6 (13 Sep 2019 08:48), Max: 36.8 (12 Sep 2019 21:45)  T(F): 97.9 (13 Sep 2019 08:48), Max: 98.2 (12 Sep 2019 21:45)  HR: 73 (13 Sep 2019 08:48) (72 - 82)  BP: 152/76 (13 Sep 2019 08:48) (122/68 - 152/76)  RR: 14 (13 Sep 2019 08:48) (14 - 16)  SpO2: 98% (13 Sep 2019 08:48) (97% - 98%)      PHYSICAL EXAM  Constitutional - NAD, Comfortable  Chest - CTAB  Cardiovascular - RRR, S1S2,  Abdomen - BS+, Soft, NTND  Extremities - No edema, No calf tenderness   Neurologic Exam -                    Cognitive - Awake, Alert, AAO to self, place, date, year, situation, left inattention     Communication - Fluent, No dysarthria     Motor - Stable     Sensory - Intact to LT  Psychiatric - Mood stable, Affect WNL    RECENT LABS    09-12    140  |  104  |  18  ----------------------------<  133<H>  4.2   |  31  |  1.12    Ca    9.3      12 Sep 2019 05:55                          12.2   6.02  )-----------( 194      ( 12 Sep 2019 05:55 )             37.7       MEDICATIONS  (STANDING):  amLODIPine   Tablet 10 milliGRAM(s) Oral <User Schedule>  carvedilol 25 milliGRAM(s) Oral <User Schedule>  dextrose 5%. 1000 milliLiter(s) (50 mL/Hr) IV Continuous <Continuous>  dextrose 50% Injectable 12.5 Gram(s) IV Push once  dextrose 50% Injectable 25 Gram(s) IV Push once  docusate sodium 100 milliGRAM(s) Oral three times a day  enoxaparin Injectable 40 milliGRAM(s) SubCutaneous daily  ferrous    sulfate 325 milliGRAM(s) Oral daily  finasteride 5 milliGRAM(s) Oral daily  folic acid 1 milliGRAM(s) Oral daily  gabapentin 300 milliGRAM(s) Oral at bedtime  insulin glargine Injectable (LANTUS) 40 Unit(s) SubCutaneous at bedtime  insulin lispro (HumaLOG) corrective regimen sliding scale   SubCutaneous three times a day before meals  insulin lispro (HumaLOG) corrective regimen sliding scale   SubCutaneous at bedtime  insulin lispro Injectable (HumaLOG) 5 Unit(s) SubCutaneous before breakfast  insulin lispro Injectable (HumaLOG) 7 Unit(s) SubCutaneous before lunch  insulin lispro Injectable (HumaLOG) 7 Unit(s) SubCutaneous before dinner  losartan 100 milliGRAM(s) Oral <User Schedule>  rosuvastatin 5 milliGRAM(s) Oral at bedtime  sodium chloride 1 Gram(s) Oral daily  tamsulosin 0.4 milliGRAM(s) Oral at bedtime    MEDICATIONS  (PRN):  acetaminophen   Tablet .. 650 milliGRAM(s) Oral every 6 hours PRN Temp greater or equal to 38C (100.4F), Mild Pain (1 - 3)  dextrose 40% Gel 15 Gram(s) Oral once PRN Blood Glucose LESS THAN 70 milliGRAM(s)/deciliter  glucagon  Injectable 1 milliGRAM(s) IntraMuscular once PRN Glucose LESS THAN 70 milligrams/deciliter  methyl salicylate 14%/menthol 6% Topical Ointment 1 Application(s) Topical every 6 hours PRN leg pain

## 2019-09-13 NOTE — DISCHARGE NOTE NURSING/CASE MANAGEMENT/SOCIAL WORK - PATIENT PORTAL LINK FT
You can access the FollowMyHealth Patient Portal offered by [facility] by registering at the following website: [email or web address]. By joining [facility]’s FollowMyHealth portal, you will also be able to view your health information using other applications (apps) compatible with our system.

## 2019-09-13 NOTE — DISCHARGE NOTE NURSING/CASE MANAGEMENT/SOCIAL WORK - NSDCPEPT PROEDHF_GEN_ALL_CORE
Monitor weight daily/Call primary care provider for follow up after discharge/Low salt diet/Report signs and symptoms to primary care provider/Activities as tolerated

## 2019-09-13 NOTE — PROGRESS NOTE ADULT - PROVIDER SPECIALTY LIST ADULT
Hospitalist
Neurology
Neurology
Physiatry
Rehab Medicine
Hospitalist

## 2019-09-13 NOTE — DISCHARGE NOTE NURSING/CASE MANAGEMENT/SOCIAL WORK - NSDCFUADDAPPT_GEN_ALL_CORE_FT
Please follow up with your PCP within 1 week of discharge.    You have a follow up appointment with Dr. Elissa Pitts on October 3, 2019 at 2:15pm.  Her office is located at [facility], [address] [place] [address], first floor, [phone].

## 2019-10-03 ENCOUNTER — APPOINTMENT (OUTPATIENT)
Dept: PHYSICAL MEDICINE AND REHAB | Facility: CLINIC | Age: 74
End: 2019-10-03
Payer: MEDICARE

## 2019-10-03 VITALS
SYSTOLIC BLOOD PRESSURE: 134 MMHG | HEART RATE: 81 BPM | WEIGHT: 163 LBS | DIASTOLIC BLOOD PRESSURE: 74 MMHG | HEIGHT: 69 IN | BODY MASS INDEX: 24.14 KG/M2 | OXYGEN SATURATION: 98 %

## 2019-10-03 DIAGNOSIS — S06.9X9A UNSPECIFIED INTRACRANIAL INJURY WITH LOSS OF CONSCIOUSNESS OF UNSPECIFIED DURATION, INITIAL ENCOUNTER: ICD-10-CM

## 2019-10-03 DIAGNOSIS — S06.5X9A TRAUMATIC SUBDURAL HEMORRHAGE WITH LOSS OF CONSCIOUSNESS OF UNSPECIFIED DURATION, INITIAL ENCOUNTER: ICD-10-CM

## 2019-10-03 DIAGNOSIS — R26.89 OTHER ABNORMALITIES OF GAIT AND MOBILITY: ICD-10-CM

## 2019-10-03 PROCEDURE — 99214 OFFICE O/P EST MOD 30 MIN: CPT

## 2019-10-07 PROBLEM — S06.5X9A SDH (SUBDURAL HEMATOMA): Status: ACTIVE | Noted: 2019-10-07

## 2019-10-07 PROBLEM — R26.89 ABNORMALITY OF GAIT DUE TO IMPAIRMENT OF BALANCE: Status: ACTIVE | Noted: 2019-10-07

## 2019-10-07 PROBLEM — S06.9X9A TBI (TRAUMATIC BRAIN INJURY): Status: ACTIVE | Noted: 2019-10-07

## 2019-10-07 NOTE — PHYSICAL EXAM
[FreeTextEntry1] : Constitutional: Alert, awake, no acute distress\par HEENT: EOMI, NC/AT, neck supple\par Cardiovascular: All extremities warm and well perfused\par Pulmonary: No respiratory distress, normal chest wall expansion\par Gastrointestinal: No abdominal distention\par \par Neuro:\par AAOx3, MOCA 28/30\par Recall 3/5 spontaneously. 5/5 with cat. cues\par Attention--able to complete days of wk backward, reverse digit span and serial 7's\par Executive function--able to complete [place] and clock drawing. \par \par CN: intact no nystagmus, visual fields intact, PERRLA, EOMI, no facial weakness or sensory deficit, shoulder blake intact, tongue midline\par Motor 5/5 bilat UE and LE\par Sens intact bilat UE and LE\par Coordination intact--FNF and HTS intact\par Proprioception: intact\par \par Gait--normal, \par \par balance-mild to moderately impaired tandem gait\par Negotiates stairs independently with HR step over step pattern\par

## 2019-10-07 NOTE — REASON FOR VISIT
[Post Hospitalization] : a post hospitalization visit [Spouse] : spouse [FreeTextEntry1] : s/p intracranial bleed

## 2019-10-13 PROCEDURE — 97530 THERAPEUTIC ACTIVITIES: CPT

## 2019-10-13 PROCEDURE — 82550 ASSAY OF CK (CPK): CPT

## 2019-10-13 PROCEDURE — G0515: CPT

## 2019-10-13 PROCEDURE — 97167 OT EVAL HIGH COMPLEX 60 MIN: CPT

## 2019-10-13 PROCEDURE — 80048 BASIC METABOLIC PNL TOTAL CA: CPT

## 2019-10-13 PROCEDURE — 93005 ELECTROCARDIOGRAM TRACING: CPT

## 2019-10-13 PROCEDURE — 36415 COLL VENOUS BLD VENIPUNCTURE: CPT

## 2019-10-13 PROCEDURE — 82962 GLUCOSE BLOOD TEST: CPT

## 2019-10-13 PROCEDURE — 80053 COMPREHEN METABOLIC PANEL: CPT

## 2019-10-13 PROCEDURE — 85027 COMPLETE CBC AUTOMATED: CPT

## 2019-10-13 PROCEDURE — 97110 THERAPEUTIC EXERCISES: CPT

## 2019-10-13 PROCEDURE — 92523 SPEECH SOUND LANG COMPREHEN: CPT

## 2019-10-13 PROCEDURE — 92610 EVALUATE SWALLOWING FUNCTION: CPT

## 2019-10-13 PROCEDURE — 97163 PT EVAL HIGH COMPLEX 45 MIN: CPT

## 2019-10-13 PROCEDURE — 97535 SELF CARE MNGMENT TRAINING: CPT

## 2019-10-13 PROCEDURE — 92507 TX SP LANG VOICE COMM INDIV: CPT

## 2019-10-13 PROCEDURE — 97116 GAIT TRAINING THERAPY: CPT

## 2019-10-14 ENCOUNTER — APPOINTMENT (OUTPATIENT)
Dept: NEUROSURGERY | Facility: CLINIC | Age: 74
End: 2019-10-14
Payer: MEDICARE

## 2019-10-14 VITALS
HEART RATE: 82 BPM | TEMPERATURE: 97.8 F | DIASTOLIC BLOOD PRESSURE: 69 MMHG | RESPIRATION RATE: 16 BRPM | HEIGHT: 69 IN | SYSTOLIC BLOOD PRESSURE: 119 MMHG | OXYGEN SATURATION: 97 % | BODY MASS INDEX: 24.14 KG/M2 | WEIGHT: 163 LBS

## 2019-10-14 PROCEDURE — 99213 OFFICE O/P EST LOW 20 MIN: CPT

## 2019-10-16 NOTE — HISTORY OF PRESENT ILLNESS
[de-identified] : Mr. Guillaume is a 74 year old man with past medical history of CAD s/p PCI on\par ASA, HTN, HLD, BPH, DM2, on ASA 81mg daily transferred from [facility] to [facility] s/p\par unwitnessed fall with finding of intracranial bleed and increased blood on\par interval scan. Patient initially presented to [facility] s/p unwitnessed fall, + LOC\par ?syncopal episode 8/21/19, with complaints of dizziness after fall and initial\par difficulty speaking. Patient did not recall event. CTH showed L hemispheric SDH\par with SAH and calvarium fx. Per family, he was initially confused and altered.\par In the [facility] ED, was complaining of headache and dizziness since his fall.\par ENT consulted for dizziness, started meclizine. Neurology was consulted for\par persistent headache, was given IV acetaminophen and solumedrol acutely, then\par sumatriptan added. Nephrology consulted for hyponatremia, felt is was due to\par chronic HCTZ use which was discontinued.\par \par Rehab Course:\par Mr. Guillaume was admitted to [facility] on 9/3/19 for\par acute rehabilitation. He completed a comprehensive program consisting of PT,\par OT, and SLP, making functional progress in therapy. His course was complicated\par by pre-syncope, found to have positive orthostatic blood pressure readings. He\par improved with adjustments to his blood pressure medications. Amantadine was\par tapered off as his arousal had significantly improved. Meclizine was stopped as\par dizziness subsided. Salt tabs were decreased as sodium levels stabilized.\par Gabapentin taper was started as patient exhibited no signs or symptoms of\par neuropathic pain. At the time of discharge he was modified independent for\par transfers, ambulation with rolling walker and ADL's. He is medically stable to\par be discharged to home with home care.\par \par 74M s/p fall 2 months ago, now here for an initial follow up. pt states he is doing well. no headaches, no n/v. no weakness.\par the dizziness he's been getting is getting better.

## 2019-10-16 NOTE — PHYSICAL EXAM
[General Appearance - Alert] : alert [General Appearance - In No Acute Distress] : in no acute distress [Person] : oriented to person [Place] : oriented to place [Time] : oriented to time [Short Term Intact] : short term memory intact [Remote Intact] : remote memory intact [Span Intact] : the attention span was normal [Concentration Intact] : normal concentrating ability [Fluency] : fluency intact [Comprehension] : comprehension intact [Current Events] : adequate knowledge of current events [Past History] : adequate knowledge of personal past history [Vocabulary] : adequate range of vocabulary [Cranial Nerves Oculomotor (III)] : extraocular motion intact [Cranial Nerves Trigeminal (V)] : facial sensation intact symmetrically [Cranial Nerves Optic (II)] : visual acuity intact bilaterally,  pupils equal round and reactive to light [Cranial Nerves Facial (VII)] : face symmetrical [Cranial Nerves Vestibulocochlear (VIII)] : hearing was intact bilaterally [Cranial Nerves Glossopharyngeal (IX)] : tongue and palate midline [Cranial Nerves Hypoglossal (XII)] : there was no tongue deviation with protrusion [Cranial Nerves Accessory (XI - Cranial And Spinal)] : head turning and shoulder shrug symmetric [Motor Tone] : muscle tone was normal in all four extremities [No Muscle Atrophy] : normal bulk in all four extremities [Motor Strength] : muscle strength was normal in all four extremities [Balance] : balance was intact [Abnormal Walk] : normal gait [Sensation Tactile Decrease] : light touch was intact [2+] : Patella left 2+ [Past-pointing] : there was no past-pointing [Tremor] : no tremor present [FreeTextEntry6] : No pronator drift

## 2019-10-16 NOTE — ASSESSMENT
[FreeTextEntry1] : 76 m with mild tbi here for initial outpatient follow up.  last CT showed stable rt frontal acute SDH with bilateral hygromas/capacious CSF spaces. Patient advised to get another scan, especially he's been on ASA 81.  Patient currently adamantly refusing as he says he feels fine and does not want to wait. He does not want it as an outpatient later this week either.  Wife and daughter asked for the referral and they will see if they can get him to do the scan.  He will return after CT, if this is done.  He has been refraining from driving and we would like to see follow up CT in order to clear him for driving at this time from our perspective.\par

## 2019-10-16 NOTE — RESULTS/DATA
[FreeTextEntry1] : CT head: 8/21\par EXTRA-AXIAL: Acute subdural hemorrhage seen along the left holohemispheric \par region in the right anterior/inferior frontal region extra-axial blood is \par seen which may be subdural, subarachnoid or a combination. Subarachnoid \par blood is also seen along the left frontoparietal region near the vertex. \par Small subarachnoid hemorrhage seen within the right sylvian fissure best \par seen on 4-13. No mass effect, midline shift or herniation seen. \par \par EXAM: CT BRAIN PROCEDURE DATE: 08/24/2019 \par \par COMPARISON STUDY: CT head 8/22/2019. \par \par FINDINGS: \par \par Redemonstration of a nondisplaced right frontal parietal parietal bone \par fracture. Right frontal hemorrhagic contusion and with associated edema and \par mass effect without significant change. A smaller hemorrhagic contusion also \par noted at the anterior right temporal lobe. Small areas of subarachnoid \par hemorrhage along the right temporal and parietal regions. A small amount of \par left temporal subdural or subarachnoid hemorrhage is identified, decreased \par compared with 8/22/2019. No midline shift or central herniation. \par \par Ventricular size is unchanged. Small vessel white matter ischemic changes \par are noted. \par \par Previously noted bilateral extra-axial fluid collections are unchanged. \par \par The visualized paranasal sinuses are clear. The mastoid air cells and middle \par ear cavities are clear. Bilateral orbital lens replacement. \par \par IMPRESSION: \par No significant interval change in the appearance of anterior right frontal \par and temporal lobe hemorrhagic contusions compared with 8/22/2019. No change \par in small amount of subarachnoid and left subdural hemorrhage. \par No midline shift or central herniation. No hydrocephalus. \par

## 2019-11-08 ENCOUNTER — APPOINTMENT (OUTPATIENT)
Dept: CT IMAGING | Facility: IMAGING CENTER | Age: 74
End: 2019-11-08
Payer: MEDICARE

## 2019-11-08 ENCOUNTER — OUTPATIENT (OUTPATIENT)
Dept: OUTPATIENT SERVICES | Facility: HOSPITAL | Age: 74
LOS: 1 days | End: 2019-11-08
Payer: MEDICARE

## 2019-11-08 DIAGNOSIS — S06.5X9A TRAUMATIC SUBDURAL HEMORRHAGE WITH LOSS OF CONSCIOUSNESS OF UNSPECIFIED DURATION, INITIAL ENCOUNTER: ICD-10-CM

## 2019-11-08 DIAGNOSIS — Z95.5 PRESENCE OF CORONARY ANGIOPLASTY IMPLANT AND GRAFT: Chronic | ICD-10-CM

## 2019-11-08 PROCEDURE — 70450 CT HEAD/BRAIN W/O DYE: CPT

## 2019-11-08 PROCEDURE — 70450 CT HEAD/BRAIN W/O DYE: CPT | Mod: 26

## 2019-11-15 ENCOUNTER — OTHER (OUTPATIENT)
Age: 74
End: 2019-11-15

## 2019-11-20 ENCOUNTER — APPOINTMENT (OUTPATIENT)
Dept: NEUROSURGERY | Facility: CLINIC | Age: 74
End: 2019-11-20
Payer: MEDICARE

## 2019-11-20 ENCOUNTER — OUTPATIENT (OUTPATIENT)
Dept: OUTPATIENT SERVICES | Facility: HOSPITAL | Age: 74
LOS: 1 days | End: 2019-11-20
Payer: MEDICARE

## 2019-11-20 VITALS
OXYGEN SATURATION: 97 % | DIASTOLIC BLOOD PRESSURE: 71 MMHG | RESPIRATION RATE: 18 BRPM | BODY MASS INDEX: 24.14 KG/M2 | TEMPERATURE: 97.9 F | HEIGHT: 69 IN | HEART RATE: 78 BPM | SYSTOLIC BLOOD PRESSURE: 170 MMHG | WEIGHT: 163 LBS

## 2019-11-20 DIAGNOSIS — Z95.5 PRESENCE OF CORONARY ANGIOPLASTY IMPLANT AND GRAFT: Chronic | ICD-10-CM

## 2019-11-20 DIAGNOSIS — S06.5X9A TRAUMATIC SUBDURAL HEMORRHAGE WITH LOSS OF CONSCIOUSNESS OF UNSPECIFIED DURATION, INITIAL ENCOUNTER: ICD-10-CM

## 2019-11-20 PROCEDURE — 70450 CT HEAD/BRAIN W/O DYE: CPT

## 2019-11-20 PROCEDURE — 70450 CT HEAD/BRAIN W/O DYE: CPT | Mod: 26

## 2019-11-20 PROCEDURE — 99214 OFFICE O/P EST MOD 30 MIN: CPT

## 2019-12-04 ENCOUNTER — OUTPATIENT (OUTPATIENT)
Dept: OUTPATIENT SERVICES | Facility: HOSPITAL | Age: 74
LOS: 1 days | End: 2019-12-04
Payer: MEDICARE

## 2019-12-04 ENCOUNTER — APPOINTMENT (OUTPATIENT)
Dept: NEUROSURGERY | Facility: CLINIC | Age: 74
End: 2019-12-04
Payer: MEDICARE

## 2019-12-04 VITALS
SYSTOLIC BLOOD PRESSURE: 158 MMHG | BODY MASS INDEX: 24.14 KG/M2 | HEART RATE: 78 BPM | HEIGHT: 69 IN | TEMPERATURE: 97.6 F | DIASTOLIC BLOOD PRESSURE: 78 MMHG | OXYGEN SATURATION: 97 % | WEIGHT: 163 LBS | RESPIRATION RATE: 17 BRPM

## 2019-12-04 DIAGNOSIS — Z95.5 PRESENCE OF CORONARY ANGIOPLASTY IMPLANT AND GRAFT: Chronic | ICD-10-CM

## 2019-12-04 DIAGNOSIS — Z00.00 ENCOUNTER FOR GENERAL ADULT MEDICAL EXAMINATION WITHOUT ABNORMAL FINDINGS: ICD-10-CM

## 2019-12-04 DIAGNOSIS — S06.5X9A TRAUMATIC SUBDURAL HEMORRHAGE WITH LOSS OF CONSCIOUSNESS OF UNSPECIFIED DURATION, INITIAL ENCOUNTER: ICD-10-CM

## 2019-12-04 PROCEDURE — 70450 CT HEAD/BRAIN W/O DYE: CPT

## 2019-12-04 PROCEDURE — 99214 OFFICE O/P EST MOD 30 MIN: CPT

## 2019-12-04 PROCEDURE — 70450 CT HEAD/BRAIN W/O DYE: CPT | Mod: 26

## 2019-12-05 NOTE — HISTORY OF PRESENT ILLNESS
[FreeTextEntry1] : Mr. Guillaume is a 74 year old man with past medical history of CAD s/p PCI on ASA, HTN, HLD, BPH and DM2.  s/p unwitnessed fall, + LOC, ?syncopal episode 8/21/19, with complaints of dizziness after fall and initial difficulty speaking. Patient did not recall event. CT Head  showed L hemispheric SDH\par with SAH and calvaria fx. Per family, he was initially confused and altered.In the [facility] ED ,with finding of intracranial bleed and increased blood on interval scan. ENT consulted for dizziness, started meclizine. Neurology was consulted for persistent headache, was given IV acetaminophen and Solu-Medrol and sumatriptan. Nephrology consulted for hyponatremia and HCTZ was discontinued. Was managed in Rehab from 9/3/19 to 9/13/19. Pt was  seen in this office on 10/14/19  and  CT on 11/8/19 which showed left chronic subdural hematoma. He stopped taking ASA . No headaches. Only has a little imbalance which is improving from rehab. No headache, no dizziness, no nausea.CT on 11/20/19 showed no change in left frontal SDH, 17 mm in diameter, mostly homogeneous, iso to hypodense.\par \par Today Mr. Guillaume present ambulatory with his wife, believes that he improved over all. Denies headache, dizziness, speech impairment, vision problems , nausea, vomiting or  seizures. He feels some weakness while walking, but not terrible, most of the time he feels good, no bothering symptoms.He stopped taking Meclizine as he has no dizziness.He does not feel he needs to have frequent visits or imaging.He is here to review today's imaging and follow up on his concussion.\par

## 2019-12-05 NOTE — PHYSICAL EXAM
[General Appearance - Alert] : alert [General Appearance - In No Acute Distress] : in no acute distress [General Appearance - Well Nourished] : well nourished [General Appearance - Well-Appearing] : healthy appearing [Oriented To Time, Place, And Person] : oriented to person, place, and time [Impaired Insight] : insight and judgment were intact [Affect] : the affect was normal [Memory Recent] : recent memory was not impaired [Person] : oriented to person [Place] : oriented to place [Time] : oriented to time [Short Term Intact] : short term memory intact [Fluency] : fluency intact [Cranial Nerves Optic (II)] : visual acuity intact bilaterally,  pupils equal round and reactive to light [Cranial Nerves Oculomotor (III)] : extraocular motion intact [Cranial Nerves Trigeminal (V)] : facial sensation intact symmetrically [Cranial Nerves Facial (VII)] : face symmetrical [Cranial Nerves Vestibulocochlear (VIII)] : hearing was intact bilaterally [Cranial Nerves Accessory (XI - Cranial And Spinal)] : head turning and shoulder shrug symmetric [Cranial Nerves Hypoglossal (XII)] : there was no tongue deviation with protrusion [Motor Strength] : muscle strength was normal in all four extremities [Balance] : balance was intact [Intact] : all motor groups within normal limits of strength and tone bilaterally [Full Visual Field] : full visual field [PERRL With Normal Accommodation] : pupils were equal in size, round, reactive to light, with normal accommodation [Abnormal Walk] : normal gait [Involuntary Movements] : no involuntary movements were seen [Motor Tone] : muscle strength and tone were normal [FreeTextEntry6] : no drift

## 2019-12-05 NOTE — ASSESSMENT
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 Pt. is asymptomatic with mild lower limb weakness.  Emphasized to pt who is reluctant to come back too frequently, that we are serious about following this collection.  Will review final report.\par \par Plan:\par Hold ASA until further order. Continue all other medications.\par Repeat CT scan head w/o cont. in 1 month. \par Follow up in 1 month after imaging.\par \par \par \par \par \par \par \par Plan\par Repeat CT scan in 1 month\par Follow up in 1 month after imaging.

## 2019-12-05 NOTE — DATA REVIEWED
[de-identified] : CT today showed stable to slightly improved left subdural collection on prelim view.

## 2019-12-05 NOTE — REVIEW OF SYSTEMS
[Leg Weakness] : leg weakness [Limb Pain] : limb pain [Negative] : Eyes [de-identified] : mild leg weakness while walking

## 2019-12-05 NOTE — REASON FOR VISIT
[Follow-Up: _____] : a [unfilled] follow-up visit [Spouse] : spouse [FreeTextEntry1] : 'I  am here for follow up on my bleed'

## 2019-12-27 NOTE — ED PROVIDER NOTE - MUSCULOSKELETAL NECK EXAM
Pt presents for depo injection, pt verified name and , pt last depo was 10/1/2019, pt tolerated inj well and had no other questions
trachea midline/neck with C-collar intact

## 2020-01-08 ENCOUNTER — APPOINTMENT (OUTPATIENT)
Dept: NEUROSURGERY | Facility: CLINIC | Age: 75
End: 2020-01-08
Payer: MEDICARE

## 2020-01-08 ENCOUNTER — OUTPATIENT (OUTPATIENT)
Dept: OUTPATIENT SERVICES | Facility: HOSPITAL | Age: 75
LOS: 1 days | End: 2020-01-08
Payer: MEDICARE

## 2020-01-08 VITALS
SYSTOLIC BLOOD PRESSURE: 130 MMHG | HEIGHT: 69 IN | RESPIRATION RATE: 18 BRPM | TEMPERATURE: 97 F | HEART RATE: 92 BPM | DIASTOLIC BLOOD PRESSURE: 70 MMHG | OXYGEN SATURATION: 98 % | WEIGHT: 163 LBS | BODY MASS INDEX: 24.14 KG/M2

## 2020-01-08 DIAGNOSIS — S06.9X9A UNSPECIFIED INTRACRANIAL INJURY WITH LOSS OF CONSCIOUSNESS OF UNSPECIFIED DURATION, INITIAL ENCOUNTER: ICD-10-CM

## 2020-01-08 DIAGNOSIS — S06.5X9A TRAUMATIC SUBDURAL HEMORRHAGE WITH LOSS OF CONSCIOUSNESS OF UNSPECIFIED DURATION, INITIAL ENCOUNTER: ICD-10-CM

## 2020-01-08 DIAGNOSIS — Z95.5 PRESENCE OF CORONARY ANGIOPLASTY IMPLANT AND GRAFT: Chronic | ICD-10-CM

## 2020-01-08 PROCEDURE — 99214 OFFICE O/P EST MOD 30 MIN: CPT

## 2020-01-08 PROCEDURE — 70450 CT HEAD/BRAIN W/O DYE: CPT

## 2020-01-08 PROCEDURE — 70450 CT HEAD/BRAIN W/O DYE: CPT | Mod: 26

## 2020-03-23 ENCOUNTER — APPOINTMENT (OUTPATIENT)
Dept: CARDIOLOGY | Facility: CLINIC | Age: 75
End: 2020-03-23

## 2020-07-30 ENCOUNTER — OUTPATIENT (OUTPATIENT)
Dept: OUTPATIENT SERVICES | Facility: HOSPITAL | Age: 75
LOS: 1 days | End: 2020-07-30
Payer: MEDICARE

## 2020-07-30 ENCOUNTER — APPOINTMENT (OUTPATIENT)
Dept: CT IMAGING | Facility: IMAGING CENTER | Age: 75
End: 2020-07-30
Payer: MEDICARE

## 2020-07-30 DIAGNOSIS — Z00.8 ENCOUNTER FOR OTHER GENERAL EXAMINATION: ICD-10-CM

## 2020-07-30 DIAGNOSIS — Z95.5 PRESENCE OF CORONARY ANGIOPLASTY IMPLANT AND GRAFT: Chronic | ICD-10-CM

## 2020-07-30 PROCEDURE — 70450 CT HEAD/BRAIN W/O DYE: CPT

## 2020-07-30 PROCEDURE — 70450 CT HEAD/BRAIN W/O DYE: CPT | Mod: 26

## 2020-08-03 ENCOUNTER — APPOINTMENT (OUTPATIENT)
Dept: CARDIOLOGY | Facility: CLINIC | Age: 75
End: 2020-08-03
Payer: MEDICARE

## 2020-08-03 ENCOUNTER — NON-APPOINTMENT (OUTPATIENT)
Age: 75
End: 2020-08-03

## 2020-08-03 VITALS
WEIGHT: 163 LBS | DIASTOLIC BLOOD PRESSURE: 82 MMHG | RESPIRATION RATE: 12 BRPM | HEIGHT: 69 IN | SYSTOLIC BLOOD PRESSURE: 172 MMHG | BODY MASS INDEX: 24.14 KG/M2

## 2020-08-03 VITALS — OXYGEN SATURATION: 100 % | SYSTOLIC BLOOD PRESSURE: 183 MMHG | DIASTOLIC BLOOD PRESSURE: 79 MMHG | HEART RATE: 71 BPM

## 2020-08-03 VITALS — DIASTOLIC BLOOD PRESSURE: 78 MMHG | SYSTOLIC BLOOD PRESSURE: 167 MMHG

## 2020-08-03 PROCEDURE — 93000 ELECTROCARDIOGRAM COMPLETE: CPT

## 2020-08-03 PROCEDURE — 99214 OFFICE O/P EST MOD 30 MIN: CPT

## 2020-08-03 RX ORDER — METHYLPREDNISOLONE 4 MG/1
4 TABLET ORAL
Qty: 1 | Refills: 0 | Status: DISCONTINUED | COMMUNITY
Start: 2019-11-20 | End: 2020-08-03

## 2020-08-03 RX ORDER — SILDENAFIL 20 MG/1
20 TABLET ORAL DAILY
Qty: 30 | Refills: 11 | Status: DISCONTINUED | COMMUNITY
Start: 2017-10-26 | End: 2020-08-03

## 2020-08-03 NOTE — HISTORY OF PRESENT ILLNESS
[FreeTextEntry1] : 74 year old male with about 20 years of CAD, type II diabetes, elevated cholesterol.  s/p  two stenting episodes.  in mid 90s and about 3 years ago.\par lv function appears to be normal based on echo and stress from 3 years ago.\par he appears to be asymptomatic at this time.  took last nitro over a year ago.\par he is establishing care at this time (change from [facility]).\par last carotid 2-3 years ago.  last hgba1c 7%\par lipids are well controlled.  20 year hx of LBBB.   no syncope.\par \par 4/8/2019 presents today for evaluation of left back pain under the left  arm and left scapula that started yesterday as a spasm,.  sx lasted for about an hour and resolved on its own no associated sx.  did not feel like he needed to go to the emergency room.  did not take benicar yesterday and then sx happened afterwards. \par \par 8/19/2019  presents today for scheduled checkup.   denies any symptoms.  took all medications and no issues.  had carotid doppler today.\par \par 8/3/2020  first visit in a year.  denies new complaints but wife states that BP has dropped with 20 mg of Benicar and she has cut the dose to 10 mg.  he is off aspirin indefinately due to chronic subdural.  he denies chest pain.

## 2020-08-03 NOTE — PHYSICAL EXAM
[General Appearance - Well Developed] : well developed [Normal Appearance] : normal appearance [Well Groomed] : well groomed [General Appearance - Well Nourished] : well nourished [No Deformities] : no deformities [General Appearance - In No Acute Distress] : no acute distress [Eyelids - No Xanthelasma] : the eyelids demonstrated no xanthelasmas [Normal Conjunctiva] : the conjunctiva exhibited no abnormalities [Normal Oral Mucosa] : normal oral mucosa [No Oral Pallor] : no oral pallor [No Oral Cyanosis] : no oral cyanosis [Normal Jugular Venous A Waves Present] : normal jugular venous A waves present [No Jugular Venous Engle A Waves] : no jugular venous engle A waves [Normal Jugular Venous V Waves Present] : normal jugular venous V waves present [Respiration, Rhythm And Depth] : normal respiratory rhythm and effort [Exaggerated Use Of Accessory Muscles For Inspiration] : no accessory muscle use [Auscultation Breath Sounds / Voice Sounds] : lungs were clear to auscultation bilaterally [Normal] : normal [Normal Rate] : normal [Rhythm Regular] : regular [Normal S2] : normal S2 [Normal S1] : normal S1 [No Murmur] : no murmurs heard [No Abnormalities] : the abdominal aorta was not enlarged and no bruit was heard [No Pitting Edema] : no pitting edema present [Abdomen Soft] : soft [Abdomen Tenderness] : non-tender [Abdomen Mass (___ Cm)] : no abdominal mass palpated [Abnormal Walk] : normal gait [Gait - Sufficient For Exercise Testing] : the gait was sufficient for exercise testing [Nail Clubbing] : no clubbing of the fingernails [Cyanosis, Localized] : no localized cyanosis [Petechial Hemorrhages (___cm)] : no petechial hemorrhages [Skin Color & Pigmentation] : normal skin color and pigmentation [] : no rash [No Venous Stasis] : no venous stasis [Skin Lesions] : no skin lesions [No Skin Ulcers] : no skin ulcer [No Xanthoma] : no  xanthoma was observed [Affect] : the affect was normal [Oriented To Time, Place, And Person] : oriented to person, place, and time [Mood] : the mood was normal [No Anxiety] : not feeling anxious [Right Carotid Bruit] : no bruit heard over the right carotid [Left Carotid Bruit] : no bruit heard over the left carotid [Bruit] : no bruit heard [Rt] : no varicose veins of the right leg [Lt] : no varicose veins of the left leg

## 2020-08-03 NOTE — DISCUSSION/SUMMARY
[Mild Aortic Stenosis] : mild aortic stenosis [Coronary Artery Disease] : coronary artery disease [Stable] : stable [Not Responding to Treatment] : not responding to treatment [Essential Hypertension] : essential hypertension [Medication Changes Per Orders] : as documented in orders [Sodium Restriction] : sodium restriction [Patient] : the patient [Family] : the patient's family [___ Month(s)] : [unfilled] month(s) [de-identified] : wife lowered benicar to 10 mg day; would resume 20 mg dose [FreeTextEntry1] : 74 year old with CAD, essential htn, lipids.  all well controlled.  most recent echo and Holter are normal.  no active sx of ischemia.  carotid Doppler. \par hold off on stress test.\par \par 8/19/2019  no complaints today.  BP is acceptable after several reading.  follow up on carotid Doppler.  \par \par 8/3/2020  BP is elevated.  wife had lowered dose of benicar to 10 mg .. discussed going back to 20 mg dose which they said they will do.  they are also asking about restarting asprin and will contact his neurologist to discuss.

## 2020-08-11 ENCOUNTER — APPOINTMENT (OUTPATIENT)
Dept: UROLOGY | Facility: CLINIC | Age: 75
End: 2020-08-11
Payer: MEDICARE

## 2020-08-11 VITALS — TEMPERATURE: 98.2 F

## 2020-08-11 VITALS
TEMPERATURE: 98.2 F | RESPIRATION RATE: 12 BRPM | HEART RATE: 78 BPM | DIASTOLIC BLOOD PRESSURE: 80 MMHG | SYSTOLIC BLOOD PRESSURE: 135 MMHG

## 2020-08-11 PROCEDURE — 99213 OFFICE O/P EST LOW 20 MIN: CPT

## 2020-08-11 NOTE — HISTORY OF PRESENT ILLNESS
[FreeTextEntry1] : BPH/LUTS:  nocturia 1x per night.  Remains on tamsulosin and finasteride 5 mg\par Recent PSA 3.63 ng/mL.\par No new complaints.  No abdominal or flank pain.\par Otherwise doing well.

## 2020-08-11 NOTE — PHYSICAL EXAM
[General Appearance - Well Developed] : well developed [Normal Appearance] : normal appearance [General Appearance - Well Nourished] : well nourished [General Appearance - In No Acute Distress] : no acute distress [Well Groomed] : well groomed [Abdomen Soft] : soft [Costovertebral Angle Tenderness] : no ~M costovertebral angle tenderness [Urinary Bladder Findings] : the bladder was normal on palpation [Abdomen Tenderness] : non-tender

## 2020-08-26 ENCOUNTER — APPOINTMENT (OUTPATIENT)
Dept: NEUROSURGERY | Facility: CLINIC | Age: 75
End: 2020-08-26
Payer: MEDICARE

## 2020-08-26 PROCEDURE — 99442: CPT

## 2020-08-26 NOTE — ASSESSMENT
[FreeTextEntry1] : \par Impression:\par 75 years old male with SDH after fall was stable for months and a mild decrease in size with mass effect in the recent CT in August 2020, as reported by radiologist. Patient doing well with no neurological symptoms.\par \par \par Plan:\par \par  As there is  still the evidence of hematoma and the mass effect, advised to hold on to Aspirin until see \par \par Will discuss with Dr. Camargo about the imaging and call back patient in 2 weeks with the plan.\par  \par Precautions given and return if there are worsening headache, lethargy, gait imbalance, weakness.\par \par \par Telephone appointment from 9:35 to 9:50 (15 min)\par \par

## 2020-08-26 NOTE — HISTORY OF PRESENT ILLNESS
[Home] : at home, [unfilled] , at the time of the visit. [Medical Office: ([facility])___] : at the medical office located in  [Verbal consent obtained from patient] : the patient, [unfilled] [FreeTextEntry1] : Mr. Guillaume is a 74 year old man with past medical history of CAD s/p PCI on ASA, HTN, HLD, BPH and DM2. s/p unwitnessed fall, + LOC, ?syncopal episode 8/21/19, with complaints of dizziness after fall and initial difficulty speaking. Today he returns with further imaging. He reports today feeling fine. He says he has a little imbalance, but otherwise ok and is back to driving. He is due to see his cardiologist in a couple of months.Last visit CT showed moderately large left frontal parietal subacute to chronic \par subdural hematomas unchanged in size but is slightly decreased in density compared with 12/4/2019. There is persistent mass effect on the cerebral  cortical sulci and left lateral ventricle with mild midline shift to the right.\par Today Mr. Guillaume present in telephonic visit to discuss about the recent CT image. He believes that he improved over all. Denies headache, speech impairment, vision problems or seizures. He is walking with no support and has no weakness. He is doing everything even driving and also leading normal life. Today he would like to know if the aspirin can be started back on. \par \par

## 2020-08-26 NOTE — PHYSICAL EXAM
[General Appearance - Alert] : alert [General Appearance - Well Nourished] : well nourished [General Appearance - Well-Appearing] : healthy appearing [Oriented To Time, Place, And Person] : oriented to person, place, and time [Affect] : the affect was normal [Memory Recent] : recent memory was not impaired [Person] : oriented to person [Place] : oriented to place [Short Term Intact] : short term memory intact [Time] : oriented to time

## 2020-09-18 ENCOUNTER — APPOINTMENT (OUTPATIENT)
Dept: NEUROSURGERY | Facility: CLINIC | Age: 75
End: 2020-09-18

## 2020-09-25 ENCOUNTER — APPOINTMENT (OUTPATIENT)
Dept: CARDIOLOGY | Facility: CLINIC | Age: 75
End: 2020-09-25
Payer: MEDICARE

## 2020-09-25 ENCOUNTER — NON-APPOINTMENT (OUTPATIENT)
Age: 75
End: 2020-09-25

## 2020-09-25 VITALS
SYSTOLIC BLOOD PRESSURE: 159 MMHG | RESPIRATION RATE: 12 BRPM | WEIGHT: 163 LBS | OXYGEN SATURATION: 98 % | HEART RATE: 83 BPM | BODY MASS INDEX: 24.07 KG/M2 | TEMPERATURE: 97.6 F | DIASTOLIC BLOOD PRESSURE: 80 MMHG

## 2020-09-25 VITALS — SYSTOLIC BLOOD PRESSURE: 127 MMHG | DIASTOLIC BLOOD PRESSURE: 68 MMHG

## 2020-09-25 PROCEDURE — 99215 OFFICE O/P EST HI 40 MIN: CPT

## 2020-09-25 PROCEDURE — 93000 ELECTROCARDIOGRAM COMPLETE: CPT | Mod: 59

## 2020-09-25 NOTE — REVIEW OF SYSTEMS
[Negative] : Heme/Lymph [Fever] : no fever [Headache] : no headache [Chills] : no chills [Feeling Fatigued] : not feeling fatigued [Blurry Vision] : no blurred vision [Seeing Double (Diplopia)] : no diplopia [Eyeglasses] : not currently wearing eyeglasses [Earache] : no earache [Discharge From The Ears] : no discharge from the ears [Mouth Sores] : no mouth sores [Sore Throat] : no sore throat [Shortness Of Breath] : no shortness of breath [Dyspnea on exertion] : not dyspnea during exertion [Chest  Pressure] : no chest pressure [Chest Pain] : no chest pain [Lower Ext Edema] : no extremity edema [Leg Claudication] : no intermittent leg claudication [Palpitations] : no palpitations [Cough] : no cough [Wheezing] : no wheezing [Abdominal Pain] : no abdominal pain [Nausea] : no nausea [Vomiting] : no vomiting [Heartburn] : no heartburn [Change in Appetite] : no change in appetite [Change In The Stool] : no change in stool [Dysphagia] : no dysphagia [Urinary Frequency] : no change in urinary frequency [Hematuria] : no hematuria [Nocturia] : no nocturia [Joint Pain] : no joint pain [Joint Swelling] : no joint swelling [Joint Stiffness] : no joint stiffness [Muscle Cramps] : no muscle cramps [Limb Weakness (Paresis)] : no limb weakness [Skin: A Rash] : no rash: [Skin Lesions] : no skin lesions [Dizziness] : no dizziness [Convulsions] : no convulsions [Confusion] : no confusion was observed [Anxiety] : no anxiety [Excessive Thirst] : no polydipsia [Easy Bleeding] : no tendency for easy bleeding [Easy Bruising] : no tendency for easy bruising

## 2020-09-25 NOTE — DISCUSSION/SUMMARY
[Unlikely Cardiac Ischemia (Low Prob.)] : chest pain unlikely to represent cardiac ischemia (low probability) [Coronary Artery Disease] : coronary artery disease [Weight Reduction] : weight reduction [Hyperlipidemia] : hyperlipidemia [Diet Modification] : diet modification [Hypertension] : hypertension [Improving] : improving [Weight Loss] : weight loss [Sodium Restriction] : sodium restriction [___ Month(s)] : [unfilled] month(s) [With Me] : with me [Bundle Branch Block] : ~T bundle branch block [Stable] : stable [Patient] : the patient [Dietary Modification] : dietary modification [Responding to Treatment] : responding to treatment [None] : none [Exercise Regimen] : an exercise regimen [Family] : the patient's family [de-identified] : s/p PCI 1994, 2015 [de-identified] : still without aspirin due to the  rec. of neurologist for [facility]. [de-identified] : reactive hypertension [de-identified] : encourage him to take  ARB for the HTN, DM, and renal protection. [FreeTextEntry1] : \par

## 2020-09-25 NOTE — HISTORY OF PRESENT ILLNESS
[FreeTextEntry1] : Patient, a 75 year old male being f/u by other cardiologist after the last PCI in 2015, is here for the  follow-up with his lab and overall condition, including the medical management with aspirin  for the previous subdural hematoma a year ago.\par For the CV system, he has no manifestation of chest pain, shortness of breath, dizziness or palpitations.In his  system he had  3 stenting event, 2X at [facility] and last one in [facility]. A year ago, 2019, he had  unknown cause of syncope with   subsequent frontal subdural hematoma although  with occipital injury. His  low dose of aspirin was discontinued by neurologist and still  nor resumed.\par He showed me the lab test recently, no particular abnormality.

## 2020-09-25 NOTE — REASON FOR VISIT
[Abnormal ECG] : an abnormal ECG [Coronary Artery Disease] : coronary artery disease [Hyperlipidemia] : hyperlipidemia [Hypertension] : hypertension [Medication Management] : Medication management [Follow-Up - Clinic] : a clinic follow-up of

## 2020-09-25 NOTE — PHYSICAL EXAM
[General Appearance - Well Developed] : well developed [Normal Appearance] : normal appearance [Well Groomed] : well groomed [General Appearance - Well Nourished] : well nourished [No Deformities] : no deformities [General Appearance - In No Acute Distress] : no acute distress [Normal Conjunctiva] : the conjunctiva exhibited no abnormalities [Eyelids - No Xanthelasma] : the eyelids demonstrated no xanthelasmas [Normal Oral Mucosa] : normal oral mucosa [No Oral Pallor] : no oral pallor [No Oral Cyanosis] : no oral cyanosis [Normal Jugular Venous A Waves Present] : normal jugular venous A waves present [Normal Jugular Venous V Waves Present] : normal jugular venous V waves present [No Jugular Venous Engle A Waves] : no jugular venous engle A waves [Heart Rate And Rhythm] : heart rate and rhythm were normal [Heart Sounds] : normal S1 and S2 [Murmurs] : no murmurs present [Arterial Pulses Normal] : the arterial pulses were normal [Edema] : no peripheral edema present [Veins - Varicosity Changes] : no varicosital changes were noted in the lower extremities [Respiration, Rhythm And Depth] : normal respiratory rhythm and effort [Exaggerated Use Of Accessory Muscles For Inspiration] : no accessory muscle use [Auscultation Breath Sounds / Voice Sounds] : lungs were clear to auscultation bilaterally [Chest Palpation] : palpation of the chest revealed no abnormalities [Lungs Percussion] : the lungs were normal to percussion [Bowel Sounds] : normal bowel sounds [Abdomen Soft] : soft [Abdomen Tenderness] : non-tender [Abdomen Mass (___ Cm)] : no abdominal mass palpated [Abdomen Hernia] : no hernia was discovered [Abnormal Walk] : normal gait [Gait - Sufficient For Exercise Testing] : the gait was sufficient for exercise testing [Nail Clubbing] : no clubbing of the fingernails [Cyanosis, Localized] : no localized cyanosis [Petechial Hemorrhages (___cm)] : no petechial hemorrhages [Skin Color & Pigmentation] : normal skin color and pigmentation [Skin Turgor] : normal skin turgor [] : no rash [No Venous Stasis] : no venous stasis [Skin Lesions] : no skin lesions [No Skin Ulcers] : no skin ulcer [No Xanthoma] : no  xanthoma was observed [Oriented To Time, Place, And Person] : oriented to person, place, and time [Impaired Insight] : insight and judgment were intact [Affect] : the affect was normal [Mood] : the mood was normal [Memory Recent] : recent memory was not impaired [Memory Remote] : remote memory was not impaired [No Anxiety] : not feeling anxious

## 2021-02-19 ENCOUNTER — APPOINTMENT (OUTPATIENT)
Dept: CARDIOLOGY | Facility: CLINIC | Age: 76
End: 2021-02-19

## 2021-02-22 ENCOUNTER — APPOINTMENT (OUTPATIENT)
Dept: CARDIOLOGY | Facility: CLINIC | Age: 76
End: 2021-02-22
Payer: MEDICARE

## 2021-02-22 ENCOUNTER — NON-APPOINTMENT (OUTPATIENT)
Age: 76
End: 2021-02-22

## 2021-02-22 ENCOUNTER — INPATIENT (INPATIENT)
Facility: HOSPITAL | Age: 76
LOS: 1 days | Discharge: HOME | DRG: 280 | End: 2021-02-24
Attending: INTERNAL MEDICINE | Admitting: INTERNAL MEDICINE
Payer: MEDICARE

## 2021-02-22 VITALS
OXYGEN SATURATION: 98 % | HEART RATE: 87 BPM | SYSTOLIC BLOOD PRESSURE: 128 MMHG | DIASTOLIC BLOOD PRESSURE: 73 MMHG | RESPIRATION RATE: 16 BRPM | HEIGHT: 67 IN | WEIGHT: 160.06 LBS | TEMPERATURE: 98 F

## 2021-02-22 VITALS — DIASTOLIC BLOOD PRESSURE: 80 MMHG | SYSTOLIC BLOOD PRESSURE: 142 MMHG

## 2021-02-22 VITALS
BODY MASS INDEX: 23.7 KG/M2 | WEIGHT: 160 LBS | HEART RATE: 83 BPM | SYSTOLIC BLOOD PRESSURE: 161 MMHG | DIASTOLIC BLOOD PRESSURE: 73 MMHG | HEIGHT: 69 IN | OXYGEN SATURATION: 97 %

## 2021-02-22 VITALS — DIASTOLIC BLOOD PRESSURE: 75 MMHG | SYSTOLIC BLOOD PRESSURE: 147 MMHG

## 2021-02-22 DIAGNOSIS — E78.5 HYPERLIPIDEMIA, UNSPECIFIED: ICD-10-CM

## 2021-02-22 DIAGNOSIS — Z95.5 PRESENCE OF CORONARY ANGIOPLASTY IMPLANT AND GRAFT: Chronic | ICD-10-CM

## 2021-02-22 DIAGNOSIS — I10 ESSENTIAL (PRIMARY) HYPERTENSION: ICD-10-CM

## 2021-02-22 DIAGNOSIS — I25.10 ATHEROSCLEROTIC HEART DISEASE OF NATIVE CORONARY ARTERY WITHOUT ANGINA PECTORIS: ICD-10-CM

## 2021-02-22 DIAGNOSIS — E11.9 TYPE 2 DIABETES MELLITUS WITHOUT COMPLICATIONS: ICD-10-CM

## 2021-02-22 DIAGNOSIS — R07.9 CHEST PAIN, UNSPECIFIED: ICD-10-CM

## 2021-02-22 LAB
ALBUMIN SERPL ELPH-MCNC: 3.9 G/DL — SIGNIFICANT CHANGE UP (ref 3.3–5)
ALP SERPL-CCNC: 49 U/L — SIGNIFICANT CHANGE UP (ref 40–120)
ALT FLD-CCNC: 26 U/L — SIGNIFICANT CHANGE UP (ref 10–45)
ANION GAP SERPL CALC-SCNC: 12 MMOL/L — SIGNIFICANT CHANGE UP (ref 5–17)
APTT BLD: 26.9 SEC — LOW (ref 27.5–35.5)
AST SERPL-CCNC: 24 U/L — SIGNIFICANT CHANGE UP (ref 10–40)
BASOPHILS # BLD AUTO: 0.04 K/UL — SIGNIFICANT CHANGE UP (ref 0–0.2)
BASOPHILS NFR BLD AUTO: 0.6 % — SIGNIFICANT CHANGE UP (ref 0–2)
BILIRUB SERPL-MCNC: 1.2 MG/DL — SIGNIFICANT CHANGE UP (ref 0.2–1.2)
BUN SERPL-MCNC: 11 MG/DL — SIGNIFICANT CHANGE UP (ref 7–23)
CALCIUM SERPL-MCNC: 9.6 MG/DL — SIGNIFICANT CHANGE UP (ref 8.4–10.5)
CHLORIDE SERPL-SCNC: 102 MMOL/L — SIGNIFICANT CHANGE UP (ref 96–108)
CO2 SERPL-SCNC: 24 MMOL/L — SIGNIFICANT CHANGE UP (ref 22–31)
CREAT SERPL-MCNC: 1 MG/DL — SIGNIFICANT CHANGE UP (ref 0.5–1.3)
EOSINOPHIL # BLD AUTO: 0.12 K/UL — SIGNIFICANT CHANGE UP (ref 0–0.5)
EOSINOPHIL NFR BLD AUTO: 1.7 % — SIGNIFICANT CHANGE UP (ref 0–6)
GLUCOSE SERPL-MCNC: 241 MG/DL — HIGH (ref 70–99)
HCT VFR BLD CALC: 40.4 % — SIGNIFICANT CHANGE UP (ref 39–50)
HGB BLD-MCNC: 13.5 G/DL — SIGNIFICANT CHANGE UP (ref 13–17)
IMM GRANULOCYTES NFR BLD AUTO: 0.1 % — SIGNIFICANT CHANGE UP (ref 0–1.5)
INR BLD: 1.03 RATIO — SIGNIFICANT CHANGE UP (ref 0.88–1.16)
LYMPHOCYTES # BLD AUTO: 1.38 K/UL — SIGNIFICANT CHANGE UP (ref 1–3.3)
LYMPHOCYTES # BLD AUTO: 19.8 % — SIGNIFICANT CHANGE UP (ref 13–44)
MCHC RBC-ENTMCNC: 30 PG — SIGNIFICANT CHANGE UP (ref 27–34)
MCHC RBC-ENTMCNC: 33.4 GM/DL — SIGNIFICANT CHANGE UP (ref 32–36)
MCV RBC AUTO: 89.8 FL — SIGNIFICANT CHANGE UP (ref 80–100)
MONOCYTES # BLD AUTO: 0.58 K/UL — SIGNIFICANT CHANGE UP (ref 0–0.9)
MONOCYTES NFR BLD AUTO: 8.3 % — SIGNIFICANT CHANGE UP (ref 2–14)
NEUTROPHILS # BLD AUTO: 4.83 K/UL — SIGNIFICANT CHANGE UP (ref 1.8–7.4)
NEUTROPHILS NFR BLD AUTO: 69.5 % — SIGNIFICANT CHANGE UP (ref 43–77)
NRBC # BLD: 0 /100 WBCS — SIGNIFICANT CHANGE UP (ref 0–0)
PLATELET # BLD AUTO: 145 K/UL — LOW (ref 150–400)
POTASSIUM SERPL-MCNC: 4.3 MMOL/L — SIGNIFICANT CHANGE UP (ref 3.5–5.3)
POTASSIUM SERPL-SCNC: 4.3 MMOL/L — SIGNIFICANT CHANGE UP (ref 3.5–5.3)
PROT SERPL-MCNC: 6.6 G/DL — SIGNIFICANT CHANGE UP (ref 6–8.3)
PROTHROM AB SERPL-ACNC: 12.3 SEC — SIGNIFICANT CHANGE UP (ref 10.6–13.6)
RBC # BLD: 4.5 M/UL — SIGNIFICANT CHANGE UP (ref 4.2–5.8)
RBC # FLD: 12.5 % — SIGNIFICANT CHANGE UP (ref 10.3–14.5)
SARS-COV-2 RNA SPEC QL NAA+PROBE: SIGNIFICANT CHANGE UP
SODIUM SERPL-SCNC: 138 MMOL/L — SIGNIFICANT CHANGE UP (ref 135–145)
TROPONIN T, HIGH SENSITIVITY RESULT: 50 NG/L — SIGNIFICANT CHANGE UP (ref 0–51)
TROPONIN T, HIGH SENSITIVITY RESULT: 91 NG/L — HIGH (ref 0–51)
WBC # BLD: 6.96 K/UL — SIGNIFICANT CHANGE UP (ref 3.8–10.5)
WBC # FLD AUTO: 6.96 K/UL — SIGNIFICANT CHANGE UP (ref 3.8–10.5)

## 2021-02-22 PROCEDURE — 99072 ADDL SUPL MATRL&STAF TM PHE: CPT

## 2021-02-22 PROCEDURE — 99285 EMERGENCY DEPT VISIT HI MDM: CPT

## 2021-02-22 PROCEDURE — 93000 ELECTROCARDIOGRAM COMPLETE: CPT

## 2021-02-22 PROCEDURE — 99214 OFFICE O/P EST MOD 30 MIN: CPT

## 2021-02-22 PROCEDURE — 93010 ELECTROCARDIOGRAM REPORT: CPT

## 2021-02-22 PROCEDURE — 70450 CT HEAD/BRAIN W/O DYE: CPT | Mod: 26

## 2021-02-22 PROCEDURE — 71045 X-RAY EXAM CHEST 1 VIEW: CPT | Mod: 26

## 2021-02-22 RX ORDER — DEXTROSE 50 % IN WATER 50 %
25 SYRINGE (ML) INTRAVENOUS ONCE
Refills: 0 | Status: DISCONTINUED | OUTPATIENT
Start: 2021-02-22 | End: 2021-02-24

## 2021-02-22 RX ORDER — TAMSULOSIN HYDROCHLORIDE 0.4 MG/1
0.4 CAPSULE ORAL AT BEDTIME
Refills: 0 | Status: DISCONTINUED | OUTPATIENT
Start: 2021-02-22 | End: 2021-02-24

## 2021-02-22 RX ORDER — INSULIN GLARGINE 100 [IU]/ML
20 INJECTION, SOLUTION SUBCUTANEOUS AT BEDTIME
Refills: 0 | Status: DISCONTINUED | OUTPATIENT
Start: 2021-02-22 | End: 2021-02-24

## 2021-02-22 RX ORDER — INSULIN LISPRO 100/ML
VIAL (ML) SUBCUTANEOUS AT BEDTIME
Refills: 0 | Status: DISCONTINUED | OUTPATIENT
Start: 2021-02-22 | End: 2021-02-24

## 2021-02-22 RX ORDER — FINASTERIDE 5 MG/1
5 TABLET, FILM COATED ORAL DAILY
Refills: 0 | Status: DISCONTINUED | OUTPATIENT
Start: 2021-02-22 | End: 2021-02-24

## 2021-02-22 RX ORDER — INSULIN LISPRO 100/ML
VIAL (ML) SUBCUTANEOUS
Refills: 0 | Status: DISCONTINUED | OUTPATIENT
Start: 2021-02-22 | End: 2021-02-24

## 2021-02-22 RX ORDER — GLUCAGON INJECTION, SOLUTION 0.5 MG/.1ML
1 INJECTION, SOLUTION SUBCUTANEOUS ONCE
Refills: 0 | Status: DISCONTINUED | OUTPATIENT
Start: 2021-02-22 | End: 2021-02-24

## 2021-02-22 RX ORDER — CARVEDILOL PHOSPHATE 80 MG/1
6.25 CAPSULE, EXTENDED RELEASE ORAL EVERY 12 HOURS
Refills: 0 | Status: DISCONTINUED | OUTPATIENT
Start: 2021-02-22 | End: 2021-02-24

## 2021-02-22 RX ORDER — SODIUM CHLORIDE 9 MG/ML
1000 INJECTION, SOLUTION INTRAVENOUS
Refills: 0 | Status: DISCONTINUED | OUTPATIENT
Start: 2021-02-22 | End: 2021-02-24

## 2021-02-22 RX ORDER — DEXTROSE 50 % IN WATER 50 %
15 SYRINGE (ML) INTRAVENOUS ONCE
Refills: 0 | Status: DISCONTINUED | OUTPATIENT
Start: 2021-02-22 | End: 2021-02-24

## 2021-02-22 RX ORDER — DEXTROSE 50 % IN WATER 50 %
12.5 SYRINGE (ML) INTRAVENOUS ONCE
Refills: 0 | Status: DISCONTINUED | OUTPATIENT
Start: 2021-02-22 | End: 2021-02-24

## 2021-02-22 NOTE — CHART NOTE - NSCHARTNOTEFT_GEN_A_CORE
While there is no absolute neurosurgical contraindication to systemic anti coagulation, there does exist an increased risk of intracranial hemorrhage which can result in significant morbidity including but not limited to headache, seizure, stroke, paralysis, and in severe cases even death.    The risks and benefits of starting systemic anticoagulation must be considered carefully in the setting of the patients medical history and current clinical condition.    - Would avoid ASA / antiplatelet  - Recommend NO heparin bolus with low ptt goal of ~60-65  - Recommend obtaining follow up CTH after patient is therapeutic and to notify neurosurgery immediately with any changes in the patients neurologic exam

## 2021-02-22 NOTE — H&P ADULT - HISTORY OF PRESENT ILLNESS
74y M pmhx CAD s/p CABG, HTN, HLD, chronic SDH present 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 Patient did nt get stress test due to adverse symptom experienced during last stress test and was sent home.  Patient called EMS as chest pain was 10/10 not relieved with NTG.

## 2021-02-22 NOTE — H&P ADULT - RS GEN PE MLT RESP DETAILS PC
airway patent/breath sounds equal/good air movement/clear to auscultation bilaterally/no chest wall tenderness/no rales/no rhonchi

## 2021-02-22 NOTE — CONSULT NOTE ADULT - ASSESSMENT
74y M PMH CAD s/p CABG, HTN, HLD, chronic SDH present with CP concerning for NSTEMI    Plan  - Admit to tele  - trend hsTrop to peak; repeat ECG   - start rosuvastatin 40mg qd, c/w coreg 25mg bid  - CT head w/ subdural hemorrhage and concern for recent bleed  - will discuss w/ neurosx re: initiation of AC/AP  - obtain lipid panel, TSH, a1c  - obtain TTE  - DASH/diabetic diet  - NPO    Franco Barrett MD  Cardiology Fellow - F1  Text or Call: [phone]  For all New Consults and Questions:  [email or web address]   Login: [email or web address]         74y M PMH CAD s/p CABG, HTN, HLD, chronic SDH present with CP concerning for NSTEMI    Plan  - Admit to tele  - trend hsTrop to peak; repeat ECG   - start rosuvastatin 40mg qd, c/w coreg 25mg bid  - CT head w/ subdural hemorrhage and concern for recent bleed  - will discuss w/ neurosx re: initiation of AC/AP patient at risk for worsening bleed during management of his cardiac d/o; patient currently CP free and s/p  from EMS; will hold off on further AP for now; if sx change, will discuss again w/ neurosx prior to initiation of AP/AC  - obtain lipid panel, TSH, a1c  - obtain TTE  - DASH/diabetic diet    Franco Barrett MD  Cardiology Fellow - F1  Text or Call: [phone]  For all New Consults and Questions:  [email or web address]   Login: [email or web address]         74y M PMH CAD s/p CABG, HTN, HLD, chronic SDH present with CP concerning for NSTEMI    Plan  - Admit to tele  - trend hsTrop to peak; repeat ECG   - start rosuvastatin 40mg qd, c/w coreg 25mg bid  - CT head w/ subdural hemorrhage and concern for recent bleed  - will discuss w/ neurosx re: initiation of AC/AP patient at risk for worsening bleed during management of his cardiac d/o; patient currently CP free and s/p  from EMS; will hold off on further AP for now; if sx change, will discuss again w/ neurosx prior to initiation of AP/AC  - obtain lipid panel, TSH, a1c  - obtain TTE  - DASH/diabetic diet  - CAIT score: 127 pts; 10% Probability of death from admission to 6 months     Franco Barrett MD  Cardiology Fellow - F1  Text or Call: [phone]  For all New Consults and Questions:  [email or web address]   Login: [email or web address]         74y M PMH CAD s/p CABG, HTN, HLD, chronic SDH.  Now presents with CP concerning for NSTEMI      REC:  - Admit to tele  - trend hsTrop to peak; repeat ECG   - start rosuvastatin 40mg qd, c/w coreg 25mg bid  - CT head w/ subdural hemorrhage, with mixed attenuation of description of some subacute appearing hemorrhage.    - will discuss w/ neurosx re: initiation of AC/AP patient at risk for worsening bleed during management of his cardiac d/o; patient currently CP free and s/p  from EMS; will hold off on further AP for now; if sx change, will discuss again w/ neurosx prior to initiation of AP/AC  - obtain lipid panel, TSH, a1c  - obtain TTE  - DASH/diabetic diet      Franco Barrett MD  Cardiology Fellow - F1  Text or Call: [phone]    Markell Rosado M.D.  Cardiology Attending, Consult Service    For Cardiology consults and questions, all Cardiology service information can be found 24/7 on [email or web address], password: [email or web address]

## 2021-02-22 NOTE — CONSULT NOTE ADULT - SUBJECTIVE AND OBJECTIVE BOX
Patient seen and evaluated at bedside    Chief Complaint: chest pain    HPI: 74y M PMH CAD s/p CABG, HTN, HLD, chronic SDH present with CP. Patient seen in clinic earlier due to 3 days CP relieved w/ NTG. Patient CP free on visit, discussed stress test vs cath (patient prefers cath due to adverse sx experienced during stress test in 2015). Pt sent home and developed CP which resolved after NTG x 5. Patient called EMS and received .     Of note, patient has not been on ASA due to SAH. He was directed to f/u w/ neurosx to discuss whether he is safe to restart AP, but did not discuss w/ them.    In the ED, VS T: 98.3, P: 87, BP: 128/73, RR: 16, O2: 98%RA. Patient currently denying CP, dyspnea, palpitations, presyncope, syncope, HA, ab pain.    PMHx:   BPH (benign prostatic hypertrophy)    Hyperlipidemia    CAD (coronary artery disease)    Diabetes mellitus    HTN (hypertension)      PSHx:   S/P drug eluting coronary stent placement    S/P primary angioplasty with coronary stent      FAMILY HISTORY:  Family history of diabetes mellitus (Sibling)  3 brothers alive with Diabetes      Allergies:  No Known Allergies    Home Medications:  folic acid 1 mg oral tablet: 1 tab(s) orally once a day (11 Sep 2019 15:18)    Current Medications:     Social History  Smoking History: denies  Alcohol Use: denies  Drug Use: denies    REVIEW OF SYSTEMS:  Constitutional:     [X] negative [ ] fevers [ ] chills [ ] weight loss [ ] weight gain  HEENT:                  [X] negative [ ] dry eyes [ ] eye irritation [ ] postnasal drip [ ] nasal congestion  CV:                         [ ] negative  [X] chest pain [ ] orthopnea [ ] palpitations [ ] murmur  Resp:                     [X] negative [ ] cough [ ] shortness of breath [ ] dyspnea [ ] wheezing [ ] sputum [ ] hemoptysis  GI:                          [X] negative [ ] nausea [ ] vomiting [ ] diarrhea [ ] constipation [ ] abd pain [ ] dysphagia   :                        [X] negative [ ] dysuria [ ] nocturia [ ] hematuria [ ] increased urinary frequency  MSK:                      [X] negative [ ] back pain [ ] myalgias [ ] arthralgias [ ] fracture  Skin:                       [X] negative [ ] rash [ ] itch  Neuro:                   [X] negative [ ] headache [ ] dizziness [ ] syncope [ ] weakness [ ] numbness  Psych:                    [X] negative [ ] anxiety [ ] depression  Endo:                     [X] negative [ ] diabetes [ ] thyroid problem  Heme/Lymph:      [X] negative [ ] anemia [ ] bleeding problem  Allergic/Immune: [X] negative [ ] itchy eyes [ ] nasal discharge [ ] hives [ ] angioedema    [X] All other systems negative or otherwise described above.  [ ] Unable to assess ROS because ________.    ICU Vital Signs Last 24 Hrs  T(C): 36.8 (22 Feb 2021 17:29), Max: 36.8 (22 Feb 2021 17:29)  T(F): 98.3 (22 Feb 2021 17:29), Max: 98.3 (22 Feb 2021 17:29)  HR: 67 (22 Feb 2021 19:49) (67 - 87)  BP: 137/66 (22 Feb 2021 19:49) (128/73 - 137/66)  BP(mean): --  ABP: --  ABP(mean): --  RR: 18 (22 Feb 2021 19:49) (16 - 18)  SpO2: 98% (22 Feb 2021 19:49) (98% - 98%)    Daily Height in cm: 170.18 (22 Feb 2021 17:29)    Daily     Physical Exam:  GENERAL: No acute distress, well-developed  HEAD:  Atraumatic, Normocephalic  ENT: EOMI, conjunctiva and sclera clear, Neck supple, No JVD, moist mucosa  CHEST/LUNG: Clear to auscultation bilaterally; No wheeze, equal breath sounds bilaterally   BACK: No spinal tenderness  HEART: Regular rate and rhythm; No murmurs, rubs, or gallops, radial and DP 2+ b/l, euvolemic  ABDOMEN: Soft, Nontender, Nondistended  EXTREMITIES:  No clubbing, cyanosis, or edema  PSYCH: Nl behavior, nl affect  NEUROLOGY: AAOx3, non-focal  SKIN: Normal color, No rashes or lesions  LINES: no central lines present    Cardiovascular Diagnostic Testing    ECG: Personally reviewed    Echo: Personally reviewed    Stress Testing: none    Cath: none    Imaging: none    CXR: Personally reviewed    Labs: Personally reviewed                        13.5   6.96  )-----------( 145      ( 22 Feb 2021 18:25 )             40.4     02-22    138  |  102  |  11  ----------------------------<  241<H>  4.3   |  24  |  1.00    Ca    9.6      22 Feb 2021 18:25    TPro  6.6  /  Alb  3.9  /  TBili  1.2  /  DBili  x   /  AST  24  /  ALT  26  /  AlkPhos  49  02-22    PT/INR - ( 22 Feb 2021 18:25 )   PT: 12.3 sec;   INR: 1.03 ratio         PTT - ( 22 Feb 2021 18:25 )  PTT:26.9 sec         Patient seen and evaluated at bedside    Chief Complaint: chest pain    HPI: 74y M PMH CAD s/p CABG, HTN, HLD, chronic SDH present with CP. Patient seen in clinic earlier due to 3 days CP relieved w/ NTG. Patient CP free on visit, discussed stress test vs cath (patient prefers cath due to adverse sx experienced during stress test in 2015). Pt sent home and developed CP which resolved after NTG x 5. Patient called EMS and received .     Of note, patient has not been on ASA due to SAH. He was directed to f/u w/ neurosx to discuss whether he is safe to restart AP, but did not discuss w/ them.    In the ED, VS T: 98.3, P: 87, BP: 128/73, RR: 16, O2: 98%RA. Patient currently denying CP, dyspnea, palpitations, presyncope, syncope, HA, ab pain.    PMHx:   BPH (benign prostatic hypertrophy)    Hyperlipidemia    CAD (coronary artery disease)    Diabetes mellitus    HTN (hypertension)      PSHx:   S/P drug eluting coronary stent placement    S/P primary angioplasty with coronary stent      FAMILY HISTORY:  Family history of diabetes mellitus (Sibling)  3 brothers alive with Diabetes      Allergies:  No Known Allergies    Home Medications:  folic acid 1 mg oral tablet: 1 tab(s) orally once a day (11 Sep 2019 15:18)    Current Medications:     Social History  Smoking History: denies  Alcohol Use: denies  Drug Use: denies    REVIEW OF SYSTEMS:  Constitutional:     [X] negative [ ] fevers [ ] chills [ ] weight loss [ ] weight gain  HEENT:                  [X] negative [ ] dry eyes [ ] eye irritation [ ] postnasal drip [ ] nasal congestion  CV:                         [ ] negative  [X] chest pain [ ] orthopnea [ ] palpitations [ ] murmur  Resp:                     [X] negative [ ] cough [ ] shortness of breath [ ] dyspnea [ ] wheezing [ ] sputum [ ] hemoptysis  GI:                          [X] negative [ ] nausea [ ] vomiting [ ] diarrhea [ ] constipation [ ] abd pain [ ] dysphagia   :                        [X] negative [ ] dysuria [ ] nocturia [ ] hematuria [ ] increased urinary frequency  MSK:                      [X] negative [ ] back pain [ ] myalgias [ ] arthralgias [ ] fracture  Skin:                       [X] negative [ ] rash [ ] itch  Neuro:                   [X] negative [ ] headache [ ] dizziness [ ] syncope [ ] weakness [ ] numbness  Psych:                    [X] negative [ ] anxiety [ ] depression  Endo:                     [X] negative [ ] diabetes [ ] thyroid problem  Heme/Lymph:      [X] negative [ ] anemia [ ] bleeding problem  Allergic/Immune: [X] negative [ ] itchy eyes [ ] nasal discharge [ ] hives [ ] angioedema    [X] All other systems negative or otherwise described above.  [ ] Unable to assess ROS because ________.    ICU Vital Signs Last 24 Hrs  T(C): 36.8 (22 Feb 2021 17:29), Max: 36.8 (22 Feb 2021 17:29)  T(F): 98.3 (22 Feb 2021 17:29), Max: 98.3 (22 Feb 2021 17:29)  HR: 67 (22 Feb 2021 19:49) (67 - 87)  BP: 137/66 (22 Feb 2021 19:49) (128/73 - 137/66)  BP(mean): --  ABP: --  ABP(mean): --  RR: 18 (22 Feb 2021 19:49) (16 - 18)  SpO2: 98% (22 Feb 2021 19:49) (98% - 98%)    Daily Height in cm: 170.18 (22 Feb 2021 17:29)    Daily     Physical Exam:  GENERAL: No acute distress, well-developed  HEAD:  Atraumatic, Normocephalic  ENT: EOMI, conjunctiva and sclera clear, Neck supple, No JVD, moist mucosa  CHEST/LUNG: Clear to auscultation bilaterally; No wheeze, equal breath sounds bilaterally   BACK: No spinal tenderness  HEART: Regular rate and rhythm; No murmurs, rubs, or gallops, radial and DP 2+ b/l, euvolemic  ABDOMEN: Soft, Nontender, Nondistended  EXTREMITIES:  No clubbing, cyanosis, or edema  PSYCH: Nl behavior, nl affect  NEUROLOGY: AAOx3, non-focal  SKIN: Normal color, No rashes or lesions  LINES: no central lines present    Cardiovascular Diagnostic Testing    ECG: Personally reviewed; LBBB, LAD, 1st degree AVB; no ischemic findings    Echo: Personally reviewed    Stress Testing: none    Cath: none    Imaging: none    CXR: Personally reviewed    Labs: Personally reviewed                        13.5   6.96  )-----------( 145      ( 22 Feb 2021 18:25 )             40.4     02-22    138  |  102  |  11  ----------------------------<  241<H>  4.3   |  24  |  1.00    Ca    9.6      22 Feb 2021 18:25    TPro  6.6  /  Alb  3.9  /  TBili  1.2  /  DBili  x   /  AST  24  /  ALT  26  /  AlkPhos  49  02-22    PT/INR - ( 22 Feb 2021 18:25 )   PT: 12.3 sec;   INR: 1.03 ratio         PTT - ( 22 Feb 2021 18:25 )  PTT:26.9 sec         Chief Complaint: chest pain    HPI: 74y M PMH CAD s/p CABG, HTN, HLD, and chronic SDH.  Now presents with CP. Patient seen in clinic earlier today, due to 3 days CP relieved w/ NTG. Patient CP free on visit, discussed stress test vs cath (patient prefers cath due to adverse sx experienced during stress test in 2015). Pt sent home, but developed CP which resolved after NTG x 5. Patient called EMS and received  and brought to ED.     Of note, patient has not been on ASA due to hx. of SAH. He was directed to f/u w/ neurosx to discuss whether he is safe to restart AP, but did not discuss w/ them.    In the ED, VS T: 98.3, P: 87, BP: 128/73, RR: 16, O2: 98%RA.  Patient currently denying CP, dyspnea, palpitations, presyncope, syncope, HA, ab pain.    PMHx:   BPH (benign prostatic hypertrophy)  Hyperlipidemia  CAD (coronary artery disease)  Diabetes mellitus  HTN (hypertension)      PSHx:   S/P drug eluting coronary stent placement  S/P primary angioplasty with coronary stent      FAMILY HISTORY:  Family history of diabetes mellitus (Sibling)  3 brothers alive with Diabetes      Allergies:  No Known Allergies      Home Medications:  Benicar 20 mg oral tablet: 1 tab(s) orally once a day (22 Feb 2021 21:29)  gabapentin 300 mg oral capsule: 1 cap(s) orally 2 times a day (22 Feb 2021 21:29)  Janumet 50 mg-1000 mg oral tablet: 1 tab(s) orally 2 times a day (22 Feb 2021 21:29)  Lantus 100 units/mL subcutaneous solution: 32 unit(s) subcutaneous once a day (at bedtime) (22 Feb 2021 21:29)  Coreg 25 mg bid   Crestor 5 mg qd  finasteride 5 mg qd  tamsulosin 0.4 mg qd  NTG 0.4 mg SL prn      Social History  Smoking History: denies  Alcohol Use: denies  Drug Use: denies    REVIEW OF SYSTEMS:  Constitutional:     [X] negative [ ] fevers [ ] chills [ ] weight loss [ ] weight gain  HEENT:                  [X] negative [ ] dry eyes [ ] eye irritation [ ] postnasal drip [ ] nasal congestion  CV:                         [ ] negative  [X] chest pain [ ] orthopnea [ ] palpitations [ ] murmur  Resp:                     [X] negative [ ] cough [ ] shortness of breath [ ] dyspnea [ ] wheezing [ ] sputum [ ] hemoptysis  GI:                          [X] negative [ ] nausea [ ] vomiting [ ] diarrhea [ ] constipation [ ] abd pain [ ] dysphagia   :                        [X] negative [ ] dysuria [ ] nocturia [ ] hematuria [ ] increased urinary frequency  MSK:                      [X] negative [ ] back pain [ ] myalgias [ ] arthralgias [ ] fracture  Skin:                       [X] negative [ ] rash [ ] itch  Neuro:                   [X] negative [ ] headache [ ] dizziness [ ] syncope [ ] weakness [ ] numbness  Psych:                    [X] negative [ ] anxiety [ ] depression  Endo:                     [X] negative [ ] diabetes [ ] thyroid problem  Heme/Lymph:      [X] negative [ ] anemia [ ] bleeding problem  Allergic/Immune: [X] negative [ ] itchy eyes [ ] nasal discharge [ ] hives [ ] angioedema    [X] All other systems negative or otherwise described above.      Vital Signs Last 24 Hrs  T(C): 36.8 (22 Feb 2021 17:29), Max: 36.8 (22 Feb 2021 17:29)  T(F): 98.3 (22 Feb 2021 17:29), Max: 98.3 (22 Feb 2021 17:29)  HR: 67 (22 Feb 2021 19:49) (67 - 87)  BP: 137/66 (22 Feb 2021 19:49) (128/73 - 137/66)  RR: 18 (22 Feb 2021 19:49) (16 - 18)  SpO2: 98% (22 Feb 2021 19:49) (98% - 98%)  Daily Height in cm: 170.18 (22 Feb 2021 17:29)        Physical Exam:  GENERAL: No acute distress, well-developed  HEAD:  Atraumatic, Normocephalic  ENT: EOMI, conjunctiva and sclera clear, Neck supple, No JVD, moist mucosa  CHEST/LUNG: Clear to auscultation bilaterally; No wheeze, equal breath sounds bilaterally   BACK: No spinal tenderness  HEART: Regular rate and rhythm; No murmurs, rubs, or gallops, radial and DP 2+ b/l, euvolemic  ABDOMEN: Soft, Nontender, Nondistended  EXTREMITIES:  No clubbing, cyanosis, or edema  PSYCH: Nl behavior, nl affect  NEUROLOGY: AAOx3, non-focal  SKIN: Normal color, No rashes or lesions  LINES: no central lines present      ECG:  SR, LBBB, LAD, 1st degree AVB; no ischemic findings      Xray Chest 1 View- PORTABLE-Urgent (Xray Chest 1 View- PORTABLE-Urgent .) (02.22.21 @ 18:50)   INTERPRETATION:  CLINICAL INFORMATION: Chest pain.    TECHNIQUE: Single portable view of the chest.    COMPARISON: Chest x-ray from 8/27/2019.    FINDINGS:  The lungs are clear.  There is no pneumothorax or large pleural effusion.  Heart size cannot be accurately assessed in this projection.  No acute osseous abnormality.    IMPRESSION:  Clear lungs.    MICAELA FOX MD; Resident Radiology  This document has been electronically signed.  BARBARA CARR MD; Attending Radiologist  This document has been electronically signed. Feb 22 2021  8:32PM        Labs:                       13.5   6.96  )-----------( 145      ( 22 Feb 2021 18:25 )             40.4     02-22    138  |  102  |  11  ----------------------------<  241<H>  4.3   |  24  |  1.00    Ca    9.6      22 Feb 2021 18:25    TPro  6.6  /  Alb  3.9  /  TBili  1.2  /  DBili  x   /  AST  24  /  ALT  26  /  AlkPhos  49  02-22    PT/INR - ( 22 Feb 2021 18:25 )   PT: 12.3 sec;   INR: 1.03 ratio     PTT - ( 22 Feb 2021 18:25 )  PTT:26.9 sec      Troponin T, High Sensitivity (02.22.21 @ 19:53)   Troponin T, High Sensitivity Result: 91: Specimen not hemolyzed   Troponin T, High Sensitivity (02.22.21 @ 18:25)   Troponin T, High Sensitivity Result: 50: Specimen not hemolyzed     CT Head No Cont (02.22.21 @ 20:55)   EXAM:  CT BRAIN                        PROCEDURE DATE:  02/22/2021      INTERPRETATION:  INDICATIONS:  Patient with history of subdural hematoma, chest pain today    TECHNIQUE:  Serial axial images were obtained from the skull base to thevertex without intravenous contrast. Sagittal and Coronal reformats were performed    COMPARISON EXAMINATION: 7/30/2020    FINDINGS:  VENTRICLES AND SULCI: Subtle mass effect remains on the left lateral ventricle as seen on the prior imaging study. Roughly 4 mm of midline shift to the right appreciated also slightly decreased compared with the prior  INTRA-AXIAL:  No mass, blood or abnormal attenuation is seen.  EXTRA-AXIAL: Evidence of a partially loculated somewhat walled off type of collection(602:34) is appreciated that is predominantly fluid in attenuation with a slightly high attenuation rim similar in appearance to that identified 7/30/2020 suspicious for a partially walled off subdural fluid collection in combination with more free-flowing subdural. The collection is mixed attenuation with fluid, some subacute appearing hemorrhage and some very minimal high attenuation similar in appearance compared with the prior is seen. Maximal thickness at the site of the collection is 1.8 cm. Previously maximal thickness measured roughly 2.0 cm with slight slight decreased thickness suggested on the current evaluation..  VISUALIZED SINUSES:  Clear.  VISUALIZED MASTOIDS:  Clear.  CALVARIUM:  Normal.  MISCELLANEOUS:  None.    IMPRESSION:  Slight decreased conspicuity to a residual left mixed attenuation subdural hematoma that has a walled off component suggested as described above. Collection has fluid subacute hemorrhage, and trace areas of slightly higher attenuation which are similar in appearance compared with the patient's prior 7/30/2020. Overall thickness is slightly decreased compared with the prior. In addition midline shift to the right is slightly decreased compared with the prior.    JUSTYN VALDEZ MD; Attending Radiologist  This document has been electronically signed. Feb 23 2021  8:39AM

## 2021-02-22 NOTE — ED ADULT NURSE NOTE - OBJECTIVE STATEMENT
75 y/o male presents to ED via EMS reporting chest pain. Pt reports requiring more nitro tabs for chest pain at home recently. EMS reports pt was sent in per family. On exam, AOx3, speaking in complete sentences. Unlabored, spontaneous respirations, NAD, O2 sat 98% RA. Abdomen soft, non-tender, non-distended. Pt denies CP, SOB, n/v/d, fever/chills at this time. EKG completed and given to attending MD at bedside. CM in place NSR HR 70s. Heplock placed, labs sent.

## 2021-02-22 NOTE — ED PROVIDER NOTE - CLINICAL SUMMARY MEDICAL DECISION MAKING FREE TEXT BOX
Vinicius HUANG: 76M CAD HTN HLD presents with a cc of L chest pain worsening x1 day usually takes 1-2 NTGs at home today had to take 6 NTG for relief of pain never had episode of chest pain like this before, no back pain exam vss non toxic non focal exam ddx c/f acs vs unstable angina, low c/f dissection given now pain resolved, and no back or fnd, given full asa per ems, will get labs cxr cardiacs, anticipate admission for further cardiac eval will also discuss w cards

## 2021-02-22 NOTE — HISTORY OF PRESENT ILLNESS
[FreeTextEntry1] : 74 year old male with about 20 years of CAD, type II diabetes, elevated cholesterol.  s/p  two stenting episodes.  in mid 90s and about 3 years ago.\par lv function appears to be normal based on echo and stress from 3 years ago.\par he appears to be asymptomatic at this time.  took last nitro over a year ago.\par he is establishing care at this time (change from [facility]).\par last carotid 2-3 years ago.  last hgba1c 7%\par lipids are well controlled.  20 year hx of LBBB.   no syncope.\par \par 4/8/2019 presents today for evaluation of left back pain under the left  arm and left scapula that started yesterday as a spasm,.  sx lasted for about an hour and resolved on its own no associated sx.  did not feel like he needed to go to the emergency room.  did not take benicar yesterday and then sx happened afterwards. \par \par 8/19/2019  presents today for scheduled checkup.   denies any symptoms.  took all medications and no issues.  had carotid doppler today.\par \par 8/3/2020  first visit in a year.  denies new complaints but wife states that BP has dropped with 20 mg of Benicar and she has cut the dose to 10 mg.  he is off aspirin indefinately due to chronic subdural.  he denies chest pain.\par \par 2/22/2021  pt reports having episodes of chest pain the past few days.  lasting minute of two and relived with ntg.  denies dyspnea, or diaphoresis.  no new meds.   wife hasn’t changed dose of any other medications.

## 2021-02-22 NOTE — H&P ADULT - PROBLEM SELECTOR PLAN 1
tele monitor, trend down cardiac enzymes, follow up echo.   Cards consult appreciated. concerning for NSTEMI, tele monitor, trend down cardiac enzymes, follow up echo.   Cards consult appreciated.

## 2021-02-22 NOTE — REASON FOR VISIT
[Follow-Up - Clinic] : a clinic follow-up of [Chest Pain] : chest pain [Coronary Artery Disease] : coronary artery disease [Hyperlipidemia] : hyperlipidemia [Hypertension] : hypertension [Spouse] : spouse

## 2021-02-22 NOTE — ED PROVIDER NOTE - ATTENDING CONTRIBUTION TO CARE
I have personally performed a face to face medical and diagnostic evaluation of the patient. I have discussed with and reviewed the ACP's note and agree with the History, ROS, Physical Exam and MDM unless otherwise indicated. A brief summary of my personal evaluation and impression can be found below.    Vinicius HUANG: Please see the HPI, ROS, PE and MDM as authored by me.

## 2021-02-22 NOTE — ED PROVIDER NOTE - OBJECTIVE STATEMENT
Vinicius HUANG: 76M CAD HTN HLD presents with a cc of L chest pain worsening x1 day usually takes 1-2 NTGs at home today had to take 6 NTG for relief of pain never had episode of chest pain like this before, no back pain, Denies numbness, tingling or loss of sensation. Denies loss of motor function. No vision changes. Denies n/v/f/c/sob. Denies headache, syncope, lightheadedness, dizziness. Denies chest palpitations, abdominal pain. Denies edema. Denies dysuria, hematuria, BRBPR, tarry stools, diarrhea, constipation. No exertional sx, Vinicius HUANG: 76M CAD HTN HLD presents with a cc of L chest pain worsening x1 day usually takes 1-2 NTGs at home today had to take 6 NTG for relief of pain never had episode of chest pain like this before, no back pain, Denies numbness, tingling or loss of sensation. Denies loss of motor function. No vision changes. Denies n/v/f/c/sob. Denies headache, syncope, lightheadedness, dizziness. Denies chest palpitations, abdominal pain. Denies edema. Denies dysuria, hematuria, BRBPR, tarry stools, diarrhea, constipation. No exertional sx.

## 2021-02-22 NOTE — DISCUSSION/SUMMARY
[Possible Cardiac Ischemia (Intermd Prob)] : possible cardiac ischemia (intermediate probability) [Adenosine Stress Test] : adenosine stress test [Coronary Artery Disease] : coronary artery disease [Essential Hypertension] : essential hypertension [Stable] : stable [___ Month(s)] : [unfilled] month(s) [FreeTextEntry1] : 74 year old with CAD, essential htn, lipids.  all well controlled.  most recent echo and Holter are normal.  no active sx of ischemia.  carotid Doppler. \par hold off on stress test.\par \par 8/19/2019  no complaints today.  BP is acceptable after several reading.  follow up on carotid Doppler.  \par \par 8/3/2020  BP is elevated.  wife had lowered dose of benicar to 10 mg .. discussed going back to 20 mg dose which they said they will do.  they are also asking about restarting asprin and will contact his neurologist to discuss.\par \par 2/22/2021  76 year old with prior CABG, reports 3 days of chest pain relieved by NTG.  considered repeating stress to identify distribution and degree of ischemia but pt reports and adverse sx during stress of 2015 that was not noted in report.  He prefers going directly to cath.   I will discuss with stress lab and review options again with pt and proceed to cath if need be,

## 2021-02-22 NOTE — PATIENT PROFILE ADULT - NSPRESCRUSEDDRG_GEN_A_NUR
Max pressure = 12 melissa. Total duration = 10 seconds.     Max pressure = 12 melissa. Total duration = 10 seconds.   No

## 2021-02-22 NOTE — PHYSICAL EXAM
[General Appearance - Well Developed] : well developed [Normal Appearance] : normal appearance [Well Groomed] : well groomed [General Appearance - Well Nourished] : well nourished [No Deformities] : no deformities [General Appearance - In No Acute Distress] : no acute distress [Normal Conjunctiva] : the conjunctiva exhibited no abnormalities [Eyelids - No Xanthelasma] : the eyelids demonstrated no xanthelasmas [Normal Oral Mucosa] : normal oral mucosa [No Oral Pallor] : no oral pallor [No Oral Cyanosis] : no oral cyanosis [Normal Jugular Venous A Waves Present] : normal jugular venous A waves present [Normal Jugular Venous V Waves Present] : normal jugular venous V waves present [No Jugular Venous Engle A Waves] : no jugular venous engle A waves [Respiration, Rhythm And Depth] : normal respiratory rhythm and effort [Exaggerated Use Of Accessory Muscles For Inspiration] : no accessory muscle use [Auscultation Breath Sounds / Voice Sounds] : lungs were clear to auscultation bilaterally [Normal] : normal [Normal Rate] : normal [Rhythm Regular] : regular [Normal S1] : normal S1 [Normal S2] : normal S2 [No Murmur] : no murmurs heard [No Abnormalities] : the abdominal aorta was not enlarged and no bruit was heard [No Pitting Edema] : no pitting edema present [Abdomen Soft] : soft [Abdomen Tenderness] : non-tender [Abdomen Mass (___ Cm)] : no abdominal mass palpated [Abnormal Walk] : normal gait [Gait - Sufficient For Exercise Testing] : the gait was sufficient for exercise testing [Nail Clubbing] : no clubbing of the fingernails [Cyanosis, Localized] : no localized cyanosis [Petechial Hemorrhages (___cm)] : no petechial hemorrhages [Skin Color & Pigmentation] : normal skin color and pigmentation [] : no rash [No Venous Stasis] : no venous stasis [Skin Lesions] : no skin lesions [No Skin Ulcers] : no skin ulcer [No Xanthoma] : no  xanthoma was observed [Oriented To Time, Place, And Person] : oriented to person, place, and time [Affect] : the affect was normal [Mood] : the mood was normal [No Anxiety] : not feeling anxious [Right Carotid Bruit] : no bruit heard over the right carotid [Left Carotid Bruit] : no bruit heard over the left carotid [Bruit] : no bruit heard [Rt] : no varicose veins of the right leg [Lt] : no varicose veins of the left leg

## 2021-02-22 NOTE — ED PROVIDER NOTE - PROGRESS NOTE DETAILS
Cards requesting CTH as pt with hx of SAH and therefore not on daily aspirin. Spoke with patient's PCP Dr. Markell Cross, aware Dr. Cody is taking admissions today, if Dr. Cody unavailable pt to be admitted to hospitalist. - Laney Cespedes PA-C Confirmed with cardiology fellow no acute intervention at this time, pt pending CTH, pt to be admitted to medicine. NSG c/s as inpatient. - Laney Cespedes PA-C USHA Bach: made cardiology Dr. Franco Barrett aware of trop 50-->91 . recommended to continue with CTH and will recommend treatment pending CTH results Vinicius HUANG: discussed with neurosurg possible sbh w active component on CTH, would recommend hold bolus of hep if needed full AC w follow up CTH once therapeutic, communicated to cards and recommended discuss formally w neurosurg for r/b of AC, cards to communicate with nsurg for AC mgmt. per cards will hold for now, was given asa per EMS prior to ED arrival.

## 2021-02-22 NOTE — ED PROVIDER NOTE - PHYSICAL EXAMINATION
Vinicius HUANG:  VITALS: Initial triage and subsequent vitals have been reviewed by me.  GEN APPEARANCE: WDWN, alert and cooperative, non-toxic appearing and in NAD  HEAD: Atraumatic, normocephalic   EYES: PERRLa, EOMI, vision grossly intact.   EARS: Gross hearing intact.   NOSE: No nasal discharge, no external evidence of epistaxis.   NECK: Supple  CV: RRR, S1S2, no c/r/m/g. No cyanosis or pallor. Extremities warm, well perfused. Cap refill <2 seconds. No bruits.   LUNGS: CTAB. No wheezing. No rales. No rhonchi. No diminished breath sounds.   ABDOMEN: Soft, NTND. No guarding or rebound. No masses.   MSK: Spine appears normal, no spine point tenderness. No CVA ttp. No joint erythema or tenderness. Normal muscular development. Pelvis stable.  EXTREMITIES: No peripheral edema. No obvious joint or bony deformity.  NEURO: Alert, follows commands. Weight bearing normal. Speech normal. Sensation and motor normal x4 extremities.   SKIN: Normal color for race, warm, dry and intact. No evidence of rash.  PSYCH: Normal mood and affect.

## 2021-02-23 LAB
CK MB BLD-MCNC: 6.1 % — HIGH (ref 0–3.5)
CK MB CFR SERPL CALC: 7.6 NG/ML — HIGH (ref 0–6.7)
CK SERPL-CCNC: 124 U/L — SIGNIFICANT CHANGE UP (ref 30–200)
GLUCOSE BLDC GLUCOMTR-MCNC: 130 MG/DL — HIGH (ref 70–99)
GLUCOSE BLDC GLUCOMTR-MCNC: 143 MG/DL — HIGH (ref 70–99)
GLUCOSE BLDC GLUCOMTR-MCNC: 145 MG/DL — HIGH (ref 70–99)
GLUCOSE BLDC GLUCOMTR-MCNC: 146 MG/DL — HIGH (ref 70–99)
GLUCOSE BLDC GLUCOMTR-MCNC: 187 MG/DL — HIGH (ref 70–99)
TROPONIN T, HIGH SENSITIVITY RESULT: 108 NG/L — HIGH (ref 0–51)
TROPONIN T, HIGH SENSITIVITY RESULT: 112 NG/L — HIGH (ref 0–51)
TROPONIN T, HIGH SENSITIVITY RESULT: 122 NG/L — HIGH (ref 0–51)

## 2021-02-23 PROCEDURE — 99233 SBSQ HOSP IP/OBS HIGH 50: CPT | Mod: GC

## 2021-02-23 PROCEDURE — 99221 1ST HOSP IP/OBS SF/LOW 40: CPT

## 2021-02-23 PROCEDURE — 93306 TTE W/DOPPLER COMPLETE: CPT | Mod: 26

## 2021-02-23 PROCEDURE — 93010 ELECTROCARDIOGRAM REPORT: CPT

## 2021-02-23 RX ORDER — ISOSORBIDE DINITRATE 5 MG/1
10 TABLET ORAL THREE TIMES A DAY
Refills: 0 | Status: DISCONTINUED | OUTPATIENT
Start: 2021-02-23 | End: 2021-02-24

## 2021-02-23 RX ORDER — NICOTINE POLACRILEX 2 MG
1 GUM BUCCAL DAILY
Refills: 0 | Status: DISCONTINUED | OUTPATIENT
Start: 2021-02-23 | End: 2021-02-23

## 2021-02-23 RX ORDER — ATORVASTATIN CALCIUM 80 MG/1
40 TABLET, FILM COATED ORAL AT BEDTIME
Refills: 0 | Status: DISCONTINUED | OUTPATIENT
Start: 2021-02-23 | End: 2021-02-24

## 2021-02-23 RX ORDER — LANOLIN ALCOHOL/MO/W.PET/CERES
3 CREAM (GRAM) TOPICAL AT BEDTIME
Refills: 0 | Status: DISCONTINUED | OUTPATIENT
Start: 2021-02-23 | End: 2021-02-24

## 2021-02-23 RX ADMIN — CARVEDILOL PHOSPHATE 6.25 MILLIGRAM(S): 80 CAPSULE, EXTENDED RELEASE ORAL at 06:38

## 2021-02-23 RX ADMIN — ISOSORBIDE DINITRATE 10 MILLIGRAM(S): 5 TABLET ORAL at 21:56

## 2021-02-23 RX ADMIN — CARVEDILOL PHOSPHATE 6.25 MILLIGRAM(S): 80 CAPSULE, EXTENDED RELEASE ORAL at 17:19

## 2021-02-23 RX ADMIN — TAMSULOSIN HYDROCHLORIDE 0.4 MILLIGRAM(S): 0.4 CAPSULE ORAL at 21:55

## 2021-02-23 RX ADMIN — TAMSULOSIN HYDROCHLORIDE 0.4 MILLIGRAM(S): 0.4 CAPSULE ORAL at 00:28

## 2021-02-23 RX ADMIN — ATORVASTATIN CALCIUM 40 MILLIGRAM(S): 80 TABLET, FILM COATED ORAL at 21:56

## 2021-02-23 RX ADMIN — FINASTERIDE 5 MILLIGRAM(S): 5 TABLET, FILM COATED ORAL at 12:30

## 2021-02-23 RX ADMIN — INSULIN GLARGINE 20 UNIT(S): 100 INJECTION, SOLUTION SUBCUTANEOUS at 21:56

## 2021-02-23 RX ADMIN — INSULIN GLARGINE 20 UNIT(S): 100 INJECTION, SOLUTION SUBCUTANEOUS at 00:27

## 2021-02-23 RX ADMIN — Medication 3 MILLIGRAM(S): at 21:56

## 2021-02-23 NOTE — PROGRESS NOTE ADULT - ASSESSMENT
74y M PMH CAD s/p CABG, HTN, HLD, chronic SDH present with CP concerning for NSTEMI            Problem/Plan - 1:  ·  Problem: Chest pain in adult.  Plan: concerning for NSTEMI, tele monitor, trend down cardiac enzymes, follow up echo.   Cards consult appreciated.    Problem/Plan - 2:  ·  Problem: Diabetes mellitus.  Plan: Continue with Lantus HISS, patient takes 32 units, will start 20 units here.   HISS, follow up A1C.     Problem/Plan - 3:  ·  Problem: CAD (coronary artery disease).  Plan: Cards recs appreciated. No aspirin for now with hx SDH in the past.     Problem/Plan - 4:  ·  Problem: HTN (hypertension).  Plan: Continue with Coreg.   Monitor blood pressure.     Problem/Plan - 5:  ·  Problem: Hyperlipidemia.  Plan: Statins.

## 2021-02-23 NOTE — PROGRESS NOTE ADULT - SUBJECTIVE AND OBJECTIVE BOX
Patient is a 76y old  Male who presents with a chief complaint of   Date of servie : 02-23-21 @ 14:43  INTERVAL HPI/OVERNIGHT EVENTS:  T(C): 36.7 (02-23-21 @ 09:23), Max: 36.8 (02-22-21 @ 17:29)  HR: 68 (02-23-21 @ 09:23) (67 - 87)  BP: 146/72 (02-23-21 @ 09:23) (128/73 - 162/70)  RR: 16 (02-23-21 @ 09:23) (16 - 18)  SpO2: 98% (02-23-21 @ 09:23) (98% - 99%)  Wt(kg): --  I&O's Summary    23 Feb 2021 07:01  -  23 Feb 2021 14:43  --------------------------------------------------------  IN: 480 mL / OUT: 700 mL / NET: -220 mL        LABS:                        13.5   6.96  )-----------( 145      ( 22 Feb 2021 18:25 )             40.4     02-22    138  |  102  |  11  ----------------------------<  241<H>  4.3   |  24  |  1.00    Ca    9.6      22 Feb 2021 18:25    TPro  6.6  /  Alb  3.9  /  TBili  1.2  /  DBili  x   /  AST  24  /  ALT  26  /  AlkPhos  49  02-22    PT/INR - ( 22 Feb 2021 18:25 )   PT: 12.3 sec;   INR: 1.03 ratio         PTT - ( 22 Feb 2021 18:25 )  PTT:26.9 sec    CAPILLARY BLOOD GLUCOSE      POCT Blood Glucose.: 145 mg/dL (23 Feb 2021 11:01)  POCT Blood Glucose.: 130 mg/dL (23 Feb 2021 07:06)  POCT Blood Glucose.: 146 mg/dL (22 Feb 2021 23:04)            MEDICATIONS  (STANDING):  atorvastatin 40 milliGRAM(s) Oral at bedtime  carvedilol 6.25 milliGRAM(s) Oral every 12 hours  dextrose 40% Gel 15 Gram(s) Oral once  dextrose 5%. 1000 milliLiter(s) (50 mL/Hr) IV Continuous <Continuous>  dextrose 5%. 1000 milliLiter(s) (100 mL/Hr) IV Continuous <Continuous>  dextrose 50% Injectable 25 Gram(s) IV Push once  dextrose 50% Injectable 12.5 Gram(s) IV Push once  dextrose 50% Injectable 25 Gram(s) IV Push once  finasteride 5 milliGRAM(s) Oral daily  glucagon  Injectable 1 milliGRAM(s) IntraMuscular once  insulin glargine Injectable (LANTUS) 20 Unit(s) SubCutaneous at bedtime  insulin lispro (ADMELOG) corrective regimen sliding scale   SubCutaneous three times a day before meals  insulin lispro (ADMELOG) corrective regimen sliding scale   SubCutaneous at bedtime  tamsulosin 0.4 milliGRAM(s) Oral at bedtime    MEDICATIONS  (PRN):          PHYSICAL EXAM:  GENERAL: NAD, well-groomed, well-developed  HEAD:  Atraumatic, Normocephalic  CHEST/LUNG: Clear to percussion bilaterally; No rales, rhonchi, wheezing, or rubs  HEART: Regular rate and rhythm; No murmurs, rubs, or gallops  ABDOMEN: Soft, Nontender, Nondistended; Bowel sounds present  EXTREMITIES:  2+ Peripheral Pulses, No clubbing, cyanosis, or edema  LYMPH: No lymphadenopathy noted  SKIN: No rashes or lesions    Care Discussed with Consultants/Other Providers [ ] YES  [ ] NO

## 2021-02-23 NOTE — PROGRESS NOTE ADULT - ASSESSMENT
74y M PMH CAD s/p CABG, HTN, HLD, chronic SDH present with CP concerning for NSTEMI, not a candidate for LHC secondary contra-indication from SDH. Would recommend medical treatment for NSTEMI for now     Rec   - would start isodil 10 mg TID for anti-anginal effect   - cont to trend CE to peak with serial EKG   - pt is currently cp free and denies any anginal symptoms, NSGY has not cleared pt for DAPT or AC, would defer for now and monitor clinically   - cont  rosuvastatin 40 mg qd   - cont coreg 6.25 mg bid, HR in the 60s, appear to be at max tolerated dose   - repeat CTH as per NSGY   - will repeat TTE   - no plan for inpatient LHC, discussed by overnight on call fellow Dr. Barrett with Interventional Attending Dr. Hall   - eventual ischemic work up when SDH stabilizes from NSGY perspective     Thank you,     Moy Galloway MD  Cardiology Fellow  All Cardiology service information can be found 24/7 on [email or web address], password: [email or web address]           74y M PMH CAD s/p CABG, HTN, HLD, chronic SDH present with CP concerning for NSTEMI, not a candidate for LHC secondary contra-indication from SDH. Would recommend medical treatment for NSTEMI for now     Rec   - would start isodil 10 mg TID for anti-anginal effect   - cont to trend CE to peak with serial EKG   - pt is currently cp free and denies any anginal symptoms, NSGY has not cleared pt for DAPT or AC, would defer for now and monitor clinically   - cont  rosuvastatin 40 mg qd   - cont coreg 6.25 mg bid, HR in the 60s, appear to be at max tolerated dose   - repeat CTH as per NSGY   - will repeat TTE   - no plan for inpatient LHC, discussed by overnight on call fellow Dr. Barrett with Interventional Attending Dr. Hall   - eventual ischemic work up when SDH stabilizes from Neurology perspective       Moy Galloway MD  Cardiology Fellow    Markell Rosado M.D.  Cardiology Attending, Consult Service    For Cardiology consults and questions, all Cardiology service information can be found 24/7 on [email or web address], password: [email or web address] 74y M with hx. of CAD s/p CABG, HTN, HLD, chronic SDH.  Now presents with CP concerning for N-STEMI.  Reluctant to proceed with LHC given concern re chronic SDH.  Continue medical treatment for NSTEMI for now; neurology consult pending.       Rec   - would start isodil 10 mg TID for anti-anginal effect   - cont to trend CE to peak with serial EKG   - pt is currently cp free and denies any anginal symptoms, NSGY has not cleared pt for DAPT or AC, would defer for now and monitor clinically   - cont rosuvastatin at 40 mg qd   - cont Coreg 6.25 mg bid (HR in the 60s, appear to be at max tolerated dose)   - repeat CTH as per NSGY   - will repeat TTE   - no plan for inpatient LHC, discussed by overnight on call fellow Dr. Barrett with Interventional Attending Dr. Hall   - eventual ischemic work up when SDH stabilizes from Neurology perspective       Moy Galloway MD  Cardiology Fellow    Markell Rosado M.D.  Cardiology Attending, Consult Service    For Cardiology consults and questions, all Cardiology service information can be found 24/7 on [email or web address], password: [email or web address]

## 2021-02-23 NOTE — CHART NOTE - NSCHARTNOTEFT_GEN_A_CORE
Discussed with patient's daughter Pal (who is a clinical pharmacologist) the plan to delay LHC until cleared by neurology 2/2 risk for worsening SDH on ASA/Plavix.  She understands the risks and agrees, but questions if there is any value to a purely diagnostic cath before the patient returns home.  I explained that a diagnostic cath would likely not change the management, but she would like to speak with the cardiology team directly if possible.  This information, along with her cell phone number ([phone]) was signed out to be passed on to the Cardiology team in the morning.

## 2021-02-23 NOTE — PROGRESS NOTE ADULT - SUBJECTIVE AND OBJECTIVE BOX
Patient seen and examined at bedside.    Overnight Events:       REVIEW OF SYSTEMS:  Constitutional:     [x ] negative [ ] fevers [ ] chills [ ] weight loss [ ] weight gain  HEENT:                  [x ] negative [ ] dry eyes [ ] eye irritation [ ] postnasal drip [ ] nasal congestion  CV:                         [ x] negative  [ ] chest pain [ ] orthopnea [ ] palpitations [ ] murmur  Resp:                     [ x] negative [ ] cough [ ] shortness of breath [ ] dyspnea [ ] wheezing [ ] sputum [ ]hemoptysis  GI:                          [ x] negative [ ] nausea [ ] vomiting [ ] diarrhea [ ] constipation [ ] abd pain [ ] dysphagia   :                        [ x] negative [ ] dysuria [ ] nocturia [ ] hematuria [ ] increased urinary frequency  Musculoskeletal: [ x] negative [ ] back pain [ ] myalgias [ ] arthralgias [ ] fracture  Skin:                       [ x] negative [ ] rash [ ] itch  Neurological:        [x ] negative [ ] headache [ ] dizziness [ ] syncope [ ] weakness [ ] numbness  Psychiatric:           [ x] negative [ ] anxiety [ ] depression  Endocrine:            [ x] negative [ ] diabetes [ ] thyroid problem  Heme/Lymph:      [ x] negative [ ] anemia [ ] bleeding problem  Allergic/Immune: [ x] negative [ ] itchy eyes [ ] nasal discharge [ ] hives [ ] angioedema    [ x] All other systems negative  [ ] Unable to assess ROS due to    Current Meds:  carvedilol 6.25 milliGRAM(s) Oral every 12 hours  dextrose 40% Gel 15 Gram(s) Oral once  dextrose 5%. 1000 milliLiter(s) IV Continuous <Continuous>  dextrose 5%. 1000 milliLiter(s) IV Continuous <Continuous>  dextrose 50% Injectable 25 Gram(s) IV Push once  dextrose 50% Injectable 12.5 Gram(s) IV Push once  dextrose 50% Injectable 25 Gram(s) IV Push once  finasteride 5 milliGRAM(s) Oral daily  glucagon  Injectable 1 milliGRAM(s) IntraMuscular once  insulin glargine Injectable (LANTUS) 20 Unit(s) SubCutaneous at bedtime  insulin lispro (ADMELOG) corrective regimen sliding scale   SubCutaneous three times a day before meals  insulin lispro (ADMELOG) corrective regimen sliding scale   SubCutaneous at bedtime  nicotine - 21 mG/24Hr(s) Patch 1 patch Transdermal daily  tamsulosin 0.4 milliGRAM(s) Oral at bedtime      PAST MEDICAL & SURGICAL HISTORY:  BPH (benign prostatic hypertrophy)    Hyperlipidemia    CAD (coronary artery disease)    Diabetes mellitus  Type 2    HTN (hypertension)    S/P drug eluting coronary stent placement  [facility]    S/P primary angioplasty with coronary stent  1990s        Vitals:  T(F): 98 (02-23), Max: 98.3 (02-22)  HR: 68 (02-23) (67 - 87)  BP: 146/72 (02-23) (128/73 - 162/70)  RR: 16 (02-23)  SpO2: 98% (02-23)  I&O's Summary    23 Feb 2021 07:01  -  23 Feb 2021 10:19  --------------------------------------------------------  IN: 240 mL / OUT: 400 mL / NET: -160 mL        Physical Exam:  Appearance: No acute distress  Eyes: PERRL, EOMI, pink conjunctiva  HENT: Normal oral mucosa  Cardiovascular: RRR, S1, S2, no murmurs, rubs, or gallops; no edema; no JVD  Respiratory: Clear to auscultation bilaterally  Gastrointestinal: soft, non-tender, non-distended with normal bowel sounds  Musculoskeletal: No clubbing; no joint deformity   Neurologic: Non-focal  Skin: No rashes, ecchymoses, or cyanosis                          13.5   6.96  )-----------( 145      ( 22 Feb 2021 18:25 )             40.4     02-22    138  |  102  |  11  ----------------------------<  241<H>  4.3   |  24  |  1.00    Ca    9.6      22 Feb 2021 18:25    TPro  6.6  /  Alb  3.9  /  TBili  1.2  /  DBili  x   /  AST  24  /  ALT  26  /  AlkPhos  49  02-22    PT/INR - ( 22 Feb 2021 18:25 )   PT: 12.3 sec;   INR: 1.03 ratio         PTT - ( 22 Feb 2021 18:25 )  PTT:26.9 sec  CARDIAC MARKERS ( 23 Feb 2021 03:25 )  x     / x     / 124 U/L / x     / 7.6 ng/mL              New ECG(s): Personally reviewed    Echo: Personally reviewed    Stress Testing: Personally reviewed    Cath: Personally reviewed    Imaging: Personally reviewed     Patient seen and examined at bedside.    Overnight Events:   Feels well no cp currently   Has not ambulated yet but says he is back to baseline       REVIEW OF SYSTEMS:  Constitutional:     [x ] negative [ ] fevers [ ] chills [ ] weight loss [ ] weight gain  HEENT:                  [x ] negative [ ] dry eyes [ ] eye irritation [ ] postnasal drip [ ] nasal congestion  CV:                         [ x] negative  [ ] chest pain [ ] orthopnea [ ] palpitations [ ] murmur  Resp:                     [ x] negative [ ] cough [ ] shortness of breath [ ] dyspnea [ ] wheezing [ ] sputum [ ]hemoptysis  GI:                          [ x] negative [ ] nausea [ ] vomiting [ ] diarrhea [ ] constipation [ ] abd pain [ ] dysphagia   :                        [ x] negative [ ] dysuria [ ] nocturia [ ] hematuria [ ] increased urinary frequency  Musculoskeletal: [ x] negative [ ] back pain [ ] myalgias [ ] arthralgias [ ] fracture  Skin:                       [ x] negative [ ] rash [ ] itch  Neurological:        [x ] negative [ ] headache [ ] dizziness [ ] syncope [ ] weakness [ ] numbness  Psychiatric:           [ x] negative [ ] anxiety [ ] depression  Endocrine:            [ x] negative [ ] diabetes [ ] thyroid problem  Heme/Lymph:      [ x] negative [ ] anemia [ ] bleeding problem  Allergic/Immune: [ x] negative [ ] itchy eyes [ ] nasal discharge [ ] hives [ ] angioedema    [ x] All other systems negative  [ ] Unable to assess ROS due to    Current Meds:  carvedilol 6.25 milliGRAM(s) Oral every 12 hours  dextrose 40% Gel 15 Gram(s) Oral once  dextrose 5%. 1000 milliLiter(s) IV Continuous <Continuous>  dextrose 5%. 1000 milliLiter(s) IV Continuous <Continuous>  dextrose 50% Injectable 25 Gram(s) IV Push once  dextrose 50% Injectable 12.5 Gram(s) IV Push once  dextrose 50% Injectable 25 Gram(s) IV Push once  finasteride 5 milliGRAM(s) Oral daily  glucagon  Injectable 1 milliGRAM(s) IntraMuscular once  insulin glargine Injectable (LANTUS) 20 Unit(s) SubCutaneous at bedtime  insulin lispro (ADMELOG) corrective regimen sliding scale   SubCutaneous three times a day before meals  insulin lispro (ADMELOG) corrective regimen sliding scale   SubCutaneous at bedtime  nicotine - 21 mG/24Hr(s) Patch 1 patch Transdermal daily  tamsulosin 0.4 milliGRAM(s) Oral at bedtime      PAST MEDICAL & SURGICAL HISTORY:  BPH (benign prostatic hypertrophy)    Hyperlipidemia    CAD (coronary artery disease)    Diabetes mellitus  Type 2    HTN (hypertension)    S/P drug eluting coronary stent placement  [facility]    S/P primary angioplasty with coronary stent  1990s        Vitals:  T(F): 98 (02-23), Max: 98.3 (02-22)  HR: 68 (02-23) (67 - 87)  BP: 146/72 (02-23) (128/73 - 162/70)  RR: 16 (02-23)  SpO2: 98% (02-23)  I&O's Summary    23 Feb 2021 07:01  -  23 Feb 2021 10:19  --------------------------------------------------------  IN: 240 mL / OUT: 400 mL / NET: -160 mL        Physical Exam:  Appearance: No acute distress  Eyes: PERRL, EOMI, pink conjunctiva  HENT: Normal oral mucosa  Cardiovascular: RRR, S1, S2, no murmurs, rubs, or gallops; no edema; no JVD  Respiratory: Clear to auscultation bilaterally  Gastrointestinal: soft, non-tender, non-distended with normal bowel sounds  Musculoskeletal: No clubbing; no joint deformity   Neurologic: Non-focal  Skin: No rashes, ecchymoses, or cyanosis                          13.5   6.96  )-----------( 145      ( 22 Feb 2021 18:25 )             40.4     02-22    138  |  102  |  11  ----------------------------<  241<H>  4.3   |  24  |  1.00    Ca    9.6      22 Feb 2021 18:25    TPro  6.6  /  Alb  3.9  /  TBili  1.2  /  DBili  x   /  AST  24  /  ALT  26  /  AlkPhos  49  02-22    PT/INR - ( 22 Feb 2021 18:25 )   PT: 12.3 sec;   INR: 1.03 ratio         PTT - ( 22 Feb 2021 18:25 )  PTT:26.9 sec  CARDIAC MARKERS ( 23 Feb 2021 03:25 )  x     / x     / 124 U/L / x     / 7.6 ng/mL              New ECG(s): Personally reviewed    Echo: Personally reviewed    Stress Testing: Personally reviewed    Cath: Personally reviewed    Imaging: Personally reviewed     Patient seen and examined at bedside.    Overnight Events:   Feels well no cp currently   Has not ambulated yet but says he is back to baseline       REVIEW OF SYSTEMS:  Constitutional:     [x ] negative [ ] fevers [ ] chills [ ] weight loss [ ] weight gain  HEENT:                  [x ] negative [ ] dry eyes [ ] eye irritation [ ] postnasal drip [ ] nasal congestion  CV:                         [ x] negative  [ ] chest pain [ ] orthopnea [ ] palpitations [ ] murmur  Resp:                     [ x] negative [ ] cough [ ] shortness of breath [ ] dyspnea [ ] wheezing [ ] sputum [ ]hemoptysis  GI:                          [ x] negative [ ] nausea [ ] vomiting [ ] diarrhea [ ] constipation [ ] abd pain [ ] dysphagia   :                        [ x] negative [ ] dysuria [ ] nocturia [ ] hematuria [ ] increased urinary frequency  Musculoskeletal: [ x] negative [ ] back pain [ ] myalgias [ ] arthralgias [ ] fracture  Skin:                       [ x] negative [ ] rash [ ] itch  Neurological:        [x ] negative [ ] headache [ ] dizziness [ ] syncope [ ] weakness [ ] numbness  Psychiatric:           [ x] negative [ ] anxiety [ ] depression  Endocrine:            [ x] negative [ ] diabetes [ ] thyroid problem  Heme/Lymph:      [ x] negative [ ] anemia [ ] bleeding problem  Allergic/Immune: [ x] negative [ ] itchy eyes [ ] nasal discharge [ ] hives [ ] angioedema  [ x] All other systems negative      Current Meds:  carvedilol 6.25 milliGRAM(s) Oral every 12 hours  dextrose 40% Gel 15 Gram(s) Oral once  dextrose 5%. 1000 milliLiter(s) IV Continuous <Continuous>  dextrose 5%. 1000 milliLiter(s) IV Continuous <Continuous>  dextrose 50% Injectable 25 Gram(s) IV Push once  dextrose 50% Injectable 12.5 Gram(s) IV Push once  dextrose 50% Injectable 25 Gram(s) IV Push once  finasteride 5 milliGRAM(s) Oral daily  glucagon  Injectable 1 milliGRAM(s) IntraMuscular once  insulin glargine Injectable (LANTUS) 20 Unit(s) SubCutaneous at bedtime  insulin lispro (ADMELOG) corrective regimen sliding scale   SubCutaneous three times a day before meals  insulin lispro (ADMELOG) corrective regimen sliding scale   SubCutaneous at bedtime  nicotine - 21 mG/24Hr(s) Patch 1 patch Transdermal daily  tamsulosin 0.4 milliGRAM(s) Oral at bedtime      PAST MEDICAL & SURGICAL HISTORY:  BPH (benign prostatic hypertrophy)  Hyperlipidemia  CAD (coronary artery disease)  Diabetes mellitus Type 2  HTN (hypertension)  S/P coronary stent placement        Vitals:  T(F): 98 (02-23), Max: 98.3 (02-22)  HR: 68 (02-23) (67 - 87)  BP: 146/72 (02-23) (128/73 - 162/70)  RR: 16 (02-23)  SpO2: 98% (02-23)    Physical Exam:  Appearance: No acute distress  Eyes: PERRL, EOMI, pink conjunctiva  HENT: Normal oral mucosa  Cardiovascular: RRR, S1, S2, no murmurs, rubs, or gallops; no edema; no JVD  Respiratory: Clear to auscultation bilaterally  Gastrointestinal: soft, non-tender, non-distended with normal bowel sounds  Musculoskeletal: No clubbing; no joint deformity   Neurologic: Non-focal  Skin: No rashes, ecchymoses, or cyanosis      I&O's Summary  23 Feb 2021 07:01  -  23 Feb 2021 10:19  --------------------------------------------------------  IN: 240 mL / OUT: 400 mL / NET: -160 mL               LABS:             13.5   6.96  )-----------( 145      ( 22 Feb 2021 18:25 )             40.4     02-22  138  |  102  |  11  ----------------------------<  241<H>  4.3   |  24  |  1.00    Ca    9.6      22 Feb 2021 18:25    TPro  6.6  /  Alb  3.9  /  TBili  1.2  /  DBili  x   /  AST  24  /  ALT  26  /  AlkPhos  49  02-22    PT/INR - ( 22 Feb 2021 18:25 )   PT: 12.3 sec;   INR: 1.03 ratio    PTT - ( 22 Feb 2021 18:25 )  PTT:26.9 sec    CARDIAC MARKERS ( 23 Feb 2021 03:25 )  x     / x     / 124 U/L / x     / 7.6 ng/mL      Troponin T, High Sensitivity (02.23.21 @ 03:25)  Troponin T, High Sensitivity Result: 122: Specimen not hemolyzed   Troponin T, High Sensitivity (02.22.21 @ 19:53)   Troponin T, High Sensitivity Result: 91: Specimen not hemolyzed   Troponin T, High Sensitivity (02.22.21 @ 18:25)   Troponin T, High Sensitivity Result: 50: Specimen not hemolyzed  Patient seen and examined at bedside.    Overnight Events:   Feels well no cp currently   Has not ambulated yet but says he is back to baseline       REVIEW OF SYSTEMS:  Constitutional:     [x ] negative [ ] fevers [ ] chills [ ] weight loss [ ] weight gain  HEENT:                  [x ] negative [ ] dry eyes [ ] eye irritation [ ] postnasal drip [ ] nasal congestion  CV:                         [ x] negative  [ ] chest pain [ ] orthopnea [ ] palpitations [ ] murmur  Resp:                     [ x] negative [ ] cough [ ] shortness of breath [ ] dyspnea [ ] wheezing [ ] sputum [ ]hemoptysis  GI:                          [ x] negative [ ] nausea [ ] vomiting [ ] diarrhea [ ] constipation [ ] abd pain [ ] dysphagia   :                        [ x] negative [ ] dysuria [ ] nocturia [ ] hematuria [ ] increased urinary frequency  Musculoskeletal: [ x] negative [ ] back pain [ ] myalgias [ ] arthralgias [ ] fracture  Skin:                       [ x] negative [ ] rash [ ] itch  Neurological:        [x ] negative [ ] headache [ ] dizziness [ ] syncope [ ] weakness [ ] numbness  Psychiatric:           [ x] negative [ ] anxiety [ ] depression  Endocrine:            [ x] negative [ ] diabetes [ ] thyroid problem  Heme/Lymph:      [ x] negative [ ] anemia [ ] bleeding problem  Allergic/Immune: [ x] negative [ ] itchy eyes [ ] nasal discharge [ ] hives [ ] angioedema  [ x] All other systems negative      Current Meds:  carvedilol 6.25 milliGRAM(s) Oral every 12 hours  dextrose 40% Gel 15 Gram(s) Oral once  dextrose 5%. 1000 milliLiter(s) IV Continuous <Continuous>  dextrose 5%. 1000 milliLiter(s) IV Continuous <Continuous>  dextrose 50% Injectable 25 Gram(s) IV Push once  dextrose 50% Injectable 12.5 Gram(s) IV Push once  dextrose 50% Injectable 25 Gram(s) IV Push once  finasteride 5 milliGRAM(s) Oral daily  glucagon  Injectable 1 milliGRAM(s) IntraMuscular once  insulin glargine Injectable (LANTUS) 20 Unit(s) SubCutaneous at bedtime  insulin lispro (ADMELOG) corrective regimen sliding scale   SubCutaneous three times a day before meals  insulin lispro (ADMELOG) corrective regimen sliding scale   SubCutaneous at bedtime  nicotine - 21 mG/24Hr(s) Patch 1 patch Transdermal daily  tamsulosin 0.4 milliGRAM(s) Oral at bedtime      PAST MEDICAL & SURGICAL HISTORY:  BPH (benign prostatic hypertrophy)  Hyperlipidemia  CAD (coronary artery disease)  Diabetes mellitus Type 2  HTN (hypertension)  S/P coronary stent placement        Vitals:  T(F): 98 (02-23), Max: 98.3 (02-22)  HR: 68 (02-23) (67 - 87)  BP: 146/72 (02-23) (128/73 - 162/70)  RR: 16 (02-23)  SpO2: 98% (02-23)    Physical Exam:  Appearance: No acute distress  Eyes: PERRL, EOMI, pink conjunctiva  HENT: Normal oral mucosa  Cardiovascular: RRR, S1, S2, no murmurs, rubs, or gallops; no edema; no JVD  Respiratory: Clear to auscultation bilaterally  Gastrointestinal: soft, non-tender, non-distended with normal bowel sounds  Musculoskeletal: No clubbing; no joint deformity   Neurologic: Non-focal  Skin: No rashes, ecchymoses, or cyanosis      I&O's Summary  23 Feb 2021 07:01  -  23 Feb 2021 10:19  --------------------------------------------------------  IN: 240 mL / OUT: 400 mL / NET: -160 mL               LABS:             13.5   6.96  )-----------( 145      ( 22 Feb 2021 18:25 )             40.4     02-22  138  |  102  |  11  ----------------------------<  241<H>  4.3   |  24  |  1.00    Ca    9.6      22 Feb 2021 18:25    TPro  6.6  /  Alb  3.9  /  TBili  1.2  /  DBili  x   /  AST  24  /  ALT  26  /  AlkPhos  49  02-22    PT/INR - ( 22 Feb 2021 18:25 )   PT: 12.3 sec;   INR: 1.03 ratio    PTT - ( 22 Feb 2021 18:25 )  PTT:26.9 sec    CARDIAC MARKERS ( 23 Feb 2021 03:25 )  x     / x     / 124 U/L / x     / 7.6 ng/mL    Troponin T, High Sensitivity (02.23.21 @ 03:25)  Troponin T, High Sensitivity Result: 122: Specimen not hemolyzed   Troponin T, High Sensitivity (02.22.21 @ 19:53)   Troponin T, High Sensitivity Result: 91: Specimen not hemolyzed   Troponin T, High Sensitivity (02.22.21 @ 18:25)   Troponin T, High Sensitivity Result: 50: Specimen not hemolyzed

## 2021-02-24 ENCOUNTER — TRANSCRIPTION ENCOUNTER (OUTPATIENT)
Age: 76
End: 2021-02-24

## 2021-02-24 VITALS
TEMPERATURE: 98 F | DIASTOLIC BLOOD PRESSURE: 70 MMHG | RESPIRATION RATE: 17 BRPM | SYSTOLIC BLOOD PRESSURE: 127 MMHG | OXYGEN SATURATION: 95 % | HEART RATE: 81 BPM

## 2021-02-24 LAB
A1C WITH ESTIMATED AVERAGE GLUCOSE RESULT: 7.1 % — HIGH (ref 4–5.6)
ESTIMATED AVERAGE GLUCOSE: 157 MG/DL — HIGH (ref 68–114)
GLUCOSE BLDC GLUCOMTR-MCNC: 161 MG/DL — HIGH (ref 70–99)
GLUCOSE BLDC GLUCOMTR-MCNC: 234 MG/DL — HIGH (ref 70–99)
TROPONIN T, HIGH SENSITIVITY RESULT: 124 NG/L — HIGH (ref 0–51)
TSH SERPL-MCNC: 0.92 UIU/ML — SIGNIFICANT CHANGE UP (ref 0.27–4.2)

## 2021-02-24 PROCEDURE — 99233 SBSQ HOSP IP/OBS HIGH 50: CPT | Mod: GC

## 2021-02-24 PROCEDURE — 93010 ELECTROCARDIOGRAM REPORT: CPT

## 2021-02-24 RX ORDER — GABAPENTIN 400 MG/1
300 CAPSULE ORAL
Refills: 0 | Status: DISCONTINUED | OUTPATIENT
Start: 2021-02-24 | End: 2021-02-24

## 2021-02-24 RX ORDER — ISOSORBIDE DINITRATE 5 MG/1
20 TABLET ORAL THREE TIMES A DAY
Refills: 0 | Status: DISCONTINUED | OUTPATIENT
Start: 2021-02-24 | End: 2021-02-24

## 2021-02-24 RX ORDER — CARVEDILOL PHOSPHATE 80 MG/1
12.5 CAPSULE, EXTENDED RELEASE ORAL EVERY 12 HOURS
Refills: 0 | Status: DISCONTINUED | OUTPATIENT
Start: 2021-02-24 | End: 2021-02-24

## 2021-02-24 RX ORDER — CARVEDILOL PHOSPHATE 80 MG/1
1 CAPSULE, EXTENDED RELEASE ORAL
Qty: 180 | Refills: 0
Start: 2021-02-24 | End: 2021-05-24

## 2021-02-24 RX ORDER — ISOSORBIDE MONONITRATE 60 MG/1
1 TABLET, EXTENDED RELEASE ORAL
Qty: 90 | Refills: 0
Start: 2021-02-24 | End: 2021-05-24

## 2021-02-24 RX ORDER — ISOSORBIDE DINITRATE 5 MG/1
1 TABLET ORAL
Qty: 0 | Refills: 0 | DISCHARGE
Start: 2021-02-24

## 2021-02-24 RX ORDER — ATORVASTATIN CALCIUM 80 MG/1
1 TABLET, FILM COATED ORAL
Qty: 0 | Refills: 0 | DISCHARGE
Start: 2021-02-24

## 2021-02-24 RX ADMIN — FINASTERIDE 5 MILLIGRAM(S): 5 TABLET, FILM COATED ORAL at 11:18

## 2021-02-24 RX ADMIN — ISOSORBIDE DINITRATE 20 MILLIGRAM(S): 5 TABLET ORAL at 06:54

## 2021-02-24 RX ADMIN — Medication 2: at 11:19

## 2021-02-24 RX ADMIN — CARVEDILOL PHOSPHATE 12.5 MILLIGRAM(S): 80 CAPSULE, EXTENDED RELEASE ORAL at 06:53

## 2021-02-24 RX ADMIN — Medication 1: at 08:19

## 2021-02-24 NOTE — PROGRESS NOTE ADULT - SUBJECTIVE AND OBJECTIVE BOX
Patient seen and examined at bedside.    Overnight Events:     chest pain free   ambulated w/o exertional symptoms   pt want to go home   spoke to daughter via the phone at 530 AM     REVIEW OF SYSTEMS:  Constitutional:     [x ] negative [ ] fevers [ ] chills [ ] weight loss [ ] weight gain  HEENT:                  [x ] negative [ ] dry eyes [ ] eye irritation [ ] postnasal drip [ ] nasal congestion  CV:                         [ x] negative  [ ] chest pain [ ] orthopnea [ ] palpitations [ ] murmur  Resp:                     [ x] negative [ ] cough [ ] shortness of breath [ ] dyspnea [ ] wheezing [ ] sputum [ ]hemoptysis  GI:                          [ x] negative [ ] nausea [ ] vomiting [ ] diarrhea [ ] constipation [ ] abd pain [ ] dysphagia   :                        [ x] negative [ ] dysuria [ ] nocturia [ ] hematuria [ ] increased urinary frequency  Musculoskeletal: [ x] negative [ ] back pain [ ] myalgias [ ] arthralgias [ ] fracture  Skin:                       [ x] negative [ ] rash [ ] itch  Neurological:        [x ] negative [ ] headache [ ] dizziness [ ] syncope [ ] weakness [ ] numbness  Psychiatric:           [ x] negative [ ] anxiety [ ] depression  Endocrine:            [ x] negative [ ] diabetes [ ] thyroid problem  Heme/Lymph:      [ x] negative [ ] anemia [ ] bleeding problem  Allergic/Immune: [ x] negative [ ] itchy eyes [ ] nasal discharge [ ] hives [ ] angioedema    [ x] All other systems negative  [ ] Unable to assess ROS due to    Current Meds:  atorvastatin 40 milliGRAM(s) Oral at bedtime  carvedilol 12.5 milliGRAM(s) Oral every 12 hours  dextrose 40% Gel 15 Gram(s) Oral once  dextrose 5%. 1000 milliLiter(s) IV Continuous <Continuous>  dextrose 5%. 1000 milliLiter(s) IV Continuous <Continuous>  dextrose 50% Injectable 25 Gram(s) IV Push once  dextrose 50% Injectable 12.5 Gram(s) IV Push once  dextrose 50% Injectable 25 Gram(s) IV Push once  finasteride 5 milliGRAM(s) Oral daily  glucagon  Injectable 1 milliGRAM(s) IntraMuscular once  insulin glargine Injectable (LANTUS) 20 Unit(s) SubCutaneous at bedtime  insulin lispro (ADMELOG) corrective regimen sliding scale   SubCutaneous three times a day before meals  insulin lispro (ADMELOG) corrective regimen sliding scale   SubCutaneous at bedtime  isosorbide   dinitrate Tablet (ISORDIL) 20 milliGRAM(s) Oral three times a day  melatonin 3 milliGRAM(s) Oral at bedtime  tamsulosin 0.4 milliGRAM(s) Oral at bedtime      PAST MEDICAL & SURGICAL HISTORY:  BPH (benign prostatic hypertrophy)    Hyperlipidemia    CAD (coronary artery disease)    Diabetes mellitus  Type 2    HTN (hypertension)    S/P drug eluting coronary stent placement  [facility]    S/P primary angioplasty with coronary stent  1990s        Vitals:  T(F): 98.6 (02-24), Max: 98.6 (02-24)  HR: 70 (02-24) (68 - 80)  BP: 126/64 (02-24) (126/64 - 161/79)  RR: 15 (02-24)  SpO2: 99% (02-24)  I&O's Summary    23 Feb 2021 07:01  -  24 Feb 2021 06:57  --------------------------------------------------------  IN: 480 mL / OUT: 700 mL / NET: -220 mL        Physical Exam:  Appearance: No acute distress  Eyes: PERRL, EOMI, pink conjunctiva  HENT: Normal oral mucosa  Cardiovascular: RRR, S1, S2, no murmurs, rubs, or gallops; no edema; no JVD  Respiratory: Clear to auscultation bilaterally  Gastrointestinal: soft, non-tender, non-distended with normal bowel sounds  Musculoskeletal: No clubbing; no joint deformity   Neurologic: Non-focal  Skin: No rashes, ecchymoses, or cyanosis                          13.5   6.96  )-----------( 145      ( 22 Feb 2021 18:25 )             40.4     02-22    138  |  102  |  11  ----------------------------<  241<H>  4.3   |  24  |  1.00    Ca    9.6      22 Feb 2021 18:25    TPro  6.6  /  Alb  3.9  /  TBili  1.2  /  DBili  x   /  AST  24  /  ALT  26  /  AlkPhos  49  02-22    PT/INR - ( 22 Feb 2021 18:25 )   PT: 12.3 sec;   INR: 1.03 ratio         PTT - ( 22 Feb 2021 18:25 )  PTT:26.9 sec  CARDIAC MARKERS ( 23 Feb 2021 03:25 )  x     / x     / 124 U/L / x     / 7.6 ng/mL              New ECG(s): Personally reviewed    Echo: Personally reviewed    Stress Testing: Personally reviewed    Cath: Personally reviewed    Imaging: Personally reviewed    Interpretation of Telemetry: SR    Patient seen and examined at bedside.    Overnight Events:   chest pain free   ambulated w/o exertional symptoms   pt wants to go home   spoke to daughter via the phone at 530 AM       REVIEW OF SYSTEMS:  Constitutional:     [x ] negative [ ] fevers [ ] chills [ ] weight loss [ ] weight gain  HEENT:                  [x ] negative [ ] dry eyes [ ] eye irritation [ ] postnasal drip [ ] nasal congestion  CV:                         [ x] negative  [ ] chest pain [ ] orthopnea [ ] palpitations [ ] murmur  Resp:                     [ x] negative [ ] cough [ ] shortness of breath [ ] dyspnea [ ] wheezing [ ] sputum [ ]hemoptysis  GI:                          [ x] negative [ ] nausea [ ] vomiting [ ] diarrhea [ ] constipation [ ] abd pain [ ] dysphagia   :                        [ x] negative [ ] dysuria [ ] nocturia [ ] hematuria [ ] increased urinary frequency  Musculoskeletal: [ x] negative [ ] back pain [ ] myalgias [ ] arthralgias [ ] fracture  Skin:                       [ x] negative [ ] rash [ ] itch  Neurological:        [x ] negative [ ] headache [ ] dizziness [ ] syncope [ ] weakness [ ] numbness  Psychiatric:           [ x] negative [ ] anxiety [ ] depression  Endocrine:            [ x] negative [ ] diabetes [ ] thyroid problem  Heme/Lymph:      [ x] negative [ ] anemia [ ] bleeding problem  Allergic/Immune: [ x] negative [ ] itchy eyes [ ] nasal discharge [ ] hives [ ] angioedema  [ x] All other systems negative      Current Meds:  atorvastatin 40 milliGRAM(s) Oral at bedtime  carvedilol 12.5 milliGRAM(s) Oral every 12 hours  dextrose 40% Gel 15 Gram(s) Oral once  dextrose 5%. 1000 milliLiter(s) IV Continuous <Continuous>  dextrose 5%. 1000 milliLiter(s) IV Continuous <Continuous>  dextrose 50% Injectable 25 Gram(s) IV Push once  dextrose 50% Injectable 12.5 Gram(s) IV Push once  dextrose 50% Injectable 25 Gram(s) IV Push once  finasteride 5 milliGRAM(s) Oral daily  glucagon  Injectable 1 milliGRAM(s) IntraMuscular once  insulin glargine Injectable (LANTUS) 20 Unit(s) SubCutaneous at bedtime  insulin lispro (ADMELOG) corrective regimen sliding scale   SubCutaneous three times a day before meals  insulin lispro (ADMELOG) corrective regimen sliding scale   SubCutaneous at bedtime  isosorbide   dinitrate Tablet (ISORDIL) 20 milliGRAM(s) Oral three times a day  melatonin 3 milliGRAM(s) Oral at bedtime  tamsulosin 0.4 milliGRAM(s) Oral at bedtime    PAST MEDICAL & SURGICAL HISTORY:  BPH (benign prostatic hypertrophy)  Hyperlipidemia  CAD (coronary artery disease)  Diabetes mellitus Type 2  HTN (hypertension)  S/P drug eluting coronary stent placement [facility]  S/P primary angioplasty with coronary stent 1990s      Vitals:  T(F): 98.6 (02-24), Max: 98.6 (02-24)  HR: 70 (02-24) (68 - 80)  BP: 126/64 (02-24) (126/64 - 161/79)  RR: 15 (02-24)  SpO2: 99% (02-24)    Physical Exam:  Appearance: No acute distress  Eyes: PERRL, EOMI, pink conjunctiva  HENT: Normal oral mucosa  Cardiovascular: RRR, S1, S2, no murmurs, rubs, or gallops; no edema; no JVD  Respiratory: Clear to auscultation bilaterally  Gastrointestinal: soft, non-tender, non-distended with normal bowel sounds  Musculoskeletal: No clubbing; no joint deformity   Neurologic: Non-focal  Skin: No rashes, ecchymoses, or cyanosis      I&O's Summary    23 Feb 2021 07:01  -  24 Feb 2021 06:57  --------------------------------------------------------  IN: 480 mL / OUT: 700 mL / NET: -220 mL      LABS:                      13.5   6.96  )-----------( 145      ( 22 Feb 2021 18:25 )             40.4     02-22  138  |  102  |  11  ----------------------------<  241<H>  4.3   |  24  |  1.00    Ca    9.6      22 Feb 2021 18:25    TPro  6.6  /  Alb  3.9  /  TBili  1.2  /  DBili  x   /  AST  24  /  ALT  26  /  AlkPhos  49  02-22    PT/INR - ( 22 Feb 2021 18:25 )   PT: 12.3 sec;   INR: 1.03 ratio    PTT - ( 22 Feb 2021 18:25 )  PTT:26.9 sec    CARDIAC MARKERS ( 23 Feb 2021 03:25 )  x     / x     / 124 U/L / x     / 7.6 ng/mL      Interpretation of Telemetry: SR

## 2021-02-24 NOTE — DISCHARGE NOTE PROVIDER - NSDCMRMEDTOKEN_GEN_ALL_CORE_FT
atorvastatin 40 mg oral tablet: 1 tab(s) orally once a day (at bedtime)  Benicar 20 mg oral tablet: 1 tab(s) orally once a day  carvedilol 25 mg oral tablet: 1 tab(s) orally 2 times a day   finasteride 5 mg oral tablet: 1 tab(s) orally once a day  gabapentin 300 mg oral capsule: 1 cap(s) orally 2 times a day  isosorbide dinitrate 20 mg oral tablet: 1 tab(s) orally 3 times a day  Janumet 50 mg-1000 mg oral tablet: 1 tab(s) orally 2 times a day  Lantus 100 units/mL subcutaneous solution: 32 unit(s) subcutaneous once a day (at bedtime)  tamsulosin 0.4 mg oral capsule: 1 cap(s) orally once a day (at bedtime)   atorvastatin 40 mg oral tablet: 1 tab(s) orally once a day (at bedtime)  Benicar 20 mg oral tablet: 1 tab(s) orally once a day  carvedilol 25 mg oral tablet: 1 tab(s) orally 2 times a day   finasteride 5 mg oral tablet: 1 tab(s) orally once a day  gabapentin 300 mg oral capsule: 1 cap(s) orally 2 times a day  Janumet 50 mg-1000 mg oral tablet: 1 tab(s) orally 2 times a day  Lantus 100 units/mL subcutaneous solution: 32 unit(s) subcutaneous once a day (at bedtime)  tamsulosin 0.4 mg oral capsule: 1 cap(s) orally once a day (at bedtime)   atorvastatin 40 mg oral tablet: 1 tab(s) orally once a day (at bedtime)  Benicar 20 mg oral tablet: 1 tab(s) orally once a day  Coreg 12.5 mg oral tablet: 1 tab(s) orally 2 times a day MDD:1 tablet two times daily  finasteride 5 mg oral tablet: 1 tab(s) orally once a day  gabapentin 300 mg oral capsule: 1 cap(s) orally 2 times a day  isosorbide mononitrate 60 mg oral tablet, extended release: 1 tab(s) orally once a day MDD:1 tablet daily  Janumet 50 mg-1000 mg oral tablet: 1 tab(s) orally 2 times a day  Lantus 100 units/mL subcutaneous solution: 32 unit(s) subcutaneous once a day (at bedtime)  tamsulosin 0.4 mg oral capsule: 1 cap(s) orally once a day (at bedtime)

## 2021-02-24 NOTE — DISCHARGE NOTE PROVIDER - CARE PROVIDER_API CALL
Felipe Camargo)  Neurosurgery  [address] [place] [address], [address] [place] [address]  Phone: [phone]  Fax: [phone]  Follow Up Time:

## 2021-02-24 NOTE — DISCHARGE NOTE PROVIDER - HOSPITAL COURSE
74y M pmhx CAD s/p CABG, HTN, HLD, chronic SDH present with CP, CP relieved w/ NTG, went to see his cardiologist today as Nitro didn't relieve his pain today. Patient did nt get stress test due to adverse symptom experienced during last stress test and was sent home.  Patient called EMS as chest pain was 10/10 not relieved with NTG.  Rec   - CE trend has peaked and pt is cp free would cont medical treatment, increase coreg to 12.5 mg BID and increase isodil to 20 mg TID   - if pt tolerates the medications, would be reasonable to dsicharge home on Imdur 60 mg QD and Coreg 12.5 mg BID in the afternoon   - defer DAPT, pt was not cleared for asa or antiplatelet agents by NSGY   - repeat TTE reviewed   - repeat CTH as per NSGY   - no plan for CCTA as pt with history of PCI which would not conclusive elucidate the anatomy   - no plan for inpatient LHC, discussed by overnight on call fellow Dr. Barrett with Interventional Attending Dr. Hall   - eventual ischemic work up when SDH stabilizes from neurological perspective     Pt seen by Neuro and Cardiology consult ok to discharge home today   Pt will follow up with Dr. Camargo in 1-2 months

## 2021-02-24 NOTE — DISCHARGE NOTE PROVIDER - NSDCCPTREATMENT_GEN_ALL_CORE_FT
PRINCIPAL PROCEDURE  Procedure: Cardiac enzymes  Findings and Treatment: NSTEMI no cardiac cath needed at this time

## 2021-02-24 NOTE — DISCHARGE NOTE NURSING/CASE MANAGEMENT/SOCIAL WORK - PATIENT PORTAL LINK FT
You can access the FollowMyHealth Patient Portal offered by [facility] by registering at the following website: [email or web address]. By joining [facility]’s FollowMyHealth portal, you will also be able to view your health information using other applications (apps) compatible with our system.

## 2021-02-24 NOTE — PROGRESS NOTE ADULT - ASSESSMENT
74y M with hx. 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 followed by NSGY presented to clinic for exertional symptoms with CE consistent with NSTE-ACS not amendable to intervention secondary to bleeding risk from SDH     Rec   - CE trend has peaked and pt is cp free would cont medical treatment, increase coreg to 12.5 mg BID and increase isodil to 20 mg TID   - if pt tolerates the medications, would be reasonable to dsicharge home on Imdur 60 mg QD and Coreg 12.5 mg BID in the afternoon   - defer DAPT, pt was not cleared for asa or antiplatelet agents by NSGY   - repeat TTE reviewed   - repeat CTH as per NSGY   - no plan for CCTA as pt with history of PCI which would not conclusive elucidate the anatomy   - no plan for inpatient LHC, discussed by overnight on call fellow Dr. Barrett with Interventional Attending Dr. Hall   - eventual ischemic work up when SDH stabilizes from neurological perspective     The above plan was discussed at length with daughter (pharmacist) via phone call at 530 AM   Discussed with neurology and neurological surgery team as well     Moy Galloway MD  Cardiology Fellow   74y M with hx. of CAD s/p PCI to the LAD and RCA in the past (last angiogram in 2015 with [facility]), HTN, & HLD.  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  Presented to clinic for exertional symptoms, with CE consistent with NSTE-ACS.  Not presently a candidate for intervention secondary to bleeding risk related to SDH.       Rec   - CE trend has peaked and pt remains cp free.  Cont medical treatment - increase coreg to 12.5 mg BID and increase Isordil to 20 mg TID.  - if pt tolerates the medications, would be reasonable to discharge home, on Imdur 60 mg QD and Coreg 12.5 mg BID, in the afternoon.   - defer DAPT, pt was not cleared for asa or antiplatelet agents by NSGY   - repeat TTE reviewed   - repeat CTH as per NSGY   - no plan for CCTA, as pt with history of PCI with stents, CTA which would not conclusively elucidate his coronary anatomy   - no plan for inpatient LHC given inability to intervene/use anti-platelet therapy due to SDH   - consider ischemic work up at later day when feasible from a neurosurgical standpoint re SDH     The above plan was discussed at length with daughter (pharmacist) via phone call at 530 AM.    Spoke to Dr. Carmichael (he has spoken to patient's private neurosurgeon).  Discussed with neurology and neurological surgery team as well.       Moy Galloway MD  Cardiology Fellow    Markell Rosado M.D.  Cardiology Attending, Consult Service    For Cardiology consults and questions, all Cardiology service information can be found 24/7 on [email or web address], password: [email or web address]

## 2021-02-24 NOTE — DISCHARGE NOTE PROVIDER - NSDCFUADDINST_GEN_ALL_CORE_FT
Follow up with Neurologist in 1-2 months   Follow up with Primary Md in 1-2 weeks   Follow up with Cardiologist in 1-2weeks

## 2021-02-24 NOTE — DISCHARGE NOTE PROVIDER - NSDCCPCAREPLAN_GEN_ALL_CORE_FT
PRINCIPAL DISCHARGE DIAGNOSIS  Diagnosis: Chest pain in adult  Assessment and Plan of Treatment:       SECONDARY DISCHARGE DIAGNOSES  Diagnosis: DM (diabetes mellitus)  Assessment and Plan of Treatment: Continue to follow with your primary care MD or your endocrinologist. Discuss what the goal hemoglobin A1C level is for you.  Follow a heart healthy diabetic diet. If you check your fingerstick glucose at home, call your MD if it is greater than 250mg/dL on 2 occasions or less than 100mg/dL on 2 occasions. Know signs of low blood sugar, such as: dizziness, shakiness, sweating, confusion, hunger, nervousness- drink 4 ounces apple juice if occurs and call your doctor. Know early signs of high blood sugar, such as: frequent urination, increased thirst, blurry vision, fatigue, headache - call your doctor if this occurs. Your hemoglobin A1C will be at a normal range (normal range is from 6-8)    Diagnosis: HTN (hypertension)  Assessment and Plan of Treatment: Continue with your blood pressure medications; eat a heart healthy diet with low salt diet; exercise regularly (consult with your physician or cardiologist first); maintain a heart healthy weight; if you smoke - quit (A resource to help you stop smoking is the [facility] – phone number [phone].); include healthy ways to manage stress. Continue to follow with your primary care physician or cardiologist. Your blood pressure will be controlled.    Diagnosis: HLD (hyperlipidemia)  Assessment and Plan of Treatment: Continue with your cholesterol medications. Eat a heart healthy diet that is low in saturated fats and salt, and includes whole grains, fruits, vegetables and lean protein; exercise regularly (consult with your physician or cardiologist first); maintain a heart healthy weight; if you smoke - quit (A resource to help you stop smoking is the [facility] – phone number [phone].). Continue to follow with your primary physician or cardiologist. Your LDL cholesterol will be less than 70mg/dL    Diagnosis: CAD (coronary artery disease)  Assessment and Plan of Treatment: Low salt, low fat diet.   Weight management.   Take medications as prescribed.    No smoking.  Follow up appointments with your doctor(s)  as instruced.

## 2021-02-24 NOTE — PROGRESS NOTE ADULT - SUBJECTIVE AND OBJECTIVE BOX
[facility]rological [facility]      Seen earlier today, and examined.  - Today, patient is without complaints.           *****MEDICATIONS: Current medication reviewed and documented.    MEDICATIONS  (STANDING):  atorvastatin 40 milliGRAM(s) Oral at bedtime  carvedilol 12.5 milliGRAM(s) Oral every 12 hours  dextrose 40% Gel 15 Gram(s) Oral once  dextrose 5%. 1000 milliLiter(s) (50 mL/Hr) IV Continuous <Continuous>  dextrose 5%. 1000 milliLiter(s) (100 mL/Hr) IV Continuous <Continuous>  dextrose 50% Injectable 25 Gram(s) IV Push once  dextrose 50% Injectable 12.5 Gram(s) IV Push once  dextrose 50% Injectable 25 Gram(s) IV Push once  finasteride 5 milliGRAM(s) Oral daily  gabapentin 300 milliGRAM(s) Oral two times a day  glucagon  Injectable 1 milliGRAM(s) IntraMuscular once  insulin glargine Injectable (LANTUS) 20 Unit(s) SubCutaneous at bedtime  insulin lispro (ADMELOG) corrective regimen sliding scale   SubCutaneous three times a day before meals  insulin lispro (ADMELOG) corrective regimen sliding scale   SubCutaneous at bedtime  isosorbide   dinitrate Tablet (ISORDIL) 20 milliGRAM(s) Oral three times a day  melatonin 3 milliGRAM(s) Oral at bedtime  tamsulosin 0.4 milliGRAM(s) Oral at bedtime    MEDICATIONS  (PRN):          ***** VITAL SIGNS:  T(F): 97.8 (21 @ 13:00), Max: 98.6 (21 @ 06:06)  HR: 81 (21 @ 13:00) (68 - 81)  BP: 127/70 (21 @ 13:00) (100/80 - 161/79)  RR: 17 (21 @ 13:00) (15 - 18)  SpO2: 95% (21 @ 13:00) (95% - 99%)  Wt(kg): --  ,   I&O's Summary    2021 07:01  -  2021 07:00  --------------------------------------------------------  IN: 480 mL / OUT: 700 mL / NET: -220 mL    2021 07:01  -  2021 15:45  --------------------------------------------------------  IN: 480 mL / OUT: 0 mL / NET: 480 mL             *****PHYSICAL EXAM:   oriented x 3   Attention comprehension are fair. Able to name, repeat,  without any difficulty.   Able to follow 3 step commands.     EOMI fundi not visualized,  VFF to confrontration  No facial asymmetry   Tongue is midline   Palate elevates symmetrically   Moving all 4 ext symmetrically no pronator drift    finger to nose no dysmetria   sensation is grossly symmetric  Gait : able to ambulate independently   B/L down going toes            *****LAB AND IMAGIN.5   6.96  )-----------( 145      ( 2021 18:25 )             40.4               02-22    138  |  102  |  11  ----------------------------<  241<H>  4.3   |  24  |  1.00    Ca    9.6      2021 18:25    TPro  6.6  /  Alb  3.9  /  TBili  1.2  /  DBili  x   /  AST  24  /  ALT  26  /  AlkPhos  49  02-22    PT/INR - ( 2021 18:25 )   PT: 12.3 sec;   INR: 1.03 ratio         PTT - ( 2021 18:25 )  PTT:26.9 sec       CARDIAC MARKERS ( 2021 03:25 )  x     / x     / 124 U/L / x     / 7.6 ng/mL                [All pertinent recent Imaging/Reports reviewed]           *****A S S E S S M E N T   A N D   P L A N :  74y M pmhx CAD s/p CABG, HTN, HLD, chronic SDH present 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 Patient did nt get stress test due to adverse symptom experienced during last stress test and was sent home.  Patient called EMS as chest pain was 10/10 not relieved with NTG.          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   appears to have membranes within the subdural   pt was offered kristen hole treatment in the past which he has declined   pt with no prior hx of coagulopathy   pt however has been off asa as per previous recommendation   discussed with cardiology, as chest pain resolved, pt will be discharged on medical management        neurosurgery did not want asa/plavix due to concern of rebleeding   instructed pt to follow with Dr Camargo to get a plan of care for chronic subdural hematomas   pt instructed to return to hospital if symptoms recur   pt understands plan of care       Thank you for allowing me to participate in the care of this patient. Will continue to follow patient periodically. Please do not hesitate to call me if you have any  questions or if there has been a change in patients neurological status     ________________  Elissa García MD  [facility]rological [facility] ([facility]  [phone]      33 minutes spent on total encounter; more than 50 % of the visit was  spent counseling about plan of care, compliance to diet/exercise and medication regimen and or  coordinating care by the attending physician.      It is advised that stroke patients follow up with BERHANE Garrett @ [phone] in 1- 2 weeks.   Others please follow up with Dr. Michael Nissenbaum [phone]

## 2021-02-25 LAB
CHOLEST SERPL-MCNC: 136 MG/DL — SIGNIFICANT CHANGE UP
HDLC SERPL-MCNC: 47 MG/DL — SIGNIFICANT CHANGE UP
LIPID PNL WITH DIRECT LDL SERPL: 68 MG/DL — SIGNIFICANT CHANGE UP
NON HDL CHOLESTEROL: 89 MG/DL — SIGNIFICANT CHANGE UP
TRIGL SERPL-MCNC: 107 MG/DL — SIGNIFICANT CHANGE UP

## 2021-02-26 ENCOUNTER — INPATIENT (INPATIENT)
Facility: HOSPITAL | Age: 76
LOS: 10 days | Discharge: HOME | DRG: 270 | End: 2021-03-09
Attending: INTERNAL MEDICINE | Admitting: INTERNAL MEDICINE
Payer: MEDICARE

## 2021-02-26 VITALS
DIASTOLIC BLOOD PRESSURE: 72 MMHG | WEIGHT: 160.06 LBS | TEMPERATURE: 98 F | RESPIRATION RATE: 18 BRPM | OXYGEN SATURATION: 99 % | SYSTOLIC BLOOD PRESSURE: 134 MMHG | HEIGHT: 67 IN | HEART RATE: 92 BPM

## 2021-02-26 DIAGNOSIS — S06.5X9A TRAUMATIC SUBDURAL HEMORRHAGE WITH LOSS OF CONSCIOUSNESS OF UNSPECIFIED DURATION, INITIAL ENCOUNTER: ICD-10-CM

## 2021-02-26 DIAGNOSIS — I21.4 NON-ST ELEVATION (NSTEMI) MYOCARDIAL INFARCTION: ICD-10-CM

## 2021-02-26 DIAGNOSIS — E78.5 HYPERLIPIDEMIA, UNSPECIFIED: ICD-10-CM

## 2021-02-26 DIAGNOSIS — Z95.5 PRESENCE OF CORONARY ANGIOPLASTY IMPLANT AND GRAFT: Chronic | ICD-10-CM

## 2021-02-26 DIAGNOSIS — I25.10 ATHEROSCLEROTIC HEART DISEASE OF NATIVE CORONARY ARTERY WITHOUT ANGINA PECTORIS: ICD-10-CM

## 2021-02-26 DIAGNOSIS — E11.9 TYPE 2 DIABETES MELLITUS WITHOUT COMPLICATIONS: ICD-10-CM

## 2021-02-26 LAB
ALBUMIN SERPL ELPH-MCNC: 4 G/DL — SIGNIFICANT CHANGE UP (ref 3.3–5)
ALP SERPL-CCNC: 55 U/L — SIGNIFICANT CHANGE UP (ref 40–120)
ALT FLD-CCNC: 30 U/L — SIGNIFICANT CHANGE UP (ref 10–45)
ANION GAP SERPL CALC-SCNC: 16 MMOL/L — SIGNIFICANT CHANGE UP (ref 5–17)
APTT BLD: 26.6 SEC — LOW (ref 27.5–35.5)
AST SERPL-CCNC: 38 U/L — SIGNIFICANT CHANGE UP (ref 10–40)
BASOPHILS # BLD AUTO: 0.03 K/UL — SIGNIFICANT CHANGE UP (ref 0–0.2)
BASOPHILS NFR BLD AUTO: 0.4 % — SIGNIFICANT CHANGE UP (ref 0–2)
BILIRUB SERPL-MCNC: 1.4 MG/DL — HIGH (ref 0.2–1.2)
BUN SERPL-MCNC: 13 MG/DL — SIGNIFICANT CHANGE UP (ref 7–23)
CALCIUM SERPL-MCNC: 10 MG/DL — SIGNIFICANT CHANGE UP (ref 8.4–10.5)
CHLORIDE SERPL-SCNC: 102 MMOL/L — SIGNIFICANT CHANGE UP (ref 96–108)
CK MB CFR SERPL CALC: 4.1 NG/ML — SIGNIFICANT CHANGE UP (ref 0–6.7)
CO2 SERPL-SCNC: 17 MMOL/L — LOW (ref 22–31)
CREAT SERPL-MCNC: 1.09 MG/DL — SIGNIFICANT CHANGE UP (ref 0.5–1.3)
EOSINOPHIL # BLD AUTO: 0.13 K/UL — SIGNIFICANT CHANGE UP (ref 0–0.5)
EOSINOPHIL NFR BLD AUTO: 1.7 % — SIGNIFICANT CHANGE UP (ref 0–6)
GLUCOSE BLDC GLUCOMTR-MCNC: 155 MG/DL — HIGH (ref 70–99)
GLUCOSE SERPL-MCNC: 206 MG/DL — HIGH (ref 70–99)
HCT VFR BLD CALC: 41.3 % — SIGNIFICANT CHANGE UP (ref 39–50)
HGB BLD-MCNC: 13.9 G/DL — SIGNIFICANT CHANGE UP (ref 13–17)
IMM GRANULOCYTES NFR BLD AUTO: 0.3 % — SIGNIFICANT CHANGE UP (ref 0–1.5)
INR BLD: 1.05 RATIO — SIGNIFICANT CHANGE UP (ref 0.88–1.16)
LYMPHOCYTES # BLD AUTO: 1.65 K/UL — SIGNIFICANT CHANGE UP (ref 1–3.3)
LYMPHOCYTES # BLD AUTO: 21.7 % — SIGNIFICANT CHANGE UP (ref 13–44)
MCHC RBC-ENTMCNC: 30.1 PG — SIGNIFICANT CHANGE UP (ref 27–34)
MCHC RBC-ENTMCNC: 33.7 GM/DL — SIGNIFICANT CHANGE UP (ref 32–36)
MCV RBC AUTO: 89.4 FL — SIGNIFICANT CHANGE UP (ref 80–100)
MONOCYTES # BLD AUTO: 0.68 K/UL — SIGNIFICANT CHANGE UP (ref 0–0.9)
MONOCYTES NFR BLD AUTO: 8.9 % — SIGNIFICANT CHANGE UP (ref 2–14)
NEUTROPHILS # BLD AUTO: 5.1 K/UL — SIGNIFICANT CHANGE UP (ref 1.8–7.4)
NEUTROPHILS NFR BLD AUTO: 67 % — SIGNIFICANT CHANGE UP (ref 43–77)
NRBC # BLD: 0 /100 WBCS — SIGNIFICANT CHANGE UP (ref 0–0)
NT-PROBNP SERPL-SCNC: 423 PG/ML — HIGH (ref 0–300)
PLATELET # BLD AUTO: 158 K/UL — SIGNIFICANT CHANGE UP (ref 150–400)
POTASSIUM SERPL-MCNC: 4.5 MMOL/L — SIGNIFICANT CHANGE UP (ref 3.5–5.3)
POTASSIUM SERPL-SCNC: 4.5 MMOL/L — SIGNIFICANT CHANGE UP (ref 3.5–5.3)
PROT SERPL-MCNC: 7 G/DL — SIGNIFICANT CHANGE UP (ref 6–8.3)
PROTHROM AB SERPL-ACNC: 12.6 SEC — SIGNIFICANT CHANGE UP (ref 10.6–13.6)
RBC # BLD: 4.62 M/UL — SIGNIFICANT CHANGE UP (ref 4.2–5.8)
RBC # FLD: 12.9 % — SIGNIFICANT CHANGE UP (ref 10.3–14.5)
SARS-COV-2 RNA SPEC QL NAA+PROBE: SIGNIFICANT CHANGE UP
SARS-COV-2 RNA SPEC QL NAA+PROBE: SIGNIFICANT CHANGE UP
SODIUM SERPL-SCNC: 135 MMOL/L — SIGNIFICANT CHANGE UP (ref 135–145)
TROPONIN T, HIGH SENSITIVITY RESULT: 93 NG/L — HIGH (ref 0–51)
TROPONIN T, HIGH SENSITIVITY RESULT: 98 NG/L — HIGH (ref 0–51)
WBC # BLD: 7.61 K/UL — SIGNIFICANT CHANGE UP (ref 3.8–10.5)
WBC # FLD AUTO: 7.61 K/UL — SIGNIFICANT CHANGE UP (ref 3.8–10.5)

## 2021-02-26 PROCEDURE — 99285 EMERGENCY DEPT VISIT HI MDM: CPT

## 2021-02-26 PROCEDURE — 93010 ELECTROCARDIOGRAM REPORT: CPT

## 2021-02-26 PROCEDURE — 99222 1ST HOSP IP/OBS MODERATE 55: CPT

## 2021-02-26 PROCEDURE — 71046 X-RAY EXAM CHEST 2 VIEWS: CPT | Mod: 26

## 2021-02-26 PROCEDURE — 70450 CT HEAD/BRAIN W/O DYE: CPT | Mod: 26,MA

## 2021-02-26 PROCEDURE — 99223 1ST HOSP IP/OBS HIGH 75: CPT | Mod: GC

## 2021-02-26 PROCEDURE — 93454 CORONARY ARTERY ANGIO S&I: CPT | Mod: 26

## 2021-02-26 RX ORDER — MORPHINE SULFATE 50 MG/1
2 CAPSULE, EXTENDED RELEASE ORAL ONCE
Refills: 0 | Status: DISCONTINUED | OUTPATIENT
Start: 2021-02-26 | End: 2021-03-02

## 2021-02-26 RX ORDER — INSULIN GLARGINE 100 [IU]/ML
20 INJECTION, SOLUTION SUBCUTANEOUS AT BEDTIME
Refills: 0 | Status: DISCONTINUED | OUTPATIENT
Start: 2021-02-26 | End: 2021-03-06

## 2021-02-26 RX ORDER — FINASTERIDE 5 MG/1
5 TABLET, FILM COATED ORAL DAILY
Refills: 0 | Status: DISCONTINUED | OUTPATIENT
Start: 2021-02-26 | End: 2021-03-09

## 2021-02-26 RX ORDER — ISOSORBIDE MONONITRATE 60 MG/1
60 TABLET, EXTENDED RELEASE ORAL DAILY
Refills: 0 | Status: DISCONTINUED | OUTPATIENT
Start: 2021-02-26 | End: 2021-03-01

## 2021-02-26 RX ORDER — SODIUM CHLORIDE 9 MG/ML
1000 INJECTION, SOLUTION INTRAVENOUS
Refills: 0 | Status: DISCONTINUED | OUTPATIENT
Start: 2021-02-26 | End: 2021-03-09

## 2021-02-26 RX ORDER — GABAPENTIN 400 MG/1
300 CAPSULE ORAL
Refills: 0 | Status: DISCONTINUED | OUTPATIENT
Start: 2021-02-26 | End: 2021-03-09

## 2021-02-26 RX ORDER — DEXTROSE 50 % IN WATER 50 %
15 SYRINGE (ML) INTRAVENOUS ONCE
Refills: 0 | Status: DISCONTINUED | OUTPATIENT
Start: 2021-02-26 | End: 2021-03-09

## 2021-02-26 RX ORDER — INSULIN LISPRO 100/ML
VIAL (ML) SUBCUTANEOUS
Refills: 0 | Status: DISCONTINUED | OUTPATIENT
Start: 2021-02-26 | End: 2021-03-09

## 2021-02-26 RX ORDER — DEXTROSE 50 % IN WATER 50 %
12.5 SYRINGE (ML) INTRAVENOUS ONCE
Refills: 0 | Status: DISCONTINUED | OUTPATIENT
Start: 2021-02-26 | End: 2021-03-09

## 2021-02-26 RX ORDER — GLUCAGON INJECTION, SOLUTION 0.5 MG/.1ML
1 INJECTION, SOLUTION SUBCUTANEOUS ONCE
Refills: 0 | Status: DISCONTINUED | OUTPATIENT
Start: 2021-02-26 | End: 2021-03-09

## 2021-02-26 RX ORDER — DEXTROSE 50 % IN WATER 50 %
25 SYRINGE (ML) INTRAVENOUS ONCE
Refills: 0 | Status: DISCONTINUED | OUTPATIENT
Start: 2021-02-26 | End: 2021-03-09

## 2021-02-26 RX ORDER — ATORVASTATIN CALCIUM 80 MG/1
40 TABLET, FILM COATED ORAL AT BEDTIME
Refills: 0 | Status: DISCONTINUED | OUTPATIENT
Start: 2021-02-26 | End: 2021-03-09

## 2021-02-26 RX ORDER — TAMSULOSIN HYDROCHLORIDE 0.4 MG/1
0.4 CAPSULE ORAL AT BEDTIME
Refills: 0 | Status: DISCONTINUED | OUTPATIENT
Start: 2021-02-26 | End: 2021-03-09

## 2021-02-26 RX ORDER — CARVEDILOL PHOSPHATE 80 MG/1
12.5 CAPSULE, EXTENDED RELEASE ORAL EVERY 12 HOURS
Refills: 0 | Status: DISCONTINUED | OUTPATIENT
Start: 2021-02-26 | End: 2021-03-02

## 2021-02-26 RX ADMIN — ATORVASTATIN CALCIUM 40 MILLIGRAM(S): 80 TABLET, FILM COATED ORAL at 22:27

## 2021-02-26 RX ADMIN — Medication 1: at 22:11

## 2021-02-26 RX ADMIN — TAMSULOSIN HYDROCHLORIDE 0.4 MILLIGRAM(S): 0.4 CAPSULE ORAL at 22:27

## 2021-02-26 RX ADMIN — ISOSORBIDE MONONITRATE 60 MILLIGRAM(S): 60 TABLET, EXTENDED RELEASE ORAL at 12:31

## 2021-02-26 RX ADMIN — GABAPENTIN 300 MILLIGRAM(S): 400 CAPSULE ORAL at 22:27

## 2021-02-26 RX ADMIN — INSULIN GLARGINE 20 UNIT(S): 100 INJECTION, SOLUTION SUBCUTANEOUS at 22:10

## 2021-02-26 RX ADMIN — CARVEDILOL PHOSPHATE 12.5 MILLIGRAM(S): 80 CAPSULE, EXTENDED RELEASE ORAL at 22:27

## 2021-02-26 NOTE — CONSULT NOTE ADULT - SUBJECTIVE AND OBJECTIVE BOX
p ([id number])     HPI:  77yo M hx of HTN, HLD, multiple chronic subdurals comes to ED for cp. Chest pain substernal, since last night, constant, no radiation, took 3 nitros last night, 1 ASA 324mg this morning. No associated with sob or diaphoresis. Pt was here few days ago for same, worked up for NSTEMI. Ended up having symptomatic relief prior to dc. In hospital TTE was interdeterminate, trop 124. Neurosurgery recs were to avoid anti-platelets and AC. Denies f/c, cough, sob, abdominal pain, change in bowel.  --Anticoagulation:    =====================  PAST MEDICAL HISTORY   BPH (benign prostatic hypertrophy)    Hyperlipidemia    CAD (coronary artery disease)    Diabetes mellitus    HTN (hypertension)      PAST SURGICAL HISTORY   S/P drug eluting coronary stent placement    S/P primary angioplasty with coronary stent          MEDICATIONS:  Antibiotics:    Neuro:    Other:      SOCIAL HISTORY:   Occupation:   Marital Status:     FAMILY HISTORY:  Family history of diabetes mellitus (Sibling)        ROS: Negative except per HPI    LABS:  PT/INR - ( 26 Feb 2021 09:39 )   PT: 12.6 sec;   INR: 1.05 ratio         PTT - ( 26 Feb 2021 09:39 )  PTT:26.6 sec                        13.9   7.61  )-----------( 158      ( 26 Feb 2021 09:39 )             41.3     02-26    135  |  102  |  13  ----------------------------<  206<H>  4.5   |  17<L>  |  1.09    Ca    10.0      26 Feb 2021 09:39    TPro  7.0  /  Alb  4.0  /  TBili  1.4<H>  /  DBili  x   /  AST  38  /  ALT  30  /  AlkPhos  55  02-26

## 2021-02-26 NOTE — CONSULT NOTE ADULT - ASSESSMENT
JHON LUO  76M a/ pmh of CAD s/p PCI in the past, HTN, HLD, chronic SDH (since 2019) present with severe CP for three nights s/p nitro, and asa 81 x4 with elevated trops. Recent admission for same pain, was dc'ed after pain relieved with meds, TTE interdeterminate. HCT in ED shows stable SDH 1.6 cm. Currently pain is better, no headache, no n/v. Cardiology would like to take patient for cath.   Exam: intact  - no acute neurosurgical intervention  -While there is no absolute neurosurgical contraindication to systemic anti coagulation/ antiplatelets, there does exist an increased risk of expansion of SDH which can result in significant morbidity including but not limited to headache, seizure, stroke, paralysis, and in severe cases even death.    The risks and benefits of starting systemic anticoagulation/antiplatet agents must be considered carefully in the setting of the patients medical history and current clinical condition.    -In setting of chronic SDH w/ recurrent chest pain, would recommend chest pain work up as medically needed but with avoidance of any systemic long term AC, or antiplatelet agents.   -would recommend HCT after diagnostic cath or with any changes in the patients neurologic exam

## 2021-02-26 NOTE — CONSULT NOTE ADULT - SUBJECTIVE AND OBJECTIVE BOX
73 yo M PMH CAD, HTN, HLD, and chronic SDH.  Now returns to ED with recurrent CP.    Earlier this week, patient seen in clinic and reports CPx 3d relieved w/ NTG.  Patient CP free on visit, discussed stress test vs cath (patient prefers cath due to adverse sx experienced during stress test in ). Pt sent home, but developed CP and was admitted on  after episode of CP which resolved after NTG x 5.   Seen by neurology and neurosurgery, who advised against use of anti-clotting agents due to hx. of SDH.  Anti-anginal meds increased, and patient remained asx. and was discharged.    No returns due to another episode of angina at rest requiring multiple NTG for resolution.  Called EMS and given ASA en route.      Pain free at this time.    PMHx:   BPH (benign prostatic hypertrophy)  Hyperlipidemia  CAD (coronary artery disease)  Diabetes mellitus  HTN (hypertension)      PSHx:   S/P drug eluting coronary stent placement  S/P primary angioplasty with coronary stent      Allergies:  No Known Allergies      MEDICATIONS  (STANDING):  atorvastatin 40 milliGRAM(s) Oral at bedtime  carvedilol 12.5 milliGRAM(s) Oral every 12 hours  dextrose 40% Gel 15 Gram(s) Oral once  dextrose 5%. 1000 milliLiter(s) (50 mL/Hr) IV Continuous <Continuous>  dextrose 5%. 1000 milliLiter(s) (100 mL/Hr) IV Continuous <Continuous>  dextrose 50% Injectable 25 Gram(s) IV Push once  dextrose 50% Injectable 12.5 Gram(s) IV Push once  dextrose 50% Injectable 25 Gram(s) IV Push once  finasteride 5 milliGRAM(s) Oral daily  gabapentin 300 milliGRAM(s) Oral two times a day  glucagon  Injectable 1 milliGRAM(s) IntraMuscular once  insulin glargine Injectable (LANTUS) 20 Unit(s) SubCutaneous at bedtime  insulin lispro (ADMELOG) corrective regimen sliding scale   SubCutaneous three times a day before meals  isosorbide   mononitrate ER Tablet (IMDUR) 60 milliGRAM(s) Oral daily  tamsulosin 0.4 milliGRAM(s) Oral at bedtime         FAMILY HISTORY:  Family history of diabetes mellitus (Sibling)  3 brothers alive with Diabetes      Social History: Personally reviewed   No tobacco, EtOH or IVDU     REVIEW OF SYSTEMS:  Constitutional:     [x ] negative [ ] fevers [ ] chills [ ] weight loss [ ] weight gain  HEENT:                  [x ] negative [ ] dry eyes [ ] eye irritation [ ] postnasal drip [ ] nasal congestion  Resp:                     [x ] negative [ ] cough [ ] shortness of breath [ ] dyspnea [ ] wheezing [ ] sputum [ ]hemoptysis  GI:                          [ x] negative [ ] nausea [ ] vomiting [ ] diarrhea [ ] constipation [ ] abd pain [ ] dysphagia   :                        [ x] negative [ ] dysuria [ ] nocturia [ ] hematuria [ ] increased urinary frequency  Musculoskeletal: [x ] negative [ ] back pain [ ] myalgias [ ] arthralgias [ ] fracture  Skin:                       [ x] negative [ ] rash [ ] itch  Neurological:        [ x] negative [ ] headache [ ] dizziness [ ] syncope [ ] weakness [ ] numbness  Psychiatric:           [ x] negative [ ] anxiety [ ] depression  Endocrine:            [ x] negative [ ] diabetes [ ] thyroid problem  Heme/Lymph:      [ x] negative [ ] anemia [ ] bleeding problem  Allergic/Immune: [ x] negative [ ] itchy eyes [ ] nasal discharge [ ] hives [ ] angioedema    [ x] All other systems negative    Physical Exam:  T(F): 98.7 (-), Max: 98.7 ()  HR: 79 () (79 - 92)  BP: 139/76 () (134/72 - 139/76)  RR: 16 (-)  SpO2: 100% ()    GENERAL: No acute distress, well-developed  HEAD:  Atraumatic, Normocephalic  ENT: EOMI, PERRLA, conjunctiva and sclera clear, Neck supple, No JVD, moist mucosa  CHEST/LUNG: Clear to auscultation bilaterally; No wheeze, equal breath sounds bilaterally   BACK: No spinal tenderness  HEART: Regular rate and rhythm; No murmurs, rubs, or gallops  ABDOMEN: Soft, Nontender, Nondistended; Bowel sounds present  EXTREMITIES:  No clubbing, cyanosis, or edema  PSYCH: Nl behavior, nl affect  NEUROLOGY: AAOx3, non-focal, cranial nerves intact  SKIN: Normal color, No rashes or lesions  LINES:      ECG:   NSR at 87 bpm.  LAD, first degree AV block, LBBB; no change.      Labs:                       13.9   7.61  )-----------( 158      ( 2021 09:39 )             41.3         135  |  102  |  13  ----------------------------<  206<H>  4.5   |  17<L>  |  1.09    Ca    10.0      2021 09:39    TPro  7.0  /  Alb  4.0  /  TBili  1.4<H>  /  DBili  x   /  AST  38  /  ALT  30  /  AlkPhos  55          Xray Chest 2 Views PA/Lat (21 @ 10:18)   EXAM:  XR CHEST PA LAT 2V                        PROCEDURE DATE:  2021      INTERPRETATION:  EXAMINATION: XR CHEST PA AND LATERAL    CLINICAL INDICATION: Chest Pain    TECHNIQUE: 2 views; Frontal and lateral views of the chest were obtained.    COMPARISON: Chest radiograph 2021.    FINDINGS:  The heart is normal in size.  The lungs are clear.  There is no pneumothorax or pleural effusion.    IMPRESSION:  Clear lungs.    YARITZA GAINES MD; Resident Radiology  This document has been electronically signed.  MARIMAR LEGER MD; Attending Radiologist  This document has been electronically signed. 2021 10:32AM      Total Cholesterol: 136  LDL: --  HDL: 47  T    Thyroid Stimulating Hormone, Serum: 0.92 uIU/mL ( @ 15:11)

## 2021-02-26 NOTE — PROGRESS NOTE ADULT - SUBJECTIVE AND OBJECTIVE BOX
Patient seen and examined at bedside.    --Anticoagulation--    T(C): 36.7 (02-26-21 @ 14:00), Max: 37.1 (02-26-21 @ 09:30)  HR: 87 (02-26-21 @ 19:35) (68 - 92)  BP: 135/75 (02-26-21 @ 19:35) (120/63 - 143/69)  RR: 17 (02-26-21 @ 19:35) (16 - 18)  SpO2: 99% (02-26-21 @ 19:35) (98% - 100%)  Wt(kg): --    Exam:  pending re-eval tomorrow

## 2021-02-26 NOTE — CONSULT NOTE ADULT - ATTENDING COMMENTS
Pt seen and examined this afternoon.  As per above resident evaluation and assessment.  Pt is awaiting cardiac cath and has no neurological symptoms.  He has no drift and is fully conversant in good spirits.  New CT showed left SDH sl. smaller than previously, but still significant in size. Pt is known to us for this chronic SDH and has been off ASA.  He was reluctant to follow up with CT imaging and further suggestions for treatment.  Currently we recognize that pt may be having significant angina with NSTEMI and is in need of cardiac work up.  He will undergo coronary angiogram to determine the need for further treatment.  As stated above, the need for antiplatelet therapy and/or anticoagulation as an adjunct to treatment designed to prevent heart attack may outweigh the risks of subdural expansion, though he is still at risk for subdural expansion.  Appreciate cardiology collaboration and our team will be on hand to further advise based upon cardiac cath results.

## 2021-02-26 NOTE — CONSULT NOTE ADULT - ASSESSMENT
77 yo Male with multiple co-morbidities including multiple chronic subdural hematoma now s/p cardiac cath found to have multivessel CAD. Patient has been seen and evaluated by Neurosurgery who deemed patient is medically high risk for subdural hematoma expansion if started on systemic anticoagulation and/or antiplatet agents required for cardiac surgery. Risks vs benefits must be considered if delaying cardiac surgery for revascularization outweighs neurological risk associated with SDH expansion given the patient’s medical history and current clinical condition.  CTS Dr. Merino to further discuss case with neurosurgery attending in AM.  CTS to follow with reccs. 77 yo Male with multiple co-morbidities including multiple chronic subdural hematoma now s/p cardiac cath found to have multivessel CAD. Patient has been seen and evaluated by Neurosurgery who deemed patient is medically high risk for subdural hematoma expansion if started on systemic anticoagulation and/or antiplatet agents required for cardiac surgery. Risks vs benefits must be considered if delaying cardiac surgery for revascularization outweighs neurological risk associated with SDH expansion given the patient’s medical history and current clinical condition.

## 2021-02-26 NOTE — ED PROVIDER NOTE - ATTENDING CONTRIBUTION TO CARE
Dr. Elliott (Attending Physician)  I performed a history and physical exam of the patient and discussed their management with the resident. I reviewed the resident's note and agree with the documented findings and plan of care. My medical decision making and observations are found above.

## 2021-02-26 NOTE — CONSULT NOTE ADULT - ATTENDING COMMENTS
I personally have reviewed all pertinent and available history, labs, and imaging. I have examined the patient and reviewed the case with the team and the ACP. I have discussed the risks/benefits of the procedure and answered all questions. 76M with history of traumatic chronic SDH p/w angina found to have TVD.  Pt at significant risk of rebleed into SDH with possible catastrophic neurological consequences with institution of anticoagulation that is needed for cardiopulmonary bypass and DAPT that is needed for PCI.  Pt planned for meningeal artery embolization on 3/1 which may decrease his risk of rebleed over time (weeks).  D/w neurosurgery and cardiology - pt's risk of bleed into the SDH outweigh the risk of immediate acute coronary event at this time.  Plan for meningeal artery embolization with f/u with neurosurgery and cardiac surgery (Dr. Merino - [phone]) in 1 month to reassess risk of proceeding with CABG.  Cardiology to optimize pt's antianginal medications.       I personally have reviewed all pertinent and available history, labs, and imaging. I have examined the patient and reviewed the case with the team and the ACP. I have discussed the risks/benefits of the procedure and answered all questions.

## 2021-02-26 NOTE — CONSULT NOTE ADULT - SUBJECTIVE AND OBJECTIVE BOX
[facility] [place]), [facility]        Patient is a 76y old  Male who presents with a chief complaint of cp (2021 10:05)    Excerpt from H&P,"  HPI:  76y M pmhx CAD s/p CABG, HTN, HLD, chronic SDH present 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 Patient did nt get stress test due to adverse symptom experienced during last stress test and was sent home.    Pt was here few days ago for same, worked up for NSTEMI. Ended up having symptomatic relief prior to dc.    as per pt, he had recurrence of chest pain and  took 4 tablets of asa 81 this am and was brought to the emergency room. He denies any headache/weakness/numbness.   He is awaiting eval by cardiology          *****PAST MEDICAL / Surgical  HISTORY:  PAST MEDICAL & SURGICAL HISTORY:  BPH (benign prostatic hypertrophy)    Hyperlipidemia    CAD (coronary artery disease)    Diabetes mellitus  Type 2    HTN (hypertension)    S/P drug eluting coronary stent placement  [facility]    S/P primary angioplasty with coronary stent  1990s             *****FAMILY HISTORY:  FAMILY HISTORY:  Family history of diabetes mellitus (Sibling)  3 brothers alive with Diabetes             *****SOCIAL HISTORY:  Alcohol: None  Smoking: None         *****ALLERGIES:   Allergies    No Known Allergies    Intolerances             *****MEDICATIONS: current medication reviewed and documented.   MEDICATIONS  (STANDING):    MEDICATIONS  (PRN):           *****REVIEW OF SYSTEM:  GEN: no fever, no chills, no pain  RESP: no SOB, no cough, no sputum  CVS: +chest pain, no palpitations, no edema  GI: no abdominal pain, no nausea, no vomiting, no constipation, no diarrhea  : no dysurea, no frequency, no hematurea  Neuro: no headache, no dizziness  PSYCH: no anxiety, no depression  Derm : no itching, no rash         *****VITAL SIGNS:  T(C): 37.1 (21 @ 09:30), Max: 37.1 (21 @ 09:30)  HR: 79 (21 @ 09:30) (79 - 92)  BP: 139/76 (21 @ 09:30) (134/72 - 139/76)  RR: 16 (21 @ 09:30) (16 - 18)  SpO2: 100% (21 @ 09:30) (99% - 100%)  Wt(kg): --           *****PHYSICAL EXAM: anxious   pt is very clear about his wishes   Alert oriented x 3   Attention comprehension are fair. Able to name, repeat, read without any difficulty.   Able to follow 3 step commands.     EOMI fundi not visualized,  VFF to confrontration  No facial asymmetry   Tongue is midline   Palate elevates symmetrically   Moving all 4 ext symmetrically no pronator drift   Reflexes are symmetric throughout   sensation is grossly symmetric  Gait : not assessed.  B/L down going toes               *****LAB AND IMAGIN.9   7.61  )-----------( 158      ( 2021 09:39 )             41.3                   135  |  102  |  13  ----------------------------<  206<H>  4.5   |  17<L>  |  1.09    Ca    10.0      2021 09:39    TPro  7.0  /  Alb  4.0  /  TBili  1.4<H>  /  DBili  x   /  AST  38  /  ALT  30  /  AlkPhos  55      PT/INR - ( 2021 09:39 )   PT: 12.6 sec;   INR: 1.05 ratio         PTT - ( 2021 09:39 )  PTT:26.6 sec            CARDIAC MARKERS ( 2021 09:40 )  x     / x     / x     / x     / 4.1 ng/mL                  [All pertinent recent Imaging reports reviewed]         *****A S S E S S M E N T   A N D   P L A N :        76y M pmhx CAD s/p CABG, HTN, HLD, chronic SDH present 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 Patient did nt get stress test due to adverse symptom experienced during last stress test and was sent home.    Pt was here few days ago for same, worked up for NSTEMI. Ended up having symptomatic relief prior to dc.    as per pt, he had recurrence of chest pain and  took 4 tablets of asa 81 this am and was brought to the emergency room. He denies any headache/weakness/numbness.   He is awaiting eval by cardiology       Problem/Recommendations 1:  chronic subdural hematoma      of note pt took asa 325 this am for anginal chest pain   ct remains unchanged from prior   risk vs. benefits are being considered thoroughly, given his left moderate size subdural hematoma with mass effect, in order to optimize the management of his anginal chest pain.     discussed in detail with cardiology team Dr. Galloway and Neurosurgery  As per the last admission, heparin gtt without a bolus, ptt goal <65  as long as frequent neurological exams q2 h and higher level of monitoring of pt was being done was agreed by neurosurgery.   Low threshold for ct head if there should arise any changes in neuro exam    Problem/Recommendations 2: chest pain   defer to cardiology for management.        prognosis guarded as this pt remains at risk for decompensation.        ___________________________  Will follow with you.  Thank you,  Elissa García MD  Diplomate of the American Board of Neurology and Psychiatry.  Diplomate of the American Board of Vascular Neurology.   [facility]   Ph: [phone]    Differential diagnosis and plan of care discussed with patient after the evaluation.   Advanced care planning options discussed.   Pain assessed and judicious use of narcotics when appropriate was discussed.  Importance of Fall prevention discussed.  Counseling on Smoking and Alcohol cessation was offered when appropriate.  Counseling on Diet, exercise, and medication compliance was done.   83 minutes spent on the total encounter;  more than 50 % of the visit was spent on counseling  and or coordinating care by the attending physician.    Thank you for allowing me to participate in the care of this дмитрий patient. Please do not hesitate to call me if you have any questions.     This and subsequent notes were partially created using voice recognition software and will  inherently be subject to errors including those of syntax and sound alike substitutions which may escape proofreading. In such instances original meaning may be extrapolated by contextual derivation.    [facility]), [facility]        Patient is a 76y old  Male who presents with a chief complaint of cp (2021 10:05)    Excerpt from H&P,"  HPI:  76y M pmhx CAD s/p CABG, HTN, HLD, chronic SDH present 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 Patient did nt get stress test due to adverse symptom experienced during last stress test and was sent home.    Pt was here few days ago for same, worked up for NSTEMI. Ended up having symptomatic relief prior to dc.    as per pt, he had recurrence of chest pain and  took 4 tablets of asa 81 this am and was brought to the emergency room. He denies any headache/weakness/numbness.   He is awaiting eval by cardiology          *****PAST MEDICAL / Surgical  HISTORY:  PAST MEDICAL & SURGICAL HISTORY:  BPH (benign prostatic hypertrophy)    Hyperlipidemia    CAD (coronary artery disease)    Diabetes mellitus  Type 2    HTN (hypertension)    S/P drug eluting coronary stent placement  [facility]    S/P primary angioplasty with coronary stent  1990s             *****FAMILY HISTORY:  FAMILY HISTORY:  Family history of diabetes mellitus (Sibling)  3 brothers alive with Diabetes             *****SOCIAL HISTORY:  Alcohol: None  Smoking: None         *****ALLERGIES:   Allergies    No Known Allergies    Intolerances             *****MEDICATIONS: current medication reviewed and documented.   MEDICATIONS  (STANDING):    MEDICATIONS  (PRN):           *****REVIEW OF SYSTEM:  GEN: no fever, no chills, no pain  RESP: no SOB, no cough, no sputum  CVS: +chest pain, no palpitations, no edema  GI: no abdominal pain, no nausea, no vomiting, no constipation, no diarrhea  : no dysurea, no frequency, no hematurea  Neuro: no headache, no dizziness  PSYCH: no anxiety, no depression  Derm : no itching, no rash         *****VITAL SIGNS:  T(C): 37.1 (21 @ 09:30), Max: 37.1 (21 @ 09:30)  HR: 79 (21 @ 09:30) (79 - 92)  BP: 139/76 (21 @ 09:30) (134/72 - 139/76)  RR: 16 (21 @ 09:30) (16 - 18)  SpO2: 100% (21 @ 09:30) (99% - 100%)  Wt(kg): --           *****PHYSICAL EXAM: anxious   pt is very clear about his wishes   Alert oriented x 3   Attention comprehension are fair. Able to name, repeat, read without any difficulty.   Able to follow 3 step commands.     EOMI fundi not visualized,  VFF to confrontration  No facial asymmetry   Tongue is midline   Palate elevates symmetrically   Moving all 4 ext symmetrically no pronator drift   Reflexes are symmetric throughout   sensation is grossly symmetric  Gait : not assessed.  B/L down going toes               *****LAB AND IMAGIN.9   7.61  )-----------( 158      ( 2021 09:39 )             41.3                   135  |  102  |  13  ----------------------------<  206<H>  4.5   |  17<L>  |  1.09    Ca    10.0      2021 09:39    TPro  7.0  /  Alb  4.0  /  TBili  1.4<H>  /  DBili  x   /  AST  38  /  ALT  30  /  AlkPhos  55      PT/INR - ( 2021 09:39 )   PT: 12.6 sec;   INR: 1.05 ratio         PTT - ( 2021 09:39 )  PTT:26.6 sec            CARDIAC MARKERS ( 2021 09:40 )  x     / x     / x     / x     / 4.1 ng/mL                  [All pertinent recent Imaging reports reviewed]         *****A S S E S S M E N T   A N D   P L A N :        76y M pmhx CAD s/p CABG, HTN, HLD, chronic SDH present 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 Patient did nt get stress test due to adverse symptom experienced during last stress test and was sent home.    Pt was here few days ago for same, worked up for NSTEMI. Ended up having symptomatic relief prior to dc.    as per pt, he had recurrence of chest pain and  took 4 tablets of asa 81 this am and was brought to the emergency room. He denies any headache/weakness/numbness.   He is awaiting eval by cardiology       Problem/Recommendations 1:  chronic subdural hematoma      of note pt took asa 325 this am for anginal chest pain   ct remains unchanged from prior   risk vs. benefits are being considered thoroughly, given his left moderate size subdural hematoma with mass effect, in order to optimize the management of his anginal chest pain.     discussed in detail with cardiology team Dr. Galloway and Neurosurgery   frequent neurological exams q2 h and higher level of monitoring of pt was being done was agreed by neurosurgery.   Low threshold for ct head if there should arise any changes in neuro exam    Problem/Recommendations 2: chest pain   defer to cardiology for management.        prognosis guarded as this pt remains at risk for decompensation.        ___________________________  Will follow with you.  Thank you,  Elissa García MD  Diplomate of the American Board of Neurology and Psychiatry.  Diplomate of the American Board of Vascular Neurology.   [facility]   Ph: [phone]    Differential diagnosis and plan of care discussed with patient after the evaluation.   Advanced care planning options discussed.   Pain assessed and judicious use of narcotics when appropriate was discussed.  Importance of Fall prevention discussed.  Counseling on Smoking and Alcohol cessation was offered when appropriate.  Counseling on Diet, exercise, and medication compliance was done.   83 minutes spent on the total encounter;  more than 50 % of the visit was spent on counseling  and or coordinating care by the attending physician.    Thank you for allowing me to participate in the care of this дмитрий patient. Please do not hesitate to call me if you have any questions.     This and subsequent notes were partially created using voice recognition software and will  inherently be subject to errors including those of syntax and sound alike substitutions which may escape proofreading. In such instances original meaning may be extrapolated by contextual derivation.

## 2021-02-26 NOTE — ED PROVIDER NOTE - OBJECTIVE STATEMENT
77yo M hx of HTN, HLD, multiple chronic subdurals comes to ED for cp. Chest pain substernal, since last night, constant, no radiation, took 3 nitros last night, 1 ASA 324mg this morning. No associated with sob or diaphoresis. Pt was here few days ago for same, worked up for NSTEMI. Ended up having symptomatic relief prior to dc. In hospital TTE was interdeterminate, trop 124. Neurosurgery recs were to avoid anti-platelets and AC. Denies f/c, cough, sob, abdominal pain, change in bowel.

## 2021-02-26 NOTE — CONSULT NOTE ADULT - ASSESSMENT
74y M with hx. of CAD s/p PCI to the LAD and RCA in the past (last angiogram in 2015 with [facility]), HTN, & HLD.  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  Return after recent discharge, again with angina in an unstable pattern.  To be seen by neurosurgery given potential need for cardiac intervention.    REC:  - Continue current meds.  Increase Coreg to 25 mg. bid if vital signs remain stable.  - Will likely need diagnostic cardiac catheterization given recurrent sxs.  - Neurosurg to see, in event cardiac intervention is needed, re anti-platelet therapy.    Spoke to Dr. Carmichael, patient's cardiologist.      Moy Galloway M.D.  Cardiology fellow    Markell Rosado M.D.  Cardiology Attending, Consult Service    For Cardiology consults and questions, all Cardiology service information can be found 24/7 on [email or web address], password: [email or web address]

## 2021-02-26 NOTE — ED ADULT NURSE REASSESSMENT NOTE - NS ED NURSE REASSESS COMMENT FT1
PT currently no longer in pain. Reports wants to utilize the bathroom, provided urinal educated pt on the importance of pressing the call bell.

## 2021-02-26 NOTE — ED PROVIDER NOTE - SECONDARY DIAGNOSIS.
Coronary artery disease involving native coronary artery of native heart with other form of angina pectoris SDH (subdural hematoma)

## 2021-02-26 NOTE — H&P ADULT - NSHPLABSRESULTS_GEN_ALL_CORE
Lab Results:  CBC  CBC Full  -  ( 26 Feb 2021 09:39 )  WBC Count : 7.61 K/uL  RBC Count : 4.62 M/uL  Hemoglobin : 13.9 g/dL  Hematocrit : 41.3 %  Platelet Count - Automated : 158 K/uL  Mean Cell Volume : 89.4 fl  Mean Cell Hemoglobin : 30.1 pg  Mean Cell Hemoglobin Concentration : 33.7 gm/dL  Auto Neutrophil # : 5.10 K/uL  Auto Lymphocyte # : 1.65 K/uL  Auto Monocyte # : 0.68 K/uL  Auto Eosinophil # : 0.13 K/uL  Auto Basophil # : 0.03 K/uL  Auto Neutrophil % : 67.0 %  Auto Lymphocyte % : 21.7 %  Auto Monocyte % : 8.9 %  Auto Eosinophil % : 1.7 %  Auto Basophil % : 0.4 %    .		Differential:	[] Automated		[] Manual  Chemistry                        13.9   7.61  )-----------( 158      ( 26 Feb 2021 09:39 )             41.3     02-26    135  |  102  |  13  ----------------------------<  206<H>  4.5   |  17<L>  |  1.09    Ca    10.0      26 Feb 2021 09:39    TPro  7.0  /  Alb  4.0  /  TBili  1.4<H>  /  DBili  x   /  AST  38  /  ALT  30  /  AlkPhos  55  02-26    LIVER FUNCTIONS - ( 26 Feb 2021 09:39 )  Alb: 4.0 g/dL / Pro: 7.0 g/dL / ALK PHOS: 55 U/L / ALT: 30 U/L / AST: 38 U/L / GGT: x           PT/INR - ( 26 Feb 2021 09:39 )   PT: 12.6 sec;   INR: 1.05 ratio         PTT - ( 26 Feb 2021 09:39 )  PTT:26.6 sec          MICROBIOLOGY/CULTURES:      RADIOLOGY RESULTS: reviewed

## 2021-02-26 NOTE — CONSULT NOTE ADULT - SUBJECTIVE AND OBJECTIVE BOX
Patient seen and evaluated at bedside    Chief Complaint:    HPI:      PMHx:   BPH (benign prostatic hypertrophy)    Hyperlipidemia    CAD (coronary artery disease)    Diabetes mellitus    HTN (hypertension)        PSHx:   S/P drug eluting coronary stent placement    S/P primary angioplasty with coronary stent        Allergies:  No Known Allergies      Home Meds:    Current Medications:       FAMILY HISTORY:  Family history of diabetes mellitus (Sibling)  3 brothers alive with Diabetes        Social History: Personally reviewed   No tobacco, EtOH or IVDU     REVIEW OF SYSTEMS:  Constitutional:     [x ] negative [ ] fevers [ ] chills [ ] weight loss [ ] weight gain  HEENT:                  [x ] negative [ ] dry eyes [ ] eye irritation [ ] postnasal drip [ ] nasal congestion  CV:                         [ x] negative  [ ] chest pain [ ] orthopnea [ ] palpitations [ ] murmur  Resp:                     [x ] negative [ ] cough [ ] shortness of breath [ ] dyspnea [ ] wheezing [ ] sputum [ ]hemoptysis  GI:                          [ x] negative [ ] nausea [ ] vomiting [ ] diarrhea [ ] constipation [ ] abd pain [ ] dysphagia   :                        [ x] negative [ ] dysuria [ ] nocturia [ ] hematuria [ ] increased urinary frequency  Musculoskeletal: [x ] negative [ ] back pain [ ] myalgias [ ] arthralgias [ ] fracture  Skin:                       [ x] negative [ ] rash [ ] itch  Neurological:        [ x] negative [ ] headache [ ] dizziness [ ] syncope [ ] weakness [ ] numbness  Psychiatric:           [ x] negative [ ] anxiety [ ] depression  Endocrine:            [ x] negative [ ] diabetes [ ] thyroid problem  Heme/Lymph:      [ x] negative [ ] anemia [ ] bleeding problem  Allergic/Immune: [ x] negative [ ] itchy eyes [ ] nasal discharge [ ] hives [ ] angioedema    [ x] All other systems negative  [ ] Unable to assess ROS due to      Physical Exam:  T(F): 98.7 (), Max: 98.7 ()  HR: 79 () (79 - 92)  BP: 139/76 () (134/72 - 139/76)  RR: 16 ()  SpO2: 100% ()  GENERAL: No acute distress, well-developed  HEAD:  Atraumatic, Normocephalic  ENT: EOMI, PERRLA, conjunctiva and sclera clear, Neck supple, No JVD, moist mucosa  CHEST/LUNG: Clear to auscultation bilaterally; No wheeze, equal breath sounds bilaterally   BACK: No spinal tenderness  HEART: Regular rate and rhythm; No murmurs, rubs, or gallops  ABDOMEN: Soft, Nontender, Nondistended; Bowel sounds present  EXTREMITIES:  No clubbing, cyanosis, or edema  PSYCH: Nl behavior, nl affect  NEUROLOGY: AAOx3, non-focal, cranial nerves intact  SKIN: Normal color, No rashes or lesions  LINES:    Cardiovascular Diagnostic Testing:    ECG: Personally reviewed    Echo: Personally reviewed    Stress Testing: Personally reviewed     Angiogram: Personally reviewed     Imaging:    CXR: Personally reviewed    Labs: Personally reviewed                        13.9   7.61  )-----------( 158      ( 2021 09:39 )             41.3               Total Cholesterol: 136  LDL: --  HDL: 47  T      Thyroid Stimulating Hormone, Serum: 0.92 uIU/mL ( @ 15:11)   Patient seen and evaluated at bedside    Chief Complaint: cp     HPI:  74y M with hx. of CAD s/p PCI to the LAD and RCA in the past ([facility]), HTN, & HLD.  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 Recent admission for exertional symptoms, with CE consistent with NSTE-ACS.  Initially pt was not cath lab candidate due to bleeding risk from his SDH upon evaluation by the NSGY team. Pt was medically managed and optimized for angina symptoms and discharged on maximally tolerated medical regimen of nitrate and beta blocker. Since discharge early this wekk, pt has been having refractory angina not relieved with NTG which prompted         PMHx:   BPH (benign prostatic hypertrophy)    Hyperlipidemia    CAD (coronary artery disease)    Diabetes mellitus    HTN (hypertension)        PSHx:   S/P drug eluting coronary stent placement    S/P primary angioplasty with coronary stent        Allergies:  No Known Allergies      Home Meds:    Current Medications:       FAMILY HISTORY:  Family history of diabetes mellitus (Sibling)  3 brothers alive with Diabetes        Social History: Personally reviewed   No tobacco, EtOH or IVDU     REVIEW OF SYSTEMS:  Constitutional:     [x ] negative [ ] fevers [ ] chills [ ] weight loss [ ] weight gain  HEENT:                  [x ] negative [ ] dry eyes [ ] eye irritation [ ] postnasal drip [ ] nasal congestion  CV:                         [ x] negative  [ ] chest pain [ ] orthopnea [ ] palpitations [ ] murmur  Resp:                     [x ] negative [ ] cough [ ] shortness of breath [ ] dyspnea [ ] wheezing [ ] sputum [ ]hemoptysis  GI:                          [ x] negative [ ] nausea [ ] vomiting [ ] diarrhea [ ] constipation [ ] abd pain [ ] dysphagia   :                        [ x] negative [ ] dysuria [ ] nocturia [ ] hematuria [ ] increased urinary frequency  Musculoskeletal: [x ] negative [ ] back pain [ ] myalgias [ ] arthralgias [ ] fracture  Skin:                       [ x] negative [ ] rash [ ] itch  Neurological:        [ x] negative [ ] headache [ ] dizziness [ ] syncope [ ] weakness [ ] numbness  Psychiatric:           [ x] negative [ ] anxiety [ ] depression  Endocrine:            [ x] negative [ ] diabetes [ ] thyroid problem  Heme/Lymph:      [ x] negative [ ] anemia [ ] bleeding problem  Allergic/Immune: [ x] negative [ ] itchy eyes [ ] nasal discharge [ ] hives [ ] angioedema    [ x] All other systems negative  [ ] Unable to assess ROS due to      Physical Exam:  T(F): 98.7 (-), Max: 98.7 ()  HR: 79 () (79 - 92)  BP: 139/76 () (134/72 - 139/76)  RR: 16 (-)  SpO2: 100% ()  GENERAL: No acute distress, well-developed  HEAD:  Atraumatic, Normocephalic  ENT: EOMI, PERRLA, conjunctiva and sclera clear, Neck supple, No JVD, moist mucosa  CHEST/LUNG: Clear to auscultation bilaterally; No wheeze, equal breath sounds bilaterally   BACK: No spinal tenderness  HEART: Regular rate and rhythm; No murmurs, rubs, or gallops  ABDOMEN: Soft, Nontender, Nondistended; Bowel sounds present  EXTREMITIES:  No clubbing, cyanosis, or edema  PSYCH: Nl behavior, nl affect  NEUROLOGY: AAOx3, non-focal, cranial nerves intact  SKIN: Normal color, No rashes or lesions  LINES:    Cardiovascular Diagnostic Testing:    ECG: Personally reviewed    Echo: Personally reviewed    Stress Testing: Personally reviewed     Angiogram: Personally reviewed     Imaging:    CXR: Personally reviewed    Labs: Personally reviewed                        13.9   7.61  )-----------( 158      ( 2021 09:39 )             41.3               Total Cholesterol: 136  LDL: --  HDL: 47  T      Thyroid Stimulating Hormone, Serum: 0.92 uIU/mL ( @ 15:11)

## 2021-02-26 NOTE — ED ADULT NURSE NOTE - OBJECTIVE STATEMENT
77 yo m pmhx of DM, BPH, HLD, HTN, CAD, subdural hematoma, stent placement presents to the Ed with c/o left sided chest pain since last night, reports he was laying down and the chest pain started. PT states he has been experiencing chest pain was here and was discharged last week. PT states he was advised if he has chest pain to return to the ED, pt states he endorsed 3 of nitro which did not relieve the pain, pt also endorsed 325 of asa at home. PT states he did not experience any other symptoms with the chest pain, states the pain was constant reports it was "severe like giving birth." The chest pain was constant, and now is currently in no pain. PT placed on cardiac monitoring, Pt has pos and equal sensation to extremities bilat, pos and equal strength to extremities x 4, strong peripheral pulses x 4, no numbness/tingling, abd soft and non distended, skin is warm and in tact provided call bell which is within reach. PT denies SOB, back pain, jaw pain, neck pain, palpitations, diaphoresis, n/v.

## 2021-02-26 NOTE — ED PROVIDER NOTE - NS ED ROS FT
Constitutional: no fevers, chills  HEENT: no cough, rhinorrhea  Cardiac: +CP  Respiratory: no SOB  GI: no n/v, abd pain, bloody or dark stools  : no dysuria, frequency, or hematuria  MSK: no joint pain  Skin: no rashes  Neuro: no headache, change in vision, weakness  Psych: negative

## 2021-02-26 NOTE — ED PROVIDER NOTE - CARE PLAN
Principal Discharge DX:	NSTEMI (non-ST elevated myocardial infarction)  Secondary Diagnosis:	Coronary artery disease involving native coronary artery of native heart with other form of angina pectoris  Secondary Diagnosis:	SDH (subdural hematoma)

## 2021-02-26 NOTE — PROGRESS NOTE ADULT - ASSESSMENT
Updated plan, plan for MMA embo Monday:    - ADM medicine, q4h neuro checks  - Preop for MMA embo Monday AM for chronic sdh  - Please document med/cards clearance for embo ASAP  - Send preop labs: T&Sx1, MRSA/MSSA swab  - COVID neg 2/26  - MMA embo will likely help reduce risk of rebleeding events in the future, and may even decrease the size of the bleed, but will not necessarily acutely decrease the risk of hemorrhage in the immediate postop period.   - Risks associated with expanding SDH should be weighed accordingly against the risks of delaying cardiac intervention by the primary team and discussed with patient  - continue q4h neuro checks, pain control and remaining care per primary  - if goes for cath, please repeat HCT after cath or any other procedures or for any change in exam

## 2021-02-26 NOTE — ED PROVIDER NOTE - PROGRESS NOTE DETAILS
COLTEN De La Vega (Resident) - Spoke to cards fellow who saw pt few days ago. Recommended neurosurg consult. D/w NS, will obtain CTH for baseline given likely need for cardiac intervention including possible ac.

## 2021-02-26 NOTE — ED PROVIDER NOTE - CLINICAL SUMMARY MEDICAL DECISION MAKING FREE TEXT BOX
Dr. Elliott (Attending Physician)  76 year old male with ho cad s/p stent , SDH pw intermittent chest pain had elevated trop to 120s in hospital was discharged 2 days ago. Took asa 325 mg despite rec to avoid with SDH. EGC with LBBB no Sgarbossa. Will send trops, and likely admit. Pt w/ multiple cardiac risk factors and recent NSTEMI w/ recurrent CP. VSS. Exam unremarkable w/o focal neuro findings. Concern for acs. Labs, cardiac markers, ekg, cxr, cards consult.    Dr. Elliott (Attending Physician)  76 year old male with ho cad s/p stent , SDH pw intermittent chest pain had elevated trop to 120s in hospital was discharged 2 days ago. Took asa 325 mg despite rec to avoid with SDH. EGC with LBBB no Sgarbossa. Will send trops, and likely admit.

## 2021-02-26 NOTE — CONSULT NOTE ADULT - PROBLEM SELECTOR RECOMMENDATION 9
77 yo Male with multiple co-morbidities including multiple chronic subdural hematoma now s/p cardiac cath found to have multivessel CAD. Patient has been seen and evaluated by Neurosurgery who deemed patient is medically high risk for subdural hematoma expansion if started on systemic anticoagulation and/or antiplatet agents required for cardiac surgery. Risks vs benefits must be considered if delaying cardiac surgery for revascularization outweighs neurological risk associated with SDH expansion given the patient’s medical history and current clinical condition.  CTS Dr. Merino to further discuss case with neurosurgery attending in AM.  CTS to follow with reccs. CT surgery evaluation in progress   Antiplatelets/systemic anticoagulation being held for now as per Neurosurgery given high risk for SDH expansion   Continue Coreg & Atorvastatin   Dr. Merino to further discuss case with neurosurgery attending in AM  Continue care as per primary team

## 2021-02-26 NOTE — ED PROVIDER NOTE - PHYSICAL EXAMINATION
General: non-toxic, NAD  HEENT: NCAT, PERRL  Cardiac: RRR, no murmurs, 2+ peripheral pulses  Chest: CTA  Abdomen: soft, non-distended, bowel sounds present, no ttp, no rebound or guarding  Extremities: no peripheral edema, calf tenderness, or leg size discrepancies  Skin: no rashes  Neuro: AAOx4, CNs intact, 5+motor, sensory grossly intact  Psych: mood and affect appropriate

## 2021-02-26 NOTE — ASU PATIENT PROFILE, ADULT - FALLEN IN THE PAST
From: Kimberly Zepeda  To: Beth Sutherland MD  Sent: 6/10/2020 11:41 PM CDT  Subject: Other    Hello Dr. Sutherland,  This is Kimberly Zepeda and I have a concern. About a week ago I started getting a pain in my left breast/ armpit area. It hurts and feels kind of like a lump. I can tell one breast is a bit bigger than the other but I'm not sure if it's always been that way. I have waited about a week now and it hasn't gotten any better. The way I would describe the pain is like a bruise and it hurts even when I don't touch the area. The area feels heavy too. I am a bit worried about it and I don't want to wait too long before getting it examined. I was wondering what I should do and if maybe I can get it checked out. Thank you!  
no

## 2021-02-26 NOTE — ED ADULT NURSE REASSESSMENT NOTE - NS ED NURSE REASSESS COMMENT FT1
PT reports chest pain, called padma ALST at [id number], told her the pt is lissay reporting chest pain, 7/10. No other current distress. EKG completed. WIll medicate as per orders.

## 2021-02-26 NOTE — CONSULT NOTE ADULT - SUBJECTIVE AND OBJECTIVE BOX
Surgeon: Dr. Merino    Requesting Physician: Dr. Cody    HISTORY OF PRESENT ILLNESS:  77 y/o M w/ PMHx of CAD s/p stents (not on Plavix or Brilinta), HTN, HLD, and chronic SDH presented to [facility] ED 2/26 c/o substernal chest pain x 1 day. Pt described pain as constant, non-radiating and not associated with SOB or diaphoresis. He states that he took Nitro x 3 and  mg this morning. Pt was admitted to [facility] on 2/22 for the same symptoms, which resolved s/p Nitro x 5. Pt was seen by neurology and neurosurgery at that time, who advised against use of anti-clotting agents due to hx of SDH.  Anti-anginal meds were increased, and pt was d/c'd as he remained asymptomatic. Cardiac cath performed 2/26 showed multivessel CAD. CT surgery consulted for CABG evaluation.       PAST MEDICAL & SURGICAL HISTORY:  BPH (benign prostatic hypertrophy)  Hyperlipidemia  CAD (coronary artery disease)  Diabetes mellitus - Type 2  HTN (hypertension)  S/P drug eluting coronary stent placement - Ramus  S/P primary angioplasty with coronary stent - 1990s      MEDICATIONS  (STANDING):  atorvastatin 40 milliGRAM(s) Oral at bedtime  carvedilol 12.5 milliGRAM(s) Oral every 12 hours  finasteride 5 milliGRAM(s) Oral daily  gabapentin 300 milliGRAM(s) Oral two times a day  glucagon  Injectable 1 milliGRAM(s) IntraMuscular once  insulin glargine Injectable (LANTUS) 20 Unit(s) SubCutaneous at bedtime  insulin lispro (ADMELOG) corrective regimen sliding scale   SubCutaneous three times a day before meals  isosorbide mononitrate ER Tablet (IMDUR) 60 milliGRAM(s) Oral daily  morphine Injectable 2 milliGRAM(s) IV Push once  tamsulosin 0.4 milliGRAM(s) Oral at bedtime    MEDICATIONS  (PRN):  nitroglycerin SubLingual 0.4 milliGRAM(s) SubLingual once PRN Chest Pain      Allergies: No Known Allergies      SOCIAL HISTORY:  Smoker: Denies  ETOH use: Denies  Illicit Drug use: Denies   Assisted device use: Denies  Lives with: Wife    FAMILY HISTORY:  Family history of diabetes mellitus (Sibling)  3 brothers alive with Diabetes      Review of Systems:  CONSTITUTIONAL: Denies fevers / chills, sweats, fatigue, weight loss, weight gain                                       NEURO:  Denies paresthesias, seizures, syncope, confusion                                                                                  EYES:  Denies blurry vision, discharge, pain, loss of vision                                                                                    ENMT:  Denies difficulty hearing, vertigo, dysphagia, epistaxis, recent dental work                                       CV: (+) chest pain, Denies palpitations, GUPTA, orthopnea                                                                                           RESPIRATORY:  Denies wheezing, SOB, cough / sputum, hemoptysis                                                               GI:  Denies nausea, vomiting, diarrhea, constipation, melena                                                                          : Denies hematuria, dysuria, urgency, incontinence                                                                                          MUSCULOSKELETAL:  Denies arthritis, joint swelling, muscle weakness                                                             SKIN/BREAST:  Denies rash, itching, hair loss, masses                                                                                              PSYCH:  Denies depression, anxiety, suicidal ideation                                                                                                HEME/LYMPH:  Denies bruises easily, enlarged lymph nodes, tender lymph nodes                                          ENDOCRINE:  Denies cold intolerance, heat intolerance, polydipsia                                                                        Vital Signs Last 24 Hrs  T(C): 36.7 (26 Feb 2021 23:44), Max: 37.1 (26 Feb 2021 09:30)  T(F): 98 (26 Feb 2021 23:44), Max: 98.7 (26 Feb 2021 09:30)  HR: 76 (27 Feb 2021 00:11) (68 - 92)  BP: 155/76 (27 Feb 2021 00:11) (120/63 - 165/77)  RR: 18 (27 Feb 2021 00:11) (16 - 18)  SpO2: 99% (27 Feb 2021 00:11) (98% - 100%)    Physical Exam  General: Well nourished, well developed, no acute distress.                                                         Neuro: Normal exam oriented to person/place & time with no focal motor or sensory  deficits.                    Eyes: Normal exam of conjunctiva & lids, pupils equally reactive.   ENT: Normal exam of nasal/oral mucosa with absence of cyanosis.   Neck: Normal exam of jugular veins, trachea & thyroid.   Chest: Normal lung exam with good air movement absence of wheezes, rales, or rhonchi:                                                                          CV:  Auscultation: normal [ x ] S3[ ] S4[ ] Irregular [ ] Rub[ ] Clicks[ ]  Murmurs none:[ x ]systolic [ ]  diastolic [ ] holosystolic [ ]  Carotids: No Bruits[ x ] Abdominal Aorta: normal [ x ] nonpalpable[ ]                                                                         GI: Normal exam of abdomen, liver & spleen with no noted masses or tenderness.                                                                                              Extremities: (+) R groin cath site w/ dressing in place - CDI, soft, no bleeding or hematoma noted. Normal no evidence of cyanosis or deformity Edema: none  Lower Extremity Pulses: Right[ x ] Left[ x ] Varicosities: none   SKIN: Normal exam to inspection & palpation.                                                             LABS:                        13.9   7.61  )-----------( 158      ( 26 Feb 2021 09:39 )             41.3     02-26    135  |  102  |  13  ----------------------------<  206<H>  4.5   |  17<L>  |  1.09    Ca    10.0      26 Feb 2021 09:39    TPro  7.0  /  Alb  4.0  /  TBili  1.4<H>  /  DBili  x   /  AST  38  /  ALT  30  /  AlkPhos  55  02-26    PT/INR - ( 26 Feb 2021 09:39 )   PT: 12.6 sec;   INR: 1.05 ratio       PTT - ( 26 Feb 2021 09:39 )  PTT:26.6 sec    CARDIAC MARKERS ( 26 Feb 2021 09:40 )  x     / x     / x     / x     / 4.1 ng/mL      RADIOLOGY & ADDITIONAL STUDIES:    Xray Chest 2 Views PA/Lat (02.26.21 @ 10:18): IMPRESSION: Clear lungs.    CT Head No Cont (02.26.21 @ 09:57): IMPRESSION: Stable exam. Mixed density holohemispheric left subdural hematoma with mass effect and mild rightward midline shift .    12 Lead ECG (02.26.21 @ 09:11): SINUS RHYTHM WITH 1ST DEGREE A-V BLOCK. LEFT AXIS DEVIATION. LEFT BUNDLE BRANCH BLOCK. ABNORMAL ECG. WHEN COMPARED WITH ECG OF 24-FEB-2021 09:43,  No SIGNIFICANT CHANGES HAVE OCCURRED    TTE with Doppler (w/Cont) (02.23.21 @ 06:00): 1. Moderately dilated left atrium.  LA volume index = 48 cc/m2. 2. Endocardium not well visualized; despite the use of Definity.  Probable septal hypokinesis. Septal motion is consistent with LBBB.     Cardiac Cath Lab - Adult (02.26.21 @ 16:51): The coronary circulation is right dominant.  Mid left main: There was a 50 % stenosis. Mid LAD: There was a 90 % stenosis. Ostial circumflex: There was a 90 % stenosis. Proximal ramus intermedius: There was a 70 % stenosis. Proximal RCA: There was a 80 % stenosis.

## 2021-02-26 NOTE — CONSULT NOTE ADULT - PROBLEM SELECTOR RECOMMENDATION 2
Neurosurgery following  Antiplatelets/systemic anticoagulation being held for now as per Neurosurgery   Continue care as per primary team

## 2021-02-26 NOTE — H&P ADULT - NSHPPHYSICALEXAM_GEN_ALL_CORE
General: WN/WD NAD  PERRLA  Neurology: A&Ox3, nonfocal, VILCHIS x 4  Respiratory: CTA B/L  CV: RRR, S1S2, no murmurs, rubs or gallops  Abdominal: Soft, NT, ND +BS, Last BM  Extremities: No edema, + peripheral pulses  Skin Normal

## 2021-02-27 DIAGNOSIS — I10 ESSENTIAL (PRIMARY) HYPERTENSION: ICD-10-CM

## 2021-02-27 LAB
ALBUMIN SERPL ELPH-MCNC: 3.9 G/DL — SIGNIFICANT CHANGE UP (ref 3.3–5)
ALP SERPL-CCNC: 55 U/L — SIGNIFICANT CHANGE UP (ref 40–120)
ALT FLD-CCNC: 26 U/L — SIGNIFICANT CHANGE UP (ref 10–45)
ANION GAP SERPL CALC-SCNC: 10 MMOL/L — SIGNIFICANT CHANGE UP (ref 5–17)
AST SERPL-CCNC: 29 U/L — SIGNIFICANT CHANGE UP (ref 10–40)
BILIRUB SERPL-MCNC: 1.5 MG/DL — HIGH (ref 0.2–1.2)
BLD GP AB SCN SERPL QL: NEGATIVE — SIGNIFICANT CHANGE UP
BUN SERPL-MCNC: 16 MG/DL — SIGNIFICANT CHANGE UP (ref 7–23)
CALCIUM SERPL-MCNC: 9.7 MG/DL — SIGNIFICANT CHANGE UP (ref 8.4–10.5)
CHLORIDE SERPL-SCNC: 103 MMOL/L — SIGNIFICANT CHANGE UP (ref 96–108)
CO2 SERPL-SCNC: 23 MMOL/L — SIGNIFICANT CHANGE UP (ref 22–31)
CREAT SERPL-MCNC: 1.08 MG/DL — SIGNIFICANT CHANGE UP (ref 0.5–1.3)
GLUCOSE BLDC GLUCOMTR-MCNC: 134 MG/DL — HIGH (ref 70–99)
GLUCOSE BLDC GLUCOMTR-MCNC: 200 MG/DL — HIGH (ref 70–99)
GLUCOSE BLDC GLUCOMTR-MCNC: 203 MG/DL — HIGH (ref 70–99)
GLUCOSE BLDC GLUCOMTR-MCNC: 236 MG/DL — HIGH (ref 70–99)
GLUCOSE SERPL-MCNC: 182 MG/DL — HIGH (ref 70–99)
HCT VFR BLD CALC: 40.1 % — SIGNIFICANT CHANGE UP (ref 39–50)
HGB BLD-MCNC: 13.5 G/DL — SIGNIFICANT CHANGE UP (ref 13–17)
MCHC RBC-ENTMCNC: 30.3 PG — SIGNIFICANT CHANGE UP (ref 27–34)
MCHC RBC-ENTMCNC: 33.7 GM/DL — SIGNIFICANT CHANGE UP (ref 32–36)
MCV RBC AUTO: 89.9 FL — SIGNIFICANT CHANGE UP (ref 80–100)
MRSA PCR RESULT.: SIGNIFICANT CHANGE UP
NRBC # BLD: 0 /100 WBCS — SIGNIFICANT CHANGE UP (ref 0–0)
PLATELET # BLD AUTO: 144 K/UL — LOW (ref 150–400)
POTASSIUM SERPL-MCNC: 3.8 MMOL/L — SIGNIFICANT CHANGE UP (ref 3.5–5.3)
POTASSIUM SERPL-SCNC: 3.8 MMOL/L — SIGNIFICANT CHANGE UP (ref 3.5–5.3)
PROT SERPL-MCNC: 6.5 G/DL — SIGNIFICANT CHANGE UP (ref 6–8.3)
RBC # BLD: 4.46 M/UL — SIGNIFICANT CHANGE UP (ref 4.2–5.8)
RBC # FLD: 12.7 % — SIGNIFICANT CHANGE UP (ref 10.3–14.5)
RH IG SCN BLD-IMP: POSITIVE — SIGNIFICANT CHANGE UP
S AUREUS DNA NOSE QL NAA+PROBE: SIGNIFICANT CHANGE UP
SARS-COV-2 IGG SERPL QL IA: NEGATIVE — SIGNIFICANT CHANGE UP
SARS-COV-2 IGM SERPL IA-ACNC: <0.1 INDEX — SIGNIFICANT CHANGE UP
SARS-COV-2 RNA SPEC QL NAA+PROBE: SIGNIFICANT CHANGE UP
SODIUM SERPL-SCNC: 136 MMOL/L — SIGNIFICANT CHANGE UP (ref 135–145)
TROPONIN T, HIGH SENSITIVITY RESULT: 133 NG/L — HIGH (ref 0–51)
TROPONIN T, HIGH SENSITIVITY RESULT: 146 NG/L — HIGH (ref 0–51)
WBC # BLD: 6.87 K/UL — SIGNIFICANT CHANGE UP (ref 3.8–10.5)
WBC # FLD AUTO: 6.87 K/UL — SIGNIFICANT CHANGE UP (ref 3.8–10.5)

## 2021-02-27 PROCEDURE — 99223 1ST HOSP IP/OBS HIGH 75: CPT

## 2021-02-27 PROCEDURE — 99233 SBSQ HOSP IP/OBS HIGH 50: CPT | Mod: GC

## 2021-02-27 RX ORDER — NITROGLYCERIN 6.5 MG
0.4 CAPSULE, EXTENDED RELEASE ORAL ONCE
Refills: 0 | Status: COMPLETED | OUTPATIENT
Start: 2021-02-27 | End: 2021-02-28

## 2021-02-27 RX ADMIN — ATORVASTATIN CALCIUM 40 MILLIGRAM(S): 80 TABLET, FILM COATED ORAL at 20:10

## 2021-02-27 RX ADMIN — Medication 1: at 16:44

## 2021-02-27 RX ADMIN — INSULIN GLARGINE 20 UNIT(S): 100 INJECTION, SOLUTION SUBCUTANEOUS at 21:21

## 2021-02-27 RX ADMIN — TAMSULOSIN HYDROCHLORIDE 0.4 MILLIGRAM(S): 0.4 CAPSULE ORAL at 20:10

## 2021-02-27 RX ADMIN — GABAPENTIN 300 MILLIGRAM(S): 400 CAPSULE ORAL at 04:27

## 2021-02-27 RX ADMIN — FINASTERIDE 5 MILLIGRAM(S): 5 TABLET, FILM COATED ORAL at 11:29

## 2021-02-27 RX ADMIN — GABAPENTIN 300 MILLIGRAM(S): 400 CAPSULE ORAL at 16:45

## 2021-02-27 RX ADMIN — ISOSORBIDE MONONITRATE 60 MILLIGRAM(S): 60 TABLET, EXTENDED RELEASE ORAL at 11:29

## 2021-02-27 RX ADMIN — CARVEDILOL PHOSPHATE 12.5 MILLIGRAM(S): 80 CAPSULE, EXTENDED RELEASE ORAL at 16:45

## 2021-02-27 RX ADMIN — CARVEDILOL PHOSPHATE 12.5 MILLIGRAM(S): 80 CAPSULE, EXTENDED RELEASE ORAL at 04:27

## 2021-02-27 RX ADMIN — Medication 2: at 11:29

## 2021-02-27 NOTE — PROGRESS NOTE ADULT - SUBJECTIVE AND OBJECTIVE BOX
Patient seen and examined at bedside.    --Anticoagulation--    T(C): 36.6 (02-27-21 @ 04:48), Max: 37.1 (02-26-21 @ 09:30)  HR: 66 (02-27-21 @ 04:48) (66 - 92)  BP: 147/63 (02-27-21 @ 04:48) (120/63 - 165/77)  RR: 18 (02-27-21 @ 04:48) (16 - 18)  SpO2: 99% (02-27-21 @ 04:48) (98% - 100%)  Wt(kg): --    Exam:  Intact    No new imaging    Labs:                         13.5   6.87  )-----------( 144      ( 27 Feb 2021 01:16 )             40.1   02-27    136  |  103  |  16  ----------------------------<  182<H>  3.8   |  23  |  1.08    Ca    9.7      27 Feb 2021 01:17    TPro  6.5  /  Alb  3.9  /  TBili  1.5<H>  /  DBili  x   /  AST  29  /  ALT  26  /  AlkPhos  55  02-27    PT/INR - ( 26 Feb 2021 09:39 )   PT: 12.6 sec;   INR: 1.05 ratio         PTT - ( 26 Feb 2021 09:39 )  PTT:26.6 sec

## 2021-02-27 NOTE — PROGRESS NOTE ADULT - SUBJECTIVE AND OBJECTIVE BOX
[facility]      Seen earlier today, and examined.  - Today, patient is without complaints.           *****MEDICATIONS: Current medication reviewed and documented.    MEDICATIONS  (STANDING):         ***** VITAL SIGNS:VSS           *****PHYSICAL EXAM:   alert oriented x 3 attention comprehension are fair.  Able to name, repeat.   EOmi fundi not visualized   no nystagmus VFF to confrontation  Tongue is midline  Palate elevates symmetrically   Moving all 4 ext spontaneously no drift appreciated    Gait not assessed.            *****LAB AND IMAGING:              reviewed                 [All pertinent recent Imaging/Reports reviewed]           *****A S S E S S M E N T   A N D   P L A N :          76y M pmhx CAD s/p CABG, HTN, HLD, chronic SDH present 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 Patient did nt get stress test due to adverse symptom experienced during last stress test and was sent home.    Pt was here few days ago for same, worked up for NSTEMI. Ended up having symptomatic relief prior to dc.    as per pt, he had recurrence of chest pain and  took 4 tablets of asa 81 this am and was brought to the emergency room. He denies any headache/weakness/numbness.   He is awaiting eval by cardiology       Problem/Recommendations 1:  chronic subdural hematoma    denies any headache or focal weakness      risk vs. benefits are being considered thoroughly, given his left moderate size subdural hematoma with mass effect, in order to optimize the management of his anginal chest pain.    s/p cath , revealed multilevel disease   ct surgery eval appreciated    discussed in detail with cardiology team Dr. Galloway and Neurosurgery   frequent neurological exams q2 h and higher level of monitoring of pt was being done was agreed by neurosurgery.   Low threshold for ct head if there should arise any changes in neuro exam  planned mma embolization on monday to facilitate cabg     Problem/Recommendations 2: chest pain   defer to cardiology for management.        prognosis guarded as this pt remains at risk for decompensation.            Thank you for allowing me to participate in the care of this patient. Will continue to follow patient periodically. Please do not hesitate to call me if you have any  questions or if there has been a change in patients neurological status     ________________  Elissa García MD  [facility]  [phone]      33 minutes spent on total encounter; more than 50 % of the visit was  spent counseling about plan of care, compliance to diet/exercise and medication regimen and or  coordinating care by the attending physician.      It is advised that stroke patients follow up with BERHANE Garrett @ [phone] in 1- 2 weeks.   Others please follow up with Dr. Michael Nissenbaum [phone]

## 2021-02-27 NOTE — PROGRESS NOTE ADULT - ASSESSMENT
Assessment and Recommendations:  74y M with hx. of CAD s/p PCI to the LAD and RCA in the past (last angiogram in 2015 with [facility]), HTN, & HLD.  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  Return after recent discharge, again with angina in an unstable pattern.  To be seen by neurosurgery given potential need for cardiac intervention.    REC:  - Continue current meds. Can increase Coreg to 25 mg. bid   - [facility] with severe multivessel CAD -- appreciate CTS recs (currently in discussion with NSGY regarding next steps)  - antiplatelets on hold per NSGY  - per NSGY, would like to proceed with MMA embolization to reduce risk of bleeding in the future. From Cardiology standpoint, he is at high cardiac risk for this procedure given known multivessel CAD but he is currently medically optimized and no further cardiac testing would  prior to proceeding to embolization.      Brian Adair MD  Cardiology Fellow  All Cardiology service information can be found 24/7 on [email or web address], password: [email or web address]    Patient seen and examined at bedside. Note is preliminary until signed by attending. Assessment and Recommendations:  74y M with hx. of CAD s/p PCI to the LAD and RCA in the past (last angiogram in 2015 with [facility]), HTN, & HLD.  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  Return after recent discharge, again with angina in an unstable pattern.  To be seen by neurosurgery given potential need for cardiac intervention.    REC:  - Continue current meds. Can increase Coreg to 25 mg. bid   - continue to trend troponins  - [facility] with severe multivessel CAD -- appreciate CTS recs (currently in discussion with NSGY regarding next steps)  - antiplatelets on hold per NSGY  - per NSGY, would like to proceed with MMA embolization to reduce risk of bleeding in the future. From Cardiology standpoint, he is at high cardiac risk for this procedure given known multivessel CAD but he is currently medically optimized and no further cardiac testing would  prior to proceeding to embolization.      Brian Adair MD  Cardiology Fellow  All Cardiology service information can be found 24/7 on [email or web address], password: [email or web address]    Patient seen and examined at bedside. Note is preliminary until signed by attending.

## 2021-02-27 NOTE — PROGRESS NOTE ADULT - ASSESSMENT
76M a/ pmh of CAD s/p PCI in the past, HTN, HLD, chronic SDH (since 2019) present with severe CP for three nights s/p nitro, and asa 81 x4 with elevated trops. Recent admission for same pain, was dc'ed after pain relieved with meds, TTE interdeterminate. HCT in ED shows stable SDH 1.6 cm. Currently pain is better, no headache, no n/v. Cardiology would like to take patient for cath.   Exam: intact  - ADM medicine, q4h neuro checks  - Preop for MMA embo Monday 3/1 AM for chronic sdh  - Please document med/cards clearance for embo ASAP  - Send preop labs: T&Sx2, MRSA/MSSA swab today  - COVID neg 2/26, will need repeat swab today  - MMA embo will likely help reduce risk of rebleeding events in the future, and may even decrease the size of the bleed, but will not necessarily acutely decrease the risk of hemorrhage in the immediate postop period.   - Risks associated with expanding SDH (ie neurological morbidity/mortality) should be weighed accordingly against the risks of delaying cardiac intervention and delaying AP/AC by the primary team and discussed with patient  - continue q4h neuro checks, pain control and remaining care per primary  - if goes for cath, please repeat HCT after cath or any other procedures or for any change in exam

## 2021-02-27 NOTE — PROGRESS NOTE ADULT - SUBJECTIVE AND OBJECTIVE BOX
Overnight Events: Mild chest pain overnight. No chest pain now. Trops slightly uptrending.    Telemetry: NSR 70-90    Review Of Systems: No chest pain, shortness of breath, or palpitations  CONSTITUTIONAL: no fevers or chills  EYES/ENT: No visual changes;  No vertigo or throat pain   NECK: No pain or stiffness  RESPIRATORY: No cough, wheezing. No shortness of breath  CARDIOVASCULAR: No chest pain or palpitations  GASTROINTESTINAL: No abdominal or epigastric pain. No nausea, vomiting. No diarrhea. No melena.  GENITOURINARY: No dysuria, frequency or hematuria  NEUROLOGICAL: No numbness or weakness  SKIN: No itching, burning, rashes, or lesions   All other review of systems is negative unless indicated above.     Current Meds:  atorvastatin 40 milliGRAM(s) Oral at bedtime  carvedilol 12.5 milliGRAM(s) Oral every 12 hours  dextrose 40% Gel 15 Gram(s) Oral once  dextrose 5%. 1000 milliLiter(s) IV Continuous <Continuous>  dextrose 5%. 1000 milliLiter(s) IV Continuous <Continuous>  dextrose 50% Injectable 25 Gram(s) IV Push once  dextrose 50% Injectable 12.5 Gram(s) IV Push once  dextrose 50% Injectable 25 Gram(s) IV Push once  finasteride 5 milliGRAM(s) Oral daily  gabapentin 300 milliGRAM(s) Oral two times a day  glucagon  Injectable 1 milliGRAM(s) IntraMuscular once  insulin glargine Injectable (LANTUS) 20 Unit(s) SubCutaneous at bedtime  insulin lispro (ADMELOG) corrective regimen sliding scale   SubCutaneous three times a day before meals  isosorbide   mononitrate ER Tablet (IMDUR) 60 milliGRAM(s) Oral daily  morphine  - Injectable 2 milliGRAM(s) IV Push once  nitroglycerin     SubLingual 0.4 milliGRAM(s) SubLingual once PRN  tamsulosin 0.4 milliGRAM(s) Oral at bedtime    Vitals:  T(F): 97.8 (02-27), Max: 98.1 (02-26)  HR: 66 (02-27) (66 - 87)  BP: 147/63 (02-27) (120/63 - 165/77)  RR: 18 (02-27)  SpO2: 99% (02-27)  I&O's Summary    26 Feb 2021 07:01  -  27 Feb 2021 07:00  --------------------------------------------------------  IN: 0 mL / OUT: 500 mL / NET: -500 mL    27 Feb 2021 07:01  -  27 Feb 2021 09:57  --------------------------------------------------------  IN: 0 mL / OUT: 100 mL / NET: -100 mL      Physical Exam:  Appearance: No acute distress; well appearing  Eyes: PERRL, EOMI, pink conjunctiva  HEENT: Normal oral mucosa  Cardiovascular: RRR, S1, S2, no murmurs, rubs, or gallops; no JVD  Respiratory: Clear to auscultation bilaterally  Gastrointestinal: soft, non-tender, non-distended with normal bowel sounds  Extremities: No edema  Musculoskeletal: No clubbing; no joint deformity   Neurologic: Non-focal  Lymphatic: No lymphadenopathy  Psychiatry: AAOx3, mood & affect appropriate  Skin: No rashes, ecchymoses, or cyanosis  Site: R groin c/d/i                          13.5   6.87  )-----------( 144      ( 27 Feb 2021 01:16 )             40.1     02-27    136  |  103  |  16  ----------------------------<  182<H>  3.8   |  23  |  1.08    Ca    9.7      27 Feb 2021 01:17    TPro  6.5  /  Alb  3.9  /  TBili  1.5<H>  /  DBili  x   /  AST  29  /  ALT  26  /  AlkPhos  55  02-27    PT/INR - ( 26 Feb 2021 09:39 )   PT: 12.6 sec;   INR: 1.05 ratio         PTT - ( 26 Feb 2021 09:39 )  PTT:26.6 sec  CARDIAC MARKERS ( 27 Feb 2021 08:23 )  146 ng/L / x     / x     / x     / x     / x      CARDIAC MARKERS ( 27 Feb 2021 01:16 )  133 ng/L / x     / x     / x     / x     / x      CARDIAC MARKERS ( 26 Feb 2021 12:45 )  93 ng/L / x     / x     / x     / x     / x      CARDIAC MARKERS ( 26 Feb 2021 09:40 )  x     / x     / x     / x     / x     / 4.1 ng/mL  CARDIAC MARKERS ( 26 Feb 2021 09:39 )  98 ng/L / x     / x     / x     / x     / x          Serum Pro-Brain Natriuretic Peptide: 423 pg/mL (02-26 @ 09:39)      Imaging:

## 2021-02-27 NOTE — PROVIDER CONTACT NOTE (OTHER) - ASSESSMENT
Pt is A&Ox4, VSS please see flowsheet for reference. 5/10 CP. Pt did not reveal he had brought his home nitroglycerin tablets, pt took one of his own. Pt very upset that he was advised to wait to take the hospital nitroglycerin instead of his own. Pt educated on the reasoning. Pt is A&Ox4, VSS please see flowsheet for reference. 5/10 CP. No EKG or tele changes. Pt did not reveal he had brought his home nitroglycerin tablets, pt took one of his own. Pt very upset that he was advised to wait to take the hospital nitroglycerin instead of his own. Pt educated on the reasoning.

## 2021-02-27 NOTE — PROGRESS NOTE ADULT - ASSESSMENT
77yo M hx of HTN, HLD, multiple chronic subdurals comes to ED for cp. Chest pain substernal, since last night, constant, no radiation, took 3 nitros last night, 1 ASA 324mg this morning. No associated with sob or diaphoresis. Pt was here few days ago for same, worked up for NSTEMI. Ended up having symptomatic relief prior to dc. In hospital TTE was interdeterminate, trop 124. Neurosurgery recs were to avoid anti-platelets and AC. Denies f/c, cough, sob, abdominal pain, change in bowel.    Problem/Plan - 1:  ·  Problem: NSTEMI (non-ST elevated myocardial infarction).  Plan: telemonitor  cards fu   cath with TVD  CTS fu     Problem/Plan - 2:  ·  Problem: SDH (subdural hematoma).  Plan: neuro and neurosurgery fu   cleared for AC as per neuro.     Problem/Plan - 3:  ·  Problem: Diabetes mellitus.  Plan: monitor FS  basal, bolus.     Problem/Plan - 4:  ·  Problem: Hyperlipidemia.  Plan: cw statin.

## 2021-02-27 NOTE — CHART NOTE - NSCHARTNOTEFT_GEN_A_CORE
Called by RN for pt with chest pain.   Pt seen and examined at bedside.   Pt states chest pain is on left chest. The same pain he always gets. #7/10   Pain does not radiate. No paresthesias.   Pt took his own nitro before I got to him.   EKG was done before nitro.   Pt states 1 nitro is usually enough to resolve his pain.   Denies any shortness of breath, palpitations, headache, nausea, vomiting or abdominal pain,     Vitals /77 HR 80 RR 18 Temp 98 O2 sat 99% on room air.   Tele: NSR 80s  EKG - HR 86 with NSR with 1st degree AVB, LBBB no change           QTc 440      74y M with hx. of CAD s/p PCI to the LAD and RCA in the past (last angiogram in 2015 with [facility]), HTN, & HLD.  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 Return after recent discharge, again with angina in an unstable pattern.  Pt was seen by neurosurgery due to need for possible intervention.   No aspirin or plavix per neurosurgery. Per cardiology continue current meds.  Increase Coreg to 25 mg. bid if vital signs remain stable, and will likely need diagnostic cardiac catheterization given recurrent sxs.  Pt now on 6 tower s/p diagnostic cath today found to have multivessel disease.     Pt now with chest pain due multivessel disease    s/p nitro SL x1 (pt took his own)  stat EKG - unchanged compared to prior  Check troponin   Nitro ordered as prn   Continue telemetry monitoring  Discussed with cardiology fellow   F/U CABG shai Lynn ANP-BC  [id number]

## 2021-02-27 NOTE — PROGRESS NOTE ADULT - SUBJECTIVE AND OBJECTIVE BOX
Patient is a 76y old  Male who presents with a chief complaint of chest pain (27 Feb 2021 09:57)    Date of servie : 02-27-21 @ 17:19  INTERVAL HPI/OVERNIGHT EVENTS:  T(C): 36.7 (02-27-21 @ 11:51), Max: 36.7 (02-26-21 @ 23:44)  HR: 70 (02-27-21 @ 11:51) (66 - 87)  BP: 115/70 (02-27-21 @ 11:51) (115/70 - 165/77)  RR: 18 (02-27-21 @ 11:51) (16 - 18)  SpO2: 98% (02-27-21 @ 11:51) (98% - 100%)  Wt(kg): --  I&O's Summary    26 Feb 2021 07:01  -  27 Feb 2021 07:00  --------------------------------------------------------  IN: 0 mL / OUT: 500 mL / NET: -500 mL    27 Feb 2021 07:01  -  27 Feb 2021 17:19  --------------------------------------------------------  IN: 240 mL / OUT: 100 mL / NET: 140 mL        LABS:                        13.5   6.87  )-----------( 144      ( 27 Feb 2021 01:16 )             40.1     02-27    136  |  103  |  16  ----------------------------<  182<H>  3.8   |  23  |  1.08    Ca    9.7      27 Feb 2021 01:17    TPro  6.5  /  Alb  3.9  /  TBili  1.5<H>  /  DBili  x   /  AST  29  /  ALT  26  /  AlkPhos  55  02-27    PT/INR - ( 26 Feb 2021 09:39 )   PT: 12.6 sec;   INR: 1.05 ratio         PTT - ( 26 Feb 2021 09:39 )  PTT:26.6 sec    CAPILLARY BLOOD GLUCOSE      POCT Blood Glucose.: 200 mg/dL (27 Feb 2021 16:26)  POCT Blood Glucose.: 203 mg/dL (27 Feb 2021 11:23)  POCT Blood Glucose.: 134 mg/dL (27 Feb 2021 07:30)  POCT Blood Glucose.: 155 mg/dL (26 Feb 2021 21:21)            MEDICATIONS  (STANDING):  atorvastatin 40 milliGRAM(s) Oral at bedtime  carvedilol 12.5 milliGRAM(s) Oral every 12 hours  dextrose 40% Gel 15 Gram(s) Oral once  dextrose 5%. 1000 milliLiter(s) (50 mL/Hr) IV Continuous <Continuous>  dextrose 5%. 1000 milliLiter(s) (100 mL/Hr) IV Continuous <Continuous>  dextrose 50% Injectable 25 Gram(s) IV Push once  dextrose 50% Injectable 12.5 Gram(s) IV Push once  dextrose 50% Injectable 25 Gram(s) IV Push once  finasteride 5 milliGRAM(s) Oral daily  gabapentin 300 milliGRAM(s) Oral two times a day  glucagon  Injectable 1 milliGRAM(s) IntraMuscular once  insulin glargine Injectable (LANTUS) 20 Unit(s) SubCutaneous at bedtime  insulin lispro (ADMELOG) corrective regimen sliding scale   SubCutaneous three times a day before meals  isosorbide   mononitrate ER Tablet (IMDUR) 60 milliGRAM(s) Oral daily  morphine  - Injectable 2 milliGRAM(s) IV Push once  tamsulosin 0.4 milliGRAM(s) Oral at bedtime    MEDICATIONS  (PRN):  nitroglycerin     SubLingual 0.4 milliGRAM(s) SubLingual once PRN Chest Pain          PHYSICAL EXAM:  GENERAL: NAD  CHEST/LUNG: Clear to percussion bilaterally; No rales, rhonchi, wheezing, or rubs  HEART: Regular rate and rhythm; No murmurs, rubs, or gallops  ABDOMEN: Soft, Nontender, Nondistended; Bowel sounds present  EXTREMITIES:  2+ Peripheral Pulses, No clubbing, cyanosis, or edema  LYMPH: No lymphadenopathy noted  SKIN: No rashes or lesions    Care Discussed with Consultants/Other Providers [ ] YES  [ ] NO

## 2021-02-28 LAB
ANION GAP SERPL CALC-SCNC: 8 MMOL/L — SIGNIFICANT CHANGE UP (ref 5–17)
BUN SERPL-MCNC: 17 MG/DL — SIGNIFICANT CHANGE UP (ref 7–23)
CALCIUM SERPL-MCNC: 9.3 MG/DL — SIGNIFICANT CHANGE UP (ref 8.4–10.5)
CHLORIDE SERPL-SCNC: 106 MMOL/L — SIGNIFICANT CHANGE UP (ref 96–108)
CO2 SERPL-SCNC: 23 MMOL/L — SIGNIFICANT CHANGE UP (ref 22–31)
CREAT SERPL-MCNC: 0.99 MG/DL — SIGNIFICANT CHANGE UP (ref 0.5–1.3)
GLUCOSE BLDC GLUCOMTR-MCNC: 164 MG/DL — HIGH (ref 70–99)
GLUCOSE BLDC GLUCOMTR-MCNC: 171 MG/DL — HIGH (ref 70–99)
GLUCOSE BLDC GLUCOMTR-MCNC: 239 MG/DL — HIGH (ref 70–99)
GLUCOSE BLDC GLUCOMTR-MCNC: 299 MG/DL — HIGH (ref 70–99)
GLUCOSE SERPL-MCNC: 172 MG/DL — HIGH (ref 70–99)
HCT VFR BLD CALC: 40 % — SIGNIFICANT CHANGE UP (ref 39–50)
HGB BLD-MCNC: 13.5 G/DL — SIGNIFICANT CHANGE UP (ref 13–17)
MCHC RBC-ENTMCNC: 30.3 PG — SIGNIFICANT CHANGE UP (ref 27–34)
MCHC RBC-ENTMCNC: 33.8 GM/DL — SIGNIFICANT CHANGE UP (ref 32–36)
MCV RBC AUTO: 89.7 FL — SIGNIFICANT CHANGE UP (ref 80–100)
NRBC # BLD: 0 /100 WBCS — SIGNIFICANT CHANGE UP (ref 0–0)
PLATELET # BLD AUTO: 135 K/UL — LOW (ref 150–400)
POTASSIUM SERPL-MCNC: 3.9 MMOL/L — SIGNIFICANT CHANGE UP (ref 3.5–5.3)
POTASSIUM SERPL-SCNC: 3.9 MMOL/L — SIGNIFICANT CHANGE UP (ref 3.5–5.3)
RBC # BLD: 4.46 M/UL — SIGNIFICANT CHANGE UP (ref 4.2–5.8)
RBC # FLD: 12.8 % — SIGNIFICANT CHANGE UP (ref 10.3–14.5)
SODIUM SERPL-SCNC: 137 MMOL/L — SIGNIFICANT CHANGE UP (ref 135–145)
TROPONIN T, HIGH SENSITIVITY RESULT: 148 NG/L — HIGH (ref 0–51)
WBC # BLD: 6.32 K/UL — SIGNIFICANT CHANGE UP (ref 3.8–10.5)
WBC # FLD AUTO: 6.32 K/UL — SIGNIFICANT CHANGE UP (ref 3.8–10.5)

## 2021-02-28 PROCEDURE — 99233 SBSQ HOSP IP/OBS HIGH 50: CPT | Mod: GC

## 2021-02-28 PROCEDURE — 93010 ELECTROCARDIOGRAM REPORT: CPT

## 2021-02-28 RX ORDER — RANOLAZINE 500 MG/1
500 TABLET, FILM COATED, EXTENDED RELEASE ORAL
Refills: 0 | Status: DISCONTINUED | OUTPATIENT
Start: 2021-02-28 | End: 2021-03-02

## 2021-02-28 RX ORDER — NITROGLYCERIN 6.5 MG
0.4 CAPSULE, EXTENDED RELEASE ORAL ONCE
Refills: 0 | Status: COMPLETED | OUTPATIENT
Start: 2021-02-28 | End: 2021-02-28

## 2021-02-28 RX ADMIN — GABAPENTIN 300 MILLIGRAM(S): 400 CAPSULE ORAL at 16:46

## 2021-02-28 RX ADMIN — Medication 0.4 MILLIGRAM(S): at 05:11

## 2021-02-28 RX ADMIN — CARVEDILOL PHOSPHATE 12.5 MILLIGRAM(S): 80 CAPSULE, EXTENDED RELEASE ORAL at 04:49

## 2021-02-28 RX ADMIN — ISOSORBIDE MONONITRATE 60 MILLIGRAM(S): 60 TABLET, EXTENDED RELEASE ORAL at 11:59

## 2021-02-28 RX ADMIN — Medication 1: at 16:46

## 2021-02-28 RX ADMIN — Medication 0.4 MILLIGRAM(S): at 04:56

## 2021-02-28 RX ADMIN — INSULIN GLARGINE 20 UNIT(S): 100 INJECTION, SOLUTION SUBCUTANEOUS at 21:22

## 2021-02-28 RX ADMIN — TAMSULOSIN HYDROCHLORIDE 0.4 MILLIGRAM(S): 0.4 CAPSULE ORAL at 21:45

## 2021-02-28 RX ADMIN — RANOLAZINE 500 MILLIGRAM(S): 500 TABLET, FILM COATED, EXTENDED RELEASE ORAL at 16:45

## 2021-02-28 RX ADMIN — Medication 1: at 07:39

## 2021-02-28 RX ADMIN — Medication 2: at 11:59

## 2021-02-28 RX ADMIN — FINASTERIDE 5 MILLIGRAM(S): 5 TABLET, FILM COATED ORAL at 11:59

## 2021-02-28 RX ADMIN — ATORVASTATIN CALCIUM 40 MILLIGRAM(S): 80 TABLET, FILM COATED ORAL at 21:45

## 2021-02-28 RX ADMIN — GABAPENTIN 300 MILLIGRAM(S): 400 CAPSULE ORAL at 04:49

## 2021-02-28 RX ADMIN — CARVEDILOL PHOSPHATE 12.5 MILLIGRAM(S): 80 CAPSULE, EXTENDED RELEASE ORAL at 16:46

## 2021-02-28 NOTE — PROGRESS NOTE ADULT - SUBJECTIVE AND OBJECTIVE BOX
For all Cardiology service contact information, go to [email or web address] and use "[email or web address]" to login.    SUBJECTIVE:   No events overnight. Denies CP, SOB or Dyspnea.   NSR 60-70s  -------------------------------------------------------------------------------------------  ROS:  CV: chest pain (-), palpitation (-), orthopnea (-), PND (-), edema (-)  PULM: SOB (-), cough (-), wheezing (-), hemoptysis (-).   CONST: fever (-), chills (-) or fatigue (-)  GI: abdominal distension (-), abdominal pain (-) , nausea/vomiting (-), hematemesis, (-), melena (-), hematochezia (-)  : dysuria (-), frequency (-), hematuria (-).   NEURO: numbness (-), weakness (-), dizziness (-)  SKIN: itching (-), rash (-)  HEENT:  visual changes (-); vertigo or throat pain (-);  neck stiffness (-)     All other review of systems is negative unless indicated above.   -------------------------------------------------------------------------------------------  VS:  T(F): 97.9 (02-28), Max: 98.1 (02-27)  HR: 75 (02-28) (70 - 88)  BP: 121/56 (02-28) (105/51 - 121/63)  RR: 18 (02-28)  SpO2: 100% (02-28)  I&O's Summary    27 Feb 2021 07:01  -  28 Feb 2021 07:00  --------------------------------------------------------  IN: 600 mL / OUT: 100 mL / NET: 500 mL    28 Feb 2021 07:01  -  28 Feb 2021 10:59  --------------------------------------------------------  IN: 0 mL / OUT: 200 mL / NET: -200 mL      ------------------------------------------------------------------------------------------  PHYSICAL EXAM:  GENERAL: NAD  HEAD:  Atraumatic, Normocephalic.  EYES: EOMI, PERRLA, conjunctiva and sclera clear.  ENT: Moist mucous membranes.  NECK: Supple, No JVD.  CHEST/LUNG: Clear to auscultation bilaterally; No rales, rhonchi, wheezing, or rubs. Unlabored respirations.  HEART: Regular rate and rhythm; No murmurs, rubs, or gallops.  ABDOMEN: Bowel sounds present; Soft, Nontender, Nondistended.   EXTREMITIES:  2+ Peripheral Pulses, brisk capillary refill. No clubbing, cyanosis, or edema.  PSYCH: Normal affect.  SKIN: No rashes or lesions.  -------------------------------------------------------------------------------------------  LABS:                          13.5   6.32  )-----------( 135      ( 28 Feb 2021 05:18 )             40.0     02-28    137  |  106  |  17  ----------------------------<  172<H>  3.9   |  23  |  0.99    Ca    9.3      28 Feb 2021 05:17    TPro  6.5  /  Alb  3.9  /  TBili  1.5<H>  /  DBili  x   /  AST  29  /  ALT  26  /  AlkPhos  55  02-27      CARDIAC MARKERS ( 28 Feb 2021 05:17 )  148 ng/L / x     / x     / x     / x     / x      CARDIAC MARKERS ( 27 Feb 2021 08:23 )  146 ng/L / x     / x     / x     / x     / x      CARDIAC MARKERS ( 27 Feb 2021 01:16 )  133 ng/L / x     / x     / x     / x     / x      CARDIAC MARKERS ( 26 Feb 2021 12:45 )  93 ng/L / x     / x     / x     / x     / x      CARDIAC MARKERS ( 26 Feb 2021 09:40 )  x     / x     / x     / x     / x     / 4.1 ng/mL            -------------------------------------------------------------------------------------------  Meds:  atorvastatin 40 milliGRAM(s) Oral at bedtime  carvedilol 12.5 milliGRAM(s) Oral every 12 hours  dextrose 40% Gel 15 Gram(s) Oral once  dextrose 5%. 1000 milliLiter(s) IV Continuous <Continuous>  dextrose 5%. 1000 milliLiter(s) IV Continuous <Continuous>  dextrose 50% Injectable 25 Gram(s) IV Push once  dextrose 50% Injectable 12.5 Gram(s) IV Push once  dextrose 50% Injectable 25 Gram(s) IV Push once  finasteride 5 milliGRAM(s) Oral daily  gabapentin 300 milliGRAM(s) Oral two times a day  glucagon  Injectable 1 milliGRAM(s) IntraMuscular once  insulin glargine Injectable (LANTUS) 20 Unit(s) SubCutaneous at bedtime  insulin lispro (ADMELOG) corrective regimen sliding scale   SubCutaneous three times a day before meals  isosorbide   mononitrate ER Tablet (IMDUR) 60 milliGRAM(s) Oral daily  morphine  - Injectable 2 milliGRAM(s) IV Push once  tamsulosin 0.4 milliGRAM(s) Oral at bedtime    -------------------------------------------------------------------------------------------

## 2021-02-28 NOTE — PROGRESS NOTE ADULT - ASSESSMENT
75yo M hx of HTN, HLD, multiple chronic subdurals comes to ED for cp. Chest pain substernal, since last night, constant, no radiation, took 3 nitros last night, 1 ASA 324mg this morning. No associated with sob or diaphoresis. Pt was here few days ago for same, worked up for NSTEMI. Ended up having symptomatic relief prior to dc. In hospital TTE was interdeterminate, trop 124. Neurosurgery recs were to avoid anti-platelets and AC. Denies f/c, cough, sob, abdominal pain, change in bowel.    Problem/Plan - 1:  ·  Problem: NSTEMI (non-ST elevated myocardial infarction).  Plan: telemonitor  cards fu   cath with TVD  CTS fu     Problem/Plan - 2:  ·  Problem: SDH (subdural hematoma).  Plan: neuro and neurosurgery fu   OR monday     Problem/Plan - 3:  ·  Problem: Diabetes mellitus.  Plan: monitor FS  basal, bolus.     Problem/Plan - 4:  ·  Problem: Hyperlipidemia.  Plan: cw statin.       Pt medically optimized pending cardiac clearence

## 2021-02-28 NOTE — PROGRESS NOTE ADULT - SUBJECTIVE AND OBJECTIVE BOX
Patient seen and examined at bedside.    --Anticoagulation--    T(C): 36.6 (02-28-21 @ 11:35), Max: 36.7 (02-27-21 @ 20:08)  HR: 69 (02-28-21 @ 11:35) (69 - 88)  BP: 149/68 (02-28-21 @ 11:35) (105/51 - 149/68)  RR: 18 (02-28-21 @ 11:35) (17 - 18)  SpO2: 98% (02-28-21 @ 11:35) (97% - 100%)  Wt(kg): --    Exam:  AOx3, FC, PERRL, EOMI, no facial, 5/5 throughout, no drift

## 2021-02-28 NOTE — PROGRESS NOTE ADULT - ASSESSMENT
76M a/ pmh of CAD s/p PCI in the past, HTN, HLD, chronic SDH (since 2019) present with severe CP for three nights s/p nitro, and asa 81 x4 with elevated trops. Recent admission for same pain, was dc'ed after pain relieved with meds, TTE interdeterminate. HCT in ED shows stable SDH 1.6 cm. Currently pain is better, no headache, no n/v. Cardiology would like to take patient for cath.   Exam: intact  - ADM medicine, q4h neuro checks  - Preop for MMA embo Monday 3/1 AM for chronic sdh  - Please document med/cards clearance for embo ASAP  -Pre op labs done    - MMA embo will likely help reduce risk of rebleeding events in the future, and may even decrease the size of the bleed, but will not necessarily acutely decrease the risk of hemorrhage in the immediate postop period.   - Risks associated with expanding SDH (ie neurological morbidity/mortality) should be weighed accordingly against the risks of delaying cardiac intervention and delaying AP/AC by the primary team and discussed with patient  - continue q4h neuro checks, pain control and remaining care per primary  - if goes for cath, please repeat HCT after cath or any other procedures or for any change in exam

## 2021-02-28 NOTE — PROGRESS NOTE ADULT - ASSESSMENT
74y M with hx. of CAD s/p PCI to the LAD and RCA in the past (last angiogram in 2015 with [facility]), HTN, & HLD.  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 Currently CP free.     Plan:   - off anticoagulation, continue atorvastatin, coreg, and Imdur at current doses.    - awaiting NSGY endovascular intervention for SDH 3/1  - Start Ranexa today 500 mg bid today.   - will continue to monitor.

## 2021-02-28 NOTE — PROGRESS NOTE ADULT - SUBJECTIVE AND OBJECTIVE BOX
Patient is a 76y old  Male who presents with a chief complaint of chest pain (28 Feb 2021 13:21)    Date of servie : 02-28-21 @ 19:08  INTERVAL HPI/OVERNIGHT EVENTS:  T(C): 36.6 (02-28-21 @ 11:35), Max: 36.7 (02-27-21 @ 20:08)  HR: 69 (02-28-21 @ 11:35) (69 - 88)  BP: 149/68 (02-28-21 @ 11:35) (105/51 - 149/68)  RR: 18 (02-28-21 @ 11:35) (17 - 18)  SpO2: 98% (02-28-21 @ 11:35) (97% - 100%)  Wt(kg): --  I&O's Summary    27 Feb 2021 07:01  -  28 Feb 2021 07:00  --------------------------------------------------------  IN: 600 mL / OUT: 100 mL / NET: 500 mL    28 Feb 2021 07:01  -  28 Feb 2021 19:08  --------------------------------------------------------  IN: 240 mL / OUT: 200 mL / NET: 40 mL        LABS:                        13.5   6.32  )-----------( 135      ( 28 Feb 2021 05:18 )             40.0     02-28    137  |  106  |  17  ----------------------------<  172<H>  3.9   |  23  |  0.99    Ca    9.3      28 Feb 2021 05:17    TPro  6.5  /  Alb  3.9  /  TBili  1.5<H>  /  DBili  x   /  AST  29  /  ALT  26  /  AlkPhos  55  02-27        CAPILLARY BLOOD GLUCOSE      POCT Blood Glucose.: 171 mg/dL (28 Feb 2021 16:27)  POCT Blood Glucose.: 239 mg/dL (28 Feb 2021 11:39)  POCT Blood Glucose.: 164 mg/dL (28 Feb 2021 07:31)  POCT Blood Glucose.: 236 mg/dL (27 Feb 2021 21:14)            MEDICATIONS  (STANDING):  atorvastatin 40 milliGRAM(s) Oral at bedtime  carvedilol 12.5 milliGRAM(s) Oral every 12 hours  dextrose 40% Gel 15 Gram(s) Oral once  dextrose 5%. 1000 milliLiter(s) (50 mL/Hr) IV Continuous <Continuous>  dextrose 5%. 1000 milliLiter(s) (100 mL/Hr) IV Continuous <Continuous>  dextrose 50% Injectable 25 Gram(s) IV Push once  dextrose 50% Injectable 12.5 Gram(s) IV Push once  dextrose 50% Injectable 25 Gram(s) IV Push once  finasteride 5 milliGRAM(s) Oral daily  gabapentin 300 milliGRAM(s) Oral two times a day  glucagon  Injectable 1 milliGRAM(s) IntraMuscular once  insulin glargine Injectable (LANTUS) 20 Unit(s) SubCutaneous at bedtime  insulin lispro (ADMELOG) corrective regimen sliding scale   SubCutaneous three times a day before meals  isosorbide   mononitrate ER Tablet (IMDUR) 60 milliGRAM(s) Oral daily  morphine  - Injectable 2 milliGRAM(s) IV Push once  ranolazine 500 milliGRAM(s) Oral two times a day  tamsulosin 0.4 milliGRAM(s) Oral at bedtime    MEDICATIONS  (PRN):          PHYSICAL EXAM:  GENERAL: frail  CHEST/LUNG: Clear to percussion bilaterally; No rales, rhonchi, wheezing, or rubs  HEART: Regular rate and rhythm; No murmurs, rubs, or gallops  ABDOMEN: Soft, Nontender, Nondistended; Bowel sounds present  EXTREMITIES:  edema +    Care Discussed with Consultants/Other Providers [ x] YES  [ ] NO

## 2021-02-28 NOTE — CHART NOTE - NSCHARTNOTEFT_GEN_A_CORE
Medicine NP Episodic Note    Called by RN to evaluate pt. w/ chest pain.  Pt. seen and examined at bedside, A+Ox3, in NAD.  Endorsed c/o 5/10, non-radiating, left-sided, "sharp" chest pain, similar presentation as admission.  Denies c/o SOB, palpitations, dizziness, lightheadedness, diaphoresis or any other significant complaints.       Vital Signs Last 24 Hrs  T(C): 36.6 (28 Feb 2021 04:34), Max: 36.7 (27 Feb 2021 11:51)  T(F): 97.9 (28 Feb 2021 04:34), Max: 98.1 (27 Feb 2021 11:51)  HR: 75 (28 Feb 2021 05:11) (70 - 88)  BP: 121/56 (28 Feb 2021 05:11) (105/51 - 121/63)  BP(mean): --  RR: 18 (28 Feb 2021 04:34) (17 - 18)  SpO2: 100% (28 Feb 2021 04:34) (97% - 100%)      Labs:                          13.5   6.32  )-----------( 135      ( 28 Feb 2021 05:18 )             40.0     02-28    137  |  106  |  17  ----------------------------<  172<H>  3.9   |  23  |  0.99    Ca    9.3      28 Feb 2021 05:17    TPro  6.5  /  Alb  3.9  /  TBili  1.5<H>  /  DBili  x   /  AST  29  /  ALT  26  /  AlkPhos  55  02-27      CARDIAC MARKERS ( 26 Feb 2021 09:40 )  x     / x     / x     / x     / 4.1 ng/mL          MEDICATIONS  (STANDING):  atorvastatin 40 milliGRAM(s) Oral at bedtime  carvedilol 12.5 milliGRAM(s) Oral every 12 hours  dextrose 40% Gel 15 Gram(s) Oral once  dextrose 5%. 1000 milliLiter(s) (50 mL/Hr) IV Continuous <Continuous>  dextrose 5%. 1000 milliLiter(s) (100 mL/Hr) IV Continuous <Continuous>  dextrose 50% Injectable 25 Gram(s) IV Push once  dextrose 50% Injectable 12.5 Gram(s) IV Push once  dextrose 50% Injectable 25 Gram(s) IV Push once  finasteride 5 milliGRAM(s) Oral daily  gabapentin 300 milliGRAM(s) Oral two times a day  glucagon  Injectable 1 milliGRAM(s) IntraMuscular once  insulin glargine Injectable (LANTUS) 20 Unit(s) SubCutaneous at bedtime  insulin lispro (ADMELOG) corrective regimen sliding scale   SubCutaneous three times a day before meals  isosorbide   mononitrate ER Tablet (IMDUR) 60 milliGRAM(s) Oral daily  morphine  - Injectable 2 milliGRAM(s) IV Push once  tamsulosin 0.4 milliGRAM(s) Oral at bedtime      Physical Exam:  Gen/Neuro: A+Ox3, in NAD  Resp: CTA B/L  CV: RRR, S1S2  Abd: Soft, NT/ND, + BS x 4 quad  MSK: No edema, + peripheral pulses    Assessment & Plan:  HPI:  76M w/ PMHx of HTN, HLD, multiple chronic subdurals comes to ED for chest pain.  Pt. s/p LHC on 2/26/2021 w/ severe multivessel CAD.  Now w/ recurrent chest pain.     # Chest Pain  > Stat EKG - SR w/ 1st degree AVB, LBBB, HR 76, no acute changes compared to prior EKG  > Chest pain resolved after NTG x 2   > F/u troponin  > Continue Imdur daily  > Continue Coreg to 25 mg BID   > Monitor vital signs   > Continue tele  > F/u am labs  > Cardiology following     Will endorse to primary team in am.  Attending to follow.    Lindsay Hayward, [facility]-BC  [phone]

## 2021-02-28 NOTE — PROVIDER CONTACT NOTE (OTHER) - ASSESSMENT
Pt is A&Ox4, VSS please see flowsheet for reference. Pt complaining of 5/10 CP. Pt is in NSR on tele, no EKG changes.

## 2021-03-01 ENCOUNTER — APPOINTMENT (OUTPATIENT)
Dept: NEUROSURGERY | Facility: HOSPITAL | Age: 76
End: 2021-03-01

## 2021-03-01 LAB
ANION GAP SERPL CALC-SCNC: 11 MMOL/L — SIGNIFICANT CHANGE UP (ref 5–17)
BUN SERPL-MCNC: 21 MG/DL — SIGNIFICANT CHANGE UP (ref 7–23)
CALCIUM SERPL-MCNC: 9.1 MG/DL — SIGNIFICANT CHANGE UP (ref 8.4–10.5)
CHLORIDE SERPL-SCNC: 104 MMOL/L — SIGNIFICANT CHANGE UP (ref 96–108)
CO2 SERPL-SCNC: 23 MMOL/L — SIGNIFICANT CHANGE UP (ref 22–31)
CREAT SERPL-MCNC: 1.08 MG/DL — SIGNIFICANT CHANGE UP (ref 0.5–1.3)
GLUCOSE BLDC GLUCOMTR-MCNC: 123 MG/DL — HIGH (ref 70–99)
GLUCOSE BLDC GLUCOMTR-MCNC: 139 MG/DL — HIGH (ref 70–99)
GLUCOSE BLDC GLUCOMTR-MCNC: 159 MG/DL — HIGH (ref 70–99)
GLUCOSE BLDC GLUCOMTR-MCNC: 191 MG/DL — HIGH (ref 70–99)
GLUCOSE BLDC GLUCOMTR-MCNC: 207 MG/DL — HIGH (ref 70–99)
GLUCOSE SERPL-MCNC: 207 MG/DL — HIGH (ref 70–99)
HCT VFR BLD CALC: 38.6 % — LOW (ref 39–50)
HGB BLD-MCNC: 12.9 G/DL — LOW (ref 13–17)
MCHC RBC-ENTMCNC: 30.3 PG — SIGNIFICANT CHANGE UP (ref 27–34)
MCHC RBC-ENTMCNC: 33.4 GM/DL — SIGNIFICANT CHANGE UP (ref 32–36)
MCV RBC AUTO: 90.6 FL — SIGNIFICANT CHANGE UP (ref 80–100)
NRBC # BLD: 0 /100 WBCS — SIGNIFICANT CHANGE UP (ref 0–0)
PLATELET # BLD AUTO: 133 K/UL — LOW (ref 150–400)
POTASSIUM SERPL-MCNC: 3.8 MMOL/L — SIGNIFICANT CHANGE UP (ref 3.5–5.3)
POTASSIUM SERPL-SCNC: 3.8 MMOL/L — SIGNIFICANT CHANGE UP (ref 3.5–5.3)
RBC # BLD: 4.26 M/UL — SIGNIFICANT CHANGE UP (ref 4.2–5.8)
RBC # FLD: 12.8 % — SIGNIFICANT CHANGE UP (ref 10.3–14.5)
SODIUM SERPL-SCNC: 138 MMOL/L — SIGNIFICANT CHANGE UP (ref 135–145)
TROPONIN T, HIGH SENSITIVITY RESULT: 121 NG/L — HIGH (ref 0–51)
TROPONIN T, HIGH SENSITIVITY RESULT: 145 NG/L — HIGH (ref 0–51)
WBC # BLD: 5.79 K/UL — SIGNIFICANT CHANGE UP (ref 3.8–10.5)
WBC # FLD AUTO: 5.79 K/UL — SIGNIFICANT CHANGE UP (ref 3.8–10.5)

## 2021-03-01 PROCEDURE — 36227 PLACE CATH XTRNL CAROTID: CPT | Mod: LT

## 2021-03-01 PROCEDURE — 61624 TCAT PERM OCCLS/EMBOLJ CNS: CPT

## 2021-03-01 PROCEDURE — 93010 ELECTROCARDIOGRAM REPORT: CPT

## 2021-03-01 PROCEDURE — 36223 PLACE CATH CAROTID/INOM ART: CPT | Mod: 50

## 2021-03-01 PROCEDURE — 75894 X-RAYS TRANSCATH THERAPY: CPT | Mod: 26

## 2021-03-01 PROCEDURE — 99233 SBSQ HOSP IP/OBS HIGH 50: CPT

## 2021-03-01 PROCEDURE — 75898 FOLLOW-UP ANGIOGRAPHY: CPT | Mod: 26

## 2021-03-01 RX ORDER — ISOSORBIDE MONONITRATE 60 MG/1
90 TABLET, EXTENDED RELEASE ORAL DAILY
Refills: 0 | Status: DISCONTINUED | OUTPATIENT
Start: 2021-03-02 | End: 2021-03-03

## 2021-03-01 RX ORDER — NITROGLYCERIN 6.5 MG
0.5 CAPSULE, EXTENDED RELEASE ORAL EVERY 6 HOURS
Refills: 0 | Status: DISCONTINUED | OUTPATIENT
Start: 2021-03-01 | End: 2021-03-02

## 2021-03-01 RX ORDER — NITROGLYCERIN 6.5 MG
0.4 CAPSULE, EXTENDED RELEASE ORAL ONCE
Refills: 0 | Status: COMPLETED | OUTPATIENT
Start: 2021-03-01 | End: 2021-03-01

## 2021-03-01 RX ORDER — ISOSORBIDE MONONITRATE 60 MG/1
30 TABLET, EXTENDED RELEASE ORAL ONCE
Refills: 0 | Status: COMPLETED | OUTPATIENT
Start: 2021-03-01 | End: 2021-03-01

## 2021-03-01 RX ADMIN — Medication 0.4 MILLIGRAM(S): at 19:55

## 2021-03-01 RX ADMIN — ISOSORBIDE MONONITRATE 30 MILLIGRAM(S): 60 TABLET, EXTENDED RELEASE ORAL at 21:27

## 2021-03-01 RX ADMIN — INSULIN GLARGINE 20 UNIT(S): 100 INJECTION, SOLUTION SUBCUTANEOUS at 21:27

## 2021-03-01 RX ADMIN — Medication 0.4 MILLIGRAM(S): at 20:20

## 2021-03-01 RX ADMIN — GABAPENTIN 300 MILLIGRAM(S): 400 CAPSULE ORAL at 04:53

## 2021-03-01 RX ADMIN — CARVEDILOL PHOSPHATE 12.5 MILLIGRAM(S): 80 CAPSULE, EXTENDED RELEASE ORAL at 04:53

## 2021-03-01 RX ADMIN — Medication 0.4 MILLIGRAM(S): at 22:22

## 2021-03-01 RX ADMIN — Medication 1: at 15:20

## 2021-03-01 RX ADMIN — FINASTERIDE 5 MILLIGRAM(S): 5 TABLET, FILM COATED ORAL at 16:30

## 2021-03-01 RX ADMIN — ISOSORBIDE MONONITRATE 60 MILLIGRAM(S): 60 TABLET, EXTENDED RELEASE ORAL at 16:30

## 2021-03-01 RX ADMIN — TAMSULOSIN HYDROCHLORIDE 0.4 MILLIGRAM(S): 0.4 CAPSULE ORAL at 20:05

## 2021-03-01 RX ADMIN — ATORVASTATIN CALCIUM 40 MILLIGRAM(S): 80 TABLET, FILM COATED ORAL at 20:05

## 2021-03-01 RX ADMIN — RANOLAZINE 500 MILLIGRAM(S): 500 TABLET, FILM COATED, EXTENDED RELEASE ORAL at 16:30

## 2021-03-01 RX ADMIN — CARVEDILOL PHOSPHATE 12.5 MILLIGRAM(S): 80 CAPSULE, EXTENDED RELEASE ORAL at 16:30

## 2021-03-01 RX ADMIN — GABAPENTIN 300 MILLIGRAM(S): 400 CAPSULE ORAL at 16:30

## 2021-03-01 RX ADMIN — Medication 2: at 07:54

## 2021-03-01 RX ADMIN — RANOLAZINE 500 MILLIGRAM(S): 500 TABLET, FILM COATED, EXTENDED RELEASE ORAL at 04:53

## 2021-03-01 NOTE — PROGRESS NOTE ADULT - SUBJECTIVE AND OBJECTIVE BOX
Patient seen and examined at bedside.    Overnight Events:   Pt feels well, no chest pain, pressure, shortness of breath or palpitation.  NPO for MMA embo with NSGY     REVIEW OF SYSTEMS:  Constitutional:     [x ] negative [ ] fevers [ ] chills [ ] weight loss [ ] weight gain  HEENT:                  [x ] negative [ ] dry eyes [ ] eye irritation [ ] postnasal drip [ ] nasal congestion  CV:                         [ x] negative  [ ] chest pain [ ] orthopnea [ ] palpitations [ ] murmur  Resp:                     [ x] negative [ ] cough [ ] shortness of breath [ ] dyspnea [ ] wheezing [ ] sputum [ ]hemoptysis  GI:                          [ x] negative [ ] nausea [ ] vomiting [ ] diarrhea [ ] constipation [ ] abd pain [ ] dysphagia   :                        [ x] negative [ ] dysuria [ ] nocturia [ ] hematuria [ ] increased urinary frequency  Musculoskeletal: [ x] negative [ ] back pain [ ] myalgias [ ] arthralgias [ ] fracture  Skin:                       [ x] negative [ ] rash [ ] itch  Neurological:        [x ] negative [ ] headache [ ] dizziness [ ] syncope [ ] weakness [ ] numbness  Psychiatric:           [ x] negative [ ] anxiety [ ] depression  Endocrine:            [ x] negative [ ] diabetes [ ] thyroid problem  Heme/Lymph:      [ x] negative [ ] anemia [ ] bleeding problem  Allergic/Immune: [ x] negative [ ] itchy eyes [ ] nasal discharge [ ] hives [ ] angioedema    [ x] All other systems negative  [ ] Unable to assess ROS due to    Current Meds:  atorvastatin 40 milliGRAM(s) Oral at bedtime  carvedilol 12.5 milliGRAM(s) Oral every 12 hours  dextrose 40% Gel 15 Gram(s) Oral once  dextrose 5%. 1000 milliLiter(s) IV Continuous <Continuous>  dextrose 5%. 1000 milliLiter(s) IV Continuous <Continuous>  dextrose 50% Injectable 25 Gram(s) IV Push once  dextrose 50% Injectable 12.5 Gram(s) IV Push once  dextrose 50% Injectable 25 Gram(s) IV Push once  finasteride 5 milliGRAM(s) Oral daily  gabapentin 300 milliGRAM(s) Oral two times a day  glucagon  Injectable 1 milliGRAM(s) IntraMuscular once  insulin glargine Injectable (LANTUS) 20 Unit(s) SubCutaneous at bedtime  insulin lispro (ADMELOG) corrective regimen sliding scale   SubCutaneous three times a day before meals  isosorbide   mononitrate ER Tablet (IMDUR) 60 milliGRAM(s) Oral daily  morphine  - Injectable 2 milliGRAM(s) IV Push once  ranolazine 500 milliGRAM(s) Oral two times a day  tamsulosin 0.4 milliGRAM(s) Oral at bedtime      PAST MEDICAL & SURGICAL HISTORY:  BPH (benign prostatic hypertrophy)    Hyperlipidemia    CAD (coronary artery disease)    Diabetes mellitus  Type 2    HTN (hypertension)    S/P drug eluting coronary stent placement  [place]    S/P primary angioplasty with coronary stent  1990s        Vitals:  T(F): 97.7 (03-01), Max: 98 (02-28)  HR: 68 (03-01) (68 - 72)  BP: 131/66 (03-01) (127/52 - 149/68)  RR: 18 (03-01)  SpO2: 97% (03-01)  I&O's Summary    27 Feb 2021 07:01  -  28 Feb 2021 07:00  --------------------------------------------------------  IN: 600 mL / OUT: 100 mL / NET: 500 mL    28 Feb 2021 07:01  -  01 Mar 2021 06:23  --------------------------------------------------------  IN: 240 mL / OUT: 200 mL / NET: 40 mL        Physical Exam:  GENERAL: NAD  HEAD:  Atraumatic, Normocephalic.  EYES: EOMI, PERRLA, conjunctiva and sclera clear.  ENT: Moist mucous membranes.  NECK: Supple, No JVD.  CHEST/LUNG: Clear to auscultation bilaterally; No rales, rhonchi, wheezing, or rubs. Unlabored respirations.  HEART: Regular rate and rhythm; No murmurs, rubs, or gallops.  ABDOMEN: Bowel sounds present; Soft, Nontender, Nondistended.   EXTREMITIES:  2+ Peripheral Pulses, brisk capillary refill. No clubbing, cyanosis, or edema.  PSYCH: Normal affect.  SKIN: No rashes or lesions                          13.5   6.32  )-----------( 135      ( 28 Feb 2021 05:18 )             40.0     02-28    137  |  106  |  17  ----------------------------<  172<H>  3.9   |  23  |  0.99    Ca    9.3      28 Feb 2021 05:17            Serum Pro-Brain Natriuretic Peptide: 423 pg/mL (02-26 @ 09:39)          New ECG(s): Personally reviewed    Echo: Personally reviewed    Stress Testing: Personally reviewed    Cath: Personally reviewed    Imaging: Personally reviewed    Interpretation of Telemetry: SR

## 2021-03-01 NOTE — PROGRESS NOTE ADULT - ASSESSMENT
76M a/ pmh of CAD s/p PCI in the past, HTN, HLD, chronic SDH (since 2019) present with severe CP for three nights s/p nitro, and asa 81 x4 with elevated trops. Recent admission for same pain, was dc'ed after pain relieved with meds, TTE interdeterminate. HCT in ED shows stable SDH 1.6 cm. Currently pain is better, no headache, no n/v. Cardiology would like to take patient for cath.   Exam: intact    - ADM medicine, q4h neuro checks  - Preop for MMA embo TODAYMonday 3/1 AM for chronic sdh  - cards cleared for mma embo 2/27, Med cleared 2/28  - preop labs done, npo w/ fluids  - COVID neg 2/27, MRSA /MSSA neg  - Will need repeat HCT tomorrow 3/2 post-procedure. Repeat HCT for any change in exam.  - MMA embo will likely help reduce risk of rebleeding events in the future, and may even decrease the size of the bleed, but will not necessarily acutely decrease the risk of hemorrhage in the immediate postop period.   - Risks associated with expanding SDH should be weighed accordingly against the risks of delaying cardiac intervention by the primary team and discussed with patient

## 2021-03-01 NOTE — CHART NOTE - NSCHARTNOTEFT_GEN_A_CORE
Called by RN for pt with chest pain #7/10   Pt states it is his usual chest pain that comes at unpredictable times.   Pt was lying in bed at the time.   Pt states pain is relieved when he takes SL nitro.   Denies any shortness of breath, palpitations, paresthesias, or abdominal pain.     Vitals stable /76  HR 70 RR 18 Temp 98.2 O2sat 98% on room air.     EKG and troponin stat  Nitrol SL x1 - required 2nd tablet after 5 minutes  Discussed case and EKG with cardiology.   Imdur increased to 90mg for tomorrow. Will give additional 30mg tonight.   No AC due to hx of SDH.   Pt s/p cath with triple vessel disease - possible CABG.   Continue to monitor on telemetry.     Tiffany Lynn ANP-BC  [id number] Called by RN for pt with chest pain #7/10   Pt states it is his usual chest pain that comes at unpredictable times.   Pt was lying in bed at the time.   Pt states pain is relieved when he takes SL nitro.   Denies any shortness of breath, palpitations, paresthesias, or abdominal pain.     Vitals stable /76  HR 70 RR 18 Temp 98.2 O2sat 98% on room air.     EKG and troponin stat  Nitrol SL x1 - required 2nd tablet after 5 minutes  Discussed case and EKG with cardiology.   Imdur increased to 90mg for tomorrow. Will give additional 30mg tonight.   No AC due to hx of SDH.   Pt s/p cath with triple vessel disease - possible CABG.   Continue to monitor on telemetry.     Tiffany Lynn ANP-BC  [id number]      Addendum 10:30 pm   Chest pain earlier tonight at 8 pm , trop trended down from 145 to 121.   EKG was unchanged.   Pt now c/o same chest pain as above, d/s house cardiology - ok to give nitro SL  /86 HR 81  and order nitro paste.  Repeat EKG   Continue on telemetry, will monitor vitals     Tiffany Lynn ANP-BC  [id number] Called by RN for pt with chest pain #7/10   Pt states it is his usual chest pain that comes at unpredictable times.   Pt was lying in bed at the time.   Pt states pain is relieved when he takes SL nitro.   Denies any shortness of breath, palpitations, paresthesias, or abdominal pain.     Vitals stable /76  HR 70 RR 18 Temp 98.2 O2sat 98% on room air.     EKG and troponin stat  Nitrol SL x1 - required 2nd tablet after 5 minutes  Discussed case and EKG with cardiology.   Imdur increased to 90mg for tomorrow. Will give additional 30mg tonight.   No AC due to hx of SDH.   Pt s/p cath with triple vessel disease - possible CABG.   Continue to monitor on telemetry.     Tiffany Lynn ANP-BC  [id number]      Addendum 10:30 pm   Chest pain earlier tonight at 8 pm , trop trended down from 145 to 121.   EKG was unchanged.   Pt now c/o same chest pain as above, d/s [facility] - ok to give nitro SL  /86 HR 81  and order nitro paste.  Repeat EKG   Continue on telemetry, will monitor vitals     Tiffany Lynn ANP-BC  [id number]      0030 am Pt with same chest pain, EKG repeated and reviewed with fellow. Nitro paste 0.5 inch applied with relief.   0215 am pt c/o chest pain, EKG and trop sent - downtrending - changed nitro to 1 inch ointment with relief.   0300 Pt awakened by chest pain in his sleep - Morphine 2 mg IV x1, EKG done unchanged, reviewed by cardiology.   Dr. Cody called at 3 am and notified about recurrent chest pain.   Continue to f/u with [place] [facility]ology.  Pt resting comfortably at this time. Vitals stable. NAD.     Tiffany Lynn ANP-BC  [id number]

## 2021-03-01 NOTE — PROGRESS NOTE ADULT - SUBJECTIVE AND OBJECTIVE BOX
Patient is a 76y old  Male who presents with a chief complaint of chest pain (01 Mar 2021 13:38)    Date of servie : 03-01-21 @ 18:28  INTERVAL HPI/OVERNIGHT EVENTS:  T(C): 36.7 (03-01-21 @ 17:15), Max: 36.7 (02-28-21 @ 20:07)  HR: 70 (03-01-21 @ 17:15) (57 - 73)  BP: 143/74 (03-01-21 @ 17:15) (122/69 - 155/88)  RR: 16 (03-01-21 @ 17:15) (12 - 18)  SpO2: 98% (03-01-21 @ 17:15) (97% - 100%)  Wt(kg): --  I&O's Summary    28 Feb 2021 07:01  -  01 Mar 2021 07:00  --------------------------------------------------------  IN: 240 mL / OUT: 200 mL / NET: 40 mL    01 Mar 2021 07:01  -  01 Mar 2021 18:28  --------------------------------------------------------  IN: 240 mL / OUT: 100 mL / NET: 140 mL        LABS:                        12.9   5.79  )-----------( 133      ( 01 Mar 2021 06:45 )             38.6     03-01    138  |  104  |  21  ----------------------------<  207<H>  3.8   |  23  |  1.08    Ca    9.1      01 Mar 2021 06:45          CAPILLARY BLOOD GLUCOSE  299 (28 Feb 2021 21:03)      POCT Blood Glucose.: 191 mg/dL (01 Mar 2021 15:12)  POCT Blood Glucose.: 139 mg/dL (01 Mar 2021 12:59)  POCT Blood Glucose.: 207 mg/dL (01 Mar 2021 07:49)  POCT Blood Glucose.: 299 mg/dL (28 Feb 2021 21:03)            MEDICATIONS  (STANDING):  atorvastatin 40 milliGRAM(s) Oral at bedtime  carvedilol 12.5 milliGRAM(s) Oral every 12 hours  dextrose 40% Gel 15 Gram(s) Oral once  dextrose 5%. 1000 milliLiter(s) (50 mL/Hr) IV Continuous <Continuous>  dextrose 5%. 1000 milliLiter(s) (100 mL/Hr) IV Continuous <Continuous>  dextrose 50% Injectable 25 Gram(s) IV Push once  dextrose 50% Injectable 12.5 Gram(s) IV Push once  dextrose 50% Injectable 25 Gram(s) IV Push once  finasteride 5 milliGRAM(s) Oral daily  gabapentin 300 milliGRAM(s) Oral two times a day  glucagon  Injectable 1 milliGRAM(s) IntraMuscular once  insulin glargine Injectable (LANTUS) 20 Unit(s) SubCutaneous at bedtime  insulin lispro (ADMELOG) corrective regimen sliding scale   SubCutaneous three times a day before meals  isosorbide   mononitrate ER Tablet (IMDUR) 60 milliGRAM(s) Oral daily  morphine  - Injectable 2 milliGRAM(s) IV Push once  ranolazine 500 milliGRAM(s) Oral two times a day  tamsulosin 0.4 milliGRAM(s) Oral at bedtime    MEDICATIONS  (PRN):          PHYSICAL EXAM:  GENERAL: frail  CHEST/LUNG: Clear to percussion bilaterally; No rales, rhonchi, wheezing, or rubs  HEART: Regular rate and rhythm; No murmurs, rubs, or gallops  ABDOMEN: Soft, Nontender, Nondistended; Bowel sounds present  EXTREMITIES:  2+ Peripheral Pulses, No clubbing, cyanosis, or edema  LYMPH: No lymphadenopathy noted  SKIN: No rashes or lesions    Care Discussed with Consultants/Other Providers [x ] YES  [ ] NO

## 2021-03-01 NOTE — CHART NOTE - NSCHARTNOTEFT_GEN_A_CORE
Interventional Neuro- Radiology   Procedure Note      Procedure: Selective Cerebral Angiography left MMA embolization  Pre- Procedure Diagnosis: SDH  Post- Procedure Diagnosis: left MMA embolization with particles and coils    : Dr. Cezar MD    Physician Assistant: Mary Gibbs PA-C    RN: Geraldine  Tech: Evans    Anesthesia: Dr. Kayden MD; Alissa Banuelos CRNA (MAC)    I/Os:  Fluids: 100 cc DTV  Contrast: 56 cc  Estimated Blood Loss: <10cc    Preliminary Report:  Under MAC, using a 5Fr 65 arrowflex sheath to the left groin examination of left internal carotid artery/ left external carotid artery/ right common carotid artery via selective cerebral angiography demonstrates chronic SDH, embolization of left MMA with particles and coils. ( Official note to follow).    Patient tolerated procedure well, vital signs stable, hemodynamically stable, no change in neurological status compared to baseline. Results discussed with neurosurgery/ patient and their family. Groin sheath d/c'ed, manual compression held to hemostasis, no active bleeding, no hematoma, mynx closure device applied, quick clot and safeguard balloon dressing applied at 10:30h. Patient transferred to IR recovery for further care/ monitoring.       Mary Gibbs PA-C

## 2021-03-01 NOTE — PROGRESS NOTE ADULT - ASSESSMENT
77YO M s/p L MMA embo, doing well postop. Exam: Neuro intact, incl VF. No facial, stable from preop. L groin C/D/I, safeguard in place, DPs strong.     PACU 4h, dc safeguard, then floor (back to medicine service)  CTH in AM  cont plan as previous: q4h neuro checks, pain control and care per primary  repeat CTH for any change in exam

## 2021-03-01 NOTE — PROGRESS NOTE ADULT - ASSESSMENT
74y M with hx. of CAD s/p PCI to the LAD and RCA in the past (last angiogram in 2015 with [facility]), HTN, & HLD.  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 Currently CP free.     Plan:   - off anticoagulation, continue atorvastatin and coreg at current doses.    - pending MMA embo with NSGY for SDH 3/1  - cont ranexa 500 mg BID and increase Imdur to 90 mg QD   - CABG eval and timeline per CTS     Thank you,   Moy Galloway MD  Cardiology Fellow, [facility]-NS/MAYA  All Cardiology service information can be found 24/7 on [email or web address], password: [email or web address]

## 2021-03-01 NOTE — PROGRESS NOTE ADULT - SUBJECTIVE AND OBJECTIVE BOX
Patient seen and examined at bedside.    --Anticoagulation--    T(C): 36.5 (03-01-21 @ 04:24), Max: 36.7 (02-28-21 @ 20:07)  HR: 68 (03-01-21 @ 04:24) (68 - 75)  BP: 131/66 (03-01-21 @ 04:24) (121/56 - 149/68)  RR: 18 (03-01-21 @ 04:24) (18 - 18)  SpO2: 97% (03-01-21 @ 04:24) (97% - 98%)  Wt(kg): --    Exam:  Intact    No new imaging    Labs:                         13.5   6.32  )-----------( 135      ( 28 Feb 2021 05:18 )             40.0     02-28    137  |  106  |  17  ----------------------------<  172<H>  3.9   |  23  |  0.99    Ca    9.3      28 Feb 2021 05:17

## 2021-03-01 NOTE — CHART NOTE - NSCHARTNOTEFT_GEN_A_CORE
Interventional Neuro Radiology  Pre-Procedure Note    This is a 77yo M hx of CAD, s/p PCI in the past, HTN, HLD, multiple chronic subdurals with chest pain x 3 days. s/p nitro and aspirin. No associated sob or diaphoresis. Pt was here few days ago for same, worked up for NSTEMI. Ended up having symptomatic relief prior to dc. In hospital TTE was interdeterminate, trop 124. Denies f/c, cough, sob, abdominal pain, change in bowel. no headache, no n/v. HCT shows stable SDH 1.6 cm.       Neuro Exam: AOx3, FC, PERRL, EOMI, fluent, normal naming and repetition, follows 3 step commands, no nystagmus, visual fields full, face symmetric, tongue midline. No drift, 5/5 power x 4 extremities. Normal sensation to LT. Normal finger-to-nose and rapid alternating movements.    PAST MEDICAL & SURGICAL HISTORY:  BPH (benign prostatic hypertrophy)    Hyperlipidemia    CAD (coronary artery disease)    Diabetes mellitus  Type 2    HTN (hypertension)    S/P drug eluting coronary stent placement  [place]    S/P primary angioplasty with coronary stent  1990s        Social History:   Denies tobacco use    FAMILY HISTORY:  Family history of diabetes mellitus (Sibling)  3 brothers alive with Diabetes      No pertinent family history    Allergies: No Known Allergies      Current Medications: atorvastatin 40 milliGRAM(s) Oral at bedtime  carvedilol 12.5 milliGRAM(s) Oral every 12 hours  dextrose 40% Gel 15 Gram(s) Oral once  dextrose 5%. 1000 milliLiter(s) IV Continuous <Continuous>  dextrose 5%. 1000 milliLiter(s) IV Continuous <Continuous>  dextrose 50% Injectable 25 Gram(s) IV Push once  dextrose 50% Injectable 12.5 Gram(s) IV Push once  dextrose 50% Injectable 25 Gram(s) IV Push once  finasteride 5 milliGRAM(s) Oral daily  gabapentin 300 milliGRAM(s) Oral two times a day  glucagon  Injectable 1 milliGRAM(s) IntraMuscular once  insulin glargine Injectable (LANTUS) 20 Unit(s) SubCutaneous at bedtime  insulin lispro (ADMELOG) corrective regimen sliding scale   SubCutaneous three times a day before meals  isosorbide   mononitrate ER Tablet (IMDUR) 60 milliGRAM(s) Oral daily  morphine  - Injectable 2 milliGRAM(s) IV Push once  ranolazine 500 milliGRAM(s) Oral two times a day  tamsulosin 0.4 milliGRAM(s) Oral at bedtime      Labs:                         12.9   5.79  )-----------( 133      ( 01 Mar 2021 06:45 )             38.6       03-01    138  |  104  |  21  ----------------------------<  207<H>  3.8   |  23  |  1.08    Ca    9.1      01 Mar 2021 06:45    TPro  6.5  /  Alb  3.9  /  TBili  1.5<H>  /  DBili  x   /  AST  29  /  ALT  26  /  AlkPhos  55  02-27        Blood Bank: 02-27-21  B  --  Positive      Assessment/Plan:   This is a 76y Male who presents with chronic SDH. Patient presents to neuro-IR for selective cerebral angiography and MMA embo. Procedure/ risks/ benefits/ goals/ alternatives were explained. Risks include but are not limited to stroke/ vessel injury/ hemorrhage/ groin hematoma. All questions answered. Informed content obtained from patient. Consent placed in chart.     Mary Gibbs PA-C

## 2021-03-01 NOTE — PROGRESS NOTE ADULT - SUBJECTIVE AND OBJECTIVE BOX
Pt seen and examined in IR recovery postop.     Neuro intact, incl VF. No facial, stable from preop. L groin C/D/I, safeguard in place, DPs strong.

## 2021-03-01 NOTE — PROGRESS NOTE ADULT - ASSESSMENT
77yo M hx of HTN, HLD, multiple chronic subdurals comes to ED for cp. Chest pain substernal, since last night, constant, no radiation, took 3 nitros last night, 1 ASA 324mg this morning. No associated with sob or diaphoresis. Pt was here few days ago for same, worked up for NSTEMI. Ended up having symptomatic relief prior to dc. In hospital TTE was interdeterminate, trop 124. Neurosurgery recs were to avoid anti-platelets and AC. Denies f/c, cough, sob, abdominal pain, change in bowel.    Problem/Plan - 1:  ·  Problem: NSTEMI (non-ST elevated myocardial infarction).  Plan: telemonitor  cards fu   cath with TVD  CTS fu     Problem/Plan - 2:  ·  Problem: SDH (subdural hematoma).  Plan: neuro and neurosurgery fu   OR monday     Problem/Plan - 3:  ·  Problem: Diabetes mellitus.  Plan: monitor FSbasal, bolus.     Problem/Plan - 4:  ·  Problem: Hyperlipidemia.  Plan: cw statin.

## 2021-03-02 ENCOUNTER — RX RENEWAL (OUTPATIENT)
Age: 76
End: 2021-03-02

## 2021-03-02 LAB
ANION GAP SERPL CALC-SCNC: 9 MMOL/L — SIGNIFICANT CHANGE UP (ref 5–17)
BUN SERPL-MCNC: 18 MG/DL — SIGNIFICANT CHANGE UP (ref 7–23)
CALCIUM SERPL-MCNC: 9.7 MG/DL — SIGNIFICANT CHANGE UP (ref 8.4–10.5)
CHLORIDE SERPL-SCNC: 104 MMOL/L — SIGNIFICANT CHANGE UP (ref 96–108)
CK MB BLD-MCNC: 3.4 % — SIGNIFICANT CHANGE UP (ref 0–3.5)
CK MB CFR SERPL CALC: 3.9 NG/ML — SIGNIFICANT CHANGE UP (ref 0–6.7)
CK SERPL-CCNC: 115 U/L — SIGNIFICANT CHANGE UP (ref 30–200)
CO2 SERPL-SCNC: 23 MMOL/L — SIGNIFICANT CHANGE UP (ref 22–31)
CREAT SERPL-MCNC: 1.04 MG/DL — SIGNIFICANT CHANGE UP (ref 0.5–1.3)
GLUCOSE BLDC GLUCOMTR-MCNC: 150 MG/DL — HIGH (ref 70–99)
GLUCOSE BLDC GLUCOMTR-MCNC: 176 MG/DL — HIGH (ref 70–99)
GLUCOSE BLDC GLUCOMTR-MCNC: 199 MG/DL — HIGH (ref 70–99)
GLUCOSE BLDC GLUCOMTR-MCNC: 223 MG/DL — HIGH (ref 70–99)
GLUCOSE SERPL-MCNC: 207 MG/DL — HIGH (ref 70–99)
HCT VFR BLD CALC: 39.4 % — SIGNIFICANT CHANGE UP (ref 39–50)
HGB BLD-MCNC: 13 G/DL — SIGNIFICANT CHANGE UP (ref 13–17)
MAGNESIUM SERPL-MCNC: 1.7 MG/DL — SIGNIFICANT CHANGE UP (ref 1.6–2.6)
MCHC RBC-ENTMCNC: 29.6 PG — SIGNIFICANT CHANGE UP (ref 27–34)
MCHC RBC-ENTMCNC: 33 GM/DL — SIGNIFICANT CHANGE UP (ref 32–36)
MCV RBC AUTO: 89.7 FL — SIGNIFICANT CHANGE UP (ref 80–100)
NRBC # BLD: 0 /100 WBCS — SIGNIFICANT CHANGE UP (ref 0–0)
PLATELET # BLD AUTO: 139 K/UL — LOW (ref 150–400)
POTASSIUM SERPL-MCNC: 4.3 MMOL/L — SIGNIFICANT CHANGE UP (ref 3.5–5.3)
POTASSIUM SERPL-SCNC: 4.3 MMOL/L — SIGNIFICANT CHANGE UP (ref 3.5–5.3)
RBC # BLD: 4.39 M/UL — SIGNIFICANT CHANGE UP (ref 4.2–5.8)
RBC # FLD: 12.7 % — SIGNIFICANT CHANGE UP (ref 10.3–14.5)
SODIUM SERPL-SCNC: 136 MMOL/L — SIGNIFICANT CHANGE UP (ref 135–145)
TROPONIN T, HIGH SENSITIVITY RESULT: 113 NG/L — HIGH (ref 0–51)
WBC # BLD: 8.16 K/UL — SIGNIFICANT CHANGE UP (ref 3.8–10.5)
WBC # FLD AUTO: 8.16 K/UL — SIGNIFICANT CHANGE UP (ref 3.8–10.5)

## 2021-03-02 PROCEDURE — 99231 SBSQ HOSP IP/OBS SF/LOW 25: CPT | Mod: GC

## 2021-03-02 PROCEDURE — 93010 ELECTROCARDIOGRAM REPORT: CPT | Mod: 77

## 2021-03-02 PROCEDURE — 99233 SBSQ HOSP IP/OBS HIGH 50: CPT

## 2021-03-02 PROCEDURE — 99233 SBSQ HOSP IP/OBS HIGH 50: CPT | Mod: GC

## 2021-03-02 PROCEDURE — 93010 ELECTROCARDIOGRAM REPORT: CPT | Mod: 76

## 2021-03-02 PROCEDURE — 70450 CT HEAD/BRAIN W/O DYE: CPT | Mod: 26

## 2021-03-02 RX ORDER — NITROGLYCERIN 6.5 MG
1 CAPSULE, EXTENDED RELEASE ORAL EVERY 6 HOURS
Refills: 0 | Status: COMPLETED | OUTPATIENT
Start: 2021-03-02 | End: 2021-03-02

## 2021-03-02 RX ORDER — RANOLAZINE 500 MG/1
1000 TABLET, FILM COATED, EXTENDED RELEASE ORAL
Refills: 0 | Status: DISCONTINUED | OUTPATIENT
Start: 2021-03-02 | End: 2021-03-09

## 2021-03-02 RX ORDER — MORPHINE SULFATE 50 MG/1
2 CAPSULE, EXTENDED RELEASE ORAL ONCE
Refills: 0 | Status: DISCONTINUED | OUTPATIENT
Start: 2021-03-02 | End: 2021-03-02

## 2021-03-02 RX ORDER — MORPHINE SULFATE 50 MG/1
1 CAPSULE, EXTENDED RELEASE ORAL EVERY 4 HOURS
Refills: 0 | Status: DISCONTINUED | OUTPATIENT
Start: 2021-03-02 | End: 2021-03-03

## 2021-03-02 RX ORDER — NITROGLYCERIN 6.5 MG
0.4 CAPSULE, EXTENDED RELEASE ORAL
Refills: 0 | Status: COMPLETED | OUTPATIENT
Start: 2021-03-02 | End: 2021-03-03

## 2021-03-02 RX ORDER — MAGNESIUM SULFATE 500 MG/ML
1 VIAL (ML) INJECTION ONCE
Refills: 0 | Status: COMPLETED | OUTPATIENT
Start: 2021-03-02 | End: 2021-03-02

## 2021-03-02 RX ORDER — METOPROLOL TARTRATE 50 MG
25 TABLET ORAL
Refills: 0 | Status: DISCONTINUED | OUTPATIENT
Start: 2021-03-02 | End: 2021-03-03

## 2021-03-02 RX ORDER — RANOLAZINE 500 MG/1
500 TABLET, FILM COATED, EXTENDED RELEASE ORAL ONCE
Refills: 0 | Status: COMPLETED | OUTPATIENT
Start: 2021-03-02 | End: 2021-03-02

## 2021-03-02 RX ORDER — NITROGLYCERIN 6.5 MG
0.4 CAPSULE, EXTENDED RELEASE ORAL ONCE
Refills: 0 | Status: COMPLETED | OUTPATIENT
Start: 2021-03-02 | End: 2021-03-02

## 2021-03-02 RX ADMIN — Medication 100 GRAM(S): at 16:28

## 2021-03-02 RX ADMIN — FINASTERIDE 5 MILLIGRAM(S): 5 TABLET, FILM COATED ORAL at 11:41

## 2021-03-02 RX ADMIN — Medication 0.4 MILLIGRAM(S): at 20:11

## 2021-03-02 RX ADMIN — MORPHINE SULFATE 2 MILLIGRAM(S): 50 CAPSULE, EXTENDED RELEASE ORAL at 07:15

## 2021-03-02 RX ADMIN — ISOSORBIDE MONONITRATE 90 MILLIGRAM(S): 60 TABLET, EXTENDED RELEASE ORAL at 05:09

## 2021-03-02 RX ADMIN — MORPHINE SULFATE 1 MILLIGRAM(S): 50 CAPSULE, EXTENDED RELEASE ORAL at 16:27

## 2021-03-02 RX ADMIN — MORPHINE SULFATE 2 MILLIGRAM(S): 50 CAPSULE, EXTENDED RELEASE ORAL at 03:07

## 2021-03-02 RX ADMIN — ATORVASTATIN CALCIUM 40 MILLIGRAM(S): 80 TABLET, FILM COATED ORAL at 20:58

## 2021-03-02 RX ADMIN — RANOLAZINE 500 MILLIGRAM(S): 500 TABLET, FILM COATED, EXTENDED RELEASE ORAL at 13:58

## 2021-03-02 RX ADMIN — Medication 1: at 11:41

## 2021-03-02 RX ADMIN — INSULIN GLARGINE 20 UNIT(S): 100 INJECTION, SOLUTION SUBCUTANEOUS at 20:58

## 2021-03-02 RX ADMIN — Medication 1 INCH(S): at 02:15

## 2021-03-02 RX ADMIN — TAMSULOSIN HYDROCHLORIDE 0.4 MILLIGRAM(S): 0.4 CAPSULE ORAL at 20:58

## 2021-03-02 RX ADMIN — Medication 1 INCH(S): at 19:58

## 2021-03-02 RX ADMIN — Medication 25 MILLIGRAM(S): at 18:06

## 2021-03-02 RX ADMIN — Medication 1 INCH(S): at 07:15

## 2021-03-02 RX ADMIN — Medication 0.5 INCH(S): at 00:14

## 2021-03-02 RX ADMIN — Medication 1: at 07:56

## 2021-03-02 RX ADMIN — Medication 0.4 MILLIGRAM(S): at 12:30

## 2021-03-02 RX ADMIN — GABAPENTIN 300 MILLIGRAM(S): 400 CAPSULE ORAL at 05:09

## 2021-03-02 RX ADMIN — RANOLAZINE 1000 MILLIGRAM(S): 500 TABLET, FILM COATED, EXTENDED RELEASE ORAL at 18:03

## 2021-03-02 RX ADMIN — GABAPENTIN 300 MILLIGRAM(S): 400 CAPSULE ORAL at 18:04

## 2021-03-02 RX ADMIN — RANOLAZINE 500 MILLIGRAM(S): 500 TABLET, FILM COATED, EXTENDED RELEASE ORAL at 05:09

## 2021-03-02 NOTE — PROGRESS NOTE ADULT - ASSESSMENT
75yo M hx of HTN, HLD, multiple chronic subdurals comes to ED for cp. Chest pain substernal, since last night, constant, no radiation, took 3 nitros last night, 1 ASA 324mg this morning. No associated with sob or diaphoresis. Pt was here few days ago for same, worked up for NSTEMI. Ended up having symptomatic relief prior to dc. In hospital TTE was interdeterminate, trop 124. Neurosurgery recs were to avoid anti-platelets and AC. Denies f/c, cough, sob, abdominal pain, change in bowel.    Problem/Plan - 1:  ·  Problem: NSTEMI (non-ST elevated myocardial infarction).  Plan: telemonitor  cards fu   sp cath and embolization for SDH   CTS fu for CABG     Problem/Plan - 2:  ·  Problem: SDH (subdural hematoma).  Plan: neuro and neurosurgery fu   sp embolization for SDH     Problem/Plan - 3:  ·  Problem: Diabetes mellitus.  Plan: monitor FSbasal, bolus.   Problem/Plan - 4:  ·  Problem: Hyperlipidemia.  Plan: cw statin.

## 2021-03-02 NOTE — PROGRESS NOTE ADULT - SUBJECTIVE AND OBJECTIVE BOX
Patient is a 76y old  Male who presents with a chief complaint of chest pain (02 Mar 2021 10:37)    Date of servie : 03-02-21 @ 13:53  INTERVAL HPI/OVERNIGHT EVENTS:  T(C): 36.4 (03-02-21 @ 11:07), Max: 37 (03-02-21 @ 04:27)  HR: 69 (03-02-21 @ 12:27) (60 - 83)  BP: 145/72 (03-02-21 @ 12:27) (103/60 - 159/87)  RR: 18 (03-02-21 @ 11:07) (14 - 18)  SpO2: 100% (03-02-21 @ 11:07) (96% - 100%)  Wt(kg): --  I&O's Summary    01 Mar 2021 07:01  -  02 Mar 2021 07:00  --------------------------------------------------------  IN: 720 mL / OUT: 600 mL / NET: 120 mL    02 Mar 2021 07:01  -  02 Mar 2021 13:53  --------------------------------------------------------  IN: 240 mL / OUT: 0 mL / NET: 240 mL        LABS:                        13.0   8.16  )-----------( 139      ( 02 Mar 2021 03:21 )             39.4     03-02    136  |  104  |  18  ----------------------------<  207<H>  4.3   |  23  |  1.04    Ca    9.7      02 Mar 2021 03:21          CAPILLARY BLOOD GLUCOSE      POCT Blood Glucose.: 199 mg/dL (02 Mar 2021 11:30)  POCT Blood Glucose.: 176 mg/dL (02 Mar 2021 07:33)  POCT Blood Glucose.: 159 mg/dL (01 Mar 2021 21:20)  POCT Blood Glucose.: 191 mg/dL (01 Mar 2021 15:12)            MEDICATIONS  (STANDING):  atorvastatin 40 milliGRAM(s) Oral at bedtime  carvedilol 12.5 milliGRAM(s) Oral every 12 hours  dextrose 40% Gel 15 Gram(s) Oral once  dextrose 5%. 1000 milliLiter(s) (50 mL/Hr) IV Continuous <Continuous>  dextrose 5%. 1000 milliLiter(s) (100 mL/Hr) IV Continuous <Continuous>  dextrose 50% Injectable 25 Gram(s) IV Push once  dextrose 50% Injectable 12.5 Gram(s) IV Push once  dextrose 50% Injectable 25 Gram(s) IV Push once  finasteride 5 milliGRAM(s) Oral daily  gabapentin 300 milliGRAM(s) Oral two times a day  glucagon  Injectable 1 milliGRAM(s) IntraMuscular once  insulin glargine Injectable (LANTUS) 20 Unit(s) SubCutaneous at bedtime  insulin lispro (ADMELOG) corrective regimen sliding scale   SubCutaneous three times a day before meals  isosorbide   mononitrate ER Tablet (IMDUR) 90 milliGRAM(s) Oral daily  ranolazine 1000 milliGRAM(s) Oral two times a day  ranolazine 500 milliGRAM(s) Oral once  tamsulosin 0.4 milliGRAM(s) Oral at bedtime    MEDICATIONS  (PRN):          PHYSICAL EXAM:  GENERAL: NAD, well-groomed, well-developed  HEAD:  Atraumatic, Normocephalic  CHEST/LUNG: Clear to percussion bilaterally; No rales, rhonchi, wheezing, or rubs  HEART: Regular rate and rhythm; No murmurs, rubs, or gallops  ABDOMEN: Soft, Nontender, Nondistended; Bowel sounds present  EXTREMITIES:  2+ Peripheral Pulses, No clubbing, cyanosis, or edema  LYMPH: No lymphadenopathy noted  SKIN: No rashes or lesions    Care Discussed with Consultants/Other Providers [ ] YES  [ ] NO

## 2021-03-02 NOTE — PROGRESS NOTE ADULT - ASSESSMENT
74y M with hx. of CAD s/p PCI to the LAD and RCA in the past (last angiogram in 2015 with [facility]), HTN, & HLD.  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 s/p MMA embolization with NSGY on 3/1     Plan:   - off anticoagulation, continue atorvastatin and coreg at current doses.    - cont imdur 90 mg QD and increase ranexa to 1000 mg BID  - near maximally tolerated doses for anti-anginal meds   - s/p MMA embolization with NSGY, f/u CTH   - CABG eval and timeline per CTS     Thank you,   Moy Galloway MD  Cardiology Fellow, [facility]-NS/[facility]  All Cardiology service information can be found 24/7 on [email or web address], password: [email or web address]

## 2021-03-02 NOTE — PROGRESS NOTE ADULT - SUBJECTIVE AND OBJECTIVE BOX
Patient seen and examined at bedside.    --Anticoagulation--    T(C): 37 (03-02-21 @ 04:27), Max: 37 (03-02-21 @ 04:27)  HR: 82 (03-02-21 @ 05:07) (57 - 83)  BP: 119/62 (03-02-21 @ 05:07) (113/68 - 159/87)  RR: 18 (03-02-21 @ 04:27) (12 - 18)  SpO2: 96% (03-02-21 @ 04:27) (96% - 100%)  Wt(kg): --    Exam:  Intact, L groin c/d/i    No new imaging    Labs:                         13.0   8.16  )-----------( 139      ( 02 Mar 2021 03:21 )             39.4     03-02    136  |  104  |  18  ----------------------------<  207<H>  4.3   |  23  |  1.04    Ca    9.7      02 Mar 2021 03:21

## 2021-03-02 NOTE — PROGRESS NOTE ADULT - SUBJECTIVE AND OBJECTIVE BOX
Pt seen yesterday after MMA embo and was feeling well, VILCHIS with  no drift.  I spoke with daughter on phone in pt's presence and answered questions regarding situation and plan for SDH.  At this time we will need to await resolution.  CT done today showed very slight decrease in width of SDH.  SDH will need to be monitored as an outpatient.  Discussed monitoring with pt and daughter.

## 2021-03-02 NOTE — CONSULT NOTE ADULT - ATTENDING COMMENTS
I have personally seen, examined and participated in the care of this patient. I have reviewed all pertinent clinical information, including history, physical exam, plan and the fellow's note. I agree with the fellow's note with the following additions:    Multi vessel CAD and subdural bleed  CICU consulted for intractable chest pain  CT Surgery is following for CABG with no definitive OR plan given subdural bleed  Neurosurgery is following and embolized L MMA yesterday  Patient denied chest pain when I saw him - one hour post Nitro SL  Patient reports that he gets chest pain relief from both Nitroglycerin and Morphine but neither are given to him frequently enough  He is on max dose Ranexa and also on Imdur and Coreg which are not on max doses  He is hemodynamically stable and oxygenating well  EKG is LBBB, enzymes are not significantly elevated    I suggest:  Increasing Imdur to max dose  Continue Ranexa  Switching Coreg to Lopressor and titrating to HR 50s-60s as blood pressure allows  Make low-dose Morphine standing with Nitro SL prn and Morphine prn for breakthrough  Continue management on the medical floor with Cardiology consult following    Putting in a balloon pump or placing him on a Nitro drip are other options for this patient's angina. However, given there are no concrete revascularization plans and it is unclear if this patient is a revascularization candidate given his head bleed, I would not want to start either of these as I would ideally use them as a bridge to revascularization. If I use them without revascularizing the patient then I would use the strategies listed above to bridge off the balloon/infusion. I also think, since he does get relief from Morphine and Nitro, if he gets them more frequently then he can have less of a pain burden. Should this plan not provide him with significant relief then please feel free to reconsult.

## 2021-03-02 NOTE — PROGRESS NOTE ADULT - SUBJECTIVE AND OBJECTIVE BOX
[facility]      Seen earlier today, and examined.  - Today, patient is without complaints.           *****MEDICATIONS: Current medication reviewed and documented.    MEDICATIONS  (STANDING):  atorvastatin 40 milliGRAM(s) Oral at bedtime  dextrose 40% Gel 15 Gram(s) Oral once  dextrose 5%. 1000 milliLiter(s) (50 mL/Hr) IV Continuous <Continuous>  dextrose 5%. 1000 milliLiter(s) (100 mL/Hr) IV Continuous <Continuous>  dextrose 50% Injectable 25 Gram(s) IV Push once  dextrose 50% Injectable 12.5 Gram(s) IV Push once  dextrose 50% Injectable 25 Gram(s) IV Push once  finasteride 5 milliGRAM(s) Oral daily  gabapentin 300 milliGRAM(s) Oral two times a day  glucagon  Injectable 1 milliGRAM(s) IntraMuscular once  insulin glargine Injectable (LANTUS) 20 Unit(s) SubCutaneous at bedtime  insulin lispro (ADMELOG) corrective regimen sliding scale   SubCutaneous three times a day before meals  isosorbide   mononitrate ER Tablet (IMDUR) 90 milliGRAM(s) Oral daily  metoprolol tartrate 25 milliGRAM(s) Oral two times a day  ranolazine 1000 milliGRAM(s) Oral two times a day  tamsulosin 0.4 milliGRAM(s) Oral at bedtime    MEDICATIONS  (PRN):  morphine  - Injectable 1 milliGRAM(s) IV Push every 4 hours PRN Severe Pain (7 - 10)  nitroglycerin     SubLingual 0.4 milliGRAM(s) SubLingual every 5 minutes PRN Chest Pain          ***** VITAL SIGNS:  T(F): 98.3 (21 @ 19:54), Max: 98.8 (21 @ 16:03)  HR: 75 (21 @ 19:54) (68 - 82)  BP: 112/63 (21 @ 19:54) (103/60 - 145/72)  RR: 18 (21 @ 19:54) (18 - 18)  SpO2: 99% (21 @ 19:54) (96% - 100%)  Wt(kg): --  ,   I&O's Summary    01 Mar 2021 07:01  -  02 Mar 2021 07:00  --------------------------------------------------------  IN: 720 mL / OUT: 600 mL / NET: 120 mL    02 Mar 2021 07:01  -  03 Mar 2021 01:09  --------------------------------------------------------  IN: 600 mL / OUT: 300 mL / NET: 300 mL             *****PHYSICAL EXAM:   alert oriented x 3 attention comprehension are fair.  Able to name, repeat.   EOmi fundi not visualized   no nystagmus VFF to confrontation  Tongue is midline  Palate elevates symmetrically   Moving all 4 ext spontaneously no drift appreciated    Gait not assessed.            *****LAB AND IMAGIN.0   8.16  )-----------( 139      ( 02 Mar 2021 03:21 )             39.4               03-02    136  |  104  |  18  ----------------------------<  207<H>  4.3   |  23  |  1.04    Ca    9.7      02 Mar 2021 03:21  Mg     1.7     03-02             CARDIAC MARKERS ( 02 Mar 2021 03:21 )  x     / x     / 115 U/L / x     / 3.9 ng/mL                [All pertinent recent Imaging/Reports reviewed]           *****A S S E S S M E N T   A N D   P L A N :            76y M pmhx CAD s/p CABG, HTN, HLD, chronic SDH present 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 Patient did nt get stress test due to adverse symptom experienced during last stress test and was sent home.    Pt was here few days ago for same, worked up for NSTEMI. Ended up having symptomatic relief prior to dc.    as per pt, he had recurrence of chest pain and  took 4 tablets of asa 81 this am and was brought to the emergency room. He denies any headache/weakness/numbness.   He is awaiting eval by cardiology       Problem/Recommendations 1:  chronic subdural hematoma    denies any headache or focal weakness      risk vs. benefits are being considered thoroughly, given his left moderate size subdural hematoma with mass effect, in order to optimize the management of his anginal chest pain.    s/p cath , revealed multilevel disease   ct surgery eval appreciated recommended cabg     frequent neurological exams q2 h and higher level of monitoring of pt was being done was agreed by neurosurgery.   Low threshold for ct head if there should arise any changes in neuro exam  s/p mma embolization tolerated procedure well, now will have to wait for 10-14 days for follow up scan as per neurosurgery   ct head post mma done         Problem/Recommendations 2: chest pain   seen by ccu not a candidate   pt having intermittent chest pain defer to cards for  management   awaiting cabg   defer to cardiology for management.        prognosis guarded as this pt remains at risk for decompensation.       Thank you for allowing me to participate in the care of this patient. Will continue to follow patient periodically. Please do not hesitate to call me if you have any  questions or if there has been a change in patients neurological status     ________________  Elissa García MD  [facility] ([facility]  [phone]      33 minutes spent on total encounter; more than 50 % of the visit was  spent counseling about plan of care, compliance to diet/exercise and medication regimen and or  coordinating care by the attending physician.      It is advised that stroke patients follow up with BERHANE Garrett @ [phone] in 1- 2 weeks.   Others please follow up with Dr. Michael Nissenbaum [phone]

## 2021-03-02 NOTE — CONSULT NOTE ADULT - CONSULT REQUESTED DATE/TIME
26-Feb-2021 10:05
26-Feb-2021 19:46
02-Mar-2021 15:03
26-Feb-2021 11:29
26-Feb-2021 11:36
26-Feb-2021 10:05

## 2021-03-02 NOTE — CONSULT NOTE ADULT - SUBJECTIVE AND OBJECTIVE BOX
Patient seen and evaluated at bedside    Chief Complaint: Chest pain     HPI:  77yo M hx of HTN, HLD, multiple chronic subdurals, CAD s/p PCI to the LAD and RCA in the past (last angiogram in 2015 with [facility]) represents with chest discomfort, [facility] showing triple vessel disease, course c/b subdural now s/p MMA embolization with NSGY on 3/1. Followed by general cardiology, plan for uptitration of anti anginal medications while ongoing surgical planning. CCU called by medicine team to assess in the setting of ongoing intermittent chest pain. On my assessment the patient is currently chest pain free although does report intermittent episodes of chest pain, most recently this morning, relieved by nitroglycerine. Otherwise the patient has no complaints, ROS negative.     PMHx:   BPH (benign prostatic hypertrophy)  Hyperlipidemia  CAD (coronary artery disease)  Diabetes mellitus  HTN (hypertension)    PSHx:   S/P drug eluting coronary stent placement  S/P primary angioplasty with coronary stent    Allergies:  No Known Allergies    Current Medications:   atorvastatin 40 milliGRAM(s) Oral at bedtime  carvedilol 12.5 milliGRAM(s) Oral every 12 hours  dextrose 40% Gel 15 Gram(s) Oral once  dextrose 5%. 1000 milliLiter(s) IV Continuous <Continuous>  dextrose 5%. 1000 milliLiter(s) IV Continuous <Continuous>  dextrose 50% Injectable 25 Gram(s) IV Push once  dextrose 50% Injectable 12.5 Gram(s) IV Push once  dextrose 50% Injectable 25 Gram(s) IV Push once  finasteride 5 milliGRAM(s) Oral daily  gabapentin 300 milliGRAM(s) Oral two times a day  glucagon  Injectable 1 milliGRAM(s) IntraMuscular once  insulin glargine Injectable (LANTUS) 20 Unit(s) SubCutaneous at bedtime  insulin lispro (ADMELOG) corrective regimen sliding scale   SubCutaneous three times a day before meals  isosorbide   mononitrate ER Tablet (IMDUR) 90 milliGRAM(s) Oral daily  ranolazine 1000 milliGRAM(s) Oral two times a day  tamsulosin 0.4 milliGRAM(s) Oral at bedtime    FAMILY HISTORY:  Family history of diabetes mellitus (Sibling)  3 brothers alive with Diabetes    Physical Exam:  T(F): 98.2 (03-02), Max: 98.6 (03-02)  HR: 72 (03-02) (66 - 83)  BP: 121/71 (03-02) (103/60 - 159/87)  RR: 18 (03-02)  SpO2: 100% (03-02)  GENERAL: No acute distress, well-developed  CHEST/LUNG: Clear to auscultation bilaterally; No wheeze, equal breath sounds bilaterally   HEART: Regular rate and rhythm; No murmurs, rubs, or gallops  ABDOMEN: Soft, Nontender, Nondistended; Bowel sounds present  EXTREMITIES:  No clubbing, cyanosis, or edema  PSYCH: Nl behavior, nl affect  NEUROLOGY: AAOx3, non-focal, cranial nerves intact    Cardiovascular Diagnostic Testing:    ECG NSR, LBBB w/o adriana ischemic changes      Echo: 1. Moderately dilated left atrium.  LA volume index = 48  cc/m2.  2. Endocardium not well visualized; despite the use of  Definity.  Probable septal hypokinesis. Septal motion is  consistent with LBBB.      Cath: CORONARY VESSELS: The coronary circulation is right dominant.  LM:   --  Mid left main: There was a 50 % stenosis.  LAD:   --  Mid LAD: There was a 90 % stenosis.  CX:   --  Ostial circumflex: There was a 90 % stenosis.  RI:   --  Proximal ramus intermedius: There was a 70 % stenosis.  RCA:   --  Proximal RCA: There was a 80 % stenosis.  COMPLICATIONS: There were no complications.    Imaging:    CXR: Personally reviewed    Labs: Personally reviewed                        13.0   8.16  )-----------( 139      ( 02 Mar 2021 03:21 )             39.4     03-02    136  |  104  |  18  ----------------------------<  207<H>  4.3   |  23  |  1.04    Ca    9.7      02 Mar 2021 03:21  Mg     1.7     03-02    CARDIAC MARKERS ( 02 Mar 2021 03:21 )  113 ng/L / x     / x     / 115 U/L / x     / 3.9 ng/mL  CARDIAC MARKERS ( 01 Mar 2021 21:00 )  121 ng/L / x     / x     / x     / x     / x      CARDIAC MARKERS ( 01 Mar 2021 06:45 )  145 ng/L / x     / x     / x     / x     / x      CARDIAC MARKERS ( 28 Feb 2021 05:17 )  148 ng/L / x     / x     / x     / x     / x      CARDIAC MARKERS ( 27 Feb 2021 08:23 )  146 ng/L / x     / x     / x     / x     / x        Serum Pro-Brain Natriuretic Peptide: 423 pg/mL (02-26 @ 09:39)    Thyroid Stimulating Hormone, Serum: 0.92 uIU/mL (02-24 @ 15:11)    77yo M hx of HTN, HLD, multiple chronic subdurals, CAD s/p PCI to the LAD and RCA in the past (last angiogram in 2015 with [facility]) represents with chest discomfort, [facility] showing triple vessel disease, course c/b subdural now s/p MMA embolization with NSGY, CCU called to assess for ongoing chest discomfort, currently chest pain free.      - incomplete -    Patient seen and evaluated at bedside    Chief Complaint: Chest pain     HPI:  75yo M hx of HTN, HLD, multiple chronic subdurals, CAD s/p PCI to the LAD and RCA in the past (last angiogram in 2015 with [facility]) represents with chest discomfort, [facility] showing triple vessel disease, course c/b subdural now s/p MMA embolization with NSGY on 3/1. Followed by general cardiology, plan for uptitration of anti anginal medications while ongoing surgical planning. CCU called by medicine team to assess in the setting of ongoing intermittent chest pain. On my assessment the patient is currently chest pain free although does report intermittent episodes of chest pain, most recently this morning, relieved by nitroglycerine. Otherwise the patient has no complaints, ROS negative.     PMHx:   BPH (benign prostatic hypertrophy)  Hyperlipidemia  CAD (coronary artery disease)  Diabetes mellitus  HTN (hypertension)    PSHx:   S/P drug eluting coronary stent placement  S/P primary angioplasty with coronary stent    Allergies:  No Known Allergies    Current Medications:   atorvastatin 40 milliGRAM(s) Oral at bedtime  carvedilol 12.5 milliGRAM(s) Oral every 12 hours  dextrose 40% Gel 15 Gram(s) Oral once  dextrose 5%. 1000 milliLiter(s) IV Continuous <Continuous>  dextrose 5%. 1000 milliLiter(s) IV Continuous <Continuous>  dextrose 50% Injectable 25 Gram(s) IV Push once  dextrose 50% Injectable 12.5 Gram(s) IV Push once  dextrose 50% Injectable 25 Gram(s) IV Push once  finasteride 5 milliGRAM(s) Oral daily  gabapentin 300 milliGRAM(s) Oral two times a day  glucagon  Injectable 1 milliGRAM(s) IntraMuscular once  insulin glargine Injectable (LANTUS) 20 Unit(s) SubCutaneous at bedtime  insulin lispro (ADMELOG) corrective regimen sliding scale   SubCutaneous three times a day before meals  isosorbide   mononitrate ER Tablet (IMDUR) 90 milliGRAM(s) Oral daily  ranolazine 1000 milliGRAM(s) Oral two times a day  tamsulosin 0.4 milliGRAM(s) Oral at bedtime    FAMILY HISTORY:  Family history of diabetes mellitus (Sibling)  3 brothers alive with Diabetes    Physical Exam:  T(F): 98.2 (03-02), Max: 98.6 (03-02)  HR: 72 (03-02) (66 - 83)  BP: 121/71 (03-02) (103/60 - 159/87)  RR: 18 (03-02)  SpO2: 100% (03-02)  GENERAL: No acute distress, well-developed  CHEST/LUNG: Clear to auscultation bilaterally; No wheeze, equal breath sounds bilaterally   HEART: Regular rate and rhythm; No murmurs, rubs, or gallops  ABDOMEN: Soft, Nontender, Nondistended; Bowel sounds present  EXTREMITIES:  No clubbing, cyanosis, or edema  PSYCH: Nl behavior, nl affect  NEUROLOGY: AAOx3, non-focal, cranial nerves intact    Cardiovascular Diagnostic Testing:    ECG NSR, LBBB w/o adriana ischemic changes      Echo: 1. Moderately dilated left atrium.  LA volume index = 48  cc/m2.  2. Endocardium not well visualized; despite the use of  Definity.  Probable septal hypokinesis. Septal motion is  consistent with LBBB.      Cath: CORONARY VESSELS: The coronary circulation is right dominant.  LM:   --  Mid left main: There was a 50 % stenosis.  LAD:   --  Mid LAD: There was a 90 % stenosis.  CX:   --  Ostial circumflex: There was a 90 % stenosis.  RI:   --  Proximal ramus intermedius: There was a 70 % stenosis.  RCA:   --  Proximal RCA: There was a 80 % stenosis.  COMPLICATIONS: There were no complications.    Imaging:    CXR: Personally reviewed    Labs: Personally reviewed                        13.0   8.16  )-----------( 139      ( 02 Mar 2021 03:21 )             39.4     03-02    136  |  104  |  18  ----------------------------<  207<H>  4.3   |  23  |  1.04    Ca    9.7      02 Mar 2021 03:21  Mg     1.7     03-02    CARDIAC MARKERS ( 02 Mar 2021 03:21 )  113 ng/L / x     / x     / 115 U/L / x     / 3.9 ng/mL  CARDIAC MARKERS ( 01 Mar 2021 21:00 )  121 ng/L / x     / x     / x     / x     / x      CARDIAC MARKERS ( 01 Mar 2021 06:45 )  145 ng/L / x     / x     / x     / x     / x      CARDIAC MARKERS ( 28 Feb 2021 05:17 )  148 ng/L / x     / x     / x     / x     / x      CARDIAC MARKERS ( 27 Feb 2021 08:23 )  146 ng/L / x     / x     / x     / x     / x        Serum Pro-Brain Natriuretic Peptide: 423 pg/mL (02-26 @ 09:39)    Thyroid Stimulating Hormone, Serum: 0.92 uIU/mL (02-24 @ 15:11)    75yo M hx of HTN, HLD, multiple chronic subdurals, CAD s/p PCI to the LAD and RCA in the past (last angiogram in 2015 with [facility]) represents with chest discomfort, [facility] showing triple vessel disease, course c/b subdural now s/p MMA embolization with NSGY, CCU called to assess for ongoing chest discomfort, currently chest pain free.     - Increase imdur   - ranexa as written   - transition coreg to lopressor and uptitrate as heart rate allows (goal HR 50-60)  - prn SLN / morphine for breakthrough   - Continue management on the medical floor with Cardiology consult following

## 2021-03-02 NOTE — PROGRESS NOTE ADULT - SUBJECTIVE AND OBJECTIVE BOX
Patient seen and examined at bedside.    Overnight Events:     O/n events noted. c/o angina intermittently and frustration with bypass scheduling     REVIEW OF SYSTEMS:  Constitutional:     [x ] negative [ ] fevers [ ] chills [ ] weight loss [ ] weight gain  HEENT:                  [x ] negative [ ] dry eyes [ ] eye irritation [ ] postnasal drip [ ] nasal congestion  CV:                         [ ] negative  [ x] chest pain [ ] orthopnea [ ] palpitations [ ] murmur  Resp:                     [ x] negative [ ] cough [ ] shortness of breath [ ] dyspnea [ ] wheezing [ ] sputum [ ]hemoptysis  GI:                          [ x] negative [ ] nausea [ ] vomiting [ ] diarrhea [ ] constipation [ ] abd pain [ ] dysphagia   :                        [ x] negative [ ] dysuria [ ] nocturia [ ] hematuria [ ] increased urinary frequency  Musculoskeletal: [ x] negative [ ] back pain [ ] myalgias [ ] arthralgias [ ] fracture  Skin:                       [ x] negative [ ] rash [ ] itch  Neurological:        [x ] negative [ ] headache [ ] dizziness [ ] syncope [ ] weakness [ ] numbness  Psychiatric:           [ x] negative [ ] anxiety [ ] depression  Endocrine:            [ x] negative [ ] diabetes [ ] thyroid problem  Heme/Lymph:      [ x] negative [ ] anemia [ ] bleeding problem  Allergic/Immune: [ x] negative [ ] itchy eyes [ ] nasal discharge [ ] hives [ ] angioedema    [ x] All other systems negative  [ ] Unable to assess ROS due to    Current Meds:  atorvastatin 40 milliGRAM(s) Oral at bedtime  carvedilol 12.5 milliGRAM(s) Oral every 12 hours  dextrose 40% Gel 15 Gram(s) Oral once  dextrose 5%. 1000 milliLiter(s) IV Continuous <Continuous>  dextrose 5%. 1000 milliLiter(s) IV Continuous <Continuous>  dextrose 50% Injectable 25 Gram(s) IV Push once  dextrose 50% Injectable 12.5 Gram(s) IV Push once  dextrose 50% Injectable 25 Gram(s) IV Push once  finasteride 5 milliGRAM(s) Oral daily  gabapentin 300 milliGRAM(s) Oral two times a day  glucagon  Injectable 1 milliGRAM(s) IntraMuscular once  insulin glargine Injectable (LANTUS) 20 Unit(s) SubCutaneous at bedtime  insulin lispro (ADMELOG) corrective regimen sliding scale   SubCutaneous three times a day before meals  isosorbide   mononitrate ER Tablet (IMDUR) 90 milliGRAM(s) Oral daily  ranolazine 500 milliGRAM(s) Oral two times a day  tamsulosin 0.4 milliGRAM(s) Oral at bedtime      PAST MEDICAL & SURGICAL HISTORY:  BPH (benign prostatic hypertrophy)    Hyperlipidemia    CAD (coronary artery disease)    Diabetes mellitus  Type 2    HTN (hypertension)    S/P drug eluting coronary stent placement  [facility]    S/P primary angioplasty with coronary stent  1990s        Vitals:  T(F): 98.6 (03-02), Max: 98.6 (03-02)  HR: 76 (03-02) (57 - 83)  BP: 103/60 (03-02) (103/60 - 159/87)  RR: 18 (03-02)  SpO2: 96% (03-02)  I&O's Summary    01 Mar 2021 07:01  -  02 Mar 2021 07:00  --------------------------------------------------------  IN: 720 mL / OUT: 600 mL / NET: 120 mL    02 Mar 2021 07:01  -  02 Mar 2021 10:37  --------------------------------------------------------  IN: 240 mL / OUT: 0 mL / NET: 240 mL        Physical Exam:    Pt refused PE                         13.0   8.16  )-----------( 139      ( 02 Mar 2021 03:21 )             39.4     03-02    136  |  104  |  18  ----------------------------<  207<H>  4.3   |  23  |  1.04    Ca    9.7      02 Mar 2021 03:21        CARDIAC MARKERS ( 02 Mar 2021 03:21 )  x     / x     / 115 U/L / x     / 3.9 ng/mL      Serum Pro-Brain Natriuretic Peptide: 423 pg/mL (02-26 @ 09:39)          New ECG(s): Personally reviewed    Echo: Personally reviewed    Stress Testing: Personally reviewed    Cath: Personally reviewed    Imaging: Personally reviewed    Interpretation of Telemetry: SR

## 2021-03-02 NOTE — PROVIDER CONTACT NOTE (OTHER) - ASSESSMENT
Pt remains A&O x 4, VSS. Pt has been having intermittent CP since admission, being treated with PRN Nitroglycerin (SL or topical)/supplemental O2/Morphine IVP. Ranexa increased today to 1000mg BID.

## 2021-03-02 NOTE — CHART NOTE - NSCHARTNOTEFT_GEN_A_CORE
75yo M hx of HTN, HLD, multiple chronic subdurals, CAD, presented with CP, s/p C this admission showing triple vessel disease, s/p MMA embolization with NSGY.   Notified of 5 beats WCT on telemetry this afternoon. Patient asymptomatic at time of event, denying CP, SOB, palpitations, lightheadedness. Vitals stable.   Mg 1.7 today - s/p Mg 1g IV x1 dose.   Keep Mg >2, K>4  Tele monitoring  Notify provider of new events    Kamala Kang PA-C   Department of Medicine

## 2021-03-02 NOTE — PROGRESS NOTE ADULT - ASSESSMENT
76M a/ pmh of CAD s/p PCI in the past, HTN, HLD, chronic SDH (since 2019) present with severe CP for three nights s/p nitro, and asa 81 x4 with elevated trops. Recent admission for same pain, was dc'ed after pain relieved with meds, TTE interdeterminate. HCT in ED shows stable SDH 1.6 cm. Currently pain is better, no headache, no n/v. Cardiology would like to take patient for cath. Exam: intact. Now POD 1 L MMA embo for chronic sdh.  - ADM medicine, q4h neuro checks  - POD1 L MMA embo, needs HCT THIS AM (ordered)  - groin check today  - COVID neg 2/27, MRSA /MSSA neg  - Repeat HCT for any change in exam.  - MMA embo will likely help reduce risk of rebleeding events in the future, and may even decrease the size of the bleed, but will not necessarily acutely decrease the risk of hemorrhage in the immediate postop period.   - Risks associated with expanding SDH should be weighed accordingly against the risks of delaying cardiac intervention by the primary team and discussed with patient  - Will need 10-14 fu w/ Dr. Camargo and Dr. Robb as outpatient for stability HCT post-procedure.

## 2021-03-03 LAB
ANION GAP SERPL CALC-SCNC: 9 MMOL/L — SIGNIFICANT CHANGE UP (ref 5–17)
BUN SERPL-MCNC: 12 MG/DL — SIGNIFICANT CHANGE UP (ref 7–23)
CALCIUM SERPL-MCNC: 9.4 MG/DL — SIGNIFICANT CHANGE UP (ref 8.4–10.5)
CHLORIDE SERPL-SCNC: 100 MMOL/L — SIGNIFICANT CHANGE UP (ref 96–108)
CO2 SERPL-SCNC: 26 MMOL/L — SIGNIFICANT CHANGE UP (ref 22–31)
CREAT SERPL-MCNC: 0.95 MG/DL — SIGNIFICANT CHANGE UP (ref 0.5–1.3)
GLUCOSE BLDC GLUCOMTR-MCNC: 179 MG/DL — HIGH (ref 70–99)
GLUCOSE BLDC GLUCOMTR-MCNC: 191 MG/DL — HIGH (ref 70–99)
GLUCOSE BLDC GLUCOMTR-MCNC: 191 MG/DL — HIGH (ref 70–99)
GLUCOSE BLDC GLUCOMTR-MCNC: 192 MG/DL — HIGH (ref 70–99)
GLUCOSE SERPL-MCNC: 198 MG/DL — HIGH (ref 70–99)
HCT VFR BLD CALC: 41.2 % — SIGNIFICANT CHANGE UP (ref 39–50)
HGB BLD-MCNC: 14.1 G/DL — SIGNIFICANT CHANGE UP (ref 13–17)
LIDOCAIN SERPL-MCNC: 41.5 NMOL/L — SIGNIFICANT CHANGE UP
MAGNESIUM SERPL-MCNC: 2 MG/DL — SIGNIFICANT CHANGE UP (ref 1.6–2.6)
MCHC RBC-ENTMCNC: 30.3 PG — SIGNIFICANT CHANGE UP (ref 27–34)
MCHC RBC-ENTMCNC: 34.2 GM/DL — SIGNIFICANT CHANGE UP (ref 32–36)
MCV RBC AUTO: 88.6 FL — SIGNIFICANT CHANGE UP (ref 80–100)
NRBC # BLD: 0 /100 WBCS — SIGNIFICANT CHANGE UP (ref 0–0)
PHOSPHATE SERPL-MCNC: 3.2 MG/DL — SIGNIFICANT CHANGE UP (ref 2.5–4.5)
PLATELET # BLD AUTO: 155 K/UL — SIGNIFICANT CHANGE UP (ref 150–400)
POTASSIUM SERPL-MCNC: 4 MMOL/L — SIGNIFICANT CHANGE UP (ref 3.5–5.3)
POTASSIUM SERPL-MCNC: 5.6 MMOL/L — HIGH (ref 3.5–5.3)
POTASSIUM SERPL-SCNC: 4 MMOL/L — SIGNIFICANT CHANGE UP (ref 3.5–5.3)
POTASSIUM SERPL-SCNC: 5.6 MMOL/L — HIGH (ref 3.5–5.3)
RBC # BLD: 4.65 M/UL — SIGNIFICANT CHANGE UP (ref 4.2–5.8)
RBC # FLD: 13 % — SIGNIFICANT CHANGE UP (ref 10.3–14.5)
SODIUM SERPL-SCNC: 135 MMOL/L — SIGNIFICANT CHANGE UP (ref 135–145)
WBC # BLD: 8.07 K/UL — SIGNIFICANT CHANGE UP (ref 3.8–10.5)
WBC # FLD AUTO: 8.07 K/UL — SIGNIFICANT CHANGE UP (ref 3.8–10.5)

## 2021-03-03 PROCEDURE — 99233 SBSQ HOSP IP/OBS HIGH 50: CPT

## 2021-03-03 PROCEDURE — 93010 ELECTROCARDIOGRAM REPORT: CPT

## 2021-03-03 RX ORDER — ONDANSETRON 8 MG/1
4 TABLET, FILM COATED ORAL EVERY 8 HOURS
Refills: 0 | Status: DISCONTINUED | OUTPATIENT
Start: 2021-03-03 | End: 2021-03-09

## 2021-03-03 RX ORDER — ONDANSETRON 8 MG/1
4 TABLET, FILM COATED ORAL ONCE
Refills: 0 | Status: COMPLETED | OUTPATIENT
Start: 2021-03-03 | End: 2021-03-03

## 2021-03-03 RX ORDER — ISOSORBIDE MONONITRATE 60 MG/1
30 TABLET, EXTENDED RELEASE ORAL ONCE
Refills: 0 | Status: COMPLETED | OUTPATIENT
Start: 2021-03-03 | End: 2021-03-03

## 2021-03-03 RX ORDER — METOPROLOL TARTRATE 50 MG
25 TABLET ORAL ONCE
Refills: 0 | Status: COMPLETED | OUTPATIENT
Start: 2021-03-03 | End: 2021-03-03

## 2021-03-03 RX ORDER — ISOSORBIDE MONONITRATE 60 MG/1
120 TABLET, EXTENDED RELEASE ORAL DAILY
Refills: 0 | Status: DISCONTINUED | OUTPATIENT
Start: 2021-03-04 | End: 2021-03-09

## 2021-03-03 RX ORDER — MORPHINE SULFATE 50 MG/1
0.5 CAPSULE, EXTENDED RELEASE ORAL EVERY 4 HOURS
Refills: 0 | Status: DISCONTINUED | OUTPATIENT
Start: 2021-03-03 | End: 2021-03-08

## 2021-03-03 RX ORDER — ISOSORBIDE MONONITRATE 60 MG/1
120 TABLET, EXTENDED RELEASE ORAL DAILY
Refills: 0 | Status: DISCONTINUED | OUTPATIENT
Start: 2021-03-03 | End: 2021-03-03

## 2021-03-03 RX ORDER — NITROGLYCERIN 6.5 MG
0.4 CAPSULE, EXTENDED RELEASE ORAL
Refills: 0 | Status: COMPLETED | OUTPATIENT
Start: 2021-03-03 | End: 2021-03-04

## 2021-03-03 RX ORDER — METOPROLOL TARTRATE 50 MG
50 TABLET ORAL EVERY 12 HOURS
Refills: 0 | Status: DISCONTINUED | OUTPATIENT
Start: 2021-03-03 | End: 2021-03-05

## 2021-03-03 RX ADMIN — ONDANSETRON 4 MILLIGRAM(S): 8 TABLET, FILM COATED ORAL at 17:11

## 2021-03-03 RX ADMIN — MORPHINE SULFATE 0.5 MILLIGRAM(S): 50 CAPSULE, EXTENDED RELEASE ORAL at 21:18

## 2021-03-03 RX ADMIN — RANOLAZINE 1000 MILLIGRAM(S): 500 TABLET, FILM COATED, EXTENDED RELEASE ORAL at 17:10

## 2021-03-03 RX ADMIN — ISOSORBIDE MONONITRATE 30 MILLIGRAM(S): 60 TABLET, EXTENDED RELEASE ORAL at 10:18

## 2021-03-03 RX ADMIN — TAMSULOSIN HYDROCHLORIDE 0.4 MILLIGRAM(S): 0.4 CAPSULE ORAL at 21:10

## 2021-03-03 RX ADMIN — MORPHINE SULFATE 0.5 MILLIGRAM(S): 50 CAPSULE, EXTENDED RELEASE ORAL at 21:48

## 2021-03-03 RX ADMIN — ATORVASTATIN CALCIUM 40 MILLIGRAM(S): 80 TABLET, FILM COATED ORAL at 21:10

## 2021-03-03 RX ADMIN — Medication 0.4 MILLIGRAM(S): at 04:31

## 2021-03-03 RX ADMIN — FINASTERIDE 5 MILLIGRAM(S): 5 TABLET, FILM COATED ORAL at 11:42

## 2021-03-03 RX ADMIN — ISOSORBIDE MONONITRATE 90 MILLIGRAM(S): 60 TABLET, EXTENDED RELEASE ORAL at 04:30

## 2021-03-03 RX ADMIN — RANOLAZINE 1000 MILLIGRAM(S): 500 TABLET, FILM COATED, EXTENDED RELEASE ORAL at 04:33

## 2021-03-03 RX ADMIN — MORPHINE SULFATE 0.5 MILLIGRAM(S): 50 CAPSULE, EXTENDED RELEASE ORAL at 18:09

## 2021-03-03 RX ADMIN — GABAPENTIN 300 MILLIGRAM(S): 400 CAPSULE ORAL at 04:29

## 2021-03-03 RX ADMIN — Medication 25 MILLIGRAM(S): at 04:29

## 2021-03-03 RX ADMIN — Medication 50 MILLIGRAM(S): at 17:11

## 2021-03-03 RX ADMIN — ONDANSETRON 4 MILLIGRAM(S): 8 TABLET, FILM COATED ORAL at 08:11

## 2021-03-03 RX ADMIN — Medication 25 MILLIGRAM(S): at 11:42

## 2021-03-03 RX ADMIN — Medication 1: at 08:08

## 2021-03-03 RX ADMIN — Medication 0.4 MILLIGRAM(S): at 02:59

## 2021-03-03 RX ADMIN — INSULIN GLARGINE 20 UNIT(S): 100 INJECTION, SOLUTION SUBCUTANEOUS at 21:21

## 2021-03-03 RX ADMIN — Medication 0.4 MILLIGRAM(S): at 09:37

## 2021-03-03 RX ADMIN — Medication 1: at 11:42

## 2021-03-03 RX ADMIN — Medication 0.4 MILLIGRAM(S): at 13:47

## 2021-03-03 RX ADMIN — GABAPENTIN 300 MILLIGRAM(S): 400 CAPSULE ORAL at 17:11

## 2021-03-03 RX ADMIN — Medication 1: at 16:29

## 2021-03-03 NOTE — PROGRESS NOTE ADULT - SUBJECTIVE AND OBJECTIVE BOX
Patient seen and examined at bedside.    Overnight Events:   Cp slightly improved   + 22 beats of monomorphic VT   Ambulated no significant exertional issues     REVIEW OF SYSTEMS:  Constitutional:     [x ] negative [ ] fevers [ ] chills [ ] weight loss [ ] weight gain  HEENT:                  [x ] negative [ ] dry eyes [ ] eye irritation [ ] postnasal drip [ ] nasal congestion  CV:                         [ x] negative  [ ] chest pain [ ] orthopnea [ ] palpitations [ ] murmur  Resp:                     [ x] negative [ ] cough [ ] shortness of breath [ ] dyspnea [ ] wheezing [ ] sputum [ ]hemoptysis  GI:                          [ x] negative [ ] nausea [ ] vomiting [ ] diarrhea [ ] constipation [ ] abd pain [ ] dysphagia   :                        [ x] negative [ ] dysuria [ ] nocturia [ ] hematuria [ ] increased urinary frequency  Musculoskeletal: [ x] negative [ ] back pain [ ] myalgias [ ] arthralgias [ ] fracture  Skin:                       [ x] negative [ ] rash [ ] itch  Neurological:        [x ] negative [ ] headache [ ] dizziness [ ] syncope [ ] weakness [ ] numbness  Psychiatric:           [ x] negative [ ] anxiety [ ] depression  Endocrine:            [ x] negative [ ] diabetes [ ] thyroid problem  Heme/Lymph:      [ x] negative [ ] anemia [ ] bleeding problem  Allergic/Immune: [ x] negative [ ] itchy eyes [ ] nasal discharge [ ] hives [ ] angioedema    [ x] All other systems negative  [ ] Unable to assess ROS due to    Current Meds:  atorvastatin 40 milliGRAM(s) Oral at bedtime  dextrose 40% Gel 15 Gram(s) Oral once  dextrose 5%. 1000 milliLiter(s) IV Continuous <Continuous>  dextrose 5%. 1000 milliLiter(s) IV Continuous <Continuous>  dextrose 50% Injectable 25 Gram(s) IV Push once  dextrose 50% Injectable 12.5 Gram(s) IV Push once  dextrose 50% Injectable 25 Gram(s) IV Push once  finasteride 5 milliGRAM(s) Oral daily  gabapentin 300 milliGRAM(s) Oral two times a day  glucagon  Injectable 1 milliGRAM(s) IntraMuscular once  insulin glargine Injectable (LANTUS) 20 Unit(s) SubCutaneous at bedtime  insulin lispro (ADMELOG) corrective regimen sliding scale   SubCutaneous three times a day before meals  isosorbide   mononitrate ER Tablet (IMDUR) 90 milliGRAM(s) Oral daily  metoprolol tartrate 25 milliGRAM(s) Oral two times a day  morphine  - Injectable 1 milliGRAM(s) IV Push every 4 hours PRN  ranolazine 1000 milliGRAM(s) Oral two times a day  tamsulosin 0.4 milliGRAM(s) Oral at bedtime      PAST MEDICAL & SURGICAL HISTORY:  BPH (benign prostatic hypertrophy)    Hyperlipidemia    CAD (coronary artery disease)    Diabetes mellitus  Type 2    HTN (hypertension)    S/P drug eluting coronary stent placement  [facility]    S/P primary angioplasty with coronary stent  1990s        Vitals:  T(F): 98.2 (03-03), Max: 98.8 (03-02)  HR: 82 (03-03) (68 - 89)  BP: 146/76 (03-03) (111/63 - 150/72)  RR: 18 (03-03)  SpO2: 98% (03-03)  I&O's Summary    02 Mar 2021 07:01  -  03 Mar 2021 07:00  --------------------------------------------------------  IN: 600 mL / OUT: 300 mL / NET: 300 mL        Physical Exam:  Appearance: No acute distress  Eyes: PERRL, EOMI, pink conjunctiva  HENT: Normal oral mucosa  Cardiovascular: RRR, S1, S2, no murmurs, rubs, or gallops; no edema; no JVD  Respiratory: Clear to auscultation bilaterally  Gastrointestinal: soft, non-tender, non-distended with normal bowel sounds  Musculoskeletal: No clubbing; no joint deformity   Neurologic: Non-focal  Skin: No rashes, ecchymoses, or cyanosis                          14.1   8.07  )-----------( 155      ( 03 Mar 2021 04:32 )             41.2     03-03    x   |  x   |  x   ----------------------------<  x   4.0   |  x   |  x     Ca    9.4      03 Mar 2021 04:32  Phos  3.2     03-03  Mg     2.0     03-03        CARDIAC MARKERS ( 02 Mar 2021 03:21 )  x     / x     / 115 U/L / x     / 3.9 ng/mL      Serum Pro-Brain Natriuretic Peptide: 423 pg/mL (02-26 @ 09:39)          New ECG(s): Personally reviewed    Echo: Personally reviewed    Stress Testing: Personally reviewed    Cath: Personally reviewed    Imaging: Personally reviewed    Interpretation of Telemetry: SR, 1 episode of NSVT

## 2021-03-03 NOTE — PROGRESS NOTE ADULT - ASSESSMENT
77yo M hx of HTN, HLD, multiple chronic subdurals comes to ED for cp. Chest pain substernal, since last night, constant, no radiation, took 3 nitros last night, 1 ASA 324mg this morning. No associated with sob or diaphoresis. Pt was here few days ago for same, worked up for NSTEMI. Ended up having symptomatic relief prior to dc. In hospital TTE was interdeterminate, trop 124. Neurosurgery recs were to avoid anti-platelets and AC. Denies f/c, cough, sob, abdominal pain, change in bowel.    Problem/Plan - 1:  ·  Problem: NSTEMI (non-ST elevated myocardial infarction).  Plan: telemonitor  cards fu   sp cath and embolization for SDH   CTS fu for CABG in 1 month     Problem/Plan - 2:  ·  Problem: SDH (subdural hematoma).  Plan: neuro and neurosurgery fu   sp embolization for SDH     Problem/Plan - 3:  ·  Problem: Diabetes mellitus.  Plan: monitor FSbasal, bolus.     Problem/Plan - 4:  ·  Problem: Hyperlipidemia.  Plan: cw statin.     case dw CTS and carda, pt at very high risk for CABG right now, surgery after 4 weeks. Meanwhile medical management

## 2021-03-03 NOTE — CHART NOTE - NSCHARTNOTEFT_GEN_A_CORE
Called by RN for episode of 22 beats of wide complex.  Pt was asymptomatic at times of event.  Pt had episode of 5 WCT beats yesterday 3/2  Pt seen and examined in room.   Pt denies any chest pain, palpitations, SOB, paresthesias or lightheadedness.     Vitals are stable.  A&Ox3, no s/s of any distress      75yo M hx of HTN, HLD, multiple chronic subdurals, CAD, presented with CP, s/p LHC this admission showing triple vessel disease, s/p MMA embolization with NSGY.     Now with episode of NSVT    EKG reviewed with cardiology.   Check lytes.   Coreg changed to metoprolol yesterday.   Keep Mg >2, K>4  Tele monitoring  Notify provider of new events    Tiffany Lynn ANP-BC  [id number]

## 2021-03-03 NOTE — PROGRESS NOTE ADULT - SUBJECTIVE AND OBJECTIVE BOX
Patient is a 76y old  Male who presents with a chief complaint of chest pain (03 Mar 2021 12:56)    Date of servie : 03-03-21 @ 15:05  INTERVAL HPI/OVERNIGHT EVENTS:  T(C): 36.8 (03-03-21 @ 04:05), Max: 37.1 (03-02-21 @ 16:03)  HR: 77 (03-03-21 @ 12:07) (68 - 89)  BP: 128/68 (03-03-21 @ 13:49) (111/63 - 150/72)  RR: 18 (03-03-21 @ 12:07) (18 - 18)  SpO2: 98% (03-03-21 @ 12:07) (95% - 99%)  Wt(kg): --  I&O's Summary    02 Mar 2021 07:01  -  03 Mar 2021 07:00  --------------------------------------------------------  IN: 600 mL / OUT: 300 mL / NET: 300 mL        LABS:                        14.1   8.07  )-----------( 155      ( 03 Mar 2021 04:32 )             41.2     03-03    x   |  x   |  x   ----------------------------<  x   4.0   |  x   |  x     Ca    9.4      03 Mar 2021 04:32  Phos  3.2     03-03  Mg     2.0     03-03          CAPILLARY BLOOD GLUCOSE      POCT Blood Glucose.: 179 mg/dL (03 Mar 2021 11:33)  POCT Blood Glucose.: 191 mg/dL (03 Mar 2021 07:44)  POCT Blood Glucose.: 223 mg/dL (02 Mar 2021 20:54)  POCT Blood Glucose.: 150 mg/dL (02 Mar 2021 16:28)            MEDICATIONS  (STANDING):  atorvastatin 40 milliGRAM(s) Oral at bedtime  dextrose 40% Gel 15 Gram(s) Oral once  dextrose 5%. 1000 milliLiter(s) (50 mL/Hr) IV Continuous <Continuous>  dextrose 5%. 1000 milliLiter(s) (100 mL/Hr) IV Continuous <Continuous>  dextrose 50% Injectable 25 Gram(s) IV Push once  dextrose 50% Injectable 12.5 Gram(s) IV Push once  dextrose 50% Injectable 25 Gram(s) IV Push once  finasteride 5 milliGRAM(s) Oral daily  gabapentin 300 milliGRAM(s) Oral two times a day  glucagon  Injectable 1 milliGRAM(s) IntraMuscular once  insulin glargine Injectable (LANTUS) 20 Unit(s) SubCutaneous at bedtime  insulin lispro (ADMELOG) corrective regimen sliding scale   SubCutaneous three times a day before meals  metoprolol tartrate 50 milliGRAM(s) Oral every 12 hours  ranolazine 1000 milliGRAM(s) Oral two times a day  tamsulosin 0.4 milliGRAM(s) Oral at bedtime    MEDICATIONS  (PRN):  morphine  - Injectable 1 milliGRAM(s) IV Push every 4 hours PRN Severe Pain (7 - 10)  nitroglycerin     SubLingual 0.4 milliGRAM(s) SubLingual every 5 minutes PRN Chest Pain          PHYSICAL EXAM:  GENERAL: NAD, well-groomed, well-developed  HEAD:  Atraumatic, Normocephalic  CHEST/LUNG: Clear to percussion bilaterally; No rales, rhonchi, wheezing, or rubs  HEART: Regular rate and rhythm; No murmurs, rubs, or gallops  ABDOMEN: Soft, Nontender, Nondistended; Bowel sounds present  EXTREMITIES:  2+ Peripheral Pulses, No clubbing, cyanosis, or edema  LYMPH: No lymphadenopathy noted  SKIN: No rashes or lesions    Care Discussed with Consultants/Other Providers [ ] YES  [ ] NO

## 2021-03-03 NOTE — PROVIDER CONTACT NOTE (OTHER) - ASSESSMENT
Pt remains A&O x 4, VSS. Pt has been having intermittent CP since admission, being treated with PRN Nitroglycerin (SL or topical)/supplemental O2/Morphine IVP. patient has no complaints of chest pain at the moment.

## 2021-03-03 NOTE — PROGRESS NOTE ADULT - SUBJECTIVE AND OBJECTIVE BOX
[facility]      Seen earlier today, and examined.  - Today, patient is without complaints.           *****MEDICATIONS: Current medication reviewed and documented.    MEDICATIONS  (STANDING):  atorvastatin 40 milliGRAM(s) Oral at bedtime  dextrose 40% Gel 15 Gram(s) Oral once  dextrose 5%. 1000 milliLiter(s) (50 mL/Hr) IV Continuous <Continuous>  dextrose 5%. 1000 milliLiter(s) (100 mL/Hr) IV Continuous <Continuous>  dextrose 50% Injectable 25 Gram(s) IV Push once  dextrose 50% Injectable 12.5 Gram(s) IV Push once  dextrose 50% Injectable 25 Gram(s) IV Push once  finasteride 5 milliGRAM(s) Oral daily  gabapentin 300 milliGRAM(s) Oral two times a day  glucagon  Injectable 1 milliGRAM(s) IntraMuscular once  insulin glargine Injectable (LANTUS) 20 Unit(s) SubCutaneous at bedtime  insulin lispro (ADMELOG) corrective regimen sliding scale   SubCutaneous three times a day before meals  metoprolol tartrate 50 milliGRAM(s) Oral every 12 hours  ranolazine 1000 milliGRAM(s) Oral two times a day  tamsulosin 0.4 milliGRAM(s) Oral at bedtime    MEDICATIONS  (PRN):  morphine  - Injectable 1 milliGRAM(s) IV Push every 4 hours PRN Severe Pain (7 - 10)  nitroglycerin     SubLingual 0.4 milliGRAM(s) SubLingual every 5 minutes PRN Chest Pain          ***** VITAL SIGNS:  T(F): 98.2 (21 @ 04:05), Max: 98.8 (21 @ 16:03)  HR: 77 (21 @ 12:07) (68 - 89)  BP: 128/68 (21 @ 13:49) (111/63 - 150/72)  RR: 18 (21 @ 12:07) (18 - 18)  SpO2: 98% (21 @ 12:07) (95% - 99%)  Wt(kg): --  ,   I&O's Summary    02 Mar 2021 07:01  -  03 Mar 2021 07:00  --------------------------------------------------------  IN: 600 mL / OUT: 300 mL / NET: 300 mL             *****PHYSICAL EXAM:     alert oriented x 3 attention comprehension are fair.  Able to name, repeat.   EOmi fundi not visualized   no nystagmus VFF to confrontation  Tongue is midline  Palate elevates symmetrically   Moving all 4 ext spontaneously no drift appreciated    Gait not assessed.            *****LAB AND IMAGIN.1   8.07  )-----------( 155      ( 03 Mar 2021 04:32 )             41.2               03-03    x   |  x   |  x   ----------------------------<  x   4.0   |  x   |  x     Ca    9.4      03 Mar 2021 04:32  Phos  3.2     03-03  Mg     2.0     03-03             CARDIAC MARKERS ( 02 Mar 2021 03:21 )  x     / x     / 115 U/L / x     / 3.9 ng/mL                [All pertinent recent Imaging/Reports reviewed]           *****A S S E S S M E N T   A N D   P L A N :        76y M pmhx CAD s/p CABG, HTN, HLD, chronic SDH present 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 Patient did nt get stress test due to adverse symptom experienced during last stress test and was sent home.    Pt was here few days ago for same, worked up for NSTEMI. Ended up having symptomatic relief prior to dc.    as per pt, he had recurrence of chest pain and  took 4 tablets of asa 81 this am and was brought to the emergency room. He denies any headache/weakness/numbness.   He is awaiting eval by cardiology       Problem/Recommendations 1:  chronic subdural hematoma    denies any headache or focal weakness      risk vs. benefits are being considered thoroughly, given his left moderate size subdural hematoma with mass effect, in order to optimize the management of his anginal chest pain.    s/p cath , revealed multilevel disease   ct surgery eval appreciated recommended cabg     frequent neurological exams q2 h and higher level of monitoring of pt was being done was agreed by neurosurgery.   Low threshold for ct head if there should arise any changes in neuro exam  s/p mma embolization tolerated procedure well, now will have to wait for 10-14 days for follow up scan as per neurosurgery   ct head post mma done         Problem/Recommendations 2: chest pain   seen by ccu not a candidate   pt having intermittent chest pain defer to cards for  management   awaiting cabg   defer to cardiology for management.        prognosis guarded as this pt remains at risk for decompensation.     Thank you for allowing me to participate in the care of this patient. Will continue to follow patient periodically. Please do not hesitate to call me if you have any  questions or if there has been a change in patients neurological status     ________________  Elissa García MD  [facility] (PN)[facility]  [phone]      33 minutes spent on total encounter; more than 50 % of the visit was  spent counseling about plan of care, compliance to diet/exercise and medication regimen and or  coordinating care by the attending physician.      It is advised that stroke patients follow up with BERHANE Garrett @ [phone] in 1- 2 weeks.   Others please follow up with Dr. Michael Nissenbaum [phone]

## 2021-03-03 NOTE — PROGRESS NOTE ADULT - ASSESSMENT
74y M with hx. of CAD s/p PCI to the LAD and RCA in the past (last angiogram in 2015 with [facility]), HTN, & HLD.  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 s/p MMA embolization with NSGY on 3/1     - increase metoprolol to 50 mg BID, titrate to goal HR of 60 if BP tolerates   - increase imdur to 120 mg QD   - cont ranexa at 1000 mg BID   - NTG PRN for cp and morphine PRN for cp not relieved by NTG, would monitor BP closely and space out medications administration   - s/p MMA embolization with NSGY, CTH slightly improved    - CABG eval and timeline per CTS     Thank you,   Moy Galloway MD  Cardiology Fellow, [facility]-NS/MAYA  All Cardiology service information can be found 24/7 on [email or web address], password: [email or web address]

## 2021-03-03 NOTE — PROGRESS NOTE ADULT - SUBJECTIVE AND OBJECTIVE BOX
Pt had episode of chest pain overnight, resolved with nitro and morphine.  Pt still at significant risk for intracranial bleed with revascularization (either PCI or CABG) given this chronic SDH (pt not even on ASA given risk).  Discussed with cardiology (Dr. Jones) who agree with optimization of CAD meds rather than revascularization at this time.  D/w pt's daughter and wife who are in agreement for medical management at this point as they do not want the risk of catastrophic intracranial bleed with revascularization

## 2021-03-04 LAB
ANION GAP SERPL CALC-SCNC: 12 MMOL/L — SIGNIFICANT CHANGE UP (ref 5–17)
BUN SERPL-MCNC: 17 MG/DL — SIGNIFICANT CHANGE UP (ref 7–23)
CALCIUM SERPL-MCNC: 9.6 MG/DL — SIGNIFICANT CHANGE UP (ref 8.4–10.5)
CHLORIDE SERPL-SCNC: 97 MMOL/L — SIGNIFICANT CHANGE UP (ref 96–108)
CO2 SERPL-SCNC: 24 MMOL/L — SIGNIFICANT CHANGE UP (ref 22–31)
CREAT SERPL-MCNC: 1.12 MG/DL — SIGNIFICANT CHANGE UP (ref 0.5–1.3)
GLUCOSE BLDC GLUCOMTR-MCNC: 163 MG/DL — HIGH (ref 70–99)
GLUCOSE BLDC GLUCOMTR-MCNC: 186 MG/DL — HIGH (ref 70–99)
GLUCOSE BLDC GLUCOMTR-MCNC: 199 MG/DL — HIGH (ref 70–99)
GLUCOSE BLDC GLUCOMTR-MCNC: 306 MG/DL — HIGH (ref 70–99)
GLUCOSE SERPL-MCNC: 183 MG/DL — HIGH (ref 70–99)
HCT VFR BLD CALC: 42.6 % — SIGNIFICANT CHANGE UP (ref 39–50)
HGB BLD-MCNC: 14.5 G/DL — SIGNIFICANT CHANGE UP (ref 13–17)
MAGNESIUM SERPL-MCNC: 1.8 MG/DL — SIGNIFICANT CHANGE UP (ref 1.6–2.6)
MCHC RBC-ENTMCNC: 29.9 PG — SIGNIFICANT CHANGE UP (ref 27–34)
MCHC RBC-ENTMCNC: 34 GM/DL — SIGNIFICANT CHANGE UP (ref 32–36)
MCV RBC AUTO: 87.8 FL — SIGNIFICANT CHANGE UP (ref 80–100)
NRBC # BLD: 0 /100 WBCS — SIGNIFICANT CHANGE UP (ref 0–0)
PHOSPHATE SERPL-MCNC: 2.9 MG/DL — SIGNIFICANT CHANGE UP (ref 2.5–4.5)
PLATELET # BLD AUTO: 150 K/UL — SIGNIFICANT CHANGE UP (ref 150–400)
POTASSIUM SERPL-MCNC: 4.7 MMOL/L — SIGNIFICANT CHANGE UP (ref 3.5–5.3)
POTASSIUM SERPL-SCNC: 4.7 MMOL/L — SIGNIFICANT CHANGE UP (ref 3.5–5.3)
RBC # BLD: 4.85 M/UL — SIGNIFICANT CHANGE UP (ref 4.2–5.8)
RBC # FLD: 12.7 % — SIGNIFICANT CHANGE UP (ref 10.3–14.5)
SODIUM SERPL-SCNC: 133 MMOL/L — LOW (ref 135–145)
WBC # BLD: 11.16 K/UL — HIGH (ref 3.8–10.5)
WBC # FLD AUTO: 11.16 K/UL — HIGH (ref 3.8–10.5)

## 2021-03-04 PROCEDURE — 99233 SBSQ HOSP IP/OBS HIGH 50: CPT

## 2021-03-04 PROCEDURE — 99232 SBSQ HOSP IP/OBS MODERATE 35: CPT

## 2021-03-04 PROCEDURE — 93010 ELECTROCARDIOGRAM REPORT: CPT | Mod: 77

## 2021-03-04 PROCEDURE — 93010 ELECTROCARDIOGRAM REPORT: CPT

## 2021-03-04 RX ORDER — MAGNESIUM SULFATE 500 MG/ML
1 VIAL (ML) INJECTION ONCE
Refills: 0 | Status: COMPLETED | OUTPATIENT
Start: 2021-03-04 | End: 2021-03-04

## 2021-03-04 RX ORDER — NITROGLYCERIN 6.5 MG
0.4 CAPSULE, EXTENDED RELEASE ORAL
Refills: 0 | Status: COMPLETED | OUTPATIENT
Start: 2021-03-04 | End: 2021-03-07

## 2021-03-04 RX ORDER — ACETAMINOPHEN 500 MG
1000 TABLET ORAL ONCE
Refills: 0 | Status: COMPLETED | OUTPATIENT
Start: 2021-03-04 | End: 2021-03-04

## 2021-03-04 RX ORDER — NITROGLYCERIN 6.5 MG
0.4 CAPSULE, EXTENDED RELEASE ORAL ONCE
Refills: 0 | Status: COMPLETED | OUTPATIENT
Start: 2021-03-04 | End: 2021-03-04

## 2021-03-04 RX ORDER — PANTOPRAZOLE SODIUM 20 MG/1
40 TABLET, DELAYED RELEASE ORAL
Refills: 0 | Status: DISCONTINUED | OUTPATIENT
Start: 2021-03-04 | End: 2021-03-09

## 2021-03-04 RX ADMIN — ONDANSETRON 4 MILLIGRAM(S): 8 TABLET, FILM COATED ORAL at 01:07

## 2021-03-04 RX ADMIN — GABAPENTIN 300 MILLIGRAM(S): 400 CAPSULE ORAL at 05:30

## 2021-03-04 RX ADMIN — MORPHINE SULFATE 0.5 MILLIGRAM(S): 50 CAPSULE, EXTENDED RELEASE ORAL at 06:33

## 2021-03-04 RX ADMIN — ATORVASTATIN CALCIUM 40 MILLIGRAM(S): 80 TABLET, FILM COATED ORAL at 21:13

## 2021-03-04 RX ADMIN — Medication 0.4 MILLIGRAM(S): at 00:35

## 2021-03-04 RX ADMIN — MORPHINE SULFATE 0.5 MILLIGRAM(S): 50 CAPSULE, EXTENDED RELEASE ORAL at 17:55

## 2021-03-04 RX ADMIN — Medication 400 MILLIGRAM(S): at 04:21

## 2021-03-04 RX ADMIN — RANOLAZINE 1000 MILLIGRAM(S): 500 TABLET, FILM COATED, EXTENDED RELEASE ORAL at 05:29

## 2021-03-04 RX ADMIN — INSULIN GLARGINE 20 UNIT(S): 100 INJECTION, SOLUTION SUBCUTANEOUS at 21:12

## 2021-03-04 RX ADMIN — Medication 100 GRAM(S): at 15:24

## 2021-03-04 RX ADMIN — GABAPENTIN 300 MILLIGRAM(S): 400 CAPSULE ORAL at 17:55

## 2021-03-04 RX ADMIN — Medication 50 MILLIGRAM(S): at 05:30

## 2021-03-04 RX ADMIN — MORPHINE SULFATE 0.5 MILLIGRAM(S): 50 CAPSULE, EXTENDED RELEASE ORAL at 22:49

## 2021-03-04 RX ADMIN — MORPHINE SULFATE 0.5 MILLIGRAM(S): 50 CAPSULE, EXTENDED RELEASE ORAL at 15:27

## 2021-03-04 RX ADMIN — MORPHINE SULFATE 0.5 MILLIGRAM(S): 50 CAPSULE, EXTENDED RELEASE ORAL at 10:38

## 2021-03-04 RX ADMIN — ISOSORBIDE MONONITRATE 120 MILLIGRAM(S): 60 TABLET, EXTENDED RELEASE ORAL at 12:51

## 2021-03-04 RX ADMIN — Medication 50 MILLIGRAM(S): at 17:55

## 2021-03-04 RX ADMIN — MORPHINE SULFATE 0.5 MILLIGRAM(S): 50 CAPSULE, EXTENDED RELEASE ORAL at 15:24

## 2021-03-04 RX ADMIN — MORPHINE SULFATE 0.5 MILLIGRAM(S): 50 CAPSULE, EXTENDED RELEASE ORAL at 06:27

## 2021-03-04 RX ADMIN — TAMSULOSIN HYDROCHLORIDE 0.4 MILLIGRAM(S): 0.4 CAPSULE ORAL at 21:13

## 2021-03-04 RX ADMIN — Medication 1000 MILLIGRAM(S): at 04:51

## 2021-03-04 RX ADMIN — Medication 0.4 MILLIGRAM(S): at 11:46

## 2021-03-04 RX ADMIN — MORPHINE SULFATE 0.5 MILLIGRAM(S): 50 CAPSULE, EXTENDED RELEASE ORAL at 21:12

## 2021-03-04 RX ADMIN — MORPHINE SULFATE 0.5 MILLIGRAM(S): 50 CAPSULE, EXTENDED RELEASE ORAL at 02:23

## 2021-03-04 RX ADMIN — MORPHINE SULFATE 0.5 MILLIGRAM(S): 50 CAPSULE, EXTENDED RELEASE ORAL at 10:30

## 2021-03-04 RX ADMIN — MORPHINE SULFATE 0.5 MILLIGRAM(S): 50 CAPSULE, EXTENDED RELEASE ORAL at 02:53

## 2021-03-04 RX ADMIN — Medication 1: at 16:29

## 2021-03-04 RX ADMIN — Medication 1: at 07:50

## 2021-03-04 RX ADMIN — FINASTERIDE 5 MILLIGRAM(S): 5 TABLET, FILM COATED ORAL at 12:51

## 2021-03-04 RX ADMIN — MORPHINE SULFATE 0.5 MILLIGRAM(S): 50 CAPSULE, EXTENDED RELEASE ORAL at 18:00

## 2021-03-04 RX ADMIN — Medication 1: at 11:46

## 2021-03-04 RX ADMIN — PANTOPRAZOLE SODIUM 40 MILLIGRAM(S): 20 TABLET, DELAYED RELEASE ORAL at 13:42

## 2021-03-04 RX ADMIN — ONDANSETRON 4 MILLIGRAM(S): 8 TABLET, FILM COATED ORAL at 10:38

## 2021-03-04 NOTE — DIETITIAN INITIAL EVALUATION ADULT. - ORAL INTAKE PTA/DIET HISTORY
Pt c/o not feeling well this AM, RN aware. He is able to complete most of RD interview, however answers briefly. Pt reports good appetite and po intake PTA with no issues. He endorses following a somewhat low sodium and low sugar diet at home, however unable to provide diet recall at this time. No chewing/swallowing difficulty reported. NKFA reported.

## 2021-03-04 NOTE — PROGRESS NOTE ADULT - ASSESSMENT
74y M with hx. of CAD s/p PCI to the LAD and RCA in the past (last angiogram in 2015 with [facility]), HTN, & HLD.  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 s/p MMA embolization with NSGY on 3/1     - increase metoprolol to 50 mg BID, titrate to goal HR of 60 if BP tolerates   - increase imdur to 120 mg QD   - cont ranexa at 1000 mg BID   - NTG PRN for cp and morphine PRN for cp not relieved by NTG, would monitor BP closely and space out medications administration   - s/p MMA embolization with NSGY, CTH slightly improved    - CABG eval and timeline per CTS     Thank you,   Moy Galloway MD  Cardiology Fellow, [facility]-NS/MAYA  All Cardiology service information can be found 24/7 on [email or web address], password: [email or web address]             74y M with hx. of CAD s/p PCI to the LAD and RCA in the past ([facility] angi[facility] in [facility]), HTN, & HLD.  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 s/p MMA embolization with NSGY on 3/1     - inc metoprolol to 75 mg BID   - cont imdur to 120 mg QD   - cont ranexa at 1000 mg BID   - NTG PRN for cp and morphine PRN for cp not relieved by NTG, would monitor BP closely and space out medications administration   - s/p MMA embolization with NSGY, CTH slightly improved    - CABG eval and timeline per CTS     Thank you,   Moy Galloway MD  Cardiology Fellow, [facility]-NS/MAYA  All Cardiology service information can be found 24/7 on [email or web address], password: [email or web address]

## 2021-03-04 NOTE — CHART NOTE - NSCHARTNOTEFT_GEN_A_CORE
Notified by RN that pt had CP. Pt had recurrent CP 2/2 TVD. Cards following. Pt seen and evaluated at bedside. NAD. Denies SOB, Vomiting, dizziness, pain in elsewhere. + nausea. Pt received morphine ATC. Nitro given and pain resolved per EKG unchanged. pt. D/w Cardiology team. C/w Current regimen and NTG PRN for cp and morphine PRN for cp.    Vital Signs Last 24 Hrs  T(C): 36.3 (04 Mar 2021 11:25), Max: 36.8 (04 Mar 2021 02:15)  T(F): 97.4 (04 Mar 2021 11:25), Max: 98.2 (04 Mar 2021 02:15)  HR: 80 (04 Mar 2021 12:52) (73 - 90)  BP: 129/76 (04 Mar 2021 12:52) (105/54 - 151/81)  RR: 18 (04 Mar 2021 11:25) (17 - 18)  SpO2: 92% (04 Mar 2021 11:25) (92% - 95%)    Nereyda Howell PA-C  [id number]

## 2021-03-04 NOTE — DIETITIAN INITIAL EVALUATION ADULT. - CHIEF COMPLAINT
Per chart, pt is 76yoM with PMHx significant for T2DM, HTN, HLD, CAD, multiple chronic subdural hematomas, admitted c/o chest pain. PT s/p cath on current admission and s/p embolization of SDH.

## 2021-03-04 NOTE — PROGRESS NOTE ADULT - SUBJECTIVE AND OBJECTIVE BOX
[facility]      Seen earlier today, and examined.  - Today, patient is without complaints.    complaining of burning          *****MEDICATIONS: Current medication reviewed and documented.    MEDICATIONS  (STANDING):  atorvastatin 40 milliGRAM(s) Oral at bedtime  dextrose 40% Gel 15 Gram(s) Oral once  dextrose 5%. 1000 milliLiter(s) (50 mL/Hr) IV Continuous <Continuous>  dextrose 5%. 1000 milliLiter(s) (100 mL/Hr) IV Continuous <Continuous>  dextrose 50% Injectable 25 Gram(s) IV Push once  dextrose 50% Injectable 12.5 Gram(s) IV Push once  dextrose 50% Injectable 25 Gram(s) IV Push once  finasteride 5 milliGRAM(s) Oral daily  gabapentin 300 milliGRAM(s) Oral two times a day  glucagon  Injectable 1 milliGRAM(s) IntraMuscular once  insulin glargine Injectable (LANTUS) 20 Unit(s) SubCutaneous at bedtime  insulin lispro (ADMELOG) corrective regimen sliding scale   SubCutaneous three times a day before meals  isosorbide   mononitrate ER Tablet (IMDUR) 120 milliGRAM(s) Oral daily  metoprolol tartrate 50 milliGRAM(s) Oral every 12 hours  morphine  - Injectable 0.5 milliGRAM(s) IV Push every 4 hours  pantoprazole    Tablet 40 milliGRAM(s) Oral before breakfast  ranolazine 1000 milliGRAM(s) Oral two times a day  tamsulosin 0.4 milliGRAM(s) Oral at bedtime    MEDICATIONS  (PRN):  nitroglycerin     SubLingual 0.4 milliGRAM(s) SubLingual every 5 minutes PRN Chest Pain  ondansetron Injectable 4 milliGRAM(s) IV Push every 8 hours PRN Nausea and/or Vomiting          ***** VITAL SIGNS:  T(F): 97.4 (21 @ 11:25), Max: 98.2 (21 @ 02:15)  HR: 80 (21 @ 12:52) (73 - 90)  BP: 129/76 (21 @ 12:52) (105/54 - 151/81)  RR: 18 (21 @ 11:25) (17 - 18)  SpO2: 92% (21 @ 11:25) (92% - 95%)  Wt(kg): --  ,   I&O's Summary    03 Mar 2021 07:01  -  04 Mar 2021 07:00  --------------------------------------------------------  IN: 120 mL / OUT: 600 mL / NET: -480 mL    04 Mar 2021 07:01  -  04 Mar 2021 13:12  --------------------------------------------------------  IN: 240 mL / OUT: 0 mL / NET: 240 mL             *****PHYSICAL EXAM:   alert oriented x 3 attention comprehension are fair.  Able to name, repeat.   EOmi fundi not visualized   no nystagmus VFF to confrontation  Tongue is midline  Palate elevates symmetrically   Moving all 4 ext spontaneously no drift appreciated    Gait not assessed.            *****LAB AND IMAGIN.5   11.16 )-----------( 150      ( 04 Mar 2021 07:01 )             42.6               03-04    133<L>  |  97  |  17  ----------------------------<  183<H>  4.7   |  24  |  1.12    Ca    9.6      04 Mar 2021 07:00  Phos  2.9     03-04  Mg     1.8     03-04                           [All pertinent recent Imaging/Reports reviewed]           *****A S S E S S M E N T   A N D   P L A N :      76y M pmhx CAD s/p CABG, HTN, HLD, chronic SDH present 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 Patient did nt get stress test due to adverse symptom experienced during last stress test and was sent home.    Pt was here few days ago for same, worked up for NSTEMI. Ended up having symptomatic relief prior to dc.    as per pt, he had recurrence of chest pain and  took 4 tablets of asa 81 this am and was brought to the emergency room. He denies any headache/weakness/numbness.   He is awaiting eval by cardiology       Problem/Recommendations 1:  chronic subdural hematoma    denies any headache or focal weakness      risk vs. benefits are being considered thoroughly, given his left moderate size subdural hematoma with mass effect, in order to optimize the management of his anginal chest pain.    s/p cath , revealed multilevel disease   ct surgery eval appreciated recommended cabg     frequent neurological exams q2 h and higher level of monitoring of pt was being done was agreed by neurosurgery.   Low threshold for ct head if there should arise any changes in neuro exam  s/p mma embolization tolerated procedure well, now will have to wait for 10-14 days for follow up scan as per neurosurgery           Problem/Recommendations 2: chest pain   seen by ccu not a candidate   pt having intermittent chest pain defer to cards for  management   awaiting cabg   defer to cardiology for management.    protonix 40     prognosis guarded as this pt remains at risk for decompensation.       Thank you for allowing me to participate in the care of this patient. Will continue to follow patient periodically. Please do not hesitate to call me if you have any  questions or if there has been a change in patients neurological status     ________________  Elissa García MD  [facility] (PN)[facility]  [phone]      33 minutes spent on total encounter; more than 50 % of the visit was  spent counseling about plan of care, compliance to diet/exercise and medication regimen and or  coordinating care by the attending physician.      It is advised that stroke patients follow up with BERHANE Garrett @ [phone] in 1- 2 weeks.   Others please follow up with Dr. Michael Nissenbaum [phone]

## 2021-03-04 NOTE — DIETITIAN INITIAL EVALUATION ADULT. - PERTINENT LABORATORY DATA
03-04 @ 07:00: Na 133<L>, BUN 17, Cr 1.12, <H>, K+ 4.7, Phos 2.9, Mg 1.8  02-24 HbA1c 7.1%    CAPILLARY BLOOD GLUCOSE  POCT Blood Glucose.: 163 mg/dL (04 Mar 2021 07:29)  POCT Blood Glucose.: 191 mg/dL (03 Mar 2021 21:16)  POCT Blood Glucose.: 192 mg/dL (03 Mar 2021 16:22)  POCT Blood Glucose.: 179 mg/dL (03 Mar 2021 11:33)

## 2021-03-04 NOTE — PROGRESS NOTE ADULT - SUBJECTIVE AND OBJECTIVE BOX
Patient seen and examined at bedside.    Overnight Events:     comfortable in bed. had episodes of cp overnight, required multiple dose of NTG and morphines   no further VTs on telemetry     REVIEW OF SYSTEMS:  Constitutional:     [x ] negative [ ] fevers [ ] chills [ ] weight loss [ ] weight gain  HEENT:                  [x ] negative [ ] dry eyes [ ] eye irritation [ ] postnasal drip [ ] nasal congestion  CV:                         [ x] negative  [ ] chest pain [ ] orthopnea [ ] palpitations [ ] murmur  Resp:                     [ x] negative [ ] cough [ ] shortness of breath [ ] dyspnea [ ] wheezing [ ] sputum [ ]hemoptysis  GI:                          [ x] negative [ ] nausea [ ] vomiting [ ] diarrhea [ ] constipation [ ] abd pain [ ] dysphagia   :                        [ x] negative [ ] dysuria [ ] nocturia [ ] hematuria [ ] increased urinary frequency  Musculoskeletal: [ x] negative [ ] back pain [ ] myalgias [ ] arthralgias [ ] fracture  Skin:                       [ x] negative [ ] rash [ ] itch  Neurological:        [x ] negative [ ] headache [ ] dizziness [ ] syncope [ ] weakness [ ] numbness  Psychiatric:           [ x] negative [ ] anxiety [ ] depression  Endocrine:            [ x] negative [ ] diabetes [ ] thyroid problem  Heme/Lymph:      [ x] negative [ ] anemia [ ] bleeding problem  Allergic/Immune: [ x] negative [ ] itchy eyes [ ] nasal discharge [ ] hives [ ] angioedema    [ x] All other systems negative  [ ] Unable to assess ROS due to    Current Meds:  atorvastatin 40 milliGRAM(s) Oral at bedtime  dextrose 40% Gel 15 Gram(s) Oral once  dextrose 5%. 1000 milliLiter(s) IV Continuous <Continuous>  dextrose 5%. 1000 milliLiter(s) IV Continuous <Continuous>  dextrose 50% Injectable 25 Gram(s) IV Push once  dextrose 50% Injectable 12.5 Gram(s) IV Push once  dextrose 50% Injectable 25 Gram(s) IV Push once  finasteride 5 milliGRAM(s) Oral daily  gabapentin 300 milliGRAM(s) Oral two times a day  glucagon  Injectable 1 milliGRAM(s) IntraMuscular once  insulin glargine Injectable (LANTUS) 20 Unit(s) SubCutaneous at bedtime  insulin lispro (ADMELOG) corrective regimen sliding scale   SubCutaneous three times a day before meals  isosorbide   mononitrate ER Tablet (IMDUR) 120 milliGRAM(s) Oral daily  metoprolol tartrate 50 milliGRAM(s) Oral every 12 hours  morphine  - Injectable 0.5 milliGRAM(s) IV Push every 4 hours  ondansetron Injectable 4 milliGRAM(s) IV Push every 8 hours PRN  ranolazine 1000 milliGRAM(s) Oral two times a day  tamsulosin 0.4 milliGRAM(s) Oral at bedtime      PAST MEDICAL & SURGICAL HISTORY:  BPH (benign prostatic hypertrophy)    Hyperlipidemia    CAD (coronary artery disease)    Diabetes mellitus  Type 2    HTN (hypertension)    S/P drug eluting coronary stent placement  [facility]    S/P primary angioplasty with coronary stent  1990s        Vitals:  T(F): 98 (03-04), Max: 98.2 (03-04)  HR: 90 (03-04) (73 - 90)  BP: 128/73 (03-04) (105/54 - 151/81)  RR: 18 (03-04)  SpO2: 94% (03-04)  I&O's Summary    03 Mar 2021 07:01  -  04 Mar 2021 07:00  --------------------------------------------------------  IN: 120 mL / OUT: 600 mL / NET: -480 mL        Physical Exam:    Appearance: No acute distress  Eyes: PERRL, EOMI, pink conjunctiva  HENT: Normal oral mucosa  Cardiovascular: RRR, S1, S2, no murmurs, rubs, or gallops; no edema; no JVD  Respiratory: Clear to auscultation bilaterally  Gastrointestinal: soft, non-tender, non-distended with normal bowel sounds  Musculoskeletal: No clubbing; no joint deformity   Neurologic: Non-focal  Skin: No rashes, ecchymoses, or cyanosis                          14.5   11.16 )-----------( 150      ( 04 Mar 2021 07:01 )             42.6     03-04    133<L>  |  97  |  17  ----------------------------<  183<H>  4.7   |  24  |  1.12    Ca    9.6      04 Mar 2021 07:00  Phos  2.9     03-04  Mg     1.8     03-04            Serum Pro-Brain Natriuretic Peptide: 423 pg/mL (02-26 @ 09:39)          New ECG(s): Personally reviewed    Echo: Personally reviewed    Stress Testing: Personally reviewed    Cath: Personally reviewed    Imaging: Personally reviewed    Interpretation of Telemetry: sinus tachycardia

## 2021-03-04 NOTE — DIETITIAN INITIAL EVALUATION ADULT. - PERTINENT MEDS FT
MEDICATIONS  (STANDING):  atorvastatin 40 milliGRAM(s) Oral at bedtime  dextrose 40% Gel 15 Gram(s) Oral once  dextrose 5%. 1000 milliLiter(s) (50 mL/Hr) IV Continuous <Continuous>  dextrose 5%. 1000 milliLiter(s) (100 mL/Hr) IV Continuous <Continuous>  dextrose 50% Injectable 25 Gram(s) IV Push once  dextrose 50% Injectable 12.5 Gram(s) IV Push once  dextrose 50% Injectable 25 Gram(s) IV Push once  finasteride 5 milliGRAM(s) Oral daily  gabapentin 300 milliGRAM(s) Oral two times a day  glucagon  Injectable 1 milliGRAM(s) IntraMuscular once  insulin glargine Injectable (LANTUS) 20 Unit(s) SubCutaneous at bedtime  insulin lispro (ADMELOG) corrective regimen sliding scale   SubCutaneous three times a day before meals  isosorbide   mononitrate ER Tablet (IMDUR) 120 milliGRAM(s) Oral daily  metoprolol tartrate 50 milliGRAM(s) Oral every 12 hours  morphine  - Injectable 0.5 milliGRAM(s) IV Push every 4 hours  ranolazine 1000 milliGRAM(s) Oral two times a day  tamsulosin 0.4 milliGRAM(s) Oral at bedtime    MEDICATIONS  (PRN):  ondansetron Injectable 4 milliGRAM(s) IV Push every 8 hours PRN Nausea and/or Vomiting

## 2021-03-04 NOTE — PROGRESS NOTE ADULT - ASSESSMENT
77yo M hx of HTN, HLD, multiple chronic subdurals comes to ED for cp. Chest pain substernal, since last night, constant, no radiation, took 3 nitros last night, 1 ASA 324mg this morning. No associated with sob or diaphoresis. Pt was here few days ago for same, worked up for NSTEMI. Ended up having symptomatic relief prior to dc. In hospital TTE was interdeterminate, trop 124. Neurosurgery recs were to avoid anti-platelets and AC. Denies f/c, cough, sob, abdominal pain, change in bowel.    Problem/Plan - 1:  ·  Problem: NSTEMI (non-ST elevated myocardial infarction).  Plan: telemonitor  cards fu   sp cath and embolization for SDH   CTS fu for CABG in 1 month     Problem/Plan - 2:  ·  Problem: SDH (subdural hematoma).  Plan: neuro and neurosurgery fu   sp embolization for SDH     Problem/Plan - 3:  ·  Problem: Diabetes mellitus.  Plan: monitor FSbasal, bolus.     Problem/Plan - 4:·  Problem: Hyperlipidemia.  Plan: cw statin.

## 2021-03-04 NOTE — PROGRESS NOTE ADULT - SUBJECTIVE AND OBJECTIVE BOX
Patient is a 76y old  Male who presents with a chief complaint of chest pain (04 Mar 2021 11:05)    Date of servie : 03-04-21 @ 19:40  INTERVAL HPI/OVERNIGHT EVENTS:  T(C): 36.3 (03-04-21 @ 11:25), Max: 36.8 (03-04-21 @ 02:15)  HR: 80 (03-04-21 @ 17:45) (73 - 90)  BP: 115/61 (03-04-21 @ 17:45) (105/54 - 132/79)  RR: 18 (03-04-21 @ 11:25) (17 - 18)  SpO2: 92% (03-04-21 @ 11:25) (92% - 95%)  Wt(kg): --  I&O's Summary    03 Mar 2021 07:01  -  04 Mar 2021 07:00  --------------------------------------------------------  IN: 120 mL / OUT: 600 mL / NET: -480 mL    04 Mar 2021 07:01  -  04 Mar 2021 19:40  --------------------------------------------------------  IN: 480 mL / OUT: 0 mL / NET: 480 mL        LABS:                        14.5   11.16 )-----------( 150      ( 04 Mar 2021 07:01 )             42.6     03-04    133<L>  |  97  |  17  ----------------------------<  183<H>  4.7   |  24  |  1.12    Ca    9.6      04 Mar 2021 07:00  Phos  2.9     03-04  Mg     1.8     03-04          CAPILLARY BLOOD GLUCOSE      POCT Blood Glucose.: 199 mg/dL (04 Mar 2021 16:22)  POCT Blood Glucose.: 186 mg/dL (04 Mar 2021 11:10)  POCT Blood Glucose.: 163 mg/dL (04 Mar 2021 07:29)  POCT Blood Glucose.: 191 mg/dL (03 Mar 2021 21:16)            MEDICATIONS  (STANDING):  atorvastatin 40 milliGRAM(s) Oral at bedtime  dextrose 40% Gel 15 Gram(s) Oral once  dextrose 5%. 1000 milliLiter(s) (50 mL/Hr) IV Continuous <Continuous>  dextrose 5%. 1000 milliLiter(s) (100 mL/Hr) IV Continuous <Continuous>  dextrose 50% Injectable 25 Gram(s) IV Push once  dextrose 50% Injectable 12.5 Gram(s) IV Push once  dextrose 50% Injectable 25 Gram(s) IV Push once  finasteride 5 milliGRAM(s) Oral daily  gabapentin 300 milliGRAM(s) Oral two times a day  glucagon  Injectable 1 milliGRAM(s) IntraMuscular once  insulin glargine Injectable (LANTUS) 20 Unit(s) SubCutaneous at bedtime  insulin lispro (ADMELOG) corrective regimen sliding scale   SubCutaneous three times a day before meals  isosorbide   mononitrate ER Tablet (IMDUR) 120 milliGRAM(s) Oral daily  metoprolol tartrate 50 milliGRAM(s) Oral every 12 hours  morphine  - Injectable 0.5 milliGRAM(s) IV Push every 4 hours  pantoprazole    Tablet 40 milliGRAM(s) Oral before breakfast  ranolazine 1000 milliGRAM(s) Oral two times a day  tamsulosin 0.4 milliGRAM(s) Oral at bedtime    MEDICATIONS  (PRN):  nitroglycerin     SubLingual 0.4 milliGRAM(s) SubLingual every 5 minutes PRN Chest Pain  ondansetron Injectable 4 milliGRAM(s) IV Push every 8 hours PRN Nausea and/or Vomiting          PHYSICAL EXAM:  GENERAL: NAD, well-groomed, well-developed  HEAD:  Atraumatic, Normocephalic  CHEST/LUNG: Clear to percussion bilaterally; No rales, rhonchi, wheezing, or rubs  HEART: Regular rate and rhythm; No murmurs, rubs, or gallops  ABDOMEN: Soft, Nontender, Nondistended; Bowel sounds present  EXTREMITIES:  2+ Peripheral Pulses, No clubbing, cyanosis, or edema  LYMPH: No lymphadenopathy noted  SKIN: No rashes or lesions    Care Discussed with Consultants/Other Providers [ ] YES  [ ] NO

## 2021-03-04 NOTE — CHART NOTE - NSCHARTNOTEFT_GEN_A_CORE
CC: chest pain    HPI:  Called by RN to evaluate patient for complaints of chest pain.   Patient seen and assessed at bedside, appears uncomfortable, alert and awake. He complained of recurrent chest pain and pointed to left chest wall. Pain is constant, described as a "pressure," 10/10 on pain scale, no radiation. It is minimally improved with Morphine IVP. No exacerbating factors.   Patient denied headache, dizziness, SOB, palpitations, abdominal  pain, N/V/D, extremity numbness/tingling, arm pain, jaw pain, or diaphoresis. Patient endorsed frustration regarding recurrent chest pain and stated he has not eaten in three days.         ROS:  CONSTITUTIONAL:  No fever, chills, rigors  CARDIOVASCULAR:  (+) chest pain. NO palpitations  RESPIRATORY:   No SOB, cough, wheezing  GASTROINTESTINAL:  No abd pain, N/V/D  NEUROLOGIC:  No HA, visual disturbances  SKIN: No rashes        PAST MEDICAL & SURGICAL HISTORY:  BPH (benign prostatic hypertrophy)    Hyperlipidemia  CAD (coronary artery disease)  Diabetes mellitus  Type 2  HTN (hypertension)  S/P drug eluting coronary stent placement  [facility]  S/P primary angioplasty with coronary stent  1990s            Vital Signs Last 24 Hrs  T(C): 36.7 (04 Mar 2021 03:54), Max: 36.8 (04 Mar 2021 02:15)  T(F): 98 (04 Mar 2021 03:54), Max: 98.2 (04 Mar 2021 02:15)  HR: 90 (04 Mar 2021 03:54) (73 - 90)  BP: 132/79 (04 Mar 2021 03:54) (105/54 - 151/81)  RR: 18 (04 Mar 2021 03:54) (17 - 18)  SpO2: 94% (04 Mar 2021 03:54) (93% - 98%)      Physical Exam:  General: Elderly male laying in bed, appears uncomfortable, WN/WD, AOx3, nontoxic appearing  Head:  NC/AT  CV: RRR, S1S2   Respiratory: CTA B/L, nonlabored on RA  Abdominal: (+) bowel sounds x4. Large, soft abdomen, NT, no guarding or rebound tenderness  MSK: No BLLE edema, + peripheral pulses, FROM all 4 extremity  Skin: (+) warm, dry       Labs:                        14.1   8.07  )-----------( 155      ( 03 Mar 2021 04:32 )             41.2     03-03    x   |  x   |  x   ----------------------------<  x   4.0   |  x   |  x     Ca    9.4      03 Mar 2021 04:32  Phos  3.2     03-03  Mg     2.0     03-03                Radiology:  < from: Cardiac Cath Lab - Adult (02.26.21 @ 16:51) >  Severe multivessel disease- recommend CTS  evaluation for CABG. This will need to be done in coordination with  neurosurgery, neurology given patient's subdural hematoma and risk of  anticoagulation on pump. Continue to hold DAPT/AC as management discussed.  < end of copied text >              Assessment & Plan:  HPI:  77yo M hx of HTN, HLD, multiple chronic subdurals comes to ED for cp. Chest pain substernal, since last night, constant, no radiation, took 3 nitros last night, 1 ASA 324mg this morning. No associated with sob or diaphoresis. Pt was here few days ago for same, worked up for NSTEMI. Ended up having symptomatic relief prior to dc. In hospital TTE was interdeterminate, trop 124. Neurosurgery recs were to avoid anti-platelets and AC. Denies f/c, cough, sob, abdominal pain, change in bowel. (26 Feb 2021 13:25)      #Recurrent chest pain  -Vital signs hemodynamically stable  -STAT EKG:  -Telemetry with SR 90s with 1st degree AVB, no events. Continue to monitor on telemetry.  -Patient given SL NTG 0.4mg x2 with some improvement in pain; close monitoring of BP  -Tylenol 1000mg IV x1 as patient stating minimal relief of chest pain with Morphine IVP  -Continue with Metoprolol 50mg PO BID, Imdur 120mg PO QD, Ranexa 1000mg PO BID, and Morphine/NTG PRN  -Cardiology and CTSx are following patient given finding of TVD on LHC (2/26) however no plan for CABG given recent MMA embolization (3/1) and risk of ICH  -Will continue to closely monitor patient/vitals  -Primary Team to follow up in AM, attending to follow       Tyrone Morgan PA-C  Dept of Medicine  [phone] CC: chest pain    HPI:  Called by RN to evaluate patient for complaints of chest pain.   Patient seen and assessed at bedside, appears uncomfortable, alert and awake. He complained of recurrent chest pain and pointed to left chest wall. Pain is constant, described as a "pressure," 10/10 on pain scale, no radiation. It is minimally improved with Morphine IVP. No exacerbating factors.   Patient denied headache, dizziness, SOB, palpitations, abdominal  pain, N/V/D, extremity numbness/tingling, arm pain, jaw pain, or diaphoresis. Patient endorsed frustration regarding recurrent chest pain and stated he has not eaten in three days.         ROS:  CONSTITUTIONAL:  No fever, chills, rigors  CARDIOVASCULAR:  (+) chest pain. NO palpitations  RESPIRATORY:   No SOB, cough, wheezing  GASTROINTESTINAL:  No abd pain, N/V/D  NEUROLOGIC:  No HA, visual disturbances  SKIN: No rashes        PAST MEDICAL & SURGICAL HISTORY:  BPH (benign prostatic hypertrophy)    Hyperlipidemia  CAD (coronary artery disease)  Diabetes mellitus  Type 2  HTN (hypertension)  S/P drug eluting coronary stent placement  [facility]amus  S/P primary angioplasty with coronary stent  1990s            Vital Signs Last 24 Hrs  T(C): 36.7 (04 Mar 2021 03:54), Max: 36.8 (04 Mar 2021 02:15)  T(F): 98 (04 Mar 2021 03:54), Max: 98.2 (04 Mar 2021 02:15)  HR: 90 (04 Mar 2021 03:54) (73 - 90)  BP: 132/79 (04 Mar 2021 03:54) (105/54 - 151/81)  RR: 18 (04 Mar 2021 03:54) (17 - 18)  SpO2: 94% (04 Mar 2021 03:54) (93% - 98%)      Physical Exam:  General: Elderly male laying in bed, appears uncomfortable, WN/WD, AOx3, nontoxic appearing  Head:  NC/AT  CV: RRR, S1S2   Respiratory: CTA B/L, nonlabored on RA  Abdominal: (+) bowel sounds x4. Large, soft abdomen, NT, no guarding or rebound tenderness  MSK: No BLLE edema, + peripheral pulses, FROM all 4 extremity  Skin: (+) warm, dry       Labs:                        14.1   8.07  )-----------( 155      ( 03 Mar 2021 04:32 )             41.2     03-03    x   |  x   |  x   ----------------------------<  x   4.0   |  x   |  x     Ca    9.4      03 Mar 2021 04:32  Phos  3.2     03-03  Mg     2.0     03-03                Radiology:  < from: Cardiac Cath Lab - Adult (02.26.21 @ 16:51) >  Severe multivessel disease- recommend CTS  evaluation for CABG. This will need to be done in coordination with  neurosurgery, neurology given patient's subdural hematoma and risk of  anticoagulation on pump. Continue to hold DAPT/AC as management discussed.  < end of copied text >              Assessment & Plan:  74y M with hx. of CAD s/p PCI to the LAD and RCA in the past (last angiogram in 2015 with [facility]), HTN, & HLD.  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 s/p MMA embolization with NSGY on 3/1.  Patient now presenting acutely with complaints of recurrent chest pain that is similar to prior episodes.     #Recurrent chest pain  -Vital signs hemodynamically stable  -STAT EKG: SR with 1st degree AVB, HR 92 BPM. No acute ST/T   -Telemetry with SR 90s with 1st degree AVB, no events. Continue to monitor on telemetry.  -Patient given SL NTG 0.4mg x2 with some improvement in pain; close monitoring of BP  -Tylenol 1000mg IV x1 as patient stating minimal relief of chest pain with Morphine IVP  -Continue with Metoprolol 50mg PO BID, Imdur 120mg PO QD, Ranexa 1000mg PO BID, and Morphine/NTG PRN  -Cardiology and CTSx are following patient given finding of TVD on LHC (2/26) however no plan for CABG given recent MMA embolization (3/1) and risk of ICH  -Will continue to closely monitor patient/vitals  -Primary Team to follow up in AM, attending to follow       Tyrone Morgan PA-C  Dept of Medicine  [phone] CC: chest pain    HPI:  Called by RN to evaluate patient for complaints of chest pain.   Patient seen and assessed at bedside, appears uncomfortable, alert and awake. He complained of recurrent chest pain that started while he was laying in bed and pointed to left chest wall. Pain is constant, described as a "pressure," 10/10 on pain scale, no radiation. It is minimally improved with Morphine IVP. No exacerbating factors.   Patient denied headache, dizziness, SOB, palpitations, abdominal  pain, N/V/D, extremity numbness/tingling, arm pain, jaw pain, or diaphoresis. Patient endorsed frustration regarding recurrent chest pain and stated he has not eaten in three days.         ROS:  CONSTITUTIONAL:  No fever, chills, rigors  CARDIOVASCULAR:  (+) chest pain. NO palpitations  RESPIRATORY:   No SOB, cough, wheezing  GASTROINTESTINAL:  No abd pain, N/V/D  NEUROLOGIC:  No HA, visual disturbances  SKIN: No rashes        PAST MEDICAL & SURGICAL HISTORY:  BPH (benign prostatic hypertrophy)    Hyperlipidemia  CAD (coronary artery disease)  Diabetes mellitus  Type 2  HTN (hypertension)  S/P drug eluting coronary stent placement  [place]  S/P primary angioplasty with coronary stent  1990s            Vital Signs Last 24 Hrs  T(C): 36.7 (04 Mar 2021 03:54), Max: 36.8 (04 Mar 2021 02:15)  T(F): 98 (04 Mar 2021 03:54), Max: 98.2 (04 Mar 2021 02:15)  HR: 90 (04 Mar 2021 03:54) (73 - 90)  BP: 132/79 (04 Mar 2021 03:54) (105/54 - 151/81)  RR: 18 (04 Mar 2021 03:54) (17 - 18)  SpO2: 94% (04 Mar 2021 03:54) (93% - 98%)      Physical Exam:  General: Elderly male laying in bed, appears uncomfortable, WN/WD, AOx3, nontoxic appearing  Head:  NC/AT  CV: RRR, S1S2   Respiratory: CTA B/L, nonlabored on RA  Abdominal: (+) bowel sounds x4. Large, soft abdomen, NT, no guarding or rebound tenderness  MSK: No BLLE edema, + peripheral pulses, FROM all 4 extremity  Skin: (+) warm, dry       Labs:                        14.1   8.07  )-----------( 155      ( 03 Mar 2021 04:32 )             41.2     03-03    x   |  x   |  x   ----------------------------<  x   4.0   |  x   |  x     Ca    9.4      03 Mar 2021 04:32  Phos  3.2     03-03  Mg     2.0     03-03                Radiology:  < from: Cardiac Cath Lab - Adult (02.26.21 @ 16:51) >  Severe multivessel disease- recommend CTS  evaluation for CABG. This will need to be done in coordination with  neurosurgery, neurology given patient's subdural hematoma and risk of  anticoagulation on pump. Continue to hold DAPT/AC as management discussed.  < end of copied text >              Assessment & Plan:  74y M with hx. of CAD s/p PCI to the LAD and RCA in the past (last angiogram in 2015 with [facility]), HTN, & HLD.  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 s/p MMA embolization with NSGY on 3/1.  Patient now presenting acutely with complaints of recurrent chest pain that is similar to prior episodes.     #Recurrent chest pain  -Vital signs hemodynamically stable  -STAT EKG: SR with 1st degree AVB, HR 92 BPM. No acute ST/T segment changes when compared with prior EKGs in [place].   -Telemetry with SR 90s with 1st degree AVB, no events. Continue to monitor on telemetry.  -Patient given SL NTG 0.4mg x2 with some improvement in pain; close monitoring of BP  -Tylenol 1000mg IV x1 as patient stating minimal relief of chest pain with Morphine IVP  -Continue with Metoprolol 50mg PO BID, Imdur 120mg PO QD, Ranexa 1000mg PO BID, and Morphine/NTG PRN  -Cardiology and CTSx are following patient given finding of TVD on LHC (2/26) however no plan for CABG given recent MMA embolization (3/1) and risk of ICH  -Will continue to closely monitor patient/vitals  -Primary Team to follow up in AM, attending to follow       Tyrone Morgan PA-C  Dept of Medicine  [id number] CC: chest pain    HPI:  Called by RN to evaluate patient for complaints of chest pain.   Patient seen and assessed at bedside, appears uncomfortable, alert and awake. He complained of recurrent chest pain that started while he was laying in bed and pointed to left chest wall. Pain is constant, described as a "pressure," 10/10 on pain scale, no radiation. It is minimally improved with Morphine IVP. No exacerbating factors.   Patient denied headache, dizziness, SOB, palpitations, abdominal  pain, N/V/D, extremity numbness/tingling, arm pain, jaw pain, or diaphoresis. Patient endorsed frustration regarding recurrent chest pain and stated he has not eaten in three days.         ROS:  CONSTITUTIONAL:  No fever, chills, rigors  CARDIOVASCULAR:  (+) chest pain. NO palpitations  RESPIRATORY:   No SOB, cough, wheezing  GASTROINTESTINAL:  No abd pain, N/V/D  NEUROLOGIC:  No HA, visual disturbances  SKIN: No rashes        PAST MEDICAL & SURGICAL HISTORY:  BPH (benign prostatic hypertrophy)    Hyperlipidemia  CAD (coronary artery disease)  Diabetes mellitus  Type 2  HTN (hypertension)  S/P drug eluting coronary stent placement  [facility]  S/P primary angioplasty with coronary stent  1990s            Vital Signs Last 24 Hrs  T(C): 36.7 (04 Mar 2021 03:54), Max: 36.8 (04 Mar 2021 02:15)  T(F): 98 (04 Mar 2021 03:54), Max: 98.2 (04 Mar 2021 02:15)  HR: 90 (04 Mar 2021 03:54) (73 - 90)  BP: 132/79 (04 Mar 2021 03:54) (105/54 - 151/81)  RR: 18 (04 Mar 2021 03:54) (17 - 18)  SpO2: 94% (04 Mar 2021 03:54) (93% - 98%)      Physical Exam:  General: Elderly male laying in bed, appears uncomfortable, WN/WD, AOx3, nontoxic appearing  Head:  NC/AT  CV: RRR, S1S2   Respiratory: CTA B/L, nonlabored on RA  Abdominal: (+) bowel sounds x4. Large, soft abdomen, NT, no guarding or rebound tenderness  MSK: No BLLE edema, + peripheral pulses, FROM all 4 extremity  Skin: (+) warm, dry       Labs:                        14.1   8.07  )-----------( 155      ( 03 Mar 2021 04:32 )             41.2     03-03    x   |  x   |  x   ----------------------------<  x   4.0   |  x   |  x     Ca    9.4      03 Mar 2021 04:32  Phos  3.2     03-03  Mg     2.0     03-03                Radiology:  < from: Cardiac Cath Lab - Adult (02.26.21 @ 16:51) >  Severe multivessel disease- recommend CTS  evaluation for CABG. This will need to be done in coordination with  neurosurgery, neurology given patient's subdural hematoma and risk of  anticoagulation on pump. Continue to hold DAPT/AC as management discussed.  < end of copied text >              Assessment & Plan:  74y M with hx. of CAD s/p PCI to the LAD and RCA in the past (last angiogram in 2015 with [facility]), HTN, & HLD.  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 s/p MMA embolization with NSGY on 3/1.  Patient now presenting acutely with complaints of recurrent chest pain that is similar to prior episodes.     #Recurrent chest pain- likely secondary to known severe MVD  -Vital signs hemodynamically stable  -STAT EKG: SR with 1st degree AVB, HR 92 BPM. No acute ST/T segment changes when compared with prior EKGs in [place].   -Telemetry with SR 90s with 1st degree AVB, no events. Continue to monitor on telemetry.  -Patient given SL NTG 0.4mg x2 with some improvement in pain; close monitoring of BP  -Tylenol 1000mg IV x1 as patient stating minimal relief of chest pain with Morphine IVP  -Continue with Metoprolol 50mg PO BID, Imdur 120mg PO QD, Ranexa 1000mg PO BID, and Morphine/NTG PRN  -Cardiology and CTSx are following patient given finding of TVD on [facility] (2/26) however no plan for CABG given recent MMA embolization (3/1) and risk of ICH  -Will continue to closely monitor patient/vitals  -Primary Team to follow up in AM, attending to follow       Tyrone Morgan PA-C  Dept of Medicine  [phone]

## 2021-03-04 NOTE — DIETITIAN INITIAL EVALUATION ADULT. - ADD RECOMMEND
1. Continue Consistent Carbohydrate (no evening snacks) therapeutic diet as indicated, will hold off on additional restrictions at this time 2. Provide 1 glucerna daily 3. Will continue to monitor nutrient intake, wt, labs, f/u per protocol

## 2021-03-04 NOTE — DIETITIAN INITIAL EVALUATION ADULT. - OTHER INFO
Pt endorses taking janumet and lantus at home for BG management. He reports SMBG x1 daily PTA with usual BG range  mg/dL reported. Recent HgbA1c noted as 7.1% per chart.    Nutrition Supplements PTA: vitamin B12, iron    Pt UBW: ~164 lbs as reflected by stated dosing wt and no recent wt changes reported.  Weight history: 157.4 lbs (8/29/19), 163.1 lbs (9/9/19), and 136.4 lbs (9/12/19, question accuracy- outlier wt)    Pt reports low appetite and po intake x3 days on current admission largely in setting of not feeling well and "medications." Pt noted with 0-100% po intake at meals per nursing flow sheet. Pt amenable to trial of glucerna supplementation.  Pt has no c/o diarrhea, or constipation. Endorses vomiting this AM and still with some residual nausea.    Nutrition education not appropriate at this time.

## 2021-03-04 NOTE — CHART NOTE - NSCHARTNOTESELECT_GEN_ALL_CORE
Chest Pain/Event Note
Interventional Neuro Radiology/Event Note
recurrent chest pain/Event Note
Chest Pain
Event Note
Interventional Neuro Radiology/Event Note
NSVT/Event Note
Recurrent CP/Event Note
WCT/Event Note

## 2021-03-05 LAB
ANION GAP SERPL CALC-SCNC: 11 MMOL/L — SIGNIFICANT CHANGE UP (ref 5–17)
BUN SERPL-MCNC: 26 MG/DL — HIGH (ref 7–23)
CALCIUM SERPL-MCNC: 9.2 MG/DL — SIGNIFICANT CHANGE UP (ref 8.4–10.5)
CHLORIDE SERPL-SCNC: 97 MMOL/L — SIGNIFICANT CHANGE UP (ref 96–108)
CO2 SERPL-SCNC: 26 MMOL/L — SIGNIFICANT CHANGE UP (ref 22–31)
CREAT SERPL-MCNC: 1.11 MG/DL — SIGNIFICANT CHANGE UP (ref 0.5–1.3)
GLUCOSE BLDC GLUCOMTR-MCNC: 237 MG/DL — HIGH (ref 70–99)
GLUCOSE BLDC GLUCOMTR-MCNC: 269 MG/DL — HIGH (ref 70–99)
GLUCOSE BLDC GLUCOMTR-MCNC: 292 MG/DL — HIGH (ref 70–99)
GLUCOSE BLDC GLUCOMTR-MCNC: 337 MG/DL — HIGH (ref 70–99)
GLUCOSE SERPL-MCNC: 214 MG/DL — HIGH (ref 70–99)
HCT VFR BLD CALC: 39.6 % — SIGNIFICANT CHANGE UP (ref 39–50)
HGB BLD-MCNC: 13.8 G/DL — SIGNIFICANT CHANGE UP (ref 13–17)
MAGNESIUM SERPL-MCNC: 1.8 MG/DL — SIGNIFICANT CHANGE UP (ref 1.6–2.6)
MCHC RBC-ENTMCNC: 30.5 PG — SIGNIFICANT CHANGE UP (ref 27–34)
MCHC RBC-ENTMCNC: 34.8 GM/DL — SIGNIFICANT CHANGE UP (ref 32–36)
MCV RBC AUTO: 87.4 FL — SIGNIFICANT CHANGE UP (ref 80–100)
NRBC # BLD: 0 /100 WBCS — SIGNIFICANT CHANGE UP (ref 0–0)
PHOSPHATE SERPL-MCNC: 2.3 MG/DL — LOW (ref 2.5–4.5)
PLATELET # BLD AUTO: 154 K/UL — SIGNIFICANT CHANGE UP (ref 150–400)
POTASSIUM SERPL-MCNC: 4.1 MMOL/L — SIGNIFICANT CHANGE UP (ref 3.5–5.3)
POTASSIUM SERPL-SCNC: 4.1 MMOL/L — SIGNIFICANT CHANGE UP (ref 3.5–5.3)
RBC # BLD: 4.53 M/UL — SIGNIFICANT CHANGE UP (ref 4.2–5.8)
RBC # FLD: 12.8 % — SIGNIFICANT CHANGE UP (ref 10.3–14.5)
SODIUM SERPL-SCNC: 134 MMOL/L — LOW (ref 135–145)
WBC # BLD: 12.69 K/UL — HIGH (ref 3.8–10.5)
WBC # FLD AUTO: 12.69 K/UL — HIGH (ref 3.8–10.5)

## 2021-03-05 PROCEDURE — 99233 SBSQ HOSP IP/OBS HIGH 50: CPT

## 2021-03-05 RX ORDER — CALCIUM CARBONATE 500(1250)
1 TABLET ORAL ONCE
Refills: 0 | Status: COMPLETED | OUTPATIENT
Start: 2021-03-05 | End: 2021-03-05

## 2021-03-05 RX ORDER — FAMOTIDINE 10 MG/ML
20 INJECTION INTRAVENOUS DAILY
Refills: 0 | Status: DISCONTINUED | OUTPATIENT
Start: 2021-03-05 | End: 2021-03-09

## 2021-03-05 RX ORDER — SENNA PLUS 8.6 MG/1
2 TABLET ORAL AT BEDTIME
Refills: 0 | Status: DISCONTINUED | OUTPATIENT
Start: 2021-03-05 | End: 2021-03-09

## 2021-03-05 RX ORDER — METOPROLOL TARTRATE 50 MG
75 TABLET ORAL EVERY 12 HOURS
Refills: 0 | Status: DISCONTINUED | OUTPATIENT
Start: 2021-03-05 | End: 2021-03-09

## 2021-03-05 RX ADMIN — PANTOPRAZOLE SODIUM 40 MILLIGRAM(S): 20 TABLET, DELAYED RELEASE ORAL at 05:29

## 2021-03-05 RX ADMIN — Medication 30 MILLILITER(S): at 15:07

## 2021-03-05 RX ADMIN — Medication 2: at 16:42

## 2021-03-05 RX ADMIN — FINASTERIDE 5 MILLIGRAM(S): 5 TABLET, FILM COATED ORAL at 13:30

## 2021-03-05 RX ADMIN — Medication 5 MILLIGRAM(S): at 15:05

## 2021-03-05 RX ADMIN — Medication 1 TABLET(S): at 17:10

## 2021-03-05 RX ADMIN — MORPHINE SULFATE 0.5 MILLIGRAM(S): 50 CAPSULE, EXTENDED RELEASE ORAL at 02:47

## 2021-03-05 RX ADMIN — Medication 3: at 12:14

## 2021-03-05 RX ADMIN — TAMSULOSIN HYDROCHLORIDE 0.4 MILLIGRAM(S): 0.4 CAPSULE ORAL at 20:59

## 2021-03-05 RX ADMIN — INSULIN GLARGINE 20 UNIT(S): 100 INJECTION, SOLUTION SUBCUTANEOUS at 21:28

## 2021-03-05 RX ADMIN — MORPHINE SULFATE 0.5 MILLIGRAM(S): 50 CAPSULE, EXTENDED RELEASE ORAL at 02:01

## 2021-03-05 RX ADMIN — GABAPENTIN 300 MILLIGRAM(S): 400 CAPSULE ORAL at 17:10

## 2021-03-05 RX ADMIN — FAMOTIDINE 20 MILLIGRAM(S): 10 INJECTION INTRAVENOUS at 19:34

## 2021-03-05 RX ADMIN — ATORVASTATIN CALCIUM 40 MILLIGRAM(S): 80 TABLET, FILM COATED ORAL at 20:59

## 2021-03-05 RX ADMIN — Medication 50 MILLIGRAM(S): at 05:27

## 2021-03-05 RX ADMIN — Medication 1 TABLET(S): at 06:22

## 2021-03-05 RX ADMIN — GABAPENTIN 300 MILLIGRAM(S): 400 CAPSULE ORAL at 05:27

## 2021-03-05 RX ADMIN — Medication 75 MILLIGRAM(S): at 17:15

## 2021-03-05 RX ADMIN — Medication 3: at 07:52

## 2021-03-05 RX ADMIN — MORPHINE SULFATE 0.5 MILLIGRAM(S): 50 CAPSULE, EXTENDED RELEASE ORAL at 05:54

## 2021-03-05 RX ADMIN — MORPHINE SULFATE 0.5 MILLIGRAM(S): 50 CAPSULE, EXTENDED RELEASE ORAL at 05:29

## 2021-03-05 RX ADMIN — ISOSORBIDE MONONITRATE 120 MILLIGRAM(S): 60 TABLET, EXTENDED RELEASE ORAL at 13:29

## 2021-03-05 NOTE — PROGRESS NOTE ADULT - SUBJECTIVE AND OBJECTIVE BOX
Patient is a 76y old  Male who presents with a chief complaint of chest pain (05 Mar 2021 08:21)    chest pain improved now, since yesterday     Date of servie : 03-05-21 @ 13:47  INTERVAL HPI/OVERNIGHT EVENTS:  T(C): 36.7 (03-05-21 @ 11:48), Max: 36.8 (03-05-21 @ 04:34)  HR: 80 (03-05-21 @ 11:48) (80 - 88)  BP: 125/73 (03-05-21 @ 11:48) (115/61 - 139/83)  RR: 18 (03-05-21 @ 11:48) (17 - 18)  SpO2: 97% (03-05-21 @ 11:48) (95% - 97%)  Wt(kg): --  I&O's Summary    04 Mar 2021 07:01  -  05 Mar 2021 07:00  --------------------------------------------------------  IN: 480 mL / OUT: 250 mL / NET: 230 mL    05 Mar 2021 07:01  -  05 Mar 2021 13:47  --------------------------------------------------------  IN: 0 mL / OUT: 200 mL / NET: -200 mL        LABS:                        13.8   12.69 )-----------( 154      ( 05 Mar 2021 06:24 )             39.6     03-05    134<L>  |  97  |  26<H>  ----------------------------<  214<H>  4.1   |  26  |  1.11    Ca    9.2      05 Mar 2021 06:24  Phos  2.3     03-05  Mg     1.8     03-05          CAPILLARY BLOOD GLUCOSE      POCT Blood Glucose.: 292 mg/dL (05 Mar 2021 11:43)  POCT Blood Glucose.: 269 mg/dL (05 Mar 2021 07:47)  POCT Blood Glucose.: 306 mg/dL (04 Mar 2021 21:06)  POCT Blood Glucose.: 199 mg/dL (04 Mar 2021 16:22)            MEDICATIONS  (STANDING):  atorvastatin 40 milliGRAM(s) Oral at bedtime  dextrose 40% Gel 15 Gram(s) Oral once  dextrose 5%. 1000 milliLiter(s) (50 mL/Hr) IV Continuous <Continuous>  dextrose 5%. 1000 milliLiter(s) (100 mL/Hr) IV Continuous <Continuous>  dextrose 50% Injectable 25 Gram(s) IV Push once  dextrose 50% Injectable 12.5 Gram(s) IV Push once  dextrose 50% Injectable 25 Gram(s) IV Push once  finasteride 5 milliGRAM(s) Oral daily  gabapentin 300 milliGRAM(s) Oral two times a day  glucagon  Injectable 1 milliGRAM(s) IntraMuscular once  insulin glargine Injectable (LANTUS) 20 Unit(s) SubCutaneous at bedtime  insulin lispro (ADMELOG) corrective regimen sliding scale   SubCutaneous three times a day before meals  isosorbide   mononitrate ER Tablet (IMDUR) 120 milliGRAM(s) Oral daily  metoprolol tartrate 50 milliGRAM(s) Oral every 12 hours  morphine  - Injectable 0.5 milliGRAM(s) IV Push every 4 hours  pantoprazole    Tablet 40 milliGRAM(s) Oral before breakfast  ranolazine 1000 milliGRAM(s) Oral two times a day  tamsulosin 0.4 milliGRAM(s) Oral at bedtime    MEDICATIONS  (PRN):  nitroglycerin     SubLingual 0.4 milliGRAM(s) SubLingual every 5 minutes PRN Chest Pain  ondansetron Injectable 4 milliGRAM(s) IV Push every 8 hours PRN Nausea and/or Vomiting          PHYSICAL EXAM:  GENERAL: NAD, well-groomed, well-developed  HEAD:  Atraumatic, Normocephalic  CHEST/LUNG: Clear to percussion bilaterally; No rales, rhonchi, wheezing, or rubs  HEART: Regular rate and rhythm; No murmurs, rubs, or gallops  ABDOMEN: Soft, Nontender, Nondistended; Bowel sounds present  EXTREMITIES:  2+ Peripheral Pulses, No clubbing, cyanosis, or edema  LYMPH: No lymphadenopathy noted  SKIN: No rashes or lesions    Care Discussed with Consultants/Other Providers [ ] YES  [ ] NO

## 2021-03-05 NOTE — PROGRESS NOTE ADULT - SUBJECTIVE AND OBJECTIVE BOX
Patient seen and examined at bedside.    Overnight Events:     no events   cp better on current med     REVIEW OF SYSTEMS:  Constitutional:     [x ] negative [ ] fevers [ ] chills [ ] weight loss [ ] weight gain  HEENT:                  [x ] negative [ ] dry eyes [ ] eye irritation [ ] postnasal drip [ ] nasal congestion  CV:                         [ x] negative  [ ] chest pain [ ] orthopnea [ ] palpitations [ ] murmur  Resp:                     [ x] negative [ ] cough [ ] shortness of breath [ ] dyspnea [ ] wheezing [ ] sputum [ ]hemoptysis  GI:                          [ x] negative [ ] nausea [ ] vomiting [ ] diarrhea [ ] constipation [ ] abd pain [ ] dysphagia   :                        [ x] negative [ ] dysuria [ ] nocturia [ ] hematuria [ ] increased urinary frequency  Musculoskeletal: [ x] negative [ ] back pain [ ] myalgias [ ] arthralgias [ ] fracture  Skin:                       [ x] negative [ ] rash [ ] itch  Neurological:        [x ] negative [ ] headache [ ] dizziness [ ] syncope [ ] weakness [ ] numbness  Psychiatric:           [ x] negative [ ] anxiety [ ] depression  Endocrine:            [ x] negative [ ] diabetes [ ] thyroid problem  Heme/Lymph:      [ x] negative [ ] anemia [ ] bleeding problem  Allergic/Immune: [ x] negative [ ] itchy eyes [ ] nasal discharge [ ] hives [ ] angioedema    [ x] All other systems negative  [ ] Unable to assess ROS due to    Current Meds:  atorvastatin 40 milliGRAM(s) Oral at bedtime  dextrose 40% Gel 15 Gram(s) Oral once  dextrose 5%. 1000 milliLiter(s) IV Continuous <Continuous>  dextrose 5%. 1000 milliLiter(s) IV Continuous <Continuous>  dextrose 50% Injectable 25 Gram(s) IV Push once  dextrose 50% Injectable 12.5 Gram(s) IV Push once  dextrose 50% Injectable 25 Gram(s) IV Push once  finasteride 5 milliGRAM(s) Oral daily  gabapentin 300 milliGRAM(s) Oral two times a day  glucagon  Injectable 1 milliGRAM(s) IntraMuscular once  insulin glargine Injectable (LANTUS) 20 Unit(s) SubCutaneous at bedtime  insulin lispro (ADMELOG) corrective regimen sliding scale   SubCutaneous three times a day before meals  isosorbide   mononitrate ER Tablet (IMDUR) 120 milliGRAM(s) Oral daily  metoprolol tartrate 50 milliGRAM(s) Oral every 12 hours  morphine  - Injectable 0.5 milliGRAM(s) IV Push every 4 hours  nitroglycerin     SubLingual 0.4 milliGRAM(s) SubLingual every 5 minutes PRN  ondansetron Injectable 4 milliGRAM(s) IV Push every 8 hours PRN  pantoprazole    Tablet 40 milliGRAM(s) Oral before breakfast  ranolazine 1000 milliGRAM(s) Oral two times a day  tamsulosin 0.4 milliGRAM(s) Oral at bedtime      PAST MEDICAL & SURGICAL HISTORY:  BPH (benign prostatic hypertrophy)    Hyperlipidemia    CAD (coronary artery disease)    Diabetes mellitus  Type 2    HTN (hypertension)    S/P drug eluting coronary stent placement  [facility]    S/P primary angioplasty with coronary stent  1990s        Vitals:  T(F): 98.3 (03-05), Max: 98.3 (03-05)  HR: 88 (03-05) (78 - 88)  BP: 116/66 (03-05) (115/61 - 139/83)  RR: 18 (03-05)  SpO2: 95% (03-05)  I&O's Summary    04 Mar 2021 07:01  -  05 Mar 2021 07:00  --------------------------------------------------------  IN: 480 mL / OUT: 250 mL / NET: 230 mL        Physical Exam:    Appearance: No acute distress  Eyes: PERRL, EOMI, pink conjunctiva  HENT: Normal oral mucosa  Cardiovascular: RRR, S1, S2, no murmurs, rubs, or gallops; no edema; no JVD  Respiratory: Clear to auscultation bilaterally  Gastrointestinal: soft, non-tender, non-distended with normal bowel sounds  Musculoskeletal: No clubbing; no joint deformity   Neurologic: Non-focal  Skin: No rashes, ecchymoses, or cyanosis                          13.8   12.69 )-----------( 154      ( 05 Mar 2021 06:24 )             39.6     03-05    134<L>  |  97  |  26<H>  ----------------------------<  214<H>  4.1   |  26  |  1.11    Ca    9.2      05 Mar 2021 06:24  Phos  2.3     03-05  Mg     1.8     03-05            Serum Pro-Brain Natriuretic Peptide: 423 pg/mL (02-26 @ 09:39)          New ECG(s): Personally reviewed    Echo: Personally reviewed    Stress Testing: Personally reviewed    Cath: Personally reviewed    Imaging: Personally reviewed    Interpretation of Telemetry: SR

## 2021-03-05 NOTE — PROGRESS NOTE ADULT - SUBJECTIVE AND OBJECTIVE BOX
[facility]      Seen earlier today, and examined.  - Today, patient is without complaints.           *****MEDICATIONS: Current medication reviewed and documented.    MEDICATIONS  (STANDING):  atorvastatin 40 milliGRAM(s) Oral at bedtime  dextrose 40% Gel 15 Gram(s) Oral once  dextrose 5%. 1000 milliLiter(s) (50 mL/Hr) IV Continuous <Continuous>  dextrose 5%. 1000 milliLiter(s) (100 mL/Hr) IV Continuous <Continuous>  dextrose 50% Injectable 25 Gram(s) IV Push once  dextrose 50% Injectable 12.5 Gram(s) IV Push once  dextrose 50% Injectable 25 Gram(s) IV Push once  famotidine    Tablet 20 milliGRAM(s) Oral daily  finasteride 5 milliGRAM(s) Oral daily  gabapentin 300 milliGRAM(s) Oral two times a day  glucagon  Injectable 1 milliGRAM(s) IntraMuscular once  insulin glargine Injectable (LANTUS) 20 Unit(s) SubCutaneous at bedtime  insulin lispro (ADMELOG) corrective regimen sliding scale   SubCutaneous three times a day before meals  isosorbide   mononitrate ER Tablet (IMDUR) 120 milliGRAM(s) Oral daily  metoprolol tartrate 75 milliGRAM(s) Oral every 12 hours  morphine  - Injectable 0.5 milliGRAM(s) IV Push every 4 hours  pantoprazole    Tablet 40 milliGRAM(s) Oral before breakfast  ranolazine 1000 milliGRAM(s) Oral two times a day  senna 2 Tablet(s) Oral at bedtime  tamsulosin 0.4 milliGRAM(s) Oral at bedtime    MEDICATIONS  (PRN):  nitroglycerin     SubLingual 0.4 milliGRAM(s) SubLingual every 5 minutes PRN Chest Pain  ondansetron Injectable 4 milliGRAM(s) IV Push every 8 hours PRN Nausea and/or Vomiting          ***** VITAL SIGNS:  T(F): 98.7 (21 @ 20:08), Max: 98.7 (21 @ 20:08)  HR: 74 (21 @ 20:08) (74 - 88)  BP: 125/62 (21 @ 20:08) (116/66 - 125/73)  RR: 18 (21 @ 20:08) (18 - 18)  SpO2: 96% (21 @ 20:08) (95% - 97%)  Wt(kg): --  ,   I&O's Summary    04 Mar 2021 07:01  -  05 Mar 2021 07:00  --------------------------------------------------------  IN: 480 mL / OUT: 250 mL / NET: 230 mL    05 Mar 2021 07:01  -  05 Mar 2021 22:07  --------------------------------------------------------  IN: 350 mL / OUT: 200 mL / NET: 150 mL             *****PHYSICAL EXAM:   alert oriented x 3 attention comprehension are fair.  Able to name, repeat.   EOmi fundi not visualized   no nystagmus VFF to confrontation  Tongue is midline  Palate elevates symmetrically   Moving all 4 ext spontaneously no drift appreciated    Gait not assessed.            *****LAB AND IMAGIN.8   12.69 )-----------( 154      ( 05 Mar 2021 06:24 )             39.6               03-05    134<L>  |  97  |  26<H>  ----------------------------<  214<H>  4.1   |  26  |  1.11    Ca    9.2      05 Mar 2021 06:24  Phos  2.3     03-05  Mg     1.8     03-05                           [All pertinent recent Imaging/Reports reviewed]           *****A S S E S S M E N T   A N D   P L A N :    c76y M pmhx CAD s/p CABG, HTN, HLD, chronic SDH present 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 Patient did nt get stress test due to adverse symptom experienced during last stress test and was sent home.    Pt was here few days ago for same, worked up for NSTEMI. Ended up having symptomatic relief prior to dc.    as per pt, he had recurrence of chest pain and  took 4 tablets of asa 81 this am and was brought to the emergency room. He denies any headache/weakness/numbness.   He is awaiting eval by cardiology       Problem/Recommendations 1:  chronic subdural hematoma    denies any headache or focal weakness      risk vs. benefits are being considered thoroughly, given his left moderate size subdural hematoma with mass effect, in order to optimize the management of his anginal chest pain.    s/p cath , revealed multilevel disease   ct surgery eval appreciated recommended cabg     frequent neurological exams q2 h and higher level of monitoring of pt was being done was agreed by neurosurgery.   Low threshold for ct head if there should arise any changes in neuro exam  s/p mma embolization tolerated procedure well, now will have to wait for 10-14 days for follow up scan as per neurosurgery           Problem/Recommendations 2: chest pain   seen by ccu not a candidate   pt having intermittent chest pain defer to cards for  management   awaiting cabg   defer to cardiology for management.    protonix 40     prognosis guarded as this pt remains at risk for decompensation.       Thank you for allowing me to participate in the care of this patient. Will continue to follow patient periodically. Please do not hesitate to call me if you have any  questions or if there has been a change in patients neurological status     ________________  Elissa García MD  [facility] ([facility]  [phone]      33 minutes spent on total encounter; more than 50 % of the visit was  spent counseling about plan of care, compliance to diet/exercise and medication regimen and or  coordinating care by the attending physician.      It is advised that stroke patients follow up with BERHANE Garrett @ [phone] in 1- 2 weeks.   Others please follow up with Dr. Michael Nissenbaum [phone]

## 2021-03-05 NOTE — PROGRESS NOTE ADULT - ASSESSMENT
75yo M hx of HTN, HLD, multiple chronic subdurals comes to ED for cp. Chest pain substernal, since last night, constant, no radiation, took 3 nitros last night, 1 ASA 324mg this morning. No associated with sob or diaphoresis. Pt was here few days ago for same, worked up for NSTEMI. Ended up having symptomatic relief prior to dc. In hospital TTE was interdeterminate, trop 124. Neurosurgery recs were to avoid anti-platelets and AC. Denies f/c, cough, sob, abdominal pain, change in bowel.    Problem/Plan - 1:  ·  Problem: NSTEMI (non-ST elevated myocardial infarction).  Plan: telemonitor  cards fu   sp cath and embolization for SDH   CTS fu for CABG in 1 month     Problem/Plan - 2:·  Problem: SDH (subdural hematoma).  Plan: neuro and neurosurgery fu   sp embolization for SDH     Problem/Plan - 3:  ·  Problem: Diabetes mellitus.  Plan: monitor FSbasal, bolus.     Problem/Plan - 4:·  Problem: Hyperlipidemia.  Plan: cw statin.

## 2021-03-06 LAB
ANION GAP SERPL CALC-SCNC: 11 MMOL/L — SIGNIFICANT CHANGE UP (ref 5–17)
BUN SERPL-MCNC: 24 MG/DL — HIGH (ref 7–23)
CALCIUM SERPL-MCNC: 9.3 MG/DL — SIGNIFICANT CHANGE UP (ref 8.4–10.5)
CHLORIDE SERPL-SCNC: 99 MMOL/L — SIGNIFICANT CHANGE UP (ref 96–108)
CO2 SERPL-SCNC: 24 MMOL/L — SIGNIFICANT CHANGE UP (ref 22–31)
CREAT SERPL-MCNC: 1.01 MG/DL — SIGNIFICANT CHANGE UP (ref 0.5–1.3)
GLUCOSE BLDC GLUCOMTR-MCNC: 197 MG/DL — HIGH (ref 70–99)
GLUCOSE BLDC GLUCOMTR-MCNC: 285 MG/DL — HIGH (ref 70–99)
GLUCOSE BLDC GLUCOMTR-MCNC: 299 MG/DL — HIGH (ref 70–99)
GLUCOSE BLDC GLUCOMTR-MCNC: 406 MG/DL — HIGH (ref 70–99)
GLUCOSE SERPL-MCNC: 217 MG/DL — HIGH (ref 70–99)
HCT VFR BLD CALC: 37 % — LOW (ref 39–50)
HGB BLD-MCNC: 12.9 G/DL — LOW (ref 13–17)
MAGNESIUM SERPL-MCNC: 1.9 MG/DL — SIGNIFICANT CHANGE UP (ref 1.6–2.6)
MCHC RBC-ENTMCNC: 30.4 PG — SIGNIFICANT CHANGE UP (ref 27–34)
MCHC RBC-ENTMCNC: 34.9 GM/DL — SIGNIFICANT CHANGE UP (ref 32–36)
MCV RBC AUTO: 87.1 FL — SIGNIFICANT CHANGE UP (ref 80–100)
NRBC # BLD: 0 /100 WBCS — SIGNIFICANT CHANGE UP (ref 0–0)
PLATELET # BLD AUTO: 158 K/UL — SIGNIFICANT CHANGE UP (ref 150–400)
POTASSIUM SERPL-MCNC: 4 MMOL/L — SIGNIFICANT CHANGE UP (ref 3.5–5.3)
POTASSIUM SERPL-SCNC: 4 MMOL/L — SIGNIFICANT CHANGE UP (ref 3.5–5.3)
RBC # BLD: 4.25 M/UL — SIGNIFICANT CHANGE UP (ref 4.2–5.8)
RBC # FLD: 12.8 % — SIGNIFICANT CHANGE UP (ref 10.3–14.5)
SODIUM SERPL-SCNC: 134 MMOL/L — LOW (ref 135–145)
WBC # BLD: 9.18 K/UL — SIGNIFICANT CHANGE UP (ref 3.8–10.5)
WBC # FLD AUTO: 9.18 K/UL — SIGNIFICANT CHANGE UP (ref 3.8–10.5)

## 2021-03-06 RX ORDER — CALCIUM CARBONATE 500(1250)
1 TABLET ORAL ONCE
Refills: 0 | Status: COMPLETED | OUTPATIENT
Start: 2021-03-06 | End: 2021-03-06

## 2021-03-06 RX ORDER — SIMETHICONE 80 MG/1
80 TABLET, CHEWABLE ORAL ONCE
Refills: 0 | Status: COMPLETED | OUTPATIENT
Start: 2021-03-06 | End: 2021-03-06

## 2021-03-06 RX ORDER — INSULIN GLARGINE 100 [IU]/ML
30 INJECTION, SOLUTION SUBCUTANEOUS AT BEDTIME
Refills: 0 | Status: DISCONTINUED | OUTPATIENT
Start: 2021-03-06 | End: 2021-03-09

## 2021-03-06 RX ADMIN — Medication 0.4 MILLIGRAM(S): at 17:19

## 2021-03-06 RX ADMIN — Medication 1: at 07:50

## 2021-03-06 RX ADMIN — TAMSULOSIN HYDROCHLORIDE 0.4 MILLIGRAM(S): 0.4 CAPSULE ORAL at 21:01

## 2021-03-06 RX ADMIN — GABAPENTIN 300 MILLIGRAM(S): 400 CAPSULE ORAL at 06:27

## 2021-03-06 RX ADMIN — Medication 75 MILLIGRAM(S): at 06:28

## 2021-03-06 RX ADMIN — MORPHINE SULFATE 0.5 MILLIGRAM(S): 50 CAPSULE, EXTENDED RELEASE ORAL at 19:43

## 2021-03-06 RX ADMIN — PANTOPRAZOLE SODIUM 40 MILLIGRAM(S): 20 TABLET, DELAYED RELEASE ORAL at 06:27

## 2021-03-06 RX ADMIN — Medication 75 MILLIGRAM(S): at 17:20

## 2021-03-06 RX ADMIN — Medication 3: at 16:35

## 2021-03-06 RX ADMIN — Medication 1 TABLET(S): at 12:05

## 2021-03-06 RX ADMIN — Medication 6: at 12:02

## 2021-03-06 RX ADMIN — INSULIN GLARGINE 30 UNIT(S): 100 INJECTION, SOLUTION SUBCUTANEOUS at 21:23

## 2021-03-06 RX ADMIN — FINASTERIDE 5 MILLIGRAM(S): 5 TABLET, FILM COATED ORAL at 12:04

## 2021-03-06 RX ADMIN — SIMETHICONE 80 MILLIGRAM(S): 80 TABLET, CHEWABLE ORAL at 17:53

## 2021-03-06 RX ADMIN — ATORVASTATIN CALCIUM 40 MILLIGRAM(S): 80 TABLET, FILM COATED ORAL at 21:01

## 2021-03-06 RX ADMIN — MORPHINE SULFATE 0.5 MILLIGRAM(S): 50 CAPSULE, EXTENDED RELEASE ORAL at 18:58

## 2021-03-06 RX ADMIN — GABAPENTIN 300 MILLIGRAM(S): 400 CAPSULE ORAL at 17:19

## 2021-03-06 RX ADMIN — ISOSORBIDE MONONITRATE 120 MILLIGRAM(S): 60 TABLET, EXTENDED RELEASE ORAL at 12:04

## 2021-03-06 RX ADMIN — FAMOTIDINE 20 MILLIGRAM(S): 10 INJECTION INTRAVENOUS at 10:36

## 2021-03-06 NOTE — PROVIDER CONTACT NOTE (CHANGE IN STATUS NOTIFICATION) - BACKGROUND
75 yo male admitted for CP/NSTEMI s/p cath 2/6 with severe MVD recommend CABG with ongoing CP, treating medically for now. HX subdural hematoma s/p MMA embolization 3/1 to reduce bleeding in attempt to have future CABG

## 2021-03-06 NOTE — PROGRESS NOTE ADULT - SUBJECTIVE AND OBJECTIVE BOX
Patient is a 76y old  Male who presents with a chief complaint of chest pain (05 Mar 2021 13:47)    Date of servie : 03-06-21 @ 19:36  INTERVAL HPI/OVERNIGHT EVENTS:  T(C): 36.5 (03-06-21 @ 17:16), Max: 37.2 (03-06-21 @ 05:27)  HR: 90 (03-06-21 @ 18:40) (74 - 90)  BP: 127/65 (03-06-21 @ 18:40) (121/71 - 145/79)  RR: 18 (03-06-21 @ 18:40) (18 - 18)  SpO2: 96% (03-06-21 @ 18:40) (95% - 96%)  Wt(kg): --  I&O's Summary    05 Mar 2021 07:01  -  06 Mar 2021 07:00  --------------------------------------------------------  IN: 450 mL / OUT: 400 mL / NET: 50 mL    06 Mar 2021 07:01  -  06 Mar 2021 19:36  --------------------------------------------------------  IN: 0 mL / OUT: 200 mL / NET: -200 mL        LABS:                        12.9   9.18  )-----------( 158      ( 06 Mar 2021 07:02 )             37.0     03-06    134<L>  |  99  |  24<H>  ----------------------------<  217<H>  4.0   |  24  |  1.01    Ca    9.3      06 Mar 2021 07:02  Phos  2.3     03-05  Mg     1.9     03-06          CAPILLARY BLOOD GLUCOSE      POCT Blood Glucose.: 285 mg/dL (06 Mar 2021 16:15)  POCT Blood Glucose.: 406 mg/dL (06 Mar 2021 11:47)  POCT Blood Glucose.: 197 mg/dL (06 Mar 2021 07:29)  POCT Blood Glucose.: 337 mg/dL (05 Mar 2021 21:22)            MEDICATIONS  (STANDING):  atorvastatin 40 milliGRAM(s) Oral at bedtime  dextrose 40% Gel 15 Gram(s) Oral once  dextrose 5%. 1000 milliLiter(s) (50 mL/Hr) IV Continuous <Continuous>  dextrose 5%. 1000 milliLiter(s) (100 mL/Hr) IV Continuous <Continuous>  dextrose 50% Injectable 25 Gram(s) IV Push once  dextrose 50% Injectable 12.5 Gram(s) IV Push once  dextrose 50% Injectable 25 Gram(s) IV Push once  famotidine    Tablet 20 milliGRAM(s) Oral daily  finasteride 5 milliGRAM(s) Oral daily  gabapentin 300 milliGRAM(s) Oral two times a day  glucagon  Injectable 1 milliGRAM(s) IntraMuscular once  insulin glargine Injectable (LANTUS) 30 Unit(s) SubCutaneous at bedtime  insulin lispro (ADMELOG) corrective regimen sliding scale   SubCutaneous three times a day before meals  isosorbide   mononitrate ER Tablet (IMDUR) 120 milliGRAM(s) Oral daily  metoprolol tartrate 75 milliGRAM(s) Oral every 12 hours  morphine  - Injectable 0.5 milliGRAM(s) IV Push every 4 hours  pantoprazole    Tablet 40 milliGRAM(s) Oral before breakfast  ranolazine 1000 milliGRAM(s) Oral two times a day  senna 2 Tablet(s) Oral at bedtime  tamsulosin 0.4 milliGRAM(s) Oral at bedtime    MEDICATIONS  (PRN):  nitroglycerin     SubLingual 0.4 milliGRAM(s) SubLingual every 5 minutes PRN Chest Pain  ondansetron Injectable 4 milliGRAM(s) IV Push every 8 hours PRN Nausea and/or Vomiting          PHYSICAL EXAM:  GENERAL: NAD, well-groomed, well-developed  HEAD:  Atraumatic, Normocephalic  CHEST/LUNG: Clear to percussion bilaterally; No rales, rhonchi, wheezing, or rubs  HEART: Regular rate and rhythm; No murmurs, rubs, or gallops  ABDOMEN: Soft, Nontender, Nondistended; Bowel sounds present  EXTREMITIES:  2+ Peripheral Pulses, No clubbing, cyanosis, or edema  LYMPH: No lymphadenopathy noted  SKIN: No rashes or lesions    Care Discussed with Consultants/Other Providers [ ] YES  [ ] NO

## 2021-03-06 NOTE — PROVIDER CONTACT NOTE (CHANGE IN STATUS NOTIFICATION) - ACTION/TREATMENT ORDERED:
NP Rufus notified- Nitro x 1 given-post nitro pain 5/10, simethicone given- states pain 5/10 but not stabbing like before. Pt states he feels better. Will cont to monitor,

## 2021-03-06 NOTE — PROGRESS NOTE ADULT - ASSESSMENT
77yo M hx of HTN, HLD, multiple chronic subdurals comes to ED for cp. Chest pain substernal, since last night, constant, no radiation, took 3 nitros last night, 1 ASA 324mg this morning. No associated with sob or diaphoresis. Pt was here few days ago for same, worked up for NSTEMI. Ended up having symptomatic relief prior to dc. In hospital TTE was interdeterminate, trop 124. Neurosurgery recs were to avoid anti-platelets and AC. Denies f/c, cough, sob, abdominal pain, change in bowel.    Problem/Plan - 1:  ·  Problem: NSTEMI (non-ST elevated myocardial infarction).  Plan: telemonitor  cards fu   sp cath and embolization for SDH   CTS fu for CABG in 1 month     Problem/Plan - 2:·  Problem: SDH (subdural hematoma).  Plan: neuro and neurosurgery fu   sp embolization for SDH     Problem/Plan - 3:  ·  Problem: Diabetes mellitus.  Plan: monitor FSbasal, bolus.     Problem/Plan - 4:·  Problem: Hyperlipidemia.  Plan: cw statin.

## 2021-03-07 LAB
ANION GAP SERPL CALC-SCNC: 10 MMOL/L — SIGNIFICANT CHANGE UP (ref 5–17)
BUN SERPL-MCNC: 22 MG/DL — SIGNIFICANT CHANGE UP (ref 7–23)
CALCIUM SERPL-MCNC: 9.5 MG/DL — SIGNIFICANT CHANGE UP (ref 8.4–10.5)
CHLORIDE SERPL-SCNC: 100 MMOL/L — SIGNIFICANT CHANGE UP (ref 96–108)
CO2 SERPL-SCNC: 25 MMOL/L — SIGNIFICANT CHANGE UP (ref 22–31)
CREAT SERPL-MCNC: 1.04 MG/DL — SIGNIFICANT CHANGE UP (ref 0.5–1.3)
GLUCOSE BLDC GLUCOMTR-MCNC: 172 MG/DL — HIGH (ref 70–99)
GLUCOSE BLDC GLUCOMTR-MCNC: 263 MG/DL — HIGH (ref 70–99)
GLUCOSE BLDC GLUCOMTR-MCNC: 318 MG/DL — HIGH (ref 70–99)
GLUCOSE BLDC GLUCOMTR-MCNC: 360 MG/DL — HIGH (ref 70–99)
GLUCOSE SERPL-MCNC: 149 MG/DL — HIGH (ref 70–99)
HCT VFR BLD CALC: 39.9 % — SIGNIFICANT CHANGE UP (ref 39–50)
HGB BLD-MCNC: 13.4 G/DL — SIGNIFICANT CHANGE UP (ref 13–17)
MCHC RBC-ENTMCNC: 30 PG — SIGNIFICANT CHANGE UP (ref 27–34)
MCHC RBC-ENTMCNC: 33.6 GM/DL — SIGNIFICANT CHANGE UP (ref 32–36)
MCV RBC AUTO: 89.3 FL — SIGNIFICANT CHANGE UP (ref 80–100)
NRBC # BLD: 0 /100 WBCS — SIGNIFICANT CHANGE UP (ref 0–0)
PLATELET # BLD AUTO: 158 K/UL — SIGNIFICANT CHANGE UP (ref 150–400)
POTASSIUM SERPL-MCNC: 4 MMOL/L — SIGNIFICANT CHANGE UP (ref 3.5–5.3)
POTASSIUM SERPL-SCNC: 4 MMOL/L — SIGNIFICANT CHANGE UP (ref 3.5–5.3)
RBC # BLD: 4.47 M/UL — SIGNIFICANT CHANGE UP (ref 4.2–5.8)
RBC # FLD: 12.9 % — SIGNIFICANT CHANGE UP (ref 10.3–14.5)
SODIUM SERPL-SCNC: 135 MMOL/L — SIGNIFICANT CHANGE UP (ref 135–145)
WBC # BLD: 7.71 K/UL — SIGNIFICANT CHANGE UP (ref 3.8–10.5)
WBC # FLD AUTO: 7.71 K/UL — SIGNIFICANT CHANGE UP (ref 3.8–10.5)

## 2021-03-07 PROCEDURE — 99233 SBSQ HOSP IP/OBS HIGH 50: CPT

## 2021-03-07 RX ADMIN — Medication 0.4 MILLIGRAM(S): at 21:03

## 2021-03-07 RX ADMIN — Medication 75 MILLIGRAM(S): at 05:23

## 2021-03-07 RX ADMIN — Medication 3: at 11:55

## 2021-03-07 RX ADMIN — FAMOTIDINE 20 MILLIGRAM(S): 10 INJECTION INTRAVENOUS at 11:55

## 2021-03-07 RX ADMIN — GABAPENTIN 300 MILLIGRAM(S): 400 CAPSULE ORAL at 17:27

## 2021-03-07 RX ADMIN — ATORVASTATIN CALCIUM 40 MILLIGRAM(S): 80 TABLET, FILM COATED ORAL at 20:55

## 2021-03-07 RX ADMIN — FINASTERIDE 5 MILLIGRAM(S): 5 TABLET, FILM COATED ORAL at 11:55

## 2021-03-07 RX ADMIN — Medication 75 MILLIGRAM(S): at 17:27

## 2021-03-07 RX ADMIN — GABAPENTIN 300 MILLIGRAM(S): 400 CAPSULE ORAL at 05:23

## 2021-03-07 RX ADMIN — Medication 5: at 16:54

## 2021-03-07 RX ADMIN — INSULIN GLARGINE 30 UNIT(S): 100 INJECTION, SOLUTION SUBCUTANEOUS at 21:26

## 2021-03-07 RX ADMIN — TAMSULOSIN HYDROCHLORIDE 0.4 MILLIGRAM(S): 0.4 CAPSULE ORAL at 20:55

## 2021-03-07 RX ADMIN — ISOSORBIDE MONONITRATE 120 MILLIGRAM(S): 60 TABLET, EXTENDED RELEASE ORAL at 11:55

## 2021-03-07 RX ADMIN — PANTOPRAZOLE SODIUM 40 MILLIGRAM(S): 20 TABLET, DELAYED RELEASE ORAL at 05:23

## 2021-03-07 RX ADMIN — Medication 0.4 MILLIGRAM(S): at 10:27

## 2021-03-07 RX ADMIN — Medication 1: at 07:45

## 2021-03-07 NOTE — PROVIDER CONTACT NOTE (OTHER) - ASSESSMENT
Patient A&O x4. VS as per flowsheet. Patient asleep during time of telemetry episode, when awoken, patient asymptomatic- denying chest pain, palpitations, SOB.

## 2021-03-07 NOTE — PROGRESS NOTE ADULT - ASSESSMENT
74y M with hx. of CAD s/p PCI to the LAD and RCA in the past (last angiogram in 2015 with [facility]), HTN, & HLD.  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 s/p MMA embolization with NSGY on 3/1, with angina improved on up-titration of medical therapy. BP within normal range.     Plan:   continue atorvastatin 40   continue current regimen: metoprolol 75 mg bid, Imdur 120 mg Qd, Ranexa 1000 mg Bid  management of SDH as per neurosurgery - not on antiplatelet therapy at this time  CABG eval and timeline per CTS     Escobar Ng MD  Cardiology Fellow   [phone]    Please check [email or web address] for cardiology service schedule and contact information.

## 2021-03-07 NOTE — PROGRESS NOTE ADULT - SUBJECTIVE AND OBJECTIVE BOX
Patient is a 76y old  Male who presents with a chief complaint of chest pain (07 Mar 2021 08:49)    Date of servie : 03-07-21 @ 16:51  INTERVAL HPI/OVERNIGHT EVENTS:  T(C): 36.6 (03-07-21 @ 11:42), Max: 37.1 (03-07-21 @ 04:00)  HR: 79 (03-07-21 @ 11:42) (76 - 90)  BP: 112/62 (03-07-21 @ 11:42) (112/62 - 145/79)  RR: 18 (03-07-21 @ 11:42) (18 - 18)  SpO2: 96% (03-07-21 @ 11:42) (94% - 96%)  Wt(kg): --  I&O's Summary    06 Mar 2021 07:01  -  07 Mar 2021 07:00  --------------------------------------------------------  IN: 0 mL / OUT: 550 mL / NET: -550 mL    07 Mar 2021 07:01  -  07 Mar 2021 16:51  --------------------------------------------------------  IN: 610 mL / OUT: 0 mL / NET: 610 mL        LABS:                        13.4   7.71  )-----------( 158      ( 07 Mar 2021 06:45 )             39.9     03-07    135  |  100  |  22  ----------------------------<  149<H>  4.0   |  25  |  1.04    Ca    9.5      07 Mar 2021 06:44  Mg     1.9     03-06          CAPILLARY BLOOD GLUCOSE      POCT Blood Glucose.: 360 mg/dL (07 Mar 2021 16:18)  POCT Blood Glucose.: 263 mg/dL (07 Mar 2021 11:53)  POCT Blood Glucose.: 172 mg/dL (07 Mar 2021 07:30)  POCT Blood Glucose.: 299 mg/dL (06 Mar 2021 21:12)            MEDICATIONS  (STANDING):  atorvastatin 40 milliGRAM(s) Oral at bedtime  dextrose 40% Gel 15 Gram(s) Oral once  dextrose 5%. 1000 milliLiter(s) (50 mL/Hr) IV Continuous <Continuous>  dextrose 5%. 1000 milliLiter(s) (100 mL/Hr) IV Continuous <Continuous>  dextrose 50% Injectable 25 Gram(s) IV Push once  dextrose 50% Injectable 12.5 Gram(s) IV Push once  dextrose 50% Injectable 25 Gram(s) IV Push once  famotidine    Tablet 20 milliGRAM(s) Oral daily  finasteride 5 milliGRAM(s) Oral daily  gabapentin 300 milliGRAM(s) Oral two times a day  glucagon  Injectable 1 milliGRAM(s) IntraMuscular once  insulin glargine Injectable (LANTUS) 30 Unit(s) SubCutaneous at bedtime  insulin lispro (ADMELOG) corrective regimen sliding scale   SubCutaneous three times a day before meals  isosorbide   mononitrate ER Tablet (IMDUR) 120 milliGRAM(s) Oral daily  metoprolol tartrate 75 milliGRAM(s) Oral every 12 hours  morphine  - Injectable 0.5 milliGRAM(s) IV Push every 4 hours  pantoprazole    Tablet 40 milliGRAM(s) Oral before breakfast  ranolazine 1000 milliGRAM(s) Oral two times a day  senna 2 Tablet(s) Oral at bedtime  tamsulosin 0.4 milliGRAM(s) Oral at bedtime    MEDICATIONS  (PRN):  nitroglycerin     SubLingual 0.4 milliGRAM(s) SubLingual every 5 minutes PRN Chest Pain  ondansetron Injectable 4 milliGRAM(s) IV Push every 8 hours PRN Nausea and/or Vomiting          PHYSICAL EXAM:  GENERAL: NAD, well-groomed, well-developed  HEAD:  Atraumatic, Normocephalic  CHEST/LUNG: Clear to percussion bilaterally; No rales, rhonchi, wheezing, or rubs  HEART: Regular rate and rhythm; No murmurs, rubs, or gallops  ABDOMEN: Soft, Nontender, Nondistended; Bowel sounds present  EXTREMITIES:  2+ Peripheral Pulses, No clubbing, cyanosis, or edema  LYMPH: No lymphadenopathy noted  SKIN: No rashes or lesions    Care Discussed with Consultants/Other Providers [ ] YES  [ ] NO

## 2021-03-07 NOTE — PROGRESS NOTE ADULT - SUBJECTIVE AND OBJECTIVE BOX
For all Cardiology service contact information, go to [email or web address] and use "[email or web address]" to login.    SUBJECTIVE:   No events overnight.   Currently does not report any change or worsening of angina at rest.   -------------------------------------------------------------------------------------------  ROS:  CV: chest pain (-), palpitation (-), orthopnea (-), PND (-), edema (-)  PULM: SOB (-), cough (-), wheezing (-), hemoptysis (-).   CONST: fever (-), chills (-) or fatigue (-)  GI: abdominal distension (-), abdominal pain (-) , nausea/vomiting (-), hematemesis, (-), melena (-), hematochezia (-)  : dysuria (-), frequency (-), hematuria (-).   NEURO: numbness (-), weakness (-), dizziness (-)  SKIN: itching (-), rash (-)  HEENT:  visual changes (-); vertigo or throat pain (-);  neck stiffness (-)     All other review of systems is negative unless indicated above.   -------------------------------------------------------------------------------------------  VS:  T(F): 98.8 (03-07), Max: 98.8 (03-07)  HR: 76 (03-07) (76 - 90)  BP: 119/68 (03-07) (119/68 - 145/79)  RR: 18 (03-07)  SpO2: 94% (03-07)  I&O's Summary    06 Mar 2021 07:01  -  07 Mar 2021 07:00  --------------------------------------------------------  IN: 0 mL / OUT: 550 mL / NET: -550 mL      ------------------------------------------------------------------------------------------  PHYSICAL EXAM:  GENERAL: NAD  HEAD:  Atraumatic, Normocephalic.  EYES: EOMI, PERRLA, conjunctiva and sclera clear.  ENT: Moist mucous membranes.  NECK: Supple, No JVD.  CHEST/LUNG: Clear to auscultation bilaterally; No rales, rhonchi, wheezing, or rubs. Unlabored respirations.  HEART: Regular rate and rhythm; No murmurs, rubs, or gallops.  ABDOMEN: Bowel sounds present; Soft, Nontender, Nondistended.   EXTREMITIES:  2+ Peripheral Pulses, brisk capillary refill. No clubbing, cyanosis, or edema.  PSYCH: Normal affect.  SKIN: No rashes or lesions.  -------------------------------------------------------------------------------------------  LABS:                          13.4   7.71  )-----------( 158      ( 07 Mar 2021 06:45 )             39.9     03-07    135  |  100  |  22  ----------------------------<  149<H>  4.0   |  25  |  1.04    Ca    9.5      07 Mar 2021 06:44  Mg     1.9     03-06        CARDIAC MARKERS ( 02 Mar 2021 03:21 )  113 ng/L / x     / x     / 115 U/L / x     / 3.9 ng/mL  CARDIAC MARKERS ( 01 Mar 2021 21:00 )  121 ng/L / x     / x     / x     / x     / x      CARDIAC MARKERS ( 01 Mar 2021 06:45 )  145 ng/L / x     / x     / x     / x     / x                -------------------------------------------------------------------------------------------  Meds:  atorvastatin 40 milliGRAM(s) Oral at bedtime  dextrose 40% Gel 15 Gram(s) Oral once  dextrose 5%. 1000 milliLiter(s) IV Continuous <Continuous>  dextrose 5%. 1000 milliLiter(s) IV Continuous <Continuous>  dextrose 50% Injectable 25 Gram(s) IV Push once  dextrose 50% Injectable 12.5 Gram(s) IV Push once  dextrose 50% Injectable 25 Gram(s) IV Push once  famotidine    Tablet 20 milliGRAM(s) Oral daily  finasteride 5 milliGRAM(s) Oral daily  gabapentin 300 milliGRAM(s) Oral two times a day  glucagon  Injectable 1 milliGRAM(s) IntraMuscular once  insulin glargine Injectable (LANTUS) 30 Unit(s) SubCutaneous at bedtime  insulin lispro (ADMELOG) corrective regimen sliding scale   SubCutaneous three times a day before meals  isosorbide   mononitrate ER Tablet (IMDUR) 120 milliGRAM(s) Oral daily  metoprolol tartrate 75 milliGRAM(s) Oral every 12 hours  morphine  - Injectable 0.5 milliGRAM(s) IV Push every 4 hours  nitroglycerin     SubLingual 0.4 milliGRAM(s) SubLingual every 5 minutes PRN  ondansetron Injectable 4 milliGRAM(s) IV Push every 8 hours PRN  pantoprazole    Tablet 40 milliGRAM(s) Oral before breakfast  ranolazine 1000 milliGRAM(s) Oral two times a day  senna 2 Tablet(s) Oral at bedtime  tamsulosin 0.4 milliGRAM(s) Oral at bedtime    -------------------------------------------------------------------------------------------     For all Cardiology service contact information, go to [email or web address] and use "[email or web address]" to login.    SUBJECTIVE:   No events overnight.   Currently does not report any change or worsening of angina at rest.   Tele: NSR with 1st degree block  -------------------------------------------------------------------------------------------  ROS:  CV: chest pain (-), palpitation (-), orthopnea (-), PND (-), edema (-)  PULM: SOB (-), cough (-), wheezing (-), hemoptysis (-).   CONST: fever (-), chills (-) or fatigue (-)  GI: abdominal distension (-), abdominal pain (-) , nausea/vomiting (-), hematemesis, (-), melena (-), hematochezia (-)  : dysuria (-), frequency (-), hematuria (-).   NEURO: numbness (-), weakness (-), dizziness (-)  SKIN: itching (-), rash (-)  HEENT:  visual changes (-); vertigo or throat pain (-);  neck stiffness (-)     All other review of systems is negative unless indicated above.   -------------------------------------------------------------------------------------------  VS:  T(F): 98.8 (03-07), Max: 98.8 (03-07)  HR: 76 (03-07) (76 - 90)  BP: 119/68 (03-07) (119/68 - 145/79)  RR: 18 (03-07)  SpO2: 94% (03-07)  I&O's Summary    06 Mar 2021 07:01  -  07 Mar 2021 07:00  --------------------------------------------------------  IN: 0 mL / OUT: 550 mL / NET: -550 mL      ------------------------------------------------------------------------------------------  PHYSICAL EXAM:  GENERAL: NAD  HEAD:  Atraumatic, Normocephalic.  EYES: EOMI, PERRLA, conjunctiva and sclera clear.  ENT: Moist mucous membranes.  NECK: Supple, No JVD.  CHEST/LUNG: Clear to auscultation bilaterally; No rales, rhonchi, wheezing, or rubs. Unlabored respirations.  HEART: Regular rate and rhythm; No murmurs, rubs, or gallops.  ABDOMEN: Bowel sounds present; Soft, Nontender, Nondistended.   EXTREMITIES:  2+ Peripheral Pulses, brisk capillary refill. No clubbing, cyanosis, or edema.  PSYCH: Normal affect.  SKIN: No rashes or lesions.  -------------------------------------------------------------------------------------------  LABS:                          13.4   7.71  )-----------( 158      ( 07 Mar 2021 06:45 )             39.9     03-07    135  |  100  |  22  ----------------------------<  149<H>  4.0   |  25  |  1.04    Ca    9.5      07 Mar 2021 06:44  Mg     1.9     03-06        CARDIAC MARKERS ( 02 Mar 2021 03:21 )  113 ng/L / x     / x     / 115 U/L / x     / 3.9 ng/mL  CARDIAC MARKERS ( 01 Mar 2021 21:00 )  121 ng/L / x     / x     / x     / x     / x      CARDIAC MARKERS ( 01 Mar 2021 06:45 )  145 ng/L / x     / x     / x     / x     / x                -------------------------------------------------------------------------------------------  Meds:  atorvastatin 40 milliGRAM(s) Oral at bedtime  dextrose 40% Gel 15 Gram(s) Oral once  dextrose 5%. 1000 milliLiter(s) IV Continuous <Continuous>  dextrose 5%. 1000 milliLiter(s) IV Continuous <Continuous>  dextrose 50% Injectable 25 Gram(s) IV Push once  dextrose 50% Injectable 12.5 Gram(s) IV Push once  dextrose 50% Injectable 25 Gram(s) IV Push once  famotidine    Tablet 20 milliGRAM(s) Oral daily  finasteride 5 milliGRAM(s) Oral daily  gabapentin 300 milliGRAM(s) Oral two times a day  glucagon  Injectable 1 milliGRAM(s) IntraMuscular once  insulin glargine Injectable (LANTUS) 30 Unit(s) SubCutaneous at bedtime  insulin lispro (ADMELOG) corrective regimen sliding scale   SubCutaneous three times a day before meals  isosorbide   mononitrate ER Tablet (IMDUR) 120 milliGRAM(s) Oral daily  metoprolol tartrate 75 milliGRAM(s) Oral every 12 hours  morphine  - Injectable 0.5 milliGRAM(s) IV Push every 4 hours  nitroglycerin     SubLingual 0.4 milliGRAM(s) SubLingual every 5 minutes PRN  ondansetron Injectable 4 milliGRAM(s) IV Push every 8 hours PRN  pantoprazole    Tablet 40 milliGRAM(s) Oral before breakfast  ranolazine 1000 milliGRAM(s) Oral two times a day  senna 2 Tablet(s) Oral at bedtime  tamsulosin 0.4 milliGRAM(s) Oral at bedtime    -------------------------------------------------------------------------------------------

## 2021-03-07 NOTE — PROGRESS NOTE ADULT - SUBJECTIVE AND OBJECTIVE BOX
[facility]      Seen earlier today, and examined.  - Today, patient is without complaints.           *****MEDICATIONS: Current medication reviewed and documented.    MEDICATIONS  (STANDING):  atorvastatin 40 milliGRAM(s) Oral at bedtime  dextrose 40% Gel 15 Gram(s) Oral once  dextrose 5%. 1000 milliLiter(s) (50 mL/Hr) IV Continuous <Continuous>  dextrose 5%. 1000 milliLiter(s) (100 mL/Hr) IV Continuous <Continuous>  dextrose 50% Injectable 25 Gram(s) IV Push once  dextrose 50% Injectable 12.5 Gram(s) IV Push once  dextrose 50% Injectable 25 Gram(s) IV Push once  famotidine    Tablet 20 milliGRAM(s) Oral daily  finasteride 5 milliGRAM(s) Oral daily  gabapentin 300 milliGRAM(s) Oral two times a day  glucagon  Injectable 1 milliGRAM(s) IntraMuscular once  insulin glargine Injectable (LANTUS) 30 Unit(s) SubCutaneous at bedtime  insulin lispro (ADMELOG) corrective regimen sliding scale   SubCutaneous three times a day before meals  isosorbide   mononitrate ER Tablet (IMDUR) 120 milliGRAM(s) Oral daily  metoprolol tartrate 75 milliGRAM(s) Oral every 12 hours  pantoprazole    Tablet 40 milliGRAM(s) Oral before breakfast  ranolazine 1000 milliGRAM(s) Oral two times a day  senna 2 Tablet(s) Oral at bedtime  tamsulosin 0.4 milliGRAM(s) Oral at bedtime    MEDICATIONS  (PRN):  ondansetron Injectable 4 milliGRAM(s) IV Push every 8 hours PRN Nausea and/or Vomiting          ***** VITAL SIGNS:  T(F): 98.5 (21 @ 04:00), Max: 98.5 (21 @ 04:00)  HR: 74 (21 @ 04:00) (74 - 80)  BP: 114/61 (21 @ 04:00) (110/63 - 121/64)  RR: 18 (21 @ 04:00) (18 - 18)  SpO2: 97% (21 @ 04:00) (95% - 97%)  Wt(kg): --  ,   I&O's Summary    07 Mar 2021 07:01  -  08 Mar 2021 07:00  --------------------------------------------------------  IN: 1150 mL / OUT: 150 mL / NET: 1000 mL    08 Mar 2021 07:01  -  08 Mar 2021 10:23  --------------------------------------------------------  IN: 295 mL / OUT: 0 mL / NET: 295 mL             *****PHYSICAL EXAM: eating better, better appetite    alert oriented x 3 attention comprehension are fair.  Able to name, repeat.   EOmi fundi not visualized   no nystagmus VFF to confrontation  Tongue is midline  Palate elevates symmetrically   Moving all 4 ext spontaneously no drift appreciated    Gait not assessed.            *****LAB AND IMAGIN.6   6.93  )-----------( 181      ( 08 Mar 2021 07:04 )             37.7               03-08    135  |  99  |  22  ----------------------------<  219<H>  4.1   |  25  |  1.06    Ca    9.5      08 Mar 2021 07:03                           [All pertinent recent Imaging/Reports reviewed]           *****A S S E S S M E N T   A N D   P L A N :    76y M pmhx CAD s/p CABG, HTN, HLD, chronic SDH present 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 Patient did nt get stress test due to adverse symptom experienced during last stress test and was sent home.    Pt was here few days ago for same, worked up for NSTEMI. Ended up having symptomatic relief prior to dc.    as per pt, he had recurrence of chest pain and  took 4 tablets of asa 81 this am and was brought to the emergency room. He denies any headache/weakness/numbness.   He is awaiting eval by cardiology       Problem/Recommendations 1:  chronic subdural hematoma    denies any headache or focal weakness      risk vs. benefits are being considered thoroughly, given his left moderate size subdural hematoma with mass effect, in order to optimize the management of his anginal chest pain.    s/p cath , revealed multilevel disease   ct surgery eval appreciated recommended cabg     frequent neurological exams q2 h and higher level of monitoring of pt was being done was agreed by neurosurgery.   Low threshold for ct head if there should arise any changes in neuro exam  s/p mma embolization tolerated procedure well, now will have to wait for 10-14 days for follow up scan as per neurosurgery        Problem/Recommendations 2: chest pain   seen by ccu not a candidate   pt having intermittent chest pain defer to cards for  management   awaiting cabg         3/7 improved chest pain defer to card/ct surgery for further management   prognosis guarded as this pt remains at risk for decompensation.         Thank you for allowing me to participate in the care of this patient. Will continue to follow patient periodically. Please do not hesitate to call me if you have any  questions or if there has been a change in patients neurological status     ________________  Elissa García MD  [facility]rological [facility] ([facility]  [phone]      33 minutes spent on total encounter; more than 50 % of the visit was  spent counseling about plan of care, compliance to diet/exercise and medication regimen and or  coordinating care by the attending physician.      It is advised that stroke patients follow up with BERHANE Garrett @ [phone] in 1- 2 weeks.   Others please follow up with Dr. Michael Nissenbaum [phone]

## 2021-03-08 LAB
ANION GAP SERPL CALC-SCNC: 11 MMOL/L — SIGNIFICANT CHANGE UP (ref 5–17)
BUN SERPL-MCNC: 22 MG/DL — SIGNIFICANT CHANGE UP (ref 7–23)
CALCIUM SERPL-MCNC: 9.5 MG/DL — SIGNIFICANT CHANGE UP (ref 8.4–10.5)
CHLORIDE SERPL-SCNC: 99 MMOL/L — SIGNIFICANT CHANGE UP (ref 96–108)
CO2 SERPL-SCNC: 25 MMOL/L — SIGNIFICANT CHANGE UP (ref 22–31)
CREAT SERPL-MCNC: 1.06 MG/DL — SIGNIFICANT CHANGE UP (ref 0.5–1.3)
GLUCOSE BLDC GLUCOMTR-MCNC: 217 MG/DL — HIGH (ref 70–99)
GLUCOSE BLDC GLUCOMTR-MCNC: 319 MG/DL — HIGH (ref 70–99)
GLUCOSE BLDC GLUCOMTR-MCNC: 322 MG/DL — HIGH (ref 70–99)
GLUCOSE BLDC GLUCOMTR-MCNC: 343 MG/DL — HIGH (ref 70–99)
GLUCOSE SERPL-MCNC: 219 MG/DL — HIGH (ref 70–99)
HCT VFR BLD CALC: 37.7 % — LOW (ref 39–50)
HGB BLD-MCNC: 12.6 G/DL — LOW (ref 13–17)
MCHC RBC-ENTMCNC: 30.1 PG — SIGNIFICANT CHANGE UP (ref 27–34)
MCHC RBC-ENTMCNC: 33.4 GM/DL — SIGNIFICANT CHANGE UP (ref 32–36)
MCV RBC AUTO: 90 FL — SIGNIFICANT CHANGE UP (ref 80–100)
NRBC # BLD: 0 /100 WBCS — SIGNIFICANT CHANGE UP (ref 0–0)
PLATELET # BLD AUTO: 181 K/UL — SIGNIFICANT CHANGE UP (ref 150–400)
POTASSIUM SERPL-MCNC: 4.1 MMOL/L — SIGNIFICANT CHANGE UP (ref 3.5–5.3)
POTASSIUM SERPL-SCNC: 4.1 MMOL/L — SIGNIFICANT CHANGE UP (ref 3.5–5.3)
RBC # BLD: 4.19 M/UL — LOW (ref 4.2–5.8)
RBC # FLD: 12.9 % — SIGNIFICANT CHANGE UP (ref 10.3–14.5)
SODIUM SERPL-SCNC: 135 MMOL/L — SIGNIFICANT CHANGE UP (ref 135–145)
WBC # BLD: 6.93 K/UL — SIGNIFICANT CHANGE UP (ref 3.8–10.5)
WBC # FLD AUTO: 6.93 K/UL — SIGNIFICANT CHANGE UP (ref 3.8–10.5)

## 2021-03-08 PROCEDURE — 70450 CT HEAD/BRAIN W/O DYE: CPT | Mod: 26

## 2021-03-08 PROCEDURE — 99233 SBSQ HOSP IP/OBS HIGH 50: CPT | Mod: GC

## 2021-03-08 RX ADMIN — ATORVASTATIN CALCIUM 40 MILLIGRAM(S): 80 TABLET, FILM COATED ORAL at 21:44

## 2021-03-08 RX ADMIN — FINASTERIDE 5 MILLIGRAM(S): 5 TABLET, FILM COATED ORAL at 12:02

## 2021-03-08 RX ADMIN — Medication 4: at 12:02

## 2021-03-08 RX ADMIN — TAMSULOSIN HYDROCHLORIDE 0.4 MILLIGRAM(S): 0.4 CAPSULE ORAL at 21:44

## 2021-03-08 RX ADMIN — SENNA PLUS 2 TABLET(S): 8.6 TABLET ORAL at 21:44

## 2021-03-08 RX ADMIN — PANTOPRAZOLE SODIUM 40 MILLIGRAM(S): 20 TABLET, DELAYED RELEASE ORAL at 05:51

## 2021-03-08 RX ADMIN — ISOSORBIDE MONONITRATE 120 MILLIGRAM(S): 60 TABLET, EXTENDED RELEASE ORAL at 12:02

## 2021-03-08 RX ADMIN — Medication 2: at 08:02

## 2021-03-08 RX ADMIN — GABAPENTIN 300 MILLIGRAM(S): 400 CAPSULE ORAL at 18:00

## 2021-03-08 RX ADMIN — Medication 75 MILLIGRAM(S): at 18:00

## 2021-03-08 RX ADMIN — GABAPENTIN 300 MILLIGRAM(S): 400 CAPSULE ORAL at 05:51

## 2021-03-08 RX ADMIN — Medication 75 MILLIGRAM(S): at 05:51

## 2021-03-08 RX ADMIN — FAMOTIDINE 20 MILLIGRAM(S): 10 INJECTION INTRAVENOUS at 12:02

## 2021-03-08 RX ADMIN — INSULIN GLARGINE 30 UNIT(S): 100 INJECTION, SOLUTION SUBCUTANEOUS at 21:44

## 2021-03-08 RX ADMIN — Medication 4: at 16:44

## 2021-03-08 NOTE — PROVIDER CONTACT NOTE (OTHER) - DATE AND TIME:
01-Mar-2021 19:50
04-Mar-2021 03:57
04-Mar-2021 18:16
02-Mar-2021 14:15
08-Mar-2021 00:02
04-Mar-2021 01:03
07-Mar-2021 03:05
27-Feb-2021 00:00
28-Feb-2021 05:00
03-Mar-2021 04:07
04-Mar-2021 11:45

## 2021-03-08 NOTE — PROVIDER CONTACT NOTE (OTHER) - RECOMMENDATIONS
Notify NP.
12 lead, Nitro
NP made aware.
NP to be notified.
Notify NP.
PA made aware.
NP to be notified.
Notify NP.

## 2021-03-08 NOTE — PROVIDER CONTACT NOTE (OTHER) - NAME OF MD/NP/PA/DO NOTIFIED:
Jocelyne Collins, NP
Lindsay Hayward NP
Tiffany Lynn
Tyrone KERR
USHA Hansen
Tiffany Lynn
Lindsay Hayward, NP
Nereyda KERR
Tiffany Lynn NP
Nereyda KERR
USHA Amezcua

## 2021-03-08 NOTE — PROGRESS NOTE ADULT - SUBJECTIVE AND OBJECTIVE BOX
[facility] Care [facility]      Seen earlier today, and examined.  - Today, patient is without complaints.           *****MEDICATIONS: Current medication reviewed and documented.    MEDICATIONS  (STANDING):  atorvastatin 40 milliGRAM(s) Oral at bedtime  dextrose 40% Gel 15 Gram(s) Oral once  dextrose 5%. 1000 milliLiter(s) (50 mL/Hr) IV Continuous <Continuous>  dextrose 5%. 1000 milliLiter(s) (100 mL/Hr) IV Continuous <Continuous>  dextrose 50% Injectable 25 Gram(s) IV Push once  dextrose 50% Injectable 12.5 Gram(s) IV Push once  dextrose 50% Injectable 25 Gram(s) IV Push once  famotidine    Tablet 20 milliGRAM(s) Oral daily  finasteride 5 milliGRAM(s) Oral daily  gabapentin 300 milliGRAM(s) Oral two times a day  glucagon  Injectable 1 milliGRAM(s) IntraMuscular once  insulin glargine Injectable (LANTUS) 30 Unit(s) SubCutaneous at bedtime  insulin lispro (ADMELOG) corrective regimen sliding scale   SubCutaneous three times a day before meals  isosorbide   mononitrate ER Tablet (IMDUR) 120 milliGRAM(s) Oral daily  metoprolol tartrate 75 milliGRAM(s) Oral every 12 hours  pantoprazole    Tablet 40 milliGRAM(s) Oral before breakfast  ranolazine 1000 milliGRAM(s) Oral two times a day  senna 2 Tablet(s) Oral at bedtime  tamsulosin 0.4 milliGRAM(s) Oral at bedtime    MEDICATIONS  (PRN):  ondansetron Injectable 4 milliGRAM(s) IV Push every 8 hours PRN Nausea and/or Vomiting          ***** VITAL SIGNS:  T(F): 98.5 (21 @ 04:00), Max: 98.5 (21 @ 04:00)  HR: 74 (21 @ 04:00) (74 - 80)  BP: 114/61 (21 @ 04:00) (110/63 - 121/64)  RR: 18 (21 @ 04:00) (18 - 18)  SpO2: 97% (21 @ 04:00) (95% - 97%)  Wt(kg): --  ,   I&O's Summary    07 Mar 2021 07:01  -  08 Mar 2021 07:00  --------------------------------------------------------  IN: 1150 mL / OUT: 150 mL / NET: 1000 mL    08 Mar 2021 07:01  -  08 Mar 2021 11:48  --------------------------------------------------------  IN: 295 mL / OUT: 0 mL / NET: 295 mL             *****PHYSICAL EXAM:   alert oriented x3  attention comprehension are fair.  Able to name, repeat.   EOmi fundi not visualized   no nystagmus VFF to confrontation  Tongue is midline  Palate elevates symmetrically   Moving all 4 ext spontaneously no drift appreciated    Gait not assessed.            *****LAB AND IMAGIN.6   6.93  )-----------( 181      ( 08 Mar 2021 07:04 )             37.7               03-08    135  |  99  |  22  ----------------------------<  219<H>  4.1   |  25  |  1.06    Ca    9.5      08 Mar 2021 07:03                           [All pertinent recent Imaging/Reports reviewed]           *****A S S E S S M E N T   A N D   P L A N :    76y M pmhx CAD s/p CABG, HTN, HLD, chronic SDH present 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 Patient did nt get stress test due to adverse symptom experienced during last stress test and was sent home.    Pt was here few days ago for same, worked up for NSTEMI. Ended up having symptomatic relief prior to dc.    as per pt, he had recurrence of chest pain and  took 4 tablets of asa 81 this am and was brought to the emergency room. He denies any headache/weakness/numbness.   He is awaiting eval by cardiology       Problem/Recommendations 1:  chronic subdural hematoma    denies any headache or focal weakness      risk vs. benefits are being considered thoroughly, given his left moderate size subdural hematoma with mass effect, in order to optimize the management of his anginal chest pain.    s/p cath , revealed multilevel disease   ct surgery eval appreciated recommended cabg     frequent neurological exams q2 h and higher level of monitoring of pt was being done was agreed by neurosurgery.   Low threshold for ct head if there should arise any changes in neuro exam  s/p mma embolization tolerated procedure well, now will have to wait for 10-14 days for follow up scan as per neurosurgery    ct head today     Problem/Recommendations 2: chest pain   seen by ccu not a candidate   pt having intermittent chest pain defer to cards for  management   awaiting cabg         3/7 improved chest pain defer to card/ct surgery for further management            Thank you for allowing me to participate in the care of this patient. Will continue to follow patient periodically. Please do not hesitate to call me if you have any  questions or if there has been a change in patients neurological status     ________________  Elissa García MD  [facility]  [phone]      33 minutes spent on total encounter; more than 50 % of the visit was  spent counseling about plan of care, compliance to diet/exercise and medication regimen and or  coordinating care by the attending physician.      It is advised that stroke patients follow up with BERHANE Garrett @ [phone] in 1- 2 weeks.   Others please follow up with Dr. Michael Nissenbaum [phone]

## 2021-03-08 NOTE — PROVIDER CONTACT NOTE (OTHER) - SITUATION
5 beats WCT noted on tele.
Patient c/o chest pain
pt had 22 beats WCT on tele
Constant Chest Pain
Patient and daughter via phone currently refusing Ranexa
Pt complaint of Chest Pain
Pt vomited x1 and feeling nauseous
Patient with episode of PAT x 4.6 seconds, HR up to 140 bpm. Patient returned to NSR 70-80's on telemetry without intervention.
Pt complaint of Chest Pain
Patient with episode of PAT x 3.4 seconds, HR up to 150 bpm. Patient returned to NSR 70-80's on telemetry without intervention.
Pt complaint CP 5/10

## 2021-03-08 NOTE — PROVIDER CONTACT NOTE (OTHER) - REASON
Pt complaint CP 5/10
Pt complaint of Chest Pain
chest pain
patient refusing ranex
Pt complaint of Chest Pain, s/p MMA embolization;
pt had 22 beats WCT on tele
5 beats WCT
Chest Pain
Ectopy: PAT
Pt vomited x1 and feeling nauseous
Ectopy: PAT

## 2021-03-08 NOTE — PROGRESS NOTE ADULT - SUBJECTIVE AND OBJECTIVE BOX
Patient seen and examined at bedside.    Overnight Events:   Pt feels well, no chest pain, pressure, shortness of breath or palpitation.  No IV morphine >24 hours     REVIEW OF SYSTEMS:  Constitutional:     [x ] negative [ ] fevers [ ] chills [ ] weight loss [ ] weight gain  HEENT:                  [x ] negative [ ] dry eyes [ ] eye irritation [ ] postnasal drip [ ] nasal congestion  CV:                         [ x] negative  [ ] chest pain [ ] orthopnea [ ] palpitations [ ] murmur  Resp:                     [ x] negative [ ] cough [ ] shortness of breath [ ] dyspnea [ ] wheezing [ ] sputum [ ]hemoptysis  GI:                          [ x] negative [ ] nausea [ ] vomiting [ ] diarrhea [ ] constipation [ ] abd pain [ ] dysphagia   :                        [ x] negative [ ] dysuria [ ] nocturia [ ] hematuria [ ] increased urinary frequency  Musculoskeletal: [ x] negative [ ] back pain [ ] myalgias [ ] arthralgias [ ] fracture  Skin:                       [ x] negative [ ] rash [ ] itch  Neurological:        [x ] negative [ ] headache [ ] dizziness [ ] syncope [ ] weakness [ ] numbness  Psychiatric:           [ x] negative [ ] anxiety [ ] depression  Endocrine:            [ x] negative [ ] diabetes [ ] thyroid problem  Heme/Lymph:      [ x] negative [ ] anemia [ ] bleeding problem  Allergic/Immune: [ x] negative [ ] itchy eyes [ ] nasal discharge [ ] hives [ ] angioedema    [ x] All other systems negative  [ ] Unable to assess ROS due to    Current Meds:  atorvastatin 40 milliGRAM(s) Oral at bedtime  dextrose 40% Gel 15 Gram(s) Oral once  dextrose 5%. 1000 milliLiter(s) IV Continuous <Continuous>  dextrose 5%. 1000 milliLiter(s) IV Continuous <Continuous>  dextrose 50% Injectable 25 Gram(s) IV Push once  dextrose 50% Injectable 12.5 Gram(s) IV Push once  dextrose 50% Injectable 25 Gram(s) IV Push once  famotidine    Tablet 20 milliGRAM(s) Oral daily  finasteride 5 milliGRAM(s) Oral daily  gabapentin 300 milliGRAM(s) Oral two times a day  glucagon  Injectable 1 milliGRAM(s) IntraMuscular once  insulin glargine Injectable (LANTUS) 30 Unit(s) SubCutaneous at bedtime  insulin lispro (ADMELOG) corrective regimen sliding scale   SubCutaneous three times a day before meals  isosorbide   mononitrate ER Tablet (IMDUR) 120 milliGRAM(s) Oral daily  metoprolol tartrate 75 milliGRAM(s) Oral every 12 hours  morphine  - Injectable 0.5 milliGRAM(s) IV Push every 4 hours  ondansetron Injectable 4 milliGRAM(s) IV Push every 8 hours PRN  pantoprazole    Tablet 40 milliGRAM(s) Oral before breakfast  ranolazine 1000 milliGRAM(s) Oral two times a day  senna 2 Tablet(s) Oral at bedtime  tamsulosin 0.4 milliGRAM(s) Oral at bedtime      PAST MEDICAL & SURGICAL HISTORY:  BPH (benign prostatic hypertrophy)    Hyperlipidemia    CAD (coronary artery disease)    Diabetes mellitus  Type 2    HTN (hypertension)    S/P drug eluting coronary stent placement  Ramus    S/P primary angioplasty with coronary stent  1990s        Vitals:  T(F): 98.5 (03-08), Max: 98.5 (03-08)  HR: 74 (03-08) (74 - 80)  BP: 114/61 (03-08) (110/63 - 121/64)  RR: 18 (03-08)  SpO2: 97% (03-08)  I&O's Summary    07 Mar 2021 07:01  -  08 Mar 2021 07:00  --------------------------------------------------------  IN: 1150 mL / OUT: 150 mL / NET: 1000 mL        Physical Exam:  GENERAL: NAD  HEAD:  Atraumatic, Normocephalic.  EYES: EOMI, PERRLA, conjunctiva and sclera clear.  ENT: Moist mucous membranes.  NECK: Supple, No JVD.  CHEST/LUNG: Clear to auscultation bilaterally; No rales, rhonchi, wheezing, or rubs. Unlabored respirations.  HEART: Regular rate and rhythm; No murmurs, rubs, or gallops.  ABDOMEN: Bowel sounds present; Soft, Nontender, Nondistended.   EXTREMITIES:  2+ Peripheral Pulses, brisk capillary refill. No clubbing, cyanosis, or edema.  PSYCH: Normal affect.  SKIN: No rashes or lesions.                        13.4   7.71  )-----------( 158      ( 07 Mar 2021 06:45 )             39.9     03-08    135  |  99  |  22  ----------------------------<  219<H>  4.1   |  25  |  1.06    Ca    9.5      08 Mar 2021 07:03                    New ECG(s): Personally reviewed    Echo: Personally reviewed    Stress Testing: Personally reviewed    Cath: Personally reviewed    Imaging: Personally reviewed    Interpretation of Telemetry: SR

## 2021-03-08 NOTE — PROGRESS NOTE ADULT - SUBJECTIVE AND OBJECTIVE BOX
Patient is a 76y old  Male who presents with a chief complaint of chest pain (08 Mar 2021 10:48)    Date of servie : 03-08-21 @ 15:33  INTERVAL HPI/OVERNIGHT EVENTS:  T(C): 36.7 (03-08-21 @ 11:35), Max: 36.9 (03-08-21 @ 04:00)  HR: 70 (03-08-21 @ 11:35) (70 - 80)  BP: 120/58 (03-08-21 @ 11:35) (110/63 - 121/64)  RR: 18 (03-08-21 @ 11:35) (18 - 18)  SpO2: 95% (03-08-21 @ 11:35) (95% - 97%)  Wt(kg): --  I&O's Summary    07 Mar 2021 07:01  -  08 Mar 2021 07:00  --------------------------------------------------------  IN: 1150 mL / OUT: 150 mL / NET: 1000 mL    08 Mar 2021 07:01  -  08 Mar 2021 15:33  --------------------------------------------------------  IN: 585 mL / OUT: 0 mL / NET: 585 mL        LABS:                        12.6   6.93  )-----------( 181      ( 08 Mar 2021 07:04 )             37.7     03-08    135  |  99  |  22  ----------------------------<  219<H>  4.1   |  25  |  1.06    Ca    9.5      08 Mar 2021 07:03          CAPILLARY BLOOD GLUCOSE      POCT Blood Glucose.: 322 mg/dL (08 Mar 2021 11:37)  POCT Blood Glucose.: 217 mg/dL (08 Mar 2021 07:25)  POCT Blood Glucose.: 318 mg/dL (07 Mar 2021 21:15)  POCT Blood Glucose.: 360 mg/dL (07 Mar 2021 16:18)            MEDICATIONS  (STANDING):  atorvastatin 40 milliGRAM(s) Oral at bedtime  dextrose 40% Gel 15 Gram(s) Oral once  dextrose 5%. 1000 milliLiter(s) (50 mL/Hr) IV Continuous <Continuous>  dextrose 5%. 1000 milliLiter(s) (100 mL/Hr) IV Continuous <Continuous>  dextrose 50% Injectable 25 Gram(s) IV Push once  dextrose 50% Injectable 12.5 Gram(s) IV Push once  dextrose 50% Injectable 25 Gram(s) IV Push once  famotidine    Tablet 20 milliGRAM(s) Oral daily  finasteride 5 milliGRAM(s) Oral daily  gabapentin 300 milliGRAM(s) Oral two times a day  glucagon  Injectable 1 milliGRAM(s) IntraMuscular once  insulin glargine Injectable (LANTUS) 30 Unit(s) SubCutaneous at bedtime  insulin lispro (ADMELOG) corrective regimen sliding scale   SubCutaneous three times a day before meals  isosorbide   mononitrate ER Tablet (IMDUR) 120 milliGRAM(s) Oral daily  metoprolol tartrate 75 milliGRAM(s) Oral every 12 hours  pantoprazole    Tablet 40 milliGRAM(s) Oral before breakfast  ranolazine 1000 milliGRAM(s) Oral two times a day  senna 2 Tablet(s) Oral at bedtime  tamsulosin 0.4 milliGRAM(s) Oral at bedtime    MEDICATIONS  (PRN):  ondansetron Injectable 4 milliGRAM(s) IV Push every 8 hours PRN Nausea and/or Vomiting          PHYSICAL EXAM:  GENERAL: NAD, well-groomed, well-developed  HEAD:  Atraumatic, Normocephalic  CHEST/LUNG: Clear to percussion bilaterally; No rales, rhonchi, wheezing, or rubs  HEART: Regular rate and rhythm; No murmurs, rubs, or gallops  ABDOMEN: Soft, Nontender, Nondistended; Bowel sounds present  EXTREMITIES:  2+ Peripheral Pulses, No clubbing, cyanosis, or edema  LYMPH: No lymphadenopathy noted  SKIN: No rashes or lesions    Care Discussed with Consultants/Other Providers [ ] YES  [ ] NO

## 2021-03-08 NOTE — PROGRESS NOTE ADULT - ASSESSMENT
74y M with hx. of CAD s/p PCI to the LAD and RCA in the past (last angiogram in 2015 with [facility]), HTN, & HLD.  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 s/p MMA embolization with NSGY on 3/1, with angina improved on up-titration of medical therapy. BP within normal range.     Plan:   continue atorvastatin 40   increase lopressor to 100 mg BID   cont Imdur 120 mg QD   unable to tolerate ranexa 1000 mg BID, reasonable to decrease to 500 mg BID   would avoid IV morphine for pain, if can be off IV med, reasonable to discharge home on po regimen   management of SDH as per neurosurgery - not on antiplatelet therapy at this time  ?repeat CTH prior to discharge   CABG eval and timeline per CTS     Thank you,   Moy Galloway MD  Cardiology Fellow, [facility]-NS/MAA[facility]  All Cardiology service information can be found 24/7 on [email or web address], password: [email or web address]

## 2021-03-08 NOTE — PROVIDER CONTACT NOTE (OTHER) - BACKGROUND
Admitting dx: NSTEMI pending CABG  PMH: DM, BPH, CAD
Pt admitted with CP. 2/26 cath shows multivassel disease, CTS following for CABG. s/p MMA embolization 3/1 for chronic SDH.
Admitting dx: NSTEMI pending CABG  PMH: DM, BPH, CAD
Patient with known MVD. Unable to have intervention due to subdural hematoma. Patient c/o chest pain multiple times a day.
Pt is a 75 yo M Dx: Chest Pain PMH: HTN, CAD, HLD, DM, BPH.
Pt admitted with CP. 2/26 cath shows multivessel disease, . s/p MMA embolization 3/1 for chronic SDH.
Admitted with NSTEMI
Patient with MVD.
Pt admitted with NSTEMI
Pt is a 75 yo M Dx: Chest Pain PMH: HTN, CAD, HLD, DM, BPH.  > s/p cath with multi vessel disease  s/p MMA embolization;
Pt is a 75 yo M Dx: Chest Pain PMH: HTN, CAD, HLD, DM, BPH.

## 2021-03-08 NOTE — PROVIDER CONTACT NOTE (OTHER) - ACTION/TREATMENT ORDERED:
Administer Nitroglycerin sublingual, draw trops and AM labs, continue to monitor.
Daughter would like to discuss dose change with cardiology  Daughters phone number given to PA
IV Zofran in place
PA made aware. Further order(s) pending at this time.
12 lead done and shown to PA  SL nitro given with good relief
NP aware. To f/u with BMP in the AM.
NP aware. Will continue to monitor.
NP will come assess and call cardiology. Continue to monitor pt.
Bedside EKG ordered and USHA Hansen will assess at bedside. WIll continue to monitor.
Administer Nitroglycerin sublingual, draw trops and EKG stat. continue to monitor.
stat labs, okay to administer medication early. continue to monitor.

## 2021-03-09 ENCOUNTER — TRANSCRIPTION ENCOUNTER (OUTPATIENT)
Age: 76
End: 2021-03-09

## 2021-03-09 VITALS
SYSTOLIC BLOOD PRESSURE: 109 MMHG | DIASTOLIC BLOOD PRESSURE: 60 MMHG | RESPIRATION RATE: 18 BRPM | TEMPERATURE: 98 F | OXYGEN SATURATION: 100 % | HEART RATE: 92 BPM

## 2021-03-09 LAB
ANION GAP SERPL CALC-SCNC: 11 MMOL/L — SIGNIFICANT CHANGE UP (ref 5–17)
BUN SERPL-MCNC: 19 MG/DL — SIGNIFICANT CHANGE UP (ref 7–23)
CALCIUM SERPL-MCNC: 9.6 MG/DL — SIGNIFICANT CHANGE UP (ref 8.4–10.5)
CHLORIDE SERPL-SCNC: 102 MMOL/L — SIGNIFICANT CHANGE UP (ref 96–108)
CO2 SERPL-SCNC: 25 MMOL/L — SIGNIFICANT CHANGE UP (ref 22–31)
CREAT SERPL-MCNC: 1.04 MG/DL — SIGNIFICANT CHANGE UP (ref 0.5–1.3)
GLUCOSE BLDC GLUCOMTR-MCNC: 220 MG/DL — HIGH (ref 70–99)
GLUCOSE BLDC GLUCOMTR-MCNC: 348 MG/DL — HIGH (ref 70–99)
GLUCOSE BLDC GLUCOMTR-MCNC: 406 MG/DL — HIGH (ref 70–99)
GLUCOSE SERPL-MCNC: 279 MG/DL — HIGH (ref 70–99)
HCT VFR BLD CALC: 38.5 % — LOW (ref 39–50)
HGB BLD-MCNC: 12.9 G/DL — LOW (ref 13–17)
MCHC RBC-ENTMCNC: 29.8 PG — SIGNIFICANT CHANGE UP (ref 27–34)
MCHC RBC-ENTMCNC: 33.5 GM/DL — SIGNIFICANT CHANGE UP (ref 32–36)
MCV RBC AUTO: 88.9 FL — SIGNIFICANT CHANGE UP (ref 80–100)
NRBC # BLD: 0 /100 WBCS — SIGNIFICANT CHANGE UP (ref 0–0)
PLATELET # BLD AUTO: 189 K/UL — SIGNIFICANT CHANGE UP (ref 150–400)
POTASSIUM SERPL-MCNC: 4.3 MMOL/L — SIGNIFICANT CHANGE UP (ref 3.5–5.3)
POTASSIUM SERPL-SCNC: 4.3 MMOL/L — SIGNIFICANT CHANGE UP (ref 3.5–5.3)
RBC # BLD: 4.33 M/UL — SIGNIFICANT CHANGE UP (ref 4.2–5.8)
RBC # FLD: 12.6 % — SIGNIFICANT CHANGE UP (ref 10.3–14.5)
SODIUM SERPL-SCNC: 138 MMOL/L — SIGNIFICANT CHANGE UP (ref 135–145)
TROPONIN T, HIGH SENSITIVITY RESULT: 1585 NG/L — HIGH (ref 0–51)
WBC # BLD: 6.73 K/UL — SIGNIFICANT CHANGE UP (ref 3.8–10.5)
WBC # FLD AUTO: 6.73 K/UL — SIGNIFICANT CHANGE UP (ref 3.8–10.5)

## 2021-03-09 PROCEDURE — U0005: CPT

## 2021-03-09 PROCEDURE — 84132 ASSAY OF SERUM POTASSIUM: CPT

## 2021-03-09 PROCEDURE — 85025 COMPLETE CBC W/AUTO DIFF WBC: CPT

## 2021-03-09 PROCEDURE — 36223 PLACE CATH CAROTID/INOM ART: CPT

## 2021-03-09 PROCEDURE — 70450 CT HEAD/BRAIN W/O DYE: CPT

## 2021-03-09 PROCEDURE — 75894 X-RAYS TRANSCATH THERAPY: CPT

## 2021-03-09 PROCEDURE — C1769: CPT

## 2021-03-09 PROCEDURE — 71046 X-RAY EXAM CHEST 2 VIEWS: CPT

## 2021-03-09 PROCEDURE — 83880 ASSAY OF NATRIURETIC PEPTIDE: CPT

## 2021-03-09 PROCEDURE — 93454 CORONARY ARTERY ANGIO S&I: CPT

## 2021-03-09 PROCEDURE — C1894: CPT

## 2021-03-09 PROCEDURE — 85610 PROTHROMBIN TIME: CPT

## 2021-03-09 PROCEDURE — 85027 COMPLETE CBC AUTOMATED: CPT

## 2021-03-09 PROCEDURE — 86769 SARS-COV-2 COVID-19 ANTIBODY: CPT

## 2021-03-09 PROCEDURE — 80048 BASIC METABOLIC PNL TOTAL CA: CPT

## 2021-03-09 PROCEDURE — 87641 MR-STAPH DNA AMP PROBE: CPT

## 2021-03-09 PROCEDURE — C1889: CPT

## 2021-03-09 PROCEDURE — C1887: CPT

## 2021-03-09 PROCEDURE — U0003: CPT

## 2021-03-09 PROCEDURE — 80053 COMPREHEN METABOLIC PANEL: CPT

## 2021-03-09 PROCEDURE — 61626 TCAT PERM OCCLS/EMBOL NONCNS: CPT

## 2021-03-09 PROCEDURE — 86850 RBC ANTIBODY SCREEN: CPT

## 2021-03-09 PROCEDURE — 93005 ELECTROCARDIOGRAM TRACING: CPT

## 2021-03-09 PROCEDURE — 99233 SBSQ HOSP IP/OBS HIGH 50: CPT | Mod: GC

## 2021-03-09 PROCEDURE — 82553 CREATINE MB FRACTION: CPT

## 2021-03-09 PROCEDURE — 75898 FOLLOW-UP ANGIOGRAPHY: CPT

## 2021-03-09 PROCEDURE — 83735 ASSAY OF MAGNESIUM: CPT

## 2021-03-09 PROCEDURE — 84484 ASSAY OF TROPONIN QUANT: CPT

## 2021-03-09 PROCEDURE — 82550 ASSAY OF CK (CPK): CPT

## 2021-03-09 PROCEDURE — C1760: CPT

## 2021-03-09 PROCEDURE — 86901 BLOOD TYPING SEROLOGIC RH(D): CPT

## 2021-03-09 PROCEDURE — 99285 EMERGENCY DEPT VISIT HI MDM: CPT

## 2021-03-09 PROCEDURE — 82962 GLUCOSE BLOOD TEST: CPT

## 2021-03-09 PROCEDURE — 85730 THROMBOPLASTIN TIME PARTIAL: CPT

## 2021-03-09 PROCEDURE — 86900 BLOOD TYPING SEROLOGIC ABO: CPT

## 2021-03-09 PROCEDURE — 87640 STAPH A DNA AMP PROBE: CPT

## 2021-03-09 PROCEDURE — 84100 ASSAY OF PHOSPHORUS: CPT

## 2021-03-09 PROCEDURE — 93010 ELECTROCARDIOGRAM REPORT: CPT

## 2021-03-09 PROCEDURE — 83695 ASSAY OF LIPOPROTEIN(A): CPT

## 2021-03-09 PROCEDURE — 36227 PLACE CATH XTRNL CAROTID: CPT

## 2021-03-09 PROCEDURE — C2628: CPT

## 2021-03-09 RX ORDER — METOPROLOL TARTRATE 50 MG
1 TABLET ORAL
Qty: 60 | Refills: 0
Start: 2021-03-09 | End: 2021-04-07

## 2021-03-09 RX ORDER — PANTOPRAZOLE SODIUM 20 MG/1
1 TABLET, DELAYED RELEASE ORAL
Qty: 30 | Refills: 0
Start: 2021-03-09 | End: 2021-04-07

## 2021-03-09 RX ORDER — PANTOPRAZOLE SODIUM 20 MG/1
1 TABLET, DELAYED RELEASE ORAL
Qty: 0 | Refills: 0 | DISCHARGE
Start: 2021-03-09

## 2021-03-09 RX ORDER — OLMESARTAN MEDOXOMIL 5 MG/1
1 TABLET, FILM COATED ORAL
Qty: 0 | Refills: 0 | DISCHARGE

## 2021-03-09 RX ORDER — FAMOTIDINE 10 MG/ML
1 INJECTION INTRAVENOUS
Qty: 30 | Refills: 0
Start: 2021-03-09 | End: 2021-04-07

## 2021-03-09 RX ORDER — FAMOTIDINE 10 MG/ML
1 INJECTION INTRAVENOUS
Qty: 0 | Refills: 0 | DISCHARGE
Start: 2021-03-09

## 2021-03-09 RX ORDER — RANOLAZINE 500 MG/1
1 TABLET, FILM COATED, EXTENDED RELEASE ORAL
Qty: 60 | Refills: 0
Start: 2021-03-09 | End: 2021-04-07

## 2021-03-09 RX ORDER — ISOSORBIDE MONONITRATE 60 MG/1
1 TABLET, EXTENDED RELEASE ORAL
Qty: 30 | Refills: 0
Start: 2021-03-09 | End: 2021-04-07

## 2021-03-09 RX ADMIN — PANTOPRAZOLE SODIUM 40 MILLIGRAM(S): 20 TABLET, DELAYED RELEASE ORAL at 05:57

## 2021-03-09 RX ADMIN — Medication 2: at 07:40

## 2021-03-09 RX ADMIN — ISOSORBIDE MONONITRATE 120 MILLIGRAM(S): 60 TABLET, EXTENDED RELEASE ORAL at 11:21

## 2021-03-09 RX ADMIN — FINASTERIDE 5 MILLIGRAM(S): 5 TABLET, FILM COATED ORAL at 11:21

## 2021-03-09 RX ADMIN — Medication 4: at 12:36

## 2021-03-09 RX ADMIN — GABAPENTIN 300 MILLIGRAM(S): 400 CAPSULE ORAL at 05:57

## 2021-03-09 RX ADMIN — FAMOTIDINE 20 MILLIGRAM(S): 10 INJECTION INTRAVENOUS at 11:21

## 2021-03-09 RX ADMIN — Medication 75 MILLIGRAM(S): at 05:57

## 2021-03-09 NOTE — PROGRESS NOTE ADULT - PROVIDER SPECIALTY LIST ADULT
Cardiology
Hospitalist
Neurology
Neurology
CT Surgery
Hospitalist
Neurology
Neurosurgery
Cardiology
Hospitalist
Neurology
Neurology
Neurosurgery
Cardiology
Hospitalist
Hospitalist
Neurosurgery

## 2021-03-09 NOTE — DISCHARGE NOTE NURSING/CASE MANAGEMENT/SOCIAL WORK - PATIENT PORTAL LINK FT
You can access the FollowMyHealth Patient Portal offered by [facility] by registering at the following website: [email or web address]. By joining [employer]’s FollowMyHealth portal, you will also be able to view your health information using other applications (apps) compatible with our system.

## 2021-03-09 NOTE — DISCHARGE NOTE PROVIDER - CARE PROVIDERS DIRECT ADDRESSES
,[email or web address]

## 2021-03-09 NOTE — PROGRESS NOTE ADULT - SUBJECTIVE AND OBJECTIVE BOX
Patient seen and examined at bedside.    Overnight Events:     No events overnight. Has not required IV morphine in over 24 hours     REVIEW OF SYSTEMS:  Constitutional:     [x ] negative [ ] fevers [ ] chills [ ] weight loss [ ] weight gain  HEENT:                  [x ] negative [ ] dry eyes [ ] eye irritation [ ] postnasal drip [ ] nasal congestion  CV:                         [ x] negative  [ ] chest pain [ ] orthopnea [ ] palpitations [ ] murmur  Resp:                     [ x] negative [ ] cough [ ] shortness of breath [ ] dyspnea [ ] wheezing [ ] sputum [ ]hemoptysis  GI:                          [ x] negative [ ] nausea [ ] vomiting [ ] diarrhea [ ] constipation [ ] abd pain [ ] dysphagia   :                        [ x] negative [ ] dysuria [ ] nocturia [ ] hematuria [ ] increased urinary frequency  Musculoskeletal: [ x] negative [ ] back pain [ ] myalgias [ ] arthralgias [ ] fracture  Skin:                       [ x] negative [ ] rash [ ] itch  Neurological:        [x ] negative [ ] headache [ ] dizziness [ ] syncope [ ] weakness [ ] numbness  Psychiatric:           [ x] negative [ ] anxiety [ ] depression  Endocrine:            [ x] negative [ ] diabetes [ ] thyroid problem  Heme/Lymph:      [ x] negative [ ] anemia [ ] bleeding problem  Allergic/Immune: [ x] negative [ ] itchy eyes [ ] nasal discharge [ ] hives [ ] angioedema    [ x] All other systems negative  [ ] Unable to assess ROS due to    Current Meds:  atorvastatin 40 milliGRAM(s) Oral at bedtime  dextrose 40% Gel 15 Gram(s) Oral once  dextrose 5%. 1000 milliLiter(s) IV Continuous <Continuous>  dextrose 5%. 1000 milliLiter(s) IV Continuous <Continuous>  dextrose 50% Injectable 25 Gram(s) IV Push once  dextrose 50% Injectable 12.5 Gram(s) IV Push once  dextrose 50% Injectable 25 Gram(s) IV Push once  famotidine    Tablet 20 milliGRAM(s) Oral daily  finasteride 5 milliGRAM(s) Oral daily  gabapentin 300 milliGRAM(s) Oral two times a day  glucagon  Injectable 1 milliGRAM(s) IntraMuscular once  insulin glargine Injectable (LANTUS) 30 Unit(s) SubCutaneous at bedtime  insulin lispro (ADMELOG) corrective regimen sliding scale   SubCutaneous three times a day before meals  isosorbide   mononitrate ER Tablet (IMDUR) 120 milliGRAM(s) Oral daily  metoprolol tartrate 75 milliGRAM(s) Oral every 12 hours  ondansetron Injectable 4 milliGRAM(s) IV Push every 8 hours PRN  pantoprazole    Tablet 40 milliGRAM(s) Oral before breakfast  ranolazine 1000 milliGRAM(s) Oral two times a day  senna 2 Tablet(s) Oral at bedtime  tamsulosin 0.4 milliGRAM(s) Oral at bedtime      PAST MEDICAL & SURGICAL HISTORY:  BPH (benign prostatic hypertrophy)    Hyperlipidemia    CAD (coronary artery disease)    Diabetes mellitus  Type 2    HTN (hypertension)    S/P drug eluting coronary stent placement  Ramus    S/P primary angioplasty with coronary stent  1990s        Vitals:  T(F): 98.1 (03-09), Max: 98.1 (03-08)  HR: 77 (03-09) (70 - 82)  BP: 129/74 (03-09) (112/59 - 130/66)  RR: 18 (03-09)  SpO2: 95% (03-09)  I&O's Summary    08 Mar 2021 07:01  -  09 Mar 2021 07:00  --------------------------------------------------------  IN: 585 mL / OUT: 0 mL / NET: 585 mL        Physical Exam:  GENERAL: NAD  HEAD:  Atraumatic, Normocephalic.  EYES: EOMI, PERRLA, conjunctiva and sclera clear.  ENT: Moist mucous membranes.  NECK: Supple, No JVD.  CHEST/LUNG: Clear to auscultation bilaterally; No rales, rhonchi, wheezing, or rubs. Unlabored respirations.  HEART: Regular rate and rhythm; No murmurs, rubs, or gallops.  ABDOMEN: Bowel sounds present; Soft, Nontender, Nondistended.   EXTREMITIES:  2+ Peripheral Pulses, brisk capillary refill. No clubbing, cyanosis, or edema.  PSYCH: Normal affect.  SKIN: No rashes or lesions.                        12.9   6.73  )-----------( 189      ( 09 Mar 2021 07:00 )             38.5     03-09    138  |  102  |  19  ----------------------------<  279<H>  4.3   |  25  |  1.04    Ca    9.6      09 Mar 2021 06:57                    New ECG(s): no new EKG     Echo: no new TTE    Cath: [facility] this admission reviewed     Interpretation of Telemetry: SR in the 70s

## 2021-03-09 NOTE — PROGRESS NOTE ADULT - REASON FOR ADMISSION
chest pain

## 2021-03-09 NOTE — PROVIDER CONTACT NOTE (CRITICAL VALUE NOTIFICATION) - BACKGROUND
Pt admitted with shaina GOMES. Multi-vessel dz on cath, pending CABG after neuro sx & neuro clearance.
Pt admitted with shaina GOMES. Multi-vessel dz on cath, pending CABG after neuro sx & neuro clearance.

## 2021-03-09 NOTE — DISCHARGE NOTE PROVIDER - NSDCCPCAREPLAN_GEN_ALL_CORE_FT
PRINCIPAL DISCHARGE DIAGNOSIS  Diagnosis: NSTEMI (non-ST elevated myocardial infarction)  Assessment and Plan of Treatment: You are not a candidate for intervention at this time.   Continue medical management until further discussions with cardiologist.   Call your doctor if you have unusual chest pain, pressure, or discomfort, shortness of breath, nausea, vomiting, burping, heartburn, tingling upper body parts, sweating, cold, clammy sking, racing heartbeat  Call 911 if you think you are having a heart attack  Take all cardiac medications as prescribed - notify your doctor if you have any side effects        SECONDARY DISCHARGE DIAGNOSES  Diagnosis: SDH (subdural hematoma)  Assessment and Plan of Treatment: Underwent MMA embolization on 3/1 with neurosurgery with mild improvement on CT head  Follow up with Dr. Camargo for repeat CT head in 10-14 days  Return to hospital immediately if you develop mental status changes, changes in consciousness, pass out, feel weak, or you think you are having a stroke or a seizure.

## 2021-03-09 NOTE — PROVIDER CONTACT NOTE (CRITICAL VALUE NOTIFICATION) - ASSESSMENT
Pt currently at IR for MMA embolization. VSS & no c/o CP at time when pt left the unit. Last Troponin 2/28 148.
VSS & no c/o CP.

## 2021-03-09 NOTE — DISCHARGE NOTE PROVIDER - PROVIDER TOKENS
PROVIDER:[TOKEN:[[id number]]],PROVIDER:[TOKEN:[[id number]]],PROVIDER:[TOKEN:[[id number]]]

## 2021-03-09 NOTE — PROGRESS NOTE ADULT - NSHPATTENDINGPLANDISCUSS_GEN_ALL_CORE
Call Cardiology Fellow or Attending as listed on [email or web address] password: [email or web address].
Call Cardiology Fellow or Attending as listed on [email or web address] password: [email or web address].

## 2021-03-09 NOTE — PROGRESS NOTE ADULT - ASSESSMENT
74y M with hx. of CAD s/p PCI to the LAD and RCA in the past (last angiogram in 2015 with [facility]), HTN, & HLD.  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 s/p MMA embolization with NSGY on 3/1, with angina improved on up-titration of medical therapy. BP within normal range.     Plan:   Unclear why troponin was checked this morning as this would not change the management of his CAD   cont atorvastatin 40 mg QHS   cont metoprolol 75 mg BID   cont Imdur 120 mg QD   unable to tolerate ranexa 1000 mg BID, reasonable to decrease to 500 mg BID   off pain medications   management of SDH as per neurosurgery - not on antiplatelet therapy at this time  ?repeat CTH prior to discharge   CABG eval and timeline per CTS     Thank you,   Moy Galloway MD  Cardiology Fellow, [facility]-NS/MAYA  All Cardiology service information can be found 24/7 on [email or web address], password: [email or web address]

## 2021-03-09 NOTE — DISCHARGE NOTE PROVIDER - CARE PROVIDER_API CALL
Felipe Camargo (MD)  Neurosurgery  [address], [address] [place] [address]  Phone: [phone]  Fax: [phone]  Follow Up Time:     Wilver Carmichael)  Cardiovascular Disease  [address] [place], [place] [id number]  Phone: [phone]  Fax: [phone]  Follow Up Time:     Markell Cross  INTERNAL MEDICINE  [address] [place] [address]  Phone: [phone]  Fax: [phone]  Follow Up Time:

## 2021-03-09 NOTE — DISCHARGE NOTE PROVIDER - NSDCMRMEDTOKEN_GEN_ALL_CORE_FT
atorvastatin 40 mg oral tablet: 1 tab(s) orally once a day (at bedtime)  Benicar 20 mg oral tablet: 1 tab(s) orally once a day  Coreg 12.5 mg oral tablet: 1 tab(s) orally 2 times a day MDD:1 tablet two times daily  famotidine 20 mg oral tablet: 1 tab(s) orally once a day  finasteride 5 mg oral tablet: 1 tab(s) orally once a day  gabapentin 300 mg oral capsule: 1 cap(s) orally 2 times a day  isosorbide mononitrate 60 mg oral tablet, extended release: 1 tab(s) orally once a day MDD:1 tablet daily  Janumet 50 mg-1000 mg oral tablet: 1 tab(s) orally 2 times a day  Lantus 100 units/mL subcutaneous solution: 32 unit(s) subcutaneous once a day (at bedtime)  pantoprazole 40 mg oral delayed release tablet: 1 tab(s) orally once a day (before a meal)  tamsulosin 0.4 mg oral capsule: 1 cap(s) orally once a day (at bedtime)   atorvastatin 40 mg oral tablet: 1 tab(s) orally once a day (at bedtime)  famotidine 20 mg oral tablet: 1 tab(s) orally once a day  finasteride 5 mg oral tablet: 1 tab(s) orally once a day  gabapentin 300 mg oral capsule: 1 cap(s) orally 2 times a day  isosorbide mononitrate 120 mg oral tablet, extended release: 1 tab(s) orally once a day  Janumet 50 mg-1000 mg oral tablet: 1 tab(s) orally 2 times a day  Lantus 100 units/mL subcutaneous solution: 32 unit(s) subcutaneous once a day (at bedtime)  metoprolol tartrate 100 mg oral tablet: 1 tab(s) orally 2 times a day   pantoprazole 40 mg oral delayed release tablet: 1 tab(s) orally once a day (before a meal)  Ranexa 500 mg oral tablet, extended release: 1 tab(s) orally 2 times a day   tamsulosin 0.4 mg oral capsule: 1 cap(s) orally once a day (at bedtime)   atorvastatin 40 mg oral tablet: 1 tab(s) orally once a day (at bedtime)  famotidine 20 mg oral tablet: 1 tab(s) orally once a day  finasteride 5 mg oral tablet: 1 tab(s) orally once a day  gabapentin 300 mg oral capsule: 1 cap(s) orally 2 times a day  isosorbide mononitrate 120 mg oral tablet, extended release: 1 tab(s) orally once a day  Janumet 50 mg-1000 mg oral tablet: 1 tab(s) orally 2 times a day  Lantus 100 units/mL subcutaneous solution: 32 unit(s) subcutaneous once a day (at bedtime)  metoprolol tartrate 100 mg oral tablet: 1 tab(s) orally 2 times a day   nitroglycerin 0.4 mg sublingual tablet: 1 tab(s) sublingual every 5 minutes as needed for chest pain. Call 911 if pain not relieved by 2 doses.  pantoprazole 40 mg oral delayed release tablet: 1 tab(s) orally once a day (before a meal)  Ranexa 500 mg oral tablet, extended release: 1 tab(s) orally 2 times a day   tamsulosin 0.4 mg oral capsule: 1 cap(s) orally once a day (at bedtime)

## 2021-03-09 NOTE — PROGRESS NOTE ADULT - ATTENDING COMMENTS
74 year old man with multiple prior coronary interventions, LAD and RCA last 2015. Has chronic SDH thought to be traumatic and managed by Neurosurgery. Presented with unstable angina and have documented three vessel disease. In effort to prepare for cardiac revascularization surgery has had Neurosurgical procedure, MMA embolization on 3/1. Seeing to maintain control of angina which is improved after up-titration of medications. Can further increase lopressor to 100 mg BID, continue Imdur 120 mg daily. Has not tolerated highest dose of ranolazine thus seek to maintain on 500 mg BID. Plan discharge to home on oral regimen.
74 year old man with multiple prior coronary interventions, LAD and RCA last 2015. Has chronic SDH thought to be traumatic and managed by Neurosurgery. Presented with unstable angina and have documented three vessel disease. In effort to prepare for cardiac revascularization surgery has had Neurosurgical procedure, MMA embolization on 3/1. Seeing to maintain control of angina which is improved after up-titration of medications. Can further increase lopressor to 100 mg BID, continue Imdur 120 mg daily. Has not tolerated highest dose of ranolazine thus seek to maintain on 500 mg BID. Repeat troponin obtained, reason unclear, and it is elevated to 1500. This likely represents type 2 myocardial ischemia with un-revascularized severe multivessel disease. As discussed, current circumstances preclude any cardiac procedure. He is now pain free on appropriate regimen. Plan discharge to home on oral regimen.
Agree with plan as outlined above.
Agree with plan as outlined above.  Discussions with patient and daughter regarding titration of medications for coronary disease that is currently not amenable to revascularization until ICH risk is lower
Doing well s/p L MMA embolization for SDH, no issues neurologically. CT this morning stable, plan for next scan in 2 weeks.
Patient seen and examined. Agree with assessment and plan as outlined above. Agree that patient is at increased risk for MACE given CAD but will not to proceed with NSX procedure in order eventually address cardiac issues. Case discussed with Dr. Merino who mentions neuro procedure may not have full benefit of protecting for bleed for a few weeks after. Will try to optimize antianginal therapy. Up titrate beta blocker. Increase Imdur to 90 qd. Can also consider Ranexa.
Patient seen and examined. Agree with assessment and plan as outlined above. Plan for NSx intervention. Increase anti-anginal regimen. Care discussed with patient and patient's daughter on the phone.

## 2021-03-15 ENCOUNTER — APPOINTMENT (OUTPATIENT)
Dept: CARDIOLOGY | Facility: CLINIC | Age: 76
End: 2021-03-15

## 2021-03-16 PROCEDURE — 82962 GLUCOSE BLOOD TEST: CPT

## 2021-03-16 PROCEDURE — 70450 CT HEAD/BRAIN W/O DYE: CPT

## 2021-03-16 PROCEDURE — 82550 ASSAY OF CK (CPK): CPT

## 2021-03-16 PROCEDURE — 84443 ASSAY THYROID STIM HORMONE: CPT

## 2021-03-16 PROCEDURE — 99285 EMERGENCY DEPT VISIT HI MDM: CPT | Mod: 25

## 2021-03-16 PROCEDURE — C8929: CPT

## 2021-03-16 PROCEDURE — 85025 COMPLETE CBC W/AUTO DIFF WBC: CPT

## 2021-03-16 PROCEDURE — 83036 HEMOGLOBIN GLYCOSYLATED A1C: CPT

## 2021-03-16 PROCEDURE — U0005: CPT

## 2021-03-16 PROCEDURE — 93005 ELECTROCARDIOGRAM TRACING: CPT

## 2021-03-16 PROCEDURE — 71045 X-RAY EXAM CHEST 1 VIEW: CPT

## 2021-03-16 PROCEDURE — 84484 ASSAY OF TROPONIN QUANT: CPT

## 2021-03-16 PROCEDURE — 80061 LIPID PANEL: CPT

## 2021-03-16 PROCEDURE — 85730 THROMBOPLASTIN TIME PARTIAL: CPT

## 2021-03-16 PROCEDURE — 82553 CREATINE MB FRACTION: CPT

## 2021-03-16 PROCEDURE — 87635 SARS-COV-2 COVID-19 AMP PRB: CPT

## 2021-03-16 PROCEDURE — 85610 PROTHROMBIN TIME: CPT

## 2021-03-16 PROCEDURE — 80053 COMPREHEN METABOLIC PANEL: CPT

## 2021-03-17 ENCOUNTER — INPATIENT (INPATIENT)
Facility: HOSPITAL | Age: 76
LOS: 8 days | Discharge: HOME | DRG: 189 | End: 2021-03-26
Attending: INTERNAL MEDICINE | Admitting: INTERNAL MEDICINE
Payer: MEDICARE

## 2021-03-17 VITALS
WEIGHT: 149.91 LBS | HEIGHT: 67 IN | HEART RATE: 77 BPM | OXYGEN SATURATION: 97 % | TEMPERATURE: 99 F | SYSTOLIC BLOOD PRESSURE: 128 MMHG | DIASTOLIC BLOOD PRESSURE: 73 MMHG | RESPIRATION RATE: 20 BRPM

## 2021-03-17 DIAGNOSIS — I62.03 NONTRAUMATIC CHRONIC SUBDURAL HEMORRHAGE: ICD-10-CM

## 2021-03-17 DIAGNOSIS — E11.65 TYPE 2 DIABETES MELLITUS WITH HYPERGLYCEMIA: ICD-10-CM

## 2021-03-17 DIAGNOSIS — I24.9 ACUTE ISCHEMIC HEART DISEASE, UNSPECIFIED: ICD-10-CM

## 2021-03-17 DIAGNOSIS — Z02.9 ENCOUNTER FOR ADMINISTRATIVE EXAMINATIONS, UNSPECIFIED: ICD-10-CM

## 2021-03-17 DIAGNOSIS — Z95.5 PRESENCE OF CORONARY ANGIOPLASTY IMPLANT AND GRAFT: Chronic | ICD-10-CM

## 2021-03-17 DIAGNOSIS — Z29.9 ENCOUNTER FOR PROPHYLACTIC MEASURES, UNSPECIFIED: ICD-10-CM

## 2021-03-17 LAB
ALBUMIN SERPL ELPH-MCNC: 3.6 G/DL — SIGNIFICANT CHANGE UP (ref 3.3–5)
ALP SERPL-CCNC: 78 U/L — SIGNIFICANT CHANGE UP (ref 40–120)
ALT FLD-CCNC: 40 U/L — SIGNIFICANT CHANGE UP (ref 10–45)
ANION GAP SERPL CALC-SCNC: 15 MMOL/L — SIGNIFICANT CHANGE UP (ref 5–17)
APTT BLD: 29.9 SEC — SIGNIFICANT CHANGE UP (ref 27.5–35.5)
AST SERPL-CCNC: 26 U/L — SIGNIFICANT CHANGE UP (ref 10–40)
BASE EXCESS BLDV CALC-SCNC: 0.5 MMOL/L — SIGNIFICANT CHANGE UP (ref -2–2)
BASOPHILS # BLD AUTO: 0.05 K/UL — SIGNIFICANT CHANGE UP (ref 0–0.2)
BASOPHILS NFR BLD AUTO: 0.4 % — SIGNIFICANT CHANGE UP (ref 0–2)
BILIRUB SERPL-MCNC: 1 MG/DL — SIGNIFICANT CHANGE UP (ref 0.2–1.2)
BUN SERPL-MCNC: 17 MG/DL — SIGNIFICANT CHANGE UP (ref 7–23)
CA-I SERPL-SCNC: 1.28 MMOL/L — SIGNIFICANT CHANGE UP (ref 1.12–1.3)
CALCIUM SERPL-MCNC: 10.3 MG/DL — SIGNIFICANT CHANGE UP (ref 8.4–10.5)
CHLORIDE BLDV-SCNC: 101 MMOL/L — SIGNIFICANT CHANGE UP (ref 96–108)
CHLORIDE SERPL-SCNC: 96 MMOL/L — SIGNIFICANT CHANGE UP (ref 96–108)
CO2 BLDV-SCNC: 28 MMOL/L — SIGNIFICANT CHANGE UP (ref 22–30)
CO2 SERPL-SCNC: 21 MMOL/L — LOW (ref 22–31)
CREAT SERPL-MCNC: 1.26 MG/DL — SIGNIFICANT CHANGE UP (ref 0.5–1.3)
EOSINOPHIL # BLD AUTO: 0.03 K/UL — SIGNIFICANT CHANGE UP (ref 0–0.5)
EOSINOPHIL NFR BLD AUTO: 0.2 % — SIGNIFICANT CHANGE UP (ref 0–6)
GAS PNL BLDV: 132 MMOL/L — LOW (ref 135–145)
GAS PNL BLDV: SIGNIFICANT CHANGE UP
GAS PNL BLDV: SIGNIFICANT CHANGE UP
GLUCOSE BLDC GLUCOMTR-MCNC: 186 MG/DL — HIGH (ref 70–99)
GLUCOSE BLDV-MCNC: 167 MG/DL — HIGH (ref 70–99)
GLUCOSE SERPL-MCNC: 160 MG/DL — HIGH (ref 70–99)
HCO3 BLDV-SCNC: 26 MMOL/L — SIGNIFICANT CHANGE UP (ref 21–29)
HCT VFR BLD CALC: 43.1 % — SIGNIFICANT CHANGE UP (ref 39–50)
HCT VFR BLDA CALC: 45 % — SIGNIFICANT CHANGE UP (ref 39–50)
HGB BLD CALC-MCNC: 14.7 G/DL — SIGNIFICANT CHANGE UP (ref 13–17)
HGB BLD-MCNC: 14.2 G/DL — SIGNIFICANT CHANGE UP (ref 13–17)
IMM GRANULOCYTES NFR BLD AUTO: 0.5 % — SIGNIFICANT CHANGE UP (ref 0–1.5)
INR BLD: 1.04 RATIO — SIGNIFICANT CHANGE UP (ref 0.88–1.16)
LACTATE BLDV-MCNC: 4 MMOL/L — CRITICAL HIGH (ref 0.7–2)
LYMPHOCYTES # BLD AUTO: 1.49 K/UL — SIGNIFICANT CHANGE UP (ref 1–3.3)
LYMPHOCYTES # BLD AUTO: 11.4 % — LOW (ref 13–44)
MCHC RBC-ENTMCNC: 29.9 PG — SIGNIFICANT CHANGE UP (ref 27–34)
MCHC RBC-ENTMCNC: 32.9 GM/DL — SIGNIFICANT CHANGE UP (ref 32–36)
MCV RBC AUTO: 90.7 FL — SIGNIFICANT CHANGE UP (ref 80–100)
MONOCYTES # BLD AUTO: 0.79 K/UL — SIGNIFICANT CHANGE UP (ref 0–0.9)
MONOCYTES NFR BLD AUTO: 6 % — SIGNIFICANT CHANGE UP (ref 2–14)
NEUTROPHILS # BLD AUTO: 10.7 K/UL — HIGH (ref 1.8–7.4)
NEUTROPHILS NFR BLD AUTO: 81.5 % — HIGH (ref 43–77)
NRBC # BLD: 0 /100 WBCS — SIGNIFICANT CHANGE UP (ref 0–0)
NT-PROBNP SERPL-SCNC: 3853 PG/ML — HIGH (ref 0–300)
PCO2 BLDV: 48 MMHG — SIGNIFICANT CHANGE UP (ref 35–50)
PH BLDV: 7.36 — SIGNIFICANT CHANGE UP (ref 7.35–7.45)
PLATELET # BLD AUTO: 311 K/UL — SIGNIFICANT CHANGE UP (ref 150–400)
PO2 BLDV: 34 MMHG — SIGNIFICANT CHANGE UP (ref 25–45)
POTASSIUM BLDV-SCNC: 4.8 MMOL/L — SIGNIFICANT CHANGE UP (ref 3.5–5.3)
POTASSIUM SERPL-MCNC: 5 MMOL/L — SIGNIFICANT CHANGE UP (ref 3.5–5.3)
POTASSIUM SERPL-SCNC: 5 MMOL/L — SIGNIFICANT CHANGE UP (ref 3.5–5.3)
PROT SERPL-MCNC: 7 G/DL — SIGNIFICANT CHANGE UP (ref 6–8.3)
PROTHROM AB SERPL-ACNC: 12.5 SEC — SIGNIFICANT CHANGE UP (ref 10.6–13.6)
RBC # BLD: 4.75 M/UL — SIGNIFICANT CHANGE UP (ref 4.2–5.8)
RBC # FLD: 13 % — SIGNIFICANT CHANGE UP (ref 10.3–14.5)
SAO2 % BLDV: 58 % — LOW (ref 67–88)
SARS-COV-2 RNA SPEC QL NAA+PROBE: SIGNIFICANT CHANGE UP
SODIUM SERPL-SCNC: 132 MMOL/L — LOW (ref 135–145)
TROPONIN T, HIGH SENSITIVITY RESULT: 1089 NG/L — HIGH (ref 0–51)
TROPONIN T, HIGH SENSITIVITY RESULT: 1090 NG/L — HIGH (ref 0–51)
WBC # BLD: 13.12 K/UL — HIGH (ref 3.8–10.5)
WBC # FLD AUTO: 13.12 K/UL — HIGH (ref 3.8–10.5)

## 2021-03-17 PROCEDURE — 99285 EMERGENCY DEPT VISIT HI MDM: CPT

## 2021-03-17 PROCEDURE — 99223 1ST HOSP IP/OBS HIGH 75: CPT

## 2021-03-17 PROCEDURE — 99222 1ST HOSP IP/OBS MODERATE 55: CPT

## 2021-03-17 PROCEDURE — 93010 ELECTROCARDIOGRAM REPORT: CPT

## 2021-03-17 PROCEDURE — 71046 X-RAY EXAM CHEST 2 VIEWS: CPT | Mod: 26

## 2021-03-17 RX ORDER — DEXTROSE 50 % IN WATER 50 %
12.5 SYRINGE (ML) INTRAVENOUS ONCE
Refills: 0 | Status: DISCONTINUED | OUTPATIENT
Start: 2021-03-17 | End: 2021-03-26

## 2021-03-17 RX ORDER — INSULIN LISPRO 100/ML
VIAL (ML) SUBCUTANEOUS
Refills: 0 | Status: DISCONTINUED | OUTPATIENT
Start: 2021-03-17 | End: 2021-03-26

## 2021-03-17 RX ORDER — ISOSORBIDE MONONITRATE 60 MG/1
120 TABLET, EXTENDED RELEASE ORAL DAILY
Refills: 0 | Status: DISCONTINUED | OUTPATIENT
Start: 2021-03-17 | End: 2021-03-26

## 2021-03-17 RX ORDER — DEXTROSE 50 % IN WATER 50 %
25 SYRINGE (ML) INTRAVENOUS ONCE
Refills: 0 | Status: DISCONTINUED | OUTPATIENT
Start: 2021-03-17 | End: 2021-03-26

## 2021-03-17 RX ORDER — FINASTERIDE 5 MG/1
5 TABLET, FILM COATED ORAL DAILY
Refills: 0 | Status: DISCONTINUED | OUTPATIENT
Start: 2021-03-17 | End: 2021-03-26

## 2021-03-17 RX ORDER — TAMSULOSIN HYDROCHLORIDE 0.4 MG/1
0.4 CAPSULE ORAL AT BEDTIME
Refills: 0 | Status: DISCONTINUED | OUTPATIENT
Start: 2021-03-17 | End: 2021-03-26

## 2021-03-17 RX ORDER — FUROSEMIDE 40 MG
20 TABLET ORAL ONCE
Refills: 0 | Status: COMPLETED | OUTPATIENT
Start: 2021-03-17 | End: 2021-03-17

## 2021-03-17 RX ORDER — INSULIN GLARGINE 100 [IU]/ML
7 INJECTION, SOLUTION SUBCUTANEOUS AT BEDTIME
Refills: 0 | Status: DISCONTINUED | OUTPATIENT
Start: 2021-03-18 | End: 2021-03-25

## 2021-03-17 RX ORDER — PANTOPRAZOLE SODIUM 20 MG/1
40 TABLET, DELAYED RELEASE ORAL
Refills: 0 | Status: DISCONTINUED | OUTPATIENT
Start: 2021-03-17 | End: 2021-03-26

## 2021-03-17 RX ORDER — INSULIN LISPRO 100/ML
VIAL (ML) SUBCUTANEOUS AT BEDTIME
Refills: 0 | Status: DISCONTINUED | OUTPATIENT
Start: 2021-03-17 | End: 2021-03-26

## 2021-03-17 RX ORDER — METOPROLOL TARTRATE 50 MG
100 TABLET ORAL
Refills: 0 | Status: DISCONTINUED | OUTPATIENT
Start: 2021-03-17 | End: 2021-03-26

## 2021-03-17 RX ORDER — DEXTROSE 50 % IN WATER 50 %
15 SYRINGE (ML) INTRAVENOUS ONCE
Refills: 0 | Status: DISCONTINUED | OUTPATIENT
Start: 2021-03-17 | End: 2021-03-26

## 2021-03-17 RX ORDER — GABAPENTIN 400 MG/1
300 CAPSULE ORAL
Refills: 0 | Status: DISCONTINUED | OUTPATIENT
Start: 2021-03-17 | End: 2021-03-26

## 2021-03-17 RX ORDER — RANOLAZINE 500 MG/1
500 TABLET, FILM COATED, EXTENDED RELEASE ORAL
Refills: 0 | Status: DISCONTINUED | OUTPATIENT
Start: 2021-03-17 | End: 2021-03-23

## 2021-03-17 RX ORDER — SODIUM CHLORIDE 9 MG/ML
1000 INJECTION, SOLUTION INTRAVENOUS
Refills: 0 | Status: DISCONTINUED | OUTPATIENT
Start: 2021-03-17 | End: 2021-03-26

## 2021-03-17 RX ORDER — GLUCAGON INJECTION, SOLUTION 0.5 MG/.1ML
1 INJECTION, SOLUTION SUBCUTANEOUS ONCE
Refills: 0 | Status: DISCONTINUED | OUTPATIENT
Start: 2021-03-17 | End: 2021-03-26

## 2021-03-17 RX ORDER — ATORVASTATIN CALCIUM 80 MG/1
40 TABLET, FILM COATED ORAL AT BEDTIME
Refills: 0 | Status: DISCONTINUED | OUTPATIENT
Start: 2021-03-17 | End: 2021-03-20

## 2021-03-17 RX ADMIN — Medication 20 MILLIGRAM(S): at 22:19

## 2021-03-17 RX ADMIN — ATORVASTATIN CALCIUM 40 MILLIGRAM(S): 80 TABLET, FILM COATED ORAL at 22:19

## 2021-03-17 RX ADMIN — TAMSULOSIN HYDROCHLORIDE 0.4 MILLIGRAM(S): 0.4 CAPSULE ORAL at 22:19

## 2021-03-17 NOTE — ED PROVIDER NOTE - ATTENDING CONTRIBUTION TO CARE
Patient seen and evaluated with resident/NP/PA, however HPI, ROS, PE and MDM as documented authored by myself unless otherwise noted- Avni Shore MD

## 2021-03-17 NOTE — H&P ADULT - PROBLEM SELECTOR PLAN 5
Transitions of Care Status:  1.  Name of PCP:      Markell Cross MD (PCP) [phone]  2.  PCP Contacted on Admission: [ ] Y    [x ] N    3.  PCP contacted at Discharge: [ ] Y    [ ] N    [ ] N/A  4.  Post-Discharge Appointment Date and Location:  5.  Summary of Handoff given to PCP:

## 2021-03-17 NOTE — H&P ADULT - ATTENDING COMMENTS
NIGHT HOSPITALIST:   Patient/ family aware of course and agree with plan/care as above.   Given patient's comorbidities, patient's long term prognosis is guarded.   Emotional support provided to patient.    Care reviewed with covering NP, Shabana, for endorsement to Dr. Cody.    Lam Villalba MD  [phone]

## 2021-03-17 NOTE — CONSULT NOTE ADULT - ATTENDING COMMENTS
76M h/o chronic subdural hematoma found to have TVD during last admission readmitted with SOB.  Pt and family deferred CABG during last admission as risk of cerebral bleed with full anticoagulation required for CABG was too high.  Pt underwent an embolization during last admission to potentially reduce the risk of cerebral bleed in the future (although the reduction of risk is not immediate and may take weeks as per neurosurgery).  Pt with no emergent need for revascularization would recommend repeat head CT and neurosurgery consult to determine if the pt's risk for cerebral bleed has decreased since embolization to determine optimal timing for revascularization.  Would consider an antiplatelet and possible heparin challenge prior to revascularization.

## 2021-03-17 NOTE — H&P ADULT - NSHPREVIEWOFSYSTEMS_GEN_ALL_CORE
NO chest pain/pressure.  No palpitations.  NO HA, no focal weakness.  NO cough, no wheezing.  NO fever, no chills, no rigors.  No back pain, no tearing back pain.    No rash.  NO joint pain.  NO dysuria, no hematuria.  NO weight loss/gain or anorexia.  NO thyroid symptoms.

## 2021-03-17 NOTE — ED PROVIDER NOTE - OBJECTIVE STATEMENT
Patient BIBEMS for dyspnea.  Presented to PMD (Markell Cross [phone]) reporting feeling short of breath today, received albuterol in office with improvement and sent to Emergency Department for evaluation.  Denying history of smoking, denying history of asthma/COPD or pulmonary disease.  No associated chest pains.  No known sick contacts, no known COVID-19 contacts, denying fevers, chills, cough.    Patient admitted in Feb this year found to have significant multivessel CAD, CT surgery deemed patient too high risk for procedure due to chronic SDHs, plan at that time with neurosurgery was to get meningeal artery embolization and then reevaluate risks of CABG.

## 2021-03-17 NOTE — H&P ADULT - HISTORY OF PRESENT ILLNESS
NIGHT HOSPITALIST:   Patient UNKNOWN to me previously, assigned to me at this point via the ER and by Dr. Cody to admit this 77 y/o M--history and Medex review from patient and from patient's adult daughter above by phone--patient with a history of CAD with PCI to the LAD and RCA (last in 2015) and known three vessel disease, essential HTN, chronic subdural haematoma from last year from a fall in his driveway, S/P embolization of the middle meningeal artery, recently discharged from [place] on March 9 2021, with recent cardiac catheterization in Feb 2021 with patient presently unable to pursue CABG due to inability to anticoagulation in the context of recent arterial embolization.

## 2021-03-17 NOTE — ED PROVIDER NOTE - CLINICAL SUMMARY MEDICAL DECISION MAKING FREE TEXT BOX
Patient presenting for dyspnea from PMD, given cardiac history and exam CHF possible, also could be COVID-19, currently no wheezing appreciated on exam or reason for primary RAD - plan for labs, CXR, COVID testing, trop/BNP, will consult cardiology given history of CAD, may require neurosurgery/CT surgery consults as well

## 2021-03-17 NOTE — H&P ADULT - ASSESSMENT
NIGHT HOSPITALIST:   NIGHT HOSPITALIST:   Presentation of patient with dyspnoea with mild pulmonary oedema on presentation in the setting of a complex medical history of type 2 DM on oral Rx and insulin, multiple past PCI with multivessel disease, followed by NS for history of intracranial hemorrhage with S/P MMA embolization with CTS and NS to review with cardiology, patient and family therapeutic approach with review of risk/benefit for CABG in light of issues of inability for anticoagulation for patient.  Will provide Lasix 20 mg IV x 1 for symptom control and diuresis.  Difficult to interpret HS troponin, with down trending from earlier in the month but will assume cardiac equivalent for now and follow trend.    I have reviewed these issues again with patient and patient's adult daughter above.    Patient on oral Rx and high Lantus doses at home.   Will temporarily HOLD oral RX and provide a conservative bedtime Lantus starting tomorrow night at 0.1U/kg/24H (7 U at bedtime for now).    Presently patient with nonspecific leukocyte elevation with no present clinical evidence of infection.

## 2021-03-17 NOTE — H&P ADULT - PROBLEM SELECTOR PLAN 2
See above.   High doses of home Lantus at home.   Random glucose of 160 in the ER.   Will HOLD oral Rx and provide conservative bedtime Lantus of 0.1U/Kg/24H.

## 2021-03-17 NOTE — H&P ADULT - PROBLEM SELECTOR PLAN 1
See above.   Seen by cardiology.   Will assume cardiac equivalent with troponin and trend.   Lasix 20 mg IV x 1.  Daily weights.   Seen by CTS with above to review with NS and cardiology with patient the issue of CABG.

## 2021-03-17 NOTE — H&P ADULT - NSHPPHYSICALEXAM_GEN_ALL_CORE
Physical exam with an elderly, nontoxic, chronically ill appearing M, in no acute distress.    Afebrile.   HR  87    RR 14.  BP  135/74   98% on RA    HEENT< PERRL< EOMI  Neck supple  NO thyromegaly  Chest decreased breath sounds at the bases.  Cor s1 s2  Abdomen soft nontender, normal bowel sounds,  skin dry  Ext No oedema.   Poor b/L nail hygiene.  Dry skin, no ulcers.  Neurologic AxOx3.  Speech fluent.  Cognition intact.   UE/LE 5/5.  NO SI/HI.

## 2021-03-17 NOTE — ED ADULT NURSE NOTE - OBJECTIVE STATEMENT
Pt is a 76 yr old male with complicated cardiac history including CAD with need for CABG but unable to perform surgery until he heals from a embolized brain aneurysm, HTN, and HLD, and DM coming from primary care office after two days of sob. Pt states yesterday he had some shortness of breath- that continued into today. Pt went into his primary office for a breathing treatment (MD gave one albuterol treatment) and he felt better afterwards. Pt currently has no symptoms- no headache, no sob, no chest pain or pressure- pt only endorses weakness ever since the treatment. Pt is a/ox 3- vitals stable.

## 2021-03-17 NOTE — ED ADULT NURSE NOTE - CHIEF COMPLAINT QUOTE
c/o SOB that began last night. Went to PCP who told him to go ED for extensive cardiac history and possible asthma exacerabtion. Hx aneurysm embolized around 2 weeks ago - needs CABG.

## 2021-03-17 NOTE — CONSULT NOTE ADULT - ASSESSMENT
74y M with PMH CAD s/p PCI to the LAD and RCA in the past (last angiogram in 2015 with [facility], known 3VD), HTN, & HLD. 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     Plan  -dyspnea is in the setting of known multivessel dz; poor PCI/CABG candidate at this time unless cleared by NSGY  -mild leukocytosis; would r/o underlying infxn  -will require NSGY clearance for antiplatelet/anticoaulation prior to any procedure  -will require CTsx eval  -f/u CXR; patient does not appear overloaded on exam but if e/o pulmonary congestion on CXR, would administer IV lasix 20  -r/o COVID    Franco Barrett MD  Cardiology Fellow - F1  Text or Call: [phone]  For all New Consults and Questions:  [email or web address]   Login: [email or web address]  
75 y/o M w/ PMHx of CAD s/p stents (not on Plavix or Brilinta), HTN, HLD, and chronic SDH known CAD , s/p LHC on 2/26 with multivessel CAD, intervention, deferred at that time given significant risk for intracranial bleed with revascularization (either PCI or CABG) given his chronic SDH. Pt is s/p left medial meningeal embolization on 3/1/21 with neurosurgery.     - Will need neuro clearance for risk stratification in setting of chronic SDH prior to revascularization   - Dr Merino to review case

## 2021-03-17 NOTE — ED PROVIDER NOTE - PHYSICAL EXAMINATION
Exam:  General: Patient well appearing, vital signs within normal limits  HEENT: airway patent with moist mucous membranes  Cardiac: RRR S1/S2 with strong peripheral pulses  Respiratory: mildly tachypneic, faint rales to mid lung fields, speaking in full sentences  GI: abdomen soft, non tender, non distended  Neuro: no gross neurologic deficits  Skin: warm, well perfused  Psych: normal mood and affect

## 2021-03-17 NOTE — H&P ADULT - NSHPOUTPATIENTPROVIDERS_GEN_ALL_CORE
Markell Cross MD (PCP) [phone]  Wilver Carmichael MD (cardiology) [phone]  Felipe Camargo MD (NS)

## 2021-03-17 NOTE — H&P ADULT - NSHPLABSRESULTS_GEN_ALL_CORE
WBC 13.1 with no significant left shift.    Hgb 14.2    Platelets of 311K.    BNP 3800    K+ 5.0    Random glucose of 160    Cr 1.2    COVID-19 PCR>>negative.    Troponin 1090  (1585 on 3/9/31)    Chest radiograph reviewed with pulmonary venous congestion.    CTT head from 3/8/21 with no change, LEFT middle meningeal artery embolic material, LEFT frontal parietal chronic subdural hematoma with mild mass effect. WBC 13.1 with no significant left shift.    Hgb 14.2    Platelets of 311K.    BNP 3800    K+ 5.0    Random glucose of 160    Cr 1.2    COVID-19 PCR>>negative.    Troponin 1090  (1585 on 3/9/31)    Chest radiograph reviewed with pulmonary venous congestion.    CTT head from 3/8/21 with no change, LEFT middle meningeal artery embolic material, LEFT frontal parietal chronic subdural hematoma with mild mass effect.    EKG tracing reviewed with sinus at 79 with 1AV and LBBB with no changes from prior EKG.

## 2021-03-17 NOTE — ED ADULT TRIAGE NOTE - CHIEF COMPLAINT QUOTE
c/o SOB that began last night. Went to PCP who told him to go ED for extensive cardiac history and possible asthma exacerabtion. Hx aneurysm embolized around 2 weeks ago - needs CABG. c/o SOB that began last night. Went to PCP who told him to go ED for extensive cardiac history and possible asthma exacerbation.

## 2021-03-18 LAB
APPEARANCE UR: CLEAR — SIGNIFICANT CHANGE UP
BILIRUB UR-MCNC: NEGATIVE — SIGNIFICANT CHANGE UP
COLOR SPEC: COLORLESS — SIGNIFICANT CHANGE UP
DIFF PNL FLD: NEGATIVE — SIGNIFICANT CHANGE UP
GLUCOSE BLDC GLUCOMTR-MCNC: 152 MG/DL — HIGH (ref 70–99)
GLUCOSE BLDC GLUCOMTR-MCNC: 238 MG/DL — HIGH (ref 70–99)
GLUCOSE BLDC GLUCOMTR-MCNC: 243 MG/DL — HIGH (ref 70–99)
GLUCOSE BLDC GLUCOMTR-MCNC: 286 MG/DL — HIGH (ref 70–99)
GLUCOSE UR QL: NEGATIVE — SIGNIFICANT CHANGE UP
KETONES UR-MCNC: NEGATIVE — SIGNIFICANT CHANGE UP
LEUKOCYTE ESTERASE UR-ACNC: NEGATIVE — SIGNIFICANT CHANGE UP
NITRITE UR-MCNC: NEGATIVE — SIGNIFICANT CHANGE UP
NT-PROBNP SERPL-SCNC: 3784 PG/ML — HIGH (ref 0–300)
PH UR: 6 — SIGNIFICANT CHANGE UP (ref 5–8)
PROT UR-MCNC: NEGATIVE — SIGNIFICANT CHANGE UP
SP GR SPEC: 1.01 — LOW (ref 1.01–1.02)
UROBILINOGEN FLD QL: NEGATIVE — SIGNIFICANT CHANGE UP

## 2021-03-18 PROCEDURE — 70450 CT HEAD/BRAIN W/O DYE: CPT | Mod: 26

## 2021-03-18 PROCEDURE — 93306 TTE W/DOPPLER COMPLETE: CPT | Mod: 26

## 2021-03-18 PROCEDURE — 93880 EXTRACRANIAL BILAT STUDY: CPT | Mod: 26

## 2021-03-18 PROCEDURE — 99233 SBSQ HOSP IP/OBS HIGH 50: CPT

## 2021-03-18 RX ORDER — FUROSEMIDE 40 MG
20 TABLET ORAL ONCE
Refills: 0 | Status: COMPLETED | OUTPATIENT
Start: 2021-03-18 | End: 2021-03-18

## 2021-03-18 RX ADMIN — ATORVASTATIN CALCIUM 40 MILLIGRAM(S): 80 TABLET, FILM COATED ORAL at 21:11

## 2021-03-18 RX ADMIN — Medication 3: at 17:27

## 2021-03-18 RX ADMIN — Medication 100 MILLIGRAM(S): at 17:23

## 2021-03-18 RX ADMIN — TAMSULOSIN HYDROCHLORIDE 0.4 MILLIGRAM(S): 0.4 CAPSULE ORAL at 21:11

## 2021-03-18 RX ADMIN — Medication 2: at 13:14

## 2021-03-18 RX ADMIN — Medication 100 MILLIGRAM(S): at 06:34

## 2021-03-18 RX ADMIN — ISOSORBIDE MONONITRATE 120 MILLIGRAM(S): 60 TABLET, EXTENDED RELEASE ORAL at 13:14

## 2021-03-18 RX ADMIN — PANTOPRAZOLE SODIUM 40 MILLIGRAM(S): 20 TABLET, DELAYED RELEASE ORAL at 06:34

## 2021-03-18 RX ADMIN — GABAPENTIN 300 MILLIGRAM(S): 400 CAPSULE ORAL at 17:23

## 2021-03-18 RX ADMIN — Medication 20 MILLIGRAM(S): at 17:23

## 2021-03-18 RX ADMIN — INSULIN GLARGINE 7 UNIT(S): 100 INJECTION, SOLUTION SUBCUTANEOUS at 21:47

## 2021-03-18 RX ADMIN — Medication 1: at 09:25

## 2021-03-18 RX ADMIN — GABAPENTIN 300 MILLIGRAM(S): 400 CAPSULE ORAL at 06:35

## 2021-03-18 RX ADMIN — Medication 100 MILLIGRAM(S): at 02:07

## 2021-03-18 RX ADMIN — FINASTERIDE 5 MILLIGRAM(S): 5 TABLET, FILM COATED ORAL at 13:13

## 2021-03-18 NOTE — CHART NOTE - NSCHARTNOTEFT_GEN_A_CORE
While there is no absolute contraindication to systemic anticoagulation or dual antiplatelet therapy there does exist an increased risk of intracranial hemorrhage which can result in significant morbidity including but not limited to headache, seizure, stroke, paralysis, and in severe cases even death.    The risks and benefits of starting systemic anticoagulation must be considered carefully in the setting of the patients medical history and current clinical condition.     If AC is started...  - Recommend NO bolus with low ptt goal of ~60-65  - Recommend obtaining follow up CTH after patient is therapeutic and to notify neurosurgery immediately with any changes in the patients neurologic exam

## 2021-03-18 NOTE — CONSULT NOTE ADULT - SUBJECTIVE AND OBJECTIVE BOX
[facility]        Patient is a 76y old  Male who presents with a chief complaint of One day of dyspnoea. (18 Mar 2021 15:05)    Excerpt from H&P,"  HPI:    75 y/o M--history and Medex review from patient and from patient's adult daughter above by phone--patient with a history of CAD with PCI to the LAD and RCA (last in ) and known three vessel disease, essential HTN, chronic subdural haematoma from last year from a fall in his driveway, S/P embolization of the middle meningeal artery, recently discharged from [facility] on 2021, with recent cardiac catheterization in 2021 with patient presently unable to pursue CABG due to inability to anticoagulation in the context of recent arterial embolization.     (17 Mar 2021 21:09)           *****PAST MEDICAL / Surgical  HISTORY:  PAST MEDICAL & SURGICAL HISTORY:  BPH (benign prostatic hypertrophy)    Hyperlipidemia    CAD (coronary artery disease)    Diabetes mellitus  Type 2    HTN (hypertension)    S/P drug eluting coronary stent placement  [facility]    S/P primary angioplasty with coronary stent  1990s             *****FAMILY HISTORY:  FAMILY HISTORY:  Family history of diabetes mellitus (Sibling)  3 brothers alive with Diabetes             *****SOCIAL HISTORY:  Alcohol: None  Smoking: None         *****ALLERGIES:   Allergies    No Known Allergies    Intolerances             *****MEDICATIONS: current medication reviewed and documented.   MEDICATIONS  (STANDING):  atorvastatin 40 milliGRAM(s) Oral at bedtime  dextrose 40% Gel 15 Gram(s) Oral once  dextrose 5%. 1000 milliLiter(s) (50 mL/Hr) IV Continuous <Continuous>  dextrose 5%. 1000 milliLiter(s) (100 mL/Hr) IV Continuous <Continuous>  dextrose 50% Injectable 25 Gram(s) IV Push once  dextrose 50% Injectable 12.5 Gram(s) IV Push once  dextrose 50% Injectable 25 Gram(s) IV Push once  finasteride 5 milliGRAM(s) Oral daily  gabapentin 300 milliGRAM(s) Oral two times a day  glucagon  Injectable 1 milliGRAM(s) IntraMuscular once  insulin glargine Injectable (LANTUS) 7 Unit(s) SubCutaneous at bedtime  insulin lispro (ADMELOG) corrective regimen sliding scale   SubCutaneous three times a day before meals  insulin lispro (ADMELOG) corrective regimen sliding scale   SubCutaneous at bedtime  isosorbide   mononitrate ER Tablet (IMDUR) 120 milliGRAM(s) Oral daily  metoprolol tartrate 100 milliGRAM(s) Oral two times a day  pantoprazole    Tablet 40 milliGRAM(s) Oral before breakfast  ranolazine 500 milliGRAM(s) Oral two times a day  tamsulosin 0.4 milliGRAM(s) Oral at bedtime    MEDICATIONS  (PRN):  guaiFENesin   Syrup  (Sugar-Free) 100 milliGRAM(s) Oral every 6 hours PRN Cough           *****REVIEW OF SYSTEM:  GEN: no fever, no chills, no pain  RESP: no SOB, no cough, no sputum  CVS: no chest pain, no palpitations, no edema  GI: no abdominal pain, no nausea, no vomiting, no constipation, no diarrhea  : no dysurea, no frequency, no hematurea  Neuro: no headache, no dizziness  PSYCH: no anxiety, no depression  Derm : no itching, no rash         *****VITAL SIGNS:  T(C): 36.4 (21 @ 12:01), Max: 37 (21 @ 20:10)  HR: 76 (21 @ 12:01) (69 - 87)  BP: 130/76 (21 @ 12:01) (105/62 - 135/74)  RR: 18 (21 @ 12:01) (18 - 21)  SpO2: 98% (21 @ 12:01) (98% - 100%)  Wt(kg): --     07:01  -   @ 07:00  --------------------------------------------------------  IN: 240 mL / OUT: 550 mL / NET: -310 mL     @ 07:01  -   @ 15:48  --------------------------------------------------------  IN: 480 mL / OUT: 0 mL / NET: 480 mL             *****PHYSICAL EXAM:   Alert oriented x 3   Attention comprehension are fair. Able to name, repeat, read without any difficulty.   Able to follow 3 step commands.     EOMI fundi not visualized,  VFF to confrontration  No facial asymmetry   Tongue is midline   Palate elevates symmetrically   Moving all 4 ext symmetrically no pronator drift   Reflexes are symmetric throughout   sensation is grossly symmetric  Gait : not assessed.  B/L down going toes               *****LAB AND IMAGIN.2   13.12 )-----------( 311      ( 17 Mar 2021 16:58 )             43.1               03-17    132<L>  |  96  |  17  ----------------------------<  160<H>  5.0   |  21<L>  |  1.26    Ca    10.3      17 Mar 2021 16:58    TPro  7.0  /  Alb  3.6  /  TBili  1.0  /  DBili  x   /  AST  26  /  ALT  40  /  AlkPhos  78  03-17    PT/INR - ( 17 Mar 2021 22:52 )   PT: 12.5 sec;   INR: 1.04 ratio         PTT - ( 17 Mar 2021 22:52 )  PTT:29.9 sec                        Urinalysis Basic - ( 18 Mar 2021 00:19 )    Color: Colorless / Appearance: Clear / S.006 / pH: x  Gluc: x / Ketone: Negative  / Bili: Negative / Urobili: Negative   Blood: x / Protein: Negative / Nitrite: Negative   Leuk Esterase: Negative / RBC: x / WBC x   Sq Epi: x / Non Sq Epi: x / Bacteria: x        [All pertinent recent Imaging reports reviewed]         *****A S S E S S M E N T   A N D   P L A N :       75 y/o M--history and Medex review from patient and from patient's adult daughter above by phone--patient with a history of CAD with PCI to the LAD and RCA (last in ) and known three vessel disease, essential HTN, chronic subdural haematoma from last year from a fall in his driveway, S/P embolization of the middle meningeal artery, recently discharged from [facility] on 2021, with recent cardiac catheterization in 2021 with patient presently unable to pursue CABG due to inability to anticoagulation in the context of recent arterial embolization.     Problem/Recommendations 1: chronic sdh   s/p mma embolization   ct stable   lower risk of bleeding.  ct surgery input   neurosurgery input   bp goal normotensive         Problem/Recommendations 2: chest pain   defer to card/ct surgery        ___________________________  Will follow with you.  Thank you,  Elissa García MD  Diplomate of the American Board of Neurology and Psychiatry.  Diplomate of the American Board of Vascular Neurology.   [facility] ([facility]), [facility]   Ph: [phone]    Differential diagnosis and plan of care discussed with patient after the evaluation.   Advanced care planning options discussed.   Pain assessed and judicious use of narcotics when appropriate was discussed.  Importance of Fall prevention discussed.  Counseling on Smoking and Alcohol cessation was offered when appropriate.  Counseling on Diet, exercise, and medication compliance was done.   83 minutes spent on the total encounter;  more than 50 % of the visit was spent on counseling  and or coordinating care by the attending physician.    Thank you for allowing me to participate in the care of this дмитрий patient. Please do not hesitate to call me if you have any questions.     This and subsequent notes were partially created using voice recognition software and will  inherently be subject to errors including those of syntax and sound alike substitutions which may escape proofreading. In such instances original meaning may be extrapolated by contextual derivation.   
per HPI:        CT Surgery consulted for CABG evaluation     Past Medical History  BPH (benign prostatic hypertrophy)    Hyperlipidemia    CAD (coronary artery disease)    Diabetes mellitus  Type 2    HTN (hypertension)        Past Surgical History  S/P drug eluting coronary stent placement  [facility]    S/P primary angioplasty with coronary stent  1990s        MEDICATIONS  (STANDING):  atorvastatin 40 milliGRAM(s) Oral at bedtime  dextrose 40% Gel 15 Gram(s) Oral once  dextrose 5%. 1000 milliLiter(s) (50 mL/Hr) IV Continuous <Continuous>  dextrose 5%. 1000 milliLiter(s) (100 mL/Hr) IV Continuous <Continuous>  dextrose 50% Injectable 25 Gram(s) IV Push once  dextrose 50% Injectable 12.5 Gram(s) IV Push once  dextrose 50% Injectable 25 Gram(s) IV Push once  finasteride 5 milliGRAM(s) Oral daily  furosemide   Injectable 20 milliGRAM(s) IV Push once  gabapentin 300 milliGRAM(s) Oral two times a day  glucagon  Injectable 1 milliGRAM(s) IntraMuscular once  insulin lispro (ADMELOG) corrective regimen sliding scale   SubCutaneous three times a day before meals  insulin lispro (ADMELOG) corrective regimen sliding scale   SubCutaneous at bedtime  isosorbide   mononitrate ER Tablet (IMDUR) 120 milliGRAM(s) Oral daily  metoprolol tartrate 100 milliGRAM(s) Oral two times a day  pantoprazole    Tablet 40 milliGRAM(s) Oral before breakfast  ranolazine 500 milliGRAM(s) Oral two times a day  tamsulosin 0.4 milliGRAM(s) Oral at bedtime    MEDICATIONS  (PRN):      Vital Signs Last 24 Hrs  T(C): 36.6 (03-17-21 @ 21:04), Max: 37 (03-17-21 @ 15:39)  T(F): 97.9 (03-17-21 @ 21:04), Max: 98.6 (03-17-21 @ 15:39)  HR: 87 (03-17-21 @ 21:04) (76 - 87)  BP: 135/74 (03-17-21 @ 21:04) (128/73 - 135/74)  RR: 20 (03-17-21 @ 21:04) (18 - 21)  SpO2: 98% (03-17-21 @ 21:04) (97% - 100%)           Daily Height in cm: 170.18 (17 Mar 2021 15:39)    Daily   Admit Wt: Drug Dosing Weight  Height (cm): 170.2 (17 Mar 2021 15:39)  Weight (kg): 68 (17 Mar 2021 15:39)  BMI (kg/m2): 23.5 (17 Mar 2021 15:39)  BSA (m2): 1.79 (17 Mar 2021 15:39)    Allergies: No Known Allergies      Relevant Family History  FAMILY HISTORY:  Family history of diabetes mellitus (Sibling)  3 brothers alive with Diabetes        Review of Systems  GENERAL:  no weakness, fatigue, fevers or chills  NEURO: no dizziness, numbness, tingling or weakness  SKIN: no itching, burning, rashes, or lesions   HEENT: no visual changes;  no headache, no vertigo, no recent colds  RESPIRATORY: no shortness of breath, no cough, sputum, wheezing, hemoptysis;   CARDIOVASCULAR:  no chest pain,  or palpitations  GI: no abd pain. no N/V/D.  PERIPHERAL VASCULAR: no swelling, no tenderness, no erythema, no varicose veins.     PHYSICAL EXAM  General: Well nourished, well developed, NAD.                                              Neuro: Normal exam oriented to person/place & time with no focal motor or sensory  deficits.                    Eyes: Normal exam of conjunctiva & lids, pupils equally reactive.   ENT: Normal exam of nasal/oral mucosa with absence of cyanosis.   Neck: Normal exam of jugular veins, trachea & thyroid.   Chest: Normal lung exam with good air movement absence of wheezes, rales, or rhonchi:                                                                          CV:  Auscultation: normal S1S2, regular  NO Murmurs                                                                      GI: Normal exam of abdomen with no noted masses or tenderness. +[id number]                                                                                            Extremities: Normal no evidence of cyanosis or deformity, Edema: none  SKIN : Normal exam to inspection & palpation.                                                           LABS:                        14.2   13.12 )-----------( 311      ( 17 Mar 2021 16:58 )             43.1     03-17    132<L>  |  96  |  17  ----------------------------<  160<H>  5.0   |  21<L>  |  1.26    Ca    10.3      17 Mar 2021 16:58    TPro  7.0  /  Alb  3.6  /  TBili  1.0  /  DBili  x   /  AST  26  /  ALT  40  /  AlkPhos  78  03-17        < from: IR Neuro (03.01.21 @ 10:29) >    Impression: Successful embolization of the left middle meningeal artery using 100-300um Embospheres and coils.    < end of copied text >      
Patient seen and evaluated at bedside    Chief Complaint: dyspnea    HPI: 74y M with PMH CAD s/p PCI to the LAD and RCA in the past (last angiogram in 2015 with [facility], known 3VD), HTN, & HLD. 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 Patient w/ known coronary dz, unable to pursue CABG due to inability to anticoagulate in the setting of recent intracranial arterial embolization. Denies CP, f/c/n/v/d/, ab pain, dysuria, sick contacts.    PMHx:   BPH (benign prostatic hypertrophy)    Hyperlipidemia    CAD (coronary artery disease)    Diabetes mellitus    HTN (hypertension)      PSHx:   S/P drug eluting coronary stent placement    S/P primary angioplasty with coronary stent      FAMILY HISTORY:  Family history of diabetes mellitus (Sibling)  3 brothers alive with Diabetes      Allergies:  No Known Allergies    Home Medications:  atorvastatin 40 mg oral tablet: 1 tab(s) orally once a day (at bedtime) (26 Feb 2021 10:59)  gabapentin 300 mg oral capsule: 1 cap(s) orally 2 times a day (26 Feb 2021 10:59)  Janumet 50 mg-1000 mg oral tablet: 1 tab(s) orally 2 times a day (26 Feb 2021 10:59)  Lantus 100 units/mL subcutaneous solution: 32 unit(s) subcutaneous once a day (at bedtime) (26 Feb 2021 10:59)  nitroglycerin 0.4 mg sublingual tablet: 1 tab(s) sublingual every 5 minutes as needed for chest pain. Call 911 if pain not relieved by 2 doses. (09 Mar 2021 15:55)    Current Medications:     Social History  Smoking History: denies  Alcohol Use: denies  Drug Use: denies    REVIEW OF SYSTEMS:  Constitutional:     [X] negative [ ] fevers [ ] chills [ ] weight loss [ ] weight gain  HEENT:                  [X] negative [ ] dry eyes [ ] eye irritation [ ] postnasal drip [ ] nasal congestion  CV:                         [X] negative  [ ] chest pain [ ] orthopnea [ ] palpitations [ ] murmur  Resp:                     [ ] negative [ ] cough [X] shortness of breath [ ] wheezing [ ] sputum [ ] hemoptysis  GI:                          [X] negative [ ] nausea [ ] vomiting [ ] diarrhea [ ] constipation [ ] abd pain [ ] dysphagia   :                        [X] negative [ ] dysuria [ ] nocturia [ ] hematuria [ ] increased urinary frequency  MSK:                      [X] negative [ ] back pain [ ] myalgias [ ] arthralgias [ ] fracture  Skin:                       [X] negative [ ] rash [ ] itch  Neuro:                   [X] negative [ ] headache [ ] dizziness [ ] syncope [ ] weakness [ ] numbness  Psych:                    [X] negative [ ] anxiety [ ] depression  Endo:                     [X] negative [ ] diabetes [ ] thyroid problem  Heme/Lymph:      [X] negative [ ] anemia [ ] bleeding problem  Allergic/Immune: [X] negative [ ] itchy eyes [ ] nasal discharge [ ] hives [ ] angioedema    [X] All other systems negative or otherwise described above.  [ ] Unable to assess ROS because ________.    ICU Vital Signs Last 24 Hrs  T(C): 37 (17 Mar 2021 15:39), Max: 37 (17 Mar 2021 15:39)  T(F): 98.6 (17 Mar 2021 15:39), Max: 98.6 (17 Mar 2021 15:39)  HR: 76 (17 Mar 2021 16:10) (76 - 77)  BP: 129/75 (17 Mar 2021 16:10) (128/73 - 129/75)  BP(mean): 91 (17 Mar 2021 16:10) (91 - 91)  ABP: --  ABP(mean): --  RR: 18 (17 Mar 2021 16:10) (18 - 20)  SpO2: 98% (17 Mar 2021 16:10) (97% - 98%)    Daily Height in cm: 170.18 (17 Mar 2021 15:39)    Daily     Physical Exam:  GENERAL: No acute distress, well-developed  HEAD:  Atraumatic, Normocephalic  ENT: EOMI, conjunctiva and sclera clear, Neck supple, No JVD, moist mucosa  CHEST/LUNG: Clear to auscultation bilaterally; No wheeze, equal breath sounds bilaterally   BACK: No spinal tenderness  HEART: Regular rate and rhythm; No murmurs, rubs, or gallops, radial and DP 2+ b/l, euvolemic  ABDOMEN: Soft, Nontender, Nondistended  EXTREMITIES:  No clubbing, cyanosis, or edema  PSYCH: Nl behavior, nl affect  NEUROLOGY: AAOx3, non-focal  SKIN: Normal color, No rashes or lesions  LINES: no central lines present    Cardiovascular Diagnostic Testing    ECG: Personally reviewed    Echo: Personally reviewed  < from: TTE with Doppler (w/Cont) (02.23.21 @ 06:00) >  Conclusions:  1. Moderately dilated left atrium.  LA volume index = 48  cc/m2.  2. Endocardium not well visualized; despite the use of  Definity.  Probable septal hypokinesis. Septal motion is  consistent with LBBB.  ------------------------------------------------------------------------  Confirmed on  2/23/2021 - 10:36:03 by LONI Black  ------------------------------------------------------------------------    < end of copied text >      Stress Testing:   < from: Nuclear Stress Test-Pharmacologic (02.22.15 @ 12:15) >  IMPRESSIONS:Abnormal Study  * Chest Pain: No chest pain with administration of  Regadenoson.  * Symptom: No Symptom.  * HR Response: Appropriate.  * BP Response: Appropriate.  * Heart Rhythm: Normal Sinus Rhythm - 71 BPM - 1st Degree  Heart Block.  * ECG Abnormalities: ECG changes could not be interpreted  due to bundle branch block.  * Arrhythmia: None.  * Myocardial Perfusion SPECT results are abnormal.  * Review of raw data shows: The study is of good technical  quality.  * There is a small, mild defect in distal anteroseptal  wall that isfixed, suggestive of infarct. There was mild  improvement with prone imaging.  * There are moderate defects of the basal to mid inferior,  basal to mid inferolateral, basal to mid inferoseptal, and  basal to mid lateral  walls that are predominantly fixed  and predominantly corrects with prone imaging, suggestive  of diaphragmatic attenuation artifact with infarct of the  basal inferolateral and basal to mid lateral walls with  mild raul-infarct ischemia.  * Post-stress gated wall motion analysiswas performed  (LVEF = 59 %;LVEDV = 80 ml.) revealing overall preserved  left ventricular ejection fraction with hypokinesis of the  basal to mid lateral walls and mild hypokinesis of the  basal inferolateral wall.  * Artifact was present as noted above.  ------------------------------------------------------------------------  Confirmed on  2/22/2015 - 15:23:15 by Elias Thakur M.D.  ------------------------------------------------------------------------    < end of copied text >      Cath:   < from: Cardiac Cath Lab - Adult (02.26.21 @ 16:51) >  CORONARY VESSELS: The coronary circulation is right dominant.  LM:   --  Mid left main: There was a 50 % stenosis.  LAD:   --  Mid LAD: There was a 90 % stenosis.  CX:   --  Ostial circumflex: There was a 90 % stenosis.  RI:   --  Proximal ramus intermedius: There was a 70 % stenosis.  RCA:   --  Proximal RCA: There was a 80 % stenosis.  COMPLICATIONS: There were no complications.  DIAGNOSTIC RECOMMENDATIONS: Severe multivessel disease- recommend CTS  evaluation for CABG. This will need to be done in coordination with  neurosurgery, neurology given patient's subdural hematoma and risk of  anticoagulation on pump. Continue to hold DAPT/AC as management discussed.  INTERVENTIONAL RECOMMENDATIONS: Severe multivessel disease- recommend CTS  evaluation for CABG. This will needto be done in coordination with  neurosurgery, neurology given patient's subdural hematoma and risk of  anticoagulation on pump. Continue to hold DAPT/AC as management discussed.  Prepared and signed by  Eddie Stokes M.D.  Signed 02/26/2021 21:58:17    < end of copied text >      Imaging: none    CXR: Personally reviewed    Labs: Personally reviewed                        14.2   13.12 )-----------( 311      ( 17 Mar 2021 16:58 )             43.1

## 2021-03-18 NOTE — PROGRESS NOTE ADULT - SUBJECTIVE AND OBJECTIVE BOX
Patient is a 76y old  Male who presents with a chief complaint of One day of dyspnoea. (18 Mar 2021 09:47)    Date of servie : 21 @ 15:05  INTERVAL HPI/OVERNIGHT EVENTS:  T(C): 36.4 (21 @ 12:01), Max: 37 (21 @ 15:39)  HR: 76 (21 @ 12:01) (69 - 87)  BP: 130/76 (21 @ 12:01) (105/62 - 135/74)  RR: 18 (21 @ 12:01) (18 - 21)  SpO2: 98% (21 @ 12:01) (97% - 100%)  Wt(kg): --  I&O's Summary    17 Mar 2021 07:  -  18 Mar 2021 07:00  --------------------------------------------------------  IN: 240 mL / OUT: 550 mL / NET: -310 mL    18 Mar 2021 07:01  -  18 Mar 2021 15:05  --------------------------------------------------------  IN: 480 mL / OUT: 0 mL / NET: 480 mL        LABS:                        14.2   13.12 )-----------( 311      ( 17 Mar 2021 16:58 )             43.1     0317    132<L>  |  96  |  17  ----------------------------<  160<H>  5.0   |  21<L>  |  1.26    Ca    10.3      17 Mar 2021 16:58    TPro  7.0  /  Alb  3.6  /  TBili  1.0  /  DBili  x   /  AST  26  /  ALT  40  /  AlkPhos  78  03-17    PT/INR - ( 17 Mar 2021 22:52 )   PT: 12.5 sec;   INR: 1.04 ratio         PTT - ( 17 Mar 2021 22:52 )  PTT:29.9 sec  Urinalysis Basic - ( 18 Mar 2021 00:19 )    Color: Colorless / Appearance: Clear / S.006 / pH: x  Gluc: x / Ketone: Negative  / Bili: Negative / Urobili: Negative   Blood: x / Protein: Negative / Nitrite: Negative   Leuk Esterase: Negative / RBC: x / WBC x   Sq Epi: x / Non Sq Epi: x / Bacteria: x      CAPILLARY BLOOD GLUCOSE      POCT Blood Glucose.: 243 mg/dL (18 Mar 2021 12:58)  POCT Blood Glucose.: 152 mg/dL (18 Mar 2021 08:52)  POCT Blood Glucose.: 186 mg/dL (17 Mar 2021 21:48)        Urinalysis Basic - ( 18 Mar 2021 00:19 )    Color: Colorless / Appearance: Clear / S.006 / pH: x  Gluc: x / Ketone: Negative  / Bili: Negative / Urobili: Negative   Blood: x / Protein: Negative / Nitrite: Negative   Leuk Esterase: Negative / RBC: x / WBC x   Sq Epi: x / Non Sq Epi: x / Bacteria: x        MEDICATIONS  (STANDING):  atorvastatin 40 milliGRAM(s) Oral at bedtime  dextrose 40% Gel 15 Gram(s) Oral once  dextrose 5%. 1000 milliLiter(s) (50 mL/Hr) IV Continuous <Continuous>  dextrose 5%. 1000 milliLiter(s) (100 mL/Hr) IV Continuous <Continuous>  dextrose 50% Injectable 25 Gram(s) IV Push once  dextrose 50% Injectable 12.5 Gram(s) IV Push once  dextrose 50% Injectable 25 Gram(s) IV Push once  finasteride 5 milliGRAM(s) Oral daily  gabapentin 300 milliGRAM(s) Oral two times a day  glucagon  Injectable 1 milliGRAM(s) IntraMuscular once  insulin glargine Injectable (LANTUS) 7 Unit(s) SubCutaneous at bedtime  insulin lispro (ADMELOG) corrective regimen sliding scale   SubCutaneous three times a day before meals  insulin lispro (ADMELOG) corrective regimen sliding scale   SubCutaneous at bedtime  isosorbide   mononitrate ER Tablet (IMDUR) 120 milliGRAM(s) Oral daily  metoprolol tartrate 100 milliGRAM(s) Oral two times a day  pantoprazole    Tablet 40 milliGRAM(s) Oral before breakfast  ranolazine 500 milliGRAM(s) Oral two times a day  tamsulosin 0.4 milliGRAM(s) Oral at bedtime    MEDICATIONS  (PRN):  guaiFENesin   Syrup  (Sugar-Free) 100 milliGRAM(s) Oral every 6 hours PRN Cough          PHYSICAL EXAM:  GENERAL: NAD, well-groomed, well-developed  HEAD:  Atraumatic, Normocephalic  CHEST/LUNG: Clear to percussion bilaterally; No rales, rhonchi, wheezing, or rubs  HEART: Regular rate and rhythm; No murmurs, rubs, or gallops  ABDOMEN: Soft, Nontender, Nondistended; Bowel sounds present  EXTREMITIES:  2+ Peripheral Pulses, No clubbing, cyanosis, or edema  LYMPH: No lymphadenopathy noted  SKIN: No rashes or lesions    Care Discussed with Consultants/Other Providers [ ] YES  [ ] NO

## 2021-03-18 NOTE — PROGRESS NOTE ADULT - ASSESSMENT
74y M with PMH CAD s/p PCI to the LAD and RCA in the past (last angiogram in 2015 with [facility], known 3VD), HTN, & HLD. 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     Plan  -dyspnea is in the setting of known multivessel dz; poor PCI/CABG candidate at this time unless cleared by NSGY  -mild leukocytosis; would r/o underlying infxn  -will require NSGY clearance for antiplatelet/anticoaulation prior to any procedure  -will require CTsx eval  -patient well appearing today from a pulmonary standpoint; would administer additional IV lasix 20mg  -increase to lipitor 80  -start losartan 25mg tomorrow if Cr remains normal    Franco Barrett MD  Cardiology Fellow - F1  Text or Call: [phone]  For all New Consults and Questions:  [email or web address]   Login: [email or web address]

## 2021-03-18 NOTE — PROGRESS NOTE ADULT - ASSESSMENT
Problem/Plan - 1:  ·  Problem: ACS (acute coronary syndrome).  Plan: See above.   telemonitor  \cards fu  awaiting CABG    Problem/Plan - 2:  ·  Problem: Type 2 diabetes mellitus with hyperglycemia, with long-term current use of insulin.  Plan: See above.   High doses of home Lantus at home.   Random glucose of 160 in the ER.   Will HOLD oral Rx and provide conservative bedtime Lantus of 0.1U/Kg/24H.   monitor FSA    Problem/Plan - 3:  ·  Problem: Chronic subdural hematoma.  Plan:CHECK ct hEAD     NS to review with CTS, cardiology the issue of CABG.     Problem/Plan - 4:  ·  Problem: Need for prophylactic measure.  Plan: ELIESER for now with active intracranial process precludes pharmacologic DVT prophylaxis.

## 2021-03-18 NOTE — PROGRESS NOTE ADULT - SUBJECTIVE AND OBJECTIVE BOX
INTERVAL EVENTS: No o/n events. Reports improved dyspnea. Denies CP, palpitations, presyncope, syncope, f/c/n/v.     REVIEW OF SYSTEMS:  Constitutional:     [X] negative [ ] fevers [ ] chills [ ] weight loss [ ] weight gain  HEENT:                  [X] negative [ ] dry eyes [ ] eye irritation [ ] postnasal drip [ ] nasal congestion  CV:                         [X] negative  [ ] chest pain [ ] orthopnea [ ] palpitations [ ] murmur  Resp:                     [X] negative [ ] cough [ ] shortness of breath [ ] dyspnea [ ] wheezing [ ] sputum [ ] hemoptysis  GI:                          [X] negative [ ] nausea [ ] vomiting [ ] diarrhea [ ] constipation [ ] abd pain [ ] dysphagia   :                        [X] negative [ ] dysuria [ ] nocturia [ ] hematuria [ ] increased urinary frequency  MSK:                      [X] negative [ ] back pain [ ] myalgias [ ] arthralgias [ ] fracture  Skin:                       [X] negative [ ] rash [ ] itch  Neuro:                   [X] negative [ ] headache [ ] dizziness [ ] syncope [ ] weakness [ ] numbness  Psych:                    [X] negative [ ] anxiety [ ] depression  Endo:                     [X] negative [ ] diabetes [ ] thyroid problem  Heme/Lymph:      [X] negative [ ] anemia [ ] bleeding problem  Allergic/Immune: [X] negative [ ] itchy eyes [ ] nasal discharge [ ] hives [ ] angioedema    [X] All other systems negative or otherwise described above.  [ ] Unable to assess ROS because ________.    PAST MEDICAL & SURGICAL HISTORY:  BPH (benign prostatic hypertrophy)    Hyperlipidemia    CAD (coronary artery disease)    Diabetes mellitus  Type 2    HTN (hypertension)    S/P drug eluting coronary stent placement  [facility]    S/P primary angioplasty with coronary stent  1990s      MEDICATIONS  (STANDING):  atorvastatin 40 milliGRAM(s) Oral at bedtime  dextrose 40% Gel 15 Gram(s) Oral once  dextrose 5%. 1000 milliLiter(s) (50 mL/Hr) IV Continuous <Continuous>  dextrose 5%. 1000 milliLiter(s) (100 mL/Hr) IV Continuous <Continuous>  dextrose 50% Injectable 25 Gram(s) IV Push once  dextrose 50% Injectable 12.5 Gram(s) IV Push once  dextrose 50% Injectable 25 Gram(s) IV Push once  finasteride 5 milliGRAM(s) Oral daily  gabapentin 300 milliGRAM(s) Oral two times a day  glucagon  Injectable 1 milliGRAM(s) IntraMuscular once  insulin glargine Injectable (LANTUS) 7 Unit(s) SubCutaneous at bedtime  insulin lispro (ADMELOG) corrective regimen sliding scale   SubCutaneous three times a day before meals  insulin lispro (ADMELOG) corrective regimen sliding scale   SubCutaneous at bedtime  isosorbide   mononitrate ER Tablet (IMDUR) 120 milliGRAM(s) Oral daily  metoprolol tartrate 100 milliGRAM(s) Oral two times a day  pantoprazole    Tablet 40 milliGRAM(s) Oral before breakfast  ranolazine 500 milliGRAM(s) Oral two times a day  tamsulosin 0.4 milliGRAM(s) Oral at bedtime    MEDICATIONS  (PRN):  guaiFENesin   Syrup  (Sugar-Free) 100 milliGRAM(s) Oral every 6 hours PRN Cough    ICU Vital Signs Last 24 Hrs  T(C): 36.7 (18 Mar 2021 09:10), Max: 37 (17 Mar 2021 15:39)  T(F): 98 (18 Mar 2021 09:10), Max: 98.6 (17 Mar 2021 15:39)  HR: 69 (18 Mar 2021 09:10) (69 - 87)  BP: 112/68 (18 Mar 2021 09:10) (105/62 - 135/74)  BP(mean): 86 (17 Mar 2021 20:10) (86 - 91)  ABP: --  ABP(mean): --  RR: 18 (18 Mar 2021 09:10) (18 - 21)  SpO2: 100% (18 Mar 2021 09:10) (97% - 100%)    Daily Height in cm: 170.18 (17 Mar 2021 23:11)    Daily Weight in k.6 (18 Mar 2021 04:24)    PHYSICAL EXAM:  GEN: Awake, alert. NAD.   HEENT: NCAT, PERRL, EOMI. Mucosa moist. No JVD.  RESP: CTA b/l  CV: RRR. Normal S1/S2. No m/r/g.  ABD: Soft. NT/ND. BS+  EXT: Warm. No edema, clubbing, or cyanosis.   NEURO: AAOx3. No focal deficits.     LABS:                        14.2   13.12 )-----------( 311      ( 17 Mar 2021 16:58 )             43.1         132<L>  |  96  |  17  ----------------------------<  160<H>  5.0   |  21<L>  |  1.26    Ca    10.3      17 Mar 2021 16:58    TPro  7.0  /  Alb  3.6  /  TBili  1.0  /  DBili  x   /  AST  26  /  ALT  40  /  AlkPhos  78          PT/INR - ( 17 Mar 2021 22:52 )   PT: 12.5 sec;   INR: 1.04 ratio         PTT - ( 17 Mar 2021 22:52 )  PTT:29.9 sec  Urinalysis Basic - ( 18 Mar 2021 00:19 )    Color: Colorless / Appearance: Clear / S.006 / pH: x  Gluc: x / Ketone: Negative  / Bili: Negative / Urobili: Negative   Blood: x / Protein: Negative / Nitrite: Negative   Leuk Esterase: Negative / RBC: x / WBC x   Sq Epi: x / Non Sq Epi: x / Bacteria: x      I&O's Summary    17 Mar 2021 07:01  -  18 Mar 2021 07:00  --------------------------------------------------------  IN: 240 mL / OUT: 550 mL / NET: -310 mL      BNPSerum Pro-Brain Natriuretic Peptide: 3784 pg/mL ( @ 07:01)  Serum Pro-Brain Natriuretic Peptide: 3853 pg/mL ( @ 16:58)      RADIOLOGY & ADDITIONAL STUDIES:    TELEMETRY: reviewed    EKG: reviewed

## 2021-03-19 LAB
ANION GAP SERPL CALC-SCNC: 13 MMOL/L — SIGNIFICANT CHANGE UP (ref 5–17)
BUN SERPL-MCNC: 16 MG/DL — SIGNIFICANT CHANGE UP (ref 7–23)
CALCIUM SERPL-MCNC: 9.6 MG/DL — SIGNIFICANT CHANGE UP (ref 8.4–10.5)
CHLORIDE SERPL-SCNC: 99 MMOL/L — SIGNIFICANT CHANGE UP (ref 96–108)
CO2 SERPL-SCNC: 25 MMOL/L — SIGNIFICANT CHANGE UP (ref 22–31)
COVID-19 SPIKE DOMAIN AB INTERP: NEGATIVE — SIGNIFICANT CHANGE UP
COVID-19 SPIKE DOMAIN ANTIBODY RESULT: 0.4 U/ML — SIGNIFICANT CHANGE UP
CREAT SERPL-MCNC: 1.22 MG/DL — SIGNIFICANT CHANGE UP (ref 0.5–1.3)
GLUCOSE BLDC GLUCOMTR-MCNC: 171 MG/DL — HIGH (ref 70–99)
GLUCOSE BLDC GLUCOMTR-MCNC: 267 MG/DL — HIGH (ref 70–99)
GLUCOSE BLDC GLUCOMTR-MCNC: 273 MG/DL — HIGH (ref 70–99)
GLUCOSE BLDC GLUCOMTR-MCNC: 286 MG/DL — HIGH (ref 70–99)
GLUCOSE SERPL-MCNC: 163 MG/DL — HIGH (ref 70–99)
HCT VFR BLD CALC: 37.2 % — LOW (ref 39–50)
HGB BLD-MCNC: 12.6 G/DL — LOW (ref 13–17)
MCHC RBC-ENTMCNC: 30.1 PG — SIGNIFICANT CHANGE UP (ref 27–34)
MCHC RBC-ENTMCNC: 33.9 GM/DL — SIGNIFICANT CHANGE UP (ref 32–36)
MCV RBC AUTO: 89 FL — SIGNIFICANT CHANGE UP (ref 80–100)
NRBC # BLD: 0 /100 WBCS — SIGNIFICANT CHANGE UP (ref 0–0)
PLATELET # BLD AUTO: 248 K/UL — SIGNIFICANT CHANGE UP (ref 150–400)
POTASSIUM SERPL-MCNC: 4.1 MMOL/L — SIGNIFICANT CHANGE UP (ref 3.5–5.3)
POTASSIUM SERPL-SCNC: 4.1 MMOL/L — SIGNIFICANT CHANGE UP (ref 3.5–5.3)
RBC # BLD: 4.18 M/UL — LOW (ref 4.2–5.8)
RBC # FLD: 12.9 % — SIGNIFICANT CHANGE UP (ref 10.3–14.5)
SARS-COV-2 IGG+IGM SERPL QL IA: 0.4 U/ML — SIGNIFICANT CHANGE UP
SARS-COV-2 IGG+IGM SERPL QL IA: NEGATIVE — SIGNIFICANT CHANGE UP
SODIUM SERPL-SCNC: 137 MMOL/L — SIGNIFICANT CHANGE UP (ref 135–145)
WBC # BLD: 7.24 K/UL — SIGNIFICANT CHANGE UP (ref 3.8–10.5)
WBC # FLD AUTO: 7.24 K/UL — SIGNIFICANT CHANGE UP (ref 3.8–10.5)

## 2021-03-19 PROCEDURE — 99233 SBSQ HOSP IP/OBS HIGH 50: CPT

## 2021-03-19 RX ORDER — FUROSEMIDE 40 MG
20 TABLET ORAL DAILY
Refills: 0 | Status: DISCONTINUED | OUTPATIENT
Start: 2021-03-19 | End: 2021-03-20

## 2021-03-19 RX ORDER — ACETAMINOPHEN 500 MG
650 TABLET ORAL ONCE
Refills: 0 | Status: COMPLETED | OUTPATIENT
Start: 2021-03-19 | End: 2021-03-19

## 2021-03-19 RX ADMIN — INSULIN GLARGINE 7 UNIT(S): 100 INJECTION, SOLUTION SUBCUTANEOUS at 22:09

## 2021-03-19 RX ADMIN — FINASTERIDE 5 MILLIGRAM(S): 5 TABLET, FILM COATED ORAL at 09:56

## 2021-03-19 RX ADMIN — Medication 3: at 17:54

## 2021-03-19 RX ADMIN — GABAPENTIN 300 MILLIGRAM(S): 400 CAPSULE ORAL at 17:54

## 2021-03-19 RX ADMIN — RANOLAZINE 500 MILLIGRAM(S): 500 TABLET, FILM COATED, EXTENDED RELEASE ORAL at 06:32

## 2021-03-19 RX ADMIN — Medication 100 MILLIGRAM(S): at 17:55

## 2021-03-19 RX ADMIN — Medication 100 MILLIGRAM(S): at 06:31

## 2021-03-19 RX ADMIN — Medication 650 MILLIGRAM(S): at 03:02

## 2021-03-19 RX ADMIN — Medication 1: at 22:08

## 2021-03-19 RX ADMIN — Medication 1: at 09:24

## 2021-03-19 RX ADMIN — PANTOPRAZOLE SODIUM 40 MILLIGRAM(S): 20 TABLET, DELAYED RELEASE ORAL at 06:32

## 2021-03-19 RX ADMIN — Medication 20 MILLIGRAM(S): at 10:00

## 2021-03-19 RX ADMIN — Medication 3: at 12:51

## 2021-03-19 RX ADMIN — Medication 650 MILLIGRAM(S): at 02:33

## 2021-03-19 RX ADMIN — TAMSULOSIN HYDROCHLORIDE 0.4 MILLIGRAM(S): 0.4 CAPSULE ORAL at 22:08

## 2021-03-19 RX ADMIN — ISOSORBIDE MONONITRATE 120 MILLIGRAM(S): 60 TABLET, EXTENDED RELEASE ORAL at 12:47

## 2021-03-19 RX ADMIN — GABAPENTIN 300 MILLIGRAM(S): 400 CAPSULE ORAL at 06:31

## 2021-03-19 NOTE — PROGRESS NOTE ADULT - ASSESSMENT
Problem/Plan - 1:  ·  Problem: ACS (acute coronary syndrome).  Plan: See above.   telemonitor  fu  awaiting CABG if cleared by NS     Problem/Plan - 2:  ·  Problem: Type 2 diabetes mellitus with hyperglycemia, with long-term current use of insulin.  Plan: See above.   High doses of home Lantus at home.   Random glucose of 160 in the ER.   Will HOLD oral Rx and provide conservative bedtime Lantus of 0.1U/Kg/24H.   monitor FSA    Problem/Plan - 3:  ·  Problem: Chronic subdural hematoma.  Plan:    NS to review with CTS, cardiology the issue of CABG.     Problem/Plan - 4:  ·  Problem: Need for prophylactic measure.  Plan: ELIESER for now with active intracranial process precludes pharmacologic DVT prophylaxis.

## 2021-03-19 NOTE — PROGRESS NOTE ADULT - SUBJECTIVE AND OBJECTIVE BOX
[facility]      Seen earlier today, and examined.  - Today, patient is without complaints.           *****MEDICATIONS: Current medication reviewed and documented.    MEDICATIONS  (STANDING):  atorvastatin 40 milliGRAM(s) Oral at bedtime  dextrose 40% Gel 15 Gram(s) Oral once  dextrose 5%. 1000 milliLiter(s) (50 mL/Hr) IV Continuous <Continuous>  dextrose 5%. 1000 milliLiter(s) (100 mL/Hr) IV Continuous <Continuous>  dextrose 50% Injectable 25 Gram(s) IV Push once  dextrose 50% Injectable 12.5 Gram(s) IV Push once  dextrose 50% Injectable 25 Gram(s) IV Push once  finasteride 5 milliGRAM(s) Oral daily  furosemide   Injectable 20 milliGRAM(s) IV Push daily  gabapentin 300 milliGRAM(s) Oral two times a day  glucagon  Injectable 1 milliGRAM(s) IntraMuscular once  insulin glargine Injectable (LANTUS) 7 Unit(s) SubCutaneous at bedtime  insulin lispro (ADMELOG) corrective regimen sliding scale   SubCutaneous three times a day before meals  insulin lispro (ADMELOG) corrective regimen sliding scale   SubCutaneous at bedtime  isosorbide   mononitrate ER Tablet (IMDUR) 120 milliGRAM(s) Oral daily  metoprolol tartrate 100 milliGRAM(s) Oral two times a day  pantoprazole    Tablet 40 milliGRAM(s) Oral before breakfast  ranolazine 500 milliGRAM(s) Oral two times a day  tamsulosin 0.4 milliGRAM(s) Oral at bedtime    MEDICATIONS  (PRN):  guaiFENesin   Syrup  (Sugar-Free) 100 milliGRAM(s) Oral every 6 hours PRN Cough          ***** VITAL SIGNS:  T(F): 97.6 (21 @ 12:20), Max: 98.4 (21 @ 00:00)  HR: 72 (21 @ 12:20) (56 - 76)  BP: 107/60 (21 @ 12:20) (103/60 - 118/68)  RR: 18 (21 @ 12:20) (18 - 18)  SpO2: 96% (21 @ 12:20) (94% - 99%)  Wt(kg): --  ,   I&O's Summary    18 Mar 2021 07:01  -  19 Mar 2021 07:00  --------------------------------------------------------  IN: 480 mL / OUT: 1375 mL / NET: -895 mL    19 Mar 2021 07:01  -  19 Mar 2021 16:02  --------------------------------------------------------  IN: 480 mL / OUT: 300 mL / NET: 180 mL             *****PHYSICAL EXAM: Alert oriented x 3   Attention comprehension are fair. Able to name, repeat, read without any difficulty.   Able to follow 3 step commands.     EOMI fundi not visualized,  VFF to confrontration  No facial asymmetry   Tongue is midline   Palate elevates symmetrically   Moving all 4 ext symmetrically no pronator drift   Reflexes are symmetric throughout   sensation is grossly symmetric  Gait : not assessed.  B/L down going toes        *****LAB AND IMAGIN.6   7.24  )-----------( 248      ( 19 Mar 2021 07:27 )             37.2               03-19    137  |  99  |  16  ----------------------------<  163<H>  4.1   |  25  |  1.22    Ca    9.6      19 Mar 2021 07:27    TPro  7.0  /  Alb  3.6  /  TBili  1.0  /  DBili  x   /  AST  26  /  ALT  40  /  AlkPhos  78  03-17    PT/INR - ( 17 Mar 2021 22:52 )   PT: 12.5 sec;   INR: 1.04 ratio         PTT - ( 17 Mar 2021 22:52 )  PTT:29.9 sec                   Urinalysis Basic - ( 18 Mar 2021 00:19 )    Color: Colorless / Appearance: Clear / S.006 / pH: x  Gluc: x / Ketone: Negative  / Bili: Negative / Urobili: Negative   Blood: x / Protein: Negative / Nitrite: Negative   Leuk Esterase: Negative / RBC: x / WBC x   Sq Epi: x / Non Sq Epi: x / Bacteria: x      [All pertinent recent Imaging/Reports reviewed]           *****A S S E S S M E N T   A N D   P L A N :      77 y/o M--history and Medex review from patient and from patient's adult daughter above by phone--patient with a history of CAD with PCI to the LAD and RCA (last in ) and known three vessel disease, essential HTN, chronic subdural haematoma from last year from a fall in his driveway, S/P embolization of the middle meningeal artery, recently discharged from [place] on 2021, with recent cardiac catheterization in 2021 with patient presently unable to pursue CABG due to inability to anticoagulation in the context of recent arterial embolization.     Problem/Recommendations 1: chronic sdh   s/p mma embolization   ct stable     ct surgery input   neurosurgery input   bp goal normotensive   neuro checks while on heparin   once therapeutic will need ct head       Problem/Recommendations 2: chest pain   defer to card/ct surgery       Thank you for allowing me to participate in the care of this patient. Will continue to follow patient periodically. Please do not hesitate to call me if you have any  questions or if there has been a change in patients neurological status     ________________  Elissa García MD  [facility] ([facility]  [phone]      33 minutes spent on total encounter; more than 50 % of the visit was  spent counseling about plan of care, compliance to diet/exercise and medication regimen and or  coordinating care by the attending physician.      It is advised that stroke patients follow up with BERHANE Garrett @ [phone] in 1- 2 weeks.   Others please follow up with Dr. Michael Nissenbaum [phone]

## 2021-03-19 NOTE — PROGRESS NOTE ADULT - SUBJECTIVE AND OBJECTIVE BOX
INTERVAL EVENTS: Reporting worsening dyspnea in AM. Denies CP, palpitations, presyncope, syncope, f/c/n/v.     REVIEW OF SYSTEMS:  Constitutional:     [X] negative [ ] fevers [ ] chills [ ] weight loss [ ] weight gain  HEENT:                  [X] negative [ ] dry eyes [ ] eye irritation [ ] postnasal drip [ ] nasal congestion  CV:                         [X] negative  [ ] chest pain [ ] orthopnea [ ] palpitations [ ] murmur  Resp:                     [ ] negative [ ] cough [X] shortness of breath {X] dyspnea [ ] wheezing [ ] sputum [ ] hemoptysis  GI:                          [X] negative [ ] nausea [ ] vomiting [ ] diarrhea [ ] constipation [ ] abd pain [ ] dysphagia   :                        [X] negative [ ] dysuria [ ] nocturia [ ] hematuria [ ] increased urinary frequency  MSK:                      [X] negative [ ] back pain [ ] myalgias [ ] arthralgias [ ] fracture  Skin:                       [X] negative [ ] rash [ ] itch  Neuro:                   [X] negative [ ] headache [ ] dizziness [ ] syncope [ ] weakness [ ] numbness  Psych:                    [X] negative [ ] anxiety [ ] depression  Endo:                     [X] negative [ ] diabetes [ ] thyroid problem  Heme/Lymph:      [X] negative [ ] anemia [ ] bleeding problem  Allergic/Immune: [X] negative [ ] itchy eyes [ ] nasal discharge [ ] hives [ ] angioedema    [X] All other systems negative or otherwise described above.  [ ] Unable to assess ROS because ________.    PAST MEDICAL & SURGICAL HISTORY:  BPH (benign prostatic hypertrophy)    Hyperlipidemia    CAD (coronary artery disease)    Diabetes mellitus  Type 2    HTN (hypertension)    S/P drug eluting coronary stent placement  [facility]    S/P primary angioplasty with coronary stent  1990s      MEDICATIONS  (STANDING):  atorvastatin 40 milliGRAM(s) Oral at bedtime  dextrose 40% Gel 15 Gram(s) Oral once  dextrose 5%. 1000 milliLiter(s) (50 mL/Hr) IV Continuous <Continuous>  dextrose 5%. 1000 milliLiter(s) (100 mL/Hr) IV Continuous <Continuous>  dextrose 50% Injectable 25 Gram(s) IV Push once  dextrose 50% Injectable 12.5 Gram(s) IV Push once  dextrose 50% Injectable 25 Gram(s) IV Push once  finasteride 5 milliGRAM(s) Oral daily  furosemide   Injectable 20 milliGRAM(s) IV Push daily  gabapentin 300 milliGRAM(s) Oral two times a day  glucagon  Injectable 1 milliGRAM(s) IntraMuscular once  insulin glargine Injectable (LANTUS) 7 Unit(s) SubCutaneous at bedtime  insulin lispro (ADMELOG) corrective regimen sliding scale   SubCutaneous three times a day before meals  insulin lispro (ADMELOG) corrective regimen sliding scale   SubCutaneous at bedtime  isosorbide   mononitrate ER Tablet (IMDUR) 120 milliGRAM(s) Oral daily  metoprolol tartrate 100 milliGRAM(s) Oral two times a day  pantoprazole    Tablet 40 milliGRAM(s) Oral before breakfast  ranolazine 500 milliGRAM(s) Oral two times a day  tamsulosin 0.4 milliGRAM(s) Oral at bedtime    MEDICATIONS  (PRN):  guaiFENesin   Syrup  (Sugar-Free) 100 milliGRAM(s) Oral every 6 hours PRN Cough    ICU Vital Signs Last 24 Hrs  T(C): 36.4 (19 Mar 2021 08:53), Max: 36.9 (19 Mar 2021 00:00)  T(F): 97.5 (19 Mar 2021 08:53), Max: 98.4 (19 Mar 2021 00:00)  HR: 56 (19 Mar 2021 08:53) (56 - 76)  BP: 104/64 (19 Mar 2021 08:53) (103/60 - 130/76)  BP(mean): --  ABP: --  ABP(mean): --  RR: 18 (19 Mar 2021 08:53) (18 - 18)  SpO2: 99% (19 Mar 2021 08:53) (94% - 99%)    Daily     Daily     PHYSICAL EXAM:  GEN: Awake, alert. NAD.   HEENT: NCAT, PERRL, EOMI. Mucosa moist. No JVD.  RESP: CTA b/l  CV: RRR. Normal S1/S2. No m/r/g.  ABD: Soft. NT/ND. BS+  EXT: Warm. No edema, clubbing, or cyanosis.   NEURO: AAOx3. No focal deficits.     LABS:                        12.6   7.24  )-----------( 248      ( 19 Mar 2021 07:27 )             37.2     03-    137  |  99  |  16  ----------------------------<  163<H>  4.1   |  25  |  1.22    Ca    9.6      19 Mar 2021 07:27    TPro  7.0  /  Alb  3.6  /  TBili  1.0  /  DBili  x   /  AST  26  /  ALT  40  /  AlkPhos  78  03-17        PT/INR - ( 17 Mar 2021 22:52 )   PT: 12.5 sec;   INR: 1.04 ratio         PTT - ( 17 Mar 2021 22:52 )  PTT:29.9 sec  Urinalysis Basic - ( 18 Mar 2021 00:19 )    Color: Colorless / Appearance: Clear / S.006 / pH: x  Gluc: x / Ketone: Negative  / Bili: Negative / Urobili: Negative   Blood: x / Protein: Negative / Nitrite: Negative   Leuk Esterase: Negative / RBC: x / WBC x   Sq Epi: x / Non Sq Epi: x / Bacteria: x      I&O's Summary    18 Mar 2021 07:01  -  19 Mar 2021 07:00  --------------------------------------------------------  IN: 480 mL / OUT: 1375 mL / NET: -895 mL      BNP    RADIOLOGY & ADDITIONAL STUDIES:    TELEMETRY: reviewed    EKG: reviewed

## 2021-03-19 NOTE — PROGRESS NOTE ADULT - ASSESSMENT
74y M with PMH CAD s/p PCI to the LAD and RCA in the past (last angiogram in 2015 with [facility], known 3VD), HTN, & HLD. 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     Plan  -dyspnea is in the setting of known multivessel dz; poor PCI/CABG candidate at this time unless cleared by NSGY; recommend IV lasix 20mg x 1  -as per neurosx, if AC or AP started, intracranial bleeding risk is increased; will require CTsx risks/benefits assessment  -increase to lipitor 80  -may start losartan 25mg if Cr remains normal and pt not undergoing surgical procedure    Franco Barrett MD  Cardiology Fellow - F1  Text or Call: [phone]  For all New Consults and Questions:  [email or web address]   Login: [email or web address] 74y M with PMH CAD s/p PCI to the LAD and RCA in the past (last angiogram in 2015 with [facility], known 3VD), HTN, & HLD. 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     Plan  -dyspnea is in the setting of known multivessel dz; poor PCI/CABG candidate at this time unless cleared by NSGY; recommend IV lasix 20mg x 1  -as per neurosx, if AC or AP started, intracranial bleeding risk is increased; will require CTsx risks/benefits assessment  -increase to lipitor 80  -may start losartan 25mg if Cr remains normal and pt not undergoing surgical procedure  -hgb drop noted, recommend repeat cbc today    Franco Barrett MD  Cardiology Fellow - F1  Text or Call: [phone]  For all New Consults and Questions:  [email or web address]   Login: [email or web address]

## 2021-03-19 NOTE — PROGRESS NOTE ADULT - SUBJECTIVE AND OBJECTIVE BOX
Patient is a 76y old  Male who presents with a chief complaint of One day of dyspnoea. (19 Mar 2021 10:16)    Date of servie : 21 @ 15:27  INTERVAL HPI/OVERNIGHT EVENTS:  T(C): 36.4 (21 @ 12:20), Max: 36.9 (21 @ 00:00)  HR: 72 (21 @ 12:20) (56 - 76)  BP: 107/60 (21 @ 12:20) (103/60 - 118/68)  RR: 18 (21 @ 12:20) (18 - 18)  SpO2: 96% (21 @ 12:20) (94% - 99%)  Wt(kg): --  I&O's Summary    18 Mar 2021 07:01  -  19 Mar 2021 07:00  --------------------------------------------------------  IN: 480 mL / OUT: 1375 mL / NET: -895 mL    19 Mar 2021 07:01  -  19 Mar 2021 15:27  --------------------------------------------------------  IN: 480 mL / OUT: 300 mL / NET: 180 mL        LABS:                        12.6   7.24  )-----------( 248      ( 19 Mar 2021 07:27 )             37.2         137  |  99  |  16  ----------------------------<  163<H>  4.1   |  25  |  1.22    Ca    9.6      19 Mar 2021 07:27    TPro  7.0  /  Alb  3.6  /  TBili  1.0  /  DBili  x   /  AST  26  /  ALT  40  /  AlkPhos  78  03-17    PT/INR - ( 17 Mar 2021 22:52 )   PT: 12.5 sec;   INR: 1.04 ratio         PTT - ( 17 Mar 2021 22:52 )  PTT:29.9 sec  Urinalysis Basic - ( 18 Mar 2021 00:19 )    Color: Colorless / Appearance: Clear / S.006 / pH: x  Gluc: x / Ketone: Negative  / Bili: Negative / Urobili: Negative   Blood: x / Protein: Negative / Nitrite: Negative   Leuk Esterase: Negative / RBC: x / WBC x   Sq Epi: x / Non Sq Epi: x / Bacteria: x      CAPILLARY BLOOD GLUCOSE      POCT Blood Glucose.: 273 mg/dL (19 Mar 2021 12:49)  POCT Blood Glucose.: 171 mg/dL (19 Mar 2021 08:50)  POCT Blood Glucose.: 238 mg/dL (18 Mar 2021 21:29)  POCT Blood Glucose.: 286 mg/dL (18 Mar 2021 17:18)        Urinalysis Basic - ( 18 Mar 2021 00:19 )    Color: Colorless / Appearance: Clear / S.006 / pH: x  Gluc: x / Ketone: Negative  / Bili: Negative / Urobili: Negative   Blood: x / Protein: Negative / Nitrite: Negative   Leuk Esterase: Negative / RBC: x / WBC x   Sq Epi: x / Non Sq Epi: x / Bacteria: x        MEDICATIONS  (STANDING):  atorvastatin 40 milliGRAM(s) Oral at bedtime  dextrose 40% Gel 15 Gram(s) Oral once  dextrose 5%. 1000 milliLiter(s) (50 mL/Hr) IV Continuous <Continuous>  dextrose 5%. 1000 milliLiter(s) (100 mL/Hr) IV Continuous <Continuous>  dextrose 50% Injectable 25 Gram(s) IV Push once  dextrose 50% Injectable 12.5 Gram(s) IV Push once  dextrose 50% Injectable 25 Gram(s) IV Push once  finasteride 5 milliGRAM(s) Oral daily  furosemide   Injectable 20 milliGRAM(s) IV Push daily  gabapentin 300 milliGRAM(s) Oral two times a day  glucagon  Injectable 1 milliGRAM(s) IntraMuscular once  insulin glargine Injectable (LANTUS) 7 Unit(s) SubCutaneous at bedtime  insulin lispro (ADMELOG) corrective regimen sliding scale   SubCutaneous three times a day before meals  insulin lispro (ADMELOG) corrective regimen sliding scale   SubCutaneous at bedtime  isosorbide   mononitrate ER Tablet (IMDUR) 120 milliGRAM(s) Oral daily  metoprolol tartrate 100 milliGRAM(s) Oral two times a day  pantoprazole    Tablet 40 milliGRAM(s) Oral before breakfast  ranolazine 500 milliGRAM(s) Oral two times a day  tamsulosin 0.4 milliGRAM(s) Oral at bedtime    MEDICATIONS  (PRN):  guaiFENesin   Syrup  (Sugar-Free) 100 milliGRAM(s) Oral every 6 hours PRN Cough          PHYSICAL EXAM:  GENERAL: NAD, well-groomed, well-developed  HEAD:  Atraumatic, Normocephalic  CHEST/LUNG: Clear to percussion bilaterally; No rales, rhonchi, wheezing, or rubs  HEART: Regular rate and rhythm; No murmurs, rubs, or gallops  ABDOMEN: Soft, Nontender, Nondistended; Bowel sounds present  EXTREMITIES:  2+ Peripheral Pulses, No clubbing, cyanosis, or edema  LYMPH: No lymphadenopathy noted  SKIN: No rashes or lesions    Care Discussed with Consultants/Other Providers [x ] YES  [ ] NO

## 2021-03-20 LAB
ANION GAP SERPL CALC-SCNC: 12 MMOL/L — SIGNIFICANT CHANGE UP (ref 5–17)
BUN SERPL-MCNC: 16 MG/DL — SIGNIFICANT CHANGE UP (ref 7–23)
CALCIUM SERPL-MCNC: 8.8 MG/DL — SIGNIFICANT CHANGE UP (ref 8.4–10.5)
CHLORIDE SERPL-SCNC: 101 MMOL/L — SIGNIFICANT CHANGE UP (ref 96–108)
CO2 SERPL-SCNC: 27 MMOL/L — SIGNIFICANT CHANGE UP (ref 22–31)
CREAT SERPL-MCNC: 1.19 MG/DL — SIGNIFICANT CHANGE UP (ref 0.5–1.3)
GLUCOSE BLDC GLUCOMTR-MCNC: 208 MG/DL — HIGH (ref 70–99)
GLUCOSE BLDC GLUCOMTR-MCNC: 215 MG/DL — HIGH (ref 70–99)
GLUCOSE BLDC GLUCOMTR-MCNC: 219 MG/DL — HIGH (ref 70–99)
GLUCOSE BLDC GLUCOMTR-MCNC: 269 MG/DL — HIGH (ref 70–99)
GLUCOSE SERPL-MCNC: 200 MG/DL — HIGH (ref 70–99)
POTASSIUM SERPL-MCNC: 4.1 MMOL/L — SIGNIFICANT CHANGE UP (ref 3.5–5.3)
POTASSIUM SERPL-SCNC: 4.1 MMOL/L — SIGNIFICANT CHANGE UP (ref 3.5–5.3)
SODIUM SERPL-SCNC: 140 MMOL/L — SIGNIFICANT CHANGE UP (ref 135–145)

## 2021-03-20 PROCEDURE — 99233 SBSQ HOSP IP/OBS HIGH 50: CPT | Mod: GC

## 2021-03-20 PROCEDURE — 93010 ELECTROCARDIOGRAM REPORT: CPT

## 2021-03-20 RX ORDER — NITROGLYCERIN 6.5 MG
0.4 CAPSULE, EXTENDED RELEASE ORAL ONCE
Refills: 0 | Status: COMPLETED | OUTPATIENT
Start: 2021-03-20 | End: 2021-03-22

## 2021-03-20 RX ORDER — ROSUVASTATIN CALCIUM 5 MG/1
5 TABLET ORAL AT BEDTIME
Refills: 0 | Status: DISCONTINUED | OUTPATIENT
Start: 2021-03-20 | End: 2021-03-26

## 2021-03-20 RX ORDER — LOSARTAN POTASSIUM 100 MG/1
25 TABLET, FILM COATED ORAL
Refills: 0 | Status: DISCONTINUED | OUTPATIENT
Start: 2021-03-20 | End: 2021-03-26

## 2021-03-20 RX ORDER — FUROSEMIDE 40 MG
20 TABLET ORAL DAILY
Refills: 0 | Status: DISCONTINUED | OUTPATIENT
Start: 2021-03-20 | End: 2021-03-25

## 2021-03-20 RX ORDER — LANOLIN ALCOHOL/MO/W.PET/CERES
3 CREAM (GRAM) TOPICAL ONCE
Refills: 0 | Status: COMPLETED | OUTPATIENT
Start: 2021-03-20 | End: 2021-03-20

## 2021-03-20 RX ADMIN — FINASTERIDE 5 MILLIGRAM(S): 5 TABLET, FILM COATED ORAL at 13:39

## 2021-03-20 RX ADMIN — ISOSORBIDE MONONITRATE 120 MILLIGRAM(S): 60 TABLET, EXTENDED RELEASE ORAL at 13:39

## 2021-03-20 RX ADMIN — GABAPENTIN 300 MILLIGRAM(S): 400 CAPSULE ORAL at 18:01

## 2021-03-20 RX ADMIN — INSULIN GLARGINE 7 UNIT(S): 100 INJECTION, SOLUTION SUBCUTANEOUS at 21:59

## 2021-03-20 RX ADMIN — Medication 2: at 18:12

## 2021-03-20 RX ADMIN — TAMSULOSIN HYDROCHLORIDE 0.4 MILLIGRAM(S): 0.4 CAPSULE ORAL at 21:14

## 2021-03-20 RX ADMIN — Medication 3 MILLIGRAM(S): at 23:48

## 2021-03-20 RX ADMIN — Medication 3: at 13:39

## 2021-03-20 RX ADMIN — Medication 100 MILLIGRAM(S): at 05:10

## 2021-03-20 RX ADMIN — GABAPENTIN 300 MILLIGRAM(S): 400 CAPSULE ORAL at 05:10

## 2021-03-20 RX ADMIN — PANTOPRAZOLE SODIUM 40 MILLIGRAM(S): 20 TABLET, DELAYED RELEASE ORAL at 05:10

## 2021-03-20 RX ADMIN — Medication 2: at 09:34

## 2021-03-20 RX ADMIN — Medication 100 MILLIGRAM(S): at 18:01

## 2021-03-20 RX ADMIN — ROSUVASTATIN CALCIUM 5 MILLIGRAM(S): 5 TABLET ORAL at 21:14

## 2021-03-20 RX ADMIN — Medication 20 MILLIGRAM(S): at 05:10

## 2021-03-20 NOTE — PROGRESS NOTE ADULT - SUBJECTIVE AND OBJECTIVE BOX
Overnight Events:       Current Meds:  atorvastatin 40 milliGRAM(s) Oral at bedtime  dextrose 40% Gel 15 Gram(s) Oral once  dextrose 5%. 1000 milliLiter(s) IV Continuous <Continuous>  dextrose 5%. 1000 milliLiter(s) IV Continuous <Continuous>  dextrose 50% Injectable 25 Gram(s) IV Push once  dextrose 50% Injectable 12.5 Gram(s) IV Push once  dextrose 50% Injectable 25 Gram(s) IV Push once  finasteride 5 milliGRAM(s) Oral daily  furosemide   Injectable 20 milliGRAM(s) IV Push daily  gabapentin 300 milliGRAM(s) Oral two times a day  glucagon  Injectable 1 milliGRAM(s) IntraMuscular once  guaiFENesin   Syrup  (Sugar-Free) 100 milliGRAM(s) Oral every 6 hours PRN  insulin glargine Injectable (LANTUS) 7 Unit(s) SubCutaneous at bedtime  insulin lispro (ADMELOG) corrective regimen sliding scale   SubCutaneous three times a day before meals  insulin lispro (ADMELOG) corrective regimen sliding scale   SubCutaneous at bedtime  isosorbide   mononitrate ER Tablet (IMDUR) 120 milliGRAM(s) Oral daily  metoprolol tartrate 100 milliGRAM(s) Oral two times a day  pantoprazole    Tablet 40 milliGRAM(s) Oral before breakfast  ranolazine 500 milliGRAM(s) Oral two times a day  tamsulosin 0.4 milliGRAM(s) Oral at bedtime        Vitals:  ICU Vital Signs Last 24 Hrs  T(C): 36.4 (20 Mar 2021 08:34), Max: 37.1 (20 Mar 2021 04:28)  T(F): 97.6 (20 Mar 2021 08:34), Max: 98.7 (20 Mar 2021 04:28)  HR: 61 (20 Mar 2021 08:34) (61 - 88)  BP: 107/67 (20 Mar 2021 08:34) (98/58 - 124/-)  BP(mean): --  ABP: --  ABP(mean): --  RR: 18 (20 Mar 2021 08:34) (18 - 18)  SpO2: 98% (20 Mar 2021 08:34) (96% - 100%)      I&O's Summary    19 Mar 2021 07:01  -  20 Mar 2021 07:00  --------------------------------------------------------  IN: 840 mL / OUT: 1350 mL / NET: -510 mL            Physical Exam:  Appearance: No acute distress; well appearing  Eyes: PERRL, EOMI, pink conjunctiva  HENT: Normal oral mucosa  Cardiovascular: RRR, S1, S2, no murmurs, rubs, or gallops; no edema; no JVD  Respiratory: Clear to auscultation bilaterally  Gastrointestinal: soft, non-tender, non-distended with normal bowel sounds  Musculoskeletal: No clubbing; no joint deformity   Neurologic: Non-focal  Lymphatic: No lymphadenopathy  Psychiatry: AAOx3, mood & affect appropriate  Skin: No rashes, ecchymoses, or cyanosis                          12.6   7.24  )-----------( 248      ( 19 Mar 2021 07:27 )             37.2     03-20    140  |  101  |  16  ----------------------------<  200<H>  4.1   |  27  |  1.19    Ca    8.8      20 Mar 2021 07:36            Serum Pro-Brain Natriuretic Peptide: 3784 pg/mL (03-18 @ 07:01)  Serum Pro-Brain Natriuretic Peptide: 3853 pg/mL (03-17 @ 16:58)          New ECG(s): Personally reviewed    Echo:  < from: Transthoracic Echocardiogram (03.18.21 @ 20:22) >  Conclusions:  Technically difficult study.  1. Mitral annular calcification, otherwise normal mitral  valve. Minimal mitral regurgitation.  2. Aortic valve not well visualized. Minimal aortic  regurgitation.  3. Endocardium not well visualized; grossly moderate left  ventricular systolic dysfunction. The mid to distal septum  is dyskinetic. The distal segments and the apex appear  akinetic. Paradoxical septal motion consistent with  conduction defect. Wall motion abnormalities may be due in  part due to interventricular conduction defect. Recommend  repeat imaging with intravenous echo contrast to better  visualize the LV.  4. The right ventricle is not well visualized; grossly  normal right ventricular size and systolic function.  5. Trace pericardial effusion.  *** Compared with echocardiogram of 2/23/2021, LV systolic  function appears decreased.    < end of copied text >    Stress Testing:     Cath:    Imaging:    Interpretation of Telemetry:  sinus rhythm with PVC

## 2021-03-20 NOTE — PROGRESS NOTE ADULT - ASSESSMENT
75 yo M with CAD s/p stents, HTN, HLD, Chronic SDH, s/p LHC 2/26 with multivessel disease with intervention deferred at that time due to intracranial bleeding risk. he is s/p L MME on 3/1/21 with nsx. CTH 3/18 with unchanged acute on chronic L FP SDH 1.2cm.   - Nsx states no absolute contraindication to systemic AC or DAPT however there is an increased risk of ICH.  f/u with nsx.  patient seems to be optimally stabilized s/p MMA and CTH is stable.  benefits of cardiac procedure seem to outweigh risks of ICH at this time.  recommend no bolus with low PTT goal 50-50 if heparin used to limit risk.   - PT/OT  - check FS, glucose control <180  - GI/DVT ppx  - Counseling on diet, exercise, and medication adherence was done  - Counseling on smoking cessation and alcohol consumption offered when appropriate.  - Pain assessed and judicious use of narcotics when appropriate was discussed.    - Stroke education given when appropriate.  - Importance of fall prevention discussed.   - Differential diagnosis and plan of care discussed with patient and/or family and primary team  - Thank you for allowing me to participate in the care of this patient. Call with questions.   Garcia Nicholson MD  Vascular Neurology  (Covering for Dr. García)  can f/u with me in office [phone]

## 2021-03-20 NOTE — PROGRESS NOTE ADULT - ATTENDING COMMENTS
Mr. Guillaume was evaluated at the bedside.  His case was discussed with Dr. Goel.  Continue medical management of CAD and reduced LVSF.  Recommend a shared decision making approach between patient, family, neurosurgery and CTS regarding the risks and benefits of AC and and CABG.

## 2021-03-20 NOTE — PROGRESS NOTE ADULT - SUBJECTIVE AND OBJECTIVE BOX
Neurology Progress Note (COVERING FOR DR. SEBASTIAN)    S: Patient seen and examined. No new events overnight. patient denied CP, SOB, HA or pain. doing okay, possible surgery early this week.      Medication:  dextrose 40% Gel 15 Gram(s) Oral once  dextrose 5%. 1000 milliLiter(s) IV Continuous <Continuous>  dextrose 5%. 1000 milliLiter(s) IV Continuous <Continuous>  dextrose 50% Injectable 25 Gram(s) IV Push once  dextrose 50% Injectable 12.5 Gram(s) IV Push once  dextrose 50% Injectable 25 Gram(s) IV Push once  finasteride 5 milliGRAM(s) Oral daily  furosemide    Tablet 20 milliGRAM(s) Oral daily  gabapentin 300 milliGRAM(s) Oral two times a day  glucagon  Injectable 1 milliGRAM(s) IntraMuscular once  guaiFENesin   Syrup  (Sugar-Free) 100 milliGRAM(s) Oral every 6 hours PRN  insulin glargine Injectable (LANTUS) 7 Unit(s) SubCutaneous at bedtime  insulin lispro (ADMELOG) corrective regimen sliding scale   SubCutaneous three times a day before meals  insulin lispro (ADMELOG) corrective regimen sliding scale   SubCutaneous at bedtime  isosorbide   mononitrate ER Tablet (IMDUR) 120 milliGRAM(s) Oral daily  losartan 25 milliGRAM(s) Oral <User Schedule>  metoprolol tartrate 100 milliGRAM(s) Oral two times a day  pantoprazole    Tablet 40 milliGRAM(s) Oral before breakfast  ranolazine 500 milliGRAM(s) Oral two times a day  rosuvastatin 5 milliGRAM(s) Oral at bedtime  tamsulosin 0.4 milliGRAM(s) Oral at bedtime      Vitals:  Vital Signs Last 24 Hrs  T(C): 36.2 (20 Mar 2021 12:45), Max: 37.1 (20 Mar 2021 04:28)  T(F): 97.2 (20 Mar 2021 12:45), Max: 98.7 (20 Mar 2021 04:28)  HR: 75 (20 Mar 2021 18:09) (61 - 88)  BP: 113/64 (20 Mar 2021 18:09) (102/62 - 113/64)  BP(mean): --  RR: 18 (20 Mar 2021 12:45) (18 - 18)  SpO2: 99% (20 Mar 2021 12:45) (97% - 100%)    Review of Systems  GENERAL:  no weakness, fatigue, fevers or chills  NEURO: no dizziness, numbness, tingling or weakness  SKIN: no itching, burning, rashes, or lesions   HEENT: no visual changes;  no headache, no vertigo, no recent colds  RESPIRATORY: no shortness of breath, no cough, sputum, wheezing, hemoptysis;   CARDIOVASCULAR:  no chest pain,  or palpitations  GI: no abd pain. no N/V/D.  PERIPHERAL VASCULAR: no swelling, no tenderness, no erythema, no varicose veins.     General Exam:   General Appearance: Appropriately dressed and in no acute distress       Head: Normocephalic, atraumatic and no dysmorphic features  Ear, Nose, and Throat: Moist mucous membranes  CVS: S1S2+  Resp: No SOB, no wheeze or rhonchi  Abd: soft NTND  Extremities: No edema, no cyanosis  Skin: No bruises, no rashes     Neurological Exam:  Mental Status: Awake, alert and oriented x 3.  Able to follow simple and complex verbal commands. Able to name and repeat. fluent speech. No obvious aphasia or dysarthria noted.   Cranial Nerves: PERRL, EOMI, VFFC, sensation V1-V3 intact,  no obvious facial asymmetry , equal elevation of palate, scm/trap 5/5, tongue is midline on protrusion. no obvious papilledema on fundoscopic exam. Hearing is grossly intact.   Motor: Normal bulk, tone and strength throughout LUE/LLE, mild RUE and RLE drift.   Sensation: Intact to light touch and pinprick throughout. no right/left confusion. no extinction to tactile on DSS.    Reflexes: 1+ throughout at biceps, brachioradialis, triceps, patellars and ankles bilaterally and equal. No clonus. R toe and L toe were both downgoing.  Coordination: No dysmetria on FNF  Gait: deferred     I personally reviewed the below data/images/labs:      CBC Full  -  ( 19 Mar 2021 07:27 )  WBC Count : 7.24 K/uL  RBC Count : 4.18 M/uL  Hemoglobin : 12.6 g/dL  Hematocrit : 37.2 %  Platelet Count - Automated : 248 K/uL  Mean Cell Volume : 89.0 fl  Mean Cell Hemoglobin : 30.1 pg  Mean Cell Hemoglobin Concentration : 33.9 gm/dL  Auto Neutrophil # : x  Auto Lymphocyte # : x  Auto Monocyte # : x  Auto Eosinophil # : x  Auto Basophil # : x  Auto Neutrophil % : x  Auto Lymphocyte % : x  Auto Monocyte % : x  Auto Eosinophil % : x  Auto Basophil % : x    03-20    140  |  101  |  16  ----------------------------<  200<H>  4.1   |  27  |  1.19    Ca    8.8      20 Mar 2021 07:36          < from: CT Head No Cont (03.18.21 @ 14:41) >    EXAM:  CT BRAIN                            PROCEDURE DATE:  03/18/2021            INTERPRETATION:  CLINICAL INFORMATION:  History of subdural hematoma, status post middle meningeal artery embolization    TECHNIQUE: CT of the head was performed without the administration of intravenous contrast.    COMPARISON: CT head 3/8/2021.    FINDINGS:    There is a mixed-attenuation acute on chronic left frontoparietal subdural hematoma measuring 1.2 cm in thickness, stable as compared to prior imaging. There is associated localized mass effect on the subjacent brain parenchyma, but no midline shift or hydrocephalus. There is no evidence to suggest interval hemorrhage.    There is gliosis and encephalomalacia in the right anterior inferior frontal lobe, which may represent sequelae of chronic traumatic brain injury.    There is curvilinear high attenuation metallic density deep to the left temporal calvarium, findings consistent with history of middle meningeal artery embolization, unchanged from prior imaging.    There is no evidence of acute infarction, intracranial hemorrhage or mass lesion.  There is hypoattenuation of the subcortical and periventricular white matter, which is nonspecific finding, but most likely represents sequela of chronic microvascular ischemic disease. There are atherosclerotic calcifications of the cavernous and supraclinoid internal carotid arteries bilaterally, and the left intradural vertebral artery. There is prominence of the cortical sulci related to underlying brain parenchymal volume loss.    Patient is status post bilateral lens replacement. The imaged portions of the paranasal sinuses are aerated. The mastoid air cells are clear. The visualized soft tissues and osseous structures appear unremarkable.      IMPRESSION:    Left frontoparietal acute on chronic subdural hematoma measuring 1.2 cm, unchanged.                  LUIZA HUGGINS MD; Resident Radiology  This document has been electronically signed.  SENIA WYNN MD; Attending Radiologist  This document has been electronically signed. Mar 18 2021  4:10PM    < end of copied text >

## 2021-03-20 NOTE — PROGRESS NOTE ADULT - ASSESSMENT
74y M with PMH CAD s/p PCI to the LAD and RCA in the past (last angiogram in 2015 with [facility], known 3VD), HTN, & HLD. 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     Plan  dyspnea improved   Euvolemic on exam and unable to visualize elevated JVP  Found to have newly reduced EF with segmentality. Given need for DAPT after stenting poor candidate for any intervenion  When bleeding risk acceptable for DAPT will need evaluation  Can occur as outpatient   In interrim will manage medically  Would switch to lasix 20mg PO daily  Would increase atorvastatin to 80mg daily if no contraindiction  would start losartan 25mg daily  monitor i/o, daily standing weights    Henry Goel  Cardiology Fellow  [phone]    All Cardiology service information can be found 24/7 on [email or web address], password: [email or web address]

## 2021-03-20 NOTE — CHART NOTE - NSCHARTNOTEFT_GEN_A_CORE
Called by RN to evaluate pt's  chest pain. Pt examined at the bedside. Alert, oriented x3. Pt reports 6/10 left sided chest pain. Denies palpitations, diaphoresis, dyspnea, nausea, vomiting, headache, back or abdominal pain.     Vital Signs Last 24 Hrs  T(C): 36.4 (20 Mar 2021 22:55), Max: 37.1 (20 Mar 2021 04:28)  T(F): 97.5 (20 Mar 2021 22:55), Max: 98.7 (20 Mar 2021 04:28)  HR: 80 (20 Mar 2021 22:55) (61 - 88)  BP: 112/65 (20 Mar 2021 22:55) (102/62 - 113/64)  BP(mean): --  RR: 20 (20 Mar 2021 22:55) (18 - 20)  SpO2: 100% (20 Mar 2021 22:55) (97% - 100%)    furosemide    Tablet 20 milliGRAM(s) Oral daily  isosorbide   mononitrate ER Tablet (IMDUR) 120 milliGRAM(s) Oral daily  losartan 25 milliGRAM(s) Oral <User Schedule>  metoprolol tartrate 100 milliGRAM(s) Oral two times a day  nitroglycerin     SubLingual 0.4 milliGRAM(s) SubLingual once PRN  ranolazine 500 milliGRAM(s) Oral two times a day  tamsulosin 0.4 milliGRAM(s) Oral at bedtime      Labs                          12.6   7.24  )-----------( 248      ( 19 Mar 2021 07:27 )             37.2       03-20    140  |  101  |  16  ----------------------------<  200<H>  4.1   |  27  |  1.19    Ca    8.8      20 Mar 2021 07:36        PHYSICAL EXAM:  GENERAL: well-developed  HEAD:  Atraumatic, Normocephalic  EYES: EOMI, PERRLA, conjunctiva and sclera clear  NECK: Supple, No JVD  CHEST/LUNG: Clear to auscultation bilaterally; No wheeze  HEART: Regular rate and rhythm;   ABDOMEN: Soft, Nontender, Nondistended; Bowel sounds present  EXTREMITIES:  2+ Peripheral Pulses, No edema  PSYCH: AAOx3  NEUROLOGY: non-focal  SKIN: No rashes or lesions    HPI:   75 y/o male with PMHX of CAD with PCI to the LAD and RCA (last in 2015), three vessel disease, essential HTN, chronic subdural hematoma from last year, s/p fall in his driveway, S/P embolization of the middle meningeal artery, recently discharged from [facility] on March 9 2021, with recent cardiac catheterization in Feb 2021 with patient presently unable to pursue CABG due to inability to anticoagulation in the context of recent arterial embolization, admited with dyspnea, now with chest pain.    1. Chest pain   -EKG stat  -troponin (with results 645, on 3/17/21 troponin was 1089  -will check troponin in 3 hrs  -chest pain resolved spontaneously   -pt refuses ranexa (complaining of feeling nausea after the medication)  -sl nitroglycerine  prn dose oredered  - cardiology is following, pt has known multivessel disease; poor PCI/CABG candidate at this time unless cleared by neurosurgery  -as per neurosx, if AC or AP started, intracranial bleeding risk is increased; will require CTsx risks/benefits assessment  -c/w  lipitor 80 mg   -c/w lisinopril  -monitor closely vital signs    will endorse to day team      Jennifer Pratt NP, #[id number]         (17 Mar 2021 21:09) Called by RN to evaluate pt's  chest pain. Pt examined at the bedside. Alert, oriented x3. Pt reports 6/10 left sided chest pain. Denies palpitations, diaphoresis, dyspnea, nausea, vomiting, headache, back or abdominal pain.     Vital Signs Last 24 Hrs  T(C): 36.4 (20 Mar 2021 22:55), Max: 37.1 (20 Mar 2021 04:28)  T(F): 97.5 (20 Mar 2021 22:55), Max: 98.7 (20 Mar 2021 04:28)  HR: 80 (20 Mar 2021 22:55) (61 - 88)  BP: 112/65 (20 Mar 2021 22:55) (102/62 - 113/64)  BP(mean): --  RR: 20 (20 Mar 2021 22:55) (18 - 20)  SpO2: 100% (20 Mar 2021 22:55) (97% - 100%)    furosemide    Tablet 20 milliGRAM(s) Oral daily  isosorbide   mononitrate ER Tablet (IMDUR) 120 milliGRAM(s) Oral daily  losartan 25 milliGRAM(s) Oral <User Schedule>  metoprolol tartrate 100 milliGRAM(s) Oral two times a day  nitroglycerin     SubLingual 0.4 milliGRAM(s) SubLingual once PRN  ranolazine 500 milliGRAM(s) Oral two times a day  tamsulosin 0.4 milliGRAM(s) Oral at bedtime      Labs                          12.6   7.24  )-----------( 248      ( 19 Mar 2021 07:27 )             37.2       03-20    140  |  101  |  16  ----------------------------<  200<H>  4.1   |  27  |  1.19    Ca    8.8      20 Mar 2021 07:36        PHYSICAL EXAM:  GENERAL: well-developed  HEAD:  Atraumatic, Normocephalic  EYES: EOMI, PERRLA, conjunctiva and sclera clear  NECK: Supple, No JVD  CHEST/LUNG: Clear to auscultation bilaterally; No wheeze  HEART: Regular rate and rhythm;   ABDOMEN: Soft, Nontender, Nondistended; Bowel sounds present  EXTREMITIES:  2+ Peripheral Pulses, No edema  PSYCH: AAOx3  NEUROLOGY: non-focal  SKIN: No rashes or lesions    HPI:   75 y/o male with PMHX of CAD with PCI to the LAD and RCA (last in 2015), three vessel disease, essential HTN, chronic subdural hematoma from last year, s/p fall in his driveway, S/P embolization of the middle meningeal artery, recently discharged from [facility] on March 9 2021, with recent cardiac catheterization in Feb 2021 with patient presently unable to pursue CABG due to inability to anticoagulation in the context of recent arterial embolization, admited with dyspnea, now with chest pain.    1. Chest pain   -EKG stat  -troponin stat,  resulted 645, and 650, (on 3/17/21 troponin was 1089)  -will check troponin in 3 hrs  -chest pain resolved spontaneously   -pt refuses ranexa (complaining of feeling nausea after the medication)  -sl nitroglycerine  prn dose oredered  - cardiology is following, pt has known multivessel disease; poor PCI/CABG candidate at this time unless cleared by neurosurgery  -as per neurosx, if AC or AP started, intracranial bleeding risk is increased; will require CTsx risks/benefits assessment  -c/w  lipitor 80 mg   -c/w lisinopril  -monitor closely vital signs    will endorse to day team  D/w Dr. Escobar Pratt NP, #[id number]         (17 Mar 2021 21:09)

## 2021-03-20 NOTE — PROGRESS NOTE ADULT - SUBJECTIVE AND OBJECTIVE BOX
Patient is a 76y old  Male who presents with a chief complaint of One day of dyspnoea. (20 Mar 2021 09:45)    Date of servie : 03-20-21 @ 19:44  INTERVAL HPI/OVERNIGHT EVENTS:  T(C): 36.2 (03-20-21 @ 12:45), Max: 37.1 (03-20-21 @ 04:28)  HR: 75 (03-20-21 @ 18:09) (61 - 88)  BP: 113/64 (03-20-21 @ 18:09) (98/58 - 113/64)  RR: 18 (03-20-21 @ 12:45) (18 - 18)  SpO2: 99% (03-20-21 @ 12:45) (97% - 100%)  Wt(kg): --  I&O's Summary    19 Mar 2021 07:01  -  20 Mar 2021 07:00  --------------------------------------------------------  IN: 840 mL / OUT: 1350 mL / NET: -510 mL    20 Mar 2021 07:01  -  20 Mar 2021 19:44  --------------------------------------------------------  IN: 360 mL / OUT: 600 mL / NET: -240 mL        LABS:                        12.6   7.24  )-----------( 248      ( 19 Mar 2021 07:27 )             37.2     03-20    140  |  101  |  16  ----------------------------<  200<H>  4.1   |  27  |  1.19    Ca    8.8      20 Mar 2021 07:36          CAPILLARY BLOOD GLUCOSE      POCT Blood Glucose.: 219 mg/dL (20 Mar 2021 17:58)  POCT Blood Glucose.: 269 mg/dL (20 Mar 2021 13:09)  POCT Blood Glucose.: 208 mg/dL (20 Mar 2021 09:04)  POCT Blood Glucose.: 286 mg/dL (19 Mar 2021 21:49)            MEDICATIONS  (STANDING):  dextrose 40% Gel 15 Gram(s) Oral once  dextrose 5%. 1000 milliLiter(s) (50 mL/Hr) IV Continuous <Continuous>  dextrose 5%. 1000 milliLiter(s) (100 mL/Hr) IV Continuous <Continuous>  dextrose 50% Injectable 25 Gram(s) IV Push once  dextrose 50% Injectable 12.5 Gram(s) IV Push once  dextrose 50% Injectable 25 Gram(s) IV Push once  finasteride 5 milliGRAM(s) Oral daily  furosemide    Tablet 20 milliGRAM(s) Oral daily  gabapentin 300 milliGRAM(s) Oral two times a day  glucagon  Injectable 1 milliGRAM(s) IntraMuscular once  insulin glargine Injectable (LANTUS) 7 Unit(s) SubCutaneous at bedtime  insulin lispro (ADMELOG) corrective regimen sliding scale   SubCutaneous three times a day before meals  insulin lispro (ADMELOG) corrective regimen sliding scale   SubCutaneous at bedtime  isosorbide   mononitrate ER Tablet (IMDUR) 120 milliGRAM(s) Oral daily  losartan 25 milliGRAM(s) Oral <User Schedule>  metoprolol tartrate 100 milliGRAM(s) Oral two times a day  pantoprazole    Tablet 40 milliGRAM(s) Oral before breakfast  ranolazine 500 milliGRAM(s) Oral two times a day  rosuvastatin 5 milliGRAM(s) Oral at bedtime  tamsulosin 0.4 milliGRAM(s) Oral at bedtime    MEDICATIONS  (PRN):  guaiFENesin   Syrup  (Sugar-Free) 100 milliGRAM(s) Oral every 6 hours PRN Cough          PHYSICAL EXAM:  GENERAL: NAD, well-groomed, well-developed  HEAD:  Atraumatic, Normocephalic  CHEST/LUNG: Clear to percussion bilaterally; No rales, rhonchi, wheezing, or rubs  HEART: Regular rate and rhythm; No murmurs, rubs, or gallops  ABDOMEN: Soft, Nontender, Nondistended; Bowel sounds present  EXTREMITIES:  2+ Peripheral Pulses, No clubbing, cyanosis, or edema  LYMPH: No lymphadenopathy noted  SKIN: No rashes or lesions    Care Discussed with Consultants/Other Providers [ ] YES  [ ] NO

## 2021-03-21 LAB
ANION GAP SERPL CALC-SCNC: 10 MMOL/L — SIGNIFICANT CHANGE UP (ref 5–17)
BUN SERPL-MCNC: 16 MG/DL — SIGNIFICANT CHANGE UP (ref 7–23)
CALCIUM SERPL-MCNC: 8.5 MG/DL — SIGNIFICANT CHANGE UP (ref 8.4–10.5)
CHLORIDE SERPL-SCNC: 101 MMOL/L — SIGNIFICANT CHANGE UP (ref 96–108)
CK SERPL-CCNC: 90 U/L — SIGNIFICANT CHANGE UP (ref 30–200)
CO2 SERPL-SCNC: 26 MMOL/L — SIGNIFICANT CHANGE UP (ref 22–31)
CREAT SERPL-MCNC: 1.06 MG/DL — SIGNIFICANT CHANGE UP (ref 0.5–1.3)
GLUCOSE BLDC GLUCOMTR-MCNC: 201 MG/DL — HIGH (ref 70–99)
GLUCOSE BLDC GLUCOMTR-MCNC: 254 MG/DL — HIGH (ref 70–99)
GLUCOSE BLDC GLUCOMTR-MCNC: 258 MG/DL — HIGH (ref 70–99)
GLUCOSE BLDC GLUCOMTR-MCNC: 369 MG/DL — HIGH (ref 70–99)
GLUCOSE SERPL-MCNC: 196 MG/DL — HIGH (ref 70–99)
HCT VFR BLD CALC: 36.9 % — LOW (ref 39–50)
HGB BLD-MCNC: 12.2 G/DL — LOW (ref 13–17)
MCHC RBC-ENTMCNC: 29.8 PG — SIGNIFICANT CHANGE UP (ref 27–34)
MCHC RBC-ENTMCNC: 33.1 GM/DL — SIGNIFICANT CHANGE UP (ref 32–36)
MCV RBC AUTO: 90 FL — SIGNIFICANT CHANGE UP (ref 80–100)
NRBC # BLD: 0 /100 WBCS — SIGNIFICANT CHANGE UP (ref 0–0)
PLATELET # BLD AUTO: 219 K/UL — SIGNIFICANT CHANGE UP (ref 150–400)
POTASSIUM SERPL-MCNC: 4 MMOL/L — SIGNIFICANT CHANGE UP (ref 3.5–5.3)
POTASSIUM SERPL-SCNC: 4 MMOL/L — SIGNIFICANT CHANGE UP (ref 3.5–5.3)
RBC # BLD: 4.1 M/UL — LOW (ref 4.2–5.8)
RBC # FLD: 12.7 % — SIGNIFICANT CHANGE UP (ref 10.3–14.5)
SODIUM SERPL-SCNC: 137 MMOL/L — SIGNIFICANT CHANGE UP (ref 135–145)
TROPONIN T, HIGH SENSITIVITY RESULT: 645 NG/L — HIGH (ref 0–51)
TROPONIN T, HIGH SENSITIVITY RESULT: 650 NG/L — HIGH (ref 0–51)
WBC # BLD: 6.16 K/UL — SIGNIFICANT CHANGE UP (ref 3.8–10.5)
WBC # FLD AUTO: 6.16 K/UL — SIGNIFICANT CHANGE UP (ref 3.8–10.5)

## 2021-03-21 PROCEDURE — 71045 X-RAY EXAM CHEST 1 VIEW: CPT | Mod: 26

## 2021-03-21 PROCEDURE — 99232 SBSQ HOSP IP/OBS MODERATE 35: CPT

## 2021-03-21 RX ORDER — FUROSEMIDE 40 MG
20 TABLET ORAL ONCE
Refills: 0 | Status: COMPLETED | OUTPATIENT
Start: 2021-03-21 | End: 2021-03-21

## 2021-03-21 RX ADMIN — LOSARTAN POTASSIUM 25 MILLIGRAM(S): 100 TABLET, FILM COATED ORAL at 09:36

## 2021-03-21 RX ADMIN — Medication 5: at 12:44

## 2021-03-21 RX ADMIN — FINASTERIDE 5 MILLIGRAM(S): 5 TABLET, FILM COATED ORAL at 12:45

## 2021-03-21 RX ADMIN — INSULIN GLARGINE 7 UNIT(S): 100 INJECTION, SOLUTION SUBCUTANEOUS at 21:41

## 2021-03-21 RX ADMIN — TAMSULOSIN HYDROCHLORIDE 0.4 MILLIGRAM(S): 0.4 CAPSULE ORAL at 21:41

## 2021-03-21 RX ADMIN — GABAPENTIN 300 MILLIGRAM(S): 400 CAPSULE ORAL at 17:38

## 2021-03-21 RX ADMIN — Medication 100 MILLIGRAM(S): at 17:38

## 2021-03-21 RX ADMIN — Medication 2: at 09:36

## 2021-03-21 RX ADMIN — Medication 3: at 17:38

## 2021-03-21 RX ADMIN — ISOSORBIDE MONONITRATE 120 MILLIGRAM(S): 60 TABLET, EXTENDED RELEASE ORAL at 12:45

## 2021-03-21 RX ADMIN — Medication 100 MILLIGRAM(S): at 05:55

## 2021-03-21 RX ADMIN — ROSUVASTATIN CALCIUM 5 MILLIGRAM(S): 5 TABLET ORAL at 21:41

## 2021-03-21 RX ADMIN — GABAPENTIN 300 MILLIGRAM(S): 400 CAPSULE ORAL at 05:55

## 2021-03-21 RX ADMIN — Medication 20 MILLIGRAM(S): at 08:39

## 2021-03-21 RX ADMIN — Medication 1: at 21:41

## 2021-03-21 RX ADMIN — Medication 20 MILLIGRAM(S): at 05:56

## 2021-03-21 RX ADMIN — PANTOPRAZOLE SODIUM 40 MILLIGRAM(S): 20 TABLET, DELAYED RELEASE ORAL at 05:55

## 2021-03-21 NOTE — PROGRESS NOTE ADULT - SUBJECTIVE AND OBJECTIVE BOX
Patient is a 76y old  Male who presents with a chief complaint of One day of dyspnoea. (21 Mar 2021 11:59)    Date of servie : 03-21-21 @ 18:23  INTERVAL HPI/OVERNIGHT EVENTS:  T(C): 36.7 (03-21-21 @ 17:47), Max: 36.7 (03-21-21 @ 17:47)  HR: 64 (03-21-21 @ 17:47) (63 - 80)  BP: 103/54 (03-21-21 @ 17:47) (101/65 - 113/66)  RR: 18 (03-21-21 @ 17:47) (18 - 20)  SpO2: 100% (03-21-21 @ 17:47) (98% - 100%)  Wt(kg): --  I&O's Summary    20 Mar 2021 07:01  -  21 Mar 2021 07:00  --------------------------------------------------------  IN: 480 mL / OUT: 900 mL / NET: -420 mL    21 Mar 2021 07:01  -  21 Mar 2021 18:23  --------------------------------------------------------  IN: 240 mL / OUT: 450 mL / NET: -210 mL        LABS:                        12.2   6.16  )-----------( 219      ( 21 Mar 2021 03:38 )             36.9     03-21    137  |  101  |  16  ----------------------------<  196<H>  4.0   |  26  |  1.06    Ca    8.5      21 Mar 2021 03:38          CAPILLARY BLOOD GLUCOSE      POCT Blood Glucose.: 258 mg/dL (21 Mar 2021 17:38)  POCT Blood Glucose.: 369 mg/dL (21 Mar 2021 12:40)  POCT Blood Glucose.: 201 mg/dL (21 Mar 2021 09:03)  POCT Blood Glucose.: 215 mg/dL (20 Mar 2021 21:48)            MEDICATIONS  (STANDING):  dextrose 40% Gel 15 Gram(s) Oral once  dextrose 5%. 1000 milliLiter(s) (50 mL/Hr) IV Continuous <Continuous>  dextrose 5%. 1000 milliLiter(s) (100 mL/Hr) IV Continuous <Continuous>  dextrose 50% Injectable 25 Gram(s) IV Push once  dextrose 50% Injectable 12.5 Gram(s) IV Push once  dextrose 50% Injectable 25 Gram(s) IV Push once  finasteride 5 milliGRAM(s) Oral daily  furosemide    Tablet 20 milliGRAM(s) Oral daily  gabapentin 300 milliGRAM(s) Oral two times a day  glucagon  Injectable 1 milliGRAM(s) IntraMuscular once  insulin glargine Injectable (LANTUS) 7 Unit(s) SubCutaneous at bedtime  insulin lispro (ADMELOG) corrective regimen sliding scale   SubCutaneous three times a day before meals  insulin lispro (ADMELOG) corrective regimen sliding scale   SubCutaneous at bedtime  isosorbide   mononitrate ER Tablet (IMDUR) 120 milliGRAM(s) Oral daily  losartan 25 milliGRAM(s) Oral <User Schedule>  metoprolol tartrate 100 milliGRAM(s) Oral two times a day  pantoprazole    Tablet 40 milliGRAM(s) Oral before breakfast  ranolazine 500 milliGRAM(s) Oral two times a day  rosuvastatin 5 milliGRAM(s) Oral at bedtime  tamsulosin 0.4 milliGRAM(s) Oral at bedtime    MEDICATIONS  (PRN):  guaiFENesin   Syrup  (Sugar-Free) 100 milliGRAM(s) Oral every 6 hours PRN Cough  nitroglycerin     SubLingual 0.4 milliGRAM(s) SubLingual once PRN Chest Pain          PHYSICAL EXAM:  GENERAL: NAD, well-groomed, well-developed  HEAD:  Atraumatic, Normocephalic  CHEST/LUNG: Clear to percussion bilaterally; No rales, rhonchi, wheezing, or rubs  HEART: Regular rate and rhythm; No murmurs, rubs, or gallops  ABDOMEN: Soft, Nontender, Nondistended; Bowel sounds present  EXTREMITIES:  2+ Peripheral Pulses, No clubbing, cyanosis, or edema  LYMPH: No lymphadenopathy noted  SKIN: No rashes or lesions    Care Discussed with Consultants/Other Providers [ ] YES  [ ] NO

## 2021-03-21 NOTE — PROGRESS NOTE ADULT - ASSESSMENT
77 yo M with CAD s/p stents, HTN, HLD, Chronic SDH, s/p LHC 2/26 with multivessel disease with intervention deferred at that time due to intracranial bleeding risk. he is s/p L MME on 3/1/21 with nsx. CTH 3/18 with unchanged acute on chronic L FP SDH 1.2cm. CP and SOB overnight, better now   - Nsx states no absolute contraindication to systemic AC or DAPT however there is an increased risk of ICH.  f/u with nsx.  patient seems to be optimally stabilized s/p MMA and CTH is stable.  benefits of cardiac procedure seem to outweigh risks of ICH at this time.  recommend no bolus with low PTT goal 50-50 if heparin used to limit risk.   - PT/OT  - check FS, glucose control <180  - GI/DVT ppx  - Counseling on diet, exercise, and medication adherence was done  - Counseling on smoking cessation and alcohol consumption offered when appropriate.  - Pain assessed and judicious use of narcotics when appropriate was discussed.    - Stroke education given when appropriate.  - Importance of fall prevention discussed.   - Differential diagnosis and plan of care discussed with patient and/or family and primary team  - Thank you for allowing me to participate in the care of this patient. Call with questions.   Garcia Nicholosn MD  Vascular Neurology  (Covering for Dr. García)  can f/u with me in office [phone]

## 2021-03-21 NOTE — CHART NOTE - NSCHARTNOTEFT_GEN_A_CORE
Patient is a 76y old  Male who presents with a chief complaint of One day of dyspnoea. (20 Mar 2021 19:43)  Notified by RN pt was complaining of feeling short of breath.  However denies chest pain.     HPI:    Vital Signs Last 24 Hrs  T(C): 36.3 (21 Mar 2021 11:45), Max: 36.4 (20 Mar 2021 21:20)  T(F): 97.4 (21 Mar 2021 11:45), Max: 97.6 (20 Mar 2021 21:20)  HR: 63 (21 Mar 2021 11:45) (63 - 80)  BP: 112/64 (21 Mar 2021 11:45) (101/65 - 113/66)  BP(mean): --  RR: 18 (21 Mar 2021 11:45) (18 - 20)  SpO2: 100% (21 Mar 2021 11:45) (98% - 100%)    Gen: 75 y/o M WD/ WN but thin in appearance.   Skin: Cool,dry and intact.    Resp: Fine crackles noted 1/2 up bilaterally.    CV: S1S2   Telemetry: SR with 1 degree AVB with PVC.  Early this morning had 4-5 beat WCT.    Chest: Symmetrical, equal lung expansion.   Extremities: (+) PPP no edema.      Laboratory:  CARDIAC MARKERS ( 20 Mar 2021 23:42 )  x     / x     / 90 U/L / x     / x                                12.2   6.16  )-----------( 219      ( 21 Mar 2021 03:38 )             36.9     Assessment:   Chest Pain   Plan:   Lasix 20 mg IV x 1 given with relief noted.   CXR obtained.  Will follow up results.   Continue telemetry monitoring.        Angle Snyder [facility]  Spectralink #[id number]

## 2021-03-21 NOTE — PROGRESS NOTE ADULT - SUBJECTIVE AND OBJECTIVE BOX
Neurology Progress Note (COVERING FOR DR. SEBASTIAN)    S: Patient seen and examined. had CP and SOB overnight. doing better     Medication:  MEDICATIONS  (STANDING):  dextrose 40% Gel 15 Gram(s) Oral once  dextrose 5%. 1000 milliLiter(s) (50 mL/Hr) IV Continuous <Continuous>  dextrose 5%. 1000 milliLiter(s) (100 mL/Hr) IV Continuous <Continuous>  dextrose 50% Injectable 25 Gram(s) IV Push once  dextrose 50% Injectable 12.5 Gram(s) IV Push once  dextrose 50% Injectable 25 Gram(s) IV Push once  finasteride 5 milliGRAM(s) Oral daily  furosemide    Tablet 20 milliGRAM(s) Oral daily  gabapentin 300 milliGRAM(s) Oral two times a day  glucagon  Injectable 1 milliGRAM(s) IntraMuscular once  insulin glargine Injectable (LANTUS) 7 Unit(s) SubCutaneous at bedtime  insulin lispro (ADMELOG) corrective regimen sliding scale   SubCutaneous three times a day before meals  insulin lispro (ADMELOG) corrective regimen sliding scale   SubCutaneous at bedtime  isosorbide   mononitrate ER Tablet (IMDUR) 120 milliGRAM(s) Oral daily  losartan 25 milliGRAM(s) Oral <User Schedule>  metoprolol tartrate 100 milliGRAM(s) Oral two times a day  pantoprazole    Tablet 40 milliGRAM(s) Oral before breakfast  ranolazine 500 milliGRAM(s) Oral two times a day  rosuvastatin 5 milliGRAM(s) Oral at bedtime  tamsulosin 0.4 milliGRAM(s) Oral at bedtime    MEDICATIONS  (PRN):  guaiFENesin   Syrup  (Sugar-Free) 100 milliGRAM(s) Oral every 6 hours PRN Cough  nitroglycerin     SubLingual 0.4 milliGRAM(s) SubLingual once PRN Chest Pain      Vitals:  Vital Signs Last 24 Hrs  T(C): 36.3 (21 Mar 2021 11:45), Max: 36.4 (20 Mar 2021 21:20)  T(F): 97.4 (21 Mar 2021 11:45), Max: 97.6 (20 Mar 2021 21:20)  HR: 63 (21 Mar 2021 11:45) (63 - 80)  BP: 112/64 (21 Mar 2021 11:45) (101/65 - 113/66)  BP(mean): --  RR: 18 (21 Mar 2021 11:45) (18 - 20)  SpO2: 100% (21 Mar 2021 11:45) (98% - 100%)    Review of Systems  GENERAL:  no weakness, fatigue, fevers or chills  NEURO: no dizziness, numbness, tingling or weakness  SKIN: no itching, burning, rashes, or lesions   HEENT: no visual changes;  no headache, no vertigo, no recent colds  RESPIRATORY: no shortness of breath, no cough, sputum, wheezing, hemoptysis;   CARDIOVASCULAR:  no chest pain,  or palpitations  GI: no abd pain. no N/V/D.  PERIPHERAL VASCULAR: no swelling, no tenderness, no erythema, no varicose veins.     General Exam:   General Appearance: Appropriately dressed and in no acute distress       Head: Normocephalic, atraumatic and no dysmorphic features  Ear, Nose, and Throat: Moist mucous membranes  CVS: S1S2+  Resp: No SOB, no wheeze or rhonchi  Abd: soft NTND  Extremities: No edema, no cyanosis  Skin: No bruises, no rashes     Neurological Exam:  Mental Status: Awake, alert and oriented x 3.  Able to follow simple and complex verbal commands. Able to name and repeat. fluent speech. No obvious aphasia or dysarthria noted.   Cranial Nerves: PERRL, EOMI, VFFC, sensation V1-V3 intact,  no obvious facial asymmetry , equal elevation of palate, scm/trap 5/5, tongue is midline on protrusion. no obvious papilledema on fundoscopic exam. Hearing is grossly intact.   Motor: Normal bulk, tone and strength throughout LUE/LLE, mild RUE and RLE drift.   Sensation: Intact to light touch and pinprick throughout. no right/left confusion. no extinction to tactile on DSS.    Reflexes: 1+ throughout at biceps, brachioradialis, triceps, patellars and ankles bilaterally and equal. No clonus. R toe and L toe were both downgoing.  Coordination: No dysmetria on FNF  Gait: deferred     I personally reviewed the below data/images/labs:      CBC Full  -  ( 21 Mar 2021 03:38 )  WBC Count : 6.16 K/uL  RBC Count : 4.10 M/uL  Hemoglobin : 12.2 g/dL  Hematocrit : 36.9 %  Platelet Count - Automated : 219 K/uL  Mean Cell Volume : 90.0 fl  Mean Cell Hemoglobin : 29.8 pg  Mean Cell Hemoglobin Concentration : 33.1 gm/dL  Auto Neutrophil # : x  Auto Lymphocyte # : x  Auto Monocyte # : x  Auto Eosinophil # : x  Auto Basophil # : x  Auto Neutrophil % : x  Auto Lymphocyte % : x  Auto Monocyte % : x  Auto Eosinophil % : x  Auto Basophil % : x    03-21    137  |  101  |  16  ----------------------------<  196<H>  4.0   |  26  |  1.06    Ca    8.5      21 Mar 2021 03:38              < from: CT Head No Cont (03.18.21 @ 14:41) >    EXAM:  CT BRAIN                            PROCEDURE DATE:  03/18/2021            INTERPRETATION:  CLINICAL INFORMATION:  History of subdural hematoma, status post middle meningeal artery embolization    TECHNIQUE: CT of the head was performed without the administration of intravenous contrast.    COMPARISON: CT head 3/8/2021.    FINDINGS:    There is a mixed-attenuation acute on chronic left frontoparietal subdural hematoma measuring 1.2 cm in thickness, stable as compared to prior imaging. There is associated localized mass effect on the subjacent brain parenchyma, but no midline shift or hydrocephalus. There is no evidence to suggest interval hemorrhage.    There is gliosis and encephalomalacia in the right anterior inferior frontal lobe, which may represent sequelae of chronic traumatic brain injury.    There is curvilinear high attenuation metallic density deep to the left temporal calvarium, findings consistent with history of middle meningeal artery embolization, unchanged from prior imaging.    There is no evidence of acute infarction, intracranial hemorrhage or mass lesion.  There is hypoattenuation of the subcortical and periventricular white matter, which is nonspecific finding, but most likely represents sequela of chronic microvascular ischemic disease. There are atherosclerotic calcifications of the cavernous and supraclinoid internal carotid arteries bilaterally, and the left intradural vertebral artery. There is prominence of the cortical sulci related to underlying brain parenchymal volume loss.    Patient is status post bilateral lens replacement. The imaged portions of the paranasal sinuses are aerated. The mastoid air cells are clear. The visualized soft tissues and osseous structures appear unremarkable.      IMPRESSION:    Left frontoparietal acute on chronic subdural hematoma measuring 1.2 cm, unchanged.                  LUIZA HUGGINS MD; Resident Radiology  This document has been electronically signed.  SENIA WYNN MD; Attending Radiologist  This document has been electronically signed. Mar 18 2021  4:10PM    < end of copied text >

## 2021-03-22 ENCOUNTER — TRANSCRIPTION ENCOUNTER (OUTPATIENT)
Age: 76
End: 2021-03-22

## 2021-03-22 ENCOUNTER — NON-APPOINTMENT (OUTPATIENT)
Age: 76
End: 2021-03-22

## 2021-03-22 LAB
ANION GAP SERPL CALC-SCNC: 9 MMOL/L — SIGNIFICANT CHANGE UP (ref 5–17)
BUN SERPL-MCNC: 18 MG/DL — SIGNIFICANT CHANGE UP (ref 7–23)
CALCIUM SERPL-MCNC: 8.9 MG/DL — SIGNIFICANT CHANGE UP (ref 8.4–10.5)
CHLORIDE SERPL-SCNC: 102 MMOL/L — SIGNIFICANT CHANGE UP (ref 96–108)
CO2 SERPL-SCNC: 29 MMOL/L — SIGNIFICANT CHANGE UP (ref 22–31)
CREAT SERPL-MCNC: 1.15 MG/DL — SIGNIFICANT CHANGE UP (ref 0.5–1.3)
GLUCOSE BLDC GLUCOMTR-MCNC: 211 MG/DL — HIGH (ref 70–99)
GLUCOSE BLDC GLUCOMTR-MCNC: 224 MG/DL — HIGH (ref 70–99)
GLUCOSE BLDC GLUCOMTR-MCNC: 239 MG/DL — HIGH (ref 70–99)
GLUCOSE BLDC GLUCOMTR-MCNC: 288 MG/DL — HIGH (ref 70–99)
GLUCOSE SERPL-MCNC: 220 MG/DL — HIGH (ref 70–99)
HCT VFR BLD CALC: 38.4 % — LOW (ref 39–50)
HGB BLD-MCNC: 12.5 G/DL — LOW (ref 13–17)
MAGNESIUM SERPL-MCNC: 2 MG/DL — SIGNIFICANT CHANGE UP (ref 1.6–2.6)
MCHC RBC-ENTMCNC: 29.8 PG — SIGNIFICANT CHANGE UP (ref 27–34)
MCHC RBC-ENTMCNC: 32.6 GM/DL — SIGNIFICANT CHANGE UP (ref 32–36)
MCV RBC AUTO: 91.6 FL — SIGNIFICANT CHANGE UP (ref 80–100)
NRBC # BLD: 0 /100 WBCS — SIGNIFICANT CHANGE UP (ref 0–0)
PHOSPHATE SERPL-MCNC: 2.9 MG/DL — SIGNIFICANT CHANGE UP (ref 2.5–4.5)
PLATELET # BLD AUTO: 198 K/UL — SIGNIFICANT CHANGE UP (ref 150–400)
POTASSIUM SERPL-MCNC: 4.3 MMOL/L — SIGNIFICANT CHANGE UP (ref 3.5–5.3)
POTASSIUM SERPL-SCNC: 4.3 MMOL/L — SIGNIFICANT CHANGE UP (ref 3.5–5.3)
RBC # BLD: 4.19 M/UL — LOW (ref 4.2–5.8)
RBC # FLD: 12.8 % — SIGNIFICANT CHANGE UP (ref 10.3–14.5)
SODIUM SERPL-SCNC: 140 MMOL/L — SIGNIFICANT CHANGE UP (ref 135–145)
WBC # BLD: 6.46 K/UL — SIGNIFICANT CHANGE UP (ref 3.8–10.5)
WBC # FLD AUTO: 6.46 K/UL — SIGNIFICANT CHANGE UP (ref 3.8–10.5)

## 2021-03-22 PROCEDURE — 93010 ELECTROCARDIOGRAM REPORT: CPT

## 2021-03-22 PROCEDURE — 99232 SBSQ HOSP IP/OBS MODERATE 35: CPT | Mod: GC

## 2021-03-22 PROCEDURE — 99232 SBSQ HOSP IP/OBS MODERATE 35: CPT

## 2021-03-22 RX ORDER — SIMETHICONE 80 MG/1
80 TABLET, CHEWABLE ORAL ONCE
Refills: 0 | Status: COMPLETED | OUTPATIENT
Start: 2021-03-22 | End: 2021-03-22

## 2021-03-22 RX ADMIN — Medication 3: at 18:22

## 2021-03-22 RX ADMIN — RANOLAZINE 500 MILLIGRAM(S): 500 TABLET, FILM COATED, EXTENDED RELEASE ORAL at 18:21

## 2021-03-22 RX ADMIN — FINASTERIDE 5 MILLIGRAM(S): 5 TABLET, FILM COATED ORAL at 11:04

## 2021-03-22 RX ADMIN — Medication 2: at 09:38

## 2021-03-22 RX ADMIN — Medication 100 MILLIGRAM(S): at 18:22

## 2021-03-22 RX ADMIN — Medication 0.4 MILLIGRAM(S): at 02:58

## 2021-03-22 RX ADMIN — PANTOPRAZOLE SODIUM 40 MILLIGRAM(S): 20 TABLET, DELAYED RELEASE ORAL at 05:50

## 2021-03-22 RX ADMIN — Medication 20 MILLIGRAM(S): at 05:49

## 2021-03-22 RX ADMIN — ISOSORBIDE MONONITRATE 120 MILLIGRAM(S): 60 TABLET, EXTENDED RELEASE ORAL at 11:04

## 2021-03-22 RX ADMIN — Medication 100 MILLIGRAM(S): at 05:50

## 2021-03-22 RX ADMIN — LOSARTAN POTASSIUM 25 MILLIGRAM(S): 100 TABLET, FILM COATED ORAL at 11:04

## 2021-03-22 RX ADMIN — GABAPENTIN 300 MILLIGRAM(S): 400 CAPSULE ORAL at 05:50

## 2021-03-22 RX ADMIN — ROSUVASTATIN CALCIUM 5 MILLIGRAM(S): 5 TABLET ORAL at 21:57

## 2021-03-22 RX ADMIN — INSULIN GLARGINE 7 UNIT(S): 100 INJECTION, SOLUTION SUBCUTANEOUS at 21:59

## 2021-03-22 RX ADMIN — GABAPENTIN 300 MILLIGRAM(S): 400 CAPSULE ORAL at 18:22

## 2021-03-22 RX ADMIN — TAMSULOSIN HYDROCHLORIDE 0.4 MILLIGRAM(S): 0.4 CAPSULE ORAL at 21:57

## 2021-03-22 RX ADMIN — Medication 2: at 13:02

## 2021-03-22 RX ADMIN — SIMETHICONE 80 MILLIGRAM(S): 80 TABLET, CHEWABLE ORAL at 10:20

## 2021-03-22 NOTE — PROGRESS NOTE ADULT - SUBJECTIVE AND OBJECTIVE BOX
Neurology Progress Note (COVERING FOR DR. SEBASTIAN)    S: Patient seen and examined. had CP and SOB overnight. doing better, wife at bedside. wants to know if surgery happening     Medication:  MEDICATIONS  (STANDING):  dextrose 40% Gel 15 Gram(s) Oral once  dextrose 5%. 1000 milliLiter(s) (50 mL/Hr) IV Continuous <Continuous>  dextrose 5%. 1000 milliLiter(s) (100 mL/Hr) IV Continuous <Continuous>  dextrose 50% Injectable 25 Gram(s) IV Push once  dextrose 50% Injectable 12.5 Gram(s) IV Push once  dextrose 50% Injectable 25 Gram(s) IV Push once  finasteride 5 milliGRAM(s) Oral daily  furosemide    Tablet 20 milliGRAM(s) Oral daily  gabapentin 300 milliGRAM(s) Oral two times a day  glucagon  Injectable 1 milliGRAM(s) IntraMuscular once  insulin glargine Injectable (LANTUS) 7 Unit(s) SubCutaneous at bedtime  insulin lispro (ADMELOG) corrective regimen sliding scale   SubCutaneous three times a day before meals  insulin lispro (ADMELOG) corrective regimen sliding scale   SubCutaneous at bedtime  isosorbide   mononitrate ER Tablet (IMDUR) 120 milliGRAM(s) Oral daily  losartan 25 milliGRAM(s) Oral <User Schedule>  metoprolol tartrate 100 milliGRAM(s) Oral two times a day  pantoprazole    Tablet 40 milliGRAM(s) Oral before breakfast  ranolazine 500 milliGRAM(s) Oral two times a day  rosuvastatin 5 milliGRAM(s) Oral at bedtime  tamsulosin 0.4 milliGRAM(s) Oral at bedtime    MEDICATIONS  (PRN):  guaiFENesin   Syrup  (Sugar-Free) 100 milliGRAM(s) Oral every 6 hours PRN Cough        Vitals:  Vital Signs Last 24 Hrs  T(C): 36.4 (22 Mar 2021 12:32), Max: 36.7 (21 Mar 2021 17:47)  T(F): 97.5 (22 Mar 2021 12:32), Max: 98 (21 Mar 2021 17:47)  HR: 54 (22 Mar 2021 12:32) (52 - 74)  BP: 109/63 (22 Mar 2021 12:32) (100/57 - 129/80)  BP(mean): --  RR: 18 (22 Mar 2021 12:32) (18 - 20)  SpO2: 100% (22 Mar 2021 12:32) (94% - 100%)    Review of Systems  GENERAL:  no weakness, fatigue, fevers or chills  NEURO: no dizziness, numbness, tingling or weakness  SKIN: no itching, burning, rashes, or lesions   HEENT: no visual changes;  no headache, no vertigo, no recent colds  RESPIRATORY: no shortness of breath, no cough, sputum, wheezing, hemoptysis;   CARDIOVASCULAR:  no chest pain,  or palpitations  GI: no abd pain. no N/V/D.  PERIPHERAL VASCULAR: no swelling, no tenderness, no erythema, no varicose veins.     General Exam:   General Appearance: Appropriately dressed and in no acute distress       Head: Normocephalic, atraumatic and no dysmorphic features  Ear, Nose, and Throat: Moist mucous membranes  CVS: S1S2+  Resp: No SOB, no wheeze or rhonchi  Abd: soft NTND  Extremities: No edema, no cyanosis  Skin: No bruises, no rashes     Neurological Exam:  Mental Status: Awake, alert and oriented x 3.  Able to follow simple and complex verbal commands. Able to name and repeat. fluent speech. No obvious aphasia or dysarthria noted.   Cranial Nerves: PERRL, EOMI, VFFC, sensation V1-V3 intact,  no obvious facial asymmetry , equal elevation of palate, scm/trap 5/5, tongue is midline on protrusion. no obvious papilledema on fundoscopic exam. Hearing is grossly intact.   Motor: Normal bulk, tone and strength throughout LUE/LLE, mild RUE and RLE drift.   Sensation: Intact to light touch and pinprick throughout. no right/left confusion. no extinction to tactile on DSS.    Reflexes: 1+ throughout at biceps, brachioradialis, triceps, patellars and ankles bilaterally and equal. No clonus. R toe and L toe were both downgoing.  Coordination: No dysmetria on FNF  Gait: deferred     I personally reviewed the below data/images/labs:  CBC Full  -  ( 22 Mar 2021 06:46 )  WBC Count : 6.46 K/uL  RBC Count : 4.19 M/uL  Hemoglobin : 12.5 g/dL  Hematocrit : 38.4 %  Platelet Count - Automated : 198 K/uL  Mean Cell Volume : 91.6 fl  Mean Cell Hemoglobin : 29.8 pg  Mean Cell Hemoglobin Concentration : 32.6 gm/dL  Auto Neutrophil # : x  Auto Lymphocyte # : x  Auto Monocyte # : x  Auto Eosinophil # : x  Auto Basophil # : x  Auto Neutrophil % : x  Auto Lymphocyte % : x  Auto Monocyte % : x  Auto Eosinophil % : x  Auto Basophil % : x      03-22    140  |  102  |  18  ----------------------------<  220<H>  4.3   |  29  |  1.15    Ca    8.9      22 Mar 2021 06:46  Phos  2.9     03-22  Mg     2.0     03-22              < from: CT Head No Cont (03.18.21 @ 14:41) >    EXAM:  CT BRAIN                            PROCEDURE DATE:  03/18/2021            INTERPRETATION:  CLINICAL INFORMATION:  History of subdural hematoma, status post middle meningeal artery embolization    TECHNIQUE: CT of the head was performed without the administration of intravenous contrast.    COMPARISON: CT head 3/8/2021.    FINDINGS:    There is a mixed-attenuation acute on chronic left frontoparietal subdural hematoma measuring 1.2 cm in thickness, stable as compared to prior imaging. There is associated localized mass effect on the subjacent brain parenchyma, but no midline shift or hydrocephalus. There is no evidence to suggest interval hemorrhage.    There is gliosis and encephalomalacia in the right anterior inferior frontal lobe, which may represent sequelae of chronic traumatic brain injury.    There is curvilinear high attenuation metallic density deep to the left temporal calvarium, findings consistent with history of middle meningeal artery embolization, unchanged from prior imaging.    There is no evidence of acute infarction, intracranial hemorrhage or mass lesion.  There is hypoattenuation of the subcortical and periventricular white matter, which is nonspecific finding, but most likely represents sequela of chronic microvascular ischemic disease. There are atherosclerotic calcifications of the cavernous and supraclinoid internal carotid arteries bilaterally, and the left intradural vertebral artery. There is prominence of the cortical sulci related to underlying brain parenchymal volume loss.    Patient is status post bilateral lens replacement. The imaged portions of the paranasal sinuses are aerated. The mastoid air cells are clear. The visualized soft tissues and osseous structures appear unremarkable.      IMPRESSION:    Left frontoparietal acute on chronic subdural hematoma measuring 1.2 cm, unchanged.                  LUIZA HUGGINS MD; Resident Radiology  This document has been electronically signed.  SENIA WYNN MD; Attending Radiologist  This document has been electronically signed. Mar 18 2021  4:10PM    < end of copied text >

## 2021-03-22 NOTE — PROGRESS NOTE ADULT - SUBJECTIVE AND OBJECTIVE BOX
Pt on telemetry with pulmonary edema and continued chest pain.  Pt has new myocardial dysfunction since last visit.  CT head showed no change in left chronic SDH.  I had long talk with pt and daughter (latter over phone).  They are concerned about imminent cardiac failure, and I explained the neurosurgical position regarding intervention.  We cannot quantify the risks of SDH expansion under the circumstances even after MMA embolization for CABG or stenting with DAP.  We discussed the choices of accepting the risk of blood thinners to avert mortality from cardiac failure/MI, which may be the more prominent risk right now, compared to bleeding and expansion of the SDH.  Daughter is tending to be more conservative, concerned about the SDH but also concerned about no corrective treatment for the heart, and patient is concerned about treating the heart.  I will have discussion with cardiology/medicine.  Afterward,  I have spoken to medicine who suggested to also speak with CTS.  I spoke with CTS who explained the large heparin bolus and dose during bypass and loss of exam for a lengthy period of time, which is of concern.  She will explore with interventional cardiology in regard to stenting options, including bare-metal stents and we will discuss further.

## 2021-03-22 NOTE — PROGRESS NOTE ADULT - SUBJECTIVE AND OBJECTIVE BOX
Neurosurgery and neurology notes reviewed.  No absolute contraindication for DAPT or heparin as per NS although increased risk of IC bleeding.  However, NS/neurology recommending no bolus and keeping PTT <60.  However, pt will need significant heparin bolus (around 30,000 U) and to maintain ptt significantly greater than 60 (ACT >480) for CABG making CABG extremely high risk for IC bleeding.

## 2021-03-22 NOTE — PROGRESS NOTE ADULT - ASSESSMENT
74y M with PMH CAD s/p PCI to the LAD and RCA in the past (last angiogram in 2015 with [facility], known 3VD), HTN, & HLD. 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     Plan  Euvolemic on exam and unable to visualize elevated JVP  Found to have newly reduced EF with segmentality. Given need for DAPT after stenting poor candidate for any intervention  When bleeding risk acceptable for DAPT will need evaluation  c/w lasix 20mg PO daily, would start atorvastatin 80mg daily if no contraindiction  c/w losartan 25mg daily  monitor i/o, daily standing weights    Franco Barrett MD  Cardiology Fellow - F1  Text or Call: [phone]  For all New Consults and Questions:  [email or web address]   Login: [email or web address]     74y M with PMH CAD s/p PCI to the LAD and RCA in the past (last angiogram in 2015 with [facility], known 3VD), HTN, & HLD. 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     Plan  Euvolemic on exam and unable to visualize elevated JVP  Found to have newly reduced EF with segmentality. Given need for DAPT after stenting poor candidate for any intervention  When bleeding risk acceptable for DAPT will need evaluation  c/w lasix 20mg PO daily, would start atorvastatin 80mg daily if no contraindication  c/w losartan 25mg daily  -can increase ranolazine to 1000 bid if BP tolerates   monitor i/o, daily standing weights    Franco Barrett MD  Cardiology Fellow - F1  Text or Call: [phone]  For all New Consults and Questions:  [email or web address]   Login: [email or web address]     74y M with PMH CAD s/p PCI to the LAD and RCA in the past, HTN, & HLD.  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  Patient w/ known 3 vessel coronary dz,  Unable to pursue CABG due to inability to tolerate anti-coagulation therapy in the setting of recent intracranial arterial embolization.   Presents w/ dyspnea.       Plan  - Euvolemic on exam and unable to visualize elevated JVP  - Found to have newly reduced EF with segmentality. Given need for DAPT after stenting poor candidate for any intervention  - When bleeding risk acceptable for DAPT will need evaluation  - c/w Lasix 20mg PO daily  - start atorvastatin 80mg daily if no contraindication  - c/w losartan 25mg daily  - can increase ranolazine to 1000 bid if BP tolerates -  - monitor i/o, daily standing weights     Franco Barrett MD  Cardiology Fellow - F1  Text or Call: [phone]    Markell Rosado M.D.  Cardiology Attending, Consult Service    For Cardiology consults and questions, all Cardiology service information can be found 24/7 on [email or web address], password: [email or web address]

## 2021-03-22 NOTE — DISCHARGE NOTE NURSING/CASE MANAGEMENT/SOCIAL WORK - NSDCVIVACCINE_GEN_ALL_CORE_FT
No Vaccines Administered. Severe acute respiratory syndrome coronavirus 2 (SARS-CoV-2) (Coronavirus disease [COVID-19]) vaccine , 2021/3/26 13:49 , Milagros Lobo (RN)

## 2021-03-22 NOTE — PROGRESS NOTE ADULT - SUBJECTIVE AND OBJECTIVE BOX
Patient is a 76y old  Male who presents with a chief complaint of One day of dyspnoea. (22 Mar 2021 18:27)    Date of servie : 03-22-21 @ 21:21  INTERVAL HPI/OVERNIGHT EVENTS:  T(C): 36.4 (03-22-21 @ 12:32), Max: 36.5 (03-22-21 @ 04:23)  HR: 54 (03-22-21 @ 12:32) (52 - 74)  BP: 109/63 (03-22-21 @ 12:32) (109/63 - 129/80)  RR: 18 (03-22-21 @ 12:32) (18 - 20)  SpO2: 100% (03-22-21 @ 12:32) (94% - 100%)  Wt(kg): --  I&O's Summary    21 Mar 2021 07:01  -  22 Mar 2021 07:00  --------------------------------------------------------  IN: 690 mL / OUT: 700 mL / NET: -10 mL    22 Mar 2021 07:01  -  22 Mar 2021 21:21  --------------------------------------------------------  IN: 420 mL / OUT: 450 mL / NET: -30 mL        LABS:                        12.5   6.46  )-----------( 198      ( 22 Mar 2021 06:46 )             38.4     03-22    140  |  102  |  18  ----------------------------<  220<H>  4.3   |  29  |  1.15    Ca    8.9      22 Mar 2021 06:46  Phos  2.9     03-22  Mg     2.0     03-22          CAPILLARY BLOOD GLUCOSE      POCT Blood Glucose.: 288 mg/dL (22 Mar 2021 18:03)  POCT Blood Glucose.: 211 mg/dL (22 Mar 2021 12:56)  POCT Blood Glucose.: 224 mg/dL (22 Mar 2021 09:11)  POCT Blood Glucose.: 254 mg/dL (21 Mar 2021 21:25)            MEDICATIONS  (STANDING):  dextrose 40% Gel 15 Gram(s) Oral once  dextrose 5%. 1000 milliLiter(s) (50 mL/Hr) IV Continuous <Continuous>  dextrose 5%. 1000 milliLiter(s) (100 mL/Hr) IV Continuous <Continuous>  dextrose 50% Injectable 25 Gram(s) IV Push once  dextrose 50% Injectable 12.5 Gram(s) IV Push once  dextrose 50% Injectable 25 Gram(s) IV Push once  finasteride 5 milliGRAM(s) Oral daily  furosemide    Tablet 20 milliGRAM(s) Oral daily  gabapentin 300 milliGRAM(s) Oral two times a day  glucagon  Injectable 1 milliGRAM(s) IntraMuscular once  insulin glargine Injectable (LANTUS) 7 Unit(s) SubCutaneous at bedtime  insulin lispro (ADMELOG) corrective regimen sliding scale   SubCutaneous three times a day before meals  insulin lispro (ADMELOG) corrective regimen sliding scale   SubCutaneous at bedtime  isosorbide   mononitrate ER Tablet (IMDUR) 120 milliGRAM(s) Oral daily  losartan 25 milliGRAM(s) Oral <User Schedule>  metoprolol tartrate 100 milliGRAM(s) Oral two times a day  pantoprazole    Tablet 40 milliGRAM(s) Oral before breakfast  ranolazine 500 milliGRAM(s) Oral two times a day  rosuvastatin 5 milliGRAM(s) Oral at bedtime  tamsulosin 0.4 milliGRAM(s) Oral at bedtime    MEDICATIONS  (PRN):  guaiFENesin   Syrup  (Sugar-Free) 100 milliGRAM(s) Oral every 6 hours PRN Cough          PHYSICAL EXAM:  GENERAL: NAD, well-groomed, well-developed  HEAD:  Atraumatic, Normocephalic  CHEST/LUNG: Clear to percussion bilaterally; No rales, rhonchi, wheezing, or rubs  HEART: Regular rate and rhythm; No murmurs, rubs, or gallops  ABDOMEN: Soft, Nontender, Nondistended; Bowel sounds present  EXTREMITIES:  2+ Peripheral Pulses, No clubbing, cyanosis, or edema  LYMPH: No lymphadenopathy noted  SKIN: No rashes or lesions    Care Discussed with Consultants/Other Providers [ ] YES  [ ] NO

## 2021-03-22 NOTE — PROGRESS NOTE ADULT - ASSESSMENT
75 yo M with CAD s/p stents, HTN, HLD, Chronic SDH, s/p LHC 2/26 with multivessel disease with intervention deferred at that time due to intracranial bleeding risk. he is s/p L MME on 3/1/21 with nsx. CTH 3/18 with unchanged acute on chronic L FP SDH 1.2cm. CP and SOB overnight, better now   - Nsx states no absolute contraindication to systemic AC or DAPT however there is an increased risk of ICH.  f/u with nsx.  patient seems to be optimally stabilized s/p MMA and CTH is stable.  benefits of cardiac procedure seem to outweigh risks of ICH at this time.  recommend no bolus if possible with low PTT goal 50-50 if heparin used to limit risk. per CT surgery note, procedure is high risk  - PT/OT  - check FS, glucose control <180  - GI/DVT ppx  - Counseling on diet, exercise, and medication adherence was done  - Counseling on smoking cessation and alcohol consumption offered when appropriate.  - Pain assessed and judicious use of narcotics when appropriate was discussed.    - Stroke education given when appropriate.  - Importance of fall prevention discussed.   - Differential diagnosis and plan of care discussed with patient and/or family and primary team  - Thank you for allowing me to participate in the care of this patient. Call with questions.  spoke with patient and his wife at bedside   Garcia Nicholson MD  Vascular Neurology  (Covering for Dr. García)  can f/u with me in office [phone]

## 2021-03-22 NOTE — PROGRESS NOTE ADULT - ASSESSMENT
Problem/Plan - 1:  ·  Problem: ACS (acute coronary syndrome).  Plan: See above.   telemonitor  fu  awaiting CABG if cleared by NS     Problem/Plan - 2:  ·  Problem: Type 2 diabetes mellitus with hyperglycemia, with long-term current use of insulin.  Plan: See above.   High doses of home Lantus at home.   Random glucose of 160 in the ER.   Will HOLD oral Rx and provide conservative bedtime Lantus of 0.1U/Kg/24H.   monitor FSA    Problem/Plan - 3:  ·  Problem: Chronic subdural hematoma.  Plan:  NS to review with CTS, cardiology the issue of CABG.   Problem/Plan - 4:  ·  Problem: Need for prophylactic measure.  Plan: ELIESER for now with active intracranial process precludes pharmacologic DVT prophylaxis.     case dw neurosurgery attending

## 2021-03-22 NOTE — CHART NOTE - NSCHARTNOTEFT_GEN_A_CORE
CC: chest pain      HPI:  Called by RN to evaluate patient for  Patient seen and assessed at bedside,   Patient denied headache, dizziness, CP, SOB, abdominal  pain, N/V/D, numbness/tingling, extremity weakness, dysuria.         ROS:  CONSTITUTIONAL:  No fever, chills, rigors  CARDIOVASCULAR:  No chest pain or palpitations  RESPIRATORY:   No SOB, cough, wheezing  GASTROINTESTINAL:  No abd pain, N/V/D  EXTREMITIES:  No swelling or joint pain  GENITOURINARY:  No burning on urination, increased frequency or urgency.   NEUROLOGIC:  No HA, visual disturbances  SKIN: No rashes        PAST MEDICAL & SURGICAL HISTORY:  Acute ischemic heart disease    Family history of diabetes mellitus (Sibling)    Handoff    Low Risk    MEWS Score    BPH (benign prostatic hypertrophy)    Hyperlipidemia    CAD (coronary artery disease)    Diabetes mellitus    HTN (hypertension)    ACS (acute coronary syndrome)    Discharge planning issues    Need for prophylactic measure    Chronic subdural hematoma    Type 2 diabetes mellitus with hyperglycemia, with long-term current use of insulin    ACS (acute coronary syndrome)    S/P drug eluting coronary stent placement    S/P primary angioplasty with coronary stent    SOB LAST NIGHT    7    SysAdmin_VisitLink          Vital Signs Last 24 Hrs  T(C): 36.2 (22 Mar 2021 00:33), Max: 36.7 (21 Mar 2021 17:47)  T(F): 97.1 (22 Mar 2021 00:33), Max: 98 (21 Mar 2021 17:47)  HR: 52 (22 Mar 2021 00:33) (52 - 69)  BP: 129/80 (22 Mar 2021 00:33) (100/57 - 129/80)  BP(mean): --  RR: 18 (22 Mar 2021 00:33) (18 - 18)  SpO2: 94% (22 Mar 2021 00:33) (94% - 100%)      Physical Exam:  General: WN/WD NAD, AOx3, nontoxic appearing  Head:  NC/AT  CV: RRR, S1S2   Respiratory: CTA B/L, nonlabored  Abdominal: (+) bowel sounds x4. Soft, NT, ND, no guarding or rebound tenderness  Genitourinary: ? So   MSK: No BLLE edema, + peripheral pulses, FROM all 4 extremity  Skin: (+) warm, dry   Psych: Appropriate affect       Labs:                        12.2   6.16  )-----------( 219      ( 21 Mar 2021 03:38 )             36.9     03-21    137  |  101  |  16  ----------------------------<  196<H>  4.0   |  26  |  1.06    Ca    8.5      21 Mar 2021 03:38                Radiology:          Assessment & Plan:  HPI:  NIGHT HOSPITALIST:   Patient UNKNOWN to me previously, assigned to me at this point via the ER and by Dr. 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     (17 Mar 2021 21:09)        -  -  -Will continue to closely monitor patient/vitals  -Primary Team to follow up in AM, attending to follow       Tyrone Morgan PA-C  Dept of Medicine CC: chest pain      HPI:  Called by RN to evaluate patient for  Patient seen and assessed at bedside,   Patient denied headache, dizziness, CP, SOB, abdominal  pain, N/V/D, numbness/tingling, extremity weakness, dysuria.         ROS:  CONSTITUTIONAL:  No fever, chills, rigors  CARDIOVASCULAR:  No chest pain or palpitations  RESPIRATORY:   No SOB, cough, wheezing  GASTROINTESTINAL:  No abd pain, N/V/D  EXTREMITIES:  No swelling or joint pain  GENITOURINARY:  No burning on urination, increased frequency or urgency.   NEUROLOGIC:  No HA, visual disturbances  SKIN: No rashes        PAST MEDICAL & SURGICAL HISTORY:  Acute ischemic heart disease    Family history of diabetes mellitus (Sibling)    Handoff    Low Risk    MEWS Score    BPH (benign prostatic hypertrophy)    Hyperlipidemia    CAD (coronary artery disease)    Diabetes mellitus    HTN (hypertension)    ACS (acute coronary syndrome)    Discharge planning issues    Need for prophylactic measure    Chronic subdural hematoma    Type 2 diabetes mellitus with hyperglycemia, with long-term current use of insulin    ACS (acute coronary syndrome)    S/P drug eluting coronary stent placement    S/P primary angioplasty with coronary stent    SOB LAST NIGHT    7    SysAdmin_VisitLink          Vital Signs Last 24 Hrs  T(C): 36.2 (22 Mar 2021 00:33), Max: 36.7 (21 Mar 2021 17:47)  T(F): 97.1 (22 Mar 2021 00:33), Max: 98 (21 Mar 2021 17:47)  HR: 52 (22 Mar 2021 00:33) (52 - 69)  BP: 129/80 (22 Mar 2021 00:33) (100/57 - 129/80)  BP(mean): --  RR: 18 (22 Mar 2021 00:33) (18 - 18)  SpO2: 94% (22 Mar 2021 00:33) (94% - 100%)      Physical Exam:  General: WN/WD NAD, AOx3, nontoxic appearing  Head:  NC/AT  CV: RRR, S1S2   Respiratory: CTA B/L, nonlabored  Abdominal: (+) bowel sounds x4. Soft, NT, ND, no guarding or rebound tenderness  Genitourinary: ? So   MSK: No BLLE edema, + peripheral pulses, FROM all 4 extremity  Skin: (+) warm, dry   Psych: Appropriate affect       Labs:                        12.2   6.16  )-----------( 219      ( 21 Mar 2021 03:38 )             36.9     03-21    137  |  101  |  16  ----------------------------<  196<H>  4.0   |  26  |  1.06    Ca    8.5      21 Mar 2021 03:38                Radiology:          Assessment & Plan:  HPI:  NIGHT HOSPITALIST:   Patient UNKNOWN to me previously, assigned to me at this point via the ER and by Dr. Cody to admit this 75 y/o M--history and Medex review from patient and from patient's adult daughter above by phone--patient with a history of CAD with PCI to the LAD and RCA (last in 2015) and known three vessel disease, essential HTN, chronic subdural haematoma from last year from a fall in his driveway, S/P embolization of the middle meningeal artery, recently discharged from [facility] on March 9 2021, with recent cardiac catheterization in Feb 2021 with patient presently unable to pursue CABG due to inability to anticoagulation in the context of recent arterial embolization.     (17 Mar 2021 21:09)      #Recurrent chest pain  -Vital signs hemodynamically stable  -STAT EKG: SR with 1st degree AVB, HR 67BPM. No acute ST/T segment changes noted when compared with prior EKG in [place] from 3/9/2021.  -Telemetry currently sinus 60; no events. Continue to monitor on telemetry.   -  -Will continue to closely monitor patient/vitals  -Primary Team to follow up in AM, attending to follow       Tyrone Morgan PA-C  Dept of Medicine  [id number] CC: chest pain      HPI:  Called by RN to evaluate patient for complaints of chest pain. Patient seen and assessed at bedside, NAD, alert and awake. He complained of left-sided chest pain that had resolved at the time of the interview after receiving SL nitroglycerin. Patient stated chest pain is similar to prior episodes of chest pain, was 8/10 on pain scale, no radiation, no exacerbating factors. He denied headache, dizziness, acute dyspnea, abdominal  pain, N/V/D, extremity numbness/tingling, arm pain, or jaw pain. Patient expressed frustration with recurrent episodes of chest pain. Of note, he has been refusing Ranexa stating that it makes his nauseous/vomit/decrease his appetite.         ROS:  CONSTITUTIONAL:  No fever, chills, rigors  CARDIOVASCULAR:  (+) chest pain  RESPIRATORY:   No SOB, cough, wheezing  GASTROINTESTINAL:  No abd pain, N/V/D  NEUROLOGIC:  No HA, visual disturbances  SKIN: No rashes        PAST MEDICAL & SURGICAL HISTORY:  BPH (benign prostatic hypertrophy)  Hyperlipidemia  CAD (coronary artery disease)  Diabetes mellitus  Type 2  HTN (hypertension)  S/P drug eluting coronary stent placement  [place]  S/P primary angioplasty with coronary stent  1990s          Vital Signs Last 24 Hrs  T(C): 36.2 (22 Mar 2021 00:33), Max: 36.7 (21 Mar 2021 17:47)  T(F): 97.1 (22 Mar 2021 00:33), Max: 98 (21 Mar 2021 17:47)  HR: 52 (22 Mar 2021 00:33) (52 - 69)  BP: 129/80 (22 Mar 2021 00:33) (100/57 - 129/80)  RR: 18 (22 Mar 2021 00:33) (18 - 18)  SpO2: 94% (22 Mar 2021 00:33) (94% - 100%)      Physical Exam:  General: WN/WD, NAD, AOx3, nontoxic appearing  Head:  NC/AT  CV: RRR, S1S2, chest pain is NOT reproducible on exam  Respiratory: CTA B/L, nonlabored  Abdominal: (+) bowel sounds x4. Soft, NT, ND, no guarding or rebound tenderness  MSK: No BLLE edema, + peripheral pulses, FROM all 4 extremity  Skin: (+) warm, dry         Labs:                        12.2   6.16  )-----------( 219      ( 21 Mar 2021 03:38 )             36.9     03-21    137  |  101  |  16  ----------------------------<  196<H>  4.0   |  26  |  1.06    Ca    8.5      21 Mar 2021 03:38                Radiology:  < from: Xray Chest 1 View- PORTABLE-Routine (Xray Chest 1 View- PORTABLE-Routine .) (03.21.21 @ 09:17) >  Pulmonary edema.  < end of copied text >            Assessment & Plan:  77 y/o M-with a history of CAD with PCI to the LAD and RCA (last in 2015) and known three vessel disease, essential HTN, chronic subdural hematoma from last year from a fall in his driveway, S/P embolization of the middle meningeal artery, recently discharged from [facility] on March 9 2021, with recent cardiac catheterization in Feb 2021 with patient presently unable to pursue CABG due to inability to anticoagulation in the context of recent arterial embolization., presents with one day of dyspnea.  Patient now presenting acutely with recurrent chest pain resolved with SL NTG.       #Recurrent chest pain- likely secondary to underlying severe MVD  -Vital signs hemodynamically stable  -Chest pain resolved at the time of the interview after receiving SL nitroglycerin  -Chest pain is not reproducible on exam   -STAT EKG: SR with 1st degree AVB, HR 67BPM. No acute ST/T segment changes noted when compared with prior EKG in [place] from 3/9/2021.  -Telemetry currently sinus 60; no events. Continue to monitor on telemetry.   -Patient refusing Ranexa, continue to educate patient on importance of medication compliance  -c/w Lopressor, Crestor, and Imdur as ordered  -Cardiology is following, patient has known severe MVD; poor PCI/CABG candidate at this time unless cleared by neurosurgery  -Follow up neurosx recommendations regarding restarting DAPT  -Will continue to closely monitor patient/vitals  -Primary Team to follow up in AM, attending to follow       Tyrone Morgan PA-C  Dept of Medicine  [id number]

## 2021-03-22 NOTE — PROGRESS NOTE ADULT - SUBJECTIVE AND OBJECTIVE BOX
INTERVAL EVENTS: No o/n events. Reports dyspnea and CP in AM. Denies palpitations, presyncope, syncope, f/c/n/v.     REVIEW OF SYSTEMS:  Constitutional:     [X] negative [ ] fevers [ ] chills [ ] weight loss [ ] weight gain  HEENT:                  [X] negative [ ] dry eyes [ ] eye irritation [ ] postnasal drip [ ] nasal congestion  CV:                         [ ] negative  [X] chest pain [ ] orthopnea [ ] palpitations [ ] murmur  Resp:                     [ ] negative [ ] cough [X] shortness of breath  [ ] wheezing [ ] sputum [ ] hemoptysis  GI:                          [X] negative [ ] nausea [ ] vomiting [ ] diarrhea [ ] constipation [ ] abd pain [ ] dysphagia   :                        [X] negative [ ] dysuria [ ] nocturia [ ] hematuria [ ] increased urinary frequency  MSK:                      [X] negative [ ] back pain [ ] myalgias [ ] arthralgias [ ] fracture  Skin:                       [X] negative [ ] rash [ ] itch  Neuro:                   [X] negative [ ] headache [ ] dizziness [ ] syncope [ ] weakness [ ] numbness  Psych:                    [X] negative [ ] anxiety [ ] depression  Endo:                     [X] negative [ ] diabetes [ ] thyroid problem  Heme/Lymph:      [X] negative [ ] anemia [ ] bleeding problem  Allergic/Immune: [X] negative [ ] itchy eyes [ ] nasal discharge [ ] hives [ ] angioedema    [X] All other systems negative or otherwise described above.  [ ] Unable to assess ROS because ________.    PAST MEDICAL & SURGICAL HISTORY:  BPH (benign prostatic hypertrophy)    Hyperlipidemia    CAD (coronary artery disease)    Diabetes mellitus  Type 2    HTN (hypertension)    S/P drug eluting coronary stent placement  [place]    S/P primary angioplasty with coronary stent  1990s      MEDICATIONS  (STANDING):  dextrose 40% Gel 15 Gram(s) Oral once  dextrose 5%. 1000 milliLiter(s) (50 mL/Hr) IV Continuous <Continuous>  dextrose 5%. 1000 milliLiter(s) (100 mL/Hr) IV Continuous <Continuous>  dextrose 50% Injectable 25 Gram(s) IV Push once  dextrose 50% Injectable 12.5 Gram(s) IV Push once  dextrose 50% Injectable 25 Gram(s) IV Push once  finasteride 5 milliGRAM(s) Oral daily  furosemide    Tablet 20 milliGRAM(s) Oral daily  gabapentin 300 milliGRAM(s) Oral two times a day  glucagon  Injectable 1 milliGRAM(s) IntraMuscular once  insulin glargine Injectable (LANTUS) 7 Unit(s) SubCutaneous at bedtime  insulin lispro (ADMELOG) corrective regimen sliding scale   SubCutaneous three times a day before meals  insulin lispro (ADMELOG) corrective regimen sliding scale   SubCutaneous at bedtime  isosorbide   mononitrate ER Tablet (IMDUR) 120 milliGRAM(s) Oral daily  losartan 25 milliGRAM(s) Oral <User Schedule>  metoprolol tartrate 100 milliGRAM(s) Oral two times a day  pantoprazole    Tablet 40 milliGRAM(s) Oral before breakfast  ranolazine 500 milliGRAM(s) Oral two times a day  rosuvastatin 5 milliGRAM(s) Oral at bedtime  tamsulosin 0.4 milliGRAM(s) Oral at bedtime    MEDICATIONS  (PRN):  guaiFENesin   Syrup  (Sugar-Free) 100 milliGRAM(s) Oral every 6 hours PRN Cough    ICU Vital Signs Last 24 Hrs  T(C): 36.5 (22 Mar 2021 04:23), Max: 36.7 (21 Mar 2021 17:47)  T(F): 97.7 (22 Mar 2021 04:23), Max: 98 (21 Mar 2021 17:47)  HR: 64 (22 Mar 2021 04:23) (52 - 74)  BP: 109/65 (22 Mar 2021 04:23) (100/57 - 129/80)  BP(mean): --  ABP: --  ABP(mean): --  RR: 18 (22 Mar 2021 04:23) (18 - 20)  SpO2: 100% (22 Mar 2021 04:23) (94% - 100%)    Daily     Daily Weight in k.8 (22 Mar 2021 04:23)    PHYSICAL EXAM:  GEN: Awake, alert. NAD.   HEENT: NCAT, PERRL, EOMI. Mucosa moist. No JVD.  RESP: CTA b/l  CV: RRR. Normal S1/S2. No m/r/g.  ABD: Soft. NT/ND. BS+  EXT: Warm. No edema, clubbing, or cyanosis.   NEURO: AAOx3. No focal deficits.     LABS:                        12.5   6.46  )-----------( 198      ( 22 Mar 2021 06:46 )             38.4     03-22    140  |  102  |  18  ----------------------------<  220<H>  4.3   |  29  |  1.15    Ca    8.9      22 Mar 2021 06:46  Phos  2.9     03-22  Mg     2.0     03-22      CARDIAC MARKERS ( 20 Mar 2021 23:42 )  x     / x     / 90 U/L / x     / x              I&O's Summary    21 Mar 2021 07:01  -  22 Mar 2021 07:00  --------------------------------------------------------  IN: 690 mL / OUT: 700 mL / NET: -10 mL    22 Mar 2021 07:01  -  22 Mar 2021 10:44  --------------------------------------------------------  IN: 180 mL / OUT: 300 mL / NET: -120 mL      BNP    RADIOLOGY & ADDITIONAL STUDIES:    TELEMETRY: reviewed    EKG: reviewed    < from: Transthoracic Echocardiogram (21 @ 20:22) >  Conclusions:  Technically difficult study.  1. Mitral annular calcification, otherwise normal mitral  valve. Minimal mitral regurgitation.  2. Aortic valve not well visualized. Minimal aortic  regurgitation.  3. Endocardium not well visualized; grossly moderate left  ventricular systolic dysfunction. The mid to distal septum  is dyskinetic. The distal segments and the apex appear  akinetic. Paradoxical septal motion consistent with  conduction defect. Wall motion abnormalities may be due in  part due to interventricular conduction defect. Recommend  repeat imaging with intravenous echo contrast to better  visualize the LV.  4. The right ventricle is not well visualized; grossly  normal right ventricular size and systolic function.  5. Trace pericardial effusion.  *** Compared with echocardiogram of 2021, LV systolic  function appears decreased.  ------------------------------------------------------------------------  Confirmed on  3/19/2021- 08:33:43 by Selvin Dalton M.D.  ------------------------------------------------------------------------    < end of copied text >      < from: TTE with Doppler (w/Cont) (21 @ 06:00) >  Conclusions:  1. Moderately dilated left atrium.  LA volume index = 48  cc/m2.  2. Endocardium not well visualized; despite the use of  Definity.  Probable septal hypokinesis. Septal motion is  consistent with LBBB.  ------------------------------------------------------------------------  Confirmed on  2021 - 10:36:03 by Wilver Carmichael M.D.  FASE  ------------------------------------------------------------------------    < end of copied text >      < from: Cardiac Cath Lab - Adult (21 @ 16:51) >  CORONARY VESSELS: The coronary circulation is right dominant.  LM:   --  Mid left main: There was a 50 % stenosis.  LAD:   --  Mid LAD: There was a 90 % stenosis.  CX:   --  Ostial circumflex: There was a 90 % stenosis.  RI:   --  Proximal ramus intermedius: There was a 70 % stenosis.  RCA:   --  Proximal RCA: There was a 80 % stenosis.  COMPLICATIONS: There were no complications.  DIAGNOSTIC RECOMMENDATIONS: Severe multivessel disease- recommend CTS  evaluation for CABG. This will need to be done in coordination with  neurosurgery, neurology given patient's subdural hematoma and risk of  anticoagulation on pump. Continue to hold DAPT/AC as management discussed.  INTERVENTIONAL RECOMMENDATIONS: Severe multivessel disease- recommend CTS  evaluation for CABG. This will needto be done in coordination with  neurosurgery, neurology given patient's subdural hematoma and risk of  anticoagulation on pump. Continue to hold DAPT/AC as management discussed.  Prepared and signed by  Eddie Stokes M.D.  Signed 2021 21:58:17    < end of copied text >       INTERVAL EVENTS: No o/n events. Reports dyspnea and CP in AM. Denies palpitations, presyncope, syncope, f/c/n/v.     REVIEW OF SYSTEMS:  Constitutional:     [X] negative [ ] fevers [ ] chills [ ] weight loss [ ] weight gain  HEENT:                  [X] negative [ ] dry eyes [ ] eye irritation [ ] postnasal drip [ ] nasal congestion  CV:                         [ ] negative  [X] chest pain [ ] orthopnea [ ] palpitations [ ] murmur  Resp:                     [ ] negative [ ] cough [X] shortness of breath  [ ] wheezing [ ] sputum [ ] hemoptysis  GI:                          [X] negative [ ] nausea [ ] vomiting [ ] diarrhea [ ] constipation [ ] abd pain [ ] dysphagia   :                        [X] negative [ ] dysuria [ ] nocturia [ ] hematuria [ ] increased urinary frequency  MSK:                      [X] negative [ ] back pain [ ] myalgias [ ] arthralgias [ ] fracture  Skin:                       [X] negative [ ] rash [ ] itch  Neuro:                   [X] negative [ ] headache [ ] dizziness [ ] syncope [ ] weakness [ ] numbness  Psych:                    [X] negative [ ] anxiety [ ] depression  Endo:                     [X] negative [ ] diabetes [ ] thyroid problem  Heme/Lymph:      [X] negative [ ] anemia [ ] bleeding problem  Allergic/Immune: [X] negative [ ] itchy eyes [ ] nasal discharge [ ] hives [ ] angioedema  [X] All other systems negative or otherwise described above.      PAST MEDICAL & SURGICAL HISTORY:  BPH (benign prostatic hypertrophy)  Hyperlipidemia  CAD (coronary artery disease)  Diabetes mellitus  HTN (hypertension)  S/P drug eluting coronary stent placement      MEDICATIONS  (STANDING):  dextrose 40% Gel 15 Gram(s) Oral once  dextrose 5%. 1000 milliLiter(s) (50 mL/Hr) IV Continuous <Continuous>  dextrose 5%. 1000 milliLiter(s) (100 mL/Hr) IV Continuous <Continuous>  dextrose 50% Injectable 25 Gram(s) IV Push once  dextrose 50% Injectable 12.5 Gram(s) IV Push once  dextrose 50% Injectable 25 Gram(s) IV Push once  finasteride 5 milliGRAM(s) Oral daily  furosemide    Tablet 20 milliGRAM(s) Oral daily  gabapentin 300 milliGRAM(s) Oral two times a day  glucagon  Injectable 1 milliGRAM(s) IntraMuscular once  insulin glargine Injectable (LANTUS) 7 Unit(s) SubCutaneous at bedtime  insulin lispro (ADMELOG) corrective regimen sliding scale   SubCutaneous three times a day before meals  insulin lispro (ADMELOG) corrective regimen sliding scale   SubCutaneous at bedtime  isosorbide   mononitrate ER Tablet (IMDUR) 120 milliGRAM(s) Oral daily  losartan 25 milliGRAM(s) Oral <User Schedule>  metoprolol tartrate 100 milliGRAM(s) Oral two times a day  pantoprazole    Tablet 40 milliGRAM(s) Oral before breakfast  ranolazine 500 milliGRAM(s) Oral two times a day  rosuvastatin 5 milliGRAM(s) Oral at bedtime  tamsulosin 0.4 milliGRAM(s) Oral at bedtime    MEDICATIONS  (PRN):  guaiFENesin   Syrup  (Sugar-Free) 100 milliGRAM(s) Oral every 6 hours PRN Cough      Vital Signs Last 24 Hrs  T(C): 36.5 (22 Mar 2021 04:23), Max: 36.7 (21 Mar 2021 17:47)  T(F): 97.7 (22 Mar 2021 04:23), Max: 98 (21 Mar 2021 17:47)  HR: 64 (22 Mar 2021 04:23) (52 - 74)  BP: 109/65 (22 Mar 2021 04:23) (100/57 - 129/80)  RR: 18 (22 Mar 2021 04:23) (18 - 20)  SpO2: 100% (22 Mar 2021 04:23) (94% - 100%)  Daily Weight in k.8 (22 Mar 2021 04:23)      PHYSICAL EXAM:  GEN: Awake, alert. NAD.   HEENT: NCAT, PERRL, EOMI. Mucosa moist. No JVD.  RESP: CTA b/l  CV: RRR. Normal S1/S2. No m/r/g.  ABD: Soft. NT/ND. BS+  EXT: Warm. No edema, clubbing, or cyanosis.   NEURO: AAOx3. No focal deficits.       LABS:                      12.5   6.46  )-----------( 198      ( 22 Mar 2021 06:46 )             38.4     03-22    140  |  102  |  18  ----------------------------<  220<H>  4.3   |  29  |  1.15    Ca    8.9      22 Mar 2021 06:46  Phos  2.9       Mg     2.0           CARDIAC MARKERS ( 20 Mar 2021 23:42 ) x     / x     / 90 U/L / x     / x            I&O's Summary  21 Mar 2021 07:  -  22 Mar 2021 07:00  --------------------------------------------------------  IN: 690 mL / OUT: 700 mL / NET: -10 mL    22 Mar 2021 07:  -  22 Mar 2021 10:44  --------------------------------------------------------  IN: 180 mL / OUT: 300 mL / NET: -120 mL      Transthoracic Echocardiogram (21 @ 20:22)  Conclusions:  Technically difficult study.  1. Mitral annular calcification, otherwise normal mitral valve. Minimal mitral regurgitation.  2. Aortic valve not well visualized. Minimal aortic regurgitation.  3. Endocardium not well visualized; grossly moderate left ventricular systolic dysfunction. The mid to distal septum is dyskinetic. The distal segments and the apex appear akinetic. Paradoxical septal motion consistent with conduction defect. Wall motion abnormalities may be due in part due to interventricular conduction defect. Recommend repeat imaging with intravenous echo contrast to better visualize the LV.  4. The right ventricle is not well visualized; grossly normal right ventricular size and systolic function.   5. Trace pericardial effusion.  *** Compared with echocardiogram of 2021, LV systolic function appears decreased.  ------------------------------------------------------------------------  Confirmed on  3/19/2021- 08:33:43 by Selvin Dalton M.D.  ------------------------------------------------------------------------      TTE with Doppler (w/Cont) (21 @ 06:00)   Conclusions:  1. Moderately dilated left atrium.  LA volume index = 48 cc/m2.  2. Endocardium not well visualized; despite the use of Definity.  Probable septal hypokinesis. Septal motion is consistent with LBBB.   ------------------------------------------------------------------------  Confirmed on  2021 - 10:36:03 by LONI Black  ------------------------------------------------------------------------        Cardiac Cath Lab - Adult (21 @ 16:51) >  CORONARY VESSELS: The coronary circulation is right dominant.  LM:   --  Mid left main: There was a 50 % stenosis.  LAD:   --  Mid LAD: There was a 90 % stenosis.  CX:   --  Ostial circumflex: There was a 90 % stenosis.  RI:   --  Proximal ramus intermedius: There was a 70 % stenosis.  RCA:   --  Proximal RCA: There was a 80 % stenosis.  COMPLICATIONS: There were no complications.  DIAGNOSTIC RECOMMENDATIONS: Severe multivessel disease- recommend CTS  evaluation for CABG. This will need to be done in coordination with  neurosurgery, neurology given patient's subdural hematoma and risk of  anticoagulation on pump. Continue to hold DAPT/AC as management discussed.  INTERVENTIONAL RECOMMENDATIONS: Severe multivessel disease- recommend CTS  evaluation for CABG. This will needto be done in coordination with  neurosurgery, neurology given patient's subdural hematoma and risk of  anticoagulation on pump. Continue to hold DAPT/AC as management discussed.  Prepared and signed by  Eddie Stokes M.D.  Signed 2021 21:58:17

## 2021-03-22 NOTE — DISCHARGE NOTE NURSING/CASE MANAGEMENT/SOCIAL WORK - PATIENT PORTAL LINK FT
You can access the FollowMyHealth Patient Portal offered by [facility] by registering at the following website: [email or web address]. By joining [employer]’s FollowMyHealth portal, you will also be able to view your health information using other applications (apps) compatible with our system.

## 2021-03-23 LAB
ANION GAP SERPL CALC-SCNC: 9 MMOL/L — SIGNIFICANT CHANGE UP (ref 5–17)
BUN SERPL-MCNC: 17 MG/DL — SIGNIFICANT CHANGE UP (ref 7–23)
CALCIUM SERPL-MCNC: 8.7 MG/DL — SIGNIFICANT CHANGE UP (ref 8.4–10.5)
CHLORIDE SERPL-SCNC: 104 MMOL/L — SIGNIFICANT CHANGE UP (ref 96–108)
CO2 SERPL-SCNC: 28 MMOL/L — SIGNIFICANT CHANGE UP (ref 22–31)
CREAT SERPL-MCNC: 1.08 MG/DL — SIGNIFICANT CHANGE UP (ref 0.5–1.3)
GLUCOSE BLDC GLUCOMTR-MCNC: 188 MG/DL — HIGH (ref 70–99)
GLUCOSE BLDC GLUCOMTR-MCNC: 203 MG/DL — HIGH (ref 70–99)
GLUCOSE BLDC GLUCOMTR-MCNC: 236 MG/DL — HIGH (ref 70–99)
GLUCOSE BLDC GLUCOMTR-MCNC: 269 MG/DL — HIGH (ref 70–99)
GLUCOSE SERPL-MCNC: 201 MG/DL — HIGH (ref 70–99)
POTASSIUM SERPL-MCNC: 3.9 MMOL/L — SIGNIFICANT CHANGE UP (ref 3.5–5.3)
POTASSIUM SERPL-SCNC: 3.9 MMOL/L — SIGNIFICANT CHANGE UP (ref 3.5–5.3)
SODIUM SERPL-SCNC: 141 MMOL/L — SIGNIFICANT CHANGE UP (ref 135–145)

## 2021-03-23 PROCEDURE — 99233 SBSQ HOSP IP/OBS HIGH 50: CPT | Mod: GC

## 2021-03-23 RX ORDER — RANOLAZINE 500 MG/1
1000 TABLET, FILM COATED, EXTENDED RELEASE ORAL
Refills: 0 | Status: DISCONTINUED | OUTPATIENT
Start: 2021-03-23 | End: 2021-03-26

## 2021-03-23 RX ADMIN — GABAPENTIN 300 MILLIGRAM(S): 400 CAPSULE ORAL at 05:21

## 2021-03-23 RX ADMIN — Medication 20 MILLIGRAM(S): at 05:21

## 2021-03-23 RX ADMIN — Medication 1: at 09:28

## 2021-03-23 RX ADMIN — Medication 100 MILLIGRAM(S): at 05:21

## 2021-03-23 RX ADMIN — ROSUVASTATIN CALCIUM 5 MILLIGRAM(S): 5 TABLET ORAL at 21:58

## 2021-03-23 RX ADMIN — Medication 100 MILLIGRAM(S): at 18:05

## 2021-03-23 RX ADMIN — ISOSORBIDE MONONITRATE 120 MILLIGRAM(S): 60 TABLET, EXTENDED RELEASE ORAL at 12:23

## 2021-03-23 RX ADMIN — LOSARTAN POTASSIUM 25 MILLIGRAM(S): 100 TABLET, FILM COATED ORAL at 09:28

## 2021-03-23 RX ADMIN — PANTOPRAZOLE SODIUM 40 MILLIGRAM(S): 20 TABLET, DELAYED RELEASE ORAL at 05:21

## 2021-03-23 RX ADMIN — GABAPENTIN 300 MILLIGRAM(S): 400 CAPSULE ORAL at 18:05

## 2021-03-23 RX ADMIN — FINASTERIDE 5 MILLIGRAM(S): 5 TABLET, FILM COATED ORAL at 12:23

## 2021-03-23 RX ADMIN — INSULIN GLARGINE 7 UNIT(S): 100 INJECTION, SOLUTION SUBCUTANEOUS at 21:59

## 2021-03-23 RX ADMIN — Medication 2: at 12:56

## 2021-03-23 RX ADMIN — TAMSULOSIN HYDROCHLORIDE 0.4 MILLIGRAM(S): 0.4 CAPSULE ORAL at 21:58

## 2021-03-23 RX ADMIN — Medication 3: at 18:05

## 2021-03-23 NOTE — PROGRESS NOTE ADULT - ASSESSMENT
74y M with PMH CAD s/p PCI to the LAD and RCA in the past, HTN, & HLD.  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  Patient w/ known 3 vessel coronary dz,  Unable to pursue CABG due to inability to tolerate anti-coagulation therapy in the setting of recent intracranial arterial embolization.   Presents w/ dyspnea.     Plan  - Euvolemic on exam and unable to visualize elevated JVP  - Found to have newly reduced EF with segmentality. Given need for DAPT after stenting poor candidate for any intervention  - When bleeding risk acceptable for DAPT will need evaluation  - c/w Lasix 20mg PO daily  - start atorvastatin 80mg daily if no contraindication  - c/w losartan 25mg daily  - can increase ranolazine to 1000 bid if BP tolerates   - monitor i/o, daily standing weights   - f/u CTsx but likely a poor candidate for CABG    Franco Barrett MD  Cardiology Fellow - F1  Text or Call: [phone] 74y M with PMH CAD s/p PCI to the LAD and RCA in the past, HTN, & HLD.  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  Patient w/ known 3 vessel coronary dz,  Unable to pursue CABG due to inability to tolerate anti-coagulation therapy in the setting of recent intracranial arterial embolization.   Presents w/ dyspnea.       Plan  - Euvolemic on exam and unable to visualize elevated JVP  - Found to have newly reduced EF with segmentality. Given need for DAPT after stenting poor candidate for any intervention  - When bleeding risk acceptable for DAPT will need evaluation  - c/w Lasix 20mg PO daily  - start atorvastatin 80mg daily if no contraindication  - c/w losartan 25mg daily  - can increase ranolazine to 1000 bid if BP tolerates   - monitor i/o, daily standing weights   - discussed with Dr. Merino of CTsx; surgical risk remains quite high      Franco Barrett MD  Cardiology Fellow - F1  Text or Call: [phone]    Plan discussed with cardiology fellow; patient seen and examined.       I was physically present for the key portions of the evaluation and management (E/M) service provided.    I agree with the above history, physical, and plan which I have reviewed and edited where appropriate.    Markell Rosado M.D.  Cardiology Attending, Consult Service    For Cardiology consults and questions, all Cardiology service information can be found 24/7 on [email or web address], password: [email or web address]

## 2021-03-23 NOTE — PROGRESS NOTE ADULT - SUBJECTIVE AND OBJECTIVE BOX
Had further discussion with CTS and medicine.  He is not felt to be a candidate for stents due to DAP therapy and we agree that this is more difficult in terms of subdural management if it should enlarge.  They would like to reeval SDH in one month.  If smaller, they recommend admitting for a heparin bolus challenge under monitored conditions and, if no issues with SDH, then would pursue CABG.  We are in agreement with this plan as long as cardiac issues are not felt to need emergent intervention.  I have spoken to daughter who desires a family meeting and advised her to contact the primary team to coordinate a family meeting with the pertinent providers, including myself.

## 2021-03-23 NOTE — PROGRESS NOTE ADULT - SUBJECTIVE AND OBJECTIVE BOX
[facility] Neurological Care [facility]      Seen earlier today, and examined.  - Today, patient is without complaints.           *****MEDICATIONS: Current medication reviewed and documented.    MEDICATIONS  (STANDING):  albuterol/ipratropium for Nebulization 3 milliLiter(s) Nebulizer once  dextrose 40% Gel 15 Gram(s) Oral once  dextrose 5%. 1000 milliLiter(s) (50 mL/Hr) IV Continuous <Continuous>  dextrose 5%. 1000 milliLiter(s) (100 mL/Hr) IV Continuous <Continuous>  dextrose 50% Injectable 25 Gram(s) IV Push once  dextrose 50% Injectable 12.5 Gram(s) IV Push once  dextrose 50% Injectable 25 Gram(s) IV Push once  finasteride 5 milliGRAM(s) Oral daily  furosemide    Tablet 20 milliGRAM(s) Oral daily  gabapentin 300 milliGRAM(s) Oral two times a day  glucagon  Injectable 1 milliGRAM(s) IntraMuscular once  insulin glargine Injectable (LANTUS) 7 Unit(s) SubCutaneous at bedtime  insulin lispro (ADMELOG) corrective regimen sliding scale   SubCutaneous three times a day before meals  insulin lispro (ADMELOG) corrective regimen sliding scale   SubCutaneous at bedtime  isosorbide   mononitrate ER Tablet (IMDUR) 120 milliGRAM(s) Oral daily  losartan 25 milliGRAM(s) Oral <User Schedule>  metoprolol tartrate 100 milliGRAM(s) Oral two times a day  pantoprazole    Tablet 40 milliGRAM(s) Oral before breakfast  ranolazine 1000 milliGRAM(s) Oral two times a day  rosuvastatin 5 milliGRAM(s) Oral at bedtime  tamsulosin 0.4 milliGRAM(s) Oral at bedtime    MEDICATIONS  (PRN):  guaiFENesin   Syrup  (Sugar-Free) 100 milliGRAM(s) Oral every 6 hours PRN Cough          ***** VITAL SIGNS:  T(F): 98.2 (21 @ 00:18), Max: 98.2 (21 @ 00:18)  HR: 68 (21 @ 00:18) (66 - 70)  BP: 103/58 (21 @ 00:18) (103/58 - 108/72)  RR: 18 (21 @ 00:18) (18 - 18)  SpO2: 98% (21 @ 00:18) (98% - 100%)  Wt(kg): --  ,   I&O's Summary    22 Mar 2021 07:  -  23 Mar 2021 07:00  --------------------------------------------------------  IN: 420 mL / OUT: 850 mL / NET: -430 mL    23 Mar 2021 07:01  -  24 Mar 2021 03:47  --------------------------------------------------------  IN: 480 mL / OUT: 600 mL / NET: -120 mL             *****PHYSICAL EXAM:   alert awake following some simple motor commands  speech is tangential.    EOmi blinks to threat   Tongue is midline  Palate elevates symmetrically   Moving both upper ext spontaneously no drift appreciated    Gait not assessed.            *****LAB AND IMAGIN.5   6.46  )-----------( 198      ( 22 Mar 2021 06:46 )             38.4               03-    141  |  104  |  17  ----------------------------<  201<H>  3.9   |  28  |  1.08    Ca    8.7      23 Mar 2021 07:04  Phos  2.9       Mg     2.0                                [All pertinent recent Imaging/Reports reviewed]           *****A S S E S S M E N T   A N D   P L A N :      77 y/o M--history and Medex review from patient and from patient's adult daughter above by phone--patient with a history of CAD with PCI to the LAD and RCA (last in ) and known three vessel disease, essential HTN, chronic subdural haematoma from last year from a fall in his driveway, S/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     Problem/Recommendations 1: chronic sdh   s/p mma embolization   ct stable     discussed with ct surgery and cardiology in detail  at this point due to high risk as per neurosurgery for neurological complication, to defer surgery until there is definite change in the size of the subdural which may take upto  3 mos.  When he is deemed to be a surgical candidate, plan is to challenge with heparin gtt in an inpt setting and  monitor with ct head scan   Cardiology d/w Vasyl, recommend medical management      neurosurgery input d/w , at this point, there is mild reduction in size of subdural and expect further changes.        would recommend asa 81 for cardiac prophylaxis.    bp goal normotensive        Problem/Recommendations 2: chest pain   defer to card/ct surgery       Thank you for allowing me to participate in the care of this patient. Will continue to follow patient periodically. Please do not hesitate to call me if you have any  questions or if there has been a change in patients neurological status     ________________  Elissa García MD  [facility] Neurological [facility] (PN)[facility]  [phone]      33 minutes spent on total encounter; more than 50 % of the visit was  spent counseling about plan of care, compliance to diet/exercise and medication regimen and or  coordinating care by the attending physician.      It is advised that stroke patients follow up with BERHANE Garrett @ [phone] in 1- 2 weeks.   Others please follow up with Dr. Michael Nissenbaum [phone]

## 2021-03-23 NOTE — PROGRESS NOTE ADULT - SUBJECTIVE AND OBJECTIVE BOX
INTERVAL EVENTS: No o/n events. Reports dyspnea is unchanged. Denies CP, palpitations, presyncope, syncope, f/c/n/v.     REVIEW OF SYSTEMS:  Constitutional:     [X] negative [ ] fevers [ ] chills [ ] weight loss [ ] weight gain  HEENT:                  [X] negative [ ] dry eyes [ ] eye irritation [ ] postnasal drip [ ] nasal congestion  CV:                         [X] negative  [ ] chest pain [ ] orthopnea [ ] palpitations [ ] murmur  Resp:                     [X] negative [ ] cough [X] shortness of breath [ ] wheezing [ ] sputum [ ] hemoptysis  GI:                          [X] negative [ ] nausea [ ] vomiting [ ] diarrhea [ ] constipation [ ] abd pain [ ] dysphagia   :                        [X] negative [ ] dysuria [ ] nocturia [ ] hematuria [ ] increased urinary frequency  MSK:                      [X] negative [ ] back pain [ ] myalgias [ ] arthralgias [ ] fracture  Skin:                       [X] negative [ ] rash [ ] itch  Neuro:                   [X] negative [ ] headache [ ] dizziness [ ] syncope [ ] weakness [ ] numbness  Psych:                    [X] negative [ ] anxiety [ ] depression  Endo:                     [X] negative [ ] diabetes [ ] thyroid problem  Heme/Lymph:      [X] negative [ ] anemia [ ] bleeding problem  Allergic/Immune: [X] negative [ ] itchy eyes [ ] nasal discharge [ ] hives [ ] angioedema    [X] All other systems negative or otherwise described above.  [ ] Unable to assess ROS because ________.    PAST MEDICAL & SURGICAL HISTORY:  BPH (benign prostatic hypertrophy)    Hyperlipidemia    CAD (coronary artery disease)    Diabetes mellitus  Type 2    HTN (hypertension)    S/P drug eluting coronary stent placement  [facility]    S/P primary angioplasty with coronary stent  1990s      MEDICATIONS  (STANDING):  dextrose 40% Gel 15 Gram(s) Oral once  dextrose 5%. 1000 milliLiter(s) (50 mL/Hr) IV Continuous <Continuous>  dextrose 5%. 1000 milliLiter(s) (100 mL/Hr) IV Continuous <Continuous>  dextrose 50% Injectable 25 Gram(s) IV Push once  dextrose 50% Injectable 12.5 Gram(s) IV Push once  dextrose 50% Injectable 25 Gram(s) IV Push once  finasteride 5 milliGRAM(s) Oral daily  furosemide    Tablet 20 milliGRAM(s) Oral daily  gabapentin 300 milliGRAM(s) Oral two times a day  glucagon  Injectable 1 milliGRAM(s) IntraMuscular once  insulin glargine Injectable (LANTUS) 7 Unit(s) SubCutaneous at bedtime  insulin lispro (ADMELOG) corrective regimen sliding scale   SubCutaneous three times a day before meals  insulin lispro (ADMELOG) corrective regimen sliding scale   SubCutaneous at bedtime  isosorbide   mononitrate ER Tablet (IMDUR) 120 milliGRAM(s) Oral daily  losartan 25 milliGRAM(s) Oral <User Schedule>  metoprolol tartrate 100 milliGRAM(s) Oral two times a day  pantoprazole    Tablet 40 milliGRAM(s) Oral before breakfast  ranolazine 500 milliGRAM(s) Oral two times a day  rosuvastatin 5 milliGRAM(s) Oral at bedtime  tamsulosin 0.4 milliGRAM(s) Oral at bedtime    MEDICATIONS  (PRN):  guaiFENesin   Syrup  (Sugar-Free) 100 milliGRAM(s) Oral every 6 hours PRN Cough    ICU Vital Signs Last 24 Hrs  T(C): 36.4 (23 Mar 2021 04:34), Max: 36.4 (22 Mar 2021 12:32)  T(F): 97.5 (23 Mar 2021 04:34), Max: 97.6 (22 Mar 2021 21:06)  HR: 66 (23 Mar 2021 04:34) (54 - 70)  BP: 108/72 (23 Mar 2021 04:34) (108/72 - 109/63)  BP(mean): --  ABP: --  ABP(mean): --  RR: 18 (23 Mar 2021 04:34) (18 - 18)  SpO2: 100% (23 Mar 2021 04:34) (97% - 100%)    Daily     Daily Weight in k.5 (23 Mar 2021 04:34)    PHYSICAL EXAM:  GEN: Awake, alert. NAD.   HEENT: NCAT, PERRL, EOMI. Mucosa moist. No JVD.  RESP: CTA b/l  CV: RRR. Normal S1/S2. No m/r/g.  ABD: Soft. NT/ND. BS+  EXT: Warm. No edema, clubbing, or cyanosis.   NEURO: AAOx3. No focal deficits.     LABS:                        12.5   6.46  )-----------( 198      ( 22 Mar 2021 06:46 )             38.4     03-23    141  |  104  |  17  ----------------------------<  201<H>  3.9   |  28  |  1.08    Ca    8.7      23 Mar 2021 07:04  Phos  2.9       Mg     2.0                   I&O's Summary    22 Mar 2021 07:01  -  23 Mar 2021 07:00  --------------------------------------------------------  IN: 420 mL / OUT: 850 mL / NET: -430 mL      BNP    RADIOLOGY & ADDITIONAL STUDIES:    TELEMETRY: reviewed    EKG: reviewed         INTERVAL EVENTS: No o/n events. Reports dyspnea is unchanged. Denies CP, palpitations, presyncope, syncope, f/c/n/v.       REVIEW OF SYSTEMS:  Constitutional:     [X] negative [ ] fevers [ ] chills [ ] weight loss [ ] weight gain  HEENT:                  [X] negative [ ] dry eyes [ ] eye irritation [ ] postnasal drip [ ] nasal congestion  CV:                         [X] negative  [ ] chest pain [ ] orthopnea [ ] palpitations [ ] murmur  Resp:                     [X] negative [ ] cough [X] shortness of breath [ ] wheezing [ ] sputum [ ] hemoptysis  GI:                          [X] negative [ ] nausea [ ] vomiting [ ] diarrhea [ ] constipation [ ] abd pain [ ] dysphagia   :                        [X] negative [ ] dysuria [ ] nocturia [ ] hematuria [ ] increased urinary frequency  MSK:                      [X] negative [ ] back pain [ ] myalgias [ ] arthralgias [ ] fracture  Skin:                       [X] negative [ ] rash [ ] itch  Neuro:                   [X] negative [ ] headache [ ] dizziness [ ] syncope [ ] weakness [ ] numbness  Psych:                    [X] negative [ ] anxiety [ ] depression  Endo:                     [X] negative [ ] diabetes [ ] thyroid problem  Heme/Lymph:      [X] negative [ ] anemia [ ] bleeding problem  Allergic/Immune: [X] negative [ ] itchy eyes [ ] nasal discharge [ ] hives [ ] angioedema  [X] All other systems negative or otherwise described above.      PAST MEDICAL & SURGICAL HISTORY:  BPH (benign prostatic hypertrophy)  Hyperlipidemia  CAD (coronary artery disease)  Diabetes mellitus Type 2  HTN (hypertension)  S/P drug eluting coronary stent placement [facility]  S/P primary angioplasty with coronary stent 1990s      MEDICATIONS  (STANDING):  dextrose 40% Gel 15 Gram(s) Oral once  dextrose 5%. 1000 milliLiter(s) (50 mL/Hr) IV Continuous <Continuous>  dextrose 5%. 1000 milliLiter(s) (100 mL/Hr) IV Continuous <Continuous>  dextrose 50% Injectable 25 Gram(s) IV Push once  dextrose 50% Injectable 12.5 Gram(s) IV Push once  dextrose 50% Injectable 25 Gram(s) IV Push once  finasteride 5 milliGRAM(s) Oral daily  furosemide    Tablet 20 milliGRAM(s) Oral daily  gabapentin 300 milliGRAM(s) Oral two times a day  glucagon  Injectable 1 milliGRAM(s) IntraMuscular once  insulin glargine Injectable (LANTUS) 7 Unit(s) SubCutaneous at bedtime  insulin lispro (ADMELOG) corrective regimen sliding scale   SubCutaneous three times a day before meals  insulin lispro (ADMELOG) corrective regimen sliding scale   SubCutaneous at bedtime  isosorbide   mononitrate ER Tablet (IMDUR) 120 milliGRAM(s) Oral daily  losartan 25 milliGRAM(s) Oral <User Schedule>  metoprolol tartrate 100 milliGRAM(s) Oral two times a day  pantoprazole    Tablet 40 milliGRAM(s) Oral before breakfast  ranolazine 500 milliGRAM(s) Oral two times a day  rosuvastatin 5 milliGRAM(s) Oral at bedtime  tamsulosin 0.4 milliGRAM(s) Oral at bedtime    MEDICATIONS  (PRN):  guaiFENesin   Syrup  (Sugar-Free) 100 milliGRAM(s) Oral every 6 hours PRN Cough      ICU Vital Signs Last 24 Hrs  T(C): 36.4 (23 Mar 2021 04:34), Max: 36.4 (22 Mar 2021 12:32)  T(F): 97.5 (23 Mar 2021 04:34), Max: 97.6 (22 Mar 2021 21:06)  HR: 66 (23 Mar 2021 04:34) (54 - 70)  BP: 108/72 (23 Mar 2021 04:34) (108/72 - 109/63)  RR: 18 (23 Mar 2021 04:34) (18 - 18)  SpO2: 100% (23 Mar 2021 04:34) (97% - 100%)  Daily Weight in k.5 (23 Mar 2021 04:34)      PHYSICAL EXAM:  GEN: Awake, alert. NAD.   HEENT: NCAT, PERRL, EOMI. Mucosa moist. No JVD.  RESP: CTA b/l  CV: RRR. Normal S1/S2. No m/r/g.  ABD: Soft. NT/ND. BS+  EXT: Warm. No edema, clubbing, or cyanosis.   NEURO: AAOx3. No focal deficits.       LABS:                      12.5   6.46  )-----------( 198      ( 22 Mar 2021 06:46 )             38.4     03-  141  |  104  |  17  ----------------------------<  201<H>  3.9   |  28  |  1.08    Ca    8.7      23 Mar 2021 07:04  Phos  2.9     -  Mg     2.0           I&O's Summary  22 Mar 2021 07:01  -  23 Mar 2021 07:00  --------------------------------------------------------  IN: 420 mL / OUT: 850 mL / NET: -430 mL

## 2021-03-23 NOTE — PROGRESS NOTE ADULT - SUBJECTIVE AND OBJECTIVE BOX
Patient is a 76y old  Male who presents with a chief complaint of One day of dyspnoea. (22 Mar 2021 21:21)    Date of servie : 03-23-21 @ 15:35  INTERVAL HPI/OVERNIGHT EVENTS:  T(C): 36.7 (03-23-21 @ 12:12), Max: 36.7 (03-23-21 @ 12:12)  HR: 67 (03-23-21 @ 12:12) (61 - 70)  BP: 107/66 (03-23-21 @ 12:12) (107/66 - 109/55)  RR: 18 (03-23-21 @ 12:12) (18 - 18)  SpO2: 99% (03-23-21 @ 12:12) (97% - 100%)  Wt(kg): --  I&O's Summary    22 Mar 2021 07:01  -  23 Mar 2021 07:00  --------------------------------------------------------  IN: 420 mL / OUT: 850 mL / NET: -430 mL    23 Mar 2021 07:01  -  23 Mar 2021 15:35  --------------------------------------------------------  IN: 480 mL / OUT: 600 mL / NET: -120 mL        LABS:                        12.5   6.46  )-----------( 198      ( 22 Mar 2021 06:46 )             38.4     03-23    141  |  104  |  17  ----------------------------<  201<H>  3.9   |  28  |  1.08    Ca    8.7      23 Mar 2021 07:04  Phos  2.9     03-22  Mg     2.0     03-22          CAPILLARY BLOOD GLUCOSE      POCT Blood Glucose.: 203 mg/dL (23 Mar 2021 12:50)  POCT Blood Glucose.: 188 mg/dL (23 Mar 2021 08:45)  POCT Blood Glucose.: 239 mg/dL (22 Mar 2021 21:56)  POCT Blood Glucose.: 288 mg/dL (22 Mar 2021 18:03)            MEDICATIONS  (STANDING):  dextrose 40% Gel 15 Gram(s) Oral once  dextrose 5%. 1000 milliLiter(s) (50 mL/Hr) IV Continuous <Continuous>  dextrose 5%. 1000 milliLiter(s) (100 mL/Hr) IV Continuous <Continuous>  dextrose 50% Injectable 25 Gram(s) IV Push once  dextrose 50% Injectable 12.5 Gram(s) IV Push once  dextrose 50% Injectable 25 Gram(s) IV Push once  finasteride 5 milliGRAM(s) Oral daily  furosemide    Tablet 20 milliGRAM(s) Oral daily  gabapentin 300 milliGRAM(s) Oral two times a day  glucagon  Injectable 1 milliGRAM(s) IntraMuscular once  insulin glargine Injectable (LANTUS) 7 Unit(s) SubCutaneous at bedtime  insulin lispro (ADMELOG) corrective regimen sliding scale   SubCutaneous three times a day before meals  insulin lispro (ADMELOG) corrective regimen sliding scale   SubCutaneous at bedtime  isosorbide   mononitrate ER Tablet (IMDUR) 120 milliGRAM(s) Oral daily  losartan 25 milliGRAM(s) Oral <User Schedule>  metoprolol tartrate 100 milliGRAM(s) Oral two times a day  pantoprazole    Tablet 40 milliGRAM(s) Oral before breakfast  ranolazine 1000 milliGRAM(s) Oral two times a day  rosuvastatin 5 milliGRAM(s) Oral at bedtime  tamsulosin 0.4 milliGRAM(s) Oral at bedtime    MEDICATIONS  (PRN):  guaiFENesin   Syrup  (Sugar-Free) 100 milliGRAM(s) Oral every 6 hours PRN Cough          PHYSICAL EXAM:  GENERAL: frail  CHEST/LUNG: Clear to percussion bilaterally; No rales, rhonchi, wheezing, or rubs  HEART: Regular rate and rhythm; No murmurs, rubs, or gallops  ABDOMEN: Soft, Nontender, Nondistended; Bowel sounds present  EXTREMITIES: no edema +  Care Discussed with Consultants/Other Providers [ ] YES  [ ] NO

## 2021-03-24 LAB
ANION GAP SERPL CALC-SCNC: 9 MMOL/L — SIGNIFICANT CHANGE UP (ref 5–17)
BUN SERPL-MCNC: 21 MG/DL — SIGNIFICANT CHANGE UP (ref 7–23)
CALCIUM SERPL-MCNC: 9.1 MG/DL — SIGNIFICANT CHANGE UP (ref 8.4–10.5)
CHLORIDE SERPL-SCNC: 103 MMOL/L — SIGNIFICANT CHANGE UP (ref 96–108)
CO2 SERPL-SCNC: 26 MMOL/L — SIGNIFICANT CHANGE UP (ref 22–31)
CREAT SERPL-MCNC: 1 MG/DL — SIGNIFICANT CHANGE UP (ref 0.5–1.3)
GLUCOSE BLDC GLUCOMTR-MCNC: 204 MG/DL — HIGH (ref 70–99)
GLUCOSE BLDC GLUCOMTR-MCNC: 268 MG/DL — HIGH (ref 70–99)
GLUCOSE BLDC GLUCOMTR-MCNC: 280 MG/DL — HIGH (ref 70–99)
GLUCOSE BLDC GLUCOMTR-MCNC: 284 MG/DL — HIGH (ref 70–99)
GLUCOSE BLDC GLUCOMTR-MCNC: 296 MG/DL — HIGH (ref 70–99)
GLUCOSE SERPL-MCNC: 284 MG/DL — HIGH (ref 70–99)
MAGNESIUM SERPL-MCNC: 1.8 MG/DL — SIGNIFICANT CHANGE UP (ref 1.6–2.6)
POTASSIUM SERPL-MCNC: 4.5 MMOL/L — SIGNIFICANT CHANGE UP (ref 3.5–5.3)
POTASSIUM SERPL-SCNC: 4.5 MMOL/L — SIGNIFICANT CHANGE UP (ref 3.5–5.3)
SARS-COV-2 RNA SPEC QL NAA+PROBE: SIGNIFICANT CHANGE UP
SODIUM SERPL-SCNC: 138 MMOL/L — SIGNIFICANT CHANGE UP (ref 135–145)

## 2021-03-24 PROCEDURE — 71045 X-RAY EXAM CHEST 1 VIEW: CPT | Mod: 26

## 2021-03-24 PROCEDURE — 99233 SBSQ HOSP IP/OBS HIGH 50: CPT | Mod: GC

## 2021-03-24 RX ORDER — ALBUTEROL 90 UG/1
1 AEROSOL, METERED ORAL ONCE
Refills: 0 | Status: DISCONTINUED | OUTPATIENT
Start: 2021-03-24 | End: 2021-03-24

## 2021-03-24 RX ORDER — FUROSEMIDE 40 MG
20 TABLET ORAL ONCE
Refills: 0 | Status: COMPLETED | OUTPATIENT
Start: 2021-03-24 | End: 2021-03-24

## 2021-03-24 RX ORDER — ALBUTEROL 90 UG/1
2.5 AEROSOL, METERED ORAL ONCE
Refills: 0 | Status: COMPLETED | OUTPATIENT
Start: 2021-03-24 | End: 2021-03-24

## 2021-03-24 RX ORDER — ASPIRIN/CALCIUM CARB/MAGNESIUM 324 MG
81 TABLET ORAL DAILY
Refills: 0 | Status: DISCONTINUED | OUTPATIENT
Start: 2021-03-24 | End: 2021-03-26

## 2021-03-24 RX ORDER — IPRATROPIUM/ALBUTEROL SULFATE 18-103MCG
3 AEROSOL WITH ADAPTER (GRAM) INHALATION ONCE
Refills: 0 | Status: COMPLETED | OUTPATIENT
Start: 2021-03-24 | End: 2021-03-24

## 2021-03-24 RX ADMIN — Medication 20 MILLIGRAM(S): at 05:01

## 2021-03-24 RX ADMIN — FINASTERIDE 5 MILLIGRAM(S): 5 TABLET, FILM COATED ORAL at 11:40

## 2021-03-24 RX ADMIN — Medication 100 MILLIGRAM(S): at 21:48

## 2021-03-24 RX ADMIN — Medication 2: at 09:08

## 2021-03-24 RX ADMIN — Medication 100 MILLIGRAM(S): at 17:12

## 2021-03-24 RX ADMIN — GABAPENTIN 300 MILLIGRAM(S): 400 CAPSULE ORAL at 05:00

## 2021-03-24 RX ADMIN — Medication 1: at 22:00

## 2021-03-24 RX ADMIN — RANOLAZINE 1000 MILLIGRAM(S): 500 TABLET, FILM COATED, EXTENDED RELEASE ORAL at 17:12

## 2021-03-24 RX ADMIN — Medication 20 MILLIGRAM(S): at 18:12

## 2021-03-24 RX ADMIN — GABAPENTIN 300 MILLIGRAM(S): 400 CAPSULE ORAL at 17:11

## 2021-03-24 RX ADMIN — Medication 3 MILLILITER(S): at 03:51

## 2021-03-24 RX ADMIN — ISOSORBIDE MONONITRATE 120 MILLIGRAM(S): 60 TABLET, EXTENDED RELEASE ORAL at 11:41

## 2021-03-24 RX ADMIN — Medication 3: at 18:52

## 2021-03-24 RX ADMIN — INSULIN GLARGINE 7 UNIT(S): 100 INJECTION, SOLUTION SUBCUTANEOUS at 22:00

## 2021-03-24 RX ADMIN — TAMSULOSIN HYDROCHLORIDE 0.4 MILLIGRAM(S): 0.4 CAPSULE ORAL at 21:47

## 2021-03-24 RX ADMIN — Medication 81 MILLIGRAM(S): at 17:11

## 2021-03-24 RX ADMIN — ROSUVASTATIN CALCIUM 5 MILLIGRAM(S): 5 TABLET ORAL at 21:47

## 2021-03-24 RX ADMIN — LOSARTAN POTASSIUM 25 MILLIGRAM(S): 100 TABLET, FILM COATED ORAL at 10:45

## 2021-03-24 RX ADMIN — Medication 3: at 13:36

## 2021-03-24 RX ADMIN — ALBUTEROL 2.5 MILLIGRAM(S): 90 AEROSOL, METERED ORAL at 12:00

## 2021-03-24 RX ADMIN — Medication 100 MILLIGRAM(S): at 05:00

## 2021-03-24 RX ADMIN — PANTOPRAZOLE SODIUM 40 MILLIGRAM(S): 20 TABLET, DELAYED RELEASE ORAL at 05:00

## 2021-03-24 NOTE — PROGRESS NOTE ADULT - SUBJECTIVE AND OBJECTIVE BOX
[facility]      Seen earlier today, and examined.  - Today, patient is without complaints.           *****MEDICATIONS: Current medication reviewed and documented.    MEDICATIONS  (STANDING):  aspirin enteric coated 81 milliGRAM(s) Oral daily  dextrose 40% Gel 15 Gram(s) Oral once  dextrose 5%. 1000 milliLiter(s) (50 mL/Hr) IV Continuous <Continuous>  dextrose 5%. 1000 milliLiter(s) (100 mL/Hr) IV Continuous <Continuous>  dextrose 50% Injectable 25 Gram(s) IV Push once  dextrose 50% Injectable 12.5 Gram(s) IV Push once  dextrose 50% Injectable 25 Gram(s) IV Push once  finasteride 5 milliGRAM(s) Oral daily  furosemide    Tablet 20 milliGRAM(s) Oral daily  gabapentin 300 milliGRAM(s) Oral two times a day  glucagon  Injectable 1 milliGRAM(s) IntraMuscular once  insulin glargine Injectable (LANTUS) 7 Unit(s) SubCutaneous at bedtime  insulin lispro (ADMELOG) corrective regimen sliding scale   SubCutaneous three times a day before meals  insulin lispro (ADMELOG) corrective regimen sliding scale   SubCutaneous at bedtime  isosorbide   mononitrate ER Tablet (IMDUR) 120 milliGRAM(s) Oral daily  losartan 25 milliGRAM(s) Oral <User Schedule>  metoprolol tartrate 100 milliGRAM(s) Oral two times a day  pantoprazole    Tablet 40 milliGRAM(s) Oral before breakfast  ranolazine 1000 milliGRAM(s) Oral two times a day  rosuvastatin 5 milliGRAM(s) Oral at bedtime  tamsulosin 0.4 milliGRAM(s) Oral at bedtime    MEDICATIONS  (PRN):  guaiFENesin   Syrup  (Sugar-Free) 100 milliGRAM(s) Oral every 6 hours PRN Cough          ***** VITAL SIGNS:  T(F): 98 (21 @ 16:10), Max: 98.4 (21 @ 04:11)  HR: 76 (21 @ 16:10) (68 - 76)  BP: 109/76 (21 @ 16:10) (103/58 - 113/72)  RR: 18 (21 @ 16:10) (18 - 20)  SpO2: 98% (21 @ 16:10) (98% - 99%)  Wt(kg): --  ,   I&O's Summary    23 Mar 2021 07:  -  24 Mar 2021 07:00  --------------------------------------------------------  IN: 480 mL / OUT: 600 mL / NET: -120 mL    24 Mar 2021 07:  -  24 Mar 2021 20:48  --------------------------------------------------------  IN: 420 mL / OUT: 400 mL / NET: 20 mL             *****PHYSICAL EXAM:   alert oriented x 3 attention comprehension are fair.  Able to name, repeat.   EOmi fundi not visualized   no nystagmus VFF to confrontation  Tongue is midline  Palate elevates symmetrically   Moving all 4 ext spontaneously no drift appreciated    Gait not assessed.            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     Problem/Recommendations 1: chronic sdh   s/p mma embolization   ct stable     discussed with ct surgery and cardiology in detail  at this point due to high risk as per neurosurgery for neurological complication, to defer surgery until there is definite change in the size of the subdural which may take upto  3 mos.  When he is deemed to be a surgical candidate, plan is to challenge with heparin gtt in an inpt setting and  monitor with ct head scan   Cardiology d/w Vasyl, recommend medical management      neurosurgery input d/w , at this point, there is mild reduction in size of subdural and expect further changes.     continue asa 81 repeat head ct on day of discharge     neurosurgery/cardiology aware         bp goal normotensive        Problem/Recommendations 2: chest pain   defer to card/ct surgery     Thank you for allowing me to participate in the care of this patient. Will continue to follow patient periodically. Please do not hesitate to call me if you have any  questions or if there has been a change in patients neurological status     ________________  Elissa García MD  [facility] ([facility]  [phone]      33 minutes spent on total encounter; more than 50 % of the visit was  spent counseling about plan of care, compliance to diet/exercise and medication regimen and or  coordinating care by the attending physician.      It is advised that stroke patients follow up with BERHANE Garrett @ [phone] in 1- 2 weeks.   Others please follow up with Dr. Michael Nissenbaum [phone]

## 2021-03-24 NOTE — PHYSICAL THERAPY INITIAL EVALUATION ADULT - PLANNED THERAPY INTERVENTIONS, PT EVAL
Pt does not require skilled PT interventions at [facility]. Pt is currently independent w/ all functional mobility and at functional baseline.

## 2021-03-24 NOTE — CHART NOTE - NSCHARTNOTEFT_GEN_A_CORE
asked to evaluate for 3 sec PAT. pt denies dizziness/palpitations. noted to be moving around in bed      Vital Signs Last 24 Hrs  T(C): 36.7 (24 Mar 2021 16:10), Max: 36.9 (24 Mar 2021 04:11)  T(F): 98 (24 Mar 2021 16:10), Max: 98.4 (24 Mar 2021 04:11)  HR: 76 (24 Mar 2021 16:10) (68 - 76)  BP: 109/76 (24 Mar 2021 16:10) (103/58 - 113/72)  BP(mean): --  RR: 18 (24 Mar 2021 16:10) (18 - 20)  SpO2: 98% (24 Mar 2021 16:10) (98% - 99%)    Physical Exam:  General: WN/WD NAD  Neurology: A&Ox3, nonfocal, VILCHIS x 4  Head:  Normocephalic, atraumatic  Respiratory: CTA B/L  CV: RRR, S1S2, no murmur  Abdominal: Soft, NT, ND no palpable mass  MSK: No edema, + peripheral pulses, FROM all 4 extremity  Labs:      03-23    141  |  104  |  17  ----------------------------<  201<H>  3.9   |  28  |  1.08    Ca    8.7      23 Mar 2021 07:04              Radiology:    76 yr old patient with a history of CAD with PCI to the LAD and RCA (last in 2015) and known three vessel disease, essential HTN, chronic subdural haematoma from last year from a fall in his driveway, S/P embolization of the middle meningeal artery, recently discharged from [facility] on March 9 2021, with recent cardiac catheterization in Feb 2021 with patient presently unable to pursue CABG due to inability to anticoagulation in the context of recent arterial embolization. pt now with episode of PAT        Assessment & Plan:  >BMP/Mg stat  >continue metoprolol  >continue telemetry      Venita Velasco ANP-BC  #[id number]

## 2021-03-24 NOTE — PHYSICAL THERAPY INITIAL EVALUATION ADULT - ADDITIONAL COMMENTS
Prior to admission pt independent with all functional mobility including ambulation and ADLs without AD.

## 2021-03-24 NOTE — PROGRESS NOTE ADULT - ASSESSMENT
Problem/Plan - 1:  ·  Problem: ACS (acute coronary syndrome).  Plan: See above.   telemonitor  cards fu  medical management and re evaluate for surgery in a month       Problem/Plan - 2:  ·  Problem: Type 2 diabetes mellitus with hyperglycemia, with long-term current use of insulin.  Plan: See above.   High doses of home Lantus at home.   Random glucose of 160 in the ER.   Will HOLD oral Rx and provide conservative bedtime Lantus of 0.1U/Kg/24H.   monitor FSA    Problem/Plan - 3:  ·  Problem: Chronic subdural hematoma.  Plan:  NS to review with CTS, cardiology the issue of CABG.     Problem/Plan - 4:·  Problem: Need for prophylactic measure.  Plan: ELIESER for now with active intracranial process precludes pharmacologic DVT prophylaxis.

## 2021-03-24 NOTE — PROGRESS NOTE ADULT - ASSESSMENT
74y M with PMH CAD s/p PCI to the LAD and RCA in the past, HTN, & HLD.  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    Patient w/ known severe 3 vessel coronary dz, including significant L main dz.        REC:  - Would repeat CXR given persistent dyspnea.  - c/w Lasix 20mg PO daily  - c/w  atorvastatin 80mg daily if no contraindication  - c/w losartan 25mg daily  - c/w ranolazine 1000 bid   - monitor i/o  - discussed again with Dr. Jimenez, Dr. Merino and Dr. García.  - plan at present is to resume low dose ASA.  Will arrange for f/u head CT in early April to re-assess size of SDH.  Will consider heparin challenge at that time, and if tolerated, will plan on CABG.  - patient's prognosis remains guarded from a cardiac standpoint, given the severity of his CAD and significant co-morbid condition (SDH) which makes management, elvia. anti-clotting therapy more difficult and with more risk.  Discussed with patient, wife, daughter (Azam).      DESTINY Barrett M.D.  Cardiology fellow  Text or Call: [phone]    Plan discussed with cardiology fellow; patient seen and examined.       I was physically present for the key portions of the evaluation and management (E/M) service provided.    I agree with the above history, physical, and plan which I have reviewed and edited where appropriate.    Markell Rosado M.D.  Cardiology Attending, Consult Service      For Cardiology consults and questions, all Cardiology service information can be found 24/7 on [email or web address], password: [email or web address]

## 2021-03-24 NOTE — PROGRESS NOTE ADULT - SUBJECTIVE AND OBJECTIVE BOX
Alert, no distress.  Still with intermittent dyspnea at rest and with exertion.  No CP; no palps.      Medications:  aspirin enteric coated 81 milliGRAM(s) Oral daily  dextrose 40% Gel 15 Gram(s) Oral once  dextrose 5%. 1000 milliLiter(s) IV Continuous <Continuous>  dextrose 5%. 1000 milliLiter(s) IV Continuous <Continuous>  dextrose 50% Injectable 25 Gram(s) IV Push once  dextrose 50% Injectable 12.5 Gram(s) IV Push once  dextrose 50% Injectable 25 Gram(s) IV Push once  finasteride 5 milliGRAM(s) Oral daily  furosemide    Tablet 20 milliGRAM(s) Oral daily  gabapentin 300 milliGRAM(s) Oral two times a day  glucagon  Injectable 1 milliGRAM(s) IntraMuscular once  guaiFENesin   Syrup  (Sugar-Free) 100 milliGRAM(s) Oral every 6 hours PRN  insulin glargine Injectable (LANTUS) 7 Unit(s) SubCutaneous at bedtime  insulin lispro (ADMELOG) corrective regimen sliding scale   SubCutaneous three times a day before meals  insulin lispro (ADMELOG) corrective regimen sliding scale   SubCutaneous at bedtime  isosorbide   mononitrate ER Tablet (IMDUR) 120 milliGRAM(s) Oral daily  losartan 25 milliGRAM(s) Oral <User Schedule>  metoprolol tartrate 100 milliGRAM(s) Oral two times a day  pantoprazole    Tablet 40 milliGRAM(s) Oral before breakfast  ranolazine 1000 milliGRAM(s) Oral two times a day  rosuvastatin 5 milliGRAM(s) Oral at bedtime  tamsulosin 0.4 milliGRAM(s) Oral at bedtime    PMH/PSH/FH/SH:  Unchanged    ROS:  Unchanged      Vitals:  T(C): 36.7 (21 @ 12:21), Max: 36.9 (21 @ 04:11)  HR: 76 (21 @ 12:21) (68 - 76)  BP: 107/66 (21 @ 12:21) (103/58 - 113/72)  RR: 18 (21 @ 12:21) (18 - 20)  SpO2: 99% (21 @ 12:21) (98% - 99%)  Daily Weight in k.2 (24 Mar 2021 04:11)      PHYSICAL EXAM:  GEN: Awake, alert. NAD.   HEENT: NCAT, PERRL, EOMI. Mucosa moist. No JVD.  RESP: CTA b/l  CV: RRR. Normal S1/S2. No m/r/g.  ABD: Soft. NT/ND. BS+  EXT: Warm. No edema, clubbing, or cyanosis.   NEURO: AAOx3. No focal deficits.       I&O's Summary  23 Mar 2021 07:  -  24 Mar 2021 07:00  --------------------------------------------------------  IN: 480 mL / OUT: 600 mL / NET: -120 mL    24 Mar 2021 07:  -  24 Mar 2021 15:57  --------------------------------------------------------  IN: 420 mL / OUT: 400 mL / NET: 20 mL        LABS:    141  |  104  |  17  ----------------------------<  201<H>  3.9   |  28  |  1.08    Ca    8.7      23 Mar 2021 07:04      TELE:

## 2021-03-24 NOTE — PHYSICAL THERAPY INITIAL EVALUATION ADULT - PERTINENT HX OF CURRENT PROBLEM, REHAB EVAL
77 y/o M history of CAD with PCI to the LAD and RCA (last in 2015) and known three vessel disease, essential HTN, chronic subdural haematoma from last year from a fall in his driveways/p L MME on 3/1/21, recently discharged from [facility] [place] on March 9 2021, with recent cardiac catheterization in Feb 2021 with patient presently unable to pursue CABG due to inability to anticoagulate in the context of recent arterial embolization. A/w progressive SOB & CP.

## 2021-03-24 NOTE — PROGRESS NOTE ADULT - SUBJECTIVE AND OBJECTIVE BOX
Patient is a 76y old  Male who presents with a chief complaint of One day of dyspnoea. (23 Mar 2021 18:14)    Date of servie : 03-24-21 @ 15:48  INTERVAL HPI/OVERNIGHT EVENTS:  T(C): 36.7 (03-24-21 @ 12:21), Max: 36.9 (03-24-21 @ 04:11)  HR: 76 (03-24-21 @ 12:21) (68 - 76)  BP: 107/66 (03-24-21 @ 12:21) (103/58 - 113/72)  RR: 18 (03-24-21 @ 12:21) (18 - 20)  SpO2: 99% (03-24-21 @ 12:21) (98% - 99%)  Wt(kg): --  I&O's Summary    23 Mar 2021 07:01  -  24 Mar 2021 07:00  --------------------------------------------------------  IN: 480 mL / OUT: 600 mL / NET: -120 mL    24 Mar 2021 07:01  -  24 Mar 2021 15:48  --------------------------------------------------------  IN: 420 mL / OUT: 400 mL / NET: 20 mL        LABS:    03-23    141  |  104  |  17  ----------------------------<  201<H>  3.9   |  28  |  1.08    Ca    8.7      23 Mar 2021 07:04          CAPILLARY BLOOD GLUCOSE      POCT Blood Glucose.: 296 mg/dL (24 Mar 2021 12:59)  POCT Blood Glucose.: 204 mg/dL (24 Mar 2021 08:56)  POCT Blood Glucose.: 236 mg/dL (23 Mar 2021 21:54)  POCT Blood Glucose.: 269 mg/dL (23 Mar 2021 17:22)            MEDICATIONS  (STANDING):  aspirin enteric coated 81 milliGRAM(s) Oral daily  dextrose 40% Gel 15 Gram(s) Oral once  dextrose 5%. 1000 milliLiter(s) (50 mL/Hr) IV Continuous <Continuous>  dextrose 5%. 1000 milliLiter(s) (100 mL/Hr) IV Continuous <Continuous>  dextrose 50% Injectable 25 Gram(s) IV Push once  dextrose 50% Injectable 12.5 Gram(s) IV Push once  dextrose 50% Injectable 25 Gram(s) IV Push once  finasteride 5 milliGRAM(s) Oral daily  furosemide    Tablet 20 milliGRAM(s) Oral daily  gabapentin 300 milliGRAM(s) Oral two times a day  glucagon  Injectable 1 milliGRAM(s) IntraMuscular once  insulin glargine Injectable (LANTUS) 7 Unit(s) SubCutaneous at bedtime  insulin lispro (ADMELOG) corrective regimen sliding scale   SubCutaneous three times a day before meals  insulin lispro (ADMELOG) corrective regimen sliding scale   SubCutaneous at bedtime  isosorbide   mononitrate ER Tablet (IMDUR) 120 milliGRAM(s) Oral daily  losartan 25 milliGRAM(s) Oral <User Schedule>  metoprolol tartrate 100 milliGRAM(s) Oral two times a day  pantoprazole    Tablet 40 milliGRAM(s) Oral before breakfast  ranolazine 1000 milliGRAM(s) Oral two times a day  rosuvastatin 5 milliGRAM(s) Oral at bedtime  tamsulosin 0.4 milliGRAM(s) Oral at bedtime    MEDICATIONS  (PRN):  guaiFENesin   Syrup  (Sugar-Free) 100 milliGRAM(s) Oral every 6 hours PRN Cough          PHYSICAL EXAM:  GENERAL: NAD, well-groomed, well-developed  HEAD:  Atraumatic, Normocephalic  CHEST/LUNG: Clear to percussion bilaterally; No rales, rhonchi, wheezing, or rubs  HEART: Regular rate and rhythm; No murmurs, rubs, or gallops  ABDOMEN: Soft, Nontender, Nondistended; Bowel sounds present  EXTREMITIES:  2+ Peripheral Pulses, No clubbing, cyanosis, or edema  LYMPH: No lymphadenopathy noted  SKIN: No rashes or lesions    Care Discussed with Consultants/Other Providers [ ] YES  [ ] NO

## 2021-03-24 NOTE — CHART NOTE - NSCHARTNOTEFT_GEN_A_CORE
PA MEDICINE NOTE:    RN notified pt woke up from his sleeping feeling "airway is blocked".   Pt seen at bedside, A+Ox4, NAD. Looks comfortable and RR within normal range w/ spO2 100% on 1L NC in which he was on for the past 2 days for comfort 2/2 dyspnea.   He mentioned he woke up from his sleep feeling airway is blocked with secretions.  Cards: s1s2  Pulm: clear airways     Vital Signs Last 24 Hrs  T(C): 36.9 (24 Mar 2021 04:11), Max: 36.9 (24 Mar 2021 04:11)  T(F): 98.4 (24 Mar 2021 04:11), Max: 98.4 (24 Mar 2021 04:11)  HR: 72 (24 Mar 2021 04:11) (66 - 72)  BP: 108/64 (24 Mar 2021 04:11) (103/58 - 108/72)  RR: 20 (24 Mar 2021 04:11) (18 - 20)  SpO2: 98% (24 Mar 2021 04:11) (98% - 100%)    IMAGING:  < from: Xray Chest 1 View- PORTABLE-Routine (Xray Chest 1 View- PORTABLE-Routine .) (03.21.21 @ 09:17) >  IMPRESSION:  Pulmonary edema.  < end of copied text >    ASSESSMENT AND PLAN:     1. secretions and "blocked airway"   - Duoneb x1   - Monior on continuous pulse ox   - Will endorse to AM team and attending to follow   - Consider pulm consult       Deidre Pascual  Dept of Medicine   #[id number] PA MEDICINE NOTE:    RN notified pt woke up from his sleep feeling "airway is blocked".   Pt seen at bedside, A+Ox4, NAD. Looks comfortable and RR within normal range w/ spO2 100% on 1L NC in which he was on for the past 2 days for comfort 2/2 dyspnea.   He mentioned he woke up from his sleep feeling airway is blocked with secretions in airway.  Cards: s1s2  Pulm: clear airways     Vital Signs Last 24 Hrs  T(C): 36.9 (24 Mar 2021 04:11), Max: 36.9 (24 Mar 2021 04:11)  T(F): 98.4 (24 Mar 2021 04:11), Max: 98.4 (24 Mar 2021 04:11)  HR: 72 (24 Mar 2021 04:11) (66 - 72)  BP: 108/64 (24 Mar 2021 04:11) (103/58 - 108/72)  RR: 20 (24 Mar 2021 04:11) (18 - 20)  SpO2: 98% (24 Mar 2021 04:11) (98% - 100%)    IMAGING:  < from: Xray Chest 1 View- PORTABLE-Routine (Xray Chest 1 View- PORTABLE-Routine .) (03.21.21 @ 09:17) >  IMPRESSION:  Pulmonary edema.  < end of copied text >    ASSESSMENT AND PLAN:     1. secretions and "blocked airway"   - Duoneb x1   - Monitor on continuous pulse ox   - Will give daily Lasix dose 1 hour earlier   - If worsens, consider ordering chest x ray and ABG   - Will endorse to AM team and attending to follow   - Consider pulm consult       Deidre Pascual  Dept of Medicine   #[id number] PA MEDICINE NOTE:    RN notified pt woke up from his sleep feeling "airway is blocked".   Pt seen at bedside, A+Ox4, NAD. Looks comfortable and RR within normal range w/ spO2 100% on 1L NC in which he was on for the past 2 days for comfort 2/2 dyspnea.   He mentioned he woke up from his sleep feeling airway is blocked with secretions in airway.  Pt denies chest pain, palpitation, difficulty breathing.   Cards: s1s2  Pulm: clear airways     Vital Signs Last 24 Hrs  T(C): 36.9 (24 Mar 2021 04:11), Max: 36.9 (24 Mar 2021 04:11)  T(F): 98.4 (24 Mar 2021 04:11), Max: 98.4 (24 Mar 2021 04:11)  HR: 72 (24 Mar 2021 04:11) (66 - 72)  BP: 108/64 (24 Mar 2021 04:11) (103/58 - 108/72)  RR: 20 (24 Mar 2021 04:11) (18 - 20)  SpO2: 98% (24 Mar 2021 04:11) (98% - 100%)    IMAGING:  < from: Xray Chest 1 View- PORTABLE-Routine (Xray Chest 1 View- PORTABLE-Routine .) (03.21.21 @ 09:17) >  IMPRESSION:  Pulmonary edema.  < end of copied text >    ASSESSMENT AND PLAN:     1. secretions and "blocked airway"   - Duoneb x1   - Monitor on continuous pulse ox   - Will give daily Lasix dose 1 hour earlier   - If worsens, consider ordering chest x ray and ABG   - Will endorse to AM team and attending to follow   - Consider pulm consult       Deidre Pascual  Dept of Medicine   #[id number] PA MEDICINE NOTE:    RN notified pt woke up from his sleep feeling "airway is blocked" and requested Proventil.    Pt seen at bedside, A+Ox4, NAD. Looks comfortable and RR within normal range w/ spO2 100% on 1L NC in which he was on for the past 2 days for comfort 2/2 dyspnea.   He mentioned he woke up from his sleep feeling airway is blocked with secretions in airway.  He mentioned he had the same thing at home when he wakes up from his sleep and takes Proventil.   Pt denies chest pain, palpitation, difficulty breathing.   Cards: s1s2  Pulm: clear airways     Vital Signs Last 24 Hrs  T(C): 36.9 (24 Mar 2021 04:11), Max: 36.9 (24 Mar 2021 04:11)  T(F): 98.4 (24 Mar 2021 04:11), Max: 98.4 (24 Mar 2021 04:11)  HR: 72 (24 Mar 2021 04:11) (66 - 72)  BP: 108/64 (24 Mar 2021 04:11) (103/58 - 108/72)  RR: 20 (24 Mar 2021 04:11) (18 - 20)  SpO2: 98% (24 Mar 2021 04:11) (98% - 100%)    IMAGING:  < from: Xray Chest 1 View- PORTABLE-Routine (Xray Chest 1 View- PORTABLE-Routine .) (03.21.21 @ 09:17) >  IMPRESSION:  Pulmonary edema.  < end of copied text >    ASSESSMENT AND PLAN:     1. secretions and "blocked airway"   - Duoneb x1   - Monitor on continuous pulse ox   - Will give daily Lasix dose 1 hour earlier   - If worsens, consider ordering chest x ray and ABG   - Will endorse to AM team and attending to follow   - Consider pulm consult       Deidre Pascual  Dept of Medicine   #[id number]

## 2021-03-25 ENCOUNTER — TRANSCRIPTION ENCOUNTER (OUTPATIENT)
Age: 76
End: 2021-03-25

## 2021-03-25 LAB
ANION GAP SERPL CALC-SCNC: 11 MMOL/L — SIGNIFICANT CHANGE UP (ref 5–17)
BUN SERPL-MCNC: 18 MG/DL — SIGNIFICANT CHANGE UP (ref 7–23)
CALCIUM SERPL-MCNC: 9.1 MG/DL — SIGNIFICANT CHANGE UP (ref 8.4–10.5)
CHLORIDE SERPL-SCNC: 102 MMOL/L — SIGNIFICANT CHANGE UP (ref 96–108)
CO2 SERPL-SCNC: 25 MMOL/L — SIGNIFICANT CHANGE UP (ref 22–31)
CREAT SERPL-MCNC: 0.97 MG/DL — SIGNIFICANT CHANGE UP (ref 0.5–1.3)
GLUCOSE BLDC GLUCOMTR-MCNC: 197 MG/DL — HIGH (ref 70–99)
GLUCOSE BLDC GLUCOMTR-MCNC: 330 MG/DL — HIGH (ref 70–99)
GLUCOSE BLDC GLUCOMTR-MCNC: 380 MG/DL — HIGH (ref 70–99)
GLUCOSE BLDC GLUCOMTR-MCNC: 400 MG/DL — HIGH (ref 70–99)
GLUCOSE BLDC GLUCOMTR-MCNC: 404 MG/DL — HIGH (ref 70–99)
GLUCOSE BLDC GLUCOMTR-MCNC: 444 MG/DL — HIGH (ref 70–99)
GLUCOSE SERPL-MCNC: 202 MG/DL — HIGH (ref 70–99)
HCT VFR BLD CALC: 37.9 % — LOW (ref 39–50)
HGB BLD-MCNC: 12.3 G/DL — LOW (ref 13–17)
MAGNESIUM SERPL-MCNC: 1.7 MG/DL — SIGNIFICANT CHANGE UP (ref 1.6–2.6)
MCHC RBC-ENTMCNC: 29.6 PG — SIGNIFICANT CHANGE UP (ref 27–34)
MCHC RBC-ENTMCNC: 32.5 GM/DL — SIGNIFICANT CHANGE UP (ref 32–36)
MCV RBC AUTO: 91.3 FL — SIGNIFICANT CHANGE UP (ref 80–100)
NRBC # BLD: 0 /100 WBCS — SIGNIFICANT CHANGE UP (ref 0–0)
PLATELET # BLD AUTO: 148 K/UL — LOW (ref 150–400)
POTASSIUM SERPL-MCNC: 3.6 MMOL/L — SIGNIFICANT CHANGE UP (ref 3.5–5.3)
POTASSIUM SERPL-SCNC: 3.6 MMOL/L — SIGNIFICANT CHANGE UP (ref 3.5–5.3)
RBC # BLD: 4.15 M/UL — LOW (ref 4.2–5.8)
RBC # FLD: 12.9 % — SIGNIFICANT CHANGE UP (ref 10.3–14.5)
SODIUM SERPL-SCNC: 138 MMOL/L — SIGNIFICANT CHANGE UP (ref 135–145)
WBC # BLD: 5.63 K/UL — SIGNIFICANT CHANGE UP (ref 3.8–10.5)
WBC # FLD AUTO: 5.63 K/UL — SIGNIFICANT CHANGE UP (ref 3.8–10.5)

## 2021-03-25 PROCEDURE — 99233 SBSQ HOSP IP/OBS HIGH 50: CPT | Mod: GC

## 2021-03-25 RX ORDER — INSULIN GLARGINE 100 [IU]/ML
20 INJECTION, SOLUTION SUBCUTANEOUS AT BEDTIME
Refills: 0 | Status: DISCONTINUED | OUTPATIENT
Start: 2021-03-25 | End: 2021-03-26

## 2021-03-25 RX ORDER — INSULIN LISPRO 100/ML
5 VIAL (ML) SUBCUTANEOUS ONCE
Refills: 0 | Status: COMPLETED | OUTPATIENT
Start: 2021-03-25 | End: 2021-03-25

## 2021-03-25 RX ORDER — FUROSEMIDE 40 MG
20 TABLET ORAL ONCE
Refills: 0 | Status: COMPLETED | OUTPATIENT
Start: 2021-03-25 | End: 2021-03-25

## 2021-03-25 RX ORDER — FUROSEMIDE 40 MG
20 TABLET ORAL DAILY
Refills: 0 | Status: DISCONTINUED | OUTPATIENT
Start: 2021-03-26 | End: 2021-03-26

## 2021-03-25 RX ORDER — JNJ-78436735 50000000000 [PFU]/.5ML
0.5 SUSPENSION INTRAMUSCULAR ONCE
Refills: 0 | Status: DISCONTINUED | OUTPATIENT
Start: 2021-03-25 | End: 2021-03-25

## 2021-03-25 RX ORDER — POTASSIUM CHLORIDE 20 MEQ
20 PACKET (EA) ORAL ONCE
Refills: 0 | Status: COMPLETED | OUTPATIENT
Start: 2021-03-25 | End: 2021-03-25

## 2021-03-25 RX ORDER — MAGNESIUM SULFATE 500 MG/ML
1 VIAL (ML) INJECTION ONCE
Refills: 0 | Status: COMPLETED | OUTPATIENT
Start: 2021-03-25 | End: 2021-03-25

## 2021-03-25 RX ADMIN — Medication 81 MILLIGRAM(S): at 12:03

## 2021-03-25 RX ADMIN — Medication 5 UNIT(S): at 18:31

## 2021-03-25 RX ADMIN — INSULIN GLARGINE 20 UNIT(S): 100 INJECTION, SOLUTION SUBCUTANEOUS at 22:23

## 2021-03-25 RX ADMIN — Medication 2: at 21:57

## 2021-03-25 RX ADMIN — Medication 5: at 13:25

## 2021-03-25 RX ADMIN — Medication 100 GRAM(S): at 17:57

## 2021-03-25 RX ADMIN — Medication 5: at 17:45

## 2021-03-25 RX ADMIN — LOSARTAN POTASSIUM 25 MILLIGRAM(S): 100 TABLET, FILM COATED ORAL at 12:05

## 2021-03-25 RX ADMIN — FINASTERIDE 5 MILLIGRAM(S): 5 TABLET, FILM COATED ORAL at 12:04

## 2021-03-25 RX ADMIN — RANOLAZINE 1000 MILLIGRAM(S): 500 TABLET, FILM COATED, EXTENDED RELEASE ORAL at 17:07

## 2021-03-25 RX ADMIN — Medication 100 MILLIGRAM(S): at 05:07

## 2021-03-25 RX ADMIN — ISOSORBIDE MONONITRATE 120 MILLIGRAM(S): 60 TABLET, EXTENDED RELEASE ORAL at 12:29

## 2021-03-25 RX ADMIN — GABAPENTIN 300 MILLIGRAM(S): 400 CAPSULE ORAL at 05:07

## 2021-03-25 RX ADMIN — TAMSULOSIN HYDROCHLORIDE 0.4 MILLIGRAM(S): 0.4 CAPSULE ORAL at 21:57

## 2021-03-25 RX ADMIN — Medication 20 MILLIGRAM(S): at 05:07

## 2021-03-25 RX ADMIN — Medication 100 MILLIGRAM(S): at 17:06

## 2021-03-25 RX ADMIN — ROSUVASTATIN CALCIUM 5 MILLIGRAM(S): 5 TABLET ORAL at 21:57

## 2021-03-25 RX ADMIN — Medication 20 MILLIGRAM(S): at 12:12

## 2021-03-25 RX ADMIN — Medication 20 MILLIEQUIVALENT(S): at 17:57

## 2021-03-25 RX ADMIN — PANTOPRAZOLE SODIUM 40 MILLIGRAM(S): 20 TABLET, DELAYED RELEASE ORAL at 05:07

## 2021-03-25 RX ADMIN — GABAPENTIN 300 MILLIGRAM(S): 400 CAPSULE ORAL at 17:07

## 2021-03-25 RX ADMIN — Medication 1: at 09:07

## 2021-03-25 NOTE — PROGRESS NOTE ADULT - ASSESSMENT
74y M with PMH CAD s/p PCI to the LAD and RCA in the past, HTN, & HLD.  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    Patient w/ known severe 3 vessel coronary dz, including significant L main dz.        REC:  - CXR w/ progression of effusions; recommend additional IV lasix 20 x 1. Monitor UOP   - c/w Lasix 20mg PO daily  - c/w crestor 5 daily if no contraindication  - c/w losartan 25mg daily  - c/w ranolazine 1000 bid  - c/w ASA 81; appears to be tolerating well w/ no new neurological deficits appreciated; c/w monitoring neuro status   - monitor i/o  - discussed again with Dr. Jimenez, Dr. Merino and Dr. García.  - plan at present is to resume low dose ASA.  Will arrange for f/u head CT in early April to re-assess size of SDH.  Will consider heparin challenge at that time, and if tolerated, will plan on CABG.  - patient's prognosis remains guarded from a cardiac standpoint, given the severity of his CAD and significant co-morbid condition (SDH) which makes management, elvia. anti-clotting therapy more difficult and with more risk.  Discussed with patient, wife, daughter (Azam).      DESTINY Barrett M.D.  Cardiology fellow  Text or Call: [phone]   74y M with PMH CAD s/p PCI to the LAD and RCA in the past, HTN, & HLD.  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    Patient w/ known severe 3 vessel coronary dz, including significant L main dz.    Complaining of dyspnea; CXR c/w mild CHF.      REC:  - CXR w/ progression of effusions; recommend additional IV Lasix 20 x 1. Monitor UOP   - c/w Lasix 20mg PO daily  - c/w Crestor 5 daily if no contraindication  - c/w losartan 25mg daily  - c/w ranolazine 1000 bid  - c/w ASA 81; appears to be tolerating well w/ no new neurological deficits appreciated; c/w monitoring neuro status   - monitor i/o  - will speak to Dr. COLTEN Camargo of neurosurgery  - continue low dose ASA.  Will arrange for f/u head CT in early April to re-assess size of SDH.  If stable or improved, plan  Will consider heparin challenge at that time, and if tolerated, will plan on CABG.  - patient's prognosis remains guarded from a cardiac standpoint, given the severity of his CAD and significant co-morbid condition (SDH) which makes management, elvia. anti-clotting therapy much more complex and with more risk.        DESTINY Barrett M.D.  Cardiology fellow  Text or Call: [phone]    cardiology fellow; patient seen and examined.       I was physically present for the key portions of the evaluation and management (E/M) service provided.    I agree with the above history, physical, and plan which I have reviewed and edited where appropriate.   Markell Rosado M.D.  Cardiology Attending, Consult Service    For Cardiology consults and questions, all Cardiology service information can be found 24/7 on [email or web address], password: [email or web address]  74y M with PMH CAD s/p PCI to the LAD and RCA in the past, HTN, & HLD.  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    Patient w/ known severe 3 vessel coronary dz, including significant L main dz.    Complaining of dyspnea; CXR c/w mild CHF.      REC:  - CXR w/ progression of effusions; recommend additional IV Lasix 20 x 1. Monitor UOP   - c/w Lasix 20mg PO daily  - c/w Crestor 5 daily if no contraindication  - c/w losartan 25mg daily  - c/w ranolazine 1000 bid  - c/w ASA 81; appears to be tolerating well w/ no new neurological deficits appreciated; c/w monitoring neuro status   - monitor i/o  - will speak to Dr. COLTEN Camargo of neurosurgery  - continue low dose ASA.  Will arrange for f/u head CT in early April to re-assess size of SDH.  If stable or improved, plan  Will consider heparin challenge at that time, and if tolerated, will plan on CABG.  - patient's prognosis remains guarded from a cardiac standpoint, given the severity of his CAD and significant co-morbid condition (SDH) which makes management, elvia. anti-clotting therapy much more complex and with more risk.    - discussed with NKRISTIN on 3 dsu      DESTINY Barrett M.D.  Cardiology fellow  Text or Call: [phone]    Plan discussed with cardiology fellow; patient seen and examined.       I was physically present for the key portions of the evaluation and management (E/M) service provided.    I agree with the above history, physical, and plan which I have reviewed and edited where appropriate.   Markell Rosado M.D.  Cardiology Attending, Consult Service    For Cardiology consults and questions, all Cardiology service information can be found 24/7 on [email or web address], password: [email or web address]

## 2021-03-25 NOTE — DIETITIAN INITIAL EVALUATION ADULT. - OTHER INFO
Intake : 100% food from home  Denies nausea/vomit :  Denies difficulty chewing /swallow :  Denies diarrhea/constipation:  Last BM : today  NKFA  IBW +/- 10%= 148pounds  Ht: 67"  Ht taken from pt  Dosing ht: 172.2cm  Usual Weight PTA: 155pounds  Dosing wt: 68kg  BMI: 26.2  BMI calculated using wt from flow sheet  BMI calculated using ht from pt  wt used to calculate needs: current  Education Provided : pt was educated on the importance of supplements to increase calorie and protein intake in light of RD's nutritional findings. CHO counting, diabetes label reading tips   pressure injury: none  edema: none

## 2021-03-25 NOTE — DISCHARGE NOTE PROVIDER - PROVIDER TOKENS
PROVIDER:[TOKEN:[[id number]]],PROVIDER:[TOKEN:[[id number]]] PROVIDER:[TOKEN:[[id number]]],PROVIDER:[TOKEN:[[id number]]],PROVIDER:[TOKEN:[[id number]],FOLLOWUP:[1 week]]

## 2021-03-25 NOTE — DISCHARGE NOTE PROVIDER - NSDCMRMEDTOKEN_GEN_ALL_CORE_FT
atorvastatin 40 mg oral tablet: 1 tab(s) orally once a day  famotidine 20 mg oral tablet: 1 tab(s) orally once a day  finasteride 5 mg oral tablet: 1 tab(s) orally once a day  gabapentin 300 mg oral capsule: 1 cap(s) orally 2 times a day    NOTE: Pharmacy directions - 1 cap 3 times daily.  isosorbide mononitrate 120 mg oral tablet, extended release: 1 tab(s) orally once a day  Janumet 50 mg-1000 mg oral tablet: 1 tab(s) orally 2 times a day  Lantus 100 units/mL subcutaneous solution: 32 unit(s) subcutaneous once a day (at bedtime)    NOTE: Pharmacy directions - 60 units daily.  metoprolol tartrate 100 mg oral tablet: 1 tab(s) orally 2 times a day   nitroglycerin 0.4 mg sublingual tablet: 1 tab(s) sublingual every 5 minutes as needed for chest pain. Call 911 if pain not relieved by 2 doses.  pantoprazole 40 mg oral delayed release tablet: 1 tab(s) orally once a day (before a meal)  Ranexa 500 mg oral tablet, extended release: 1 tab(s) orally 2 times a day  tamsulosin 0.4 mg oral capsule: 1 cap(s) orally once a day (at bedtime)   aspirin 81 mg oral delayed release tablet: 1 tab(s) orally once a day  famotidine 20 mg oral tablet: 1 tab(s) orally once a day  finasteride 5 mg oral tablet: 1 tab(s) orally once a day  gabapentin 300 mg oral capsule: 1 cap(s) orally 2 times a day    NOTE: Pharmacy directions - 1 cap 3 times daily.  insulin glargine: 20 unit(s) subcutaneous once a day (at bedtime)  isosorbide mononitrate 120 mg oral tablet, extended release: 1 tab(s) orally once a day  Janumet 50 mg-1000 mg oral tablet: 1 tab(s) orally 2 times a day  Lasix 40 mg oral tablet: 1 tab(s) orally once a day   losartan 25 mg oral tablet: 1 tab(s) orally once a day at 10:00am  metoprolol tartrate 100 mg oral tablet: 1 tab(s) orally 2 times a day   nitroglycerin 0.4 mg sublingual tablet: 1 tab(s) sublingual every 5 minutes as needed for chest pain. Call 911 if pain not relieved by 2 doses.  pantoprazole 40 mg oral delayed release tablet: 1 tab(s) orally once a day (before a meal)  potassium chloride 10 mEq oral capsule, extended release: 1 cap(s) orally once a day   Ranexa 1000 mg oral tablet, extended release: 1 tab(s) orally 2 times a day  rosuvastatin 5 mg oral tablet: 1 tab(s) orally once a day (at bedtime)  tamsulosin 0.4 mg oral capsule: 1 cap(s) orally once a day (at bedtime)   aspirin 81 mg oral delayed release tablet: 1 tab(s) orally once a day  DULoxetine 20 mg oral delayed release capsule: 1 cap(s) orally once a day (at bedtime)  famotidine 20 mg oral tablet: 1 tab(s) orally once a day  finasteride 5 mg oral tablet: 1 tab(s) orally once a day  gabapentin 300 mg oral capsule: 1 cap(s) orally 2 times a day    NOTE: Pharmacy directions - 1 cap 3 times daily.  insulin glargine: 20 unit(s) subcutaneous once a day (at bedtime)  isosorbide mononitrate 120 mg oral tablet, extended release: 1 tab(s) orally once a day  Janumet 50 mg-1000 mg oral tablet: 1 tab(s) orally 2 times a day  Lasix 40 mg oral tablet: 1 tab(s) orally once a day   losartan 25 mg oral tablet: 1 tab(s) orally once a day at 10:00am  metoprolol tartrate 100 mg oral tablet: 1 tab(s) orally 2 times a day   nitroglycerin 0.4 mg sublingual tablet: 1 tab(s) sublingual every 5 minutes as needed for chest pain. Call 911 if pain not relieved by 2 doses.  pantoprazole 40 mg oral delayed release tablet: 1 tab(s) orally once a day (before a meal)  potassium chloride 10 mEq oral capsule, extended release: 1 cap(s) orally once a day   Ranexa 1000 mg oral tablet, extended release: 1 tab(s) orally 2 times a day  rosuvastatin 5 mg oral tablet: 1 tab(s) orally once a day (at bedtime)  tamsulosin 0.4 mg oral capsule: 1 cap(s) orally once a day (at bedtime)

## 2021-03-25 NOTE — DIETITIAN INITIAL EVALUATION ADULT. - FACTORS AFF FOOD INTAKE
he is eating foods brought in from home including rice, vegetables and salad./Judaism/ethnic/cultural/personal food preferences

## 2021-03-25 NOTE — DISCHARGE NOTE PROVIDER - NSDCCAREPROVSEEN_GEN_ALL_CORE_FT
Patient Instructions by Josefina Cleveland APN at 08/06/18 02:58 PM     Author:  Josefina Cleveland APN Service:  (none) Author Type:  Nurse Practitioner     Filed:  08/06/18 02:59 PM Encounter Date:  8/6/2018 Status:  Signed     :  Josefina Cleveland APN (Nurse Practitioner)            If no relief in 2 weeks- call ear, nose, throat specialist for an evaluation  OTOLARYNGOLOGY (ENT): [facility]: [phone]          Revision History        User Key Date/Time User Provider Type Action    > [N/A] 08/06/18 02:59 PM Josefina Cleveland APN Nurse Practitioner Sign            
Marian Cody

## 2021-03-25 NOTE — PROGRESS NOTE ADULT - ASSESSMENT
Problem/Plan - 1:  ·  Problem: ACS (acute coronary syndrome).  Plan: See above.   telemonitor  cards fu  medical management and re evaluate for surgery in a month       Problem/Plan - 2:  ·  Problem: Type 2 diabetes mellitus with hyperglycemia, with long-term current use of insulin.  Plan: See above.   High doses of home Lantus at home.   Random glucose of 160 in the ER.   Will HOLD oral Rx and provide conservative bedtime Lantus of 0.1U/Kg/24H.   monitor FSA    Problem/Plan - 3:  ·  Problem: Chronic subdural hematoma.  Plan:  NS to review with CTS, cardiology the issue of CABG.   repeat CT before discharge     Problem/Plan - 4:·  Problem: Need for prophylactic measure.  Plan: ELIESER for now with active intracranial process precludes pharmacologic DVT prophylaxis.     dc planning once cleared by cards

## 2021-03-25 NOTE — DISCHARGE NOTE PROVIDER - CARE PROVIDERS DIRECT ADDRESSES
,[email or web address],DirectAddress_Unknown

## 2021-03-25 NOTE — DIETITIAN INITIAL EVALUATION ADULT. - ORAL INTAKE PTA/DIET HISTORY
meat for breakfast, rice and vegetables for lunch, fruit for dinner. Glucerna 1 x daily. placed pending verification to add Glucerna 1 x daily.

## 2021-03-25 NOTE — DIETITIAN INITIAL EVALUATION ADULT. - PERTINENT MEDS FT
MEDICATIONS  (STANDING):  aspirin enteric coated 81 milliGRAM(s) Oral daily  dextrose 40% Gel 15 Gram(s) Oral once  dextrose 5%. 1000 milliLiter(s) (50 mL/Hr) IV Continuous <Continuous>  dextrose 5%. 1000 milliLiter(s) (100 mL/Hr) IV Continuous <Continuous>  dextrose 50% Injectable 25 Gram(s) IV Push once  dextrose 50% Injectable 12.5 Gram(s) IV Push once  dextrose 50% Injectable 25 Gram(s) IV Push once  finasteride 5 milliGRAM(s) Oral daily  furosemide    Tablet 20 milliGRAM(s) Oral daily  gabapentin 300 milliGRAM(s) Oral two times a day  glucagon  Injectable 1 milliGRAM(s) IntraMuscular once  insulin glargine Injectable (LANTUS) 7 Unit(s) SubCutaneous at bedtime  insulin lispro (ADMELOG) corrective regimen sliding scale   SubCutaneous three times a day before meals  insulin lispro (ADMELOG) corrective regimen sliding scale   SubCutaneous at bedtime  isosorbide   mononitrate ER Tablet (IMDUR) 120 milliGRAM(s) Oral daily  losartan 25 milliGRAM(s) Oral <User Schedule>  metoprolol tartrate 100 milliGRAM(s) Oral two times a day  pantoprazole    Tablet 40 milliGRAM(s) Oral before breakfast  ranolazine 1000 milliGRAM(s) Oral two times a day  rosuvastatin 5 milliGRAM(s) Oral at bedtime  tamsulosin 0.4 milliGRAM(s) Oral at bedtime    MEDICATIONS  (PRN):  guaiFENesin   Syrup  (Sugar-Free) 100 milliGRAM(s) Oral every 6 hours PRN Cough

## 2021-03-25 NOTE — PROGRESS NOTE ADULT - SUBJECTIVE AND OBJECTIVE BOX
Patient is a 76y old  Male who presents with a chief complaint of One day of dyspnoea. (25 Mar 2021 10:08)    Date of servie : 03-25-21 @ 15:14  INTERVAL HPI/OVERNIGHT EVENTS:  T(C): 36.5 (03-25-21 @ 12:43), Max: 36.8 (03-24-21 @ 20:30)  HR: 73 (03-25-21 @ 12:43) (71 - 76)  BP: 125/71 (03-25-21 @ 12:43) (105/65 - 125/71)  RR: 18 (03-25-21 @ 12:43) (18 - 18)  SpO2: 97% (03-25-21 @ 12:43) (97% - 99%)  Wt(kg): --  I&O's Summary    24 Mar 2021 07:01  -  25 Mar 2021 07:00  --------------------------------------------------------  IN: 420 mL / OUT: 400 mL / NET: 20 mL    25 Mar 2021 07:01  -  25 Mar 2021 15:14  --------------------------------------------------------  IN: 420 mL / OUT: 350 mL / NET: 70 mL        LABS:                        12.3   5.63  )-----------( 148      ( 25 Mar 2021 07:07 )             37.9     03-25    138  |  102  |  18  ----------------------------<  202<H>  3.6   |  25  |  0.97    Ca    9.1      25 Mar 2021 07:07  Mg     1.7     03-25          CAPILLARY BLOOD GLUCOSE      POCT Blood Glucose.: 380 mg/dL (25 Mar 2021 13:03)  POCT Blood Glucose.: 404 mg/dL (25 Mar 2021 13:02)  POCT Blood Glucose.: 197 mg/dL (25 Mar 2021 08:51)  POCT Blood Glucose.: 284 mg/dL (24 Mar 2021 21:54)  POCT Blood Glucose.: 280 mg/dL (24 Mar 2021 18:30)  POCT Blood Glucose.: 268 mg/dL (24 Mar 2021 17:09)            MEDICATIONS  (STANDING):  aspirin enteric coated 81 milliGRAM(s) Oral daily  dextrose 40% Gel 15 Gram(s) Oral once  dextrose 5%. 1000 milliLiter(s) (50 mL/Hr) IV Continuous <Continuous>  dextrose 5%. 1000 milliLiter(s) (100 mL/Hr) IV Continuous <Continuous>  dextrose 50% Injectable 25 Gram(s) IV Push once  dextrose 50% Injectable 12.5 Gram(s) IV Push once  dextrose 50% Injectable 25 Gram(s) IV Push once  finasteride 5 milliGRAM(s) Oral daily  gabapentin 300 milliGRAM(s) Oral two times a day  glucagon  Injectable 1 milliGRAM(s) IntraMuscular once  insulin glargine Injectable (LANTUS) 7 Unit(s) SubCutaneous at bedtime  insulin lispro (ADMELOG) corrective regimen sliding scale   SubCutaneous three times a day before meals  insulin lispro (ADMELOG) corrective regimen sliding scale   SubCutaneous at bedtime  isosorbide   mononitrate ER Tablet (IMDUR) 120 milliGRAM(s) Oral daily  losartan 25 milliGRAM(s) Oral <User Schedule>  metoprolol tartrate 100 milliGRAM(s) Oral two times a day  pantoprazole    Tablet 40 milliGRAM(s) Oral before breakfast  ranolazine 1000 milliGRAM(s) Oral two times a day  rosuvastatin 5 milliGRAM(s) Oral at bedtime  tamsulosin 0.4 milliGRAM(s) Oral at bedtime    MEDICATIONS  (PRN):  guaiFENesin   Syrup  (Sugar-Free) 100 milliGRAM(s) Oral every 6 hours PRN Cough          PHYSICAL EXAM:  GENERAL: NAD, well-groomed, well-developed  HEAD:  Atraumatic, Normocephalic  CHEST/LUNG: Clear to percussion bilaterally; No rales, rhonchi, wheezing, or rubs  HEART: Regular rate and rhythm; No murmurs, rubs, or gallops  ABDOMEN: Soft, Nontender, Nondistended; Bowel sounds present  EXTREMITIES:  2+ Peripheral Pulses, No clubbing, cyanosis, or edema  LYMPH: No lymphadenopathy noted  SKIN: No rashes or lesions    Care Discussed with Consultants/Other Providers [ ] YES  [ ] NO

## 2021-03-25 NOTE — DIETITIAN INITIAL EVALUATION ADULT. - PERTINENT LABORATORY DATA
03-25 @ 07:07: Na 138, BUN 18, Cr 0.97, <H>, K+ 3.6, Mg 1.7  03-24 @ 18:46: Na 138, BUN 21, Cr 1.00, <H>, K+ 4.5, Mg 1.8

## 2021-03-25 NOTE — DISCHARGE NOTE PROVIDER - INSTRUCTIONS
Follow consistent carbohydrate, low sodium/salt and low cholesterol/fat diet. Avoid added salt, frozen dinners, canned soups and broths, foods fried in oil, salted/cured meats including ham, watson, and other deli meats high in sodium. Eat plenty of fruits, vegetables and whole grains.

## 2021-03-25 NOTE — CHART NOTE - NSCHARTNOTEFT_GEN_A_CORE
spoke with cards  will need IV lasix   CT head  cancelled today , CT head on the day of discharge   Pt consented for COVID vaccine , Vaccine on the day of discharge , will follow up with DR Escobar Izquierdo NP-C [id number]

## 2021-03-25 NOTE — PROGRESS NOTE ADULT - SUBJECTIVE AND OBJECTIVE BOX
INTERVAL EVENTS: No o/n events. Patient continues to report dyspnea, though sx stable. Denies CP,  palpitations, presyncope, syncope, f/c/n/v.     REVIEW OF SYSTEMS:  Constitutional:     [X] negative [ ] fevers [ ] chills [ ] weight loss [ ] weight gain  HEENT:                  [X] negative [ ] dry eyes [ ] eye irritation [ ] postnasal drip [ ] nasal congestion  CV:                         [X] negative  [ ] chest pain [ ] orthopnea [ ] palpitations [ ] murmur  Resp:                     [X] negative [ ] cough [X] shortness of breath  [ ] wheezing [ ] sputum [ ] hemoptysis  GI:                          [X] negative [ ] nausea [ ] vomiting [ ] diarrhea [ ] constipation [ ] abd pain [ ] dysphagia   :                        [X] negative [ ] dysuria [ ] nocturia [ ] hematuria [ ] increased urinary frequency  MSK:                      [X] negative [ ] back pain [ ] myalgias [ ] arthralgias [ ] fracture  Skin:                       [X] negative [ ] rash [ ] itch  Neuro:                   [X] negative [ ] headache [ ] dizziness [ ] syncope [ ] weakness [ ] numbness  Psych:                    [X] negative [ ] anxiety [ ] depression  Endo:                     [X] negative [ ] diabetes [ ] thyroid problem  Heme/Lymph:      [X] negative [ ] anemia [ ] bleeding problem  Allergic/Immune: [X] negative [ ] itchy eyes [ ] nasal discharge [ ] hives [ ] angioedema    [X] All other systems negative or otherwise described above.  [ ] Unable to assess ROS because ________.    PAST MEDICAL & SURGICAL HISTORY:  BPH (benign prostatic hypertrophy)    Hyperlipidemia    CAD (coronary artery disease)    Diabetes mellitus  Type 2    HTN (hypertension)    S/P drug eluting coronary stent placement  [facility]    S/P primary angioplasty with coronary stent  1990s      MEDICATIONS  (STANDING):  aspirin enteric coated 81 milliGRAM(s) Oral daily  dextrose 40% Gel 15 Gram(s) Oral once  dextrose 5%. 1000 milliLiter(s) (50 mL/Hr) IV Continuous <Continuous>  dextrose 5%. 1000 milliLiter(s) (100 mL/Hr) IV Continuous <Continuous>  dextrose 50% Injectable 25 Gram(s) IV Push once  dextrose 50% Injectable 12.5 Gram(s) IV Push once  dextrose 50% Injectable 25 Gram(s) IV Push once  finasteride 5 milliGRAM(s) Oral daily  furosemide    Tablet 20 milliGRAM(s) Oral daily  gabapentin 300 milliGRAM(s) Oral two times a day  glucagon  Injectable 1 milliGRAM(s) IntraMuscular once  insulin glargine Injectable (LANTUS) 7 Unit(s) SubCutaneous at bedtime  insulin lispro (ADMELOG) corrective regimen sliding scale   SubCutaneous three times a day before meals  insulin lispro (ADMELOG) corrective regimen sliding scale   SubCutaneous at bedtime  isosorbide   mononitrate ER Tablet (IMDUR) 120 milliGRAM(s) Oral daily  losartan 25 milliGRAM(s) Oral <User Schedule>  metoprolol tartrate 100 milliGRAM(s) Oral two times a day  pantoprazole    Tablet 40 milliGRAM(s) Oral before breakfast  ranolazine 1000 milliGRAM(s) Oral two times a day  rosuvastatin 5 milliGRAM(s) Oral at bedtime  tamsulosin 0.4 milliGRAM(s) Oral at bedtime    MEDICATIONS  (PRN):  guaiFENesin   Syrup  (Sugar-Free) 100 milliGRAM(s) Oral every 6 hours PRN Cough    ICU Vital Signs Last 24 Hrs  T(C): 36.8 (25 Mar 2021 04:12), Max: 36.8 (24 Mar 2021 20:30)  T(F): 98.2 (25 Mar 2021 04:12), Max: 98.2 (24 Mar 2021 20:30)  HR: 71 (25 Mar 2021 04:12) (71 - 76)  BP: 120/69 (25 Mar 2021 04:12) (105/65 - 120/69)  BP(mean): --  ABP: --  ABP(mean): --  RR: 18 (25 Mar 2021 09:12) (18 - 18)  SpO2: 99% (25 Mar 2021 09:12) (97% - 99%)    Daily     Daily Weight in k.2 (25 Mar 2021 04:12)    PHYSICAL EXAM:  GEN: Awake, alert. NAD.   HEENT: NCAT, PERRL, EOMI. Mucosa moist. No JVD.  RESP: CTA b/l  CV: RRR. Normal S1/S2. No m/r/g.  ABD: Soft. NT/ND. BS+  EXT: Warm. No edema, clubbing, or cyanosis.   NEURO: AAOx3. No focal deficits.     LABS:                        12.3   5.63  )-----------( 148      ( 25 Mar 2021 07:07 )             37.9         138  |  102  |  18  ----------------------------<  202<H>  3.6   |  25  |  0.97    Ca    9.1      25 Mar 2021 07:07  Mg     1.7                   I&O's Summary    24 Mar 2021 07:01  -  25 Mar 2021 07:00  --------------------------------------------------------  IN: 420 mL / OUT: 400 mL / NET: 20 mL      BNP    RADIOLOGY & ADDITIONAL STUDIES:    TELEMETRY: reviewed    EKG: reviewed         INTERVAL EVENTS: No o/n events. Patient continues to report dyspnea, though sx stable. Denies CP,  palpitations, presyncope, syncope, f/c/n/v.       REVIEW OF SYSTEMS:  Constitutional:     [X] negative [ ] fevers [ ] chills [ ] weight loss [ ] weight gain  HEENT:                  [X] negative [ ] dry eyes [ ] eye irritation [ ] postnasal drip [ ] nasal congestion  CV:                         [X] negative  [ ] chest pain [ ] orthopnea [ ] palpitations [ ] murmur  Resp:                     [ ] negative [ ] cough [X] shortness of breath  [ ] wheezing [ ] sputum [ ] hemoptysis  GI:                          [X] negative [ ] nausea [ ] vomiting [ ] diarrhea [ ] constipation [ ] abd pain [ ] dysphagia   :                        [X] negative [ ] dysuria [ ] nocturia [ ] hematuria [ ] increased urinary frequency  MSK:                      [X] negative [ ] back pain [ ] myalgias [ ] arthralgias [ ] fracture  Skin:                       [X] negative [ ] rash [ ] itch  Neuro:                   [X] negative [ ] headache [ ] dizziness [ ] syncope [ ] weakness [ ] numbness  Psych:                    [X] negative [ ] anxiety [ ] depression  Endo:                     [X] negative [ ] diabetes [ ] thyroid problem  Heme/Lymph:      [X] negative [ ] anemia [ ] bleeding problem  Allergic/Immune: [X] negative [ ] itchy eyes [ ] nasal discharge [ ] hives [ ] angioedema  [X] All other systems negative or otherwise described above.      PAST MEDICAL & SURGICAL HISTORY:  BPH (benign prostatic hypertrophy)  Hyperlipidemia  CAD (coronary artery disease)  Diabetes mellitus Type 2  HTN (hypertension)  S/P drug eluting coronary stent placement [facility]  S/P primary angioplasty with coronary stent 1990s      MEDICATIONS  (STANDING):  aspirin enteric coated 81 milliGRAM(s) Oral daily  dextrose 40% Gel 15 Gram(s) Oral once  dextrose 5%. 1000 milliLiter(s) (50 mL/Hr) IV Continuous <Continuous>  dextrose 5%. 1000 milliLiter(s) (100 mL/Hr) IV Continuous <Continuous>  dextrose 50% Injectable 25 Gram(s) IV Push once  dextrose 50% Injectable 12.5 Gram(s) IV Push once  dextrose 50% Injectable 25 Gram(s) IV Push once  finasteride 5 milliGRAM(s) Oral daily  furosemide    Tablet 20 milliGRAM(s) Oral daily  gabapentin 300 milliGRAM(s) Oral two times a day  glucagon  Injectable 1 milliGRAM(s) IntraMuscular once  insulin glargine Injectable (LANTUS) 7 Unit(s) SubCutaneous at bedtime  insulin lispro (ADMELOG) corrective regimen sliding scale   SubCutaneous three times a day before meals  insulin lispro (ADMELOG) corrective regimen sliding scale   SubCutaneous at bedtime  isosorbide   mononitrate ER Tablet (IMDUR) 120 milliGRAM(s) Oral daily  losartan 25 milliGRAM(s) Oral <User Schedule>  metoprolol tartrate 100 milliGRAM(s) Oral two times a day  pantoprazole    Tablet 40 milliGRAM(s) Oral before breakfast  ranolazine 1000 milliGRAM(s) Oral two times a day  rosuvastatin 5 milliGRAM(s) Oral at bedtime  tamsulosin 0.4 milliGRAM(s) Oral at bedtime    MEDICATIONS  (PRN):  guaiFENesin   Syrup  (Sugar-Free) 100 milliGRAM(s) Oral every 6 hours PRN Cough      ICU Vital Signs Last 24 Hrs  T(C): 36.8 (25 Mar 2021 04:12), Max: 36.8 (24 Mar 2021 20:30)  T(F): 98.2 (25 Mar 2021 04:12), Max: 98.2 (24 Mar 2021 20:30)  HR: 71 (25 Mar 2021 04:12) (71 - 76)  BP: 120/69 (25 Mar 2021 04:12) (105/65 - 120/69)  RR: 18 (25 Mar 2021 09:12) (18 - 18)  SpO2: 99% (25 Mar 2021 09:12) (97% - 99%)  Daily Weight in k.2 (25 Mar 2021 04:12)    PHYSICAL EXAM:  GEN: Awake, alert. NAD.   HEENT: NCAT, PERRL, EOMI. Mucosa moist. No JVD.  RESP:  few inspiratory rales  CV: RRR. Normal S1/S2. No m/r/g.  ABD: Soft. NT/ND. BS+  EXT: Warm. No edema, clubbing, or cyanosis.   NEURO: AAOx3. No focal deficits.       LABS:                      12.3   5.63  )-----------( 148      ( 25 Mar 2021 07:07 )             37.9       138  |  102  |  18  ----------------------------<  202<H>  3.6   |  25  |  0.97    Ca    9.1      25 Mar 2021 07:07  Mg     1.7           I&O's Summary  24 Mar 2021 07:01  -  25 Mar 2021 07:00  --------------------------------------------------------  IN: 420 mL / OUT: 400 mL / NET: 20 mL      TELE:   NSR      Xray Chest 1 View- PORTABLE-Urgent (Xray Chest 1 View- PORTABLE-Urgent .) (21 @ 16:55)   INTERPRETATION:  Chest one view    HISTORY: Shortness of breath    COMPARISON STUDY: 3/21/2021  Frontal expiratory view of the chest shows the heart to be similar in size. The lungs show similar pulmonary vascularity with progression of pleural effusions and there is no evidence of pneumothorax.    IMPRESSION:  Progression of effusions.  Thank you for the courtesy of this referral.    MINA PFEIFFER MD; Attending Interventional Radiologist  This document has been electronically signed. Mar 25 2021  9:16AM

## 2021-03-25 NOTE — DISCHARGE NOTE PROVIDER - HOSPITAL COURSE
74y M with PMH CAD s/p PCI to the LAD and RCA in the past, HTN, & HLD.  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    Patient w/ known severe 3 vessel coronary dz, including significant L main dz.  Pt was evaluated by neurology and neuro sx , since Pt cannot tolerate Heparin boluses for CABG , will not undergo CABG with admissions , the plan is to have a CT brain done in 1 month ,  Pt will be admitted for a heparin bolus and monitored , if no issues  with SDH  then  would pursue  for CABG .      74y M with PMH CAD s/p PCI to the LAD and RCA in the past, HTN, & HLD.  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    Patient w/ known severe 3 vessel coronary dz, including significant L main dz.  Pt was evaluated by neurology and neuro sx , since Pt cannot tolerate Heparin boluses for CABG , will not undergo CABG with admissions , the plan is to have a CT brain done in 1 month ,  Pt will be admitted for a heparin bolus and monitored , if no issues  with SDH  then  would pursue  for CABG .           74y M with PMH CAD s/p PCI to the LAD and RCA in the past, HTN, & HLD.  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    Patient w/ known severe 3 vessel coronary dz, including significant L main dz.    CXR c/w mild CHF; dyspnea resolved.      REC:  - c/w Lasix 20mg PO daily  - c/w Crestor 5 daily if no contraindication  - c/w losartan 25mg daily  - c/w ranolazine 1000 bid  - c/w ASA 81; appears to be tolerating well w/ no new neurological deficits appreciated; c/w monitoring neuro status   - monitor i/o  - continue low dose ASA.  Per neuro, for f/u head CT in early April to re-assess size of SDH.  If stable or improved, plan  Will consider heparin challenge at that time, and if tolerated, will plan on CABG.  - patient's prognosis remains guarded from a cardiac standpoint, given the severity of his CAD and significant co-morbid condition (SDH) which makes management, elvia. anti-clotting therapy much more complex and with more risk.     74y M with PMH CAD s/p PCI to the LAD and RCA in the past, HTN, & HLD.  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    Patient w/ known severe 3 vessel coronary dz, including significant L main dz.  Pt was evaluated by neurology and neuro sx , since Pt cannot tolerate Heparin boluses for CABG , will not undergo CABG with admissions , the plan is to have a CT brain done in 1 month ,  Pt will be admitted for a heparin bolus and monitored , if no issues  with SDH  then  would pursue  for CABG .           74y M with PMH CAD s/p PCI to the LAD and RCA in the past, HTN, & HLD.  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    Patient w/ known severe 3 vessel coronary dz, including significant L main dz.    CXR c/w mild CHF; dyspnea resolved.      REC:  - c/w Lasix 20mg PO daily  - c/w Crestor 5 daily if no contraindication  - c/w losartan 25mg daily  - c/w ranolazine 1000 bid  - c/w ASA 81; appears to be tolerating well w/ no new neurological deficits appreciated; c/w monitoring neuro status   - monitor i/o  - continue low dose ASA.  Per neuro, for f/u head CT in early April to re-assess size of SDH.  If stable or improved, plan  Will consider heparin challenge at that time, and if tolerated, will plan on CABG.  - patient's prognosis remains guarded from a cardiac standpoint, given the severity of his CAD and significant co-morbid condition (SDH) which makes management, elvia. anti-clotting therapy much more complex and with more risk.        *******NEED TO COMPLETE*******    DCP and medication reconsiliation discussed with Dr Cody and in agreement. Patient is hemodynamically stable and cleared for discharge. Patient will follow up with PMD, cardiology, Neurology. 74y M with PMH CAD s/p PCI to the LAD and RCA in the past, HTN, & HLD.  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    Patient w/ known severe 3 vessel coronary dz, including significant L main dz.  Pt was evaluated by neurology and neuro sx , since Pt cannot tolerate Heparin boluses for CABG , will not undergo CABG with admissions , the plan is to have a CT brain done in 1 month ,  Pt admitted for a heparin bolus and monitored , if no issues  with SDH  then  would pursue  for CABG .        REC:  - c/w Lasix 20mg PO daily  - c/w Crestor 5 daily if no contraindication  - c/w losartan 25mg daily  - c/w ranolazine 1000 bid  - c/w ASA 81; appears to be tolerating well w/ no new neurological deficits appreciated; c/w monitoring neuro status   - monitor i/o  - continue low dose ASA.  Per neuro, for f/u head CT in early April to re-assess size of SDH.  If stable or improved, plan  Will consider heparin challenge at that time, and if tolerated, will plan on CABG.    DCP and medication reconsiliation discussed with Dr Cody and in agreement. Patient is hemodynamically stable and cleared for discharge. Patient will follow up with PMD, cardiology, Neurology.

## 2021-03-25 NOTE — DISCHARGE NOTE PROVIDER - CARE PROVIDER_API CALL
Wilver Carmichael (MD)  Cardiovascular Disease  [address], [place] [id number]  Phone: [phone]  Fax: [phone]  Follow Up Time:     Felipe Camargo)  Neurosurgery  [address], [address] [place] [address]  Phone: [phone]  Fax: [phone]  Follow Up Time:    Wilver Carmichael (MD)  Cardiovascular Disease  [address] [place], [place] [id number]  Phone: [phone]  Fax: [phone]  Follow Up Time:     Felipe Camargo)  Neurosurgery  [address], [address] [place] [address]  Phone: [phone]  Fax: [phone]  Follow Up Time:     PUJA DUVALL  Physical Medicine and Rehabilitation  Phone: [phone]  Follow Up Time: 1 week

## 2021-03-25 NOTE — DISCHARGE NOTE PROVIDER - NSDCCPCAREPLAN_GEN_ALL_CORE_FT
PRINCIPAL DISCHARGE DIAGNOSIS  Diagnosis: ACS (acute coronary syndrome)  Assessment and Plan of Treatment: c/w asa 81 mg   c/w Crestor   c/w Losartan and Betablockers   Make sure to keep appointments with doctor for cardiac follow up care        SECONDARY DISCHARGE DIAGNOSES  Diagnosis: Type 2 diabetes mellitus with hyperglycemia, with long-term current use of insulin  Assessment and Plan of Treatment: HgA1C this admission.  Make sure you get your HgA1c checked every three months.  If you take oral diabetes medications, check your blood glucose two times a day.  If you take insulin, check your blood glucose before meals and at bedtime.  It's important not to skip any meals.  Keep a log of your blood glucose results and always take it with you to your doctor appointments.  Keep a list of your current medications including injectables and over the counter medications and bring this medication list with you to all your doctor appointments.  If you have not seen your opthalmologist this year call for appointment.  Check your feet daily for redness, sores, or openings. Do not self treat. If no improvement in two days call your primary care physician for an appointment.  Low blood sugar (hypoglycemia) is a blood sugar below 70mg/dl. Check your blood sugar if you feel signs/symptoms of hypoglycemia. If your blood sugar is below 70 take 15 grams of carbohydrates (ex 4 oz of apple juice, 3-4 glucosr tablets, or 4-6 oz of regular soda) wait 15 minutes and repeat blood sugar to make sure it comes up above 70.  If your blood sugar is above 70 and you are due for a meal, have a meal.  If you are not due for a meal have a snack.  This snack helps keeps your blood sugar at a safe range.      Diagnosis: Chronic subdural hematoma  Assessment and Plan of Treatment: CT brain in 1 month   follow up with DR Camargo and DR García     PRINCIPAL DISCHARGE DIAGNOSIS  Diagnosis: ACS (acute coronary syndrome)  Assessment and Plan of Treatment: c/w asa 81 mg   c/w Crestor   c/w Losartan and Betablockers   Make sure to keep appointments with doctor for cardiac follow up care        SECONDARY DISCHARGE DIAGNOSES  Diagnosis: Type 2 diabetes mellitus with hyperglycemia, with long-term current use of insulin  Assessment and Plan of Treatment: HgA1C on 2/24/21 was 7.1  Make sure you get your HgA1c checked every three months.  If you take insulin, check your blood glucose before meals and at bedtime.  It's important not to skip any meals.  Keep a log of your blood glucose results and always take it with you to your doctor appointments.  Keep a list of your current medications including injectables and over the counter medications and bring this medication list with you to all your doctor appointments.  If you have not seen your opthalmologist this year call for appointment.  Check your feet daily for redness, sores, or openings. Do not self treat. If no improvement in two days call your primary care physician for an appointment.  Low blood sugar (hypoglycemia) is a blood sugar below 70mg/dl. Check your blood sugar if you feel signs/symptoms of hypoglycemia. If your blood sugar is below 70 take 15 grams of carbohydrates (ex 4 oz of apple juice, 3-4 glucosr tablets, or 4-6 oz of regular soda) wait 15 minutes and repeat blood sugar to make sure it comes up above 70.  If your blood sugar is above 70 and you are due for a meal, have a meal.  If you are not due for a meal have a snack.  This snack helps keeps your blood sugar at a safe range.      Diagnosis: Chronic subdural hematoma  Assessment and Plan of Treatment: CT brain in 1 month   follow up with DR Camargo and DR García

## 2021-03-25 NOTE — DIETITIAN INITIAL EVALUATION ADULT. - ADD RECOMMEND
continue Consistent Carbohydrate/DASH diet. placed pending verification to add Glucerna x 1 daily. provided diabetes diet ed-monitor need for reinforcement.

## 2021-03-25 NOTE — DIETITIAN INITIAL EVALUATION ADULT. - PROBLEM SELECTOR PLAN 5
Transitions of Care Status:  1.  Name of PCP:      Markell Cross MD (PCP) [phone]  2.  PCP Contacted on Admission: [ ] Y    [x ] N    3.  PCP contacted at Discharge: [ ] Y    [ ] N    [ ] N/A  4.  Post-Discharge Appointment Date and Location:  5.  Summary of Handoff given to PCP:

## 2021-03-26 ENCOUNTER — APPOINTMENT (OUTPATIENT)
Dept: NEUROSURGERY | Facility: CLINIC | Age: 76
End: 2021-03-26

## 2021-03-26 VITALS
TEMPERATURE: 97 F | RESPIRATION RATE: 18 BRPM | SYSTOLIC BLOOD PRESSURE: 114 MMHG | OXYGEN SATURATION: 96 % | DIASTOLIC BLOOD PRESSURE: 67 MMHG | HEART RATE: 65 BPM

## 2021-03-26 LAB
ANION GAP SERPL CALC-SCNC: 11 MMOL/L — SIGNIFICANT CHANGE UP (ref 5–17)
BUN SERPL-MCNC: 18 MG/DL — SIGNIFICANT CHANGE UP (ref 7–23)
CALCIUM SERPL-MCNC: 9.2 MG/DL — SIGNIFICANT CHANGE UP (ref 8.4–10.5)
CHLORIDE SERPL-SCNC: 100 MMOL/L — SIGNIFICANT CHANGE UP (ref 96–108)
CO2 SERPL-SCNC: 26 MMOL/L — SIGNIFICANT CHANGE UP (ref 22–31)
CREAT SERPL-MCNC: 0.94 MG/DL — SIGNIFICANT CHANGE UP (ref 0.5–1.3)
GLUCOSE BLDC GLUCOMTR-MCNC: 195 MG/DL — HIGH (ref 70–99)
GLUCOSE BLDC GLUCOMTR-MCNC: 350 MG/DL — HIGH (ref 70–99)
GLUCOSE SERPL-MCNC: 163 MG/DL — HIGH (ref 70–99)
HCT VFR BLD CALC: 37.8 % — LOW (ref 39–50)
HGB BLD-MCNC: 12.6 G/DL — LOW (ref 13–17)
MAGNESIUM SERPL-MCNC: 1.9 MG/DL — SIGNIFICANT CHANGE UP (ref 1.6–2.6)
MCHC RBC-ENTMCNC: 30.1 PG — SIGNIFICANT CHANGE UP (ref 27–34)
MCHC RBC-ENTMCNC: 33.3 GM/DL — SIGNIFICANT CHANGE UP (ref 32–36)
MCV RBC AUTO: 90.2 FL — SIGNIFICANT CHANGE UP (ref 80–100)
NRBC # BLD: 0 /100 WBCS — SIGNIFICANT CHANGE UP (ref 0–0)
PHOSPHATE SERPL-MCNC: 2.7 MG/DL — SIGNIFICANT CHANGE UP (ref 2.5–4.5)
PLATELET # BLD AUTO: 145 K/UL — LOW (ref 150–400)
POTASSIUM SERPL-MCNC: 3.5 MMOL/L — SIGNIFICANT CHANGE UP (ref 3.5–5.3)
POTASSIUM SERPL-SCNC: 3.5 MMOL/L — SIGNIFICANT CHANGE UP (ref 3.5–5.3)
RBC # BLD: 4.19 M/UL — LOW (ref 4.2–5.8)
RBC # FLD: 12.7 % — SIGNIFICANT CHANGE UP (ref 10.3–14.5)
SODIUM SERPL-SCNC: 137 MMOL/L — SIGNIFICANT CHANGE UP (ref 135–145)
WBC # BLD: 4.84 K/UL — SIGNIFICANT CHANGE UP (ref 3.8–10.5)
WBC # FLD AUTO: 4.84 K/UL — SIGNIFICANT CHANGE UP (ref 3.8–10.5)

## 2021-03-26 PROCEDURE — 87635 SARS-COV-2 COVID-19 AMP PRB: CPT

## 2021-03-26 PROCEDURE — 85730 THROMBOPLASTIN TIME PARTIAL: CPT

## 2021-03-26 PROCEDURE — 71046 X-RAY EXAM CHEST 2 VIEWS: CPT

## 2021-03-26 PROCEDURE — 81003 URINALYSIS AUTO W/O SCOPE: CPT

## 2021-03-26 PROCEDURE — 83735 ASSAY OF MAGNESIUM: CPT

## 2021-03-26 PROCEDURE — 82435 ASSAY OF BLOOD CHLORIDE: CPT

## 2021-03-26 PROCEDURE — 85025 COMPLETE CBC W/AUTO DIFF WBC: CPT

## 2021-03-26 PROCEDURE — 85018 HEMOGLOBIN: CPT

## 2021-03-26 PROCEDURE — 80048 BASIC METABOLIC PNL TOTAL CA: CPT

## 2021-03-26 PROCEDURE — 84132 ASSAY OF SERUM POTASSIUM: CPT

## 2021-03-26 PROCEDURE — 85610 PROTHROMBIN TIME: CPT

## 2021-03-26 PROCEDURE — 82962 GLUCOSE BLOOD TEST: CPT

## 2021-03-26 PROCEDURE — 94640 AIRWAY INHALATION TREATMENT: CPT

## 2021-03-26 PROCEDURE — 83605 ASSAY OF LACTIC ACID: CPT

## 2021-03-26 PROCEDURE — 84484 ASSAY OF TROPONIN QUANT: CPT

## 2021-03-26 PROCEDURE — 82803 BLOOD GASES ANY COMBINATION: CPT

## 2021-03-26 PROCEDURE — 82947 ASSAY GLUCOSE BLOOD QUANT: CPT

## 2021-03-26 PROCEDURE — 82330 ASSAY OF CALCIUM: CPT

## 2021-03-26 PROCEDURE — 85014 HEMATOCRIT: CPT

## 2021-03-26 PROCEDURE — 84295 ASSAY OF SERUM SODIUM: CPT

## 2021-03-26 PROCEDURE — 84100 ASSAY OF PHOSPHORUS: CPT

## 2021-03-26 PROCEDURE — 93005 ELECTROCARDIOGRAM TRACING: CPT

## 2021-03-26 PROCEDURE — 97162 PT EVAL MOD COMPLEX 30 MIN: CPT

## 2021-03-26 PROCEDURE — 70450 CT HEAD/BRAIN W/O DYE: CPT

## 2021-03-26 PROCEDURE — 99232 SBSQ HOSP IP/OBS MODERATE 35: CPT | Mod: GC

## 2021-03-26 PROCEDURE — 70450 CT HEAD/BRAIN W/O DYE: CPT | Mod: 26

## 2021-03-26 PROCEDURE — 99285 EMERGENCY DEPT VISIT HI MDM: CPT

## 2021-03-26 PROCEDURE — 93306 TTE W/DOPPLER COMPLETE: CPT

## 2021-03-26 PROCEDURE — 71045 X-RAY EXAM CHEST 1 VIEW: CPT

## 2021-03-26 PROCEDURE — 0031A: CPT

## 2021-03-26 PROCEDURE — 83880 ASSAY OF NATRIURETIC PEPTIDE: CPT

## 2021-03-26 PROCEDURE — U0003: CPT

## 2021-03-26 PROCEDURE — U0005: CPT

## 2021-03-26 PROCEDURE — 80053 COMPREHEN METABOLIC PANEL: CPT

## 2021-03-26 PROCEDURE — 82550 ASSAY OF CK (CPK): CPT

## 2021-03-26 PROCEDURE — 93880 EXTRACRANIAL BILAT STUDY: CPT

## 2021-03-26 PROCEDURE — 93010 ELECTROCARDIOGRAM REPORT: CPT

## 2021-03-26 PROCEDURE — 86769 SARS-COV-2 COVID-19 ANTIBODY: CPT

## 2021-03-26 PROCEDURE — 85027 COMPLETE CBC AUTOMATED: CPT

## 2021-03-26 RX ORDER — LOSARTAN POTASSIUM 100 MG/1
1 TABLET, FILM COATED ORAL
Qty: 30 | Refills: 0
Start: 2021-03-26 | End: 2021-04-24

## 2021-03-26 RX ORDER — DULOXETINE HYDROCHLORIDE 30 MG/1
1 CAPSULE, DELAYED RELEASE ORAL
Qty: 30 | Refills: 0
Start: 2021-03-26 | End: 2021-04-24

## 2021-03-26 RX ORDER — DULOXETINE HYDROCHLORIDE 30 MG/1
20 CAPSULE, DELAYED RELEASE ORAL AT BEDTIME
Refills: 0 | Status: DISCONTINUED | OUTPATIENT
Start: 2021-03-26 | End: 2021-03-26

## 2021-03-26 RX ORDER — INSULIN GLARGINE 100 [IU]/ML
32 INJECTION, SOLUTION SUBCUTANEOUS
Qty: 0 | Refills: 0 | DISCHARGE

## 2021-03-26 RX ORDER — ROSUVASTATIN CALCIUM 5 MG/1
1 TABLET ORAL
Qty: 30 | Refills: 0
Start: 2021-03-26 | End: 2021-04-24

## 2021-03-26 RX ORDER — JNJ-78436735 50000000000 [PFU]/.5ML
0.5 SUSPENSION INTRAMUSCULAR ONCE
Refills: 0 | Status: COMPLETED | OUTPATIENT
Start: 2021-03-26 | End: 2021-03-26

## 2021-03-26 RX ORDER — ASPIRIN/CALCIUM CARB/MAGNESIUM 324 MG
1 TABLET ORAL
Qty: 30 | Refills: 0
Start: 2021-03-26 | End: 2021-04-24

## 2021-03-26 RX ORDER — ATORVASTATIN CALCIUM 80 MG/1
1 TABLET, FILM COATED ORAL
Qty: 0 | Refills: 0 | DISCHARGE

## 2021-03-26 RX ORDER — POTASSIUM CHLORIDE 20 MEQ
40 PACKET (EA) ORAL ONCE
Refills: 0 | Status: COMPLETED | OUTPATIENT
Start: 2021-03-26 | End: 2021-03-26

## 2021-03-26 RX ORDER — INSULIN GLARGINE 100 [IU]/ML
20 INJECTION, SOLUTION SUBCUTANEOUS
Qty: 0 | Refills: 0 | DISCHARGE
Start: 2021-03-26

## 2021-03-26 RX ORDER — RANOLAZINE 500 MG/1
1 TABLET, FILM COATED, EXTENDED RELEASE ORAL
Qty: 60 | Refills: 0
Start: 2021-03-26 | End: 2021-04-24

## 2021-03-26 RX ORDER — RANOLAZINE 500 MG/1
1 TABLET, FILM COATED, EXTENDED RELEASE ORAL
Qty: 0 | Refills: 0 | DISCHARGE

## 2021-03-26 RX ORDER — MAGNESIUM SULFATE 500 MG/ML
1 VIAL (ML) INJECTION ONCE
Refills: 0 | Status: COMPLETED | OUTPATIENT
Start: 2021-03-26 | End: 2021-03-26

## 2021-03-26 RX ORDER — POTASSIUM CHLORIDE 20 MEQ
1 PACKET (EA) ORAL
Qty: 30 | Refills: 0
Start: 2021-03-26 | End: 2021-04-24

## 2021-03-26 RX ORDER — FUROSEMIDE 40 MG
1 TABLET ORAL
Qty: 30 | Refills: 0
Start: 2021-03-26 | End: 2021-04-24

## 2021-03-26 RX ADMIN — GABAPENTIN 300 MILLIGRAM(S): 400 CAPSULE ORAL at 06:13

## 2021-03-26 RX ADMIN — Medication 40 MILLIEQUIVALENT(S): at 08:40

## 2021-03-26 RX ADMIN — ISOSORBIDE MONONITRATE 120 MILLIGRAM(S): 60 TABLET, EXTENDED RELEASE ORAL at 11:52

## 2021-03-26 RX ADMIN — Medication 81 MILLIGRAM(S): at 11:47

## 2021-03-26 RX ADMIN — Medication 4: at 12:33

## 2021-03-26 RX ADMIN — PANTOPRAZOLE SODIUM 40 MILLIGRAM(S): 20 TABLET, DELAYED RELEASE ORAL at 06:13

## 2021-03-26 RX ADMIN — Medication 100 MILLIGRAM(S): at 06:13

## 2021-03-26 RX ADMIN — Medication 100 GRAM(S): at 08:40

## 2021-03-26 RX ADMIN — JNJ-78436735 0.5 MILLILITER(S): 50000000000 SUSPENSION INTRAMUSCULAR at 13:49

## 2021-03-26 RX ADMIN — FINASTERIDE 5 MILLIGRAM(S): 5 TABLET, FILM COATED ORAL at 11:47

## 2021-03-26 RX ADMIN — LOSARTAN POTASSIUM 25 MILLIGRAM(S): 100 TABLET, FILM COATED ORAL at 10:36

## 2021-03-26 RX ADMIN — Medication 20 MILLIGRAM(S): at 06:13

## 2021-03-26 RX ADMIN — Medication 1: at 09:08

## 2021-03-26 NOTE — PROVIDER CONTACT NOTE (OTHER) - BACKGROUND
Pt admitted for acute ischemic heart disease
Dx - Dyspnea in setting of multivessel disease  Hx - DM, subdural hematoma s/p embolization this march, HTN, CAD, stent in the past
Dx - Dyspnea in setting of multivessel disease  Hx - DM, subdural hematoma s/p embolization this march, HTN, CAD, stent in the past
admitted for multi vessel disease,SDH. CAD, HTN, Dyspnoea
admitted for multivessel disease, , CAD, bilateral pleural effusion
admitted with dyspnea with multiple vessel disease
Patient admitted for dyspnea in setting of multivessel disease. PMH significant for DM2, CAD, BPH, HTN, and HLD.
admitted for multivessel disease, CAD, HTN, Bilateral pleural effusion

## 2021-03-26 NOTE — CHART NOTE - NSCHARTNOTESELECT_GEN_ALL_CORE
chest pain/Event Note
risk benefit AC/Event Note
secretions/Event Note
Chest Pain/Event Note
Event Note
Event Note
NSVT/Event Note
PAT/Event Note

## 2021-03-26 NOTE — PROVIDER CONTACT NOTE (OTHER) - NAME OF MD/NP/PA/DO NOTIFIED:
Rula Lao, NP
BERHANE Snyder
BERHANE Snyder
Cathy KERR
Santa Rodriguez NP
USHA Pascual
Venita Velasco
Santa Rodriguez NP

## 2021-03-26 NOTE — PROGRESS NOTE ADULT - PROVIDER SPECIALTY LIST ADULT
Hospitalist
Neurology
CT Surgery
Cardiology
Cardiology
Neurology
Neurosurgery
Neurosurgery
Cardiology
Hospitalist
Neurology
Neurology
Cardiology
Hospitalist
Hospitalist
Neurology

## 2021-03-26 NOTE — PROVIDER CONTACT NOTE (OTHER) - REASON
pt c/o difficulty breathing
Pt had 24 beats wide complex on telemetry
4 beats of WCT on tele HR up to 160s
5 beats of WCT on tele monitoring
c/o getting up from sleep feeling "airway is blocked" and requested Proventil
chest pain
pt c/o chest pain
PAT's 150 3.4 sec

## 2021-03-26 NOTE — PROVIDER CONTACT NOTE (OTHER) - DATE AND TIME:
26-Mar-2021 02:01
20-Mar-2021 22:55
21-Mar-2021 08:00
21-Mar-2021 10:28
22-Mar-2021 02:56
24-Mar-2021 04:00
21-Mar-2021 06:30
24-Mar-2021 16:16

## 2021-03-26 NOTE — PROGRESS NOTE ADULT - ASSESSMENT
74y M with PMH CAD s/p PCI to the LAD and RCA in the past, HTN, & HLD.  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    Patient w/ known severe 3 vessel coronary dz, including significant L main dz.    Complaining of dyspnea; CXR c/w mild CHF.      REC:  - c/w Lasix 20mg PO daily  - c/w Crestor 5 daily if no contraindication  - c/w losartan 25mg daily  - c/w ranolazine 1000 bid  - c/w ASA 81; appears to be tolerating well w/ no new neurological deficits appreciated; c/w monitoring neuro status   - monitor i/o  - continue low dose ASA.  Will arrange for f/u head CT in early April to re-assess size of SDH.  If stable or improved, plan  Will consider heparin challenge at that time, and if tolerated, will plan on CABG.  - patient's prognosis remains guarded from a cardiac standpoint, given the severity of his CAD and significant co-morbid condition (SDH) which makes management, elvia. anti-clotting therapy much more complex and with more risk.        DESTINY Barrett M.D.  Cardiology fellow  Text or Call: [phone]   74y M with PMH CAD s/p PCI to the LAD and RCA in the past, HTN, & HLD.  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    Patient w/ known severe 3 vessel coronary dz, including significant L main dz.    CXR c/w mild CHF; dyspnea resolved.      REC:  - c/w Lasix 20mg PO daily  - c/w Crestor 5 daily if no contraindication  - c/w losartan 25mg daily  - c/w ranolazine 1000 bid  - c/w ASA 81; appears to be tolerating well w/ no new neurological deficits appreciated; c/w monitoring neuro status   - monitor i/o  - continue low dose ASA.  Per neuro, for f/u head CT in early April to re-assess size of SDH.  If stable or improved, plan  Will consider heparin challenge at that time, and if tolerated, will plan on CABG.  - patient's prognosis remains guarded from a cardiac standpoint, given the severity of his CAD and significant co-morbid condition (SDH) which makes management, elvia. anti-clotting therapy much more complex and with more risk.        DESTINY Barrett M.D.  Cardiology fellow  Text or Call: [phone]    Plan discussed with cardiology fellow; patient seen and examined.       I was physically present for the key portions of the evaluation and management (E/M) service provided.    I agree with the above history, physical, and plan which I have reviewed and edited where appropriate.   Markell Rosado M.D.  Cardiology Attending, Consult Service    For Cardiology consults and questions, all Cardiology service information can be found 24/7 on [email or web address], password: [email or web address]    74y M with PMH CAD s/p PCI to the LAD and RCA in the past, HTN, & HLD.  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    Patient w/ known severe 3 vessel coronary dz, including significant L main dz.    CXR c/w mild CHF; dyspnea resolved.      REC:  - c/w Lasix 20mg PO daily  - c/w Crestor 5 daily if no contraindication  - c/w losartan 25mg daily  - c/w ranolazine 1000 bid  - c/w ASA 81; appears to be tolerating well w/ no new neurological deficits appreciated; c/w monitoring neuro status   - monitor i/o  - continue low dose ASA.  Per neuro, for f/u head CT in early April to re-assess size of SDH.  If stable or improved, plan  Will consider heparin challenge at that time, and if tolerated, will plan on CABG.  - patient's prognosis remains guarded from a cardiac standpoint, given the severity of his CAD and significant co-morbid condition (SDH) which makes management, elvia. anti-clotting therapy much more complex and with more risk.        Discussed with Dr. Carmichael; discussed with SONIA Barrett M.D.  Cardiology fellow  Text or Call: [phone]    Plan discussed with cardiology fellow; patient seen and examined.       I was physically present for the key portions of the evaluation and management (E/M) service provided.    I agree with the above history, physical, and plan which I have reviewed and edited where appropriate.   Markell Rosado M.D.  Cardiology Attending, Consult Service    For Cardiology consults and questions, all Cardiology service information can be found 24/7 on [email or web address], password: [email or web address]

## 2021-03-26 NOTE — PROGRESS NOTE ADULT - SUBJECTIVE AND OBJECTIVE BOX
[facility]      Seen earlier today, and examined.  - Today, patient is without complaints.           *****MEDICATIONS: Current medication reviewed and documented.    MEDICATIONS  (STANDING):  aspirin enteric coated 81 milliGRAM(s) Oral daily  dextrose 40% Gel 15 Gram(s) Oral once  dextrose 5%. 1000 milliLiter(s) (50 mL/Hr) IV Continuous <Continuous>  dextrose 5%. 1000 milliLiter(s) (100 mL/Hr) IV Continuous <Continuous>  dextrose 50% Injectable 25 Gram(s) IV Push once  dextrose 50% Injectable 12.5 Gram(s) IV Push once  dextrose 50% Injectable 25 Gram(s) IV Push once  DULoxetine 20 milliGRAM(s) Oral at bedtime  finasteride 5 milliGRAM(s) Oral daily  furosemide   Injectable 20 milliGRAM(s) IV Push daily  gabapentin 300 milliGRAM(s) Oral two times a day  glucagon  Injectable 1 milliGRAM(s) IntraMuscular once  insulin glargine Injectable (LANTUS) 20 Unit(s) SubCutaneous at bedtime  insulin lispro (ADMELOG) corrective regimen sliding scale   SubCutaneous three times a day before meals  insulin lispro (ADMELOG) corrective regimen sliding scale   SubCutaneous at bedtime  isosorbide   mononitrate ER Tablet (IMDUR) 120 milliGRAM(s) Oral daily  losartan 25 milliGRAM(s) Oral <User Schedule>  metoprolol tartrate 100 milliGRAM(s) Oral two times a day  pantoprazole    Tablet 40 milliGRAM(s) Oral before breakfast  ranolazine 1000 milliGRAM(s) Oral two times a day  rosuvastatin 5 milliGRAM(s) Oral at bedtime  tamsulosin 0.4 milliGRAM(s) Oral at bedtime    MEDICATIONS  (PRN):  guaiFENesin   Syrup  (Sugar-Free) 100 milliGRAM(s) Oral every 6 hours PRN Cough          ***** VITAL SIGNS:  T(F): 97.3 (21 @ 11:24), Max: 98.5 (21 @ 04:07)  HR: 65 (21 @ 11:24) (65 - 73)  BP: 114/67 (21 @ 11:24) (114/67 - 128/68)  RR: 18 (21 @ 11:24) (18 - 18)  SpO2: 96% (21 @ 11:24) (96% - 97%)  Wt(kg): --  ,   I&O's Summary    25 Mar 2021 07:  -  26 Mar 2021 07:00  --------------------------------------------------------  IN: 420 mL / OUT: 350 mL / NET: 70 mL    26 Mar 2021 07:01  -  26 Mar 2021 15:43  --------------------------------------------------------  IN: 660 mL / OUT: 0 mL / NET: 660 mL             *****PHYSICAL EXAM:   alert oriented x 3 attention comprehension are fair.  Able to name, repeat.   EOmi fundi not visualized   no nystagmus VFF to confrontation  Tongue is midline  Palate elevates symmetrically   Moving all 4 ext spontaneously no drift appreciated    Gait not assessed.            *****LAB AND IMAGIN.6   4.84  )-----------( 145      ( 26 Mar 2021 06:18 )             37.8               -26    137  |  100  |  18  ----------------------------<  163<H>  3.5   |  26  |  0.94    Ca    9.2      26 Mar 2021 06:18  Phos  2.7     -  Mg     1.9                                [All pertinent recent Imaging/Reports reviewed]           *****A S S E S S M E N T   A N D   P L A N :          77 y/o M--history and Medex review from patient and from patient's adult daughter above by phone--patient with a history of CAD with PCI to the LAD and RCA (last in ) and known three vessel disease, essential HTN, chronic subdural haematoma from last year from a fall in his driveway, S/P embolization of the middle meningeal artery, recently discharged from [facility] on 2021, with recent cardiac catheterization in 2021 with patient presently unable to pursue CABG due to inability to anticoagulation in the context of recent arterial embolization.     Problem/Recommendations 1: chronic sdh   s/p mma embolization   ct stable     discussed with ct surgery and cardiology in detail  at this point due to high risk as per neurosurgery for neurological complication, to defer surgery until there is definite change in the size of the subdural which may take upto  3 mos.  When he is deemed to be a surgical candidate, plan is to challenge with heparin gtt in an inpt setting and  monitor with ct head scan   Cardiology d/w Vasyl, recommend medical management      neurosurgery input d/w , at this point, there is mild reduction in size of subdural and expect further changes.     continue asa 81 repeat head ct on day of discharge     neurosurgery/cardiology aware   ct head repeated 2 days after initiation of asa rx without any changes, d/wDr. Andrews of neuroradiology   discussed results of ct with Mrs Guillaume   d/w Pa          Problem/Recommendations 2: chest pain   defer to card/ct surgery          Problem/Recommendations 3: neuropathy   pt currently on gabapentin 300 bid  wife requesting alternative regimen with cymbala 20 mg   Thank you for allowing me to participate in the care of this patient. Will continue to follow patient periodically. Please do not hesitate to call me if you have any  questions or if there has been a change in patients neurological status     ________________  Elissa García MD  [facility] ([facility]  [phone]      33 minutes spent on total encounter; more than 50 % of the visit was  spent counseling about plan of care, compliance to diet/exercise and medication regimen and or  coordinating care by the attending physician.      It is advised that stroke patients follow up with BREHANE Garrett @ [phone] in 1- 2 weeks.   Others please follow up with Dr. Michael Nissenbaum [phone]

## 2021-03-26 NOTE — PROVIDER CONTACT NOTE (OTHER) - ACTION/TREATMENT ORDERED:
NP Rula johnson, EKG ordered. Will continue to monitor pt. BERHANE Horta aware, EKG and AM labs ordered BMP, CBC, MAG, and PHOS. Will continue to monitor pt.

## 2021-03-26 NOTE — PROGRESS NOTE ADULT - REASON FOR ADMISSION
One day of dyspnoea.

## 2021-03-26 NOTE — PROVIDER CONTACT NOTE (OTHER) - SITUATION
PAT's 150 3.4 sec
pt c/o difficulty breathing
Pt had 24 beats wide complex on telemetry
4 beats of WCT on tele HR up to 160s
5 beats of WCT on tele monitoring
c/o getting up from sleep feeling "airway is blocked" and requested Proventil
pt c/o chest pain. 8/10, non radiating
pt c/o lt side chest pain, not radiating, 7/10 level

## 2021-03-26 NOTE — PROVIDER CONTACT NOTE (OTHER) - RECOMMENDATIONS
Nitroglycerin 0.4 mg sublingual given , Approved by Tyrone KERR
meds? Proventil?
to assess pt. EKG, cardiac enzymes
NP to review pt chart?
Review chart. Continue to monitor.
to assess pt

## 2021-03-26 NOTE — PROVIDER CONTACT NOTE (OTHER) - ASSESSMENT
B/P 112/65, HR 80, pox 100%
Patient A&Ox4 Denies chest pain and palpitations.  /61 HR 76 Spo2 98 room air  RR 18
Patient AOx4. Denies chest pain, denies palpitation. Patient with complains of dyspnea (been having intermittently since admission, not new, and not worse than what patient has been experiencing). O2 99% on 2L NC. /65, HR 60-70 sinus, RR 18, Temp 97.4 F.
Patient Aox4. Denies chest pain. Feels a little better with his dyspnea. Temp 97.6 F, /63, HR 68 sinus, RR 18, O2 99% on 2L NC.
VSS. pox 99%-100% on 1 L NC, pt. states "has felt like this way at home in past"
pain level 8/10, VS stable,
pt has slight dyspnoea, ON 2L N/C oxygen. saturating 97% to100% . pt is on contineous oxygen monitoring
Pt is A&ox4. VSS. Pt denies any chest pain/discomfort. Pt was asleep at time of ectopy.

## 2021-03-26 NOTE — PROGRESS NOTE ADULT - SUBJECTIVE AND OBJECTIVE BOX
INTERVAL EVENTS: Patient reports feeling well and desires to be discharged.     REVIEW OF SYSTEMS:  Constitutional:     [X] negative [ ] fevers [ ] chills [ ] weight loss [ ] weight gain  HEENT:                  [X] negative [ ] dry eyes [ ] eye irritation [ ] postnasal drip [ ] nasal congestion  CV:                         [X] negative  [ ] chest pain [ ] orthopnea [ ] palpitations [ ] murmur  Resp:                     [ ] negative [ ] cough [X] shortness of breath [ ] wheezing [ ] sputum [ ] hemoptysis  GI:                          [X] negative [ ] nausea [ ] vomiting [ ] diarrhea [ ] constipation [ ] abd pain [ ] dysphagia   :                        [X] negative [ ] dysuria [ ] nocturia [ ] hematuria [ ] increased urinary frequency  MSK:                      [X] negative [ ] back pain [ ] myalgias [ ] arthralgias [ ] fracture  Skin:                       [X] negative [ ] rash [ ] itch  Neuro:                   [X] negative [ ] headache [ ] dizziness [ ] syncope [ ] weakness [ ] numbness  Psych:                    [X] negative [ ] anxiety [ ] depression  Endo:                     [X] negative [ ] diabetes [ ] thyroid problem  Heme/Lymph:      [X] negative [ ] anemia [ ] bleeding problem  Allergic/Immune: [X] negative [ ] itchy eyes [ ] nasal discharge [ ] hives [ ] angioedema    [X] All other systems negative or otherwise described above.  [ ] Unable to assess ROS because ________.    PAST MEDICAL & SURGICAL HISTORY:  BPH (benign prostatic hypertrophy)    Hyperlipidemia    CAD (coronary artery disease)    Diabetes mellitus  Type 2    HTN (hypertension)    S/P drug eluting coronary stent placement  [facility]    S/P primary angioplasty with coronary stent  1990s      MEDICATIONS  (STANDING):  aspirin enteric coated 81 milliGRAM(s) Oral daily  dextrose 40% Gel 15 Gram(s) Oral once  dextrose 5%. 1000 milliLiter(s) (50 mL/Hr) IV Continuous <Continuous>  dextrose 5%. 1000 milliLiter(s) (100 mL/Hr) IV Continuous <Continuous>  dextrose 50% Injectable 25 Gram(s) IV Push once  dextrose 50% Injectable 12.5 Gram(s) IV Push once  dextrose 50% Injectable 25 Gram(s) IV Push once  finasteride 5 milliGRAM(s) Oral daily  furosemide   Injectable 20 milliGRAM(s) IV Push daily  gabapentin 300 milliGRAM(s) Oral two times a day  glucagon  Injectable 1 milliGRAM(s) IntraMuscular once  insulin glargine Injectable (LANTUS) 20 Unit(s) SubCutaneous at bedtime  insulin lispro (ADMELOG) corrective regimen sliding scale   SubCutaneous three times a day before meals  insulin lispro (ADMELOG) corrective regimen sliding scale   SubCutaneous at bedtime  isosorbide   mononitrate ER Tablet (IMDUR) 120 milliGRAM(s) Oral daily  losartan 25 milliGRAM(s) Oral <User Schedule>  metoprolol tartrate 100 milliGRAM(s) Oral two times a day  pantoprazole    Tablet 40 milliGRAM(s) Oral before breakfast  ranolazine 1000 milliGRAM(s) Oral two times a day  rosuvastatin 5 milliGRAM(s) Oral at bedtime  tamsulosin 0.4 milliGRAM(s) Oral at bedtime    MEDICATIONS  (PRN):  guaiFENesin   Syrup  (Sugar-Free) 100 milliGRAM(s) Oral every 6 hours PRN Cough    ICU Vital Signs Last 24 Hrs  T(C): 36.9 (26 Mar 2021 04:07), Max: 36.9 (26 Mar 2021 04:07)  T(F): 98.5 (26 Mar 2021 04:07), Max: 98.5 (26 Mar 2021 04:07)  HR: 73 (26 Mar 2021 04:07) (66 - 73)  BP: 125/62 (26 Mar 2021 04:07) (120/70 - 128/68)  BP(mean): --  ABP: --  ABP(mean): --  RR: 18 (26 Mar 2021 04:07) (18 - 18)  SpO2: 97% (26 Mar 2021 04:07) (96% - 99%)    Daily     Daily Weight in k.4 (26 Mar 2021 04:07)    PHYSICAL EXAM:  GEN: Awake, alert. NAD.   HEENT: NCAT, PERRL, EOMI. Mucosa moist. No JVD.  RESP: CTA b/l  CV: RRR. Normal S1/S2. No m/r/g.  ABD: Soft. NT/ND. BS+  EXT: Warm. No edema, clubbing, or cyanosis.   NEURO: AAOx3. No focal deficits.     LABS:                        12.6   4.84  )-----------( 145      ( 26 Mar 2021 06:18 )             37.8         137  |  100  |  18  ----------------------------<  163<H>  3.5   |  26  |  0.94    Ca    9.2      26 Mar 2021 06:18  Phos  2.7       Mg     1.9                   I&O's Summary    25 Mar 2021 07:01  -  26 Mar 2021 07:00  --------------------------------------------------------  IN: 420 mL / OUT: 350 mL / NET: 70 mL      BNP    RADIOLOGY & ADDITIONAL STUDIES:    TELEMETRY: reviewed    EKG: reviewed         INTERVAL EVENTS: Patient reports feeling well and desires to be discharged.     REVIEW OF SYSTEMS:  Constitutional:     [X] negative [ ] fevers [ ] chills [ ] weight loss [ ] weight gain  HEENT:                  [X] negative [ ] dry eyes [ ] eye irritation [ ] postnasal drip [ ] nasal congestion  CV:                         [X] negative  [ ] chest pain [ ] orthopnea [ ] palpitations [ ] murmur  Resp:                     [ ] negative [ ] cough [X] shortness of breath [ ] wheezing [ ] sputum [ ] hemoptysis  GI:                          [X] negative [ ] nausea [ ] vomiting [ ] diarrhea [ ] constipation [ ] abd pain [ ] dysphagia   :                        [X] negative [ ] dysuria [ ] nocturia [ ] hematuria [ ] increased urinary frequency  MSK:                      [X] negative [ ] back pain [ ] myalgias [ ] arthralgias [ ] fracture  Skin:                       [X] negative [ ] rash [ ] itch  Neuro:                   [X] negative [ ] headache [ ] dizziness [ ] syncope [ ] weakness [ ] numbness  Psych:                    [X] negative [ ] anxiety [ ] depression  Endo:                     [X] negative [ ] diabetes [ ] thyroid problem  Heme/Lymph:      [X] negative [ ] anemia [ ] bleeding problem  Allergic/Immune: [X] negative [ ] itchy eyes [ ] nasal discharge [ ] hives [ ] angioedema  [X] All other systems negative or otherwise described above.      PAST MEDICAL & SURGICAL HISTORY:  BPH (benign prostatic hypertrophy)  Hyperlipidemia  CAD (coronary artery disease)  Diabetes mellitus Type 2  HTN (hypertension)  S/P drug eluting coronary stent placement [facility]  S/P primary angioplasty with coronary stent 1990s      MEDICATIONS  (STANDING):  aspirin enteric coated 81 milliGRAM(s) Oral daily  dextrose 40% Gel 15 Gram(s) Oral once  dextrose 5%. 1000 milliLiter(s) (50 mL/Hr) IV Continuous <Continuous>  dextrose 5%. 1000 milliLiter(s) (100 mL/Hr) IV Continuous <Continuous>  dextrose 50% Injectable 25 Gram(s) IV Push once  dextrose 50% Injectable 12.5 Gram(s) IV Push once  dextrose 50% Injectable 25 Gram(s) IV Push once  finasteride 5 milliGRAM(s) Oral daily  furosemide   Injectable 20 milliGRAM(s) IV Push daily  gabapentin 300 milliGRAM(s) Oral two times a day  glucagon  Injectable 1 milliGRAM(s) IntraMuscular once  insulin glargine Injectable (LANTUS) 20 Unit(s) SubCutaneous at bedtime  insulin lispro (ADMELOG) corrective regimen sliding scale   SubCutaneous three times a day before meals  insulin lispro (ADMELOG) corrective regimen sliding scale   SubCutaneous at bedtime  isosorbide   mononitrate ER Tablet (IMDUR) 120 milliGRAM(s) Oral daily  losartan 25 milliGRAM(s) Oral <User Schedule>  metoprolol tartrate 100 milliGRAM(s) Oral two times a day  pantoprazole    Tablet 40 milliGRAM(s) Oral before breakfast  ranolazine 1000 milliGRAM(s) Oral two times a day  rosuvastatin 5 milliGRAM(s) Oral at bedtime  tamsulosin 0.4 milliGRAM(s) Oral at bedtime    MEDICATIONS  (PRN):  guaiFENesin   Syrup  (Sugar-Free) 100 milliGRAM(s) Oral every 6 hours PRN Cough      ICU Vital Signs Last 24 Hrs  T(C): 36.9 (26 Mar 2021 04:07), Max: 36.9 (26 Mar 2021 04:07)  T(F): 98.5 (26 Mar 2021 04:07), Max: 98.5 (26 Mar 2021 04:07)  HR: 73 (26 Mar 2021 04:07) (66 - 73)  BP: 125/62 (26 Mar 2021 04:07) (120/70 - 128/68)  RR: 18 (26 Mar 2021 04:07) (18 - 18)  SpO2: 97% (26 Mar 2021 04:07) (96% - 99%)  Daily Weight in k.4 (26 Mar 2021 04:07)      PHYSICAL EXAM:  GEN: Awake, alert. NAD.   HEENT: NCAT, PERRL, EOMI. Mucosa moist. No JVD.  RESP: CTA b/l  CV: RRR. Normal S1/S2. No m/r/g.  ABD: Soft. NT/ND. BS+  EXT: Warm. No edema, clubbing, or cyanosis.   NEURO: AAOx3. No focal deficits.       LABS:                      12.6   4.84  )-----------( 145      ( 26 Mar 2021 06:18 )             37.8     -  137  |  100  |  18  ----------------------------<  163<H>  3.5   |  26  |  0.94    Ca    9.2      26 Mar 2021 06:18  Phos  2.7       Mg     1.9         I&O's Summary    25 Mar 2021 07:01  -  26 Mar 2021 07:00  --------------------------------------------------------  IN: 420 mL / OUT: 350 mL / NET: 70 mL

## 2021-03-26 NOTE — CHART NOTE - NSCHARTNOTEFT_GEN_A_CORE
MEDICINE NP    PUJA, JHON  76y Male    Patient is a 76yoM PMHx CAD with PCI to LAD and RCA (last in 2015) and known three vessel disease, HTN, chronic SDH from last year from a fall in his driveway, S/P embolization of middle meningeal artery, recently discharged from [place] on 3/9, with recent cardiac cath Feb 2021 with patient presently unable to pursue CABG due to inability to anticoagulation in the context of recent arterial embolization, p/w one day of dyspnea.  Admitted with Dyspnea/ Chest Pain in setting multivesel disease-for medical management.  Now with arrythmia on Tele.        > Event Summary:  Notified by RN, Patient with 24beats WCT on Tele, aleep and asymptomatic.  Patient seen at bedside, asleep, easily awakened. AOX3.  Denies chest pain, sob, palpitations, dizziness.        -Vital Signs Last 24 Hrs  T(C): 36.6 (26 Mar 2021 01:47), Max: 36.8 (25 Mar 2021 04:12)  T(F): 97.8 (26 Mar 2021 01:47), Max: 98.2 (25 Mar 2021 04:12)  HR: 67 (26 Mar 2021 01:47) (66 - 73)  BP: 128/68 (26 Mar 2021 01:47) (120/69 - 128/68)  RR: 18 (26 Mar 2021 01:47) (18 - 18)  SpO2: 96% (26 Mar 2021 01:47) (96% - 99%)      PLAN:   1. Arrythmia -  24Beats WCT /NSVT   -ECG  -F/u STAT BMP - Lytes  -Will give AM Lopressor PO now  -D/W Cardiology Fellow, Dr. Ribeiro, reviewed and agrees with above, no further recommendations at this time.   -C/w close monitoring and current regimen   -Will endorse to day provider and attending to follow       JUDY Solano-BC  Medicine Department  #[id number] MEDICINE NP    PUJA, JHON  76y Male    Patient is a 76yoM PMHx CAD with PCI to LAD and RCA (last in 2015) and known three vessel disease, HTN, chronic SDH from last year from a fall in his driveway, S/P embolization of middle meningeal artery, recently discharged from [place] on 3/9, with recent cardiac cath Feb 2021 with patient presently unable to pursue CABG due to inability to anticoagulation in the context of recent arterial embolization, p/w one day of dyspnea.  Admitted with Dyspnea/ Chest Pain in setting multivesel disease-for medical management.  Now with arrythmia on Tele.        > Event Summary:  Notified by RN, Patient with 24beats WCT on Tele, aleep and asymptomatic.  Patient seen at bedside, asleep, easily awakened. AOX3.  Denies chest pain, sob, palpitations, dizziness.        -Vital Signs Last 24 Hrs  T(C): 36.6 (26 Mar 2021 01:47), Max: 36.8 (25 Mar 2021 04:12)  T(F): 97.8 (26 Mar 2021 01:47), Max: 98.2 (25 Mar 2021 04:12)  HR: 67 (26 Mar 2021 01:47) (66 - 73)  BP: 128/68 (26 Mar 2021 01:47) (120/69 - 128/68)  RR: 18 (26 Mar 2021 01:47) (18 - 18)  SpO2: 96% (26 Mar 2021 01:47) (96% - 99%)      PLAN:   1. Arrythmia -  24Beats WCT /NSVT   -ECG : SR w/ 1*AVB, 65bpm.  LBB.  No acute ST/T wave changes  -F/u STAT BMP - Lytes  -Will give AM Lopressor PO now  -D/W Cardiology Fellow, Dr. Ribeiro, reviewed and agrees with above, no further recommendations at this time.   -C/w close monitoring and current regimen   -Will endorse to day provider and attending to follow       JUDY Solano-BC  Medicine Department  #[id number]

## 2021-03-29 ENCOUNTER — APPOINTMENT (OUTPATIENT)
Dept: CARDIOLOGY | Facility: CLINIC | Age: 76
End: 2021-03-29

## 2021-04-06 NOTE — PROGRESS NOTE ADULT - ASSESSMENT
74y M with hx. of CAD s/p PCI to the LAD and RCA in the past (last angiogram in 2015 with [facility]), HTN, & HLD.  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 s/p MMA embolization with NSGY on 3/1     - inc metoprolol to 75 mg BID   - cont imdur to 120 mg QD   - cont ranexa at 1000 mg BID   - NTG PRN for cp and morphine PRN for cp not relieved by NTG, would monitor BP closely and space out medications administration   - s/p MMA embolization with NSGY, CTH slightly improved    - CABG eval and timeline per CTS     Will discuss with family regarding discharge planning today     Thank you,   Moy Galloway MD  Cardiology Fellow, [facility]-NS/MAYA  All Cardiology service information can be found 24/7 on [email or web address], password: [email or web address]   Vermilion Border Text: The closure involved the vermilion border.

## 2021-04-09 DIAGNOSIS — R06.02 SHORTNESS OF BREATH: ICD-10-CM

## 2021-04-30 NOTE — DISCHARGE NOTE PROVIDER - HOSPITAL COURSE
NEW PATIENT QUESTIONS     Have you seen a PAIN MANAGEMENT PROVIDER in the last 5 YEARS: Yes     If yes who did you see and what facility? [facility]       ORTHOPEDIC PROVIDER, PHYSICAL THERAPY, or CHIROPRACTOR:     Ortho -  No   PT -  No   Chiro -  No       If yes, who did you see and what facility      Have you had any PROCEDURES / INJECTIONS / SURGERIES for this condition: No     If yes who did you see  and what facility? [facility] /PATIENT WILL BRING MRI REPORT WITH        Patient's Insurance: [unfilled]         74 year old man with multiple prior coronary interventions, LAD and RCA 2015. Has chronic SDH thought to be traumatic and managed by Neurosurgery. Presented with unstable angina and have documented three vessel disease. In effort to prepare for cardiac revascularization surgery has had Neurosurgical procedure, MMA embolization on 3/1. Seeing to maintain control of angina which is improved after up-titration of medications. Current circumstances preclude any cardiac procedure. He is now pain free on appropriate PO regimen. Plan to f/u with neurosurgery for f/u CT head in 10-14 days. Follow up with cardiology to discuss possible CABG in future.    DCP with medication reconciliation discussed with Dr. Cody.   Patient cleared for discharge home with home care.   Follow up with neurosurgery, cardiologist, PCP in 1 week.

## 2021-05-17 ENCOUNTER — APPOINTMENT (OUTPATIENT)
Dept: CARDIOLOGY | Facility: CLINIC | Age: 76
End: 2021-05-17

## 2021-07-01 NOTE — PATIENT PROFILE ADULT - NSPROEDALEARNPREF_GEN_A_NUR
check abg pt mildly hypercarbic on vbg  fu cta r/o pe/infarct  fu rvp
skill demonstration/verbal instruction/individual instruction

## 2021-08-17 ENCOUNTER — NON-APPOINTMENT (OUTPATIENT)
Age: 76
End: 2021-08-17

## 2021-08-18 ENCOUNTER — APPOINTMENT (OUTPATIENT)
Dept: UROLOGY | Facility: CLINIC | Age: 76
End: 2021-08-18

## 2021-08-18 VITALS — SYSTOLIC BLOOD PRESSURE: 145 MMHG | DIASTOLIC BLOOD PRESSURE: 66 MMHG | HEART RATE: 81 BPM

## 2021-08-18 NOTE — HISTORY OF PRESENT ILLNESS
[FreeTextEntry1] : Patient is a 76 year old man\par PSA July 2021 3.06 ng/mL \par \par May 2021 had open heart surgery

## 2021-08-19 ENCOUNTER — APPOINTMENT (OUTPATIENT)
Dept: UROLOGY | Facility: CLINIC | Age: 76
End: 2021-08-19
Payer: MEDICARE

## 2021-08-19 VITALS
WEIGHT: 152 LBS | HEART RATE: 83 BPM | HEIGHT: 67 IN | RESPIRATION RATE: 12 BRPM | DIASTOLIC BLOOD PRESSURE: 73 MMHG | BODY MASS INDEX: 23.86 KG/M2 | TEMPERATURE: 97.2 F | SYSTOLIC BLOOD PRESSURE: 133 MMHG

## 2021-08-19 PROCEDURE — 99213 OFFICE O/P EST LOW 20 MIN: CPT

## 2021-08-20 LAB
APPEARANCE: CLEAR
BACTERIA: NEGATIVE
BILIRUBIN URINE: NEGATIVE
BLOOD URINE: NEGATIVE
CALCIUM OXALATE CRYSTALS: ABNORMAL
COLOR: YELLOW
GLUCOSE QUALITATIVE U: NEGATIVE
HYALINE CASTS: 0 /LPF
KETONES URINE: NEGATIVE
LEUKOCYTE ESTERASE URINE: NEGATIVE
MICROSCOPIC-UA: NORMAL
NITRITE URINE: NEGATIVE
PH URINE: 5.5
PROTEIN URINE: ABNORMAL
RED BLOOD CELLS URINE: 4 /HPF
SPECIFIC GRAVITY URINE: 1.03
SQUAMOUS EPITHELIAL CELLS: 1 /HPF
UROBILINOGEN URINE: NORMAL
WHITE BLOOD CELLS URINE: 1 /HPF

## 2021-08-22 NOTE — HISTORY OF PRESENT ILLNESS
[FreeTextEntry1] : Patient is a 76 year old man. Comes in today with complaints of weak urinary stream. He has a history of BPH. Currently on finasteride 5 mg daily and tamsulosin 0.4 mg daily. Reports one weak ago he noticed that his urinary stream has gotten weaker, especially at night time. One night he felt he could not urinate at all, but was able to urinate in the morning. \par \par Denies any hematuria, dysuria or incontinence.\par Denies abdominal pain or flank pain.  No fever/chills, nausea/vomiting.\par

## 2021-08-22 NOTE — ASSESSMENT
[FreeTextEntry1] : BPH: PVR 3ml in office today. Increase tamsulosin 0.4 mg up to 2 tablets daily. \par Tried to obtain urine sample, but patient states he could not go. Will drop off urine to the lab. \par Will perform office cystoscopy on next visit.  If evidence of significant outlet obstruction, then will consider outlet reducing procedure such as a TURP.

## 2021-08-23 LAB — BACTERIA UR CULT: NORMAL

## 2021-08-31 ENCOUNTER — OUTPATIENT (OUTPATIENT)
Dept: OUTPATIENT SERVICES | Facility: HOSPITAL | Age: 76
LOS: 1 days | End: 2021-08-31
Payer: MEDICARE

## 2021-08-31 ENCOUNTER — APPOINTMENT (OUTPATIENT)
Dept: UROLOGY | Facility: CLINIC | Age: 76
End: 2021-08-31
Payer: MEDICARE

## 2021-08-31 ENCOUNTER — APPOINTMENT (OUTPATIENT)
Dept: UROLOGY | Facility: CLINIC | Age: 76
End: 2021-08-31

## 2021-08-31 VITALS
HEART RATE: 86 BPM | SYSTOLIC BLOOD PRESSURE: 155 MMHG | TEMPERATURE: 98 F | RESPIRATION RATE: 17 BRPM | DIASTOLIC BLOOD PRESSURE: 69 MMHG

## 2021-08-31 DIAGNOSIS — Z95.5 PRESENCE OF CORONARY ANGIOPLASTY IMPLANT AND GRAFT: Chronic | ICD-10-CM

## 2021-08-31 DIAGNOSIS — R35.0 FREQUENCY OF MICTURITION: ICD-10-CM

## 2021-08-31 PROCEDURE — 52000 CYSTOURETHROSCOPY: CPT

## 2021-08-31 PROCEDURE — 99213 OFFICE O/P EST LOW 20 MIN: CPT | Mod: 25

## 2021-08-31 NOTE — ASSESSMENT
[FreeTextEntry1] : Office cystoscopy was performed today which showed trilobar hypertrophy of the prostate causing outlet obstruction.\par We discussed the options including continued medical therapy versus proceeding with surgery.  Patient is significantly bothered by his urinary symptoms and wishes to proceed with definitive surgical treatment.\par \par Recommend to proceed with cystoscopy, button vaporization of the prostate.\par \par With regards to surgery, reviewed the operative time, expected hospital stay and recovery time.  Discussed the potential immediate complications of bleeding, infection, electrolyte abnormalities, extended need for urethral catheterization, DVT/PE, and anesthetic complications.  Discussed the long term risks including stress urinary incontinence and overactive bladder symptoms requiring medication. \par \par Will need medical and cardiac clearance prior to surgery.\par \par All questions were answered.  Patient agrees to proceed as recommended.\par

## 2021-08-31 NOTE — HISTORY OF PRESENT ILLNESS
[FreeTextEntry1] : BPH, currently on tamsulosin and finasteride.  His last visit tamsulosin was increased to 2 tablets.  Feels that the stream is mildly improved however continues to have significant urinary symptoms including weak urinary stream, feeling of incomplete bladder emptying.\par Denies any hematuria, dysuria or incontinence.\par Denies abdominal pain or flank pain.  No fever/chills, nausea/vomiting.\par

## 2021-09-08 ENCOUNTER — OUTPATIENT (OUTPATIENT)
Dept: OUTPATIENT SERVICES | Facility: HOSPITAL | Age: 76
LOS: 1 days | End: 2021-09-08
Payer: MEDICARE

## 2021-09-08 VITALS
OXYGEN SATURATION: 98 % | HEART RATE: 74 BPM | DIASTOLIC BLOOD PRESSURE: 60 MMHG | HEIGHT: 68 IN | WEIGHT: 149.91 LBS | RESPIRATION RATE: 16 BRPM | SYSTOLIC BLOOD PRESSURE: 120 MMHG | TEMPERATURE: 97 F

## 2021-09-08 DIAGNOSIS — N40.1 BENIGN PROSTATIC HYPERPLASIA WITH LOWER URINARY TRACT SYMPTOMS: ICD-10-CM

## 2021-09-08 DIAGNOSIS — Z95.1 PRESENCE OF AORTOCORONARY BYPASS GRAFT: Chronic | ICD-10-CM

## 2021-09-08 DIAGNOSIS — E11.9 TYPE 2 DIABETES MELLITUS WITHOUT COMPLICATIONS: ICD-10-CM

## 2021-09-08 DIAGNOSIS — I10 ESSENTIAL (PRIMARY) HYPERTENSION: ICD-10-CM

## 2021-09-08 DIAGNOSIS — Z95.5 PRESENCE OF CORONARY ANGIOPLASTY IMPLANT AND GRAFT: Chronic | ICD-10-CM

## 2021-09-08 DIAGNOSIS — I25.10 ATHEROSCLEROTIC HEART DISEASE OF NATIVE CORONARY ARTERY WITHOUT ANGINA PECTORIS: ICD-10-CM

## 2021-09-08 DIAGNOSIS — Z95.818 PRESENCE OF OTHER CARDIAC IMPLANTS AND GRAFTS: Chronic | ICD-10-CM

## 2021-09-08 LAB
A1C WITH ESTIMATED AVERAGE GLUCOSE RESULT: 6.2 % — HIGH (ref 4–5.6)
ANION GAP SERPL CALC-SCNC: 11 MMOL/L — SIGNIFICANT CHANGE UP (ref 7–14)
BASOPHILS # BLD AUTO: 0.03 K/UL — SIGNIFICANT CHANGE UP (ref 0–0.2)
BASOPHILS NFR BLD AUTO: 0.4 % — SIGNIFICANT CHANGE UP (ref 0–2)
BUN SERPL-MCNC: 17 MG/DL — SIGNIFICANT CHANGE UP (ref 7–23)
CALCIUM SERPL-MCNC: 9.8 MG/DL — SIGNIFICANT CHANGE UP (ref 8.4–10.5)
CHLORIDE SERPL-SCNC: 102 MMOL/L — SIGNIFICANT CHANGE UP (ref 98–107)
CO2 SERPL-SCNC: 24 MMOL/L — SIGNIFICANT CHANGE UP (ref 22–31)
CREAT SERPL-MCNC: 1.04 MG/DL — SIGNIFICANT CHANGE UP (ref 0.5–1.3)
EOSINOPHIL # BLD AUTO: 0.12 K/UL — SIGNIFICANT CHANGE UP (ref 0–0.5)
EOSINOPHIL NFR BLD AUTO: 1.7 % — SIGNIFICANT CHANGE UP (ref 0–6)
ESTIMATED AVERAGE GLUCOSE: 131 — SIGNIFICANT CHANGE UP
GLUCOSE SERPL-MCNC: 153 MG/DL — HIGH (ref 70–99)
HCT VFR BLD CALC: 37.6 % — LOW (ref 39–50)
HGB BLD-MCNC: 11.9 G/DL — LOW (ref 13–17)
IANC: 4.94 K/UL — SIGNIFICANT CHANGE UP (ref 1.5–8.5)
IMM GRANULOCYTES NFR BLD AUTO: 0.3 % — SIGNIFICANT CHANGE UP (ref 0–1.5)
LYMPHOCYTES # BLD AUTO: 1.22 K/UL — SIGNIFICANT CHANGE UP (ref 1–3.3)
LYMPHOCYTES # BLD AUTO: 17.3 % — SIGNIFICANT CHANGE UP (ref 13–44)
MCHC RBC-ENTMCNC: 27.4 PG — SIGNIFICANT CHANGE UP (ref 27–34)
MCHC RBC-ENTMCNC: 31.6 GM/DL — LOW (ref 32–36)
MCV RBC AUTO: 86.6 FL — SIGNIFICANT CHANGE UP (ref 80–100)
MONOCYTES # BLD AUTO: 0.72 K/UL — SIGNIFICANT CHANGE UP (ref 0–0.9)
MONOCYTES NFR BLD AUTO: 10.2 % — SIGNIFICANT CHANGE UP (ref 2–14)
NEUTROPHILS # BLD AUTO: 4.94 K/UL — SIGNIFICANT CHANGE UP (ref 1.8–7.4)
NEUTROPHILS NFR BLD AUTO: 70.1 % — SIGNIFICANT CHANGE UP (ref 43–77)
NRBC # BLD: 0 /100 WBCS — SIGNIFICANT CHANGE UP
NRBC # FLD: 0 K/UL — SIGNIFICANT CHANGE UP
PLATELET # BLD AUTO: 196 K/UL — SIGNIFICANT CHANGE UP (ref 150–400)
POTASSIUM SERPL-MCNC: 5.1 MMOL/L — SIGNIFICANT CHANGE UP (ref 3.5–5.3)
POTASSIUM SERPL-SCNC: 5.1 MMOL/L — SIGNIFICANT CHANGE UP (ref 3.5–5.3)
RBC # BLD: 4.34 M/UL — SIGNIFICANT CHANGE UP (ref 4.2–5.8)
RBC # FLD: 14.4 % — SIGNIFICANT CHANGE UP (ref 10.3–14.5)
SODIUM SERPL-SCNC: 137 MMOL/L — SIGNIFICANT CHANGE UP (ref 135–145)
WBC # BLD: 7.05 K/UL — SIGNIFICANT CHANGE UP (ref 3.8–10.5)
WBC # FLD AUTO: 7.05 K/UL — SIGNIFICANT CHANGE UP (ref 3.8–10.5)

## 2021-09-08 PROCEDURE — 93010 ELECTROCARDIOGRAM REPORT: CPT

## 2021-09-08 RX ORDER — GABAPENTIN 400 MG/1
1 CAPSULE ORAL
Qty: 0 | Refills: 0 | DISCHARGE

## 2021-09-08 RX ORDER — NITROGLYCERIN 6.5 MG
1 CAPSULE, EXTENDED RELEASE ORAL
Qty: 0 | Refills: 0 | DISCHARGE

## 2021-09-08 RX ORDER — SITAGLIPTIN AND METFORMIN HYDROCHLORIDE 500; 50 MG/1; MG/1
1 TABLET, FILM COATED ORAL
Qty: 0 | Refills: 0 | DISCHARGE

## 2021-09-08 RX ORDER — SODIUM CHLORIDE 9 MG/ML
1000 INJECTION, SOLUTION INTRAVENOUS
Refills: 0 | Status: DISCONTINUED | OUTPATIENT
Start: 2021-09-17 | End: 2021-10-02

## 2021-09-08 RX ORDER — SODIUM CHLORIDE 9 MG/ML
3 INJECTION INTRAMUSCULAR; INTRAVENOUS; SUBCUTANEOUS EVERY 8 HOURS
Refills: 0 | Status: DISCONTINUED | OUTPATIENT
Start: 2021-09-17 | End: 2021-10-02

## 2021-09-08 RX ORDER — ROSUVASTATIN CALCIUM 5 MG/1
1 TABLET ORAL
Qty: 0 | Refills: 0 | DISCHARGE

## 2021-09-08 NOTE — H&P PST ADULT - NSANTHOSAYNRD_GEN_A_CORE
No. LUZ MARINA screening performed.  STOP BANG Legend: 0-2 = LOW Risk; 3-4 = INTERMEDIATE Risk; 5-8 = HIGH Risk

## 2021-09-08 NOTE — H&P PST ADULT - ENDOCRINE COMMENTS
type 2 DM on insulin and janumet. fasting BS 80-91mg/dl. pt reports many nights he does not take insulin due to low BS. type 2 DM on insulin and Janumet. fasting BS 80-91mg/dl. pt reports many nights he does not take insulin due to low blood sugar reading

## 2021-09-08 NOTE — H&P PST ADULT - PROBLEM SELECTOR PLAN 1
preop for cystoscopy button vaporization of the prostate on 9/17/21  preop instructions given, pt verbalized understanding  pt will take prescribed omeprazole AM of surgery for GI prophylaxis  pt is scheduled for COVID testing preop  medical clearance requested (pt with multiple comorbidities and advanced age)

## 2021-09-08 NOTE — H&P PST ADULT - NSICDXPASTMEDICALHX_GEN_ALL_CORE_FT
PAST MEDICAL HISTORY:  BPH (benign prostatic hypertrophy)     CAD (coronary artery disease)     Diabetes mellitus Type 2    HTN (hypertension)     Hyperlipidemia     Left bundle branch block

## 2021-09-08 NOTE — H&P PST ADULT - NSICDXPASTSURGICALHX_GEN_ALL_CORE_FT
PAST SURGICAL HISTORY:  S/P CABG x 3 May 2021    S/P drug eluting coronary stent placement Ramus    S/P primary angioplasty with coronary stent 1990s     PAST SURGICAL HISTORY:  S/P CABG x 3 May 2021    S/P drug eluting coronary stent placement Ramus    S/P primary angioplasty with coronary stent 1990s    Status post placement of implantable loop recorder May 2021

## 2021-09-08 NOTE — H&P PST ADULT - LAB RESULTS AND INTERPRETATION
cbc, bmp, hga1c pending   urine culture on chart from August 19, 2021 cbc, bmp, hga1c pending   urine culture result on chart from August 19, 2021

## 2021-09-08 NOTE — H&P PST ADULT - PROBLEM SELECTOR PLAN 3
pt will reduce lantus by 20% night before surgery  pt will hold Janumet AM of surgery  accu check to be assessed on admission

## 2021-09-08 NOTE — H&P PST ADULT - HISTORY OF PRESENT ILLNESS
77 y/o male with CAD s/p CABG x3 May 3rd, 2021, LBBB, HTN, HLD, Type 2 DM and BPH presents to PST preop for cystoscopy button vaporization of the prostate. pt reports long standing history of BPH and was being treated with Flomax. pt states within the last month he experiences increased difficulty to pass urine.

## 2021-09-09 DIAGNOSIS — N40.1 BENIGN PROSTATIC HYPERPLASIA WITH LOWER URINARY TRACT SYMPTOMS: ICD-10-CM

## 2021-09-10 ENCOUNTER — OUTPATIENT (OUTPATIENT)
Dept: OUTPATIENT SERVICES | Facility: HOSPITAL | Age: 76
LOS: 1 days | End: 2021-09-10
Payer: MEDICARE

## 2021-09-10 ENCOUNTER — APPOINTMENT (OUTPATIENT)
Dept: RADIOLOGY | Facility: IMAGING CENTER | Age: 76
End: 2021-09-10

## 2021-09-10 DIAGNOSIS — Z01.810 ENCOUNTER FOR PREPROCEDURAL CARDIOVASCULAR EXAMINATION: ICD-10-CM

## 2021-09-10 DIAGNOSIS — Z95.1 PRESENCE OF AORTOCORONARY BYPASS GRAFT: Chronic | ICD-10-CM

## 2021-09-10 DIAGNOSIS — Z95.5 PRESENCE OF CORONARY ANGIOPLASTY IMPLANT AND GRAFT: Chronic | ICD-10-CM

## 2021-09-10 DIAGNOSIS — J90 PLEURAL EFFUSION, NOT ELSEWHERE CLASSIFIED: ICD-10-CM

## 2021-09-10 DIAGNOSIS — Z95.818 PRESENCE OF OTHER CARDIAC IMPLANTS AND GRAFTS: Chronic | ICD-10-CM

## 2021-09-10 PROBLEM — I44.7 LEFT BUNDLE-BRANCH BLOCK, UNSPECIFIED: Chronic | Status: ACTIVE | Noted: 2021-09-08

## 2021-09-10 PROCEDURE — 71046 X-RAY EXAM CHEST 2 VIEWS: CPT

## 2021-09-10 PROCEDURE — 71046 X-RAY EXAM CHEST 2 VIEWS: CPT | Mod: 26

## 2021-09-14 ENCOUNTER — APPOINTMENT (OUTPATIENT)
Dept: DISASTER EMERGENCY | Facility: CLINIC | Age: 76
End: 2021-09-14

## 2021-09-14 DIAGNOSIS — Z01.818 ENCOUNTER FOR OTHER PREPROCEDURAL EXAMINATION: ICD-10-CM

## 2021-09-15 LAB — SARS-COV-2 N GENE NPH QL NAA+PROBE: NOT DETECTED

## 2021-09-16 ENCOUNTER — TRANSCRIPTION ENCOUNTER (OUTPATIENT)
Age: 76
End: 2021-09-16

## 2021-09-17 ENCOUNTER — OUTPATIENT (OUTPATIENT)
Dept: OUTPATIENT SERVICES | Facility: HOSPITAL | Age: 76
LOS: 1 days | Discharge: HOME | End: 2021-09-17
Payer: MEDICARE

## 2021-09-17 ENCOUNTER — APPOINTMENT (OUTPATIENT)
Dept: UROLOGY | Facility: HOSPITAL | Age: 76
End: 2021-09-17

## 2021-09-17 VITALS
HEART RATE: 76 BPM | RESPIRATION RATE: 16 BRPM | HEIGHT: 67 IN | TEMPERATURE: 98 F | OXYGEN SATURATION: 100 % | SYSTOLIC BLOOD PRESSURE: 147 MMHG | WEIGHT: 160.06 LBS | DIASTOLIC BLOOD PRESSURE: 76 MMHG

## 2021-09-17 VITALS
RESPIRATION RATE: 18 BRPM | TEMPERATURE: 98 F | SYSTOLIC BLOOD PRESSURE: 162 MMHG | HEART RATE: 77 BPM | OXYGEN SATURATION: 96 % | DIASTOLIC BLOOD PRESSURE: 79 MMHG

## 2021-09-17 DIAGNOSIS — N40.1 BENIGN PROSTATIC HYPERPLASIA WITH LOWER URINARY TRACT SYMPTOMS: ICD-10-CM

## 2021-09-17 DIAGNOSIS — Z95.5 PRESENCE OF CORONARY ANGIOPLASTY IMPLANT AND GRAFT: Chronic | ICD-10-CM

## 2021-09-17 DIAGNOSIS — Z95.818 PRESENCE OF OTHER CARDIAC IMPLANTS AND GRAFTS: Chronic | ICD-10-CM

## 2021-09-17 DIAGNOSIS — Z95.1 PRESENCE OF AORTOCORONARY BYPASS GRAFT: Chronic | ICD-10-CM

## 2021-09-17 PROCEDURE — 52601 PROSTATECTOMY (TURP): CPT

## 2021-09-17 RX ORDER — SODIUM CHLORIDE 9 MG/ML
1000 INJECTION, SOLUTION INTRAVENOUS
Refills: 0 | Status: DISCONTINUED | OUTPATIENT
Start: 2021-09-17 | End: 2021-10-02

## 2021-09-17 RX ORDER — ACETAMINOPHEN 500 MG
1000 TABLET ORAL ONCE
Refills: 0 | Status: COMPLETED | OUTPATIENT
Start: 2021-09-17 | End: 2021-09-17

## 2021-09-17 RX ORDER — ONDANSETRON 8 MG/1
4 TABLET, FILM COATED ORAL ONCE
Refills: 0 | Status: DISCONTINUED | OUTPATIENT
Start: 2021-09-17 | End: 2021-10-02

## 2021-09-17 RX ADMIN — Medication 1000 MILLIGRAM(S): at 21:00

## 2021-09-17 RX ADMIN — SODIUM CHLORIDE 3 MILLILITER(S): 9 INJECTION INTRAMUSCULAR; INTRAVENOUS; SUBCUTANEOUS at 20:39

## 2021-09-17 RX ADMIN — Medication 400 MILLIGRAM(S): at 20:42

## 2021-09-17 NOTE — ASU DISCHARGE PLAN (ADULT/PEDIATRIC) - ASU DC SPECIAL INSTRUCTIONSFT
-After your surgery, it is common to have some blood in the urine.  The urine may appear pink or even red.  If you start to notice thick blood or many blood clots in the urine, call your physician.  Otherwise, drink a lot of fluids in order to dilute the urine well.  If you are unable to urinate or you feel abdominal fullness, sitting in a tub of warm water (sitz bath) may help; if not, call your physician.    -If you have a fever over 101F, call your physician.    -Provided that you are not restricted with fluids by your physician, you should drink 6-8 (8 oz.) glasses of fluid per day.    -You may experience weakness after you go home; this is normal.  You will slowly regain your strength, during which time you may gradually increase your level of activity.  	Do not exercise, lift heavy objects, drive, or resume sexual activity until you are told to do so by your physician.  	You may walk around and even climb stairs.  	Do not allow yourself to become constipated, as straining may cause bleeding.  Take stool softeners (ex. Colace) or a laxative (ex. Senekot, ExLax) if needed.    You are being discharged with a ingram catheter.  Please follow up with Dr. Turner or the Urology Clinic at [facility] [phone] to have ingram catheter removed on Tuesday next week.  Call their office to confirm/schedule appointment.     -If pain exists, you may take Tylenol as needed.

## 2021-09-17 NOTE — ASU DISCHARGE PLAN (ADULT/PEDIATRIC) - CARE PROVIDER_API CALL
Brayden Turner)  Urology  [address] [place] [address], [place] [address]  Phone: [phone]  Fax: [phone]  Follow Up Time:

## 2021-09-17 NOTE — ASU DISCHARGE PLAN (ADULT/PEDIATRIC) - NURSING INSTRUCTIONS
DO NOT take any Tylenol (Acetaminophen) or narcotics containing Tylenol until after 3pm . You received Tylenol during your operation and it can cause damage to your liver if too much is taken within a 24 hour time period.

## 2021-09-20 LAB
GLUCOSE BLDC GLUCOMTR-MCNC: 145 MG/DL — HIGH (ref 70–99)
GLUCOSE BLDC GLUCOMTR-MCNC: 163 MG/DL — HIGH (ref 70–99)

## 2021-09-21 ENCOUNTER — APPOINTMENT (OUTPATIENT)
Dept: UROLOGY | Facility: CLINIC | Age: 76
End: 2021-09-21
Payer: MEDICARE

## 2021-09-21 ENCOUNTER — OUTPATIENT (OUTPATIENT)
Dept: OUTPATIENT SERVICES | Facility: HOSPITAL | Age: 76
LOS: 1 days | End: 2021-09-21
Payer: MEDICARE

## 2021-09-21 VITALS
SYSTOLIC BLOOD PRESSURE: 150 MMHG | RESPIRATION RATE: 18 BRPM | HEART RATE: 82 BPM | HEIGHT: 67 IN | WEIGHT: 152 LBS | BODY MASS INDEX: 23.86 KG/M2 | DIASTOLIC BLOOD PRESSURE: 74 MMHG

## 2021-09-21 DIAGNOSIS — Z95.1 PRESENCE OF AORTOCORONARY BYPASS GRAFT: Chronic | ICD-10-CM

## 2021-09-21 DIAGNOSIS — Z95.5 PRESENCE OF CORONARY ANGIOPLASTY IMPLANT AND GRAFT: Chronic | ICD-10-CM

## 2021-09-21 DIAGNOSIS — Z95.818 PRESENCE OF OTHER CARDIAC IMPLANTS AND GRAFTS: Chronic | ICD-10-CM

## 2021-09-21 DIAGNOSIS — R35.0 FREQUENCY OF MICTURITION: ICD-10-CM

## 2021-09-21 PROCEDURE — 51700 IRRIGATION OF BLADDER: CPT | Mod: 58

## 2021-09-21 PROCEDURE — 51700 IRRIGATION OF BLADDER: CPT

## 2021-09-22 DIAGNOSIS — N40.1 BENIGN PROSTATIC HYPERPLASIA WITH LOWER URINARY TRACT SYMPTOMS: ICD-10-CM

## 2021-09-29 ENCOUNTER — NON-APPOINTMENT (OUTPATIENT)
Age: 76
End: 2021-09-29

## 2021-10-06 ENCOUNTER — NON-APPOINTMENT (OUTPATIENT)
Age: 76
End: 2021-10-06

## 2021-10-26 ENCOUNTER — APPOINTMENT (OUTPATIENT)
Dept: RADIOLOGY | Facility: IMAGING CENTER | Age: 76
End: 2021-10-26
Payer: MEDICARE

## 2021-10-26 ENCOUNTER — OUTPATIENT (OUTPATIENT)
Dept: OUTPATIENT SERVICES | Facility: HOSPITAL | Age: 76
LOS: 1 days | End: 2021-10-26
Payer: MEDICARE

## 2021-10-26 DIAGNOSIS — Z95.5 PRESENCE OF CORONARY ANGIOPLASTY IMPLANT AND GRAFT: Chronic | ICD-10-CM

## 2021-10-26 DIAGNOSIS — Z95.818 PRESENCE OF OTHER CARDIAC IMPLANTS AND GRAFTS: Chronic | ICD-10-CM

## 2021-10-26 DIAGNOSIS — Z95.1 PRESENCE OF AORTOCORONARY BYPASS GRAFT: Chronic | ICD-10-CM

## 2021-10-26 DIAGNOSIS — R06.02 SHORTNESS OF BREATH: ICD-10-CM

## 2021-10-26 PROCEDURE — 71046 X-RAY EXAM CHEST 2 VIEWS: CPT | Mod: 26

## 2021-10-26 PROCEDURE — 71046 X-RAY EXAM CHEST 2 VIEWS: CPT

## 2021-11-04 ENCOUNTER — APPOINTMENT (OUTPATIENT)
Dept: UROLOGY | Facility: CLINIC | Age: 76
End: 2021-11-04
Payer: MEDICARE

## 2021-11-04 VITALS
HEART RATE: 83 BPM | SYSTOLIC BLOOD PRESSURE: 153 MMHG | HEIGHT: 67 IN | WEIGHT: 144 LBS | TEMPERATURE: 98 F | DIASTOLIC BLOOD PRESSURE: 79 MMHG | BODY MASS INDEX: 22.6 KG/M2

## 2021-11-04 DIAGNOSIS — R35.0 FREQUENCY OF MICTURITION: ICD-10-CM

## 2021-11-04 PROCEDURE — 99024 POSTOP FOLLOW-UP VISIT: CPT

## 2021-11-04 RX ORDER — FINASTERIDE 5 MG/1
5 TABLET, FILM COATED ORAL DAILY
Qty: 90 | Refills: 3 | Status: COMPLETED | COMMUNITY
Start: 2017-04-20 | End: 2021-11-04

## 2021-11-04 RX ORDER — OXYBUTYNIN CHLORIDE 5 MG/1
5 TABLET, EXTENDED RELEASE ORAL
Qty: 30 | Refills: 4 | Status: COMPLETED | COMMUNITY
Start: 2021-09-29 | End: 2021-11-04

## 2021-11-04 NOTE — ASSESSMENT
[FreeTextEntry1] : PVR performed in office today 15ml \par Excellent result post surgery.\par Off all urinary medications.\par \par Follow up in 6 months.

## 2021-11-04 NOTE — HISTORY OF PRESENT ILLNESS
[FreeTextEntry1] : Patient is a 76 year old man with a history of BPH. Status post cystoscopy and  transurethral button vaporization of the prostate 9/17/2021.\par \par He denies any post operative complications. Reports  stream is good with complete emptying.\par PVR today 15 mL.\par No stress incontinence.\par Nocturia 1x per night.\par No abdominal or flank pain.

## 2021-11-23 ENCOUNTER — NON-APPOINTMENT (OUTPATIENT)
Age: 76
End: 2021-11-23

## 2021-11-23 ENCOUNTER — APPOINTMENT (OUTPATIENT)
Dept: CARDIOLOGY | Facility: CLINIC | Age: 76
End: 2021-11-23
Payer: MEDICARE

## 2021-11-23 VITALS
DIASTOLIC BLOOD PRESSURE: 71 MMHG | RESPIRATION RATE: 18 BRPM | TEMPERATURE: 97.7 F | OXYGEN SATURATION: 97 % | WEIGHT: 151 LBS | SYSTOLIC BLOOD PRESSURE: 125 MMHG | HEART RATE: 75 BPM | BODY MASS INDEX: 23.65 KG/M2

## 2021-11-23 DIAGNOSIS — J90 PLEURAL EFFUSION, NOT ELSEWHERE CLASSIFIED: ICD-10-CM

## 2021-11-23 PROCEDURE — 93000 ELECTROCARDIOGRAM COMPLETE: CPT | Mod: 59

## 2021-11-23 PROCEDURE — 99215 OFFICE O/P EST HI 40 MIN: CPT

## 2021-11-23 RX ORDER — ASPIRIN ENTERIC COATED TABLETS 81 MG 81 MG/1
81 TABLET, DELAYED RELEASE ORAL
Refills: 0 | Status: ACTIVE | COMMUNITY

## 2021-11-23 RX ORDER — LOSARTAN POTASSIUM 25 MG/1
25 TABLET, FILM COATED ORAL
Refills: 0 | Status: DISCONTINUED | COMMUNITY
End: 2021-11-23

## 2021-11-23 RX ORDER — ASCORBIC ACID 500 MG
TABLET ORAL
Refills: 0 | Status: ACTIVE | COMMUNITY

## 2021-11-23 RX ORDER — FOLIC ACID 1 MG/1
1 TABLET ORAL
Refills: 0 | Status: ACTIVE | COMMUNITY

## 2021-11-23 RX ORDER — NITROGLYCERIN 0.4 MG/1
0.4 TABLET SUBLINGUAL
Qty: 100 | Refills: 0 | Status: DISCONTINUED | COMMUNITY
Start: 2021-03-02 | End: 2021-11-23

## 2021-11-23 RX ORDER — ERGOCALCIFEROL 1.25 MG/1
CAPSULE ORAL
Refills: 0 | Status: ACTIVE | COMMUNITY

## 2021-11-23 NOTE — DISCUSSION/SUMMARY
[Coronary Artery Disease] : coronary artery disease [Essential Hypertension] : essential hypertension [___ Month(s)] : in [unfilled] month(s) [Stable] : stable [Family] : discussed with the patient's family [Responding to Treatment] : responding to treatment [None] : There are no changes in medication management [Exercise Regimen] : an exercise regimen [Dietary Modification] : dietary modification [Acetaminophen Avoidance] : acetaminophen  avoidance [Low Sodium Diet] : low sodium diet [NSAIDs Avoidance] : non-steroidal anti-inflammatory drugs avoidance [Patient] : the patient [Minutes Spent: ___] : for [unfilled] ~Uminutes [With Me] : with me [de-identified] : s/p CABG,  [de-identified] : s/p stent, then s/p CABG [FreeTextEntry1] : 76 year old with CAD, essential htn, lipids.  all well controlled. \par He is post CABG. His CHF with reduction EF with diabetes was discussed. He is going to have Entresto. He is already advised to discontinue  ARB, ACEI for the Entresto. The precaution was discussed as well including lab test.

## 2021-11-23 NOTE — REVIEW OF SYSTEMS
[Fever] : no fever [Headache] : no headache [Chills] : no chills [Feeling Fatigued] : feeling fatigued [Blurry Vision] : no blurred vision [Seeing Double (Diplopia)] : no diplopia [Eye Pain] : no eye pain [Earache] : no earache [Discharge From Ears] : no discharge from the ears [Hearing Loss] : no hearing loss [Mouth Sores] : no mouth sores [Sore Throat] : no sore throat [Sinus Pressure] : no sinus pressure [Tinnitus] : no tinnitus [Vertigo] : no vertigo [SOB] : no shortness of breath [Dyspnea on exertion] : dyspnea during exertion [Chest Discomfort] : no chest discomfort [Lower Ext Edema] : no extremity edema [Leg Claudication] : no intermittent leg claudication [Palpitations] : no palpitations [Orthopnea] : no orthopnea [PND] : no PND [Syncope] : no syncope [Cough] : no cough [Wheezing] : no wheezing [Coughing Up Blood] : no hemoptysis [Snoring] : no snoring [Abdominal Pain] : no abdominal pain [Nausea] : no nausea [Vomiting] : no vomiting [Heartburn] : no heartburn [Change in Appetite] : no change in appetite [Change In The Stool] : no change in stool [Dysphagia] : no dysphagia [Diarrhea] : diarrhea [Constipation] : no constipation [Blood in stool] : no blood in stoo [Urinary Frequency] : no change in urinary frequency [Hematuria] : no hematuria [Pain During Urination] : no dysuria [Nocturia] : no nocturia [Joint Pain] : no joint pain [Joint Swelling] : no joint swelling [Joint Stiffness] : no joint stiffness [Muscle Cramps] : no muscle cramps [Myalgia] : no myalgia [Rash] : no rash [Itching] : no itching [Change In Color Of Skin] : change in skin color [Skin Lesions] : no skin lesions [Telangiectasias] : no telangiectasias [Dizziness] : no dizziness [Tremor] : no tremor was seen [Numbness (Hypoesthesia)] : no numbness [Convulsions] : no convulsions [Tingling (Paresthesia)] : no tingling [Weakness] : no weakness [Limb Weakness (Paresis)] : no limb weakness (Paresis) [Speech Disturbance] : no speech disturbance [Confusion] : no confusion was observed [Memory Lapses Or Loss] : no memory lapses or loss [Depression] : no depression [Anxiety] : no anxiety [Under Stress] : not under stress [Suicidal] : not suicidal [Easy Bleeding] : no tendency for easy bleeding [Swollen Glands] : no swollen glands [Easy Bruising] : no tendency for easy bruising [Negative] : Heme/Lymph

## 2021-11-23 NOTE — REASON FOR VISIT
[Follow-Up - Clinic] : a clinic follow-up of [Chest Pain] : chest pain [Coronary Artery Disease] : coronary artery disease [Hyperlipidemia] : hyperlipidemia [Hypertension] : hypertension [Spouse] : spouse [Cardiomyopathy] : cardiomyopathy [CABG Follow-up] : bypass graft [Heart Failure] : congestive heart failure

## 2021-11-23 NOTE — PHYSICAL EXAM
[General Appearance - Well Developed] : well developed [Normal Appearance] : normal appearance [Well Groomed] : well groomed [General Appearance - Well Nourished] : well nourished [No Deformities] : no deformities [General Appearance - In No Acute Distress] : no acute distress [Normal Conjunctiva] : the conjunctiva exhibited no abnormalities [Eyelids - No Xanthelasma] : the eyelids demonstrated no xanthelasmas [Normal Oral Mucosa] : normal oral mucosa [No Oral Pallor] : no oral pallor [No Oral Cyanosis] : no oral cyanosis [Normal Jugular Venous A Waves Present] : normal jugular venous A waves present [Normal Jugular Venous V Waves Present] : normal jugular venous V waves present [No Jugular Venous Engle A Waves] : no jugular venous engle A waves [Respiration, Rhythm And Depth] : normal respiratory rhythm and effort [Exaggerated Use Of Accessory Muscles For Inspiration] : no accessory muscle use [Auscultation Breath Sounds / Voice Sounds] : lungs were clear to auscultation bilaterally [Normal] : normal [Normal Rate] : normal [Rhythm Regular] : regular [Normal S1] : normal S1 [Normal S2] : normal S2 [No Murmur] : no murmurs heard [No Abnormalities] : the abdominal aorta was not enlarged and no bruit was heard [No Pitting Edema] : no pitting edema present [Abdomen Soft] : soft [Abdomen Tenderness] : non-tender [Abdomen Mass (___ Cm)] : no abdominal mass palpated [Abnormal Walk] : normal gait [Gait - Sufficient For Exercise Testing] : the gait was sufficient for exercise testing [Nail Clubbing] : no clubbing of the fingernails [Cyanosis, Localized] : no localized cyanosis [Petechial Hemorrhages (___cm)] : no petechial hemorrhages [] : no rash [No Venous Stasis] : no venous stasis [Skin Lesions] : no skin lesions [No Skin Ulcers] : no skin ulcer [No Xanthoma] : no  xanthoma was observed [Oriented To Time, Place, And Person] : oriented to person, place, and time [Affect] : the affect was normal [Mood] : the mood was normal [No Anxiety] : not feeling anxious [Chest Palpation] : palpation of the chest revealed no abnormalities [Lungs Percussion] : the lungs were normal to percussion [FreeTextEntry1] : s/p CABG [Right Carotid Bruit] : no bruit heard over the right carotid [Left Carotid Bruit] : no bruit heard over the left carotid [Right Femoral Bruit] : no bruit heard over the right femoral artery [Left Femoral Bruit] : no bruit heard over the left femoral artery [2+] : left 2+ [Bruit] : no bruit heard [Rt] : no varicose veins of the right leg [Lt] : no varicose veins of the left leg [Skin Turgor] : normal skin turgor

## 2021-11-23 NOTE — HISTORY OF PRESENT ILLNESS
[FreeTextEntry1] : 76 year old male with about 20 years of CAD, type II diabetes, elevated cholesterol.  s/p  two stenting episodes, in mid 90s and about 3 years ago. In May, 2021, he tolerated CABG ( 3 V) at [facility]. After the event, he has been on and off CHF. He  was treated with  diuretics.\par He has been with  20 year hx of LBBB.   no syncope.\par Today, he is here  for the problem of exertional shortness of breath,  tonya. pleural effusion, and echo finding of reduction of EF.\par \par \par \par

## 2021-11-24 RX ORDER — SACUBITRIL AND VALSARTAN 24; 26 MG/1; MG/1
24-26 TABLET, FILM COATED ORAL
Qty: 180 | Refills: 1 | Status: DISCONTINUED | COMMUNITY
Start: 2021-11-23 | End: 2021-11-24

## 2021-12-22 ENCOUNTER — NON-APPOINTMENT (OUTPATIENT)
Age: 76
End: 2021-12-22

## 2021-12-29 NOTE — CONSULT NOTE ADULT - CONSULT REQUESTED BY NAME
ATP
ED
Kalyan
Medicine
primary team
Dr Cody
[Clear to Auscultation] : lungs were clear to auscultation bilaterally
[Normal Gait and Station] : normal gait and station
[Normal muscle strength, symmetry and tone of facial, head and neck musculature] : normal muscle strength, symmetry and tone of facial, head and neck musculature
[Normal] : no cervical lymphadenopathy
[Exposed Vessel] : left anterior vessel not exposed
[Wheezing] : no wheezing
[Increased Work of Breathing] : no increased work of breathing with use of accessory muscles and retractions
[de-identified] : Exercise-induced asthma?  Difficulty breathing through the nose
[de-identified] : Deviated leaning against right lateral nasal wall and turbinate closing airway
[de-identified] : Sigmoid deviation
Dr. Sims

## 2022-01-27 ENCOUNTER — APPOINTMENT (OUTPATIENT)
Dept: CARDIOLOGY | Facility: CLINIC | Age: 77
End: 2022-01-27

## 2022-03-30 ENCOUNTER — APPOINTMENT (OUTPATIENT)
Dept: CARDIOLOGY | Facility: CLINIC | Age: 77
End: 2022-03-30
Payer: MEDICARE

## 2022-03-30 ENCOUNTER — NON-APPOINTMENT (OUTPATIENT)
Age: 77
End: 2022-03-30

## 2022-03-30 VITALS
DIASTOLIC BLOOD PRESSURE: 70 MMHG | OXYGEN SATURATION: 100 % | WEIGHT: 153 LBS | RESPIRATION RATE: 18 BRPM | HEART RATE: 67 BPM | BODY MASS INDEX: 23.96 KG/M2 | SYSTOLIC BLOOD PRESSURE: 137 MMHG | TEMPERATURE: 97.7 F

## 2022-03-30 PROCEDURE — 93000 ELECTROCARDIOGRAM COMPLETE: CPT | Mod: 59

## 2022-03-30 PROCEDURE — 99214 OFFICE O/P EST MOD 30 MIN: CPT

## 2022-03-30 NOTE — PHYSICAL EXAM
[General Appearance - Well Developed] : well developed [Normal Appearance] : normal appearance [Well Groomed] : well groomed [General Appearance - Well Nourished] : well nourished [No Deformities] : no deformities [General Appearance - In No Acute Distress] : no acute distress [Normal Conjunctiva] : the conjunctiva exhibited no abnormalities [Eyelids - No Xanthelasma] : the eyelids demonstrated no xanthelasmas [Normal Oral Mucosa] : normal oral mucosa [No Oral Pallor] : no oral pallor [No Oral Cyanosis] : no oral cyanosis [Normal Jugular Venous A Waves Present] : normal jugular venous A waves present [Normal Jugular Venous V Waves Present] : normal jugular venous V waves present [No Jugular Venous Engle A Waves] : no jugular venous engle A waves [Respiration, Rhythm And Depth] : normal respiratory rhythm and effort [Exaggerated Use Of Accessory Muscles For Inspiration] : no accessory muscle use [Auscultation Breath Sounds / Voice Sounds] : lungs were clear to auscultation bilaterally [Chest Palpation] : palpation of the chest revealed no abnormalities [Lungs Percussion] : the lungs were normal to percussion [Normal] : normal [Normal Rate] : normal [Rhythm Regular] : regular [Normal S1] : normal S1 [Normal S2] : normal S2 [No Murmur] : no murmurs heard [2+] : left 2+ [No Abnormalities] : the abdominal aorta was not enlarged and no bruit was heard [No Pitting Edema] : no pitting edema present [Abdomen Soft] : soft [Abdomen Tenderness] : non-tender [Abdomen Mass (___ Cm)] : no abdominal mass palpated [Abnormal Walk] : normal gait [Gait - Sufficient For Exercise Testing] : the gait was sufficient for exercise testing [Nail Clubbing] : no clubbing of the fingernails [Cyanosis, Localized] : no localized cyanosis [Petechial Hemorrhages (___cm)] : no petechial hemorrhages [Skin Turgor] : normal skin turgor [] : no rash [No Venous Stasis] : no venous stasis [Skin Lesions] : no skin lesions [No Skin Ulcers] : no skin ulcer [No Xanthoma] : no  xanthoma was observed [Oriented To Time, Place, And Person] : oriented to person, place, and time [Affect] : the affect was normal [No Anxiety] : not feeling anxious [Mood] : the mood was normal [FreeTextEntry1] : s/p CABG [Right Carotid Bruit] : no bruit heard over the right carotid [Left Carotid Bruit] : no bruit heard over the left carotid [Right Femoral Bruit] : no bruit heard over the right femoral artery [Left Femoral Bruit] : no bruit heard over the left femoral artery [Bruit] : no bruit heard [Rt] : no varicose veins of the right leg [Lt] : no varicose veins of the left leg [Bowel Sounds] : normal bowel sounds [Abdomen Hernia] : no hernia was discovered

## 2022-03-30 NOTE — REASON FOR VISIT
[Follow-Up - Clinic] : a clinic follow-up of [Cardiomyopathy] : cardiomyopathy [Chest Pain] : chest pain [Coronary Artery Disease] : coronary artery disease [CABG Follow-up] : bypass graft [Heart Failure] : congestive heart failure [Hyperlipidemia] : hyperlipidemia [Hypertension] : hypertension [Spouse] : spouse

## 2022-03-30 NOTE — HISTORY OF PRESENT ILLNESS
[FreeTextEntry1] : 77 year old male with about 20+ years of CAD, type II diabetes, elevated cholesterol.  s/p  two stenting episodes, in mid 90s and about 3 years ago. In May, 2021, he tolerated CABG ( 3 V) at [facility]. After the event, he has been on and off CHF. He  was treated with  diuretics.\par He has been with  20 year hx of LBBB.   no syncope.\par Today, he is here  for the problem of exertional shortness of breath,  tonya. pleural effusion, and echo finding of reduction of EF.\par \par \par \par

## 2022-03-30 NOTE — DISCUSSION/SUMMARY
[Coronary Artery Disease] : coronary artery disease [Family] : discussed with the patient's family [Essential Hypertension] : essential hypertension [Stable] : stable [Responding to Treatment] : responding to treatment [None] : There are no changes in medication management [Exercise Regimen] : an exercise regimen [Dietary Modification] : dietary modification [Acetaminophen Avoidance] : acetaminophen  avoidance [Low Sodium Diet] : low sodium diet [NSAIDs Avoidance] : non-steroidal anti-inflammatory drugs avoidance [Patient] : the patient [Minutes Spent: ___] : for [unfilled] ~Uminutes [With Me] : with me [___ Month(s)] : in [unfilled] month(s) [de-identified] : s/p CABG, no chest pain [de-identified] : s/p stent, then s/p CABG [FreeTextEntry1] : 77 year old with CAD, essential htn, lipids.  all well controlled. \par He is post CABG. His CHF with reduction EF with diabetes was discussed. He is going to have Entresto. He is already advised to discontinue  ARB, ACEI for the Entresto. The precaution was discussed as well including lab test.

## 2022-05-09 ENCOUNTER — APPOINTMENT (OUTPATIENT)
Dept: UROLOGY | Facility: CLINIC | Age: 77
End: 2022-05-09
Payer: MEDICARE

## 2022-05-09 DIAGNOSIS — N40.1 BENIGN PROSTATIC HYPERPLASIA WITH LOWER URINARY TRACT SYMPMS: ICD-10-CM

## 2022-05-09 DIAGNOSIS — R97.20 ELEVATED PROSTATE, SPECIFIC ANTIGEN [PSA]: ICD-10-CM

## 2022-05-09 PROCEDURE — 99213 OFFICE O/P EST LOW 20 MIN: CPT

## 2022-05-09 RX ORDER — SPIRONOLACTONE 25 MG/1
25 TABLET ORAL
Qty: 90 | Refills: 1 | Status: COMPLETED | COMMUNITY
Start: 2021-11-24 | End: 2022-05-09

## 2022-05-09 RX ORDER — FUROSEMIDE 20 MG/1
20 TABLET ORAL EVERY OTHER DAY
Refills: 0 | Status: COMPLETED | COMMUNITY
End: 2022-05-09

## 2022-05-09 NOTE — ASSESSMENT
[FreeTextEntry1] : BPH: doing well post Button TURP.\par Labs reviewed. PSA 6.38 ng/mL.\par Recommend repeat PSA f/t. Patient wishes to have at Dr. Cross's office.\par Rx given, will call once results available.\par If PSA declines, then follow up in one year.

## 2022-05-09 NOTE — HISTORY OF PRESENT ILLNESS
[FreeTextEntry1] : Patient is a 77 year old man with a history of BPH\par S/P TURP September 2021\par \par He reports stream is good, denies any increased urgency or frequency. Denies any hematuria, dysuria or incontinence. Does not use tamsulosin or finasteride anymore. \par \par He reports he had his PSA checked in January and it was around 6 ng/mL.

## 2022-06-17 NOTE — PATIENT PROFILE ADULT - NSPROGENANESREACTION_GEN_A_NUR
Consulting Provider:  Olga Watson MD  Chief Complaint:   Chief Complaint   Patient presents with   • Abdominal Pain     History of Present Illness:   78 year old male with history of HTN, HLD, and DM who presents with abdominal pain and diarrhea. 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    Pt states he began experiencing abdominal pain and diarrhea about one week ago. Denies any nausea, vomiting, melena, constipation, or recent weight loss. Presented to his PCP's office on 6/14 and was instructed to go to the ED.  Continues to have about 2 episodes of diarrhea per day, states abdominal pain has resolved. Has been tolerating diet. Was found to be febrile to 39C on admission. TMax yesterday 38.6C, afebrile this morning. Previous colonoscopy done in 2016 negative except for diverticulosis. Prior surgical history significant for laparoscopic cholecystectomy 02/2021.     Workup significant for blood culture with no growth to date, C diff negative. GI pathogen panel pending. Most recent CBC done 6/14 with no leukocytosis. CT done 6/15 demonstrating well encapsulated mesenteric fat with prominent mesenteric lymph nodes which can be seen with mesenteric panniculitis. Has been started on ceftriaxone and Flagyl.     History obtained via interpretor iPad [id number]    PMH:    has a past medical history of Diabetes mellitus (CMS/HCC), Essential (primary) hypertension, and High cholesterol.    PSH:    has a past surgical history that includes Cholecystectomy (02/2021).    Prior to Admission medications    Medication Sig Start Date End Date Taking? Authorizing Provider   pantoprazole (PROTONIX) 20 MG tablet TOME 1 TABLETA POR LA BOCA  DIARIAMENTE  Patient taking differently: Take 20 mg by mouth daily as needed (Acid reflux). 4/25/22  Yes Aidan Correa MD   labetalol (NORMODYNE) 200 MG tablet TOME 1 TABLETA POR LA BOCA  CADA 8 HORAS  Patient taking differently: Take 200 mg by mouth in the morning and 200 mg  at noon and 200 mg before bedtime. 1/10/22  Yes Aidan Correa MD   gemfibrozil (LOPID) 600 MG tablet TOME 1 TABLETA POR LA BOCA  DIARIAMENTE  Patient taking differently: Take 600 mg by mouth daily. 1/3/22  Yes Aidan Correa MD   atorvastatin (LIPITOR) 10 MG tablet TOME 1 TABLETA POR LA BOCA  DIARIAMENTE  Patient taking differently: Take 10 mg by mouth daily. 1/3/22  Yes Aidan Correa MD   levothyroxine 112 MCG tablet TOME 1 TABLETA POR LA BOCA  DIARIAMENTE ANTES DEL  DESAYUNO  Patient taking differently: Take 112 mcg by mouth daily. 1/3/22  Yes Aidan Correa MD   metFORMIN (GLUCOPHAGE) 850 MG tablet TOME 1 TABLETA POR LA BOCA  DOS VECES AL WENDY CON LAS  COMIDAS  Patient taking differently: Take 1,700 mg by mouth daily (at noon). 2 tablets = 1700mg 1/3/22  Yes Aidan Correa MD   glipiZIDE (GLUCOTROL ER) 10 MG 24 hr tablet TOME 1 TABLETA POR LA BOCA  DIARIAMENTE  Patient taking differently: Take 10 mg by mouth daily. 12/29/21  Yes Aidan Correa MD   OneTouch Ultra test strip REVISE EL NIVEL DE AZUCAR  EN LA LUCRECIA 2 VECES AL  WENDY 12/27/21  Yes Aidan Correa MD   NIFEdipine CC (ADALAT CC) 90 MG 24 hr tablet TOME 1 TABLETA POR LA BOCA  DOS VECES AL WENDY  Patient taking differently: Take 90 mg by mouth in the morning and 90 mg before bedtime. 12/16/21  Yes Aidan Correa MD   Lancets (OneTouch Delica Plus [id number]) Misc USE NOELLE SE INDICO PARA  REVISAR EL NIVEL DE AZUCAR  EN LA LUCRECIA 12/11/21  Yes Olga Watson MD   traMADol (ULTRAM) 50 MG tablet Take 1 tablet by mouth every 6 hours as needed for Pain. 2/21/21  Yes Olga Watson MD   aspirin 81 MG chewable tablet Chew 81 mg by mouth daily.   Yes Outside Provider   doxazosin (CARDURA) 1 MG tablet TOME 1 TABLETA POR LA BOCA  DIARIAMENTE 1/31/22   Aidan Correa MD   losartan (COZAAR) 100 MG tablet TOME 1 TABLETA POR LA BOCA  DIARIAMENTE 1/31/22   Aidan Correa MD        ALLERGIES:   Allergen Reactions   • Penicillins SWELLING        Social History:    reports that he has never smoked. He has never used smokeless tobacco. He reports that he does not drink alcohol and does not use drugs.     Family History:   family history includes Cancer in his mother; Heart disease in his sister and son; Stroke in his father and sister.    Review of Systems:   See HPI. 10 ROS is otherwise negative.    Physical Exam:  Visit Vitals  /68   Pulse 79   Temp 100 °F (37.8 °C) (Oral)   Resp 16   Ht 5' 4\" (1.626 m)   Wt 68.8 kg (151 lb 10.8 oz)   SpO2 95%   BMI 26.04 kg/m²       General: awake and alert, no acute distress  Skin: warm  HEENT: normocephalic, EOMI  Cardiac: regular rate   Pulmonary: breathing comfortably on room air, no respiratory distress  Abdomen: soft, nondistended, non tender  Musculoskeletal: ROM grossly intact.   Psychiatric: answers questions appropriately.     CBC:   Recent Labs     06/14/22  1735   WBC 7.9   RBC 4.29*   HGB 12.4*   HCT 36.2*   MCV 84.4        CMP:    Recent Labs     06/16/22  0428 06/16/22  1512 06/17/22  0712   SODIUM 134*  --   --    POTASSIUM 2.7*   < > 3.3*   CHLORIDE 100  --   --    CO2 27  --   --    BUN 11  --   --    CREATININE 0.98  --   --    GLUCOSE 89  --   --    TOTPROTEIN 6.6  --   --    ALBUMIN 2.5*  --   --    CALCIUM 8.5  --   --    BILIRUBIN 0.3  --   --    ALKPT 69  --   --    AST 26  --   --    GPT 29  --   --     < > = values in this interval not displayed.     Imaging    Patient: SPEEDY FLORES  Time Out: 05:40  Exam(s): CT ABDOMEN + PELVIS Without Contrast      EXAM:    CT Abdomen and Pelvis Without Intravenous Contrast     CLINICAL HISTORY:     Reason for exam: abd pain.     TECHNIQUE:    Axial computed tomography images of the abdomen and pelvis without   intravenous contrast.  Dose reduction techniques were utilized.     COMPARISON:    No relevant prior studies available.     FINDINGS:    Limitations:  Limited evaluation the absence of contrast and with   respiratory motion artifact.     Lung bases:  Unremarkable.  No mass.  No consolidation.    Mediastinum:  Small hiatal hernia with thickening which may be   inflammatory in nature.      ABDOMEN:    Liver:  Unremarkable.    Gallbladder and bile ducts:  Cholecystectomy changes.  No ductal   dilation.    Pancreas:  Unremarkable.  No ductal dilation.    Spleen:  Unremarkable.  No splenomegaly.    Adrenals:  Unremarkable.  No mass.    Kidneys and ureters:  No evidence of obstructive renal calculi or signs   of collecting system dilatation.    Stomach and bowel:  Liquid colonic contents noted which may relate to   sequelae of diarrheal illness.  Colonic diverticulosis.  No evidence of   diverticulitis.  No obstruction.      PELVIS:    Appendix:  Dilated appendix measuring up to 1.5 cm at its distal   portion.  Please note this is similar in comparison with CT scan 2-18-21.    Appendicitis less likely.    Bladder: Bladder wall thickening which can be seen with cystitis.    Reproductive:  Unremarkable as visualized.      ABDOMEN and PELVIS:    Intraperitoneal space: No free air.    Bones/joints:  Degenerative changes in the spine.  No acute fracture.    No dislocation.    Soft tissues:  Unremarkable.    Vasculature:  Atherosclerotic calcifications along the aorta and   subsequent branches.  No abdominal aortic aneurysm.    Lymph nodes: Well encapsulated mesenteric fat with prominent mesenteric   lymph nodes which can be seen with mesenteric panniculitis.     IMPRESSION:  1.  Limited evaluation the absence of contrast and with respiratory   motion artifact.    2.  Well encapsulated mesenteric fat with prominent mesenteric lymph   nodes which can be seen with mesenteric panniculitis.    3.  Dilated appendix measuring up to 1.5 cm at its distal portion.    Please note this is similar in comparison with CT scan 2-18-21.    Appendicitis less likely.    4.  Liquid colonic contents noted which may relate to sequelae of   diarrheal illness.    5.  Bladder wall  thickening which can be seen with cystitis.  6.  No other acute findings.  7.  Incidental findings as described in the body of report.     Electronically signed by Bassam Meléndez MD on 06 15 22 at 05:40      Assessment/Plan:   78 year old male with history of HTN, HLD, and DM who presents with abdominal pain and diarrhea. 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 CT done 6/15 demonstrating well encapsulated mesenteric fat with prominent mesenteric lymph nodes which can be seen with mesenteric panniculitis. Abdomen soft, non-tender on exam.    -No acute surgical intervention indicated at this time  -Continue to monitor patient clinically for improvement in symptoms; if pt condition not improving or worsening, can re-evaluate for possible biopsy for mesenteric panniculitis   -Remainder of care per primary/consulting teams    Zakia Rueda PA-C    Discussed  with Dr. Greenwood.      no previous reaction

## 2022-08-22 ENCOUNTER — RX RENEWAL (OUTPATIENT)
Age: 77
End: 2022-08-22

## 2022-08-25 ENCOUNTER — OUTPATIENT (OUTPATIENT)
Dept: OUTPATIENT SERVICES | Facility: HOSPITAL | Age: 77
LOS: 1 days | End: 2022-08-25
Payer: MEDICARE

## 2022-08-25 ENCOUNTER — APPOINTMENT (OUTPATIENT)
Dept: RADIOLOGY | Facility: IMAGING CENTER | Age: 77
End: 2022-08-25

## 2022-08-25 DIAGNOSIS — Z95.5 PRESENCE OF CORONARY ANGIOPLASTY IMPLANT AND GRAFT: Chronic | ICD-10-CM

## 2022-08-25 DIAGNOSIS — Z95.818 PRESENCE OF OTHER CARDIAC IMPLANTS AND GRAFTS: Chronic | ICD-10-CM

## 2022-08-25 DIAGNOSIS — Z95.1 PRESENCE OF AORTOCORONARY BYPASS GRAFT: Chronic | ICD-10-CM

## 2022-08-25 DIAGNOSIS — J91.8 PLEURAL EFFUSION IN OTHER CONDITIONS CLASSIFIED ELSEWHERE: ICD-10-CM

## 2022-08-25 PROCEDURE — 71046 X-RAY EXAM CHEST 2 VIEWS: CPT | Mod: 26

## 2022-08-25 PROCEDURE — 71046 X-RAY EXAM CHEST 2 VIEWS: CPT

## 2022-11-09 ENCOUNTER — INPATIENT (INPATIENT)
Facility: HOSPITAL | Age: 77
LOS: 8 days | Discharge: HOME | End: 2022-11-18
Attending: HOSPITALIST | Admitting: HOSPITALIST

## 2022-11-09 VITALS
OXYGEN SATURATION: 100 % | SYSTOLIC BLOOD PRESSURE: 162 MMHG | RESPIRATION RATE: 18 BRPM | HEART RATE: 78 BPM | DIASTOLIC BLOOD PRESSURE: 82 MMHG | TEMPERATURE: 98 F

## 2022-11-09 DIAGNOSIS — I74.9 EMBOLISM AND THROMBOSIS OF UNSPECIFIED ARTERY: Chronic | ICD-10-CM

## 2022-11-09 DIAGNOSIS — E11.9 TYPE 2 DIABETES MELLITUS WITHOUT COMPLICATIONS: ICD-10-CM

## 2022-11-09 DIAGNOSIS — Z95.5 PRESENCE OF CORONARY ANGIOPLASTY IMPLANT AND GRAFT: Chronic | ICD-10-CM

## 2022-11-09 DIAGNOSIS — E87.5 HYPERKALEMIA: ICD-10-CM

## 2022-11-09 DIAGNOSIS — R55 SYNCOPE AND COLLAPSE: ICD-10-CM

## 2022-11-09 DIAGNOSIS — Z95.818 PRESENCE OF OTHER CARDIAC IMPLANTS AND GRAFTS: Chronic | ICD-10-CM

## 2022-11-09 DIAGNOSIS — Z95.1 PRESENCE OF AORTOCORONARY BYPASS GRAFT: Chronic | ICD-10-CM

## 2022-11-09 DIAGNOSIS — Z29.9 ENCOUNTER FOR PROPHYLACTIC MEASURES, UNSPECIFIED: ICD-10-CM

## 2022-11-09 DIAGNOSIS — I25.10 ATHEROSCLEROTIC HEART DISEASE OF NATIVE CORONARY ARTERY WITHOUT ANGINA PECTORIS: ICD-10-CM

## 2022-11-09 LAB
ALBUMIN SERPL ELPH-MCNC: 4.1 G/DL — SIGNIFICANT CHANGE UP (ref 3.3–5)
ALP SERPL-CCNC: 64 U/L — SIGNIFICANT CHANGE UP (ref 40–120)
ALT FLD-CCNC: 25 U/L — SIGNIFICANT CHANGE UP (ref 4–41)
ANION GAP SERPL CALC-SCNC: 11 MMOL/L — SIGNIFICANT CHANGE UP (ref 7–14)
ANION GAP SERPL CALC-SCNC: 12 MMOL/L — SIGNIFICANT CHANGE UP (ref 7–14)
APTT BLD: 25.7 SEC — LOW (ref 27–36.3)
AST SERPL-CCNC: 29 U/L — SIGNIFICANT CHANGE UP (ref 4–40)
B PERT DNA SPEC QL NAA+PROBE: SIGNIFICANT CHANGE UP
B PERT+PARAPERT DNA PNL SPEC NAA+PROBE: SIGNIFICANT CHANGE UP
BASOPHILS # BLD AUTO: 0.03 K/UL — SIGNIFICANT CHANGE UP (ref 0–0.2)
BASOPHILS NFR BLD AUTO: 0.3 % — SIGNIFICANT CHANGE UP (ref 0–2)
BILIRUB SERPL-MCNC: 0.8 MG/DL — SIGNIFICANT CHANGE UP (ref 0.2–1.2)
BORDETELLA PARAPERTUSSIS (RAPRVP): SIGNIFICANT CHANGE UP
BUN SERPL-MCNC: 21 MG/DL — SIGNIFICANT CHANGE UP (ref 7–23)
BUN SERPL-MCNC: 22 MG/DL — SIGNIFICANT CHANGE UP (ref 7–23)
C PNEUM DNA SPEC QL NAA+PROBE: SIGNIFICANT CHANGE UP
CALCIUM SERPL-MCNC: 9.4 MG/DL — SIGNIFICANT CHANGE UP (ref 8.4–10.5)
CALCIUM SERPL-MCNC: 9.7 MG/DL — SIGNIFICANT CHANGE UP (ref 8.4–10.5)
CHLORIDE SERPL-SCNC: 101 MMOL/L — SIGNIFICANT CHANGE UP (ref 98–107)
CHLORIDE SERPL-SCNC: 102 MMOL/L — SIGNIFICANT CHANGE UP (ref 98–107)
CO2 SERPL-SCNC: 20 MMOL/L — LOW (ref 22–31)
CO2 SERPL-SCNC: 23 MMOL/L — SIGNIFICANT CHANGE UP (ref 22–31)
CREAT SERPL-MCNC: 1.23 MG/DL — SIGNIFICANT CHANGE UP (ref 0.5–1.3)
CREAT SERPL-MCNC: 1.38 MG/DL — HIGH (ref 0.5–1.3)
EGFR: 52 ML/MIN/1.73M2 — LOW
EGFR: 60 ML/MIN/1.73M2 — SIGNIFICANT CHANGE UP
EOSINOPHIL # BLD AUTO: 0.07 K/UL — SIGNIFICANT CHANGE UP (ref 0–0.5)
EOSINOPHIL NFR BLD AUTO: 0.8 % — SIGNIFICANT CHANGE UP (ref 0–6)
FLUAV SUBTYP SPEC NAA+PROBE: SIGNIFICANT CHANGE UP
FLUBV RNA SPEC QL NAA+PROBE: SIGNIFICANT CHANGE UP
GLUCOSE BLDC GLUCOMTR-MCNC: 119 MG/DL — HIGH (ref 70–99)
GLUCOSE SERPL-MCNC: 130 MG/DL — HIGH (ref 70–99)
GLUCOSE SERPL-MCNC: 160 MG/DL — HIGH (ref 70–99)
HADV DNA SPEC QL NAA+PROBE: SIGNIFICANT CHANGE UP
HCOV 229E RNA SPEC QL NAA+PROBE: SIGNIFICANT CHANGE UP
HCOV HKU1 RNA SPEC QL NAA+PROBE: SIGNIFICANT CHANGE UP
HCOV NL63 RNA SPEC QL NAA+PROBE: SIGNIFICANT CHANGE UP
HCOV OC43 RNA SPEC QL NAA+PROBE: SIGNIFICANT CHANGE UP
HCT VFR BLD CALC: 40.9 % — SIGNIFICANT CHANGE UP (ref 39–50)
HGB BLD-MCNC: 13.3 G/DL — SIGNIFICANT CHANGE UP (ref 13–17)
HMPV RNA SPEC QL NAA+PROBE: SIGNIFICANT CHANGE UP
HPIV1 RNA SPEC QL NAA+PROBE: SIGNIFICANT CHANGE UP
HPIV2 RNA SPEC QL NAA+PROBE: SIGNIFICANT CHANGE UP
HPIV3 RNA SPEC QL NAA+PROBE: SIGNIFICANT CHANGE UP
HPIV4 RNA SPEC QL NAA+PROBE: SIGNIFICANT CHANGE UP
IANC: 6.66 K/UL — SIGNIFICANT CHANGE UP (ref 1.8–7.4)
IMM GRANULOCYTES NFR BLD AUTO: 0.5 % — SIGNIFICANT CHANGE UP (ref 0–0.9)
INR BLD: 0.97 RATIO — SIGNIFICANT CHANGE UP (ref 0.88–1.16)
LYMPHOCYTES # BLD AUTO: 1.29 K/UL — SIGNIFICANT CHANGE UP (ref 1–3.3)
LYMPHOCYTES # BLD AUTO: 14.7 % — SIGNIFICANT CHANGE UP (ref 13–44)
M PNEUMO DNA SPEC QL NAA+PROBE: SIGNIFICANT CHANGE UP
MCHC RBC-ENTMCNC: 29 PG — SIGNIFICANT CHANGE UP (ref 27–34)
MCHC RBC-ENTMCNC: 32.5 GM/DL — SIGNIFICANT CHANGE UP (ref 32–36)
MCV RBC AUTO: 89.3 FL — SIGNIFICANT CHANGE UP (ref 80–100)
MONOCYTES # BLD AUTO: 0.68 K/UL — SIGNIFICANT CHANGE UP (ref 0–0.9)
MONOCYTES NFR BLD AUTO: 7.8 % — SIGNIFICANT CHANGE UP (ref 2–14)
NEUTROPHILS # BLD AUTO: 6.66 K/UL — SIGNIFICANT CHANGE UP (ref 1.8–7.4)
NEUTROPHILS NFR BLD AUTO: 75.9 % — SIGNIFICANT CHANGE UP (ref 43–77)
NRBC # BLD: 0 /100 WBCS — SIGNIFICANT CHANGE UP (ref 0–0)
NRBC # FLD: 0 K/UL — SIGNIFICANT CHANGE UP (ref 0–0)
PLATELET # BLD AUTO: 236 K/UL — SIGNIFICANT CHANGE UP (ref 150–400)
POTASSIUM SERPL-MCNC: 5.4 MMOL/L — HIGH (ref 3.5–5.3)
POTASSIUM SERPL-MCNC: 6.1 MMOL/L — HIGH (ref 3.5–5.3)
POTASSIUM SERPL-SCNC: 5.4 MMOL/L — HIGH (ref 3.5–5.3)
POTASSIUM SERPL-SCNC: 6.1 MMOL/L — HIGH (ref 3.5–5.3)
PROT SERPL-MCNC: 6.9 G/DL — SIGNIFICANT CHANGE UP (ref 6–8.3)
PROTHROM AB SERPL-ACNC: 11.3 SEC — SIGNIFICANT CHANGE UP (ref 10.5–13.4)
RAPID RVP RESULT: DETECTED
RBC # BLD: 4.58 M/UL — SIGNIFICANT CHANGE UP (ref 4.2–5.8)
RBC # FLD: 12.8 % — SIGNIFICANT CHANGE UP (ref 10.3–14.5)
RSV RNA SPEC QL NAA+PROBE: SIGNIFICANT CHANGE UP
RV+EV RNA SPEC QL NAA+PROBE: SIGNIFICANT CHANGE UP
SARS-COV-2 RNA SPEC QL NAA+PROBE: DETECTED
SODIUM SERPL-SCNC: 134 MMOL/L — LOW (ref 135–145)
SODIUM SERPL-SCNC: 135 MMOL/L — SIGNIFICANT CHANGE UP (ref 135–145)
TROPONIN T, HIGH SENSITIVITY RESULT: 21 NG/L — SIGNIFICANT CHANGE UP
UFH PPP CHRO-ACNC: 0 IU/ML — LOW (ref 0.3–0.7)
WBC # BLD: 8.77 K/UL — SIGNIFICANT CHANGE UP (ref 3.8–10.5)
WBC # FLD AUTO: 8.77 K/UL — SIGNIFICANT CHANGE UP (ref 3.8–10.5)

## 2022-11-09 PROCEDURE — 99285 EMERGENCY DEPT VISIT HI MDM: CPT

## 2022-11-09 PROCEDURE — 70450 CT HEAD/BRAIN W/O DYE: CPT | Mod: 26,MG

## 2022-11-09 PROCEDURE — 71045 X-RAY EXAM CHEST 1 VIEW: CPT | Mod: 26

## 2022-11-09 PROCEDURE — 99223 1ST HOSP IP/OBS HIGH 75: CPT

## 2022-11-09 PROCEDURE — 93010 ELECTROCARDIOGRAM REPORT: CPT

## 2022-11-09 PROCEDURE — G1004: CPT

## 2022-11-09 RX ORDER — DEXTROSE 50 % IN WATER 50 %
25 SYRINGE (ML) INTRAVENOUS ONCE
Refills: 0 | Status: DISCONTINUED | OUTPATIENT
Start: 2022-11-09 | End: 2022-11-18

## 2022-11-09 RX ORDER — INSULIN HUMAN 100 [IU]/ML
5 INJECTION, SOLUTION SUBCUTANEOUS ONCE
Refills: 0 | Status: COMPLETED | OUTPATIENT
Start: 2022-11-09 | End: 2022-11-09

## 2022-11-09 RX ORDER — OLMESARTAN MEDOXOMIL 5 MG/1
1 TABLET, FILM COATED ORAL
Qty: 0 | Refills: 0 | DISCHARGE

## 2022-11-09 RX ORDER — DEXTROSE 50 % IN WATER 50 %
15 SYRINGE (ML) INTRAVENOUS ONCE
Refills: 0 | Status: DISCONTINUED | OUTPATIENT
Start: 2022-11-09 | End: 2022-11-18

## 2022-11-09 RX ORDER — INSULIN LISPRO 100/ML
VIAL (ML) SUBCUTANEOUS
Refills: 0 | Status: DISCONTINUED | OUTPATIENT
Start: 2022-11-09 | End: 2022-11-18

## 2022-11-09 RX ORDER — TAMSULOSIN HYDROCHLORIDE 0.4 MG/1
0.8 CAPSULE ORAL AT BEDTIME
Refills: 0 | Status: DISCONTINUED | OUTPATIENT
Start: 2022-11-09 | End: 2022-11-18

## 2022-11-09 RX ORDER — FINASTERIDE 5 MG/1
5 TABLET, FILM COATED ORAL DAILY
Refills: 0 | Status: DISCONTINUED | OUTPATIENT
Start: 2022-11-09 | End: 2022-11-18

## 2022-11-09 RX ORDER — INSULIN GLARGINE 100 [IU]/ML
0 INJECTION, SOLUTION SUBCUTANEOUS
Qty: 0 | Refills: 0 | DISCHARGE

## 2022-11-09 RX ORDER — SITAGLIPTIN AND METFORMIN HYDROCHLORIDE 500; 50 MG/1; MG/1
1 TABLET, FILM COATED ORAL
Qty: 0 | Refills: 0 | DISCHARGE

## 2022-11-09 RX ORDER — FINASTERIDE 5 MG/1
1 TABLET, FILM COATED ORAL
Qty: 0 | Refills: 0 | DISCHARGE

## 2022-11-09 RX ORDER — PREGABALIN 225 MG/1
1 CAPSULE ORAL
Qty: 0 | Refills: 0 | DISCHARGE

## 2022-11-09 RX ORDER — DEXTROSE 50 % IN WATER 50 %
12.5 SYRINGE (ML) INTRAVENOUS ONCE
Refills: 0 | Status: DISCONTINUED | OUTPATIENT
Start: 2022-11-09 | End: 2022-11-18

## 2022-11-09 RX ORDER — SODIUM ZIRCONIUM CYCLOSILICATE 10 G/10G
10 POWDER, FOR SUSPENSION ORAL ONCE
Refills: 0 | Status: COMPLETED | OUTPATIENT
Start: 2022-11-09 | End: 2022-11-09

## 2022-11-09 RX ORDER — INSULIN LISPRO 100/ML
VIAL (ML) SUBCUTANEOUS AT BEDTIME
Refills: 0 | Status: DISCONTINUED | OUTPATIENT
Start: 2022-11-09 | End: 2022-11-18

## 2022-11-09 RX ORDER — LOSARTAN POTASSIUM 100 MG/1
1 TABLET, FILM COATED ORAL
Qty: 0 | Refills: 0 | DISCHARGE

## 2022-11-09 RX ORDER — INSULIN GLARGINE 100 [IU]/ML
25 INJECTION, SOLUTION SUBCUTANEOUS
Qty: 0 | Refills: 0 | DISCHARGE

## 2022-11-09 RX ORDER — METOPROLOL TARTRATE 50 MG
50 TABLET ORAL
Refills: 0 | Status: DISCONTINUED | OUTPATIENT
Start: 2022-11-09 | End: 2022-11-10

## 2022-11-09 RX ORDER — SODIUM CHLORIDE 9 MG/ML
1000 INJECTION INTRAMUSCULAR; INTRAVENOUS; SUBCUTANEOUS ONCE
Refills: 0 | Status: DISCONTINUED | OUTPATIENT
Start: 2022-11-09 | End: 2022-11-09

## 2022-11-09 RX ORDER — ASPIRIN/CALCIUM CARB/MAGNESIUM 324 MG
1 TABLET ORAL
Qty: 0 | Refills: 0 | DISCHARGE

## 2022-11-09 RX ORDER — ZINC SULFATE TAB 220 MG (50 MG ZINC EQUIVALENT) 220 (50 ZN) MG
0 TAB ORAL
Qty: 0 | Refills: 0 | DISCHARGE

## 2022-11-09 RX ORDER — ASPIRIN/CALCIUM CARB/MAGNESIUM 324 MG
81 TABLET ORAL DAILY
Refills: 0 | Status: DISCONTINUED | OUTPATIENT
Start: 2022-11-09 | End: 2022-11-18

## 2022-11-09 RX ORDER — GLUCAGON INJECTION, SOLUTION 0.5 MG/.1ML
1 INJECTION, SOLUTION SUBCUTANEOUS ONCE
Refills: 0 | Status: DISCONTINUED | OUTPATIENT
Start: 2022-11-09 | End: 2022-11-18

## 2022-11-09 RX ORDER — ROSUVASTATIN CALCIUM 5 MG/1
1 TABLET ORAL
Qty: 0 | Refills: 0 | DISCHARGE

## 2022-11-09 RX ORDER — OMEPRAZOLE 10 MG/1
1 CAPSULE, DELAYED RELEASE ORAL
Qty: 0 | Refills: 0 | DISCHARGE

## 2022-11-09 RX ORDER — DEXTROSE 50 % IN WATER 50 %
50 SYRINGE (ML) INTRAVENOUS ONCE
Refills: 0 | Status: COMPLETED | OUTPATIENT
Start: 2022-11-09 | End: 2022-11-09

## 2022-11-09 RX ORDER — FERROUS SULFATE 325(65) MG
1 TABLET ORAL
Qty: 0 | Refills: 0 | DISCHARGE

## 2022-11-09 RX ORDER — GABAPENTIN 400 MG/1
1 CAPSULE ORAL
Qty: 0 | Refills: 0 | DISCHARGE

## 2022-11-09 RX ORDER — GABAPENTIN 400 MG/1
300 CAPSULE ORAL THREE TIMES A DAY
Refills: 0 | Status: DISCONTINUED | OUTPATIENT
Start: 2022-11-09 | End: 2022-11-18

## 2022-11-09 RX ORDER — SODIUM CHLORIDE 9 MG/ML
1000 INJECTION, SOLUTION INTRAVENOUS
Refills: 0 | Status: DISCONTINUED | OUTPATIENT
Start: 2022-11-09 | End: 2022-11-18

## 2022-11-09 RX ORDER — TAMSULOSIN HYDROCHLORIDE 0.4 MG/1
2 CAPSULE ORAL
Qty: 0 | Refills: 0 | DISCHARGE

## 2022-11-09 RX ORDER — ATORVASTATIN CALCIUM 80 MG/1
20 TABLET, FILM COATED ORAL AT BEDTIME
Refills: 0 | Status: DISCONTINUED | OUTPATIENT
Start: 2022-11-09 | End: 2022-11-18

## 2022-11-09 RX ADMIN — INSULIN HUMAN 5 UNIT(S): 100 INJECTION, SOLUTION SUBCUTANEOUS at 16:37

## 2022-11-09 RX ADMIN — SODIUM ZIRCONIUM CYCLOSILICATE 10 GRAM(S): 10 POWDER, FOR SUSPENSION ORAL at 22:19

## 2022-11-09 RX ADMIN — SODIUM ZIRCONIUM CYCLOSILICATE 10 GRAM(S): 10 POWDER, FOR SUSPENSION ORAL at 16:31

## 2022-11-09 RX ADMIN — Medication 50 MILLILITER(S): at 16:31

## 2022-11-09 NOTE — ED ADULT NURSE NOTE - OBJECTIVE STATEMENT
Patient to the ED  s/p syncopal episode. The patient was at work when he suddenly fainted and before hitting the floor was caught by his co-workers. The patient verbalized having similar syncopal episodes in the past. PMH CAD s/p CABG, HTN, HLD and DM . Denies headache, visual changes, chest pain, shortness of breath, nausea, vomiting, unilateral weakness. Alert and oriented x4. Wife at bedside. CM in place. No distress noted at this time. The patient stated " I feel much better than I did before coming to the ED. I do not know exactly what happened. ". Iv # 20 placed in RAC and # 20 placed in LAC.

## 2022-11-09 NOTE — ED PROVIDER NOTE - ATTENDING CONTRIBUTION TO CARE
Attending Statement: I have personally seen and examined this patient. I have fully participated in the care of this patient. I have reviewed all pertinent clinical information, including history physical exam, plan and the fellow  note and agree except as noted  7 Attending Statement: I have personally seen and examined this patient. I have fully participated in the care of this patient. I have reviewed all pertinent clinical information, including history physical exam, plan and the fellow  note and agree except as noted  77yoF PMH CAD s/p CABG, HTN, HLD, DM, BIBEMS for syncopal episode. pt was in the office, and passed out. Cant recall what happened. pt denies any cp/sob before or after event. no fever. no n/v/d no abdominal pain. no numbness/weakness. Hasn't synopsized in the past. Vital signs noted. sitting up, overall no distress EOMI. no facial asymmetry no slurred speech. supple neck. nl rom. normal S1-S2 No resp distress. able to speak in full and clear sentences. no wheeze, rales or stridor. soft nontender abdomen. no  rebound. no guarding. no sign of trauma. no CVAT moving all ext.   plan ekg, labs, ct head, tba syncope

## 2022-11-09 NOTE — H&P ADULT - NSHPPHYSICALEXAM_GEN_ALL_CORE
VITALS:   T(C): 36.4 (11-09-22 @ 21:36), Max: 36.7 (11-09-22 @ 13:51)  HR: 66 (11-09-22 @ 21:36) (66 - 78)  BP: 136/67 (11-09-22 @ 21:36) (115/65 - 162/82)  RR: 19 (11-09-22 @ 21:36) (12 - 19)  SpO2: 100% (11-09-22 @ 21:36) (100% - 100%)    GENERAL: NAD, lying in bed comfortably  HEAD:  Atraumatic, normocephalic  EYES: EOMI, PERRLA, conjunctiva and sclera clear  ENT: Moist mucous membranes  NECK: Supple, no JVD  HEART: Regular rate and rhythm, no murmurs, rubs, or gallops  LUNGS: Unlabored respirations.  Clear to auscultation bilaterally, no crackles, wheezing, or rhonchi  ABDOMEN: Soft, nontender, nondistended, +BS  EXTREMITIES: 2+ peripheral pulses bilaterally. No clubbing, cyanosis, or edema  NERVOUS SYSTEM:  A&Ox3, no focal deficits   SKIN: No rashes or lesions

## 2022-11-09 NOTE — ED PROVIDER NOTE - NSICDXPASTSURGICALHX_GEN_ALL_CORE_FT
PAST SURGICAL HISTORY:  S/P CABG x 3 May 2021    S/P drug eluting coronary stent placement Ramus    S/P primary angioplasty with coronary stent 1990s    Status post placement of implantable loop recorder May 2021

## 2022-11-09 NOTE — ED PROVIDER NOTE - NS ED ROS FT
GENERAL: no fever, no chills, no weight loss  EYES: no change in vision, no irritation, no discharge, no redness, no pain  HEENT: no trouble swallowing or speaking, no throat pain, no ear pain  CARDIAC: no chest pain, no palpitations   PULMONARY: no cough, no shortness of breath, no wheezing  GI: no abdominal pain, no nausea, no vomiting, no diarrhea, no constipation, no melena, no hematochezia, no hematemesis  : no changes in urination, no dysuria, no frequency, no hematuria, no discharge  SKIN: no rashes  NEURO: no headache, no numbness, no weakness, +syncope  MSK: no joint pain, no muscle pain, no back pain, no calf pain

## 2022-11-09 NOTE — ED PROVIDER NOTE - CLINICAL SUMMARY MEDICAL DECISION MAKING FREE TEXT BOX
Fernando Rangel, PGY4: 74 year old male p/w syncope without prodromal symptoms, reportedly bradycardic to 20s per EMS. Patient at baseline currently, no CP, palpitations, or SOB. Neurologically intact. HR now sinus in 80s, no acute EKG changes. Will obtain labs, CT head, IV fluids, reassess. Patient is high risk syncope and would benefit from CDU vs admission.

## 2022-11-09 NOTE — ED PROVIDER NOTE - CARE PLAN
Principal Discharge DX:	Syncope  Secondary Diagnosis:	SORAIDA (acute kidney injury)  Secondary Diagnosis:	Hyperkalemia   1

## 2022-11-09 NOTE — H&P ADULT - PROBLEM SELECTOR PLAN 1
-pt. with one episode of syncope this AM while at work, +LOC -head involvement or fall  -HR found to be 20's to 30's as per EMS  -diff dx cardiogenic vs. vasovagal  -no prodromal symptoms prior to syncopal episode, less likely vasovagal  -EKG in ED sinus rhythm with 1st degree AV block  -pt. hyperkalemic on admission K 6.1  -will hold aldactone at this time  -will order TT -pt. with one episode of syncope this AM while at work, +LOC -head involvement or fall, no postictal state  -HR found to be 20's to 30's as per EMS  -diff dx cardiogenic vs. vasovagal vs neurogenic  -no prodromal symptoms prior to syncopal episode, less likely vasovagal  -no postictal state or hx seizures in past  -EKG in ED sinus rhythm with 1st degree AV block  -pt. hyperkalemic on admission K 6.1  -will hold aldactone and olmesartan at this time  -will order TTE, monitor on tele  -will consult cards in AM to follow

## 2022-11-09 NOTE — H&P ADULT - PROBLEM SELECTOR PLAN 4
-pt. on Janumet and Lantus 25 at home, though per wife and daughter pt. only takes lantus at night based on BG level at home.  -pt. glucose adequate, 130 on last labs  -will hold lantus dose tonight and reassess BG with meals, place on ISS.  -If BG elevated can restart Lantus tomorrow evening 20 units. -pt. with Hx CAD s/p CABG in 2021  -c/w aspirin

## 2022-11-09 NOTE — ED PROVIDER NOTE - PHYSICAL EXAMINATION
GENERAL: A&Ox3, non-toxic appearing, no acute distress, slow to respond  HEENT: NCAT, EOMI, oral mucosa moist, normal conjunctiva  RESP: CTAB, no respiratory distress, no wheezes/rhonchi/rales, speaking in full sentences  CV: RRR, no murmurs/rubs/gallops, midline chest scar  ABDOMEN: soft, non-tender, non-distended, no guarding  MSK: no visible deformities  NEURO: no focal sensory or motor deficits, CN 2-12 grossly intact, 5/5 strength in all extremities  SKIN: warm, normal color, well perfused, no rash  PSYCH: flat affect

## 2022-11-09 NOTE — ED PROVIDER NOTE - PROGRESS NOTE DETAILS
Fernando Rangel, PGY4: Patient noted to be hyperkalemic to 6.1, nonhemolyzed, no EKG changes.  Will treat with IV fluids, shift with insulin/dextrose, Lokelma for excretion.  Patient will require admission. pt stable, hyperkalemia treated, pending repeat trop and tba. sign out to night team Fernando Rangel, PGY4: Will admit given hyperK, SORAIDA, and syncope. Pt's cardiologist is Dr. Juan Hauser NPP. Patient endorsed to hospitalist on tele. She is requesting repeat BMP.

## 2022-11-09 NOTE — ED PROVIDER NOTE - CADM POA CENTRAL LINE
L foot pain for 1 week, today was very severe. Feels \"like ants crawling on the bottom of his foot\". Takes gabapentin at [facility] but states that it is not working. No

## 2022-11-09 NOTE — ED PROVIDER NOTE - OBJECTIVE STATEMENT
74-year-old male PMH CAD s/p CABG, HTN, HLD, DM, presents to the emergency department for syncope.  Patient states he was at work and woke up with multiple people surrounding him.  Per EMS, patient was bradycardic to 20s.  Patient currently complains of generalized weakness.  He otherwise denies headache, visual changes, chest pain, shortness of breath, nausea, vomiting, unilateral weakness.  He takes aspirin, no other anticoagulation. 5-year-old male PMH CAD s/p CABG, HTN, HLD, DM, presents to the emergency department for syncope.  Patient states he was at work and woke up with multiple people surrounding him.  Per EMS, patient was bradycardic to 20s.  Patient currently complains of generalized weakness.  He otherwise denies headache, visual changes, chest pain, shortness of breath, nausea, vomiting, unilateral weakness.  He takes aspirin, no other anticoagulation. 77-year-old male PMH CAD s/p CABG, HTN, HLD, DM, presents to the emergency department for syncope.  Patient states he was at work and woke up with multiple people surrounding him.  Per EMS, patient was bradycardic to 20s.  Patient currently complains of generalized weakness.  He otherwise denies headache, visual changes, chest pain, shortness of breath, nausea, vomiting, unilateral weakness.  He takes aspirin, no other anticoagulation.

## 2022-11-09 NOTE — H&P ADULT - NSHPLABSRESULTS_GEN_ALL_CORE
LABS:                          13.3   8.77  )-----------( 236      ( 09 Nov 2022 14:32 )             40.9     11-09    135  |  101  |  21  ----------------------------<  130<H>  5.4<H>   |  23  |  1.23    Ca    9.4      09 Nov 2022 18:57    TPro  6.9  /  Alb  4.1  /  TBili  0.8  /  DBili  x   /  AST  29  /  ALT  25  /  AlkPhos  64  11-09    PT/INR - ( 09 Nov 2022 14:32 )   PT: 11.3 sec;   INR: 0.97 ratio         PTT - ( 09 Nov 2022 14:32 )  PTT:25.7 sec

## 2022-11-09 NOTE — H&P ADULT - NSICDXPASTSURGICALHX_GEN_ALL_CORE_FT
PAST SURGICAL HISTORY:  Arterial embolism MMA 2021    S/P CABG x 3 May 2021    S/P drug eluting coronary stent placement Ramus    S/P primary angioplasty with coronary stent 1990s    Status post placement of implantable loop recorder May 2021

## 2022-11-09 NOTE — H&P ADULT - ATTENDING COMMENTS
77yr old male with a pmh of hx of subdural hematoma, CAD s/p CABG 2021, HTN, HLD, T2DM on insulin, BPH, and recent COVID (10-15 days prior) who presents following a syncopal episode at work today. Pt denies any prodromal symptoms prior to the event occurring but does endorse LOC as when he came to there was many people around him. He denies hitting his head as he reports bystanders held him up.   Per EMS pt was found to be bradycardic in the 20s.  Denies  headache, dizziness, chest pain, palpitations, SOB, abdominal pain, joint pain, diarrhea/constipation, urinary symptoms.   Vitals: T 98.1, HR 78, /63, RR 16 satting 100% RA    EKG interpreted by myself: LBBB  CXR interpreted by myself: clear lungs  CT head interpreted by radiology: 1. No acute intracranial hemorrhage.  2. Residual subacute to chronic left subdural hemorrhagic collection, decreased in size on the current examination compared with the prior    REVIEW OF SYSTEMS:    CONSTITUTIONAL: No weakness, fevers or chills  EYES/ENT: No visual changes;  No dysphagia; No sore throat; No rhinorrhea; No sinus pain/pressure  NECK: No pain or stiffness  RESPIRATORY: No cough, wheezing, hemoptysis; No shortness of breath  CARDIOVASCULAR: No chest pain or palpitations; No lower extremity edema  GASTROINTESTINAL: No abdominal or epigastric pain. No nausea, vomiting, or hematemesis; No diarrhea or constipation. No melena or hematochezia.  GENITOURINARY: No dysuria, frequency or hematuria  NEUROLOGICAL: No numbness or weakness + syncope  MSK: ambulates without assistance   SKIN: No itching, burning, rashes, or lesions   All other review of systems is negative unless indicated above.    PHYSICAL EXAM:  GENERAL: NAD, well-developed, well-nourished  HEAD:  Atraumatic, Normocephalic  EYES: EOMI, PERRL, conjunctiva and sclera clear  NECK: Supple, No JVD  CHEST/LUNG: Clear to auscultation bilaterally; No wheezes, rales or rhonchi  HEART: Regular rate and rhythm; No murmurs, rubs, or gallops, (+)S1, S2  ABDOMEN: Soft, Nontender, Nondistended; Normal Bowel sounds   EXTREMITIES:  2+ Peripheral Pulses, No clubbing, cyanosis, or edema  PSYCH: normal mood and affect  NEUROLOGY: AAOx3, non-focal  SKIN: No rashes or lesions    Syncope  - CT head nob acute changes  - Last echo 3/2021: grossly moderate left ventricular systolic dysfunction  - EKG LBBB  * monitor on telemetry  * check orthostatics, echo    SORAIDA/Hyperkalemia  New  Cr 1.38 on admission – baseline 0.9-1.0  K 6.1 ->5.4 s/p insulin/dextrose/lokelma  Hold aldactone and olmesartan   Encourage PO intake     HTN  Chronic  Continue Metoprolol 50mg BID (did not have further episodes of bradycardia here in ED)  Hold aldactone and olmesartan in setting of SORAIDA    CAD  Chronic stable  Continue ASA    T2DM   Chronic moderate exacerbation    Hold home janumet and basal as takes pending on BG  LDCS with diabetic diet  A1c and lipid panel in AM    Remainder as above

## 2022-11-09 NOTE — ED ADULT TRIAGE NOTE - CHIEF COMPLAINT QUOTE
Pt brought in by EMS from home complaining of bradycardia, cool pale and diaphoretic. As per EMS pts HR was in the 20s. Pt denies chest pain, sob, fever or chills.

## 2022-11-09 NOTE — H&P ADULT - PROBLEM SELECTOR PLAN 5
Diet: CC/DASH  DVT: heparin subq  Dispo: likely home -pt. on Janumet and Lantus 25 at home, though per wife and daughter pt. only takes lantus at night based on BG level at home.  -pt. glucose adequate, 130 on last labs  -will hold lantus dose tonight and reassess BG with meals, place on ISS.  -If BG elevated can restart Lantus tomorrow evening 20 units.

## 2022-11-09 NOTE — H&P ADULT - HISTORY OF PRESENT ILLNESS
Mr. Guillaume is a 77YOM with PMH CAD s/p CABG 2021, HTN, HLD, DM on insulin and BPH who presents to the ED today after having a syncopal episode while at work. He denies having any prodromal symptoms prior to fainting but does endorse LOC for some time, noting that he woke up with many people around him. He denies head involvement saying that he was held up by other people during the episode. Per EMS the patient was found to be bradycardic to the 20s, and was found to have generalized weakness in the ED. He has no previous history of syncope. He was found to be COVID+ appx. 10-15 days ago with no respiratory symptoms, only noting some mild confusion. He endorses having a fall in 2019 w/ head involvement w/ chronic subdural hemorrhage (now reducing in size on imaging), and had MMA embolization at [facility] in 2021.

## 2022-11-09 NOTE — H&P ADULT - ASSESSMENT
77YOM with PMH CAD s/p CABG 2021, HTN, HLD, DM on insulin and BPH who presents to the ED today after having a syncopal episode while at work with LOC but no head involvement

## 2022-11-09 NOTE — H&P ADULT - PROBLEM SELECTOR PLAN 3
-pt. with Hx CAD s/p CABG in 2021  -c/w aspirin -pt. hyperkalemic on admission K 6.1  -will hold aldactone and olmesartan at this time  -s/p 10mg Lokelma x2 with adequate response  -trend BMP  -monitor on tele

## 2022-11-09 NOTE — H&P ADULT - PROBLEM SELECTOR PLAN 2
-pt. hyperkalemic on admission K 6.1  -will hold aldactone and olmesartan at this time  -s/p 10mg Lokelma x2 with adequate response  -trend BMP  -monitor on tele -pt. with elevated Creatinine to 1.38 on admission, improved to 1.23  -baseline during previous hospitalizations 0.9-1.2  -encourage PO intake  -holding aldactone and benicar at this time

## 2022-11-09 NOTE — ED ADULT NURSE NOTE - NSICDXFAMILYHX_GEN_ALL_CORE_FT
No FAMILY HISTORY:  Sibling  Still living? Yes, Estimated age: Age Unknown  Family history of diabetes mellitus, Age at diagnosis: Age Unknown

## 2022-11-10 ENCOUNTER — APPOINTMENT (OUTPATIENT)
Dept: CARDIOLOGY | Facility: CLINIC | Age: 77
End: 2022-11-10

## 2022-11-10 DIAGNOSIS — I10 ESSENTIAL (PRIMARY) HYPERTENSION: ICD-10-CM

## 2022-11-10 DIAGNOSIS — N17.9 ACUTE KIDNEY FAILURE, UNSPECIFIED: ICD-10-CM

## 2022-11-10 LAB
A1C WITH ESTIMATED AVERAGE GLUCOSE RESULT: 7.4 % — HIGH (ref 4–5.6)
CHOLEST SERPL-MCNC: 136 MG/DL — SIGNIFICANT CHANGE UP
ESTIMATED AVERAGE GLUCOSE: 166 — SIGNIFICANT CHANGE UP
GLUCOSE BLDC GLUCOMTR-MCNC: 129 MG/DL — HIGH (ref 70–99)
GLUCOSE BLDC GLUCOMTR-MCNC: 142 MG/DL — HIGH (ref 70–99)
GLUCOSE BLDC GLUCOMTR-MCNC: 150 MG/DL — HIGH (ref 70–99)
GLUCOSE BLDC GLUCOMTR-MCNC: 176 MG/DL — HIGH (ref 70–99)
HDLC SERPL-MCNC: 40 MG/DL — LOW
LIPID PNL WITH DIRECT LDL SERPL: 63 MG/DL — SIGNIFICANT CHANGE UP
NON HDL CHOLESTEROL: 96 MG/DL — SIGNIFICANT CHANGE UP
TRIGL SERPL-MCNC: 164 MG/DL — HIGH

## 2022-11-10 PROCEDURE — 93306 TTE W/DOPPLER COMPLETE: CPT | Mod: 26

## 2022-11-10 RX ORDER — METOPROLOL TARTRATE 50 MG
50 TABLET ORAL
Refills: 0 | Status: DISCONTINUED | OUTPATIENT
Start: 2022-11-10 | End: 2022-11-14

## 2022-11-10 RX ADMIN — Medication 81 MILLIGRAM(S): at 14:35

## 2022-11-10 RX ADMIN — ATORVASTATIN CALCIUM 20 MILLIGRAM(S): 80 TABLET, FILM COATED ORAL at 21:33

## 2022-11-10 RX ADMIN — FINASTERIDE 5 MILLIGRAM(S): 5 TABLET, FILM COATED ORAL at 14:36

## 2022-11-10 RX ADMIN — Medication 1: at 17:20

## 2022-11-10 RX ADMIN — TAMSULOSIN HYDROCHLORIDE 0.8 MILLIGRAM(S): 0.4 CAPSULE ORAL at 21:32

## 2022-11-10 RX ADMIN — Medication 50 MILLIGRAM(S): at 05:00

## 2022-11-10 RX ADMIN — GABAPENTIN 300 MILLIGRAM(S): 400 CAPSULE ORAL at 14:35

## 2022-11-10 RX ADMIN — GABAPENTIN 300 MILLIGRAM(S): 400 CAPSULE ORAL at 05:00

## 2022-11-10 RX ADMIN — Medication 50 MILLIGRAM(S): at 17:20

## 2022-11-10 RX ADMIN — GABAPENTIN 300 MILLIGRAM(S): 400 CAPSULE ORAL at 21:32

## 2022-11-10 NOTE — PATIENT PROFILE ADULT - FALL HARM RISK - HARM RISK INTERVENTIONS

## 2022-11-10 NOTE — PROGRESS NOTE ADULT - SUBJECTIVE AND OBJECTIVE BOX
Patient is a 77y old  Male who presents with a chief complaint of Syncope (10 Nov 2022 14:37)    Date of servie : 11-10-22 @ 15:02  INTERVAL HPI/OVERNIGHT EVENTS:  T(C): 36.9 (11-10-22 @ 10:15), Max: 36.9 (11-10-22 @ 10:15)  HR: 72 (11-10-22 @ 10:15) (66 - 86)  BP: 121/82 (11-10-22 @ 10:15) (115/65 - 159/79)  RR: 19 (11-10-22 @ 10:15) (12 - 19)  SpO2: 100% (11-10-22 @ 10:15) (100% - 100%)  Wt(kg): --  I&O's Summary    09 Nov 2022 07:01  -  10 Nov 2022 07:00  --------------------------------------------------------  IN: 0 mL / OUT: 580 mL / NET: -580 mL        LABS:                        13.3   8.77  )-----------( 236      ( 09 Nov 2022 14:32 )             40.9     11-09    135  |  101  |  21  ----------------------------<  130<H>  5.4<H>   |  23  |  1.23    Ca    9.4      09 Nov 2022 18:57    TPro  6.9  /  Alb  4.1  /  TBili  0.8  /  DBili  x   /  AST  29  /  ALT  25  /  AlkPhos  64  11-09    PT/INR - ( 09 Nov 2022 14:32 )   PT: 11.3 sec;   INR: 0.97 ratio         PTT - ( 09 Nov 2022 14:32 )  PTT:25.7 sec    CAPILLARY BLOOD GLUCOSE      POCT Blood Glucose.: 142 mg/dL (10 Nov 2022 11:31)  POCT Blood Glucose.: 129 mg/dL (10 Nov 2022 07:04)  POCT Blood Glucose.: 119 mg/dL (09 Nov 2022 23:23)  POCT Blood Glucose.: 129 mg/dL (09 Nov 2022 15:37)            MEDICATIONS  (STANDING):  aspirin  chewable 81 milliGRAM(s) Oral daily  atorvastatin 20 milliGRAM(s) Oral at bedtime  dextrose 5%. 1000 milliLiter(s) (100 mL/Hr) IV Continuous <Continuous>  dextrose 5%. 1000 milliLiter(s) (50 mL/Hr) IV Continuous <Continuous>  dextrose 50% Injectable 25 Gram(s) IV Push once  dextrose 50% Injectable 12.5 Gram(s) IV Push once  dextrose 50% Injectable 25 Gram(s) IV Push once  finasteride 5 milliGRAM(s) Oral daily  gabapentin 300 milliGRAM(s) Oral three times a day  glucagon  Injectable 1 milliGRAM(s) IntraMuscular once  insulin lispro (ADMELOG) corrective regimen sliding scale   SubCutaneous three times a day before meals  insulin lispro (ADMELOG) corrective regimen sliding scale   SubCutaneous at bedtime  metoprolol tartrate 50 milliGRAM(s) Oral two times a day  tamsulosin 0.8 milliGRAM(s) Oral at bedtime    MEDICATIONS  (PRN):  dextrose Oral Gel 15 Gram(s) Oral once PRN Blood Glucose LESS THAN 70 milliGRAM(s)/deciliter          PHYSICAL EXAM:  GENERAL: NAD, well-groomed, well-developed  HEAD:  Atraumatic, Normocephalic  CHEST/LUNG: Clear to percussion bilaterally; No rales, rhonchi, wheezing, or rubs  HEART: Regular rate and rhythm; No murmurs, rubs, or gallops  ABDOMEN: Soft, Nontender, Nondistended; Bowel sounds present  EXTREMITIES:  2+ Peripheral Pulses, No clubbing, cyanosis, or edema  LYMPH: No lymphadenopathy noted  SKIN: No rashes or lesions    Care Discussed with Consultants/Other Providers [ ] YES  [ ] NO

## 2022-11-10 NOTE — CONSULT NOTE ADULT - SUBJECTIVE AND OBJECTIVE BOX
[facility]), [facility]        Patient is a 77y old  Male who presents with a chief complaint of Syncope (10 Nov 2022 15:01)    Excerpt from H&P,"   Mr. Guillaume is a 77YOM with PMH CAD s/p CABG , HTN, HLD, DM on insulin and BPH who presents to the ED today after having a syncopal episode while at work. He denies having any prodromal symptoms prior to fainting but does endorse LOC for some time, noting that he woke up with many people around him. He denies head involvement saying that he was held up by other people during the episode. Per EMS the patient was found to be bradycardic to the 20s, and was found to have generalized weakness in the ED. He has no previous history of syncope. He was found to be COVID+ appx. 10-15 days ago with no respiratory symptoms, only noting some mild confusion. He endorses having a fall in  w/ head involvement w/ chronic subdural hemorrhage (now reducing in size on imaging), and had MMA embolization at [facility] in .          *****PAST MEDICAL / Surgical  HISTORY:  PAST MEDICAL & SURGICAL HISTORY:  HTN (hypertension)      Diabetes mellitus  Type 2      CAD (coronary artery disease)      Hyperlipidemia      BPH (benign prostatic hypertrophy)      Left bundle branch block      S/P primary angioplasty with coronary stent  1990s      S/P drug eluting coronary stent placement  Ramus      S/P CABG x 3  May 2021      Status post placement of implantable loop recorder  May 2021      Arterial embolism  MMA                *****FAMILY HISTORY:  FAMILY HISTORY:  Family history of diabetes mellitus (Sibling)  3 brothers alive with Diabetes             *****SOCIAL HISTORY:  Alcohol: None  Smoking: None         *****ALLERGIES:   Allergies    No Known Drug Allergies  thalium dye (Rash)    Intolerances             *****MEDICATIONS: current medication reviewed and documented.   MEDICATIONS  (STANDING):  aspirin  chewable 81 milliGRAM(s) Oral daily  atorvastatin 20 milliGRAM(s) Oral at bedtime  dextrose 5%. 1000 milliLiter(s) (100 mL/Hr) IV Continuous <Continuous>  dextrose 5%. 1000 milliLiter(s) (50 mL/Hr) IV Continuous <Continuous>  dextrose 50% Injectable 25 Gram(s) IV Push once  dextrose 50% Injectable 12.5 Gram(s) IV Push once  dextrose 50% Injectable 25 Gram(s) IV Push once  finasteride 5 milliGRAM(s) Oral daily  gabapentin 300 milliGRAM(s) Oral three times a day  glucagon  Injectable 1 milliGRAM(s) IntraMuscular once  insulin lispro (ADMELOG) corrective regimen sliding scale   SubCutaneous three times a day before meals  insulin lispro (ADMELOG) corrective regimen sliding scale   SubCutaneous at bedtime  metoprolol tartrate 50 milliGRAM(s) Oral two times a day  tamsulosin 0.8 milliGRAM(s) Oral at bedtime    MEDICATIONS  (PRN):  dextrose Oral Gel 15 Gram(s) Oral once PRN Blood Glucose LESS THAN 70 milliGRAM(s)/deciliter           *****REVIEW OF SYSTEM:  GEN: no fever, no chills, no pain  RESP: no SOB, no cough, no sputum  CVS: no chest pain, no palpitations, no edema  GI: no abdominal pain, no nausea, no vomiting, no constipation, no diarrhea  : no dysurea, no frequency, no hematurea  Neuro: no headache, no dizziness  PSYCH: no anxiety, no depression  Derm : no itching, no rash         *****VITAL SIGNS:  T(C): 37.1 (11-10-22 @ 17:35), Max: 37.1 (11-10-22 @ 17:35)  HR: 76 (11-10-22 @ 17:35) (66 - 86)  BP: 138/62 (11-10-22 @ 17:35) (121/82 - 159/79)  RR: 19 (11-10-22 @ 17:35) (17 - 19)  SpO2: 100% (11-10-22 @ 17:35) (100% - 100%)  Wt(kg): --     @ 07:01  -  11-10 @ 07:00  --------------------------------------------------------  IN: 0 mL / OUT: 580 mL / NET: -580 mL             *****PHYSICAL EXAM:   Alert oriented x 2  Attention poor  comprehension  fair. Able to name, repeat, read without any difficulty.   Able to follow 1 step commands. repetitive   limited insight at times, insists on going home   tangential        EOMI fundi not visualized,  VFF to confrontration  No facial asymmetry   Tongue is midline       Moving all 4 ext symmetrically no pronator drift   Reflexes are symmetric throughout   sensation is grossly symmetric  Gait : not assessed.  B/L down going toes               *****LAB AND IMAGIN.3   8.77  )-----------( 236      ( 2022 14:32 )             40.9                   135  |  101  |  21  ----------------------------<  130<H>  5.4<H>   |  23  |  1.23    Ca    9.4      2022 18:57    TPro  6.9  /  Alb  4.1  /  TBili  0.8  /  DBili  x   /  AST  29  /  ALT  25  /  AlkPhos  64      PT/INR - ( 2022 14:32 )   PT: 11.3 sec;   INR: 0.97 ratio         PTT - ( 2022 14:32 )  PTT:25.7 sec                            [All pertinent recent Imaging reports reviewed]         *****A S S E S S M E N T   A N D   P L A N :      Excerpt from H&P,"   Mr. Guillaume is a 77YOM with PMH CAD s/p CABG , HTN, HLD, DM on insulin and BPH who presents to the ED today after having a syncopal episode while at work. He denies having any prodromal symptoms prior to fainting but does endorse LOC for some time, noting that he woke up with many people around him. He denies head involvement saying that he was held up by other people during the episode. Per EMS the patient was found to be bradycardic to the 20s, and was found to have generalized weakness in the ED. He has no previous history of syncope. He was found to be COVID+ appx. 10-15 days ago with no respiratory symptoms, only noting some mild confusion. He endorses having a fall in 2019 w/ head involvement w/ chronic subdural hemorrhage (now reducing in size on imaging), and had MMA embolization at [facility] in .     Problem/Recommendations 1: syncope   defer to cardio  pt reports feeling dizzy   denies any prolonged post ictal period  tongue laceration or incontinence   doubt sz     ct head without any acute path   smaller subdural on the left cerebral hemisphere             Problem/Recommendations 2:    renal insufficiency/hyperkalemia   continue to monitor closely      ___________________________  Will follow with you.  Thank you,  Elissa García MD  Diplomate of the American Board of Neurology and Psychiatry.  Diplomate of the American Board of Vascular Neurology.   [facility] ([facility]), [facility]   Ph: [phone]    Differential diagnosis and plan of care discussed with patient after the evaluation.   Advanced care planning options discussed.   Pain assessed and judicious use of narcotics when appropriate was discussed.  Importance of Fall prevention discussed.  Counseling on Smoking and Alcohol cessation was offered when appropriate.  Counseling on Diet, exercise, and medication compliance was done.   83 minutes spent on the total encounter;  more than 50 % of the visit was spent on counseling  and or coordinating care by the attending physician.    Thank you for allowing me to participate in the care of this дмитрий patient. Please do not hesitate to call me if you have any questions.     This and subsequent notes  will  inherently be subject to errors including those of syntax and substitutions which may escape proofreading. In such instances original meaning may be extrapolated by contextual derivation.   
C A R D I O L O G Y  *********************    DATE OF SERVICE: 11-10-22    HISTORY OF PRESENT ILLNESS: HPI:  Pt is a 77YOM with PMH CAD s/p PCI s/p CABG x3 ( LIMA to LAD, SV to PDA, ANd PL of Right) on 5/3/2021, post op Afib previously on Amiodarone, had ILR implanted and then removed, DM, HLD, BPH, previous subdural hematoma who presented to the ED  after having a syncopal episode while at work.    Patient states he was at work when he started to feel dizzy, also reported palpitations, denied any chest pain, stated he tried to stand up and was unsteady on his feet. His co-workers saw him and supported him. States he passed out. No urinary or bowel incontinence.  No confusion. EMS was called and noted to have a heart rate in the 20s. . He has no previous history of syncope. He was found to be COVID+ appx. 10-15 days ago with no respiratory symptoms, only noting some mild confusion. He endorses having a fall in 2019 w/ head involvement w/ chronic subdural hemorrhage (now reducing in size on imaging), and had MMA embolization at [facility] in 2021.     CABG in 2021 ( LIMA to LAD, SVG to PDA and PL of right). Had post op afib with wide complex managed on Amiodarone sunsequently converted to NSR. ILR placed and then removed    ECHO from [facility] records 04/2021 normal LV size, mild LVH, LVEF 45% MAC with moderate MR, mild AI      PAST MEDICAL & SURGICAL HISTORY:  HTN (hypertension)  Diabetes mellitus Type 2  CAD (coronary artery disease) s/p CABG  Hyperlipidemia  BPH (benign prostatic hypertrophy)  Left bundle branch block  S/P primary angioplasty with coronary stent 1990s  S/P drug eluting coronary stent placement Ramus  S/P CABG x 3 May 2021  Status post placement of implantable loop recorder May 2021  Arterial embolism MMA 2021      MEDICATIONS:  MEDICATIONS  (STANDING):  aspirin  chewable 81 milliGRAM(s) Oral daily  atorvastatin 20 milliGRAM(s) Oral at bedtime  dextrose 5%. 1000 milliLiter(s) (100 mL/Hr) IV Continuous <Continuous>  dextrose 5%. 1000 milliLiter(s) (50 mL/Hr) IV Continuous <Continuous>  dextrose 50% Injectable 25 Gram(s) IV Push once  dextrose 50% Injectable 12.5 Gram(s) IV Push once  dextrose 50% Injectable 25 Gram(s) IV Push once  finasteride 5 milliGRAM(s) Oral daily  gabapentin 300 milliGRAM(s) Oral three times a day  glucagon  Injectable 1 milliGRAM(s) IntraMuscular once  insulin lispro (ADMELOG) corrective regimen sliding scale   SubCutaneous three times a day before meals  insulin lispro (ADMELOG) corrective regimen sliding scale   SubCutaneous at bedtime  metoprolol tartrate 50 milliGRAM(s) Oral two times a day  tamsulosin 0.8 milliGRAM(s) Oral at bedtime    Allergies  No Known Drug Allergies  thalium dye (Rash)  Intolerances    FAMILY HISTORY:  Family history of diabetes mellitus (Sibling)  3 brothers alive with Diabetes    SOCIAL HISTORY:    [X ] Non-smoker  [ ] Smoker  [ ] Alcohol    FLU VACCINE THIS YEAR STARTS IN AUGUST:  [ ] Yes    [ ] No    IF OVER 65 HAVE YOU EVER HAD A PNA VACCINE:  [ ] Yes    [ ] No       [ ] N/A      REVIEW OF SYSTEMS:  [ ]chest pain  [  ]shortness of breath  [  ]palpitations  [ X ]syncope  [ ]near syncope [ ]upper extremity weakness   [ ] lower extremity weakness  [  ]diplopia  [  ]altered mental status   [  ]fevers  [ ]chills [ ]nausea  [ ]vomiting  [  ]dysphagia    [ ]abdominal pain  [ ]melena  [ ]BRBPR    [  ]epistaxis  [  ]rash    [ ]lower extremity edema    [X] All others negative	  [ ] Unable to obtain      LABS:	 	    CARDIAC MARKERS:                        13.3   8.77  )-----------( 236      ( 09 Nov 2022 14:32 )             40.9     Hb Trend: 13.3<--    11-09    135  |  101  |  21  ----------------------------<  130<H>  5.4<H>   |  23  |  1.23    Ca    9.4      09 Nov 2022 18:57    TPro  6.9  /  Alb  4.1  /  TBili  0.8  /  DBili  x   /  AST  29  /  ALT  25  /  AlkPhos  64  11-09    Creatinine Trend: 1.23<--, 1.38<--    PHYSICAL EXAM:  T(C): 36.9 (11-10-22 @ 10:15), Max: 36.9 (11-10-22 @ 10:15)  HR: 72 (11-10-22 @ 10:15) (66 - 86)  BP: 121/82 (11-10-22 @ 10:15) (115/65 - 162/82)  RR: 19 (11-10-22 @ 10:15) (12 - 19)  SpO2: 100% (11-10-22 @ 10:15) (100% - 100%)  Wt(kg): --   BMI (kg/m2): 24.3 (11-10-22 @ 10:09)  I&O's Summary    09 Nov 2022 07:01  -  10 Nov 2022 07:00  --------------------------------------------------------  IN: 0 mL / OUT: 580 mL / NET: -580 mL    General: Well nourished in no acute distress. Alert and Oriented * 3.   Head: Normocephalic and atraumatic.   Neck: No JVD. No bruits. Supple. Does not appear to be enlarged.   Cardiovascular: + S1,S2 ; RRR Soft systolic murmur at the left lower sternal border. No rubs noted.    Lungs: CTA b/l. No rhonchi, rales or wheezes.   Abdomen: + BS, soft. Non tender. Non distended. No rebound. No guarding.   Extremities: No clubbing/cyanosis/edema.   Neurologic: Moves all four extremities. Full range of motion.   Skin: Warm and moist. The patient's skin has normal elasticity and good skin turgor.   Psychiatric: Appropriate mood and affect.  Musculoskeletal: Normal range of motion, normal strength     TELEMETRY: 	  NSR with PVC    ECG:  	NSR, 1st degree LBBB    RADIOLOGY:         CXR:     ASSESSMENT/PLAN: Pt is a 77YOM with PMH CAD s/p PCI s/p CABG x3 ( LIMA to LAD, SV to PDA, ANd PL of Right) on 5/3/2021, post op Afib previously on Amiodarone, had ILR implanted and then removed, DM, HLD, BPH, previous subdural hematoma who presented to the ED  after having a syncopal episode while at work.    1. Syncope  - EKG with 1st degree AVB and LBBB  - no strips available from heart rate in 20s  - EP consult for evaluation  - check echo for EF in case patient needs PPM vs ICD    2. CAD  -  CAD s/p PCI s/p CABG x3 ( LIMA to LAD, SV to PDA, And PL of Right)   - c/w ASA and Statin    3.  post op Afib previously on Amiodarone  - currently in sinus c/w BB    4. HLD  - c/w atorvastatin    Christian Cody MD  Pager: [phone]       
EP ttending    HISTORY OF PRESENT ILLNESS: HPI:  Mr. Guillaume is a 77YOM with PMH CAD s/p CABG 2021, HTN, HLD, DM on insulin and BPH who presents to the ED today after having a syncopal episode while at work. He denies having any prodromal symptoms prior to fainting but does endorse LOC for some time, noting that he woke up with many people around him. He denies head involvement saying that he was held up by other people during the episode. Per EMS the patient was found to be bradycardic to the 20s, and was found to have generalized weakness in the ED. He has no previous history of syncope. He was found to be COVID+ appx. 10-15 days ago with no respiratory symptoms, only noting some mild confusion. He endorses having a fall in 2019 w/ head involvement w/ chronic subdural hemorrhage (now reducing in size on imaging), and had MMA embolization at [facility] in 2021. (09 Nov 2022 22:30)    Mr Guillaume presents after unexplained syncope at work. Lightheadedness started while sitting down. 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  He reports no prodrome of palpitations, warmth/sweating, nausea or blurry vision.   EMS verbal report to the ED was that patient's pulse was in the 20s in the ambulance, but no EKG strips of this rhythm were recorded.  On arrival, EKG redemonstrates sinus rhythm, first degree AV block, and LBBB.  He was found to be mildly hyperkalemic, and treated medically for this, and some antihypertensives held.      His cardiac history is notable for reduced LV EF, multi-vessel CAD requiring CABG, and post-CABG AFib.  His CABG + ILR implant were at [facility], after being turned down for CABG at [facility]. States he no longer follows at [facility] though.  No further AFib was identified in the year since his CABG, so the ILR was removed.  A 10 pt ROS is otherwise negative.    PAST MEDICAL & SURGICAL HISTORY:  HTN (hypertension)  Diabetes mellitus  Type 2  CAD (coronary artery disease)  Hyperlipidemia  BPH (benign prostatic hypertrophy)  Left bundle branch block  S/P primary angioplasty with coronary stent  1990s  S/P drug eluting coronary stent placement  Ramus  S/P CABG x 3  May 2021  Status post placement of implantable loop recorder  May 2021  Arterial embolism  MMA 2021      MEDICATIONS  (STANDING):  aspirin  chewable 81 milliGRAM(s) Oral daily  atorvastatin 20 milliGRAM(s) Oral at bedtime  dextrose 5%. 1000 milliLiter(s) (100 mL/Hr) IV Continuous <Continuous>  dextrose 5%. 1000 milliLiter(s) (50 mL/Hr) IV Continuous <Continuous>  dextrose 50% Injectable 25 Gram(s) IV Push once  dextrose 50% Injectable 12.5 Gram(s) IV Push once  dextrose 50% Injectable 25 Gram(s) IV Push once  finasteride 5 milliGRAM(s) Oral daily  gabapentin 300 milliGRAM(s) Oral three times a day  glucagon  Injectable 1 milliGRAM(s) IntraMuscular once  insulin lispro (ADMELOG) corrective regimen sliding scale   SubCutaneous three times a day before meals  insulin lispro (ADMELOG) corrective regimen sliding scale   SubCutaneous at bedtime  metoprolol tartrate 50 milliGRAM(s) Oral two times a day  tamsulosin 0.8 milliGRAM(s) Oral at bedtime      Allergies    No Known Drug Allergies  thalium dye (Rash)    Intolerances    FAMILY HISTORY:  Family history of diabetes mellitus (Sibling)  3 brothers alive with Diabetes      Non-contributary for premature coronary disease or sudden cardiac death    SOCIAL HISTORY:    [x ] Non-smoker  [ ] Smoker  [ ] Alcohol      PHYSICAL EXAM:  T(C): 36.9 (11-10-22 @ 10:15), Max: 36.9 (11-10-22 @ 10:15)  HR: 72 (11-10-22 @ 10:15) (66 - 86)  BP: 121/82 (11-10-22 @ 10:15) (115/65 - 159/79)  RR: 19 (11-10-22 @ 10:15) (12 - 19)  SpO2: 100% (11-10-22 @ 10:15) (100% - 100%)  Wt(kg): --    General: Well nourished, no acute distress, alert and oriented x 3  Head: normocephalic, no trauma  Neck: no JVD, no bruit, supple, not enlarged  CV: S1S2, no S3, regular rate, rhythm is SINUS, no murmurs.    Lungs: clear BL, no rales or wheezes  Abdomen: bowel sounds +, soft, nontender, nondistended  Extremities: no clubbing, cyanosis or edema  Neuro: Moves all 4 extremities, sensation intact x 4 extremities  Skin: warm and moist, normal turgor  Psych: Mood and affect are appropriate for circumstances  MSK: normal range of motion and strength x4 extremities.    TELEMETRY: NSR, first degree AV block, LBBB	    ECG: NSR, first degree AV block >300ms, and LBBB 144ms.  Echo: Pre-CABG, moderate systolic dysfunction, difficult echo windows.  Repeat is pending.  	  	  LABS:	 	                          13.3   8.77  )-----------( 236      ( 09 Nov 2022 14:32 )             40.9     11-09    135  |  101  |  21  ----------------------------<  130<H>  5.4<H>   |  23  |  1.23    Ca    9.4      09 Nov 2022 18:57    TPro  6.9  /  Alb  4.1  /  TBili  0.8  /  DBili  x   /  AST  29  /  ALT  25  /  AlkPhos  64  11-09    ASSESSMENT/PLAN: 	77y Male presenting with unexplained syncope. Hx multivessel CAD s/p CABG last year, previously reduced EF (30-40% range) and longstanding first degree AV block and LBBB.    High risk syncope presentation with uncertain prognosis at this point.  Maintain hospitalization on telemetry.  Re-check an echocardiogram, and consider an ischemic workup.  Recommended EP study to rule out susceptibility to infranodal heart block, and for VT induction given prior MI/CABG, and baseline first degree AV block + LBBB.  Continue aspirin, metoprolol and lipitor for CAD.    Don Martin M.D.  Cardiac Electrophysiology    office [phone]  pager [phone]

## 2022-11-11 LAB
ANION GAP SERPL CALC-SCNC: 12 MMOL/L — SIGNIFICANT CHANGE UP (ref 7–14)
BUN SERPL-MCNC: 22 MG/DL — SIGNIFICANT CHANGE UP (ref 7–23)
CALCIUM SERPL-MCNC: 9.5 MG/DL — SIGNIFICANT CHANGE UP (ref 8.4–10.5)
CHLORIDE SERPL-SCNC: 103 MMOL/L — SIGNIFICANT CHANGE UP (ref 98–107)
CO2 SERPL-SCNC: 22 MMOL/L — SIGNIFICANT CHANGE UP (ref 22–31)
CREAT SERPL-MCNC: 1.16 MG/DL — SIGNIFICANT CHANGE UP (ref 0.5–1.3)
EGFR: 64 ML/MIN/1.73M2 — SIGNIFICANT CHANGE UP
GLUCOSE BLDC GLUCOMTR-MCNC: 170 MG/DL — HIGH (ref 70–99)
GLUCOSE BLDC GLUCOMTR-MCNC: 173 MG/DL — HIGH (ref 70–99)
GLUCOSE BLDC GLUCOMTR-MCNC: 202 MG/DL — HIGH (ref 70–99)
GLUCOSE BLDC GLUCOMTR-MCNC: 212 MG/DL — HIGH (ref 70–99)
GLUCOSE SERPL-MCNC: 194 MG/DL — HIGH (ref 70–99)
HCT VFR BLD CALC: 38.9 % — LOW (ref 39–50)
HGB BLD-MCNC: 13 G/DL — SIGNIFICANT CHANGE UP (ref 13–17)
MCHC RBC-ENTMCNC: 29.1 PG — SIGNIFICANT CHANGE UP (ref 27–34)
MCHC RBC-ENTMCNC: 33.4 GM/DL — SIGNIFICANT CHANGE UP (ref 32–36)
MCV RBC AUTO: 87.2 FL — SIGNIFICANT CHANGE UP (ref 80–100)
NRBC # BLD: 0 /100 WBCS — SIGNIFICANT CHANGE UP (ref 0–0)
NRBC # FLD: 0 K/UL — SIGNIFICANT CHANGE UP (ref 0–0)
PHOSPHATE SERPL-MCNC: 3 MG/DL — SIGNIFICANT CHANGE UP (ref 2.5–4.5)
PLATELET # BLD AUTO: 190 K/UL — SIGNIFICANT CHANGE UP (ref 150–400)
POTASSIUM SERPL-MCNC: 4.4 MMOL/L — SIGNIFICANT CHANGE UP (ref 3.5–5.3)
POTASSIUM SERPL-SCNC: 4.4 MMOL/L — SIGNIFICANT CHANGE UP (ref 3.5–5.3)
RBC # BLD: 4.46 M/UL — SIGNIFICANT CHANGE UP (ref 4.2–5.8)
RBC # FLD: 13.2 % — SIGNIFICANT CHANGE UP (ref 10.3–14.5)
SODIUM SERPL-SCNC: 137 MMOL/L — SIGNIFICANT CHANGE UP (ref 135–145)
TROPONIN T, HIGH SENSITIVITY RESULT: 23 NG/L — SIGNIFICANT CHANGE UP
WBC # BLD: 6.57 K/UL — SIGNIFICANT CHANGE UP (ref 3.8–10.5)
WBC # FLD AUTO: 6.57 K/UL — SIGNIFICANT CHANGE UP (ref 3.8–10.5)

## 2022-11-11 RX ADMIN — FINASTERIDE 5 MILLIGRAM(S): 5 TABLET, FILM COATED ORAL at 12:26

## 2022-11-11 RX ADMIN — GABAPENTIN 300 MILLIGRAM(S): 400 CAPSULE ORAL at 14:12

## 2022-11-11 RX ADMIN — GABAPENTIN 300 MILLIGRAM(S): 400 CAPSULE ORAL at 05:17

## 2022-11-11 RX ADMIN — Medication 50 MILLIGRAM(S): at 05:17

## 2022-11-11 RX ADMIN — Medication 2: at 17:40

## 2022-11-11 RX ADMIN — Medication 2: at 12:26

## 2022-11-11 RX ADMIN — TAMSULOSIN HYDROCHLORIDE 0.8 MILLIGRAM(S): 0.4 CAPSULE ORAL at 21:50

## 2022-11-11 RX ADMIN — ATORVASTATIN CALCIUM 20 MILLIGRAM(S): 80 TABLET, FILM COATED ORAL at 21:50

## 2022-11-11 RX ADMIN — Medication 81 MILLIGRAM(S): at 12:26

## 2022-11-11 RX ADMIN — Medication 1: at 07:46

## 2022-11-11 RX ADMIN — Medication 50 MILLIGRAM(S): at 17:41

## 2022-11-11 RX ADMIN — GABAPENTIN 300 MILLIGRAM(S): 400 CAPSULE ORAL at 21:50

## 2022-11-11 NOTE — PROGRESS NOTE ADULT - SUBJECTIVE AND OBJECTIVE BOX
[facility]      Seen earlier today Date of service   No new neurological symptoms reported. Remains stable neurologically.   - Today, patient is without complaints.          *****MEDICATIONS: Current medication reviewed and documented.    MEDICATIONS  (STANDING):  aspirin  chewable 81 milliGRAM(s) Oral daily  atorvastatin 20 milliGRAM(s) Oral at bedtime  dextrose 5%. 1000 milliLiter(s) (100 mL/Hr) IV Continuous <Continuous>  dextrose 5%. 1000 milliLiter(s) (50 mL/Hr) IV Continuous <Continuous>  dextrose 50% Injectable 25 Gram(s) IV Push once  dextrose 50% Injectable 12.5 Gram(s) IV Push once  dextrose 50% Injectable 25 Gram(s) IV Push once  finasteride 5 milliGRAM(s) Oral daily  gabapentin 300 milliGRAM(s) Oral three times a day  glucagon  Injectable 1 milliGRAM(s) IntraMuscular once  insulin lispro (ADMELOG) corrective regimen sliding scale   SubCutaneous three times a day before meals  insulin lispro (ADMELOG) corrective regimen sliding scale   SubCutaneous at bedtime  metoprolol tartrate 50 milliGRAM(s) Oral two times a day  tamsulosin 0.8 milliGRAM(s) Oral at bedtime    MEDICATIONS  (PRN):  dextrose Oral Gel 15 Gram(s) Oral once PRN Blood Glucose LESS THAN 70 milliGRAM(s)/deciliter          ***** VITAL SIGNS:   Vital Signs Last 24 Hrs       T(F): 98 (22 @ 12:32), Max: 98.1 (22 @ 05:17)  HR: 79 (22 @ 17:40) (73 - 84)  BP: 115/59 (22 @ 17:40) (115/59 - 130/71)  RR: 18 (22 @ 12:32) (16 - 18)  SpO2: 100% (22 @ 12:32) (100% - 100%)  Wt(kg): --  I&O's Summary           *****PHYSICAL EXAM:   alert oriented x 3 attention comprehension are fair.  repetitive   limited insight   Able to name, repeat.   EOmi fundi not visualized   no nystagmus VFF to confrontation  Tongue is midline  Palate elevates symmetrically   Moving all 4 ext spontaneously no drift appreciated    Gait not assessed.            *****LAB AND IMAGIN.0   6.57  )-----------( 190      ( 2022 06:25 )             38.9                   137  |  103  |  22  ----------------------------<  194<H>  4.4   |  22  |  1.16    Ca    9.5      2022 06:25  Phos  TNP                                [All pertinent recent Imaging/Reports reviewed]            *****A S S E S S M E N T   A N D   P L A N :         Excerpt from H&P,"   Mr. Guillaume is a 77YOM with PMH CAD s/p CABG , HTN, HLD, DM on insulin and BPH who presents to the ED today after having a syncopal episode while at work. He denies having any prodromal symptoms prior to fainting but does endorse LOC for some time, noting that he woke up with many people around him. He denies head involvement saying that he was held up by other people during the episode. Per EMS the patient was found to be bradycardic to the 20s, and was found to have generalized weakness in the ED. He has no previous history of syncope. He was found to be COVID+ appx. 10-15 days ago with no respiratory symptoms, only noting some mild confusion. He endorses having a fall in 2019 w/ head involvement w/ chronic subdural hemorrhage (now reducing in size on imaging), and had MMA embolization at [facility] in .     Problem/Recommendations 1: syncope   defer to cardio  pt reports feeling dizzy   denies any prolonged post ictal period  tongue laceration or incontinence   doubt sz     ct head without any acute path   smaller subdural on the left cerebral hemisphere        Problem/Recommendations 2:    renal insufficiency/hyperkalemia   continue to monitor closely       Problem/Recommendations 3:cognitive impairment  pt is forgetful , repetitive  does not always retain information  will need witness for any consents     Thank you for allowing me to participate in the care of this patient. Will continue to follow patient periodically. Please do not hesitate to call me if you have any  questions or if there has been a change in patients neurological status     ________________  Elissa García MD  [facility]  [phone]      33 minutes spent on total encounter; more than 50 % of the visit was  spent counseling about plan of care, compliance to diet/exercise and medication regimen and or  coordinating care by the attending physician.      It is advised that stroke patients follow up with BERHANE Garrett @ [phone] in 1- 2 weeks.   Others please follow up with Dr. Michael Nissenbaum [phone]

## 2022-11-11 NOTE — PROGRESS NOTE ADULT - SUBJECTIVE AND OBJECTIVE BOX
Date of service 11/11/2022    chief complaint: Syncope    extended hpi: Pt is a 77YOM with PMH CAD s/p PCI s/p CABG x3 ( LIMA to LAD, SV to PDA, ANd PL of Right) on 5/3/2021, post op Afib previously on Amiodarone, had ILR implanted and then removed, DM, HLD, BPH, previous subdural hematoma who presented to the ED  after having a syncopal episode while at work.    Patient states he was at work when he started to feel dizzy, also reported palpitations, denied any chest pain, stated he tried to stand up and was unsteady on his feet. His co-workers saw him and supported him. States he passed out. No urinary or bowel incontinence.  No confusion. EMS was called and noted to have a heart rate in the 20s. . He has no previous history of syncope. He was found to be COVID+ appx. 10-15 days ago with no respiratory symptoms, only noting some mild confusion. He endorses having a fall in 2019 w/ head involvement w/ chronic subdural hemorrhage (now reducing in size on imaging), and had MMA embolization at [facility] in 2021.     CABG in 2021 ( LIMA to LAD, SVG to PDA and PL of right). Had post op afib with wide complex managed on Amiodarone sunsequently converted to NSR. ILR placed and then removed    ECHO from [place] records 04/2021 normal LV size, mild LVH, LVEF 45% MAC with moderate MR, mild AI        Patient denies chest pain or shortness of breath.   Review of symptoms otherwise negative.    MEDICATIONS:  aspirin  chewable 81 milliGRAM(s) Oral daily  atorvastatin 20 milliGRAM(s) Oral at bedtime  dextrose 5%. 1000 milliLiter(s) IV Continuous <Continuous>  dextrose 5%. 1000 milliLiter(s) IV Continuous <Continuous>  dextrose 50% Injectable 25 Gram(s) IV Push once  dextrose 50% Injectable 12.5 Gram(s) IV Push once  dextrose 50% Injectable 25 Gram(s) IV Push once  dextrose Oral Gel 15 Gram(s) Oral once PRN  finasteride 5 milliGRAM(s) Oral daily  gabapentin 300 milliGRAM(s) Oral three times a day  glucagon  Injectable 1 milliGRAM(s) IntraMuscular once  insulin lispro (ADMELOG) corrective regimen sliding scale   SubCutaneous three times a day before meals  insulin lispro (ADMELOG) corrective regimen sliding scale   SubCutaneous at bedtime  metoprolol tartrate 50 milliGRAM(s) Oral two times a day  tamsulosin 0.8 milliGRAM(s) Oral at bedtime      LABS:                        13.0   6.57  )-----------( 190      ( 11 Nov 2022 06:25 )             38.9     Hemoglobin: 13.0 g/dL (11-11 @ 06:25)  Hemoglobin: 13.3 g/dL (11-09 @ 14:32)      11-11    137  |  103  |  22  ----------------------------<  194<H>  4.4   |  22  |  1.16    Ca    9.5      11 Nov 2022 06:25  Phos  TNP     11-11    TPro  6.9  /  Alb  4.1  /  TBili  0.8  /  DBili  x   /  AST  29  /  ALT  25  /  AlkPhos  64  11-09    Creatinine Trend: 1.16<--, 1.23<--, 1.38<--      PHYSICAL EXAM:  T(C): 36.7 (11-11-22 @ 05:17), Max: 37.1 (11-10-22 @ 17:35)  HR: 84 (11-11-22 @ 05:17) (72 - 84)  BP: 127/61 (11-11-22 @ 05:17) (121/82 - 138/62)  RR: 16 (11-11-22 @ 05:17) (16 - 19)  SpO2: 100% (11-11-22 @ 05:17) (100% - 100%)  Wt(kg): --    I&O's Summary      General: Well nourished in no acute distress. Alert and Oriented * 3.   Head: Normocephalic and atraumatic.   Neck: No JVD. No bruits. Supple. Does not appear to be enlarged.   Cardiovascular: + S1,S2 ; RRR Soft systolic murmur at the left lower sternal border. No rubs noted.    Lungs: CTA b/l. No rhonchi, rales or wheezes.   Abdomen: + BS, soft. Non tender. Non distended. No rebound. No guarding.   Extremities: No clubbing/cyanosis/edema.   Neurologic: Moves all four extremities. Full range of motion.   Skin: Warm and moist. The patient's skin has normal elasticity and good skin turgor.   Psychiatric: Appropriate mood and affect.  Musculoskeletal: Normal range of motion, normal strength    TELE: NSR with 1st degree      ECG:  	NSR, 1st degree LBBB    TTE:  OBSERVATIONS:  Mitral Valve: Mitral annular calcification, otherwise  normal mitral valve. Mild mitral regurgitation.  Aortic Root: Normal aortic root.  Aortic Valve: Aortic valve leaflet morphology not well  visualized.  Left Atrium: Normal left atrium.  Left Ventricle: Endocardium not well visualized; grossly  mild to moderate segmental left ventricular systolic  dysfunction.  Hypokinesis of the apex, distal septum, and  distal anterior wall.  Endocardial visualization enhanced  with intravenous injection of echo contrast (Definity).  No  LV thrombus seen.  Right Heart: Normal right atrium. The right ventricle is  not well visualized; grossly normal right ventricular  systolic function. Normal tricuspid valve. Minimal  tricuspid regurgitation. Normal pulmonic valve.  Pericardium/PleuraNormal pericardium with no pericardial  effusion    ASSESSMENT/PLAN: Pt is a 77YOM with PMH CAD s/p PCI s/p CABG x3 ( LIMA to LAD, SV to PDA, ANd PL of Right) on 5/3/2021, post op Afib previously on Amiodarone, had ILR implanted and then removed, DM, HLD, BPH, previous subdural hematoma who presented to the ED  after having a syncopal episode while at work.    1. Syncope  - EKG with 1st degree AVB and LBBB  - no strips available from heart rate in 20s  - EP consult for evaluation and possible EP study  - ECHO with mild to moderate segmental LV dysfucntion in setting of CABG, will check with EP for ischemic work-up    2. CAD  -  CAD s/p PCI s/p CABG x3 ( LIMA to LAD, SV to PDA, And PL of Right)   - c/w ASA and Statin    3.  Post op Afib previously on Amiodarone  - currently in sinus c/w BB    4. HLD  - c/w atorvastatin    Christian Cody MD  Pager: [phone]

## 2022-11-11 NOTE — PROGRESS NOTE ADULT - SUBJECTIVE AND OBJECTIVE BOX
EP ttending    HISTORY OF PRESENT ILLNESS: HPI:  Mr. Guillaume is a 77YOM with PMH CAD s/p CABG 2021, HTN, HLD, DM on insulin and BPH who presents to the ED today after having a syncopal episode while at work. He denies having any prodromal symptoms prior to fainting but does endorse LOC for some time, noting that he woke up with many people around him. He denies head involvement saying that he was held up by other people during the episode. Per EMS the patient was found to be bradycardic to the 20s, and was found to have generalized weakness in the ED. He has no previous history of syncope. He was found to be COVID+ appx. 10-15 days ago with no respiratory symptoms, only noting some mild confusion. He endorses having a fall in 2019 w/ head involvement w/ chronic subdural hemorrhage (now reducing in size on imaging), and had MMA embolization at [facility] in 2021. (09 Nov 2022 22:30)    Mr Guillaume presents after unexplained syncope at work. Lightheadedness started while sitting down. 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  He reports no prodrome of palpitations, warmth/sweating, nausea or blurry vision.   EMS verbal report to the ED was that patient's pulse was in the 20s in the ambulance, but no EKG strips of this rhythm were recorded.  On arrival, EKG redemonstrates sinus rhythm, first degree AV block, and LBBB.  He was found to be mildly hyperkalemic, and treated medically for this, and some antihypertensives held.      His cardiac history is notable for reduced LV EF, multi-vessel CAD requiring CABG, and post-CABG AFib.  His CABG + ILR implant were at [place], after being turned down for CABG at [facility]. States he no longer follows at [place] though.  No further AFib was identified in the year since his CABG, so the ILR was removed.  A 10 pt ROS is otherwise negative.  Date of service 11/11- spoke w/ patient's daughter and other family members on the phone for collateral history, and plan of care discussed in depth.  no angina or palpitations, no orthopnea or syncope here in hospital.  awaiting next step of testing and agrees not to leave hospital until completion.    PAST MEDICAL & SURGICAL HISTORY:  HTN (hypertension)  Diabetes mellitus  Type 2  CAD (coronary artery disease)  Hyperlipidemia  BPH (benign prostatic hypertrophy)  Left bundle branch block  S/P primary angioplasty with coronary stent  1990s  S/P drug eluting coronary stent placement  [place]  S/P CABG x 3  May 2021  Status post placement of implantable loop recorder  May 2021  Arterial embolism  MMA 2021    aspirin  chewable 81 milliGRAM(s) Oral daily  atorvastatin 20 milliGRAM(s) Oral at bedtime  dextrose 5%. 1000 milliLiter(s) IV Continuous <Continuous>  dextrose 5%. 1000 milliLiter(s) IV Continuous <Continuous>  dextrose 50% Injectable 25 Gram(s) IV Push once  dextrose 50% Injectable 12.5 Gram(s) IV Push once  dextrose 50% Injectable 25 Gram(s) IV Push once  dextrose Oral Gel 15 Gram(s) Oral once PRN  finasteride 5 milliGRAM(s) Oral daily  gabapentin 300 milliGRAM(s) Oral three times a day  glucagon  Injectable 1 milliGRAM(s) IntraMuscular once  insulin lispro (ADMELOG) corrective regimen sliding scale   SubCutaneous three times a day before meals  insulin lispro (ADMELOG) corrective regimen sliding scale   SubCutaneous at bedtime  metoprolol tartrate 50 milliGRAM(s) Oral two times a day  tamsulosin 0.8 milliGRAM(s) Oral at bedtime                            13.0   6.57  )-----------( 190      ( 11 Nov 2022 06:25 )             38.9       11-11    137  |  103  |  22  ----------------------------<  194<H>  4.4   |  22  |  1.16    Ca    9.5      11 Nov 2022 06:25  Phos  TNP     11-11    TPro  6.9  /  Alb  4.1  /  TBili  0.8  /  DBili  x   /  AST  29  /  ALT  25  /  AlkPhos  64  11-09    T(C): 36.7 (11-11-22 @ 12:32), Max: 37.1 (11-10-22 @ 17:35)  HR: 73 (11-11-22 @ 12:32) (72 - 84)  BP: 120/59 (11-11-22 @ 12:32) (120/59 - 138/62)  RR: 18 (11-11-22 @ 12:32) (16 - 19)  SpO2: 100% (11-11-22 @ 12:32) (100% - 100%)  Wt(kg): --    I&O's Summary    General: Well nourished, no acute distress, alert and oriented x 3  Head: normocephalic, no trauma  Neck: no JVD, no bruit, supple, not enlarged  CV: S1S2, no S3, regular rate, rhythm is SINUS, no murmurs.    Lungs: clear BL, no rales or wheezes  Abdomen: bowel sounds +, soft, nontender, nondistended  Extremities: no clubbing, cyanosis or edema  Neuro: Moves all 4 extremities, sensation intact x 4 extremities  Skin: warm and moist, normal turgor  Psych: Mood and affect are appropriate for circumstances  MSK: normal range of motion and strength x4 extremities.    TELEMETRY: NSR, first degree AV block, LBBB	    ECG: NSR, first degree AV block >300ms, and LBBB 144ms.  Echo: Pre-CABG, moderate systolic dysfunction, difficult echo windows.  Repeat is pending.  < from: TTE with Doppler (w/Cont) (11.10.22 @ 18:27) >  OBSERVATIONS:  Mitral Valve: Mitral annular calcification, otherwise  normal mitral valve. Mild mitral regurgitation.  Aortic Root: Normal aortic root.  Aortic Valve: Aortic valve leaflet morphology not well  visualized.  Left Atrium: Normal left atrium.  Left Ventricle: Endocardium not well visualized; grossly  mild to moderate segmental left ventricular systolic  dysfunction.  Hypokinesis of the apex, distal septum, and  distal anterior wall.  Endocardial visualization enhanced  with intravenous injection of echo contrast (Definity).  No  LV thrombus seen.  Right Heart: Normal right atrium. The right ventricle is  not well visualized; grossly normal right ventricular  systolic function. Normal tricuspid valve. Minimal  tricuspid regurgitation. Normal pulmonic valve.  Pericardium/PleuraNormal pericardium with no pericardial  effusion.    < end of copied text >  	    ASSESSMENT/PLAN: 	77y Male presenting with unexplained syncope. Hx multivessel CAD s/p CABG last year, previously reduced EF (30-40% range) and longstanding first degree AV block and LBBB.    High risk syncope presentation with uncertain prognosis at this point  Echocardiogram suggests persistent LV systolic dysfunction despite CABG but unable to accurately assess LVEF%.  He is at high risk for his syncope being due to either infra-bri AV block or spontaneous VT/VF in the setting of ischemic heart disease.  Maintain hospitalization on telemetry.    Chronic systolic CHF - will order a stress test.  if high risk results, needs coronary angiography.  has previously been on an ARB.  will restart an ARB or ARNI.    Left Bundle Branch Block- EP study to rule out susceptibility to infranodal heart block    CAD, prior MI, CABG, reduced LV EF% - EP study for VT. Continue aspirin, metoprolol and lipitor for CAD.    Depending upon results of EP Study, may need either a biventricular pacemaker, biventricular ICD, or outpatient followup for an ILR.  He previously saw Dr Nahid Ricci from [phone] and wants to re-enroll w/ [facility].    Don Martin M.D.  Cardiac Electrophysiology    office [phone]  pager [phone]

## 2022-11-11 NOTE — PROGRESS NOTE ADULT - SUBJECTIVE AND OBJECTIVE BOX
Patient is a 77y old  Male who presents with a chief complaint of Syncope (11 Nov 2022 09:43)    Date of servie : 11-11-22 @ 13:32  INTERVAL HPI/OVERNIGHT EVENTS:  T(C): 36.7 (11-11-22 @ 12:32), Max: 37.1 (11-10-22 @ 17:35)  HR: 73 (11-11-22 @ 12:32) (72 - 84)  BP: 120/59 (11-11-22 @ 12:32) (120/59 - 138/62)  RR: 18 (11-11-22 @ 12:32) (16 - 19)  SpO2: 100% (11-11-22 @ 12:32) (100% - 100%)  Wt(kg): --  I&O's Summary      LABS:                        13.0   6.57  )-----------( 190      ( 11 Nov 2022 06:25 )             38.9     11-11    137  |  103  |  22  ----------------------------<  194<H>  4.4   |  22  |  1.16    Ca    9.5      11 Nov 2022 06:25  Phos  TNP     11-11    TPro  6.9  /  Alb  4.1  /  TBili  0.8  /  DBili  x   /  AST  29  /  ALT  25  /  AlkPhos  64  11-09    PT/INR - ( 09 Nov 2022 14:32 )   PT: 11.3 sec;   INR: 0.97 ratio         PTT - ( 09 Nov 2022 14:32 )  PTT:25.7 sec    CAPILLARY BLOOD GLUCOSE      POCT Blood Glucose.: 212 mg/dL (11 Nov 2022 11:38)  POCT Blood Glucose.: 170 mg/dL (11 Nov 2022 07:39)  POCT Blood Glucose.: 150 mg/dL (10 Nov 2022 22:35)  POCT Blood Glucose.: 176 mg/dL (10 Nov 2022 16:55)            MEDICATIONS  (STANDING):  aspirin  chewable 81 milliGRAM(s) Oral daily  atorvastatin 20 milliGRAM(s) Oral at bedtime  dextrose 5%. 1000 milliLiter(s) (100 mL/Hr) IV Continuous <Continuous>  dextrose 5%. 1000 milliLiter(s) (50 mL/Hr) IV Continuous <Continuous>  dextrose 50% Injectable 25 Gram(s) IV Push once  dextrose 50% Injectable 12.5 Gram(s) IV Push once  dextrose 50% Injectable 25 Gram(s) IV Push once  finasteride 5 milliGRAM(s) Oral daily  gabapentin 300 milliGRAM(s) Oral three times a day  glucagon  Injectable 1 milliGRAM(s) IntraMuscular once  insulin lispro (ADMELOG) corrective regimen sliding scale   SubCutaneous three times a day before meals  insulin lispro (ADMELOG) corrective regimen sliding scale   SubCutaneous at bedtime  metoprolol tartrate 50 milliGRAM(s) Oral two times a day  tamsulosin 0.8 milliGRAM(s) Oral at bedtime    MEDICATIONS  (PRN):  dextrose Oral Gel 15 Gram(s) Oral once PRN Blood Glucose LESS THAN 70 milliGRAM(s)/deciliter          PHYSICAL EXAM:  GENERAL: NAD, well-groomed, well-developed  HEAD:  Atraumatic, Normocephalic  CHEST/LUNG: Clear to percussion bilaterally; No rales, rhonchi, wheezing, or rubs  HEART: Regular rate and rhythm; No murmurs, rubs, or gallops  ABDOMEN: Soft, Nontender, Nondistended; Bowel sounds present  EXTREMITIES:  no edema +    Care Discussed with Consultants/Other Providers [ x] YES  [ ] NO

## 2022-11-12 LAB
ANION GAP SERPL CALC-SCNC: 12 MMOL/L — SIGNIFICANT CHANGE UP (ref 7–14)
BASOPHILS # BLD AUTO: 0.02 K/UL — SIGNIFICANT CHANGE UP (ref 0–0.2)
BASOPHILS NFR BLD AUTO: 0.3 % — SIGNIFICANT CHANGE UP (ref 0–2)
BUN SERPL-MCNC: 32 MG/DL — HIGH (ref 7–23)
CALCIUM SERPL-MCNC: 9.4 MG/DL — SIGNIFICANT CHANGE UP (ref 8.4–10.5)
CHLORIDE SERPL-SCNC: 103 MMOL/L — SIGNIFICANT CHANGE UP (ref 98–107)
CO2 SERPL-SCNC: 22 MMOL/L — SIGNIFICANT CHANGE UP (ref 22–31)
CREAT SERPL-MCNC: 1.17 MG/DL — SIGNIFICANT CHANGE UP (ref 0.5–1.3)
EGFR: 63 ML/MIN/1.73M2 — SIGNIFICANT CHANGE UP
EOSINOPHIL # BLD AUTO: 0.06 K/UL — SIGNIFICANT CHANGE UP (ref 0–0.5)
EOSINOPHIL NFR BLD AUTO: 0.8 % — SIGNIFICANT CHANGE UP (ref 0–6)
GLUCOSE BLDC GLUCOMTR-MCNC: 195 MG/DL — HIGH (ref 70–99)
GLUCOSE BLDC GLUCOMTR-MCNC: 201 MG/DL — HIGH (ref 70–99)
GLUCOSE BLDC GLUCOMTR-MCNC: 217 MG/DL — HIGH (ref 70–99)
GLUCOSE BLDC GLUCOMTR-MCNC: 248 MG/DL — HIGH (ref 70–99)
GLUCOSE SERPL-MCNC: 206 MG/DL — HIGH (ref 70–99)
HCT VFR BLD CALC: 37.5 % — LOW (ref 39–50)
HGB BLD-MCNC: 12.4 G/DL — LOW (ref 13–17)
IANC: 4.48 K/UL — SIGNIFICANT CHANGE UP (ref 1.8–7.4)
IMM GRANULOCYTES NFR BLD AUTO: 0.4 % — SIGNIFICANT CHANGE UP (ref 0–0.9)
LYMPHOCYTES # BLD AUTO: 1.63 K/UL — SIGNIFICANT CHANGE UP (ref 1–3.3)
LYMPHOCYTES # BLD AUTO: 22.6 % — SIGNIFICANT CHANGE UP (ref 13–44)
MAGNESIUM SERPL-MCNC: 1.4 MG/DL — LOW (ref 1.6–2.6)
MCHC RBC-ENTMCNC: 29 PG — SIGNIFICANT CHANGE UP (ref 27–34)
MCHC RBC-ENTMCNC: 33.1 GM/DL — SIGNIFICANT CHANGE UP (ref 32–36)
MCV RBC AUTO: 87.8 FL — SIGNIFICANT CHANGE UP (ref 80–100)
MONOCYTES # BLD AUTO: 0.98 K/UL — HIGH (ref 0–0.9)
MONOCYTES NFR BLD AUTO: 13.6 % — SIGNIFICANT CHANGE UP (ref 2–14)
NEUTROPHILS # BLD AUTO: 4.48 K/UL — SIGNIFICANT CHANGE UP (ref 1.8–7.4)
NEUTROPHILS NFR BLD AUTO: 62.3 % — SIGNIFICANT CHANGE UP (ref 43–77)
NRBC # BLD: 0 /100 WBCS — SIGNIFICANT CHANGE UP (ref 0–0)
NRBC # FLD: 0 K/UL — SIGNIFICANT CHANGE UP (ref 0–0)
PHOSPHATE SERPL-MCNC: 2.8 MG/DL — SIGNIFICANT CHANGE UP (ref 2.5–4.5)
PLATELET # BLD AUTO: 188 K/UL — SIGNIFICANT CHANGE UP (ref 150–400)
POTASSIUM SERPL-MCNC: 4.6 MMOL/L — SIGNIFICANT CHANGE UP (ref 3.5–5.3)
POTASSIUM SERPL-SCNC: 4.6 MMOL/L — SIGNIFICANT CHANGE UP (ref 3.5–5.3)
RBC # BLD: 4.27 M/UL — SIGNIFICANT CHANGE UP (ref 4.2–5.8)
RBC # FLD: 13.2 % — SIGNIFICANT CHANGE UP (ref 10.3–14.5)
SARS-COV-2 RNA SPEC QL NAA+PROBE: SIGNIFICANT CHANGE UP
SODIUM SERPL-SCNC: 137 MMOL/L — SIGNIFICANT CHANGE UP (ref 135–145)
WBC # BLD: 7.2 K/UL — SIGNIFICANT CHANGE UP (ref 3.8–10.5)
WBC # FLD AUTO: 7.2 K/UL — SIGNIFICANT CHANGE UP (ref 3.8–10.5)

## 2022-11-12 RX ADMIN — Medication 2: at 12:00

## 2022-11-12 RX ADMIN — Medication 81 MILLIGRAM(S): at 13:36

## 2022-11-12 RX ADMIN — FINASTERIDE 5 MILLIGRAM(S): 5 TABLET, FILM COATED ORAL at 13:37

## 2022-11-12 RX ADMIN — GABAPENTIN 300 MILLIGRAM(S): 400 CAPSULE ORAL at 20:57

## 2022-11-12 RX ADMIN — GABAPENTIN 300 MILLIGRAM(S): 400 CAPSULE ORAL at 13:37

## 2022-11-12 RX ADMIN — Medication 50 MILLIGRAM(S): at 17:15

## 2022-11-12 RX ADMIN — TAMSULOSIN HYDROCHLORIDE 0.8 MILLIGRAM(S): 0.4 CAPSULE ORAL at 20:58

## 2022-11-12 RX ADMIN — GABAPENTIN 300 MILLIGRAM(S): 400 CAPSULE ORAL at 05:35

## 2022-11-12 RX ADMIN — Medication 1: at 17:14

## 2022-11-12 RX ADMIN — Medication 50 MILLIGRAM(S): at 05:35

## 2022-11-12 RX ADMIN — Medication 2: at 07:35

## 2022-11-12 RX ADMIN — ATORVASTATIN CALCIUM 20 MILLIGRAM(S): 80 TABLET, FILM COATED ORAL at 20:58

## 2022-11-12 NOTE — PROGRESS NOTE ADULT - SUBJECTIVE AND OBJECTIVE BOX
Patient is a 77y old  Male who presents with a chief complaint of Syncope (12 Nov 2022 16:03)    Date of servie : 11-12-22 @ 16:33  INTERVAL HPI/OVERNIGHT EVENTS:  T(C): 36.5 (11-12-22 @ 13:15), Max: 36.7 (11-11-22 @ 21:50)  HR: 75 (11-12-22 @ 13:15) (73 - 81)  BP: 135/66 (11-12-22 @ 13:15) (115/56 - 141/61)  RR: 17 (11-12-22 @ 13:15) (16 - 17)  SpO2: 99% (11-12-22 @ 13:15) (99% - 100%)  Wt(kg): --  I&O's Summary    12 Nov 2022 07:01  -  12 Nov 2022 16:33  --------------------------------------------------------  IN: 0 mL / OUT: 900 mL / NET: -900 mL        LABS:                        12.4   7.20  )-----------( 188      ( 12 Nov 2022 06:00 )             37.5     11-12    137  |  103  |  32<H>  ----------------------------<  206<H>  4.6   |  22  |  1.17    Ca    9.4      12 Nov 2022 06:00  Phos  2.8     11-12  Mg     1.40     11-12          CAPILLARY BLOOD GLUCOSE      POCT Blood Glucose.: 217 mg/dL (12 Nov 2022 10:59)  POCT Blood Glucose.: 201 mg/dL (12 Nov 2022 07:26)  POCT Blood Glucose.: 173 mg/dL (11 Nov 2022 22:55)  POCT Blood Glucose.: 202 mg/dL (11 Nov 2022 16:46)            MEDICATIONS  (STANDING):  aspirin  chewable 81 milliGRAM(s) Oral daily  atorvastatin 20 milliGRAM(s) Oral at bedtime  dextrose 5%. 1000 milliLiter(s) (100 mL/Hr) IV Continuous <Continuous>  dextrose 5%. 1000 milliLiter(s) (50 mL/Hr) IV Continuous <Continuous>  dextrose 50% Injectable 25 Gram(s) IV Push once  dextrose 50% Injectable 12.5 Gram(s) IV Push once  dextrose 50% Injectable 25 Gram(s) IV Push once  finasteride 5 milliGRAM(s) Oral daily  gabapentin 300 milliGRAM(s) Oral three times a day  glucagon  Injectable 1 milliGRAM(s) IntraMuscular once  insulin lispro (ADMELOG) corrective regimen sliding scale   SubCutaneous three times a day before meals  insulin lispro (ADMELOG) corrective regimen sliding scale   SubCutaneous at bedtime  metoprolol tartrate 50 milliGRAM(s) Oral two times a day  tamsulosin 0.8 milliGRAM(s) Oral at bedtime    MEDICATIONS  (PRN):  dextrose Oral Gel 15 Gram(s) Oral once PRN Blood Glucose LESS THAN 70 milliGRAM(s)/deciliter         Patient is a 77y old  Male who presents with a chief complaint of Syncope (12 Nov 2022 16:03)    Date of servie : 11-12-22 @ 16:33  INTERVAL HPI/OVERNIGHT EVENTS:  T(C): 36.5 (11-12-22 @ 13:15), Max: 36.7 (11-11-22 @ 21:50)  HR: 75 (11-12-22 @ 13:15) (73 - 81)  BP: 135/66 (11-12-22 @ 13:15) (115/56 - 141/61)  RR: 17 (11-12-22 @ 13:15) (16 - 17)  SpO2: 99% (11-12-22 @ 13:15) (99% - 100%)  Wt(kg): --  I&O's Summary    12 Nov 2022 07:01  -  12 Nov 2022 16:33  --------------------------------------------------------  IN: 0 mL / OUT: 900 mL / NET: -900 mL        LABS:                        12.4   7.20  )-----------( 188      ( 12 Nov 2022 06:00 )             37.5     11-12    137  |  103  |  32<H>  ----------------------------<  206<H>  4.6   |  22  |  1.17    Ca    9.4      12 Nov 2022 06:00  Phos  2.8     11-12  Mg     1.40     11-12          CAPILLARY BLOOD GLUCOSE      POCT Blood Glucose.: 217 mg/dL (12 Nov 2022 10:59)  POCT Blood Glucose.: 201 mg/dL (12 Nov 2022 07:26)  POCT Blood Glucose.: 173 mg/dL (11 Nov 2022 22:55)  POCT Blood Glucose.: 202 mg/dL (11 Nov 2022 16:46)            MEDICATIONS  (STANDING):  aspirin  chewable 81 milliGRAM(s) Oral daily  atorvastatin 20 milliGRAM(s) Oral at bedtime  dextrose 5%. 1000 milliLiter(s) (100 mL/Hr) IV Continuous <Continuous>  dextrose 5%. 1000 milliLiter(s) (50 mL/Hr) IV Continuous <Continuous>  dextrose 50% Injectable 25 Gram(s) IV Push once  dextrose 50% Injectable 12.5 Gram(s) IV Push once  dextrose 50% Injectable 25 Gram(s) IV Push once  finasteride 5 milliGRAM(s) Oral daily  gabapentin 300 milliGRAM(s) Oral three times a day  glucagon  Injectable 1 milliGRAM(s) IntraMuscular once  insulin lispro (ADMELOG) corrective regimen sliding scale   SubCutaneous three times a day before meals  insulin lispro (ADMELOG) corrective regimen sliding scale   SubCutaneous at bedtime  metoprolol tartrate 50 milliGRAM(s) Oral two times a day  tamsulosin 0.8 milliGRAM(s) Oral at bedtime    MEDICATIONS  (PRN):  dextrose Oral Gel 15 Gram(s) Oral once PRN Blood Glucose LESS THAN 70 milliGRAM(s)/deciliter        General: WN/WD NAD  PERRLA  Neurology: A&Ox3, nonfocal, VILCHIS x 4  Respiratory: CTA B/L  CV: RRR, S1S2, no murmurs, rubs or gallops  Abdominal: Soft, NT, ND +BS, Last BM  Extremities: No edema, + peripheral pulses  Skin Normal
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
## 2022-11-12 NOTE — PROGRESS NOTE ADULT - SUBJECTIVE AND OBJECTIVE BOX
EP      HISTORY OF PRESENT ILLNESS: HPI:  Mr. Guillaume is a 77YOM with PMH CAD s/p CABG 2021, HTN, HLD, DM on insulin and BPH who presents to the ED today after having a syncopal episode while at work. He denies having any prodromal symptoms prior to fainting but does endorse LOC for some time, noting that he woke up with many people around him. He denies head involvement saying that he was held up by other people during the episode. Per EMS the patient was found to be bradycardic to the 20s, and was found to have generalized weakness in the ED. He has no previous history of syncope. He was found to be COVID+ appx. 10-15 days ago with no respiratory symptoms, only noting some mild confusion. He endorses having a fall in 2019 w/ head involvement w/ chronic subdural hemorrhage (now reducing in size on imaging), and had MMA embolization at [facility] in 2021. (09 Nov 2022 22:30)    Mr Guillaume presents after unexplained syncope at work. Lightheadedness started while sitting down. 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  He reports no prodrome of palpitations, warmth/sweating, nausea or blurry vision.   EMS verbal report to the ED was that patient's pulse was in the 20s in the ambulance, but no EKG strips of this rhythm were recorded.  On arrival, EKG redemonstrates sinus rhythm, first degree AV block, and LBBB.  He was found to be mildly hyperkalemic, and treated medically for this, and some antihypertensives held.      His cardiac history is notable for reduced LV EF, multi-vessel CAD requiring CABG, and post-CABG AFib.  His CABG + ILR implant were at [place], after being turned down for CABG at [facility]. States he no longer follows at [place] though.  No further AFib was identified in the year since his CABG, so the ILR was removed.  A 10 pt ROS is otherwise negative.  11/11- spoke w/ patient's daughter and other family members on the phone for collateral history, and plan of care discussed in depth.  no angina or palpitations, no orthopnea or syncope here in hospital.  awaiting next step of testing and agrees not to leave hospital until completion.    Date of service  11/12 no chest pain or sob       PAST MEDICAL & SURGICAL HISTORY:  HTN (hypertension)  Diabetes mellitus  Type 2  CAD (coronary artery disease)  Hyperlipidemia  BPH (benign prostatic hypertrophy)  Left bundle branch block  S/P primary angioplasty with coronary stent  1990s  S/P drug eluting coronary stent placement  [place]  S/P CABG x 3  May 2021  Status post placement of implantable loop recorder  May 2021  Arterial embolism  MMA 2021         aspirin  chewable 81 milliGRAM(s) Oral daily  atorvastatin 20 milliGRAM(s) Oral at bedtime  dextrose 5%. 1000 milliLiter(s) IV Continuous <Continuous>  dextrose 5%. 1000 milliLiter(s) IV Continuous <Continuous>  dextrose 50% Injectable 25 Gram(s) IV Push once  dextrose 50% Injectable 12.5 Gram(s) IV Push once  dextrose 50% Injectable 25 Gram(s) IV Push once  dextrose Oral Gel 15 Gram(s) Oral once PRN  finasteride 5 milliGRAM(s) Oral daily  gabapentin 300 milliGRAM(s) Oral three times a day  glucagon  Injectable 1 milliGRAM(s) IntraMuscular once  insulin lispro (ADMELOG) corrective regimen sliding scale   SubCutaneous three times a day before meals  insulin lispro (ADMELOG) corrective regimen sliding scale   SubCutaneous at bedtime  metoprolol tartrate 50 milliGRAM(s) Oral two times a day  tamsulosin 0.8 milliGRAM(s) Oral at bedtime                            12.4   7.20  )-----------( 188      ( 12 Nov 2022 06:00 )             37.5       Hemoglobin: 12.4 g/dL (11-12 @ 06:00)  Hemoglobin: 13.0 g/dL (11-11 @ 06:25)  Hemoglobin: 13.3 g/dL (11-09 @ 14:32)      11-12    137  |  103  |  32<H>  ----------------------------<  206<H>  4.6   |  22  |  1.17    Ca    9.4      12 Nov 2022 06:00  Phos  2.8     11-12  Mg     1.40     11-12      Creatinine Trend: 1.17<--, 1.16<--, 1.23<--, 1.38<--    COAGS:           T(C): 36.4 (11-12-22 @ 09:30), Max: 36.7 (11-11-22 @ 12:32)  HR: 73 (11-12-22 @ 09:30) (73 - 81)  BP: 115/56 (11-12-22 @ 09:30) (115/56 - 141/61)  RR: 17 (11-12-22 @ 09:30) (16 - 18)  SpO2: 100% (11-12-22 @ 09:30) (99% - 100%)  Wt(kg): --    I&O's Summary    12 Nov 2022 07:01  -  12 Nov 2022 10:33  --------------------------------------------------------  IN: 0 mL / OUT: 900 mL / NET: -900 mL      General: Well nourished, no acute distress, alert and oriented x 3  Head: normocephalic, no trauma  Neck: no JVD, no bruit, supple, not enlarged  CV: S1S2, no S3, regular rate, rhythm is SINUS, no murmurs.    Lungs: clear BL, no rales or wheezes  Abdomen: bowel sounds +, soft, nontender, nondistended  Extremities: no clubbing, cyanosis or edema  Neuro: Moves all 4 extremities, sensation intact x 4 extremities  Skin: warm and moist, normal turgor  Psych: Mood and affect are appropriate for circumstances  MSK: normal range of motion and strength x4 extremities.    TELEMETRY: NSR, first degree AV block, LBBB	    ECG: NSR, first degree AV block >300ms, and LBBB 144ms.  Echo: Pre-CABG, moderate systolic dysfunction, difficult echo windows.  Repeat is pending.  < from: TTE with Doppler (w/Cont) (11.10.22 @ 18:27) >  OBSERVATIONS:  Mitral Valve: Mitral annular calcification, otherwise  normal mitral valve. Mild mitral regurgitation.  Aortic Root: Normal aortic root.  Aortic Valve: Aortic valve leaflet morphology not well  visualized.  Left Atrium: Normal left atrium.  Left Ventricle: Endocardium not well visualized; grossly  mild to moderate segmental left ventricular systolic  dysfunction.  Hypokinesis of the apex, distal septum, and  distal anterior wall.  Endocardial visualization enhanced  with intravenous injection of echo contrast (Definity).  No  LV thrombus seen.  Right Heart: Normal right atrium. The right ventricle is  not well visualized; grossly normal right ventricular  systolic function. Normal tricuspid valve. Minimal  tricuspid regurgitation. Normal pulmonic valve.  Pericardium/PleuraNormal pericardium with no pericardial  effusion.    < end of copied text >  	    ASSESSMENT/PLAN: 	77y Male presenting with unexplained syncope. Hx multivessel CAD s/p CABG last year, previously reduced EF (30-40% range) and longstanding first degree AV block and LBBB.    High risk syncope presentation with uncertain prognosis at this point  Echocardiogram suggests persistent LV systolic dysfunction despite CABG but unable to accurately assess LVEF%.  He is at high risk for his syncope being due to either infra-bri AV block or spontaneous VT/VF in the setting of ischemic heart disease.  Maintain hospitalization on telemetry.    Chronic systolic CHF - will order a stress test.  if high risk results, needs coronary angiography.  has previously been on an ARB.  will restart an ARB or ARNI.    Left Bundle Branch Block- EP study to rule out susceptibility to infranodal heart block    CAD, prior MI, CABG, reduced LV EF% - EP study for VT. Continue aspirin, metoprolol and lipitor for CAD.    Depending upon results of EP Study, may need either a biventricular pacemaker, biventricular ICD, or outpatient followup for an ILR.  He previously saw Dr Nahid Ricci from [phone] and wants to re-enroll w/ [facility].

## 2022-11-12 NOTE — PROGRESS NOTE ADULT - SUBJECTIVE AND OBJECTIVE BOX
Date of service 11/12/2022    chief complaint: Syncope    extended hpi: Pt is a 77YOM with PMH CAD s/p PCI s/p CABG x3 ( LIMA to LAD, SV to PDA, ANd PL of Right) on 5/3/2021, post op Afib previously on Amiodarone, had ILR implanted and then removed, DM, HLD, BPH, previous subdural hematoma who presented to the ED  after having a syncopal episode while at work.    Patient states he was at work when he started to feel dizzy, also reported palpitations, denied any chest pain, stated he tried to stand up and was unsteady on his feet. His co-workers saw him and supported him. States he passed out. No urinary or bowel incontinence.  No confusion. EMS was called and noted to have a heart rate in the 20s. . He has no previous history of syncope. He was found to be COVID+ appx. 10-15 days ago with no respiratory symptoms, only noting some mild confusion. He endorses having a fall in 2019 w/ head involvement w/ chronic subdural hemorrhage (now reducing in size on imaging), and had MMA embolization at [facility] in 2021.     CABG in 2021 ( LIMA to LAD, SVG to PDA and PL of right). Had post op afib with wide complex managed on Amiodarone sunsequently converted to NSR. ILR placed and then removed    ECHO from [facility] records 04/2021 normal LV size, mild LVH, LVEF 45% MAC with moderate MR, mild AI        Ovenright events noted  Patient denies chest pain or shortness of breath.   Review of symptoms otherwise negative.    MEDICATIONS:  aspirin  chewable 81 milliGRAM(s) Oral daily  atorvastatin 20 milliGRAM(s) Oral at bedtime  dextrose 5%. 1000 milliLiter(s) IV Continuous <Continuous>  dextrose 5%. 1000 milliLiter(s) IV Continuous <Continuous>  dextrose 50% Injectable 25 Gram(s) IV Push once  dextrose 50% Injectable 12.5 Gram(s) IV Push once  dextrose 50% Injectable 25 Gram(s) IV Push once  dextrose Oral Gel 15 Gram(s) Oral once PRN  finasteride 5 milliGRAM(s) Oral daily  gabapentin 300 milliGRAM(s) Oral three times a day  glucagon  Injectable 1 milliGRAM(s) IntraMuscular once  insulin lispro (ADMELOG) corrective regimen sliding scale   SubCutaneous three times a day before meals  insulin lispro (ADMELOG) corrective regimen sliding scale   SubCutaneous at bedtime  metoprolol tartrate 50 milliGRAM(s) Oral two times a day  tamsulosin 0.8 milliGRAM(s) Oral at bedtime      LABS:                        12.4   7.20  )-----------( 188      ( 12 Nov 2022 06:00 )             37.5       Hemoglobin: 12.4 g/dL (11-12 @ 06:00)  Hemoglobin: 13.0 g/dL (11-11 @ 06:25)  Hemoglobin: 13.3 g/dL (11-09 @ 14:32)      11-12    137  |  103  |  32<H>  ----------------------------<  206<H>  4.6   |  22  |  1.17    Ca    9.4      12 Nov 2022 06:00  Phos  2.8     11-12  Mg     1.40     11-12      Creatinine Trend: 1.17<--, 1.16<--, 1.23<--, 1.38<--    COAGS:           PHYSICAL EXAM:  T(C): 36.5 (11-12-22 @ 13:15), Max: 36.7 (11-11-22 @ 21:50)  HR: 75 (11-12-22 @ 13:15) (73 - 81)  BP: 135/66 (11-12-22 @ 13:15) (115/56 - 141/61)  RR: 17 (11-12-22 @ 13:15) (16 - 17)  SpO2: 99% (11-12-22 @ 13:15) (99% - 100%)  Wt(kg): --    I&O's Summary    12 Nov 2022 07:01  -  12 Nov 2022 16:03  --------------------------------------------------------  IN: 0 mL / OUT: 900 mL / NET: -900 mL          General: Well nourished in no acute distress. Alert and Oriented * 3.   Head: Normocephalic and atraumatic.   Neck: No JVD. No bruits. Supple. Does not appear to be enlarged.   Cardiovascular: + S1,S2 ; RRR Soft systolic murmur at the left lower sternal border. No rubs noted.    Lungs: CTA b/l. No rhonchi, rales or wheezes.   Abdomen: + BS, soft. Non tender. Non distended. No rebound. No guarding.   Extremities: No clubbing/cyanosis/edema.   Neurologic: Moves all four extremities. Full range of motion.   Skin: Warm and moist. The patient's skin has normal elasticity and good skin turgor.   Psychiatric: Appropriate mood and affect.  Musculoskeletal: Normal range of motion, normal strength    TELE: NSR with 1st degree      ECG:  	NSR, 1st degree LBBB    TTE:  OBSERVATIONS:  Mitral Valve: Mitral annular calcification, otherwise  normal mitral valve. Mild mitral regurgitation.  Aortic Root: Normal aortic root.  Aortic Valve: Aortic valve leaflet morphology not well  visualized.  Left Atrium: Normal left atrium.  Left Ventricle: Endocardium not well visualized; grossly  mild to moderate segmental left ventricular systolic  dysfunction.  Hypokinesis of the apex, distal septum, and  distal anterior wall.  Endocardial visualization enhanced  with intravenous injection of echo contrast (Definity).  No  LV thrombus seen.  Right Heart: Normal right atrium. The right ventricle is  not well visualized; grossly normal right ventricular  systolic function. Normal tricuspid valve. Minimal  tricuspid regurgitation. Normal pulmonic valve.  Pericardium/PleuraNormal pericardium with no pericardial  effusion    ASSESSMENT/PLAN: Pt is a 77YOM with PMH CAD s/p PCI s/p CABG x3 ( LIMA to LAD, SV to PDA, ANd PL of Right) on 5/3/2021, post op Afib previously on Amiodarone, had ILR implanted and then removed, DM, HLD, BPH, previous subdural hematoma who presented to the ED  after having a syncopal episode while at work.    1. Syncope  - EKG with 1st degree AVB and LBBB  - no strips available from heart rate in 20s  - EP consult for evaluation and possible EP study  - ECHO with mild to moderate segmental LV dysfucntion in setting of CABG, plan for stress tomorrow    2. CAD  -  CAD s/p PCI s/p CABG x3 ( LIMA to LAD, SV to PDA, And PL of Right)   - c/w ASA and Statin    3.  Post op Afib previously on Amiodarone  - currently in sinus c/w BB    4. HLD  - c/w atorvastatin    Christian Cody MD  Pager: [phone]

## 2022-11-13 LAB
ANION GAP SERPL CALC-SCNC: 10 MMOL/L — SIGNIFICANT CHANGE UP (ref 7–14)
BASOPHILS # BLD AUTO: 0.04 K/UL — SIGNIFICANT CHANGE UP (ref 0–0.2)
BASOPHILS NFR BLD AUTO: 0.8 % — SIGNIFICANT CHANGE UP (ref 0–2)
BUN SERPL-MCNC: 24 MG/DL — HIGH (ref 7–23)
CALCIUM SERPL-MCNC: 10 MG/DL — SIGNIFICANT CHANGE UP (ref 8.4–10.5)
CHLORIDE SERPL-SCNC: 101 MMOL/L — SIGNIFICANT CHANGE UP (ref 98–107)
CO2 SERPL-SCNC: 23 MMOL/L — SIGNIFICANT CHANGE UP (ref 22–31)
CREAT SERPL-MCNC: 1.02 MG/DL — SIGNIFICANT CHANGE UP (ref 0.5–1.3)
EGFR: 75 ML/MIN/1.73M2 — SIGNIFICANT CHANGE UP
EOSINOPHIL # BLD AUTO: 0.1 K/UL — SIGNIFICANT CHANGE UP (ref 0–0.5)
EOSINOPHIL NFR BLD AUTO: 2.1 % — SIGNIFICANT CHANGE UP (ref 0–6)
GLUCOSE BLDC GLUCOMTR-MCNC: 196 MG/DL — HIGH (ref 70–99)
GLUCOSE BLDC GLUCOMTR-MCNC: 228 MG/DL — HIGH (ref 70–99)
GLUCOSE BLDC GLUCOMTR-MCNC: 251 MG/DL — HIGH (ref 70–99)
GLUCOSE SERPL-MCNC: 201 MG/DL — HIGH (ref 70–99)
HCT VFR BLD CALC: 39.8 % — SIGNIFICANT CHANGE UP (ref 39–50)
HGB BLD-MCNC: 13.2 G/DL — SIGNIFICANT CHANGE UP (ref 13–17)
IANC: 2.55 K/UL — SIGNIFICANT CHANGE UP (ref 1.8–7.4)
IMM GRANULOCYTES NFR BLD AUTO: 0.2 % — SIGNIFICANT CHANGE UP (ref 0–0.9)
LYMPHOCYTES # BLD AUTO: 1.39 K/UL — SIGNIFICANT CHANGE UP (ref 1–3.3)
LYMPHOCYTES # BLD AUTO: 29.1 % — SIGNIFICANT CHANGE UP (ref 13–44)
MAGNESIUM SERPL-MCNC: 1.6 MG/DL — SIGNIFICANT CHANGE UP (ref 1.6–2.6)
MCHC RBC-ENTMCNC: 29.4 PG — SIGNIFICANT CHANGE UP (ref 27–34)
MCHC RBC-ENTMCNC: 33.2 GM/DL — SIGNIFICANT CHANGE UP (ref 32–36)
MCV RBC AUTO: 88.6 FL — SIGNIFICANT CHANGE UP (ref 80–100)
MONOCYTES # BLD AUTO: 0.68 K/UL — SIGNIFICANT CHANGE UP (ref 0–0.9)
MONOCYTES NFR BLD AUTO: 14.3 % — HIGH (ref 2–14)
NEUTROPHILS # BLD AUTO: 2.55 K/UL — SIGNIFICANT CHANGE UP (ref 1.8–7.4)
NEUTROPHILS NFR BLD AUTO: 53.5 % — SIGNIFICANT CHANGE UP (ref 43–77)
NRBC # BLD: 0 /100 WBCS — SIGNIFICANT CHANGE UP (ref 0–0)
NRBC # FLD: 0 K/UL — SIGNIFICANT CHANGE UP (ref 0–0)
PHOSPHATE SERPL-MCNC: 3.2 MG/DL — SIGNIFICANT CHANGE UP (ref 2.5–4.5)
PLATELET # BLD AUTO: 168 K/UL — SIGNIFICANT CHANGE UP (ref 150–400)
POTASSIUM SERPL-MCNC: 4.2 MMOL/L — SIGNIFICANT CHANGE UP (ref 3.5–5.3)
POTASSIUM SERPL-SCNC: 4.2 MMOL/L — SIGNIFICANT CHANGE UP (ref 3.5–5.3)
RBC # BLD: 4.49 M/UL — SIGNIFICANT CHANGE UP (ref 4.2–5.8)
RBC # FLD: 13.1 % — SIGNIFICANT CHANGE UP (ref 10.3–14.5)
SODIUM SERPL-SCNC: 134 MMOL/L — LOW (ref 135–145)
WBC # BLD: 4.77 K/UL — SIGNIFICANT CHANGE UP (ref 3.8–10.5)
WBC # FLD AUTO: 4.77 K/UL — SIGNIFICANT CHANGE UP (ref 3.8–10.5)

## 2022-11-13 RX ADMIN — Medication 1: at 07:17

## 2022-11-13 RX ADMIN — Medication 81 MILLIGRAM(S): at 13:20

## 2022-11-13 RX ADMIN — Medication 2: at 16:38

## 2022-11-13 RX ADMIN — FINASTERIDE 5 MILLIGRAM(S): 5 TABLET, FILM COATED ORAL at 13:20

## 2022-11-13 RX ADMIN — Medication 3: at 11:42

## 2022-11-13 RX ADMIN — GABAPENTIN 300 MILLIGRAM(S): 400 CAPSULE ORAL at 13:20

## 2022-11-13 RX ADMIN — GABAPENTIN 300 MILLIGRAM(S): 400 CAPSULE ORAL at 22:09

## 2022-11-13 RX ADMIN — Medication 50 MILLIGRAM(S): at 04:49

## 2022-11-13 RX ADMIN — ATORVASTATIN CALCIUM 20 MILLIGRAM(S): 80 TABLET, FILM COATED ORAL at 22:09

## 2022-11-13 RX ADMIN — Medication 50 MILLIGRAM(S): at 17:47

## 2022-11-13 RX ADMIN — GABAPENTIN 300 MILLIGRAM(S): 400 CAPSULE ORAL at 04:50

## 2022-11-13 NOTE — PROGRESS NOTE ADULT - SUBJECTIVE AND OBJECTIVE BOX
Date of service 11/13/2022    chief complaint: Syncope    extended hpi: Pt is a 77YOM with PMH CAD s/p PCI s/p CABG x3 ( LIMA to LAD, SV to PDA, ANd PL of Right) on 5/3/2021, post op Afib previously on Amiodarone, had ILR implanted and then removed, DM, HLD, BPH, previous subdural hematoma who presented to the ED  after having a syncopal episode while at work.    Patient states he was at work when he started to feel dizzy, also reported palpitations, denied any chest pain, stated he tried to stand up and was unsteady on his feet. His co-workers saw him and supported him. States he passed out. No urinary or bowel incontinence.  No confusion. EMS was called and noted to have a heart rate in the 20s. . He has no previous history of syncope. He was found to be COVID+ appx. 10-15 days ago with no respiratory symptoms, only noting some mild confusion. He endorses having a fall in 2019 w/ head involvement w/ chronic subdural hemorrhage (now reducing in size on imaging), and had MMA embolization at [facility] in 2021.     CABG in 2021 ( LIMA to LAD, SVG to PDA and PL of right). Had post op afib with wide complex managed on Amiodarone sunsequently converted to NSR. ILR placed and then removed    ECHO from [place] records 04/2021 normal LV size, mild LVH, LVEF 45% MAC with moderate MR, mild AI        DATE OF SERVICE: 11-13-22    Patient denies chest pain or shortness of breath.   Review of symptoms otherwise negative.    MEDICATIONS:  aspirin  chewable 81 milliGRAM(s) Oral daily  atorvastatin 20 milliGRAM(s) Oral at bedtime  dextrose 5%. 1000 milliLiter(s) IV Continuous <Continuous>  dextrose 5%. 1000 milliLiter(s) IV Continuous <Continuous>  dextrose 50% Injectable 25 Gram(s) IV Push once  dextrose 50% Injectable 12.5 Gram(s) IV Push once  dextrose 50% Injectable 25 Gram(s) IV Push once  dextrose Oral Gel 15 Gram(s) Oral once PRN  finasteride 5 milliGRAM(s) Oral daily  gabapentin 300 milliGRAM(s) Oral three times a day  glucagon  Injectable 1 milliGRAM(s) IntraMuscular once  insulin lispro (ADMELOG) corrective regimen sliding scale   SubCutaneous three times a day before meals  insulin lispro (ADMELOG) corrective regimen sliding scale   SubCutaneous at bedtime  metoprolol tartrate 50 milliGRAM(s) Oral two times a day  tamsulosin 0.8 milliGRAM(s) Oral at bedtime      LABS:                        13.2   4.77  )-----------( 168      ( 13 Nov 2022 05:06 )             39.8       Hemoglobin: 13.2 g/dL (11-13 @ 05:06)  Hemoglobin: 12.4 g/dL (11-12 @ 06:00)  Hemoglobin: 13.0 g/dL (11-11 @ 06:25)  Hemoglobin: 13.3 g/dL (11-09 @ 14:32)      11-13    134<L>  |  101  |  24<H>  ----------------------------<  201<H>  4.2   |  23  |  1.02    Ca    10.0      13 Nov 2022 05:06  Phos  3.2     11-13  Mg     1.60     11-13      Creatinine Trend: 1.02<--, 1.17<--, 1.16<--, 1.23<--, 1.38<--    COAGS:           PHYSICAL EXAM:  T(C): 36.6 (11-13-22 @ 09:20), Max: 36.9 (11-12-22 @ 17:10)  HR: 69 (11-13-22 @ 09:20) (64 - 76)  BP: 132/65 (11-13-22 @ 09:20) (109/53 - 135/66)  RR: 18 (11-13-22 @ 09:20) (17 - 18)  SpO2: 98% (11-13-22 @ 09:20) (98% - 100%)  Wt(kg): --    I&O's Summary    12 Nov 2022 07:01  -  13 Nov 2022 07:00  --------------------------------------------------------  IN: 0 mL / OUT: 900 mL / NET: -900 mL          General: Well nourished in no acute distress. Alert and Oriented * 3.   Head: Normocephalic and atraumatic.   Neck: No JVD. No bruits. Supple. Does not appear to be enlarged.   Cardiovascular: + S1,S2 ; RRR Soft systolic murmur at the left lower sternal border. No rubs noted.    Lungs: CTA b/l. No rhonchi, rales or wheezes.   Abdomen: + BS, soft. Non tender. Non distended. No rebound. No guarding.   Extremities: No clubbing/cyanosis/edema.   Neurologic: Moves all four extremities. Full range of motion.   Skin: Warm and moist. The patient's skin has normal elasticity and good skin turgor.   Psychiatric: Appropriate mood and affect.  Musculoskeletal: Normal range of motion, normal strength    TELE: NSR with 1st degree      ECG:  	NSR, 1st degree LBBB    TTE:  OBSERVATIONS:  Mitral Valve: Mitral annular calcification, otherwise  normal mitral valve. Mild mitral regurgitation.  Aortic Root: Normal aortic root.  Aortic Valve: Aortic valve leaflet morphology not well  visualized.  Left Atrium: Normal left atrium.  Left Ventricle: Endocardium not well visualized; grossly  mild to moderate segmental left ventricular systolic  dysfunction.  Hypokinesis of the apex, distal septum, and  distal anterior wall.  Endocardial visualization enhanced  with intravenous injection of echo contrast (Definity).  No  LV thrombus seen.  Right Heart: Normal right atrium. The right ventricle is  not well visualized; grossly normal right ventricular  systolic function. Normal tricuspid valve. Minimal  tricuspid regurgitation. Normal pulmonic valve.  Pericardium/PleuraNormal pericardium with no pericardial  effusion    ASSESSMENT/PLAN: Pt is a 77YOM with PMH CAD s/p PCI s/p CABG x3 ( LIMA to LAD, SV to PDA, ANd PL of Right) on 5/3/2021, post op Afib previously on Amiodarone, had ILR implanted and then removed, DM, HLD, BPH, previous subdural hematoma who presented to the ED  after having a syncopal episode while at work.    1. Syncope  - EKG with 1st degree AVB and LBBB  - no strips available from heart rate in 20s  - EP consult for evaluation and possible EP study  - ECHO with mild to moderate segmental LV dysfucntion in setting of CABG, plan for stress today    2. CAD  -  CAD s/p PCI s/p CABG x3 ( LIMA to LAD, SV to PDA, And PL of Right)   - c/w ASA and Statin    3.  Post op Afib previously on Amiodarone  - currently in sinus c/w BB    4. HLD  - c/w atorvastatin    Christian Cody MD  Pager: [phone]

## 2022-11-13 NOTE — PROGRESS NOTE ADULT - SUBJECTIVE AND OBJECTIVE BOX
EP      HISTORY OF PRESENT ILLNESS: HPI:  Mr. Guillaume is a 77YOM with PMH CAD s/p CABG 2021, HTN, HLD, DM on insulin and BPH who presents to the ED today after having a syncopal episode while at work. He denies having any prodromal symptoms prior to fainting but does endorse LOC for some time, noting that he woke up with many people around him. He denies head involvement saying that he was held up by other people during the episode. Per EMS the patient was found to be bradycardic to the 20s, and was found to have generalized weakness in the ED. He has no previous history of syncope. He was found to be COVID+ appx. 10-15 days ago with no respiratory symptoms, only noting some mild confusion. He endorses having a fall in 2019 w/ head involvement w/ chronic subdural hemorrhage (now reducing in size on imaging), and had MMA embolization at [facility] in 2021. (09 Nov 2022 22:30)    Mr Guillaume presents after unexplained syncope at work. Lightheadedness started while sitting down. 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  He reports no prodrome of palpitations, warmth/sweating, nausea or blurry vision.   EMS verbal report to the ED was that patient's pulse was in the 20s in the ambulance, but no EKG strips of this rhythm were recorded.  On arrival, EKG redemonstrates sinus rhythm, first degree AV block, and LBBB.  He was found to be mildly hyperkalemic, and treated medically for this, and some antihypertensives held.      His cardiac history is notable for reduced LV EF, multi-vessel CAD requiring CABG, and post-CABG AFib.  His CABG + ILR implant were at [facility], after being turned down for CABG at [facility]. States he no longer follows at [facility] though.  No further AFib was identified in the year since his CABG, so the ILR was removed.  A 10 pt ROS is otherwise negative.  11/11- spoke w/ patient's daughter and other family members on the phone for collateral history, and plan of care discussed in depth.  no angina or palpitations, no orthopnea or syncope here in hospital.  awaiting next step of testing and agrees not to leave hospital until completion.      11/12 no chest pain or sob   Date of service  11/13 no angina, or syncope overnight  no concerns offered     PAST MEDICAL & SURGICAL HISTORY:  HTN (hypertension)  Diabetes mellitus  Type 2  CAD (coronary artery disease)  Hyperlipidemia  BPH (benign prostatic hypertrophy)  Left bundle branch block  S/P primary angioplasty with coronary stent  1990s  S/P drug eluting coronary stent placement  [place]  S/P CABG x 3  May 2021  Status post placement of implantable loop recorder  May 2021  Arterial embolism  MMA 2021          aspirin  chewable 81 milliGRAM(s) Oral daily  atorvastatin 20 milliGRAM(s) Oral at bedtime  dextrose 5%. 1000 milliLiter(s) IV Continuous <Continuous>  dextrose 5%. 1000 milliLiter(s) IV Continuous <Continuous>  dextrose 50% Injectable 25 Gram(s) IV Push once  dextrose 50% Injectable 12.5 Gram(s) IV Push once  dextrose 50% Injectable 25 Gram(s) IV Push once  dextrose Oral Gel 15 Gram(s) Oral once PRN  finasteride 5 milliGRAM(s) Oral daily  gabapentin 300 milliGRAM(s) Oral three times a day  glucagon  Injectable 1 milliGRAM(s) IntraMuscular once  insulin lispro (ADMELOG) corrective regimen sliding scale   SubCutaneous three times a day before meals  insulin lispro (ADMELOG) corrective regimen sliding scale   SubCutaneous at bedtime  metoprolol tartrate 50 milliGRAM(s) Oral two times a day  tamsulosin 0.8 milliGRAM(s) Oral at bedtime                            13.2   4.77  )-----------( 168      ( 13 Nov 2022 05:06 )             39.8       Hemoglobin: 13.2 g/dL (11-13 @ 05:06)  Hemoglobin: 12.4 g/dL (11-12 @ 06:00)  Hemoglobin: 13.0 g/dL (11-11 @ 06:25)  Hemoglobin: 13.3 g/dL (11-09 @ 14:32)      11-13    134<L>  |  101  |  24<H>  ----------------------------<  201<H>  4.2   |  23  |  1.02    Ca    10.0      13 Nov 2022 05:06  Phos  3.2     11-13  Mg     1.60     11-13      Creatinine Trend: 1.02<--, 1.17<--, 1.16<--, 1.23<--, 1.38<--    COAGS:           T(C): 36.4 (11-13-22 @ 04:45), Max: 36.9 (11-12-22 @ 17:10)  HR: 64 (11-13-22 @ 04:45) (64 - 76)  BP: 128/79 (11-13-22 @ 04:45) (109/53 - 135/66)  RR: 18 (11-13-22 @ 04:45) (17 - 18)  SpO2: 100% (11-13-22 @ 04:45) (99% - 100%)  Wt(kg): --    I&O's Summary    12 Nov 2022 07:01  -  13 Nov 2022 07:00  --------------------------------------------------------  IN: 0 mL / OUT: 900 mL / NET: -900 mL      General: Well nourished, no acute distress, alert and oriented x 3  Head: normocephalic, no trauma  Neck: no JVD, no bruit, supple, not enlarged  CV: S1S2, no S3, regular rate, rhythm is SINUS, no murmurs.    Lungs: clear BL, no rales or wheezes  Abdomen: bowel sounds +, soft, nontender, nondistended  Extremities: no clubbing, cyanosis or edema  Neuro: Moves all 4 extremities, sensation intact x 4 extremities  Skin: warm and moist, normal turgor  Psych: Mood and affect are appropriate for circumstances  MSK: normal range of motion and strength x4 extremities.    TELEMETRY: NSR, first degree AV block, LBBB	    ECG: NSR, first degree AV block >300ms, and LBBB 144ms.  Echo: Pre-CABG, moderate systolic dysfunction, difficult echo windows.  Repeat is pending.  < from: TTE with Doppler (w/Cont) (11.10.22 @ 18:27) >  OBSERVATIONS:  Mitral Valve: Mitral annular calcification, otherwise  normal mitral valve. Mild mitral regurgitation.  Aortic Root: Normal aortic root.  Aortic Valve: Aortic valve leaflet morphology not well  visualized.  Left Atrium: Normal left atrium.  Left Ventricle: Endocardium not well visualized; grossly  mild to moderate segmental left ventricular systolic  dysfunction.  Hypokinesis of the apex, distal septum, and  distal anterior wall.  Endocardial visualization enhanced  with intravenous injection of echo contrast (Definity).  No  LV thrombus seen.  Right Heart: Normal right atrium. The right ventricle is  not well visualized; grossly normal right ventricular  systolic function. Normal tricuspid valve. Minimal  tricuspid regurgitation. Normal pulmonic valve.  Pericardium/PleuraNormal pericardium with no pericardial  effusion.    < end of copied text >  	    ASSESSMENT/PLAN: 	77y Male presenting with unexplained syncope. Hx multivessel CAD s/p CABG last year, previously reduced EF (30-40% range) and longstanding first degree AV block and LBBB.    High risk syncope presentation with uncertain prognosis at this point  Echocardiogram suggests persistent LV systolic dysfunction despite CABG but unable to accurately assess LVEF%.  He is at high risk for his syncope being due to either infra-bri AV block or spontaneous VT/VF in the setting of ischemic heart disease.  Maintain hospitalization on telemetry.    Chronic systolic CHF - will order a stress test.  if high risk results, needs coronary angiography.  has previously been on an ARB.  will restart an ARB or ARNI.    Left Bundle Branch Block- EP study to rule out susceptibility to infranodal heart block    CAD, prior MI, CABG, reduced LV EF% - EP study for VT. Continue aspirin, metoprolol and lipitor for CAD.    Depending upon results of EP Study, may need either a biventricular pacemaker, biventricular ICD, or outpatient followup for an ILR.  He previously saw Dr Nahid Ricci from [phone] and wants to re-enroll w/ [facility].

## 2022-11-13 NOTE — PROGRESS NOTE ADULT - SUBJECTIVE AND OBJECTIVE BOX
Patient is a 77y old  Male who presents with a chief complaint of Syncope (13 Nov 2022 09:33)    Date of servie : 11-13-22 @ 10:00  INTERVAL HPI/OVERNIGHT EVENTS:  T(C): 36.4 (11-13-22 @ 04:45), Max: 36.9 (11-12-22 @ 17:10)  HR: 64 (11-13-22 @ 04:45) (64 - 76)  BP: 128/79 (11-13-22 @ 04:45) (109/53 - 135/66)  RR: 18 (11-13-22 @ 04:45) (17 - 18)  SpO2: 100% (11-13-22 @ 04:45) (99% - 100%)  Wt(kg): --  I&O's Summary    12 Nov 2022 07:01  -  13 Nov 2022 07:00  --------------------------------------------------------  IN: 0 mL / OUT: 900 mL / NET: -900 mL        LABS:                        13.2   4.77  )-----------( 168      ( 13 Nov 2022 05:06 )             39.8     11-13    134<L>  |  101  |  24<H>  ----------------------------<  201<H>  4.2   |  23  |  1.02    Ca    10.0      13 Nov 2022 05:06  Phos  3.2     11-13  Mg     1.60     11-13          CAPILLARY BLOOD GLUCOSE      POCT Blood Glucose.: 196 mg/dL (13 Nov 2022 07:10)  POCT Blood Glucose.: 248 mg/dL (12 Nov 2022 20:56)  POCT Blood Glucose.: 195 mg/dL (12 Nov 2022 16:42)  POCT Blood Glucose.: 217 mg/dL (12 Nov 2022 10:59)            MEDICATIONS  (STANDING):  aspirin  chewable 81 milliGRAM(s) Oral daily  atorvastatin 20 milliGRAM(s) Oral at bedtime  dextrose 5%. 1000 milliLiter(s) (100 mL/Hr) IV Continuous <Continuous>  dextrose 5%. 1000 milliLiter(s) (50 mL/Hr) IV Continuous <Continuous>  dextrose 50% Injectable 25 Gram(s) IV Push once  dextrose 50% Injectable 12.5 Gram(s) IV Push once  dextrose 50% Injectable 25 Gram(s) IV Push once  finasteride 5 milliGRAM(s) Oral daily  gabapentin 300 milliGRAM(s) Oral three times a day  glucagon  Injectable 1 milliGRAM(s) IntraMuscular once  insulin lispro (ADMELOG) corrective regimen sliding scale   SubCutaneous three times a day before meals  insulin lispro (ADMELOG) corrective regimen sliding scale   SubCutaneous at bedtime  metoprolol tartrate 50 milliGRAM(s) Oral two times a day  tamsulosin 0.8 milliGRAM(s) Oral at bedtime    MEDICATIONS  (PRN):  dextrose Oral Gel 15 Gram(s) Oral once PRN Blood Glucose LESS THAN 70 milliGRAM(s)/deciliter          PHYSICAL EXAM:  GENERAL: NAD, well-groomed, well-developed  HEAD:  Atraumatic, Normocephalic  CHEST/LUNG: Clear to percussion bilaterally; No rales, rhonchi, wheezing, or rubs  HEART: Regular rate and rhythm; No murmurs, rubs, or gallops  ABDOMEN: Soft, Nontender, Nondistended; Bowel sounds present  EXTREMITIES:  2+ Peripheral Pulses, No clubbing, cyanosis, or edema  LYMPH: No lymphadenopathy noted  SKIN: No rashes or lesions    Care Discussed with Consultants/Other Providers [ ] YES  [ ] NO

## 2022-11-14 LAB
ANION GAP SERPL CALC-SCNC: 10 MMOL/L — SIGNIFICANT CHANGE UP (ref 7–14)
BASOPHILS # BLD AUTO: 0.03 K/UL — SIGNIFICANT CHANGE UP (ref 0–0.2)
BASOPHILS NFR BLD AUTO: 0.6 % — SIGNIFICANT CHANGE UP (ref 0–2)
BUN SERPL-MCNC: 25 MG/DL — HIGH (ref 7–23)
CALCIUM SERPL-MCNC: 9.9 MG/DL — SIGNIFICANT CHANGE UP (ref 8.4–10.5)
CHLORIDE SERPL-SCNC: 103 MMOL/L — SIGNIFICANT CHANGE UP (ref 98–107)
CO2 SERPL-SCNC: 21 MMOL/L — LOW (ref 22–31)
CREAT SERPL-MCNC: 1.03 MG/DL — SIGNIFICANT CHANGE UP (ref 0.5–1.3)
EGFR: 74 ML/MIN/1.73M2 — SIGNIFICANT CHANGE UP
EOSINOPHIL # BLD AUTO: 0.08 K/UL — SIGNIFICANT CHANGE UP (ref 0–0.5)
EOSINOPHIL NFR BLD AUTO: 1.5 % — SIGNIFICANT CHANGE UP (ref 0–6)
GLUCOSE BLDC GLUCOMTR-MCNC: 183 MG/DL — HIGH (ref 70–99)
GLUCOSE BLDC GLUCOMTR-MCNC: 189 MG/DL — HIGH (ref 70–99)
GLUCOSE BLDC GLUCOMTR-MCNC: 233 MG/DL — HIGH (ref 70–99)
GLUCOSE BLDC GLUCOMTR-MCNC: 302 MG/DL — HIGH (ref 70–99)
GLUCOSE SERPL-MCNC: 229 MG/DL — HIGH (ref 70–99)
HCT VFR BLD CALC: 42 % — SIGNIFICANT CHANGE UP (ref 39–50)
HGB BLD-MCNC: 13.8 G/DL — SIGNIFICANT CHANGE UP (ref 13–17)
IANC: 3.1 K/UL — SIGNIFICANT CHANGE UP (ref 1.8–7.4)
IMM GRANULOCYTES NFR BLD AUTO: 0.2 % — SIGNIFICANT CHANGE UP (ref 0–0.9)
LYMPHOCYTES # BLD AUTO: 1.5 K/UL — SIGNIFICANT CHANGE UP (ref 1–3.3)
LYMPHOCYTES # BLD AUTO: 28.2 % — SIGNIFICANT CHANGE UP (ref 13–44)
MAGNESIUM SERPL-MCNC: 1.8 MG/DL — SIGNIFICANT CHANGE UP (ref 1.6–2.6)
MCHC RBC-ENTMCNC: 29.2 PG — SIGNIFICANT CHANGE UP (ref 27–34)
MCHC RBC-ENTMCNC: 32.9 GM/DL — SIGNIFICANT CHANGE UP (ref 32–36)
MCV RBC AUTO: 89 FL — SIGNIFICANT CHANGE UP (ref 80–100)
MONOCYTES # BLD AUTO: 0.6 K/UL — SIGNIFICANT CHANGE UP (ref 0–0.9)
MONOCYTES NFR BLD AUTO: 11.3 % — SIGNIFICANT CHANGE UP (ref 2–14)
NEUTROPHILS # BLD AUTO: 3.1 K/UL — SIGNIFICANT CHANGE UP (ref 1.8–7.4)
NEUTROPHILS NFR BLD AUTO: 58.2 % — SIGNIFICANT CHANGE UP (ref 43–77)
NRBC # BLD: 0 /100 WBCS — SIGNIFICANT CHANGE UP (ref 0–0)
NRBC # FLD: 0 K/UL — SIGNIFICANT CHANGE UP (ref 0–0)
PHOSPHATE SERPL-MCNC: 2.7 MG/DL — SIGNIFICANT CHANGE UP (ref 2.5–4.5)
PLATELET # BLD AUTO: 172 K/UL — SIGNIFICANT CHANGE UP (ref 150–400)
POTASSIUM SERPL-MCNC: 4.2 MMOL/L — SIGNIFICANT CHANGE UP (ref 3.5–5.3)
POTASSIUM SERPL-SCNC: 4.2 MMOL/L — SIGNIFICANT CHANGE UP (ref 3.5–5.3)
RBC # BLD: 4.72 M/UL — SIGNIFICANT CHANGE UP (ref 4.2–5.8)
RBC # FLD: 13 % — SIGNIFICANT CHANGE UP (ref 10.3–14.5)
SODIUM SERPL-SCNC: 134 MMOL/L — LOW (ref 135–145)
WBC # BLD: 5.32 K/UL — SIGNIFICANT CHANGE UP (ref 3.8–10.5)
WBC # FLD AUTO: 5.32 K/UL — SIGNIFICANT CHANGE UP (ref 3.8–10.5)

## 2022-11-14 RX ORDER — METOPROLOL TARTRATE 50 MG
100 TABLET ORAL DAILY
Refills: 0 | Status: DISCONTINUED | OUTPATIENT
Start: 2022-11-14 | End: 2022-11-18

## 2022-11-14 RX ORDER — LANOLIN ALCOHOL/MO/W.PET/CERES
3 CREAM (GRAM) TOPICAL AT BEDTIME
Refills: 0 | Status: DISCONTINUED | OUTPATIENT
Start: 2022-11-14 | End: 2022-11-18

## 2022-11-14 RX ADMIN — GABAPENTIN 300 MILLIGRAM(S): 400 CAPSULE ORAL at 22:12

## 2022-11-14 RX ADMIN — Medication 81 MILLIGRAM(S): at 14:48

## 2022-11-14 RX ADMIN — GABAPENTIN 300 MILLIGRAM(S): 400 CAPSULE ORAL at 05:15

## 2022-11-14 RX ADMIN — GABAPENTIN 300 MILLIGRAM(S): 400 CAPSULE ORAL at 14:48

## 2022-11-14 RX ADMIN — Medication 1: at 07:40

## 2022-11-14 RX ADMIN — Medication 3 MILLIGRAM(S): at 22:53

## 2022-11-14 RX ADMIN — Medication 50 MILLIGRAM(S): at 05:15

## 2022-11-14 RX ADMIN — Medication 50 MILLIGRAM(S): at 17:04

## 2022-11-14 RX ADMIN — Medication 4: at 17:04

## 2022-11-14 NOTE — PROGRESS NOTE ADULT - SUBJECTIVE AND OBJECTIVE BOX
EP      HISTORY OF PRESENT ILLNESS: HPI:  Mr. Guillaume is a 77YOM with PMH CAD s/p CABG 2021, HTN, HLD, DM on insulin and BPH who presents to the ED today after having a syncopal episode while at work. He denies having any prodromal symptoms prior to fainting but does endorse LOC for some time, noting that he woke up with many people around him. He denies head involvement saying that he was held up by other people during the episode. Per EMS the patient was found to be bradycardic to the 20s, and was found to have generalized weakness in the ED. He has no previous history of syncope. He was found to be COVID+ appx. 10-15 days ago with no respiratory symptoms, only noting some mild confusion. He endorses having a fall in 2019 w/ head involvement w/ chronic subdural hemorrhage (now reducing in size on imaging), and had MMA embolization at [facility] in 2021. (09 Nov 2022 22:30)    Mr Guillaume presents after unexplained syncope at work. Lightheadedness started while sitting down. 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  He reports no prodrome of palpitations, warmth/sweating, nausea or blurry vision.   EMS verbal report to the ED was that patient's pulse was in the 20s in the ambulance, but no EKG strips of this rhythm were recorded.  On arrival, EKG redemonstrates sinus rhythm, first degree AV block, and LBBB.  He was found to be mildly hyperkalemic, and treated medically for this, and some antihypertensives held.      His cardiac history is notable for reduced LV EF, multi-vessel CAD requiring CABG, and post-CABG AFib.  His CABG + ILR implant were at [place], after being turned down for CABG at [facility]. States he no longer follows at [place] though.  No further AFib was identified in the year since his CABG, so the ILR was removed.  A 10 pt ROS is otherwise negative.  11/11- spoke w/ patient's daughter and other family members on the phone for collateral history, and plan of care discussed in depth.  no angina or palpitations, no orthopnea or syncope here in hospital.  awaiting next step of testing and agrees not to leave hospital until completion.    11/12 no chest pain or sob   11/13 no angina, or syncope overnight  Date of service  11/14- conversation at length with patient and his spouse at bedside.  will forgo stress testing- his LV ejection fraction did not change pre/post-CABG.    He has swabbed NEGATIVE for COVID on 11/12, however by hospital infection control protocols, he is still on isolation until 11/20.  Nevertheless, we will move ahead with EP study / possible device implant as he is asymptomatic.  As he is high risk for sudden cardiac arrest, we should not delay this needlessly for another week, but nor can he go home and schedule this electively as an outpatient.  He is at risk of suddenly dying in a place where we cannot protect him if he leaves the hospital against medical advice before completion of testing. The patient and his spouse expressed dismay over "being here many days and its like nothing is really happening".  We accomplished an echocardiogram before the weekend, but there was no testing or invasive procedures that could be performed over the weekend.    PAST MEDICAL & SURGICAL HISTORY:  HTN (hypertension)  Diabetes mellitus  Type 2  CAD (coronary artery disease)  Hyperlipidemia  BPH (benign prostatic hypertrophy)  Left bundle branch block  S/P primary angioplasty with coronary stent  1990s  S/P drug eluting coronary stent placement  [place]  S/P CABG x 3  May 2021  Status post placement of implantable loop recorder  May 2021  Arterial embolism  MMA 2021     aspirin  chewable 81 milliGRAM(s) Oral daily  atorvastatin 20 milliGRAM(s) Oral at bedtime  dextrose 5%. 1000 milliLiter(s) IV Continuous <Continuous>  dextrose 5%. 1000 milliLiter(s) IV Continuous <Continuous>  dextrose 50% Injectable 25 Gram(s) IV Push once  dextrose 50% Injectable 12.5 Gram(s) IV Push once  dextrose 50% Injectable 25 Gram(s) IV Push once  dextrose Oral Gel 15 Gram(s) Oral once PRN  finasteride 5 milliGRAM(s) Oral daily  gabapentin 300 milliGRAM(s) Oral three times a day  glucagon  Injectable 1 milliGRAM(s) IntraMuscular once  insulin lispro (ADMELOG) corrective regimen sliding scale   SubCutaneous three times a day before meals  insulin lispro (ADMELOG) corrective regimen sliding scale   SubCutaneous at bedtime  metoprolol tartrate 50 milliGRAM(s) Oral two times a day  tamsulosin 0.8 milliGRAM(s) Oral at bedtime                          13.8   5.32  )-----------( 172      ( 14 Nov 2022 05:30 )             42.0       11-14    134<L>  |  103  |  25<H>  ----------------------------<  229<H>  4.2   |  21<L>  |  1.03    Ca    9.9      14 Nov 2022 05:30  Phos  2.7     11-14  Mg     1.80     11-14      T(C): 36.7 (11-14-22 @ 05:11), Max: 36.7 (11-14-22 @ 05:11)  HR: 64 (11-14-22 @ 05:11) (59 - 73)  BP: 131/68 (11-14-22 @ 05:11) (131/68 - 155/72)  RR: 18 (11-14-22 @ 05:11) (18 - 18)  SpO2: 100% (11-14-22 @ 05:11) (100% - 100%)  Wt(kg): --    I&O's Summary      General: Well nourished, no acute distress, alert and oriented x 3  Head: normocephalic, no trauma  Neck: no JVD, no bruit, supple, not enlarged  CV: S1S2, no S3, regular rate, rhythm is SINUS, no murmurs.    Lungs: clear BL, no rales or wheezes  Abdomen: bowel sounds +, soft, nontender, nondistended  Extremities: no clubbing, cyanosis or edema  Neuro: Moves all 4 extremities, sensation intact x 4 extremities  Skin: warm and moist, normal turgor  Psych: Mood and affect are appropriate for circumstances  MSK: normal range of motion and strength x4 extremities.    TELEMETRY: NSR, first degree AV block, LBBB	    ECG: NSR, first degree AV block >300ms, and LBBB 144ms.  Echo: Pre-CABG, moderate systolic dysfunction, difficult echo windows.  Repeat is pending.  < from: TTE with Doppler (w/Cont) (11.10.22 @ 18:27) >  OBSERVATIONS:  Mitral Valve: Mitral annular calcification, otherwise  normal mitral valve. Mild mitral regurgitation.  Aortic Root: Normal aortic root.  Aortic Valve: Aortic valve leaflet morphology not well  visualized.  Left Atrium: Normal left atrium.  Left Ventricle: Endocardium not well visualized; grossly  mild to moderate segmental left ventricular systolic  dysfunction.  Hypokinesis of the apex, distal septum, and  distal anterior wall.  Endocardial visualization enhanced  with intravenous injection of echo contrast (Definity).  No  LV thrombus seen.  Right Heart: Normal right atrium. The right ventricle is  not well visualized; grossly normal right ventricular  systolic function. Normal tricuspid valve. Minimal  tricuspid regurgitation. Normal pulmonic valve.  Pericardium/PleuraNormal pericardium with no pericardial  effusion.    < end of copied text >  	    ASSESSMENT/PLAN: 	77y Male presenting with unexplained syncope. Hx multivessel CAD s/p CABG last year, previously reduced EF (30-40% range) and longstanding first degree AV block and LBBB.    High risk syncope presentation with uncertain prognosis at this point  Echocardiogram suggests persistent LV systolic dysfunction despite CABG but unable to accurately assess LVEF%.  He is at high risk for his syncope being due to either infra-bri AV block or spontaneous VT/VF in the setting of ischemic heart disease.  Maintain hospitalization on telemetry.    Chronic systolic CHF - will forgo stress testing. His LVEF has not changed pre/post CABG.  On discharge, convert Metoprolol to ToprolXL.  Will order an ARB today.    Left Bundle Branch Block- EP study to rule out susceptibility to infranodal heart block    CAD, prior MI, CABG, reduced LV EF% - EP study for VT. Continue aspirin, metoprolol and lipitor for CAD.    Depending upon results of EP Study, may need either a biventricular pacemaker, biventricular ICD, or outpatient followup for an ILR.  Awaiting scheduling for end-of-day EP lab case, after which the room will be terminally cleaned.  He would be discharged the following day.  He previously saw Dr Nahid Ricci from [id number] and wants to re-enroll w/ [facility].     Don Martin M.D.  Cardiac Electrophysiology  [phone]

## 2022-11-14 NOTE — PROGRESS NOTE ADULT - SUBJECTIVE AND OBJECTIVE BOX
Patient is a 77y old  Male who presents with a chief complaint of Syncope (13 Nov 2022 13:12)    Date of servie : 11-14-22 @ 11:00  INTERVAL HPI/OVERNIGHT EVENTS:  T(C): 36.7 (11-14-22 @ 05:11), Max: 36.7 (11-14-22 @ 05:11)  HR: 64 (11-14-22 @ 05:11) (59 - 73)  BP: 131/68 (11-14-22 @ 05:11) (127/59 - 155/72)  RR: 18 (11-14-22 @ 05:11) (18 - 18)  SpO2: 100% (11-14-22 @ 05:11) (100% - 100%)  Wt(kg): --  I&O's Summary      LABS:                        13.8   5.32  )-----------( 172      ( 14 Nov 2022 05:30 )             42.0     11-14    134<L>  |  103  |  25<H>  ----------------------------<  229<H>  4.2   |  21<L>  |  1.03    Ca    9.9      14 Nov 2022 05:30  Phos  2.7     11-14  Mg     1.80     11-14          CAPILLARY BLOOD GLUCOSE  211 (13 Nov 2022 21:24)      POCT Blood Glucose.: 183 mg/dL (14 Nov 2022 07:36)  POCT Blood Glucose.: 228 mg/dL (13 Nov 2022 16:35)  POCT Blood Glucose.: 251 mg/dL (13 Nov 2022 11:18)            MEDICATIONS  (STANDING):  aspirin  chewable 81 milliGRAM(s) Oral daily  atorvastatin 20 milliGRAM(s) Oral at bedtime  dextrose 5%. 1000 milliLiter(s) (100 mL/Hr) IV Continuous <Continuous>  dextrose 5%. 1000 milliLiter(s) (50 mL/Hr) IV Continuous <Continuous>  dextrose 50% Injectable 25 Gram(s) IV Push once  dextrose 50% Injectable 12.5 Gram(s) IV Push once  dextrose 50% Injectable 25 Gram(s) IV Push once  finasteride 5 milliGRAM(s) Oral daily  gabapentin 300 milliGRAM(s) Oral three times a day  glucagon  Injectable 1 milliGRAM(s) IntraMuscular once  insulin lispro (ADMELOG) corrective regimen sliding scale   SubCutaneous three times a day before meals  insulin lispro (ADMELOG) corrective regimen sliding scale   SubCutaneous at bedtime  metoprolol tartrate 50 milliGRAM(s) Oral two times a day  tamsulosin 0.8 milliGRAM(s) Oral at bedtime    MEDICATIONS  (PRN):  dextrose Oral Gel 15 Gram(s) Oral once PRN Blood Glucose LESS THAN 70 milliGRAM(s)/deciliter          PHYSICAL EXAM:  GENERAL: NAD, well-groomed, well-developed  HEAD:  Atraumatic, Normocephalic  CHEST/LUNG: Clear to percussion bilaterally; No rales, rhonchi, wheezing, or rubs  HEART: Regular rate and rhythm; No murmurs, rubs, or gallops  ABDOMEN: Soft, Nontender, Nondistended; Bowel sounds present  EXTREMITIES:  2+ Peripheral Pulses, No clubbing, cyanosis, or edema  LYMPH: No lymphadenopathy noted  SKIN: No rashes or lesions    Care Discussed with Consultants/Other Providers [ ] YES  [ ] NO

## 2022-11-14 NOTE — PROGRESS NOTE ADULT - SUBJECTIVE AND OBJECTIVE BOX
Date of service 11/14/2022    chief complaint: Syncope    extended hpi: Pt is a 77YOM with PMH CAD s/p PCI s/p CABG x3 ( LIMA to LAD, SV to PDA, ANd PL of Right) on 5/3/2021, post op Afib previously on Amiodarone, had ILR implanted and then removed, DM, HLD, BPH, previous subdural hematoma who presented to the ED  after having a syncopal episode while at work.    Patient states he was at work when he started to feel dizzy, also reported palpitations, denied any chest pain, stated he tried to stand up and was unsteady on his feet. His co-workers saw him and supported him. States he passed out. No urinary or bowel incontinence.  No confusion. EMS was called and noted to have a heart rate in the 20s. . He has no previous history of syncope. He was found to be COVID+ appx. 10-15 days ago with no respiratory symptoms, only noting some mild confusion. He endorses having a fall in 2019 w/ head involvement w/ chronic subdural hemorrhage (now reducing in size on imaging), and had MMA embolization at [facility] in 2021.     CABG in 2021 ( LIMA to LAD, SVG to PDA and PL of right). Had post op afib with wide complex managed on Amiodarone sunsequently converted to NSR. ILR placed and then removed    ECHO from [place] records 04/2021 normal LV size, mild LVH, LVEF 45% MAC with moderate MR, mild AI    Patient denies chest pain or shortness of breath.   Review of symptoms otherwise negative.    MEDICATIONS:  aspirin  chewable 81 milliGRAM(s) Oral daily  atorvastatin 20 milliGRAM(s) Oral at bedtime  dextrose 5%. 1000 milliLiter(s) IV Continuous <Continuous>  dextrose 5%. 1000 milliLiter(s) IV Continuous <Continuous>  dextrose 50% Injectable 25 Gram(s) IV Push once  dextrose 50% Injectable 12.5 Gram(s) IV Push once  dextrose 50% Injectable 25 Gram(s) IV Push once  dextrose Oral Gel 15 Gram(s) Oral once PRN  finasteride 5 milliGRAM(s) Oral daily  gabapentin 300 milliGRAM(s) Oral three times a day  glucagon  Injectable 1 milliGRAM(s) IntraMuscular once  insulin lispro (ADMELOG) corrective regimen sliding scale   SubCutaneous three times a day before meals  insulin lispro (ADMELOG) corrective regimen sliding scale   SubCutaneous at bedtime  metoprolol tartrate 50 milliGRAM(s) Oral two times a day  tamsulosin 0.8 milliGRAM(s) Oral at bedtime      LABS:                        13.8   5.32  )-----------( 172      ( 14 Nov 2022 05:30 )             42.0       Hemoglobin: 13.8 g/dL (11-14 @ 05:30)  Hemoglobin: 13.2 g/dL (11-13 @ 05:06)  Hemoglobin: 12.4 g/dL (11-12 @ 06:00)  Hemoglobin: 13.0 g/dL (11-11 @ 06:25)  Hemoglobin: 13.3 g/dL (11-09 @ 14:32)      11-14    134<L>  |  103  |  25<H>  ----------------------------<  229<H>  4.2   |  21<L>  |  1.03    Ca    9.9      14 Nov 2022 05:30  Phos  2.7     11-14  Mg     1.80     11-14      Creatinine Trend: 1.03<--, 1.02<--, 1.17<--, 1.16<--, 1.23<--, 1.38<--    COAGS:           PHYSICAL EXAM:  T(C): 36.7 (11-14-22 @ 05:11), Max: 36.7 (11-14-22 @ 05:11)  HR: 64 (11-14-22 @ 05:11) (59 - 73)  BP: 131/68 (11-14-22 @ 05:11) (127/59 - 155/72)  RR: 18 (11-14-22 @ 05:11) (18 - 18)  SpO2: 100% (11-14-22 @ 05:11) (100% - 100%)  Wt(kg): --    I&O's Summary    General: Well nourished in no acute distress. Alert and Oriented * 3.   Head: Normocephalic and atraumatic.   Neck: No JVD. No bruits. Supple. Does not appear to be enlarged.   Cardiovascular: + S1,S2 ; RRR Soft systolic murmur at the left lower sternal border. No rubs noted.    Lungs: CTA b/l. No rhonchi, rales or wheezes.   Abdomen: + BS, soft. Non tender. Non distended. No rebound. No guarding.   Extremities: No clubbing/cyanosis/edema.   Neurologic: Moves all four extremities. Full range of motion.   Skin: Warm and moist. The patient's skin has normal elasticity and good skin turgor.   Psychiatric: Appropriate mood and affect.  Musculoskeletal: Normal range of motion, normal strength    TELE: NSR with 1st degree      ECG:  	NSR, 1st degree LBBB    TTE:  OBSERVATIONS:  Mitral Valve: Mitral annular calcification, otherwise  normal mitral valve. Mild mitral regurgitation.  Aortic Root: Normal aortic root.  Aortic Valve: Aortic valve leaflet morphology not well  visualized.  Left Atrium: Normal left atrium.  Left Ventricle: Endocardium not well visualized; grossly  mild to moderate segmental left ventricular systolic  dysfunction.  Hypokinesis of the apex, distal septum, and  distal anterior wall.  Endocardial visualization enhanced  with intravenous injection of echo contrast (Definity).  No  LV thrombus seen.  Right Heart: Normal right atrium. The right ventricle is  not well visualized; grossly normal right ventricular  systolic function. Normal tricuspid valve. Minimal  tricuspid regurgitation. Normal pulmonic valve.  Pericardium/PleuraNormal pericardium with no pericardial  effusion    ASSESSMENT/PLAN: Pt is a 77YOM with PMH CAD s/p PCI s/p CABG x3 ( LIMA to LAD, SV to PDA, ANd PL of Right) on 5/3/2021, post op Afib previously on Amiodarone, had ILR implanted and then removed, DM, HLD, BPH, previous subdural hematoma who presented to the ED  after having a syncopal episode while at work.    1. Syncope  - EKG with 1st degree AVB and LBBB  - no strips available from heart rate in 20s  - EP consult for evaluation and possible EP study  - ECHO with mild to moderate segmental LV dysfucntion in setting of CABG, awaiting stress however COVID positive despite no symptoms. Had COVID 3-4 weeks ago    2. CAD  -  CAD s/p PCI s/p CABG x3 ( LIMA to LAD, SV to PDA, And PL of Right)   - c/w ASA and Statin    3.  Post op Afib previously on Amiodarone  - currently in sinus c/w BB    4. HLD  - c/w atorvastatin    Christian Cody MD  Pager: [phone]

## 2022-11-15 LAB
ANION GAP SERPL CALC-SCNC: 12 MMOL/L — SIGNIFICANT CHANGE UP (ref 7–14)
BASOPHILS # BLD AUTO: 0.02 K/UL — SIGNIFICANT CHANGE UP (ref 0–0.2)
BASOPHILS NFR BLD AUTO: 0.3 % — SIGNIFICANT CHANGE UP (ref 0–2)
BUN SERPL-MCNC: 21 MG/DL — SIGNIFICANT CHANGE UP (ref 7–23)
CALCIUM SERPL-MCNC: 9.3 MG/DL — SIGNIFICANT CHANGE UP (ref 8.4–10.5)
CHLORIDE SERPL-SCNC: 100 MMOL/L — SIGNIFICANT CHANGE UP (ref 98–107)
CO2 SERPL-SCNC: 22 MMOL/L — SIGNIFICANT CHANGE UP (ref 22–31)
CREAT SERPL-MCNC: 0.92 MG/DL — SIGNIFICANT CHANGE UP (ref 0.5–1.3)
EGFR: 85 ML/MIN/1.73M2 — SIGNIFICANT CHANGE UP
EOSINOPHIL # BLD AUTO: 0.1 K/UL — SIGNIFICANT CHANGE UP (ref 0–0.5)
EOSINOPHIL NFR BLD AUTO: 1.6 % — SIGNIFICANT CHANGE UP (ref 0–6)
GLUCOSE BLDC GLUCOMTR-MCNC: 175 MG/DL — HIGH (ref 70–99)
GLUCOSE BLDC GLUCOMTR-MCNC: 186 MG/DL — HIGH (ref 70–99)
GLUCOSE BLDC GLUCOMTR-MCNC: 194 MG/DL — HIGH (ref 70–99)
GLUCOSE BLDC GLUCOMTR-MCNC: 308 MG/DL — HIGH (ref 70–99)
GLUCOSE BLDC GLUCOMTR-MCNC: 312 MG/DL — HIGH (ref 70–99)
GLUCOSE SERPL-MCNC: 217 MG/DL — HIGH (ref 70–99)
HCT VFR BLD CALC: 39.6 % — SIGNIFICANT CHANGE UP (ref 39–50)
HGB BLD-MCNC: 13.4 G/DL — SIGNIFICANT CHANGE UP (ref 13–17)
IANC: 3.57 K/UL — SIGNIFICANT CHANGE UP (ref 1.8–7.4)
IMM GRANULOCYTES NFR BLD AUTO: 0.2 % — SIGNIFICANT CHANGE UP (ref 0–0.9)
LYMPHOCYTES # BLD AUTO: 1.65 K/UL — SIGNIFICANT CHANGE UP (ref 1–3.3)
LYMPHOCYTES # BLD AUTO: 27.2 % — SIGNIFICANT CHANGE UP (ref 13–44)
MAGNESIUM SERPL-MCNC: 1.8 MG/DL — SIGNIFICANT CHANGE UP (ref 1.6–2.6)
MCHC RBC-ENTMCNC: 29.6 PG — SIGNIFICANT CHANGE UP (ref 27–34)
MCHC RBC-ENTMCNC: 33.8 GM/DL — SIGNIFICANT CHANGE UP (ref 32–36)
MCV RBC AUTO: 87.4 FL — SIGNIFICANT CHANGE UP (ref 80–100)
MONOCYTES # BLD AUTO: 0.72 K/UL — SIGNIFICANT CHANGE UP (ref 0–0.9)
MONOCYTES NFR BLD AUTO: 11.9 % — SIGNIFICANT CHANGE UP (ref 2–14)
NEUTROPHILS # BLD AUTO: 3.57 K/UL — SIGNIFICANT CHANGE UP (ref 1.8–7.4)
NEUTROPHILS NFR BLD AUTO: 58.8 % — SIGNIFICANT CHANGE UP (ref 43–77)
NRBC # BLD: 0 /100 WBCS — SIGNIFICANT CHANGE UP (ref 0–0)
NRBC # FLD: 0 K/UL — SIGNIFICANT CHANGE UP (ref 0–0)
PHOSPHATE SERPL-MCNC: 2.9 MG/DL — SIGNIFICANT CHANGE UP (ref 2.5–4.5)
PLATELET # BLD AUTO: 181 K/UL — SIGNIFICANT CHANGE UP (ref 150–400)
POTASSIUM SERPL-MCNC: 4.1 MMOL/L — SIGNIFICANT CHANGE UP (ref 3.5–5.3)
POTASSIUM SERPL-SCNC: 4.1 MMOL/L — SIGNIFICANT CHANGE UP (ref 3.5–5.3)
RBC # BLD: 4.53 M/UL — SIGNIFICANT CHANGE UP (ref 4.2–5.8)
RBC # FLD: 12.9 % — SIGNIFICANT CHANGE UP (ref 10.3–14.5)
SODIUM SERPL-SCNC: 134 MMOL/L — LOW (ref 135–145)
WBC # BLD: 6.07 K/UL — SIGNIFICANT CHANGE UP (ref 3.8–10.5)
WBC # FLD AUTO: 6.07 K/UL — SIGNIFICANT CHANGE UP (ref 3.8–10.5)

## 2022-11-15 RX ORDER — LOSARTAN POTASSIUM 100 MG/1
25 TABLET, FILM COATED ORAL DAILY
Refills: 0 | Status: DISCONTINUED | OUTPATIENT
Start: 2022-11-15 | End: 2022-11-16

## 2022-11-15 RX ORDER — ENOXAPARIN SODIUM 100 MG/ML
40 INJECTION SUBCUTANEOUS EVERY 24 HOURS
Refills: 0 | Status: DISCONTINUED | OUTPATIENT
Start: 2022-11-15 | End: 2022-11-17

## 2022-11-15 RX ADMIN — GABAPENTIN 300 MILLIGRAM(S): 400 CAPSULE ORAL at 13:21

## 2022-11-15 RX ADMIN — Medication 4: at 12:36

## 2022-11-15 RX ADMIN — LOSARTAN POTASSIUM 25 MILLIGRAM(S): 100 TABLET, FILM COATED ORAL at 17:40

## 2022-11-15 RX ADMIN — Medication 100 MILLIGRAM(S): at 05:59

## 2022-11-15 RX ADMIN — Medication 1: at 07:47

## 2022-11-15 RX ADMIN — Medication 81 MILLIGRAM(S): at 13:20

## 2022-11-15 RX ADMIN — GABAPENTIN 300 MILLIGRAM(S): 400 CAPSULE ORAL at 22:40

## 2022-11-15 RX ADMIN — ENOXAPARIN SODIUM 40 MILLIGRAM(S): 100 INJECTION SUBCUTANEOUS at 17:40

## 2022-11-15 RX ADMIN — Medication 1: at 17:39

## 2022-11-15 RX ADMIN — GABAPENTIN 300 MILLIGRAM(S): 400 CAPSULE ORAL at 05:59

## 2022-11-15 RX ADMIN — ATORVASTATIN CALCIUM 20 MILLIGRAM(S): 80 TABLET, FILM COATED ORAL at 22:39

## 2022-11-15 NOTE — PROGRESS NOTE ADULT - SUBJECTIVE AND OBJECTIVE BOX
Patient is a 77y old  Male who presents with a chief complaint of Syncope (14 Nov 2022 14:47)    Date of servie : 11-15-22 @ 12:48  INTERVAL HPI/OVERNIGHT EVENTS:  T(C): 36.4 (11-15-22 @ 05:50), Max: 36.7 (11-14-22 @ 17:09)  HR: 74 (11-15-22 @ 05:50) (72 - 74)  BP: 149/81 (11-15-22 @ 05:50) (149/66 - 158/66)  RR: 18 (11-15-22 @ 05:50) (18 - 18)  SpO2: 99% (11-15-22 @ 05:50) (99% - 100%)  Wt(kg): --  I&O's Summary      LABS:                        13.4   6.07  )-----------( 181      ( 15 Nov 2022 07:12 )             39.6     11-15    134<L>  |  100  |  21  ----------------------------<  217<H>  4.1   |  22  |  0.92    Ca    9.3      15 Nov 2022 07:12  Phos  2.9     11-15  Mg     1.80     11-15          CAPILLARY BLOOD GLUCOSE      POCT Blood Glucose.: 312 mg/dL (15 Nov 2022 12:12)  POCT Blood Glucose.: 308 mg/dL (15 Nov 2022 12:11)  POCT Blood Glucose.: 194 mg/dL (15 Nov 2022 07:37)  POCT Blood Glucose.: 189 mg/dL (14 Nov 2022 22:19)  POCT Blood Glucose.: 302 mg/dL (14 Nov 2022 16:16)            MEDICATIONS  (STANDING):  aspirin  chewable 81 milliGRAM(s) Oral daily  atorvastatin 20 milliGRAM(s) Oral at bedtime  dextrose 5%. 1000 milliLiter(s) (100 mL/Hr) IV Continuous <Continuous>  dextrose 5%. 1000 milliLiter(s) (50 mL/Hr) IV Continuous <Continuous>  dextrose 50% Injectable 25 Gram(s) IV Push once  dextrose 50% Injectable 12.5 Gram(s) IV Push once  dextrose 50% Injectable 25 Gram(s) IV Push once  finasteride 5 milliGRAM(s) Oral daily  gabapentin 300 milliGRAM(s) Oral three times a day  glucagon  Injectable 1 milliGRAM(s) IntraMuscular once  insulin lispro (ADMELOG) corrective regimen sliding scale   SubCutaneous three times a day before meals  insulin lispro (ADMELOG) corrective regimen sliding scale   SubCutaneous at bedtime  metoprolol succinate  milliGRAM(s) Oral daily  tamsulosin 0.8 milliGRAM(s) Oral at bedtime    MEDICATIONS  (PRN):  dextrose Oral Gel 15 Gram(s) Oral once PRN Blood Glucose LESS THAN 70 milliGRAM(s)/deciliter  melatonin 3 milliGRAM(s) Oral at bedtime PRN Insomnia          PHYSICAL EXAM:  GENERAL: NAD, well-groomed, well-developed  HEAD:  Atraumatic, Normocephalic  CHEST/LUNG: Clear to percussion bilaterally; No rales, rhonchi, wheezing, or rubs  HEART: Regular rate and rhythm; No murmurs, rubs, or gallops  ABDOMEN: Soft, Nontender, Nondistended; Bowel sounds present  EXTREMITIES:  2+ Peripheral Pulses, No clubbing, cyanosis, or edema  LYMPH: No lymphadenopathy noted  SKIN: No rashes or lesions    Care Discussed with Consultants/Other Providers [ ] YES  [ ] NO

## 2022-11-15 NOTE — PROGRESS NOTE ADULT - SUBJECTIVE AND OBJECTIVE BOX
Date of service 11/15/2022    chief complaint: Syncope    extended hpi: Pt is a 77YOM with PMH CAD s/p PCI s/p CABG x3 ( LIMA to LAD, SV to PDA, ANd PL of Right) on 5/3/2021, post op Afib previously on Amiodarone, had ILR implanted and then removed, DM, HLD, BPH, previous subdural hematoma who presented to the ED  after having a syncopal episode while at work.    Patient states he was at work when he started to feel dizzy, also reported palpitations, denied any chest pain, stated he tried to stand up and was unsteady on his feet. His co-workers saw him and supported him. States he passed out. No urinary or bowel incontinence.  No confusion. EMS was called and noted to have a heart rate in the 20s. . He has no previous history of syncope. He was found to be COVID+ appx. 10-15 days ago with no respiratory symptoms, only noting some mild confusion. He endorses having a fall in 2019 w/ head involvement w/ chronic subdural hemorrhage (now reducing in size on imaging), and had MMA embolization at [facility] in 2021.     CABG in 2021 ( LIMA to LAD, SVG to PDA and PL of right). Had post op afib with wide complex managed on Amiodarone sunsequently converted to NSR. ILR placed and then removed    ECHO from [facility] records 04/2021 normal LV size, mild LVH, LVEF 45% MAC with moderate MR, mild AI      Patient denies chest pain or shortness of breath.   Review of symptoms otherwise negative.    MEDICATIONS:  aspirin  chewable 81 milliGRAM(s) Oral daily  atorvastatin 20 milliGRAM(s) Oral at bedtime  dextrose 5%. 1000 milliLiter(s) IV Continuous <Continuous>  dextrose 5%. 1000 milliLiter(s) IV Continuous <Continuous>  dextrose 50% Injectable 25 Gram(s) IV Push once  dextrose 50% Injectable 12.5 Gram(s) IV Push once  dextrose 50% Injectable 25 Gram(s) IV Push once  dextrose Oral Gel 15 Gram(s) Oral once PRN  finasteride 5 milliGRAM(s) Oral daily  gabapentin 300 milliGRAM(s) Oral three times a day  glucagon  Injectable 1 milliGRAM(s) IntraMuscular once  insulin lispro (ADMELOG) corrective regimen sliding scale   SubCutaneous three times a day before meals  insulin lispro (ADMELOG) corrective regimen sliding scale   SubCutaneous at bedtime  melatonin 3 milliGRAM(s) Oral at bedtime PRN  metoprolol succinate  milliGRAM(s) Oral daily  tamsulosin 0.8 milliGRAM(s) Oral at bedtime      LABS:                        13.4   6.07  )-----------( 181      ( 15 Nov 2022 07:12 )             39.6       Hemoglobin: 13.4 g/dL (11-15 @ 07:12)  Hemoglobin: 13.8 g/dL (11-14 @ 05:30)  Hemoglobin: 13.2 g/dL (11-13 @ 05:06)  Hemoglobin: 12.4 g/dL (11-12 @ 06:00)  Hemoglobin: 13.0 g/dL (11-11 @ 06:25)      11-15    134<L>  |  100  |  21  ----------------------------<  217<H>  4.1   |  22  |  0.92    Ca    9.3      15 Nov 2022 07:12  Phos  2.9     11-15  Mg     1.80     11-15      Creatinine Trend: 0.92<--, 1.03<--, 1.02<--, 1.17<--, 1.16<--, 1.23<--    PHYSICAL EXAM:  T(C): 36.4 (11-15-22 @ 05:50), Max: 36.7 (11-14-22 @ 17:09)  HR: 74 (11-15-22 @ 05:50) (72 - 74)  BP: 149/81 (11-15-22 @ 05:50) (149/66 - 158/66)  RR: 18 (11-15-22 @ 05:50) (18 - 18)  SpO2: 99% (11-15-22 @ 05:50) (99% - 100%)  Wt(kg): --    I&O's Summary    General: Well nourished in no acute distress. Alert and Oriented * 3.   Head: Normocephalic and atraumatic.   Neck: No JVD. No bruits. Supple. Does not appear to be enlarged.   Cardiovascular: + S1,S2 ; RRR Soft systolic murmur at the left lower sternal border. No rubs noted.    Lungs: CTA b/l. No rhonchi, rales or wheezes.   Abdomen: + BS, soft. Non tender. Non distended. No rebound. No guarding.   Extremities: No clubbing/cyanosis/edema.   Neurologic: Moves all four extremities. Full range of motion.   Skin: Warm and moist. The patient's skin has normal elasticity and good skin turgor.   Psychiatric: Appropriate mood and affect.  Musculoskeletal: Normal range of motion, normal strength    TELE: NSR with 1st degree      ECG:  	NSR, 1st degree LBBB    TTE:  OBSERVATIONS:  Mitral Valve: Mitral annular calcification, otherwise  normal mitral valve. Mild mitral regurgitation.  Aortic Root: Normal aortic root.  Aortic Valve: Aortic valve leaflet morphology not well  visualized.  Left Atrium: Normal left atrium.  Left Ventricle: Endocardium not well visualized; grossly  mild to moderate segmental left ventricular systolic  dysfunction.  Hypokinesis of the apex, distal septum, and  distal anterior wall.  Endocardial visualization enhanced  with intravenous injection of echo contrast (Definity).  No  LV thrombus seen.  Right Heart: Normal right atrium. The right ventricle is  not well visualized; grossly normal right ventricular  systolic function. Normal tricuspid valve. Minimal  tricuspid regurgitation. Normal pulmonic valve.  Pericardium/PleuraNormal pericardium with no pericardial  effusion    ASSESSMENT/PLAN: Pt is a 77YOM with PMH CAD s/p PCI s/p CABG x3 ( LIMA to LAD, SV to PDA, ANd PL of Right) on 5/3/2021, post op Afib previously on Amiodarone, had ILR implanted and then removed, DM, HLD, BPH, previous subdural hematoma who presented to the ED  after having a syncopal episode while at work.    1. Syncope  - EKG with 1st degree AVB and LBBB  - no strips available from heart rate in 20s  - ECHO with mild to moderate segmental LV dysfunction in setting of CABG  - For EP study pending COVID protocols and EP lab availabilty    2. CAD  -  CAD s/p PCI s/p CABG x3 ( LIMA to LAD, SV to PDA, And PL of Right)   - c/w ASA and Statin    3.  Post op Afib previously on Amiodarone  - currently in sinus c/w BB    4. HLD  - c/w atorvastatin    Christian Cody MD  Pager: [phone]

## 2022-11-15 NOTE — PROGRESS NOTE ADULT - SUBJECTIVE AND OBJECTIVE BOX
EP      HISTORY OF PRESENT ILLNESS: HPI:  Mr. Guillaume is a 77YOM with PMH CAD s/p CABG 2021, HTN, HLD, DM on insulin and BPH who presents to the ED today after having a syncopal episode while at work. He denies having any prodromal symptoms prior to fainting but does endorse LOC for some time, noting that he woke up with many people around him. He denies head involvement saying that he was held up by other people during the episode. Per EMS the patient was found to be bradycardic to the 20s, and was found to have generalized weakness in the ED. He has no previous history of syncope. He was found to be COVID+ appx. 10-15 days ago with no respiratory symptoms, only noting some mild confusion. He endorses having a fall in 2019 w/ head involvement w/ chronic subdural hemorrhage (now reducing in size on imaging), and had MMA embolization at [facility] in 2021. (09 Nov 2022 22:30)    Mr Guillaume presents after unexplained syncope at work. Lightheadedness started while sitting down. 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  He reports no prodrome of palpitations, warmth/sweating, nausea or blurry vision.   EMS verbal report to the ED was that patient's pulse was in the 20s in the ambulance, but no EKG strips of this rhythm were recorded.  On arrival, EKG redemonstrates sinus rhythm, first degree AV block, and LBBB.  He was found to be mildly hyperkalemic, and treated medically for this, and some antihypertensives held.      His cardiac history is notable for reduced LV EF, multi-vessel CAD requiring CABG, and post-CABG AFib.  His CABG + ILR implant were at [place], after being turned down for CABG at [facility]. States he no longer follows at [facility] though.  No further AFib was identified in the year since his CABG, so the ILR was removed.  A 10 pt ROS is otherwise negative.  11/11- spoke w/ patient's daughter and other family members on the phone for collateral history, and plan of care discussed in depth.  no angina or palpitations, no orthopnea or syncope here in hospital.  awaiting next step of testing and agrees not to leave hospital until completion.    11/12 no chest pain or sob   11/13 no angina, or syncope overnight  11/14- conversation at length with patient and his spouse at bedside.  will forgo stress testing- his LV ejection fraction did not change pre/post-CABG.    He has swabbed NEGATIVE for COVID on 11/12, however by hospital infection control protocols, he is still on isolation until 11/20.  Nevertheless, we will move ahead with EP study / possible device implant as he is asymptomatic.  As he is high risk for sudden cardiac arrest, we should not delay this needlessly for another week, but nor can he go home and schedule this electively as an outpatient.  He is at risk of suddenly dying in a place where we cannot protect him if he leaves the hospital against medical advice before completion of testing. The patient and his spouse expressed dismay over "being here many days and its like nothing is really happening".  We accomplished an echocardiogram before the weekend, but there was no testing or invasive procedures that could be performed over the weekend.  Date of service  11/15- resting in bed, no new complaints. no angina, orthopnea, palpitations or loss of appetite.      PAST MEDICAL & SURGICAL HISTORY:  HTN (hypertension)  Diabetes mellitus  Type 2  CAD (coronary artery disease)  Hyperlipidemia  BPH (benign prostatic hypertrophy)  Left bundle branch block  S/P primary angioplasty with coronary stent  1990s  S/P drug eluting coronary stent placement  [place]  S/P CABG x 3  May 2021  Status post placement of implantable loop recorder  May 2021  Arterial embolism  MMA 2021     aspirin  chewable 81 milliGRAM(s) Oral daily  atorvastatin 20 milliGRAM(s) Oral at bedtime  dextrose 5%. 1000 milliLiter(s) IV Continuous <Continuous>  dextrose 5%. 1000 milliLiter(s) IV Continuous <Continuous>  dextrose 50% Injectable 25 Gram(s) IV Push once  dextrose 50% Injectable 12.5 Gram(s) IV Push once  dextrose 50% Injectable 25 Gram(s) IV Push once  dextrose Oral Gel 15 Gram(s) Oral once PRN  finasteride 5 milliGRAM(s) Oral daily  gabapentin 300 milliGRAM(s) Oral three times a day  glucagon  Injectable 1 milliGRAM(s) IntraMuscular once  insulin lispro (ADMELOG) corrective regimen sliding scale   SubCutaneous three times a day before meals  insulin lispro (ADMELOG) corrective regimen sliding scale   SubCutaneous at bedtime  melatonin 3 milliGRAM(s) Oral at bedtime PRN  metoprolol succinate  milliGRAM(s) Oral daily  tamsulosin 0.8 milliGRAM(s) Oral at bedtime                            13.4   6.07  )-----------( 181      ( 15 Nov 2022 07:12 )             39.6       11-15    134<L>  |  100  |  21  ----------------------------<  217<H>  4.1   |  22  |  0.92    Ca    9.3      15 Nov 2022 07:12  Phos  2.9     11-15  Mg     1.80     11-15      T(C): 36.4 (11-15-22 @ 05:50), Max: 36.7 (11-14-22 @ 17:09)  HR: 74 (11-15-22 @ 05:50) (72 - 74)  BP: 149/81 (11-15-22 @ 05:50) (149/66 - 158/66)  RR: 18 (11-15-22 @ 05:50) (18 - 18)  SpO2: 99% (11-15-22 @ 05:50) (99% - 100%)  Wt(kg): --    I&O's Summary    General: Well nourished, no acute distress, alert and oriented x 3  Head: normocephalic, no trauma  Neck: no JVD, no bruit, supple, not enlarged  CV: S1S2, no S3, regular rate, rhythm is SINUS, no murmurs.    Lungs: clear BL, no rales or wheezes  Abdomen: bowel sounds +, soft, nontender, nondistended  Extremities: no clubbing, cyanosis or edema  Neuro: Moves all 4 extremities, sensation intact x 4 extremities  Skin: warm and moist, normal turgor  Psych: Mood and affect are appropriate for circumstances  MSK: normal range of motion and strength x4 extremities.    TELEMETRY: NSR, first degree AV block, LBBB	    ECG: NSR, first degree AV block >300ms, and LBBB 144ms.  Echo: Pre-CABG, moderate systolic dysfunction, difficult echo windows.  Repeat is pending.  < from: TTE with Doppler (w/Cont) (11.10.22 @ 18:27) >  OBSERVATIONS:  Mitral Valve: Mitral annular calcification, otherwise  normal mitral valve. Mild mitral regurgitation.  Aortic Root: Normal aortic root.  Aortic Valve: Aortic valve leaflet morphology not well  visualized.  Left Atrium: Normal left atrium.  Left Ventricle: Endocardium not well visualized; grossly  mild to moderate segmental left ventricular systolic  dysfunction.  Hypokinesis of the apex, distal septum, and  distal anterior wall.  Endocardial visualization enhanced  with intravenous injection of echo contrast (Definity).  No  LV thrombus seen.  Right Heart: Normal right atrium. The right ventricle is  not well visualized; grossly normal right ventricular  systolic function. Normal tricuspid valve. Minimal  tricuspid regurgitation. Normal pulmonic valve.  Pericardium/PleuraNormal pericardium with no pericardial  effusion.    < end of copied text >  	    ASSESSMENT/PLAN: 	77y Male presenting with unexplained syncope. Hx multivessel CAD s/p CABG last year, previously reduced EF (30-40% range) and longstanding first degree AV block and LBBB.    High risk syncope presentation with uncertain prognosis at this point  Echocardiogram suggests persistent LV systolic dysfunction despite CABG but unable to accurately assess LVEF%.  He is at high risk for his syncope being due to either infra-bri AV block or spontaneous VT/VF in the setting of ischemic heart disease.  Maintain hospitalization on telemetry.    Chronic systolic CHF - will forgo stress testing. His LVEF has not changed pre/post CABG.  On discharge, convert Metoprolol to ToprolXL.  Continue Losartan.    Left Bundle Branch Block- EP study to rule out susceptibility to infranodal heart block    CAD, prior MI, CABG, reduced LV EF% - EP study for VT. Continue aspirin, metoprolol and lipitor for CAD.    Depending upon results of EP Study, may need either a biventricular pacemaker, biventricular ICD, or outpatient followup for an ILR.  Scheduled for Thursday PM.  -On Wednesday, will order NPO after midnight.  -On Wednesday, will update Type+Screen.  -Does not need repeat COVID swab... will be treated as asymptomatic COVID-positive.  Transport w/ a mask on.    He would be discharged the following day.  He previously saw Dr Nahid Ricci from [id number] and wants to re-enroll w/ [facility].     Don Martin M.D.  Cardiac Electrophysiology  [phone]

## 2022-11-16 LAB
ANION GAP SERPL CALC-SCNC: 12 MMOL/L — SIGNIFICANT CHANGE UP (ref 7–14)
BASOPHILS # BLD AUTO: 0.03 K/UL — SIGNIFICANT CHANGE UP (ref 0–0.2)
BASOPHILS NFR BLD AUTO: 0.6 % — SIGNIFICANT CHANGE UP (ref 0–2)
BLD GP AB SCN SERPL QL: NEGATIVE — SIGNIFICANT CHANGE UP
BUN SERPL-MCNC: 21 MG/DL — SIGNIFICANT CHANGE UP (ref 7–23)
CALCIUM SERPL-MCNC: 9.6 MG/DL — SIGNIFICANT CHANGE UP (ref 8.4–10.5)
CHLORIDE SERPL-SCNC: 102 MMOL/L — SIGNIFICANT CHANGE UP (ref 98–107)
CO2 SERPL-SCNC: 22 MMOL/L — SIGNIFICANT CHANGE UP (ref 22–31)
CREAT SERPL-MCNC: 1 MG/DL — SIGNIFICANT CHANGE UP (ref 0.5–1.3)
EGFR: 77 ML/MIN/1.73M2 — SIGNIFICANT CHANGE UP
EOSINOPHIL # BLD AUTO: 0.09 K/UL — SIGNIFICANT CHANGE UP (ref 0–0.5)
EOSINOPHIL NFR BLD AUTO: 1.7 % — SIGNIFICANT CHANGE UP (ref 0–6)
GLUCOSE BLDC GLUCOMTR-MCNC: 207 MG/DL — HIGH (ref 70–99)
GLUCOSE BLDC GLUCOMTR-MCNC: 271 MG/DL — HIGH (ref 70–99)
GLUCOSE BLDC GLUCOMTR-MCNC: 276 MG/DL — HIGH (ref 70–99)
GLUCOSE BLDC GLUCOMTR-MCNC: 282 MG/DL — HIGH (ref 70–99)
GLUCOSE BLDC GLUCOMTR-MCNC: 344 MG/DL — HIGH (ref 70–99)
GLUCOSE SERPL-MCNC: 211 MG/DL — HIGH (ref 70–99)
HCT VFR BLD CALC: 39.1 % — SIGNIFICANT CHANGE UP (ref 39–50)
HGB BLD-MCNC: 13.3 G/DL — SIGNIFICANT CHANGE UP (ref 13–17)
IANC: 2.96 K/UL — SIGNIFICANT CHANGE UP (ref 1.8–7.4)
IMM GRANULOCYTES NFR BLD AUTO: 0.2 % — SIGNIFICANT CHANGE UP (ref 0–0.9)
LYMPHOCYTES # BLD AUTO: 1.6 K/UL — SIGNIFICANT CHANGE UP (ref 1–3.3)
LYMPHOCYTES # BLD AUTO: 29.5 % — SIGNIFICANT CHANGE UP (ref 13–44)
MAGNESIUM SERPL-MCNC: 1.6 MG/DL — SIGNIFICANT CHANGE UP (ref 1.6–2.6)
MCHC RBC-ENTMCNC: 29.4 PG — SIGNIFICANT CHANGE UP (ref 27–34)
MCHC RBC-ENTMCNC: 34 GM/DL — SIGNIFICANT CHANGE UP (ref 32–36)
MCV RBC AUTO: 86.5 FL — SIGNIFICANT CHANGE UP (ref 80–100)
MONOCYTES # BLD AUTO: 0.73 K/UL — SIGNIFICANT CHANGE UP (ref 0–0.9)
MONOCYTES NFR BLD AUTO: 13.5 % — SIGNIFICANT CHANGE UP (ref 2–14)
NEUTROPHILS # BLD AUTO: 2.96 K/UL — SIGNIFICANT CHANGE UP (ref 1.8–7.4)
NEUTROPHILS NFR BLD AUTO: 54.5 % — SIGNIFICANT CHANGE UP (ref 43–77)
NRBC # BLD: 0 /100 WBCS — SIGNIFICANT CHANGE UP (ref 0–0)
NRBC # FLD: 0 K/UL — SIGNIFICANT CHANGE UP (ref 0–0)
PHOSPHATE SERPL-MCNC: 2.7 MG/DL — SIGNIFICANT CHANGE UP (ref 2.5–4.5)
PLATELET # BLD AUTO: 178 K/UL — SIGNIFICANT CHANGE UP (ref 150–400)
POTASSIUM SERPL-MCNC: 4.1 MMOL/L — SIGNIFICANT CHANGE UP (ref 3.5–5.3)
POTASSIUM SERPL-SCNC: 4.1 MMOL/L — SIGNIFICANT CHANGE UP (ref 3.5–5.3)
RBC # BLD: 4.52 M/UL — SIGNIFICANT CHANGE UP (ref 4.2–5.8)
RBC # FLD: 13.1 % — SIGNIFICANT CHANGE UP (ref 10.3–14.5)
RH IG SCN BLD-IMP: POSITIVE — SIGNIFICANT CHANGE UP
SODIUM SERPL-SCNC: 136 MMOL/L — SIGNIFICANT CHANGE UP (ref 135–145)
WBC # BLD: 5.42 K/UL — SIGNIFICANT CHANGE UP (ref 3.8–10.5)
WBC # FLD AUTO: 5.42 K/UL — SIGNIFICANT CHANGE UP (ref 3.8–10.5)

## 2022-11-16 RX ORDER — LOSARTAN POTASSIUM 100 MG/1
50 TABLET, FILM COATED ORAL DAILY
Refills: 0 | Status: DISCONTINUED | OUTPATIENT
Start: 2022-11-17 | End: 2022-11-18

## 2022-11-16 RX ADMIN — Medication 2: at 07:40

## 2022-11-16 RX ADMIN — Medication 3 MILLIGRAM(S): at 22:08

## 2022-11-16 RX ADMIN — LOSARTAN POTASSIUM 25 MILLIGRAM(S): 100 TABLET, FILM COATED ORAL at 05:10

## 2022-11-16 RX ADMIN — GABAPENTIN 300 MILLIGRAM(S): 400 CAPSULE ORAL at 05:10

## 2022-11-16 RX ADMIN — Medication 2: at 22:12

## 2022-11-16 RX ADMIN — Medication 3: at 17:40

## 2022-11-16 RX ADMIN — Medication 81 MILLIGRAM(S): at 13:15

## 2022-11-16 RX ADMIN — GABAPENTIN 300 MILLIGRAM(S): 400 CAPSULE ORAL at 22:09

## 2022-11-16 RX ADMIN — Medication 100 MILLIGRAM(S): at 05:11

## 2022-11-16 RX ADMIN — GABAPENTIN 300 MILLIGRAM(S): 400 CAPSULE ORAL at 13:15

## 2022-11-16 RX ADMIN — ENOXAPARIN SODIUM 40 MILLIGRAM(S): 100 INJECTION SUBCUTANEOUS at 17:48

## 2022-11-16 RX ADMIN — Medication 3: at 11:32

## 2022-11-16 NOTE — DIETITIAN INITIAL EVALUATION ADULT - PERTINENT MEDS FT
MEDICATIONS  (STANDING):  aspirin  chewable 81 milliGRAM(s) Oral daily  atorvastatin 20 milliGRAM(s) Oral at bedtime  dextrose 5%. 1000 milliLiter(s) (100 mL/Hr) IV Continuous <Continuous>  dextrose 5%. 1000 milliLiter(s) (50 mL/Hr) IV Continuous <Continuous>  dextrose 50% Injectable 25 Gram(s) IV Push once  dextrose 50% Injectable 12.5 Gram(s) IV Push once  dextrose 50% Injectable 25 Gram(s) IV Push once  enoxaparin Injectable 40 milliGRAM(s) SubCutaneous every 24 hours  finasteride 5 milliGRAM(s) Oral daily  gabapentin 300 milliGRAM(s) Oral three times a day  glucagon  Injectable 1 milliGRAM(s) IntraMuscular once  insulin lispro (ADMELOG) corrective regimen sliding scale   SubCutaneous three times a day before meals  insulin lispro (ADMELOG) corrective regimen sliding scale   SubCutaneous at bedtime  losartan 25 milliGRAM(s) Oral daily  metoprolol succinate  milliGRAM(s) Oral daily  tamsulosin 0.8 milliGRAM(s) Oral at bedtime    MEDICATIONS  (PRN):  dextrose Oral Gel 15 Gram(s) Oral once PRN Blood Glucose LESS THAN 70 milliGRAM(s)/deciliter  melatonin 3 milliGRAM(s) Oral at bedtime PRN Insomnia

## 2022-11-16 NOTE — PROGRESS NOTE ADULT - ASSESSMENT
77YOM with PMH CAD s/p CABG 2021, HTN, HLD, DM on insulin and BPH who presents to the ED today after having a syncopal episode while at work with LOC but no head involvement    Problem/Plan - 1:  ·  Problem: Syncope.   ·  Plan: -telemonitor   -HR found to be 20's to 30's as per EMS  - check echo  - cards and EP fu     Problem/Plan - 2:  ·  Problem: SORAIDA (acute kidney injury).   ·  Plan: -monitor cr  -encourage PO intake  -holding aldactone and benicar at this time.    Problem/Plan - 3:  ·  Problem: Hyperkalemia.   ·  Plan: -pt. hyperkalemic on admission K 6.1  -will hold aldactone and olmesartan at this time  -s/p 10mg Lokelma x2 with adequate response.    Problem/Plan - 4:  ·  Problem: CAD (coronary artery disease).   ·  Plan: -pt. with Hx CAD s/p CABG in 2021  -c/w aspirin.    Problem/Plan - 5:  ·  Problem: Type 2 diabetes mellitus.   ·  Plan: - monitor FS  - ISS     Problem/Plan - 6:  ·  Problem: Hypertension.   ·  Plan: - cw current meds  - DASH diet     Problem/Plan - 7:  ·  Problem: Preventive measure.   ·  Plan: Diet: CC/DASH  DVT: heparin subq    
77YOM with PMH CAD s/p CABG 2021, HTN, HLD, DM on insulin and BPH who presents to the ED today after having a syncopal episode while at work with LOC but no head involvement    Problem/Plan - 1:  ·  Problem: Syncope.   ·  Plan: -telemonitor   -HR found to be 20's to 30's as per EMS  - echo reviewed  - cards and EP fu     Problem/Plan - 2:  ·  Problem: SORAIDA (acute kidney injury).   ·  Plan: -monitor cr  -encourage PO intake    Problem/Plan - 3:  ·  Problem: Hyperkalemia.   ·  Plan: -pt. hyperkalemic on admission K 6.1  -mpnitor bmp     Problem/Plan - 4:  ·  Problem: CAD (coronary artery disease).   ·  Plan: -pt. with Hx CAD s/p CABG in 2021  -c/w aspirin.  Problem/Plan - 5:  ·  Problem: Type 2 diabetes mellitus.   ·  Plan: - monitor FS  - ISS     Problem/Plan - 6:  ·  Problem: Hypertension.   ·  Plan: - cw current meds  - DASH diet     Problem/Plan - 7:  ·  Problem: Preventive measure.   ·  Plan: Diet: CC/DASH  DVT: heparin subq     
77YOM with PMH CAD s/p CABG 2021, HTN, HLD, DM on insulin and BPH who presents to the ED today after having a syncopal episode while at work with LOC but no head involvement    Problem/Plan - 1:  ·  Problem: Syncope.   ·  Plan: -telemonitor   -HR found to be 20's to 30's as per EMS  - echo reviewed  - cards and EP fu   - EP study and PPM p,anned     Problem/Plan - 2:  ·  Problem: SORAIDA (acute kidney injury).   ·  Plan: -monitor cr  -encourage PO intake    Problem/Plan - 3:  ·  Problem: Hyperkalemia.   ·  Plan: -pt. hyperkalemic on admission  monitor bmp     Problem/Plan - 4:  ·  Problem: CAD (coronary artery disease).   ·  Plan: -pt. with Hx CAD s/p CABG in 2021  -c/w aspirin.    Problem/Plan - 5:  ·  Problem: Type 2 diabetes mellitus.   ·  Plan: - monitor FS  - ISS     Problem/Plan - 6:  ·  Problem: Hypertension.   ·  Plan: - cw current meds  - DASH diet     Problem/Plan - 7:  ·  Problem: Preventive measure.   ·  Plan: Diet: CC/DASH  DVT: heparin subq
77YOM with PMH CAD s/p CABG 2021, HTN, HLD, DM on insulin and BPH who presents to the ED today after having a syncopal episode while at work with LOC but no head involvement    Problem/Plan - 1:  ·  Problem: Syncope.   ·  Plan: -telemonitor   -HR found to be 20's to 30's as per EMS  - check echo  - cards and EP fu     Problem/Plan - 2:  ·  Problem: SORAIDA (acute kidney injury).   ·  Plan: -monitor cr  -encourage PO intake  -holding aldactone and benicar at this time.    Problem/Plan - 3:  ·  Problem: Hyperkalemia.   ·  Plan: -pt. hyperkalemic on admission K 6.1  -will hold aldactone and olmesartan at this time  -s/p 10mg Lokelma x2 with adequate response.    Problem/Plan - 4:  ·  Problem: CAD (coronary artery disease).   ·  Plan: -pt. with Hx CAD s/p CABG in 2021  -c/w aspirin.    Problem/Plan - 5:  ·  Problem: Type 2 diabetes mellitus.   ·  Plan: - monitor FS  - ISS     Problem/Plan - 6:  ·  Problem: Hypertension.   ·  Plan: - cw current meds  - DASH diet     Problem/Plan - 7:  ·  Problem: Preventive measure.   ·  Plan: Diet: CC/DASH  DVT: heparin subq    
77YOM with PMH CAD s/p CABG 2021, HTN, HLD, DM on insulin and BPH who presents to the ED today after having a syncopal episode while at work with LOC but no head involvement    Problem/Plan - 1:  ·  Problem: Syncope.   ·  Plan: -telemonitor   -HR found to be 20's to 30's as per EMS  - echo reviewed  - cards and EP fu     Problem/Plan - 2:  ·  Problem: SORAIDA (acute kidney injury).   ·  Plan: -monitor cr  -encourage PO intake    Problem/Plan - 3:  ·  Problem: Hyperkalemia.   ·  Plan: -pt. hyperkalemic on admission K 6.1  -mpnitor bmp     Problem/Plan - 4:  ·  Problem: CAD (coronary artery disease).   ·  Plan: -pt. with Hx CAD s/p CABG in 2021  -c/w aspirin.  Problem/Plan - 5:  ·  Problem: Type 2 diabetes mellitus.   ·  Plan: - monitor FS  - ISS     Problem/Plan - 6:  ·  Problem: Hypertension.   ·  Plan: - cw current meds  - DASH diet     Problem/Plan - 7:  ·  Problem: Preventive measure.   ·  Plan: Diet: CC/DASH  DVT: heparin subq     
77YOM with PMH CAD s/p CABG 2021, HTN, HLD, DM on insulin and BPH who presents to the ED today after having a syncopal episode while at work with LOC but no head involvement    Problem/Plan - 1:  ·  Problem: Syncope.   ·  Plan: -telemonitor   -HR found to be 20's to 30's as per EMS  - echo reviewed  - cards and EP fu   - EP study and PPM p,anned     Problem/Plan - 2:  ·  Problem: SORAIDA (acute kidney injury).   ·  Plan: -monitor cr  -encourage PO intake    Problem/Plan - 3:  ·  Problem: Hyperkalemia.   ·  Plan: -pt. hyperkalemic on admission  monitor bmp     Problem/Plan - 4:  ·  Problem: CAD (coronary artery disease).   ·  Plan: -pt. with Hx CAD s/p CABG in 2021  -c/w aspirin.  Problem/Plan - 5:  ·  Problem: Type 2 diabetes mellitus.   ·  Plan: - monitor FS  - ISS     Problem/Plan - 6:  ·  Problem: Hypertension.   ·  Plan: - cw current meds  - DASH diet     Problem/Plan - 7:  ·  Problem: Preventive measure.   ·  Plan: Diet: CC/DASH  DVT: heparin subq
77YOM with PMH CAD s/p CABG 2021, HTN, HLD, DM on insulin and BPH who presents to the ED today after having a syncopal episode while at work with LOC but no head involvement    Problem/Plan - 1:  ·  Problem: Syncope.   ·  Plan: -telemonitor   -HR found to be 20's to 30's as per EMS  - echo reviewed  - cards and EP fu     Problem/Plan - 2:  ·  Problem: SORAIDA (acute kidney injury).   ·  Plan: -monitor cr  -encourage PO intake    Problem/Plan - 3:  ·  Problem: Hyperkalemia.   ·  Plan: -pt. hyperkalemic on admission K 6.1  -mpnitor bmp     Problem/Plan - 4:  ·  Problem: CAD (coronary artery disease).   ·  Plan: -pt. with Hx CAD s/p CABG in 2021  -c/w aspirin.  Problem/Plan - 5:  ·  Problem: Type 2 diabetes mellitus.   ·  Plan: - monitor FS  - ISS     Problem/Plan - 6:  ·  Problem: Hypertension.   ·  Plan: - cw current meds  - DASH diet     Problem/Plan - 7:  ·  Problem: Preventive measure.   ·  Plan: Diet: CC/DASH  DVT: heparin subq

## 2022-11-16 NOTE — DIETITIAN INITIAL EVALUATION ADULT - OTHER INFO
A&Ox4 with wife at bedside. COVID+. Pt reports good appetite and PO intake >75% meals. NPO past midnight for possible pacemaker placement. No reported GI issues (nausea/vomiting/diarrhea/constipation.) Denies any chewing or swallowing difficulties at this time. NFKA. No pork. UBW around 155 pounds.

## 2022-11-16 NOTE — DIETITIAN INITIAL EVALUATION ADULT - PERTINENT LABORATORY DATA
11-16    136  |  102  |  21  ----------------------------<  211<H>  4.1   |  22  |  1.00    Ca    9.6      16 Nov 2022 06:33  Phos  2.7     11-16  Mg     1.60     11-16    POCT Blood Glucose.: 271 mg/dL (11-16-22 @ 11:17)  A1C with Estimated Average Glucose Result: 7.4 % (11-10-22 @ 06:05)

## 2022-11-16 NOTE — PROGRESS NOTE ADULT - SUBJECTIVE AND OBJECTIVE BOX
Patient is a 77y old  Male who presents with a chief complaint of Syncope (16 Nov 2022 13:36)    Date of servie : 11-16-22 @ 15:26  INTERVAL HPI/OVERNIGHT EVENTS:  T(C): 36.7 (11-16-22 @ 13:00), Max: 36.8 (11-16-22 @ 05:00)  HR: 64 (11-16-22 @ 13:00) (60 - 75)  BP: 117/61 (11-16-22 @ 13:00) (117/61 - 149/70)  RR: 17 (11-16-22 @ 13:00) (17 - 18)  SpO2: 100% (11-16-22 @ 13:00) (100% - 100%)  Wt(kg): --  I&O's Summary      LABS:                        13.3   5.42  )-----------( 178      ( 16 Nov 2022 06:33 )             39.1     11-16    136  |  102  |  21  ----------------------------<  211<H>  4.1   |  22  |  1.00    Ca    9.6      16 Nov 2022 06:33  Phos  2.7     11-16  Mg     1.60     11-16          CAPILLARY BLOOD GLUCOSE      POCT Blood Glucose.: 271 mg/dL (16 Nov 2022 11:17)  POCT Blood Glucose.: 282 mg/dL (16 Nov 2022 11:16)  POCT Blood Glucose.: 207 mg/dL (16 Nov 2022 07:34)  POCT Blood Glucose.: 175 mg/dL (15 Nov 2022 21:46)  POCT Blood Glucose.: 186 mg/dL (15 Nov 2022 16:42)            MEDICATIONS  (STANDING):  aspirin  chewable 81 milliGRAM(s) Oral daily  atorvastatin 20 milliGRAM(s) Oral at bedtime  dextrose 5%. 1000 milliLiter(s) (100 mL/Hr) IV Continuous <Continuous>  dextrose 5%. 1000 milliLiter(s) (50 mL/Hr) IV Continuous <Continuous>  dextrose 50% Injectable 25 Gram(s) IV Push once  dextrose 50% Injectable 12.5 Gram(s) IV Push once  dextrose 50% Injectable 25 Gram(s) IV Push once  enoxaparin Injectable 40 milliGRAM(s) SubCutaneous every 24 hours  finasteride 5 milliGRAM(s) Oral daily  gabapentin 300 milliGRAM(s) Oral three times a day  glucagon  Injectable 1 milliGRAM(s) IntraMuscular once  insulin lispro (ADMELOG) corrective regimen sliding scale   SubCutaneous three times a day before meals  insulin lispro (ADMELOG) corrective regimen sliding scale   SubCutaneous at bedtime  losartan 50 milliGRAM(s) Oral daily  metoprolol succinate  milliGRAM(s) Oral daily  tamsulosin 0.8 milliGRAM(s) Oral at bedtime    MEDICATIONS  (PRN):  dextrose Oral Gel 15 Gram(s) Oral once PRN Blood Glucose LESS THAN 70 milliGRAM(s)/deciliter  melatonin 3 milliGRAM(s) Oral at bedtime PRN Insomnia          PHYSICAL EXAM:  GENERAL: NAD, well-groomed, well-developed  HEAD:  Atraumatic, Normocephalic  CHEST/LUNG: Clear to percussion bilaterally; No rales, rhonchi, wheezing, or rubs  HEART: Regular rate and rhythm; No murmurs, rubs, or gallops  ABDOMEN: Soft, Nontender, Nondistended; Bowel sounds present  EXTREMITIES:  2+ Peripheral Pulses, No clubbing, cyanosis, or edema  LYMPH: No lymphadenopathy noted  SKIN: No rashes or lesions    Care Discussed with Consultants/Other Providers [ ] YES  [ ] NO

## 2022-11-16 NOTE — PROGRESS NOTE ADULT - SUBJECTIVE AND OBJECTIVE BOX
Date of service 11/16/2022    chief complaint: Syncope    extended hpi: Pt is a 77YOM with PMH CAD s/p PCI s/p CABG x3 ( LIMA to LAD, SV to PDA, ANd PL of Right) on 5/3/2021, post op Afib previously on Amiodarone, had ILR implanted and then removed, DM, HLD, BPH, previous subdural hematoma who presented to the ED  after having a syncopal episode while at work.    Patient states he was at work when he started to feel dizzy, also reported palpitations, denied any chest pain, stated he tried to stand up and was unsteady on his feet. His co-workers saw him and supported him. States he passed out. No urinary or bowel incontinence.  No confusion. EMS was called and noted to have a heart rate in the 20s. . He has no previous history of syncope. He was found to be COVID+ appx. 10-15 days ago with no respiratory symptoms, only noting some mild confusion. He endorses having a fall in 2019 w/ head involvement w/ chronic subdural hemorrhage (now reducing in size on imaging), and had MMA embolization at [facility] in 2021.     CABG in 2021 ( LIMA to LAD, SVG to PDA and PL of right). Had post op afib with wide complex managed on Amiodarone sunsequently converted to NSR. ILR placed and then removed    ECHO from [facility] records 04/2021 normal LV size, mild LVH, LVEF 45% MAC with moderate MR, mild AI    Patient denies chest pain or shortness of breath.   Review of symptoms otherwise negative.    Review of Systems:   Constitutional: [ ] fevers, [ ] chills.   Skin: [ ] dry skin. [ ] rashes.  Psychiatric: [ ] depression, [ ] anxiety.   Gastrointestinal: [ ] BRBPR, [ ] melena.   Neurological: [ ] confusion. [ ] seizures. [ ] shuffling gait.   Ears,Nose,Mouth and Throat: [ ] ear pain [ ] sore throat.   Eyes: [ ] diplopia.   Respiratory: [ ] hemoptysis. [ ] shortness of breath  Cardiovascular: See HPI above  Hematologic/Lymphatic: [ ] anemia. [ ] painful nodes. [ ] prolonged bleeding.   Genitourinary: [ ] hematuria. [ ] flank pain.   Endocrine: [ ] significant change in weight. [ ] intolerance to heat and cold.     Review of systems [x ] otherwise negative, [ ] otherwise unable to obtain    FH: no family history of sudden cardiac death in first degree relatives    SH: [ ] tobacco, [ ] alcohol, [ ] drugs    aspirin  chewable 81 milliGRAM(s) Oral daily  atorvastatin 20 milliGRAM(s) Oral at bedtime  dextrose 5%. 1000 milliLiter(s) IV Continuous <Continuous>  dextrose 5%. 1000 milliLiter(s) IV Continuous <Continuous>  dextrose 50% Injectable 25 Gram(s) IV Push once  dextrose 50% Injectable 12.5 Gram(s) IV Push once  dextrose 50% Injectable 25 Gram(s) IV Push once  dextrose Oral Gel 15 Gram(s) Oral once PRN  enoxaparin Injectable 40 milliGRAM(s) SubCutaneous every 24 hours  finasteride 5 milliGRAM(s) Oral daily  gabapentin 300 milliGRAM(s) Oral three times a day  glucagon  Injectable 1 milliGRAM(s) IntraMuscular once  insulin lispro (ADMELOG) corrective regimen sliding scale   SubCutaneous three times a day before meals  insulin lispro (ADMELOG) corrective regimen sliding scale   SubCutaneous at bedtime  losartan 25 milliGRAM(s) Oral daily  melatonin 3 milliGRAM(s) Oral at bedtime PRN  metoprolol succinate  milliGRAM(s) Oral daily  tamsulosin 0.8 milliGRAM(s) Oral at bedtime                            13.3   5.42  )-----------( 178      ( 16 Nov 2022 06:33 )             39.1   136  |  102  |  21  ----------------------------<  211<H>  4.1   |  22  |  1.00    Ca    9.6      16 Nov 2022 06:33  Phos  2.7     11-16  Mg     1.60     11-16    T(C): 36.8 (11-16-22 @ 05:00), Max: 36.8 (11-16-22 @ 05:00)  HR: 74 (11-16-22 @ 05:00) (60 - 75)  BP: 146/77 (11-16-22 @ 05:00) (130/58 - 149/70)  RR: 18 (11-16-22 @ 05:00) (17 - 18)  SpO2: 100% (11-16-22 @ 05:00) (100% - 100%)  Wt(kg): --    General: Well nourished in no acute distress. Alert and Oriented * 3.   Head: Normocephalic and atraumatic.   Neck: No JVD. No bruits. Supple. Does not appear to be enlarged.   Cardiovascular: + S1,S2 ; RRR Soft systolic murmur at the left lower sternal border. No rubs noted.    Lungs: CTA b/l. No rhonchi, rales or wheezes.   Abdomen: + BS, soft. Non tender. Non distended. No rebound. No guarding.   Extremities: No clubbing/cyanosis/edema.   Neurologic: Moves all four extremities. Full range of motion.   Skin: Warm and moist. The patient's skin has normal elasticity and good skin turgor.   Psychiatric: Appropriate mood and affect.  Musculoskeletal: Normal range of motion, normal strength    TELE: NSR with 1st degree      ECG:  	NSR, 1st degree LBBB    TTE:  OBSERVATIONS:  Mitral Valve: Mitral annular calcification, otherwise  normal mitral valve. Mild mitral regurgitation.  Aortic Root: Normal aortic root.  Aortic Valve: Aortic valve leaflet morphology not well  visualized.  Left Atrium: Normal left atrium.  Left Ventricle: Endocardium not well visualized; grossly  mild to moderate segmental left ventricular systolic  dysfunction.  Hypokinesis of the apex, distal septum, and  distal anterior wall.  Endocardial visualization enhanced  with intravenous injection of echo contrast (Definity).  No  LV thrombus seen.  Right Heart: Normal right atrium. The right ventricle is  not well visualized; grossly normal right ventricular  systolic function. Normal tricuspid valve. Minimal  tricuspid regurgitation. Normal pulmonic valve.  Pericardium/PleuraNormal pericardium with no pericardial  effusion    ASSESSMENT/PLAN: Pt is a 77YOM with PMH CAD s/p PCI s/p CABG x3 ( LIMA to LAD, SV to PDA, ANd PL of Right) on 5/3/2021, post op Afib previously on Amiodarone, had ILR implanted and then removed, DM, HLD, BPH, previous subdural hematoma who presented to the ED  after having a syncopal episode while at work.    1. Syncope  - EKG with 1st degree AVB and LBBB  - no strips available from heart rate in 20s  - ECHO with mild to moderate segmental LV dysfunction in setting of CABG  - For EP study pending COVID protocols and EP lab availability-- tentatively scheduled 11/17    2. CAD  -  CAD s/p PCI s/p CABG x3 ( LIMA to LAD, SV to PDA, And PL of Right)   - c/w ASA and Statin    3.  Post op Afib previously on Amiodarone  - currently in sinus c/w BB    4. HLD  - c/w atorvastatin    Nery KERR  [phone]

## 2022-11-16 NOTE — DIETITIAN INITIAL EVALUATION ADULT - NS FNS DIET ORDER
Diet, NPO after Midnight:      NPO Start Date: 16-Nov-2022,   NPO Start Time: 23:59 (11-16-22 @ 07:23)

## 2022-11-16 NOTE — PROGRESS NOTE ADULT - NS ATTEND AMEND GEN_ALL_CORE FT
awaiting stress testing, then EPS guided device implant.
Patient seen and examined. Agree with plan as detailed in PA/NP Note.         Increase Losartan in setting of mild cardiomyopathy and elevated BP.    Christian Cody MD  Pager: [phone]
over-the-weekend covid swab NEGATIVE.    asymptomatic from a systemic and pulmonary standpoint.  ischemic workup, then EP study guided device implant.

## 2022-11-16 NOTE — DIETITIAN INITIAL EVALUATION ADULT - ADD RECOMMEND
1. Encourage PO intake and honor food preferences as able.  2. Continue to document PO in RN flow sheets and monitor weekly weights.

## 2022-11-16 NOTE — DIETITIAN INITIAL EVALUATION ADULT - REASON FOR ADMISSION
Syncope and collapse  77YOM with PMH CAD s/p CABG 2021, HTN, HLD, DM on insulin and BPH who presents to the ED today after having a syncopal episode while at work with LOC but no head involvement

## 2022-11-16 NOTE — PROGRESS NOTE ADULT - SUBJECTIVE AND OBJECTIVE BOX
EP      HISTORY OF PRESENT ILLNESS: HPI:  Mr. Guillaume is a 77YOM with PMH CAD s/p CABG 2021, HTN, HLD, DM on insulin and BPH who presents to the ED today after having a syncopal episode while at work. He denies having any prodromal symptoms prior to fainting but does endorse LOC for some time, noting that he woke up with many people around him. He denies head involvement saying that he was held up by other people during the episode. Per EMS the patient was found to be bradycardic to the 20s, and was found to have generalized weakness in the ED. He has no previous history of syncope. He was found to be COVID+ appx. 10-15 days ago with no respiratory symptoms, only noting some mild confusion. He endorses having a fall in 2019 w/ head involvement w/ chronic subdural hemorrhage (now reducing in size on imaging), and had MMA embolization at [facility] in 2021. (09 Nov 2022 22:30)    Mr Guillaume presents after unexplained syncope at work. Lightheadedness started while sitting down. 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  He reports no prodrome of palpitations, warmth/sweating, nausea or blurry vision.   EMS verbal report to the ED was that patient's pulse was in the 20s in the ambulance, but no EKG strips of this rhythm were recorded.  On arrival, EKG redemonstrates sinus rhythm, first degree AV block, and LBBB.  He was found to be mildly hyperkalemic, and treated medically for this, and some antihypertensives held.      His cardiac history is notable for reduced LV EF, multi-vessel CAD requiring CABG, and post-CABG AFib.  His CABG + ILR implant were at [facility], after being turned down for CABG at [facility]. States he no longer follows at [facility] though.  No further AFib was identified in the year since his CABG, so the ILR was removed.  A 10 pt ROS is otherwise negative.  11/11- spoke w/ patient's daughter and other family members on the phone for collateral history, and plan of care discussed in depth.  no angina or palpitations, no orthopnea or syncope here in hospital.  awaiting next step of testing and agrees not to leave hospital until completion.    11/12 no chest pain or sob   11/13 no angina, or syncope overnight  11/14- conversation at length with patient and his spouse at bedside.  will forgo stress testing- his LV ejection fraction did not change pre/post-CABG.    He has swabbed NEGATIVE for COVID on 11/12, however by hospital infection control protocols, he is still on isolation until 11/20.  Nevertheless, we will move ahead with EP study / possible device implant as he is asymptomatic.  As he is high risk for sudden cardiac arrest, we should not delay this needlessly for another week, but nor can he go home and schedule this electively as an outpatient.  He is at risk of suddenly dying in a place where we cannot protect him if he leaves the hospital against medical advice before completion of testing. The patient and his spouse expressed dismay over "being here many days and its like nothing is really happening".  We accomplished an echocardiogram before the weekend, but there was no testing or invasive procedures that could be performed over the weekend.  11/15- resting in bed, no new complaints. no angina, orthopnea, palpitations or loss of appetite.  Date of service 11/16- resting in bed, no new complaints.  awaiting procedure tomorrow.    PAST MEDICAL & SURGICAL HISTORY:  HTN (hypertension)  Diabetes mellitus  Type 2  CAD (coronary artery disease)  Hyperlipidemia  BPH (benign prostatic hypertrophy)  Left bundle branch block  S/P primary angioplasty with coronary stent  1990s  S/P drug eluting coronary stent placement  [place]  S/P CABG x 3  May 2021  Status post placement of implantable loop recorder  May 2021  Arterial embolism  MMA 2021     aspirin  chewable 81 milliGRAM(s) Oral daily  atorvastatin 20 milliGRAM(s) Oral at bedtime  dextrose 5%. 1000 milliLiter(s) IV Continuous <Continuous>  dextrose 5%. 1000 milliLiter(s) IV Continuous <Continuous>  dextrose 50% Injectable 25 Gram(s) IV Push once  dextrose 50% Injectable 12.5 Gram(s) IV Push once  dextrose 50% Injectable 25 Gram(s) IV Push once  dextrose Oral Gel 15 Gram(s) Oral once PRN  enoxaparin Injectable 40 milliGRAM(s) SubCutaneous every 24 hours  finasteride 5 milliGRAM(s) Oral daily  gabapentin 300 milliGRAM(s) Oral three times a day  glucagon  Injectable 1 milliGRAM(s) IntraMuscular once  insulin lispro (ADMELOG) corrective regimen sliding scale   SubCutaneous three times a day before meals  insulin lispro (ADMELOG) corrective regimen sliding scale   SubCutaneous at bedtime  losartan 50 milliGRAM(s) Oral daily  melatonin 3 milliGRAM(s) Oral at bedtime PRN  metoprolol succinate  milliGRAM(s) Oral daily  tamsulosin 0.8 milliGRAM(s) Oral at bedtime                            13.3   5.42  )-----------( 178      ( 16 Nov 2022 06:33 )             39.1       11-16    136  |  102  |  21  ----------------------------<  211<H>  4.1   |  22  |  1.00    Ca    9.6      16 Nov 2022 06:33  Phos  2.7     11-16  Mg     1.60     11-16    T(C): 36.7 (11-16-22 @ 13:00), Max: 36.8 (11-16-22 @ 05:00)  HR: 64 (11-16-22 @ 13:00) (60 - 75)  BP: 117/61 (11-16-22 @ 13:00) (117/61 - 149/70)  RR: 17 (11-16-22 @ 13:00) (17 - 18)  SpO2: 100% (11-16-22 @ 13:00) (100% - 100%)  Wt(kg): --    I&O's Summary    General: Well nourished, no acute distress, alert and oriented x 3  Head: normocephalic, no trauma  Neck: no JVD, no bruit, supple, not enlarged  CV: S1S2, no S3, regular rate, rhythm is SINUS, no murmurs.    Lungs: clear BL, no rales or wheezes  Abdomen: bowel sounds +, soft, nontender, nondistended  Extremities: no clubbing, cyanosis or edema  Neuro: Moves all 4 extremities, sensation intact x 4 extremities  Skin: warm and moist, normal turgor  Psych: Mood and affect are appropriate for circumstances  MSK: normal range of motion and strength x4 extremities.    TELEMETRY: NSR, first degree AV block, LBBB	    ECG: NSR, first degree AV block >300ms, and LBBB 144ms.  Echo: Pre-CABG, moderate systolic dysfunction, difficult echo windows.  Repeat is pending.  < from: TTE with Doppler (w/Cont) (11.10.22 @ 18:27) >  OBSERVATIONS:  Mitral Valve: Mitral annular calcification, otherwise  normal mitral valve. Mild mitral regurgitation.  Aortic Root: Normal aortic root.  Aortic Valve: Aortic valve leaflet morphology not well  visualized.  Left Atrium: Normal left atrium.  Left Ventricle: Endocardium not well visualized; grossly  mild to moderate segmental left ventricular systolic  dysfunction.  Hypokinesis of the apex, distal septum, and  distal anterior wall.  Endocardial visualization enhanced  with intravenous injection of echo contrast (Definity).  No  LV thrombus seen.  Right Heart: Normal right atrium. The right ventricle is  not well visualized; grossly normal right ventricular  systolic function. Normal tricuspid valve. Minimal  tricuspid regurgitation. Normal pulmonic valve.  Pericardium/PleuraNormal pericardium with no pericardial  effusion.    < end of copied text >  	    ASSESSMENT/PLAN: 	77y Male presenting with unexplained syncope. Hx multivessel CAD s/p CABG last year, previously reduced EF (30-40% range) and longstanding first degree AV block and LBBB.    High risk syncope presentation with uncertain prognosis at this point  Echocardiogram suggests persistent LV systolic dysfunction despite CABG but unable to accurately assess LVEF%.  He is at high risk for his syncope being due to either infra-bri AV block or spontaneous VT/VF in the setting of ischemic heart disease.  Maintain hospitalization on telemetry.    Chronic systolic CHF - will forgo stress testing. His LVEF has not changed pre/post CABG.  On discharge, convert Metoprolol to ToprolXL.  Continue Losartan.    Left Bundle Branch Block- EP study to rule out susceptibility to infranodal heart block    CAD, prior MI, CABG, reduced LV EF% - EP study for VT. Continue aspirin, metoprolol and lipitor for CAD.    Depending upon results of EP Study, may need either a biventricular pacemaker, biventricular ICD, or outpatient followup for an ILR.  Scheduled for Thursday PM.  -On Wednesday, will order NPO after midnight.  -Type+Screen is updated.  -Does not need repeat COVID swab... will be treated as asymptomatic COVID-positive.  Transport w/ a mask on.    He would be discharged the following day.  He previously saw Dr Nahid Ricci from [phone] and wants to re-enroll w/ [facility].     Don Martin M.D.  Cardiac Electrophysiology  [phone]

## 2022-11-17 LAB
ANION GAP SERPL CALC-SCNC: 11 MMOL/L — SIGNIFICANT CHANGE UP (ref 7–14)
BUN SERPL-MCNC: 29 MG/DL — HIGH (ref 7–23)
CALCIUM SERPL-MCNC: 9.5 MG/DL — SIGNIFICANT CHANGE UP (ref 8.4–10.5)
CHLORIDE SERPL-SCNC: 103 MMOL/L — SIGNIFICANT CHANGE UP (ref 98–107)
CO2 SERPL-SCNC: 22 MMOL/L — SIGNIFICANT CHANGE UP (ref 22–31)
CREAT SERPL-MCNC: 1.09 MG/DL — SIGNIFICANT CHANGE UP (ref 0.5–1.3)
EGFR: 69 ML/MIN/1.73M2 — SIGNIFICANT CHANGE UP
GLUCOSE BLDC GLUCOMTR-MCNC: 166 MG/DL — HIGH (ref 70–99)
GLUCOSE BLDC GLUCOMTR-MCNC: 191 MG/DL — HIGH (ref 70–99)
GLUCOSE BLDC GLUCOMTR-MCNC: 208 MG/DL — HIGH (ref 70–99)
GLUCOSE BLDC GLUCOMTR-MCNC: 234 MG/DL — HIGH (ref 70–99)
GLUCOSE BLDC GLUCOMTR-MCNC: 257 MG/DL — HIGH (ref 70–99)
GLUCOSE BLDC GLUCOMTR-MCNC: 273 MG/DL — HIGH (ref 70–99)
GLUCOSE SERPL-MCNC: 263 MG/DL — HIGH (ref 70–99)
HCT VFR BLD CALC: 38.2 % — LOW (ref 39–50)
HGB BLD-MCNC: 12.8 G/DL — LOW (ref 13–17)
MAGNESIUM SERPL-MCNC: 1.8 MG/DL — SIGNIFICANT CHANGE UP (ref 1.6–2.6)
MCHC RBC-ENTMCNC: 29.2 PG — SIGNIFICANT CHANGE UP (ref 27–34)
MCHC RBC-ENTMCNC: 33.5 GM/DL — SIGNIFICANT CHANGE UP (ref 32–36)
MCV RBC AUTO: 87 FL — SIGNIFICANT CHANGE UP (ref 80–100)
NRBC # BLD: 0 /100 WBCS — SIGNIFICANT CHANGE UP (ref 0–0)
NRBC # FLD: 0 K/UL — SIGNIFICANT CHANGE UP (ref 0–0)
PHOSPHATE SERPL-MCNC: 3.3 MG/DL — SIGNIFICANT CHANGE UP (ref 2.5–4.5)
PLATELET # BLD AUTO: 160 K/UL — SIGNIFICANT CHANGE UP (ref 150–400)
POTASSIUM SERPL-MCNC: 3.9 MMOL/L — SIGNIFICANT CHANGE UP (ref 3.5–5.3)
POTASSIUM SERPL-SCNC: 3.9 MMOL/L — SIGNIFICANT CHANGE UP (ref 3.5–5.3)
RBC # BLD: 4.39 M/UL — SIGNIFICANT CHANGE UP (ref 4.2–5.8)
RBC # FLD: 13.1 % — SIGNIFICANT CHANGE UP (ref 10.3–14.5)
SODIUM SERPL-SCNC: 136 MMOL/L — SIGNIFICANT CHANGE UP (ref 135–145)
WBC # BLD: 5.21 K/UL — SIGNIFICANT CHANGE UP (ref 3.8–10.5)
WBC # FLD AUTO: 5.21 K/UL — SIGNIFICANT CHANGE UP (ref 3.8–10.5)

## 2022-11-17 PROCEDURE — 71045 X-RAY EXAM CHEST 1 VIEW: CPT | Mod: 26

## 2022-11-17 RX ORDER — CEFAZOLIN SODIUM 1 G
1000 VIAL (EA) INJECTION EVERY 8 HOURS
Refills: 0 | Status: COMPLETED | OUTPATIENT
Start: 2022-11-17 | End: 2022-11-18

## 2022-11-17 RX ORDER — SODIUM CHLORIDE 9 MG/ML
250 INJECTION INTRAMUSCULAR; INTRAVENOUS; SUBCUTANEOUS ONCE
Refills: 0 | Status: COMPLETED | OUTPATIENT
Start: 2022-11-17 | End: 2022-11-17

## 2022-11-17 RX ORDER — ONDANSETRON 8 MG/1
4 TABLET, FILM COATED ORAL ONCE
Refills: 0 | Status: COMPLETED | OUTPATIENT
Start: 2022-11-17 | End: 2022-11-17

## 2022-11-17 RX ORDER — PROCAINAMIDE HCL 500 MG
1000 TABLET, EXTENDED RELEASE ORAL ONCE
Refills: 0 | Status: DISCONTINUED | OUTPATIENT
Start: 2022-11-17 | End: 2022-11-17

## 2022-11-17 RX ADMIN — ONDANSETRON 4 MILLIGRAM(S): 8 TABLET, FILM COATED ORAL at 20:40

## 2022-11-17 RX ADMIN — LOSARTAN POTASSIUM 50 MILLIGRAM(S): 100 TABLET, FILM COATED ORAL at 05:39

## 2022-11-17 RX ADMIN — GABAPENTIN 300 MILLIGRAM(S): 400 CAPSULE ORAL at 14:00

## 2022-11-17 RX ADMIN — GABAPENTIN 300 MILLIGRAM(S): 400 CAPSULE ORAL at 05:39

## 2022-11-17 RX ADMIN — Medication 100 MILLIGRAM(S): at 05:39

## 2022-11-17 RX ADMIN — Medication 2: at 11:49

## 2022-11-17 RX ADMIN — ATORVASTATIN CALCIUM 20 MILLIGRAM(S): 80 TABLET, FILM COATED ORAL at 21:40

## 2022-11-17 RX ADMIN — Medication 3: at 06:58

## 2022-11-17 RX ADMIN — SODIUM CHLORIDE 500 MILLILITER(S): 9 INJECTION INTRAMUSCULAR; INTRAVENOUS; SUBCUTANEOUS at 20:20

## 2022-11-17 RX ADMIN — Medication 81 MILLIGRAM(S): at 14:01

## 2022-11-17 RX ADMIN — GABAPENTIN 300 MILLIGRAM(S): 400 CAPSULE ORAL at 21:40

## 2022-11-17 NOTE — PROGRESS NOTE ADULT - SUBJECTIVE AND OBJECTIVE BOX
Patient is a 77y old  Male who presents with a chief complaint of Syncope (17 Nov 2022 13:10)    Date of servie : 11-17-22 @ 16:53  INTERVAL HPI/OVERNIGHT EVENTS:  T(C): 36.7 (11-17-22 @ 10:00), Max: 36.7 (11-16-22 @ 17:52)  HR: 74 (11-17-22 @ 10:00) (68 - 74)  BP: 134/60 (11-17-22 @ 10:00) (117/75 - 147/72)  RR: 17 (11-17-22 @ 10:00) (17 - 18)  SpO2: 100% (11-17-22 @ 10:00) (100% - 100%)  Wt(kg): --  I&O's Summary      LABS:                        12.8   5.21  )-----------( 160      ( 17 Nov 2022 06:29 )             38.2     11-17    136  |  103  |  29<H>  ----------------------------<  263<H>  3.9   |  22  |  1.09    Ca    9.5      17 Nov 2022 06:29  Phos  3.3     11-17  Mg     1.80     11-17          CAPILLARY BLOOD GLUCOSE      POCT Blood Glucose.: 166 mg/dL (17 Nov 2022 13:58)  POCT Blood Glucose.: 208 mg/dL (17 Nov 2022 11:45)  POCT Blood Glucose.: 273 mg/dL (17 Nov 2022 06:27)  POCT Blood Glucose.: 257 mg/dL (17 Nov 2022 01:14)  POCT Blood Glucose.: 344 mg/dL (16 Nov 2022 22:11)  POCT Blood Glucose.: 276 mg/dL (16 Nov 2022 17:38)            MEDICATIONS  (STANDING):  aspirin  chewable 81 milliGRAM(s) Oral daily  atorvastatin 20 milliGRAM(s) Oral at bedtime  dextrose 5%. 1000 milliLiter(s) (100 mL/Hr) IV Continuous <Continuous>  dextrose 5%. 1000 milliLiter(s) (50 mL/Hr) IV Continuous <Continuous>  dextrose 50% Injectable 25 Gram(s) IV Push once  dextrose 50% Injectable 12.5 Gram(s) IV Push once  dextrose 50% Injectable 25 Gram(s) IV Push once  enoxaparin Injectable 40 milliGRAM(s) SubCutaneous every 24 hours  finasteride 5 milliGRAM(s) Oral daily  gabapentin 300 milliGRAM(s) Oral three times a day  glucagon  Injectable 1 milliGRAM(s) IntraMuscular once  insulin lispro (ADMELOG) corrective regimen sliding scale   SubCutaneous three times a day before meals  insulin lispro (ADMELOG) corrective regimen sliding scale   SubCutaneous at bedtime  losartan 50 milliGRAM(s) Oral daily  metoprolol succinate  milliGRAM(s) Oral daily  procainamide   IVPB 1000 milliGRAM(s) IV Intermittent once  tamsulosin 0.8 milliGRAM(s) Oral at bedtime    MEDICATIONS  (PRN):  dextrose Oral Gel 15 Gram(s) Oral once PRN Blood Glucose LESS THAN 70 milliGRAM(s)/deciliter  melatonin 3 milliGRAM(s) Oral at bedtime PRN Insomnia

## 2022-11-17 NOTE — PROGRESS NOTE ADULT - SUBJECTIVE AND OBJECTIVE BOX
EP      HISTORY OF PRESENT ILLNESS: HPI:  Mr. Guillaume is a 77YOM with PMH CAD s/p CABG 2021, HTN, HLD, DM on insulin and BPH who presents to the ED today after having a syncopal episode while at work. He denies having any prodromal symptoms prior to fainting but does endorse LOC for some time, noting that he woke up with many people around him. He denies head involvement saying that he was held up by other people during the episode. Per EMS the patient was found to be bradycardic to the 20s, and was found to have generalized weakness in the ED. He has no previous history of syncope. He was found to be COVID+ appx. 10-15 days ago with no respiratory symptoms, only noting some mild confusion. He endorses having a fall in 2019 w/ head involvement w/ chronic subdural hemorrhage (now reducing in size on imaging), and had MMA embolization at [facility] in 2021. (09 Nov 2022 22:30)    Mr Guillaume presents after unexplained syncope at work. Lightheadedness started while sitting down. 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  He reports no prodrome of palpitations, warmth/sweating, nausea or blurry vision.   EMS verbal report to the ED was that patient's pulse was in the 20s in the ambulance, but no EKG strips of this rhythm were recorded.  On arrival, EKG redemonstrates sinus rhythm, first degree AV block, and LBBB.  He was found to be mildly hyperkalemic, and treated medically for this, and some antihypertensives held.      His cardiac history is notable for reduced LV EF, multi-vessel CAD requiring CABG, and post-CABG AFib.  His CABG + ILR implant were at [place], after being turned down for CABG at [facility]. States he no longer follows at [place] though.  No further AFib was identified in the year since his CABG, so the ILR was removed.  A 10 pt ROS is otherwise negative.  11/11- spoke w/ patient's daughter and other family members on the phone for collateral history, and plan of care discussed in depth.  no angina or palpitations, no orthopnea or syncope here in hospital.  awaiting next step of testing and agrees not to leave hospital until completion.    11/12 no chest pain or sob   11/13 no angina, or syncope overnight  11/14- conversation at length with patient and his spouse at bedside.  will forgo stress testing- his LV ejection fraction did not change pre/post-CABG.    He has swabbed NEGATIVE for COVID on 11/12, however by hospital infection control protocols, he is still on isolation until 11/20.  Nevertheless, we will move ahead with EP study / possible device implant as he is asymptomatic.  As he is high risk for sudden cardiac arrest, we should not delay this needlessly for another week, but nor can he go home and schedule this electively as an outpatient.  He is at risk of suddenly dying in a place where we cannot protect him if he leaves the hospital against medical advice before completion of testing. The patient and his spouse expressed dismay over "being here many days and its like nothing is really happening".  We accomplished an echocardiogram before the weekend, but there was no testing or invasive procedures that could be performed over the weekend.  11/15- resting in bed, no new complaints. no angina, orthopnea, palpitations or loss of appetite.  11/16- resting in bed, no new complaints.  awaiting procedure tomorrow.  Date of service 11/17- NPO, awaiting EP study +/- device implant today.    aspirin  chewable 81 milliGRAM(s) Oral daily  atorvastatin 20 milliGRAM(s) Oral at bedtime  dextrose 5%. 1000 milliLiter(s) IV Continuous <Continuous>  dextrose 5%. 1000 milliLiter(s) IV Continuous <Continuous>  dextrose 50% Injectable 25 Gram(s) IV Push once  dextrose 50% Injectable 12.5 Gram(s) IV Push once  dextrose 50% Injectable 25 Gram(s) IV Push once  dextrose Oral Gel 15 Gram(s) Oral once PRN  enoxaparin Injectable 40 milliGRAM(s) SubCutaneous every 24 hours  finasteride 5 milliGRAM(s) Oral daily  gabapentin 300 milliGRAM(s) Oral three times a day  glucagon  Injectable 1 milliGRAM(s) IntraMuscular once  insulin lispro (ADMELOG) corrective regimen sliding scale   SubCutaneous three times a day before meals  insulin lispro (ADMELOG) corrective regimen sliding scale   SubCutaneous at bedtime  losartan 50 milliGRAM(s) Oral daily  melatonin 3 milliGRAM(s) Oral at bedtime PRN  metoprolol succinate  milliGRAM(s) Oral daily  tamsulosin 0.8 milliGRAM(s) Oral at bedtime                            12.8   5.21  )-----------( 160      ( 17 Nov 2022 06:29 )             38.2       11-17    136  |  103  |  29<H>  ----------------------------<  263<H>  3.9   |  22  |  1.09    Ca    9.5      17 Nov 2022 06:29  Phos  3.3     11-17  Mg     1.80     11-17    T(C): 36.6 (11-17-22 @ 05:36), Max: 36.7 (11-16-22 @ 13:00)  HR: 72 (11-17-22 @ 05:36) (64 - 72)  BP: 135/58 (11-17-22 @ 05:36) (117/61 - 147/72)  RR: 17 (11-17-22 @ 05:36) (17 - 18)  SpO2: 100% (11-17-22 @ 05:36) (100% - 100%)  Wt(kg): --    I&O's Summary    General: Well nourished, no acute distress, alert and oriented x 3  Head: normocephalic, no trauma  Neck: no JVD, no bruit, supple, not enlarged  CV: S1S2, no S3, regular rate, rhythm is SINUS, no murmurs.    Lungs: clear BL, no rales or wheezes  Abdomen: bowel sounds +, soft, nontender, nondistended  Extremities: no clubbing, cyanosis or edema  Neuro: Moves all 4 extremities, sensation intact x 4 extremities  Skin: warm and moist, normal turgor  Psych: Mood and affect are appropriate for circumstances  MSK: normal range of motion and strength x4 extremities.    TELEMETRY: NSR, first degree AV block, LBBB	    ECG: NSR, first degree AV block >300ms, and LBBB 144ms.  Echo: Pre-CABG, moderate systolic dysfunction, difficult echo windows.  Repeat is pending.  < from: TTE with Doppler (w/Cont) (11.10.22 @ 18:27) >  OBSERVATIONS:  Mitral Valve: Mitral annular calcification, otherwise  normal mitral valve. Mild mitral regurgitation.  Aortic Root: Normal aortic root.  Aortic Valve: Aortic valve leaflet morphology not well  visualized.  Left Atrium: Normal left atrium.  Left Ventricle: Endocardium not well visualized; grossly  mild to moderate segmental left ventricular systolic  dysfunction.  Hypokinesis of the apex, distal septum, and  distal anterior wall.  Endocardial visualization enhanced  with intravenous injection of echo contrast (Definity).  No  LV thrombus seen.  Right Heart: Normal right atrium. The right ventricle is  not well visualized; grossly normal right ventricular  systolic function. Normal tricuspid valve. Minimal  tricuspid regurgitation. Normal pulmonic valve.  Pericardium/PleuraNormal pericardium with no pericardial  effusion.    < end of copied text >  	  ASSESSMENT/PLAN: 	77y Male presenting with unexplained syncope. Hx multivessel CAD s/p CABG last year, previously reduced EF (30-40% range) and longstanding first degree AV block and LBBB.    High risk syncope presentation with uncertain prognosis at this point  Echocardiogram suggests persistent LV systolic dysfunction despite CABG but unable to accurately assess LVEF%.  He is at high risk for his syncope being due to either infra-bri AV block or spontaneous VT/VF in the setting of ischemic heart disease.  Maintain hospitalization on telemetry.    Chronic systolic CHF - will forgo stress testing. His LVEF has not changed pre/post CABG.  On discharge, convert Metoprolol to ToprolXL.  Continue Losartan.    Left Bundle Branch Block- EP study to rule out susceptibility to infranodal heart block    CAD, prior MI, CABG, reduced LV EF% - EP study for VT. Continue aspirin, metoprolol and lipitor for CAD.    Depending upon results of EP Study, may need either a biventricular pacemaker, biventricular ICD, or outpatient followup for an ILR.  Scheduled for Thursday PM.  Does not need repeat COVID swab... will be treated as asymptomatic COVID-positive.  Transport w/ a mask on.    He would be discharged the following day.  He previously saw Dr Nahid Ricci from [phone] and wants to re-enroll w/ [facility].     Don Martin M.D.  Cardiac Electrophysiology  [phone]

## 2022-11-17 NOTE — CHART NOTE - NSCHARTNOTEFT_GEN_A_CORE
EP Brief Operative Note    Diagnosis: Ventricular Tachycardia  Procedure: CRT-Defibrillator implant  Surgeon: Don Martin M.D.  Findings: Successful Biventricular ICD implant.  RV lead in distal interventricular septum.  LV lead (active fixation) in anterolateral branch with no phrenic stim.  RA lead in appendage stump (prior CABG cannulation)  EBL: <30mL  Specimens: none  Post-op Diagnosis: same  Assistants: none    A/P)   Mr Guillaume is a 77yoM with prior MI, CABG 1 yr ago, LVEF 35-40%, with unexplained syncope. He has a LBBB and 1st degree AV block.  EP study showed a HV interval of 70ms, and inducible monomorphic VT.  He underwent biventricular defibrillator insertion, with no acute complications noted.    CXR AP portable ASAP.  EKG ASAP.  2 more doses of Ancef 1g q8hrs.  Pocketpress to skin . MD to remove tomorrow.  OK for discharge tomorrow.  Will set up EP and CV followup.    Don Martin M.D.  Cardiac Electrophysiology    office [phone]  pager [phone]

## 2022-11-17 NOTE — CHART NOTE - NSCHARTNOTEFT_GEN_A_CORE
Right chest wall site s/p AICD procedure is without bleeding or hematoma. Dressing dry, clean and intact. Vital signs stable. BL brachial and radial pulses palpable and symmetric. Sensation, strength, and ROM equal bilaterally. Patient denies chest pain, SOB, numbness, and tingling. Will continue to monitor.    Radiology paged to review CXR preliminary results, awaiting read. Right chest wall site s/p AICD procedure is without bleeding or hematoma. Dressing dry, clean and intact. Vital signs stable. BL brachial and radial pulses palpable and symmetric. Sensation, strength, and ROM equal bilaterally. Patient denies chest pain, SOB, numbness, and tingling. Will continue to monitor.    Radiology paged to review CXR preliminary results, awaiting read.      Addendum 11/18 1:00:    PROCEDURE DATE:  11/17/2022    ******PRELIMINARY REPORT******      INTERPRETATION:  FRANCIS. follow up final report.

## 2022-11-17 NOTE — PROGRESS NOTE ADULT - SUBJECTIVE AND OBJECTIVE BOX
Date of service 11/17/2022    chief complaint: Syncope    extended hpi: Pt is a 77YOM with PMH CAD s/p PCI s/p CABG x3 ( LIMA to LAD, SV to PDA, ANd PL of Right) on 5/3/2021, post op Afib previously on Amiodarone, had ILR implanted and then removed, DM, HLD, BPH, previous subdural hematoma who presented to the ED  after having a syncopal episode while at work.    Patient states he was at work when he started to feel dizzy, also reported palpitations, denied any chest pain, stated he tried to stand up and was unsteady on his feet. His co-workers saw him and supported him. States he passed out. No urinary or bowel incontinence.  No confusion. EMS was called and noted to have a heart rate in the 20s. . He has no previous history of syncope. He was found to be COVID+ appx. 10-15 days ago with no respiratory symptoms, only noting some mild confusion. He endorses having a fall in 2019 w/ head involvement w/ chronic subdural hemorrhage (now reducing in size on imaging), and had MMA embolization at [facility] in 2021.     CABG in 2021 ( LIMA to LAD, SVG to PDA and PL of right). Had post op afib with wide complex managed on Amiodarone sunsequently converted to NSR. ILR placed and then removed    ECHO from [place] records 04/2021 normal LV size, mild LVH, LVEF 45% MAC with moderate MR, mild AI      Patient denies chest pain or shortness of breath.   Review of symptoms otherwise negative.    MEDICATIONS:  aspirin  chewable 81 milliGRAM(s) Oral daily  atorvastatin 20 milliGRAM(s) Oral at bedtime  dextrose 5%. 1000 milliLiter(s) IV Continuous <Continuous>  dextrose 5%. 1000 milliLiter(s) IV Continuous <Continuous>  dextrose 50% Injectable 25 Gram(s) IV Push once  dextrose 50% Injectable 12.5 Gram(s) IV Push once  dextrose 50% Injectable 25 Gram(s) IV Push once  dextrose Oral Gel 15 Gram(s) Oral once PRN  enoxaparin Injectable 40 milliGRAM(s) SubCutaneous every 24 hours  finasteride 5 milliGRAM(s) Oral daily  gabapentin 300 milliGRAM(s) Oral three times a day  glucagon  Injectable 1 milliGRAM(s) IntraMuscular once  insulin lispro (ADMELOG) corrective regimen sliding scale   SubCutaneous three times a day before meals  insulin lispro (ADMELOG) corrective regimen sliding scale   SubCutaneous at bedtime  losartan 50 milliGRAM(s) Oral daily  melatonin 3 milliGRAM(s) Oral at bedtime PRN  metoprolol succinate  milliGRAM(s) Oral daily  tamsulosin 0.8 milliGRAM(s) Oral at bedtime      LABS:                        12.8   5.21  )-----------( 160      ( 17 Nov 2022 06:29 )             38.2       Hemoglobin: 12.8 g/dL (11-17 @ 06:29)  Hemoglobin: 13.3 g/dL (11-16 @ 06:33)  Hemoglobin: 13.4 g/dL (11-15 @ 07:12)  Hemoglobin: 13.8 g/dL (11-14 @ 05:30)  Hemoglobin: 13.2 g/dL (11-13 @ 05:06)      11-17    136  |  103  |  29<H>  ----------------------------<  263<H>  3.9   |  22  |  1.09    Ca    9.5      17 Nov 2022 06:29  Phos  3.3     11-17  Mg     1.80     11-17    Creatinine Trend: 1.09<--, 1.00<--, 0.92<--, 1.03<--, 1.02<--, 1.17<--    PHYSICAL EXAM:  T(C): 36.6 (11-17-22 @ 05:36), Max: 36.7 (11-16-22 @ 17:52)  HR: 72 (11-17-22 @ 05:36) (68 - 72)  BP: 135/58 (11-17-22 @ 05:36) (117/75 - 147/72)  RR: 17 (11-17-22 @ 05:36) (17 - 18)  SpO2: 100% (11-17-22 @ 05:36) (100% - 100%)  Wt(kg): --    I&O's Summary    General: Well nourished in no acute distress. Alert and Oriented * 3.   Head: Normocephalic and atraumatic.   Neck: No JVD. No bruits. Supple. Does not appear to be enlarged.   Cardiovascular: + S1,S2 ; RRR Soft systolic murmur at the left lower sternal border. No rubs noted.    Lungs: CTA b/l. No rhonchi, rales or wheezes.   Abdomen: + BS, soft. Non tender. Non distended. No rebound. No guarding.   Extremities: No clubbing/cyanosis/edema.   Neurologic: Moves all four extremities. Full range of motion.   Skin: Warm and moist. The patient's skin has normal elasticity and good skin turgor.   Psychiatric: Appropriate mood and affect.  Musculoskeletal: Normal range of motion, normal strength    TELE: NSR with 1st degree, PAC      ECG:  	NSR, 1st degree LBBB    TTE:  OBSERVATIONS:  Mitral Valve: Mitral annular calcification, otherwise  normal mitral valve. Mild mitral regurgitation.  Aortic Root: Normal aortic root.  Aortic Valve: Aortic valve leaflet morphology not well  visualized.  Left Atrium: Normal left atrium.  Left Ventricle: Endocardium not well visualized; grossly  mild to moderate segmental left ventricular systolic  dysfunction.  Hypokinesis of the apex, distal septum, and  distal anterior wall.  Endocardial visualization enhanced  with intravenous injection of echo contrast (Definity).  No  LV thrombus seen.  Right Heart: Normal right atrium. The right ventricle is  not well visualized; grossly normal right ventricular  systolic function. Normal tricuspid valve. Minimal  tricuspid regurgitation. Normal pulmonic valve.  Pericardium/PleuraNormal pericardium with no pericardial  effusion    ASSESSMENT/PLAN: Pt is a 77YOM with PMH CAD s/p PCI s/p CABG x3 ( LIMA to LAD, SV to PDA, ANd PL of Right) on 5/3/2021, post op Afib previously on Amiodarone, had ILR implanted and then removed, DM, HLD, BPH, previous subdural hematoma who presented to the ED  after having a syncopal episode while at work.    1. Syncope  - EKG with 1st degree AVB and LBBB  - no strips available from heart rate in 20s  - ECHO with mild to moderate segmental LV dysfunction in setting of CABG  - For EP study today    2. CAD  -  CAD s/p PCI s/p CABG x3 ( LIMA to LAD, SV to PDA, And PL of Right)   - c/w ASA and Statin    3.  Post op Afib previously on Amiodarone  - currently in sinus c/w BB    4. HLD  - c/w atorvastatin      Christian Cody MD  Pager: [phone]

## 2022-11-18 ENCOUNTER — TRANSCRIPTION ENCOUNTER (OUTPATIENT)
Age: 77
End: 2022-11-18

## 2022-11-18 VITALS
SYSTOLIC BLOOD PRESSURE: 155 MMHG | TEMPERATURE: 98 F | DIASTOLIC BLOOD PRESSURE: 73 MMHG | OXYGEN SATURATION: 100 % | RESPIRATION RATE: 18 BRPM | HEART RATE: 74 BPM

## 2022-11-18 LAB
ANION GAP SERPL CALC-SCNC: 12 MMOL/L — SIGNIFICANT CHANGE UP (ref 7–14)
BUN SERPL-MCNC: 20 MG/DL — SIGNIFICANT CHANGE UP (ref 7–23)
CALCIUM SERPL-MCNC: 10 MG/DL — SIGNIFICANT CHANGE UP (ref 8.4–10.5)
CHLORIDE SERPL-SCNC: 100 MMOL/L — SIGNIFICANT CHANGE UP (ref 98–107)
CO2 SERPL-SCNC: 24 MMOL/L — SIGNIFICANT CHANGE UP (ref 22–31)
CREAT SERPL-MCNC: 0.97 MG/DL — SIGNIFICANT CHANGE UP (ref 0.5–1.3)
EGFR: 79 ML/MIN/1.73M2 — SIGNIFICANT CHANGE UP
GLUCOSE BLDC GLUCOMTR-MCNC: 224 MG/DL — HIGH (ref 70–99)
GLUCOSE BLDC GLUCOMTR-MCNC: 257 MG/DL — HIGH (ref 70–99)
GLUCOSE SERPL-MCNC: 248 MG/DL — HIGH (ref 70–99)
HCT VFR BLD CALC: 43.4 % — SIGNIFICANT CHANGE UP (ref 39–50)
HGB BLD-MCNC: 14.3 G/DL — SIGNIFICANT CHANGE UP (ref 13–17)
MAGNESIUM SERPL-MCNC: 1.7 MG/DL — SIGNIFICANT CHANGE UP (ref 1.6–2.6)
MCHC RBC-ENTMCNC: 28.9 PG — SIGNIFICANT CHANGE UP (ref 27–34)
MCHC RBC-ENTMCNC: 32.9 GM/DL — SIGNIFICANT CHANGE UP (ref 32–36)
MCV RBC AUTO: 87.9 FL — SIGNIFICANT CHANGE UP (ref 80–100)
NRBC # BLD: 0 /100 WBCS — SIGNIFICANT CHANGE UP (ref 0–0)
NRBC # FLD: 0 K/UL — SIGNIFICANT CHANGE UP (ref 0–0)
PHOSPHATE SERPL-MCNC: 3 MG/DL — SIGNIFICANT CHANGE UP (ref 2.5–4.5)
PLATELET # BLD AUTO: 160 K/UL — SIGNIFICANT CHANGE UP (ref 150–400)
POTASSIUM SERPL-MCNC: 4.8 MMOL/L — SIGNIFICANT CHANGE UP (ref 3.5–5.3)
POTASSIUM SERPL-SCNC: 4.8 MMOL/L — SIGNIFICANT CHANGE UP (ref 3.5–5.3)
RBC # BLD: 4.94 M/UL — SIGNIFICANT CHANGE UP (ref 4.2–5.8)
RBC # FLD: 13.1 % — SIGNIFICANT CHANGE UP (ref 10.3–14.5)
SODIUM SERPL-SCNC: 136 MMOL/L — SIGNIFICANT CHANGE UP (ref 135–145)
WBC # BLD: 7.85 K/UL — SIGNIFICANT CHANGE UP (ref 3.8–10.5)
WBC # FLD AUTO: 7.85 K/UL — SIGNIFICANT CHANGE UP (ref 3.8–10.5)

## 2022-11-18 RX ORDER — METOPROLOL TARTRATE 50 MG
1 TABLET ORAL
Qty: 30 | Refills: 0
Start: 2022-11-18 | End: 2022-12-17

## 2022-11-18 RX ORDER — CHOLECALCIFEROL (VITAMIN D3) 125 MCG
1 CAPSULE ORAL
Qty: 0 | Refills: 0 | DISCHARGE

## 2022-11-18 RX ORDER — METOPROLOL TARTRATE 50 MG
1 TABLET ORAL
Qty: 0 | Refills: 0 | DISCHARGE

## 2022-11-18 RX ORDER — SPIRONOLACTONE 25 MG/1
1 TABLET, FILM COATED ORAL
Qty: 0 | Refills: 0 | DISCHARGE

## 2022-11-18 RX ORDER — ONDANSETRON 8 MG/1
4 TABLET, FILM COATED ORAL ONCE
Refills: 0 | Status: COMPLETED | OUTPATIENT
Start: 2022-11-18 | End: 2022-11-18

## 2022-11-18 RX ADMIN — LOSARTAN POTASSIUM 50 MILLIGRAM(S): 100 TABLET, FILM COATED ORAL at 06:15

## 2022-11-18 RX ADMIN — Medication 3: at 12:02

## 2022-11-18 RX ADMIN — Medication 100 MILLIGRAM(S): at 10:32

## 2022-11-18 RX ADMIN — Medication 100 MILLIGRAM(S): at 00:23

## 2022-11-18 RX ADMIN — GABAPENTIN 300 MILLIGRAM(S): 400 CAPSULE ORAL at 06:15

## 2022-11-18 RX ADMIN — Medication 2: at 07:56

## 2022-11-18 RX ADMIN — Medication 81 MILLIGRAM(S): at 12:05

## 2022-11-18 RX ADMIN — ONDANSETRON 4 MILLIGRAM(S): 8 TABLET, FILM COATED ORAL at 07:56

## 2022-11-18 RX ADMIN — Medication 100 MILLIGRAM(S): at 06:15

## 2022-11-18 NOTE — DISCHARGE NOTE NURSING/CASE MANAGEMENT/SOCIAL WORK - NSDCFUADDAPPT_GEN_ALL_CORE_FT
FOLLOW UP DR KASPER CARDIOLOGY 11/29 at 3 pm  FOLLOW UP DR VENCES EP 12/1 at 9 am  both at [address], [facility], [phone]

## 2022-11-18 NOTE — PROGRESS NOTE ADULT - REASON FOR ADMISSION
Syncope

## 2022-11-18 NOTE — DISCHARGE NOTE NURSING/CASE MANAGEMENT/SOCIAL WORK - NSDCVIVACCINE_GEN_ALL_CORE_FT
COVID-19 vaccine, vector-nr, rS-Ad26, PF, 0.5 mL ([place]); 26-Mar-2021 13:49; Milagros Lobo (VLADIMIR); [place]; [id number] (Exp. Date: 25-May-2021); IntraMuscular; Deltoid Left.; 0.5 milliLiter(s);

## 2022-11-18 NOTE — DISCHARGE NOTE PROVIDER - NSDCMRMEDTOKEN_GEN_ALL_CORE_FT
Aldactone 25 mg oral tablet: 1 tab(s) orally once a day  aspirin 81 mg oral tablet: 1 tab(s) orally once a day  Crestor 5 mg oral tablet: 1 tab(s) orally once a day (at bedtime)  ferrous sulfate 324 mg (65 mg elemental iron) oral tablet: 1 tab(s) orally once a day  finasteride 5 mg oral tablet: 1 tab(s) orally once a day AM  Flomax 0.4 mg oral capsule: 2 cap(s) orally once a day (at bedtime)  gabapentin 300 mg oral tablet: 1 tab(s) orally 3 times a day  Janumet 50 mg-1000 mg oral tablet: 1 tab(s) orally 2 times a day  Lantus 100 units/mL subcutaneous solution: 25  subcutaneous once a day (at bedtime)  Metoprolol Tartrate 50 mg oral tablet: 1 tab(s) orally 2 times a day  olmesartan 20 mg oral tablet: 1 tab(s) orally once a day  omeprazole 20 mg oral delayed release tablet: 1 tab(s) orally once a day AM  Vitamin B-12: 1 tab(s) orally once a day  Vitamin D3: 1 cap(s) orally once a day  Zinc:    aspirin 81 mg oral tablet: 1 tab(s) orally once a day  Crestor 5 mg oral tablet: 1 tab(s) orally once a day (at bedtime)  ferrous sulfate 324 mg (65 mg elemental iron) oral tablet: 1 tab(s) orally once a day  finasteride 5 mg oral tablet: 1 tab(s) orally once a day AM  Flomax 0.4 mg oral capsule: 2 cap(s) orally once a day (at bedtime)  gabapentin 300 mg oral tablet: 1 tab(s) orally 3 times a day  Janumet 50 mg-1000 mg oral tablet: 1 tab(s) orally 2 times a day  Lantus 100 units/mL subcutaneous solution: 25  subcutaneous once a day (at bedtime)  metoprolol succinate 100 mg oral tablet, extended release: 1 tab(s) orally once a day  olmesartan 20 mg oral tablet: 1 tab(s) orally once a day  omeprazole 20 mg oral delayed release tablet: 1 tab(s) orally once a day AM  Vitamin B-12: 1 tab(s) orally once a day  Zinc:

## 2022-11-18 NOTE — PROGRESS NOTE ADULT - SUBJECTIVE AND OBJECTIVE BOX
Date of service 11/18/2022    chief complaint: Syncope    extended hpi: Pt is a 77YOM with PMH CAD s/p PCI s/p CABG x3 ( LIMA to LAD, SV to PDA, ANd PL of Right) on 5/3/2021, post op Afib previously on Amiodarone, had ILR implanted and then removed, DM, HLD, BPH, previous subdural hematoma who presented to the ED  after having a syncopal episode while at work.    Patient states he was at work when he started to feel dizzy, also reported palpitations, denied any chest pain, stated he tried to stand up and was unsteady on his feet. His co-workers saw him and supported him. States he passed out. No urinary or bowel incontinence.  No confusion. EMS was called and noted to have a heart rate in the 20s. . He has no previous history of syncope. He was found to be COVID+ appx. 10-15 days ago with no respiratory symptoms, only noting some mild confusion. He endorses having a fall in 2019 w/ head involvement w/ chronic subdural hemorrhage (now reducing in size on imaging), and had MMA embolization at [facility] in 2021.     CABG in 2021 ( LIMA to LAD, SVG to PDA and PL of right). Had post op afib with wide complex managed on Amiodarone sunsequently converted to NSR. ILR placed and then removed    ECHO from [facility] records 04/2021 normal LV size, mild LVH, LVEF 45% MAC with moderate MR, mild AI    Patient denies chest pain or shortness of breath.   Review of symptoms otherwise negative.    Review of Systems:   Constitutional: [ ] fevers, [ ] chills.   Skin: [ ] dry skin. [ ] rashes.  Psychiatric: [ ] depression, [ ] anxiety.   Gastrointestinal: [ ] BRBPR, [ ] melena.   Neurological: [ ] confusion. [ ] seizures. [ ] shuffling gait.   Ears,Nose,Mouth and Throat: [ ] ear pain [ ] sore throat.   Eyes: [ ] diplopia.   Respiratory: [ ] hemoptysis. [ ] shortness of breath  Cardiovascular: See HPI above  Hematologic/Lymphatic: [ ] anemia. [ ] painful nodes. [ ] prolonged bleeding.   Genitourinary: [ ] hematuria. [ ] flank pain.   Endocrine: [ ] significant change in weight. [ ] intolerance to heat and cold.     Review of systems [x ] otherwise negative, [ ] otherwise unable to obtain    FH: no family history of sudden cardiac death in first degree relatives    SH: [ ] tobacco, [ ] alcohol, [ ] drugs    aspirin  chewable 81 milliGRAM(s) Oral daily  atorvastatin 20 milliGRAM(s) Oral at bedtime  dextrose 5%. 1000 milliLiter(s) IV Continuous <Continuous>  dextrose 5%. 1000 milliLiter(s) IV Continuous <Continuous>  dextrose 50% Injectable 25 Gram(s) IV Push once  dextrose 50% Injectable 12.5 Gram(s) IV Push once  dextrose 50% Injectable 25 Gram(s) IV Push once  dextrose Oral Gel 15 Gram(s) Oral once PRN  finasteride 5 milliGRAM(s) Oral daily  gabapentin 300 milliGRAM(s) Oral three times a day  glucagon  Injectable 1 milliGRAM(s) IntraMuscular once  insulin lispro (ADMELOG) corrective regimen sliding scale   SubCutaneous three times a day before meals  insulin lispro (ADMELOG) corrective regimen sliding scale   SubCutaneous at bedtime  losartan 50 milliGRAM(s) Oral daily  melatonin 3 milliGRAM(s) Oral at bedtime PRN  metoprolol succinate  milliGRAM(s) Oral daily  tamsulosin 0.8 milliGRAM(s) Oral at bedtime                          14.3   7.85  )-----------( 160      ( 18 Nov 2022 06:29 )             43.4       136  |  100  |  20  ----------------------------<  248<H>  4.8   |  24  |  0.97    Ca    10.0      18 Nov 2022 06:29  Phos  3.0     11-18  Mg     1.70     11-18      T(C): 36.9 (11-18-22 @ 10:10), Max: 36.9 (11-18-22 @ 10:10)  HR: 65 (11-18-22 @ 10:10) (59 - 70)  BP: 131/63 (11-18-22 @ 10:10) (114/59 - 154/74)  RR: 18 (11-18-22 @ 10:10) (17 - 18)  SpO2: 100% (11-18-22 @ 10:10) (99% - 100%)  Wt(kg): --    General: Well nourished in no acute distress. Alert and Oriented * 3.   Head: Normocephalic and atraumatic.   Neck: No JVD. No bruits. Supple. Does not appear to be enlarged.   Cardiovascular: + S1,S2 ; RRR Soft systolic murmur at the left lower sternal border. No rubs noted.    Lungs: CTA b/l. No rhonchi, rales or wheezes.   Abdomen: + BS, soft. Non tender. Non distended. No rebound. No guarding.   Extremities: No clubbing/cyanosis/edema.   Neurologic: Moves all four extremities. Full range of motion.   Skin: Warm and moist. The patient's skin has normal elasticity and good skin turgor.   Psychiatric: Appropriate mood and affect.  Musculoskeletal: Normal range of motion, normal strength    TELE: NSR with 1st degree, PAC      ECG:  	NSR, 1st degree LBBB    TTE:  OBSERVATIONS:  Mitral Valve: Mitral annular calcification, otherwise  normal mitral valve. Mild mitral regurgitation.  Aortic Root: Normal aortic root.  Aortic Valve: Aortic valve leaflet morphology not well  visualized.  Left Atrium: Normal left atrium.  Left Ventricle: Endocardium not well visualized; grossly  mild to moderate segmental left ventricular systolic  dysfunction.  Hypokinesis of the apex, distal septum, and  distal anterior wall.  Endocardial visualization enhanced  with intravenous injection of echo contrast (Definity).  No  LV thrombus seen.  Right Heart: Normal right atrium. The right ventricle is  not well visualized; grossly normal right ventricular  systolic function. Normal tricuspid valve. Minimal  tricuspid regurgitation. Normal pulmonic valve.  Pericardium/PleuraNormal pericardium with no pericardial  effusion    ASSESSMENT/PLAN: Pt is a 77YOM with PMH CAD s/p PCI s/p CABG x3 ( LIMA to LAD, SV to PDA, ANd PL of Right) on 5/3/2021, post op Afib previously on Amiodarone, had ILR implanted and then removed, DM, HLD, BPH, previous subdural hematoma who presented to the ED  after having a syncopal episode while at work.    1. Syncope  - EKG with 1st degree AVB and LBBB  - no strips available from heart rate in 20s  - ECHO with mild to moderate segmental LV dysfunction in setting of CABG  - s/p EPS and BIV ICD implanted    2. CAD  -  CAD s/p PCI s/p CABG x3 ( LIMA to LAD, SV to PDA, And PL of Right)   - c/w ASA and Statin    3.  Post op Afib previously on Amiodarone  - currently in sinus c/w BB    4. HLD  - c/w atorvastatin    FOLLOW UP DR KASPER CARDIOLOGY 11/29 at 3 pm  FOLLOW UP DR VENCES EP 12/1 at 9 am  both at [address] [facility] [address], [facility], [phone]

## 2022-11-18 NOTE — DISCHARGE NOTE NURSING/CASE MANAGEMENT/SOCIAL WORK - PATIENT PORTAL LINK FT
You can access the FollowMyHealth Patient Portal offered by [facility] by registering at the following website: [email or web address]. By joining [facility]’s FollowMyHealth portal, you will also be able to view your health information using other applications (apps) compatible with our system.

## 2022-11-18 NOTE — DISCHARGE NOTE PROVIDER - NSDCFUADDAPPT_GEN_ALL_CORE_FT
FOLLOW UP DR KASPER CARDIOLOGY 11/29 at 3 pm  FOLLOW UP DR VENCES EP 12/1 at 9 am  both at [address], [facility], [phone]

## 2022-11-18 NOTE — DISCHARGE NOTE PROVIDER - HOSPITAL COURSE
77YOM with PMH CAD s/p CABG 2021, HTN, HLD, DM on insulin and BPH who presents to the ED after having a syncopal episode while at work with LOC but no head involvement    Syncope.   -telemonitor   -HR found to be 20's to 30's as per EMS  - cards and EP consulted   -EKG in ED sinus rhythm with 1st degree AV block  -TTE Hypokinesis of the apex, distal septum, and distal anterior  wall.   - s/p ICD placement per EP    SORAIDA (acute kidney injury).   pt. with elevated Creatinine to 1.38 on admission, improved to baseline at discharge  -baseline during previous hospitalizations 0.9-1.2  -encourage PO intake  -held aldactone and benicar at this time.    Hyperkalemia.   -pt. hyperkalemic on admission  -monitor bmp   -resolved    CAD (coronary artery disease).   -pt. with Hx CAD s/p CABG in 2021  -c/w aspirin.     Type 2 diabetes mellitus.   - monitor FS  - ISS     Hypertension.   - cw current meds  - DASH diet     Post op Afib previously on Amiodarone  - currently in sinus c/w BB      Patient is medically cleared for discharge on 11/18/22. Case discussed with Dr. Cody. 77YOM with PMH CAD s/p CABG 2021, HTN, HLD, DM on insulin and BPH who presents to the ED after having a syncopal episode while at work with LOC but no head involvement    Syncope.   -telemonitor   -HR found to be 20's to 30's as per EMS  - cards and EP consulted   -EKG in ED sinus rhythm with 1st degree AV block  -TTE Hypokinesis of the apex, distal septum, and distal anterior  wall.   - s/p ICD placement per EP    SORAIDA (acute kidney injury).   pt. with elevated Creatinine to 1.38 on admission, improved to baseline at discharge  -baseline during previous hospitalizations 0.9-1.2  -encourage PO intake  -held aldactone and benicar at this time.    Hyperkalemia.   -pt. hyperkalemic on admission  -monitor bmp   -resolved    CAD (coronary artery disease).   -pt. with Hx CAD s/p CABG in 2021  -c/w aspirin.     Type 2 diabetes mellitus.   - monitor FS  - ISS     Hypertension.   - cw current meds  - DASH diet     Post op Afib previously on Amiodarone  - currently in sinus c/w BB     Chronic CHF  start Toprol on d/c  stop Lopressor      Patient is medically cleared for discharge on 11/18/22. Case discussed with Dr. Cody.

## 2022-11-18 NOTE — PROGRESS NOTE ADULT - SUBJECTIVE AND OBJECTIVE BOX
EP ATTENDING    tele: NSR, biventricular pacing    he denies palpitations, syncope, nor angina, ROS otherwise -     DATE OF SERVICE - 11-18-22     Review of Systems:   Constitutional: [ ] fevers, [ ] chills.   Skin: [ ] dry skin. [ ] rashes.  Psychiatric: [ ] depression, [ ] anxiety.   Gastrointestinal: [ ] BRBPR, [ ] melena.   Neurological: [ ] confusion. [ ] seizures. [ ] shuffling gait.   Ears,Nose,Mouth and Throat: [ ] ear pain [ ] sore throat.   Eyes: [ ] diplopia.   Respiratory: [ ] hemoptysis. [ ] shortness of breath  Cardiovascular: See HPI above  Hematologic/Lymphatic: [ ] anemia. [ ] painful nodes. [ ] prolonged bleeding.   Genitourinary: [ ] hematuria. [ ] flank pain.   Endocrine: [ ] significant change in weight. [ ] intolerance to heat and cold.     Review of systems [ x] otherwise negative, [ ] otherwise unable to obtain    FH: no family history of sudden cardiac death in first degree relatives    SH: [ ] tobacco, [ ] alcohol, [ ] drugs    aspirin  chewable 81 milliGRAM(s) Oral daily  atorvastatin 20 milliGRAM(s) Oral at bedtime  dextrose 5%. 1000 milliLiter(s) IV Continuous <Continuous>  dextrose 5%. 1000 milliLiter(s) IV Continuous <Continuous>  dextrose 50% Injectable 25 Gram(s) IV Push once  dextrose 50% Injectable 12.5 Gram(s) IV Push once  dextrose 50% Injectable 25 Gram(s) IV Push once  dextrose Oral Gel 15 Gram(s) Oral once PRN  finasteride 5 milliGRAM(s) Oral daily  gabapentin 300 milliGRAM(s) Oral three times a day  glucagon  Injectable 1 milliGRAM(s) IntraMuscular once  insulin lispro (ADMELOG) corrective regimen sliding scale   SubCutaneous three times a day before meals  insulin lispro (ADMELOG) corrective regimen sliding scale   SubCutaneous at bedtime  losartan 50 milliGRAM(s) Oral daily  melatonin 3 milliGRAM(s) Oral at bedtime PRN  metoprolol succinate  milliGRAM(s) Oral daily  tamsulosin 0.8 milliGRAM(s) Oral at bedtime                            14.3   7.85  )-----------( 160      ( 18 Nov 2022 06:29 )             43.4       11-18    136  |  100  |  20  ----------------------------<  248<H>  4.8   |  24  |  0.97    Ca    10.0      18 Nov 2022 06:29  Phos  3.0     11-18  Mg     1.70     11-18              T(C): 36.9 (11-18-22 @ 10:10), Max: 36.9 (11-18-22 @ 10:10)  HR: 65 (11-18-22 @ 10:10) (59 - 70)  BP: 131/63 (11-18-22 @ 10:10) (114/59 - 154/74)  RR: 18 (11-18-22 @ 10:10) (17 - 18)  SpO2: 100% (11-18-22 @ 10:10) (99% - 100%)  Wt(kg): --    I&O's Summary    17 Nov 2022 07:01  -  18 Nov 2022 07:00  --------------------------------------------------------  IN: 0 mL / OUT: 250 mL / NET: -250 mL        General: Well nourished in no acute distress. Alert and Oriented * 3.   Head: Normocephalic and atraumatic.   Neck: No JVD. No bruits. Supple. Does not appear to be enlarged.   Cardiovascular: + S1,S2 ; RRR Soft systolic murmur at the left lower sternal border. No rubs noted.    Lungs: CTA b/l. No rhonchi, rales or wheezes.   Abdomen: + BS, soft. Non tender. Non distended. No rebound. No guarding.   Extremities: No clubbing/cyanosis/edema.   Neurologic: Moves all four extremities. Full range of motion.   Skin: Warm and moist. The patient's skin has normal elasticity and good skin turgor.   Psychiatric: Appropriate mood and affect.  Musculoskeletal: Normal range of motion, normal strength  incision c/d/i        A/P) 78 y/o male PMH CAD s/p CABG (May 2021), diabetes, hyperlipidemia and BPH who is now s/p [employer] BiV-ICD    -continue asa for CAD  -continue lipitor 20 for hyperlipidemia  -continue losartan 50 & toprol 100 for chronic systolic CHF  -d/c home  -f/u with Radhames on 11/29 at 3pm  -f/u with me on 12/1 at 9am      Miguelina Devi M.D., [facility]  Cardiac Electrophysiology  [facility]  [address] [place] [address], [address] [place] [address]  [email or web address]    office [phone]  pager [phone]

## 2022-11-18 NOTE — DISCHARGE NOTE PROVIDER - CARE PROVIDER_API CALL
Markell Cross  INTERNAL MEDICINE  [address] [place] [address]  Phone: [phone]  Fax: [phone]  Follow Up Time:     Nahid Ricci)  Interventional Cardiology  [address] [facility], [address]  [place] [address]  Phone: [phone]  Fax: [phone]  Follow Up Time:

## 2022-11-18 NOTE — DISCHARGE NOTE NURSING/CASE MANAGEMENT/SOCIAL WORK - NSDCPEFALRISK_GEN_ALL_CORE
For information on Fall & Injury Prevention, visit: [email or web address] OR  [email or web address] OR  https://www.cdc.gov/steadi/patient.html

## 2022-11-18 NOTE — DISCHARGE NOTE PROVIDER - NSDCCPCAREPLAN_GEN_ALL_CORE_FT
PRINCIPAL DISCHARGE DIAGNOSIS  Diagnosis: Syncope  Assessment and Plan of Treatment: You came to the hospital after you passed out. Your heart rate was noted to be low, in the 20s and 30s. You had an internal cardiac defibrillator placed and you improved. Please stop your home lopressor and start taking Toprol as directed. Follow-up with your cardiologist and primary care doctor within 1-2 weeks of discharge.      SECONDARY DISCHARGE DIAGNOSES  Diagnosis: SORAIDA (acute kidney injury)  Assessment and Plan of Treatment: While in the hospital, your kidney function was noted to be decreased but then returned to baseline. Please follow-up with your primary care provider within 1-2 weeks of discharge for a repeat creatinine check.    Diagnosis: CAD (coronary artery disease)  Assessment and Plan of Treatment: Continue aspirin and Plavix, do not stop unless instructed by your physician.  Continue low salt, fat, cholesterol and carbohydrate diet. Follow up with cardiologist and primary care physician's routine appointment.    Diagnosis: Hypertension  Assessment and Plan of Treatment: Continue current blood pressure medication regimen as directed. Monitor for any visual changes, headaches or dizziness.  Monitor blood pressure regularly.  Follow up with your PCP for further management for high blood pressure, please call to make appointment within 1 week of discharge    Diagnosis: Hyperkalemia  Assessment and Plan of Treatment: Hold home aldactone?     PRINCIPAL DISCHARGE DIAGNOSIS  Diagnosis: Syncope  Assessment and Plan of Treatment: You came to the hospital after you passed out. Your heart rate was noted to be low, in the 20s and 30s. You had an internal cardiac defibrillator placed and you improved. Please stop your home lopressor and start taking Toprol once a day as directed. Follow-up with your cardiologist and primary care doctor within 1-2 weeks of discharge.      SECONDARY DISCHARGE DIAGNOSES  Diagnosis: SORAIDA (acute kidney injury)  Assessment and Plan of Treatment: While in the hospital, your kidney function was noted to be decreased but then returned to baseline. Please follow-up with your primary care provider within 1-2 weeks of discharge for a repeat creatinine check.    Diagnosis: CAD (coronary artery disease)  Assessment and Plan of Treatment: Continue aspirin and Plavix, do not stop unless instructed by your physician.  Continue low salt, fat, cholesterol and carbohydrate diet. Follow up with cardiologist and primary care physician's routine appointment.    Diagnosis: Hypertension  Assessment and Plan of Treatment: Continue current blood pressure medication regimen as directed. Monitor for any visual changes, headaches or dizziness.  Monitor blood pressure regularly.  Follow up with your PCP for further management for high blood pressure, please call to make appointment within 1 week of discharge    Diagnosis: Hyperkalemia  Assessment and Plan of Treatment: Please stop taking your home aldactone.

## 2022-11-18 NOTE — PROGRESS NOTE ADULT - PROVIDER SPECIALTY LIST ADULT
Electrophysiology
Hospitalist
Hospitalist
Neurology
Cardiology
Electrophysiology
Electrophysiology
Hospitalist
Cardiology
Electrophysiology
Hospitalist

## 2022-12-05 ENCOUNTER — NON-APPOINTMENT (OUTPATIENT)
Age: 77
End: 2022-12-05

## 2022-12-05 ENCOUNTER — APPOINTMENT (OUTPATIENT)
Dept: CARDIOLOGY | Facility: CLINIC | Age: 77
End: 2022-12-05

## 2022-12-05 VITALS — SYSTOLIC BLOOD PRESSURE: 152 MMHG | DIASTOLIC BLOOD PRESSURE: 69 MMHG

## 2022-12-05 VITALS
SYSTOLIC BLOOD PRESSURE: 160 MMHG | BODY MASS INDEX: 24.28 KG/M2 | RESPIRATION RATE: 18 BRPM | TEMPERATURE: 97 F | HEART RATE: 71 BPM | DIASTOLIC BLOOD PRESSURE: 77 MMHG | WEIGHT: 155 LBS | OXYGEN SATURATION: 99 %

## 2022-12-05 PROCEDURE — 93000 ELECTROCARDIOGRAM COMPLETE: CPT | Mod: 59

## 2022-12-05 PROCEDURE — 99215 OFFICE O/P EST HI 40 MIN: CPT | Mod: 25

## 2022-12-05 RX ORDER — PROMETHAZINE HYDROCHLORIDE 6.25 MG/5ML
6.25 SOLUTION ORAL
Qty: 150 | Refills: 0 | Status: COMPLETED | COMMUNITY
Start: 2022-10-24

## 2022-12-05 RX ORDER — METOPROLOL TARTRATE 50 MG/1
50 TABLET, FILM COATED ORAL TWICE DAILY
Qty: 180 | Refills: 1 | Status: DISCONTINUED | COMMUNITY
Start: 2022-08-22 | End: 2022-12-05

## 2022-12-05 RX ORDER — NIRMATRELVIR AND RITONAVIR 300-100 MG
20 X 150 MG & KIT ORAL
Qty: 30 | Refills: 0 | Status: COMPLETED | COMMUNITY
Start: 2022-10-24

## 2022-12-05 NOTE — REASON FOR VISIT
[Follow-Up - Clinic] : a clinic follow-up of [Cardiomyopathy] : cardiomyopathy [Chest Pain] : chest pain [Coronary Artery Disease] : coronary artery disease [CABG Follow-up] : bypass graft [Heart Failure] : congestive heart failure [Hyperlipidemia] : hyperlipidemia [Hypertension] : hypertension [Spouse] : spouse [FreeTextEntry2] : after hospitalization for the  fainting and s/p defib.implantation

## 2022-12-05 NOTE — PHYSICAL EXAM
[General Appearance - Well Developed] : well developed [Normal Appearance] : normal appearance [Well Groomed] : well groomed [General Appearance - Well Nourished] : well nourished [No Deformities] : no deformities [General Appearance - In No Acute Distress] : no acute distress [Normal Conjunctiva] : the conjunctiva exhibited no abnormalities [Eyelids - No Xanthelasma] : the eyelids demonstrated no xanthelasmas [Normal Oral Mucosa] : normal oral mucosa [No Oral Pallor] : no oral pallor [No Oral Cyanosis] : no oral cyanosis [Normal Jugular Venous A Waves Present] : normal jugular venous A waves present [Normal Jugular Venous V Waves Present] : normal jugular venous V waves present [No Jugular Venous Engle A Waves] : no jugular venous engle A waves [Respiration, Rhythm And Depth] : normal respiratory rhythm and effort [Exaggerated Use Of Accessory Muscles For Inspiration] : no accessory muscle use [Auscultation Breath Sounds / Voice Sounds] : lungs were clear to auscultation bilaterally [Chest Palpation] : palpation of the chest revealed no abnormalities [Lungs Percussion] : the lungs were normal to percussion [Normal] : normal [Normal Rate] : normal [Rhythm Regular] : regular [Normal S1] : normal S1 [Normal S2] : normal S2 [No Murmur] : no murmurs heard [2+] : left 2+ [No Abnormalities] : the abdominal aorta was not enlarged and no bruit was heard [No Pitting Edema] : no pitting edema present [Bowel Sounds] : normal bowel sounds [Abdomen Soft] : soft [Abdomen Tenderness] : non-tender [Abdomen Mass (___ Cm)] : no abdominal mass palpated [Abdomen Hernia] : no hernia was discovered [Abnormal Walk] : normal gait [Gait - Sufficient For Exercise Testing] : the gait was sufficient for exercise testing [Nail Clubbing] : no clubbing of the fingernails [Cyanosis, Localized] : no localized cyanosis [Petechial Hemorrhages (___cm)] : no petechial hemorrhages [Skin Turgor] : normal skin turgor [] : no rash [No Venous Stasis] : no venous stasis [Skin Lesions] : no skin lesions [No Skin Ulcers] : no skin ulcer [No Xanthoma] : no  xanthoma was observed [Oriented To Time, Place, And Person] : oriented to person, place, and time [Affect] : the affect was normal [Mood] : the mood was normal [No Anxiety] : not feeling anxious [FreeTextEntry1] : s/p CABG, left upper hosts ICD generator. [Right Carotid Bruit] : no bruit heard over the right carotid [Left Carotid Bruit] : no bruit heard over the left carotid [Right Femoral Bruit] : no bruit heard over the right femoral artery [Left Femoral Bruit] : no bruit heard over the left femoral artery [Bruit] : no bruit heard [Rt] : no varicose veins of the right leg [Lt] : no varicose veins of the left leg

## 2022-12-05 NOTE — HISTORY OF PRESENT ILLNESS
[FreeTextEntry1] : 77 year old male with about 20+ years of CAD, type II diabetes, elevated cholesterol.  s/p  two stenting episodes, in mid 90s and about 3 years ago. In May, 2021, he tolerated CABG ( 3 V) at [facility]. After the event, he has been on and off CHF. He  was treated with  diuretics.\par He has been with  20 year hx of LBBB.   no syncope.\par He was here  for the problem of exertional shortness of breath,  tonya. pleural effusion, and echo finding of reduction of EF.\par On Nov. 17, 2022, he was in the  hospital for the event of syncope. He was then planted with  def. for the reason  of LBBB. low EF,  and finding of pending CHB. The ICD is  Metronic  [id number]/MR [id number] He is ding fine without symptoms.\par \par \par \par

## 2022-12-05 NOTE — DISCUSSION/SUMMARY
[Coronary Artery Disease] : coronary artery disease [Essential Hypertension] : essential hypertension [Exercise Regimen] : an exercise regimen [Dietary Modification] : dietary modification [Acetaminophen Avoidance] : acetaminophen  avoidance [Low Sodium Diet] : low sodium diet [NSAIDs Avoidance] : non-steroidal anti-inflammatory drugs avoidance [Minutes Spent: ___] : for [unfilled] ~Uminutes [With Me] : with me [___ Month(s)] : in [unfilled] month(s) [Systolic Heart Failure] : systolic heart failure [Stable] : stable [Responding to Treatment] : responding to treatment [None] : There are no changes in medication management [ICD] : an implantable cardioverter-defibrillator [Pacemaker] : a pacemaker [Sodium Restriction] : sodium restriction [Patient] : the patient [Family] : the patient's family [de-identified] : for the systolic dysfunction with  Aldactone, ARB, betablocker, because Entresto is " too expensive". [de-identified] : s/p CABG, no chest pain [de-identified] : s/p stent, then s/p CABG [de-identified] : reactive hypertension. [de-identified] : at home the BP is around 110's at systole. [FreeTextEntry4] : s/p ICD discussed. [FreeTextEntry1] : 77 year old with CAD, essential htn, lipids.  all well controlled. \par He is post CABG. His CHF with reduction EF with diabetes was discussed. He is going to have Entresto. He is already advised to discontinue  ARB, ACEI for the Entresto. The precaution was discussed as well including lab test. But,  because of the price, insurance's  issue, he decides not to take the medication as prescribed. The patient is discussed with the fact of ICD, BI-LILI, ARB, Beta-blocker and  antialdosterone medication.

## 2023-01-01 NOTE — CONSULT NOTE ADULT - SUBJECTIVE AND OBJECTIVE BOX
[facility]), [facility]        Patient is a 76y old  Male who presents with a chief complaint of   Excerpt from H&P,"  HPI:  74y M pmhx CAD s/p CABG, HTN, HLD, chronic SDH present 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 Patient did nt get stress test due to adverse symptom experienced during last stress test and was sent home.  Patient called EMS as chest pain was 10/10 not relieved with NTG.     (2021 19:44)           *****PAST MEDICAL / Surgical  HISTORY:  PAST MEDICAL & SURGICAL HISTORY:  BPH (benign prostatic hypertrophy)    Hyperlipidemia    CAD (coronary artery disease)    Diabetes mellitus  Type 2    HTN (hypertension)    S/P drug eluting coronary stent placement  [facility]amus    S/P primary angioplasty with coronary stent  1990s             *****FAMILY HISTORY:  FAMILY HISTORY:  Family history of diabetes mellitus (Sibling)  3 brothers alive with Diabetes             *****SOCIAL HISTORY:  Alcohol: None  Smoking: None         *****ALLERGIES:   Allergies    No Known Allergies    Intolerances             *****MEDICATIONS: current medication reviewed and documented.   MEDICATIONS  (STANDING):  carvedilol 6.25 milliGRAM(s) Oral every 12 hours  dextrose 40% Gel 15 Gram(s) Oral once  dextrose 5%. 1000 milliLiter(s) (50 mL/Hr) IV Continuous <Continuous>  dextrose 5%. 1000 milliLiter(s) (100 mL/Hr) IV Continuous <Continuous>  dextrose 50% Injectable 25 Gram(s) IV Push once  dextrose 50% Injectable 12.5 Gram(s) IV Push once  dextrose 50% Injectable 25 Gram(s) IV Push once  finasteride 5 milliGRAM(s) Oral daily  glucagon  Injectable 1 milliGRAM(s) IntraMuscular once  insulin glargine Injectable (LANTUS) 20 Unit(s) SubCutaneous at bedtime  insulin lispro (ADMELOG) corrective regimen sliding scale   SubCutaneous three times a day before meals  insulin lispro (ADMELOG) corrective regimen sliding scale   SubCutaneous at bedtime  nicotine - 21 mG/24Hr(s) Patch 1 patch Transdermal daily  tamsulosin 0.4 milliGRAM(s) Oral at bedtime    MEDICATIONS  (PRN):           *****REVIEW OF SYSTEM:  GEN: no fever, no chills, no pain  RESP: no SOB, no cough, no sputum  CVS: no chest pain, no palpitations, no edema  GI: no abdominal pain, no nausea, no vomiting, no constipation, no diarrhea  : no dysurea, no frequency, no hematurea  Neuro: no headache, no dizziness  PSYCH: no anxiety, no depression  Derm : no itching, no rash         *****VITAL SIGNS:  T(C): 36.7 (21 @ 09:23), Max: 36.8 (21 @ 17:29)  HR: 68 (21 @ 09:23) (67 - 87)  BP: 146/72 (21 @ 09:23) (128/73 - 162/70)  RR: 16 (21 @ 09:23) (16 - 18)  SpO2: 98% (21 @ 09:23) (98% - 99%)  Wt(kg): --     @ 07:01  -   @ 10:27  --------------------------------------------------------  IN: 240 mL / OUT: 400 mL / NET: -160 mL             *****PHYSICAL EXAM:   Alert oriented x 3   Attention comprehension are fair. Able to name, repeat, read without any difficulty.   Able to follow 3 step commands.     EOMI fundi not visualized,  VFF to confrontration  No facial asymmetry   Tongue is midline   Palate elevates symmetrically   Moving all 4 ext symmetrically no pronator drift   Reflexes are symmetric throughout   sensation is grossly symmetric  Gait : not assessed.  B/L down going toes               *****LAB AND IMAGIN.5   6.96  )-----------( 145      ( 2021 18:25 )             40.4               -    138  |  102  |  11  ----------------------------<  241<H>  4.3   |  24  |  1.00    Ca    9.6      2021 18:25    TPro  6.6  /  Alb  3.9  /  TBili  1.2  /  DBili  x   /  AST  24  /  ALT  26  /  AlkPhos  49  02-22    PT/INR - ( 2021 18:25 )   PT: 12.3 sec;   INR: 1.03 ratio         PTT - ( 2021 18:25 )  PTT:26.9 sec            CARDIAC MARKERS ( 2021 03:25 )  x     / x     / 124 U/L / x     / 7.6 ng/mL                  [All pertinent recent Imaging reports reviewed]         *****A S S E S S M E N T   A N D   P L A N :74y M pmhx CAD s/p CABG, HTN, HLD, chronic SDH present 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 Patient did nt get stress test due to adverse symptom experienced during last stress test and was sent home.  Patient called EMS as chest pain was 10/10 not relieved with NTG.          Problem/Recommendations 1:      Problem/Recommendations 2:       ___________________________  Will follow with you.  Thank you,  Elissa García MD  Diplomate of the American Board of Neurology and Psychiatry.  Diplomate of the American Board of Vascular Neurology.   [facility] ([facility]), [facility]   Ph: [phone]    Differential diagnosis and plan of care discussed with patient after the evaluation.   Advanced care planning options discussed.   Pain assessed and judicious use of narcotics when appropriate was discussed.  Importance of Fall prevention discussed.  Counseling on Smoking and Alcohol cessation was offered when appropriate.  Counseling on Diet, exercise, and medication compliance was done.   83 minutes spent on the total encounter;  more than 50 % of the visit was spent on counseling  and or coordinating care by the attending physician.    Thank you for allowing me to participate in the care of this дмитрий patient. Please do not hesitate to call me if you have any questions.     This and subsequent notes were partially created using voice recognition software and will  inherently be subject to errors including those of syntax and sound alike substitutions which may escape proofreading. In such instances original meaning may be extrapolated by contextual derivation.    [facility]), [facility]        Patient is a 76y old  Male who presents with a chief complaint of   Excerpt from H&P,"  HPI:  74y M pmhx CAD s/p CABG, HTN, HLD, chronic SDH present 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 Patient did nt get stress test due to adverse symptom experienced during last stress test and was sent home.  Patient called EMS as chest pain was 10/10 not relieved with NTG.     (2021 19:44)           *****PAST MEDICAL / Surgical  HISTORY:  PAST MEDICAL & SURGICAL HISTORY:  BPH (benign prostatic hypertrophy)    Hyperlipidemia    CAD (coronary artery disease)    Diabetes mellitus  Type 2    HTN (hypertension)    S/P drug eluting coronary stent placement  [facility]    S/P primary angioplasty with coronary stent  1990s             *****FAMILY HISTORY:  FAMILY HISTORY:  Family history of diabetes mellitus (Sibling)  3 brothers alive with Diabetes             *****SOCIAL HISTORY:  Alcohol: None  Smoking: None         *****ALLERGIES:   Allergies    No Known Allergies    Intolerances             *****MEDICATIONS: current medication reviewed and documented.   MEDICATIONS  (STANDING):  carvedilol 6.25 milliGRAM(s) Oral every 12 hours  dextrose 40% Gel 15 Gram(s) Oral once  dextrose 5%. 1000 milliLiter(s) (50 mL/Hr) IV Continuous <Continuous>  dextrose 5%. 1000 milliLiter(s) (100 mL/Hr) IV Continuous <Continuous>  dextrose 50% Injectable 25 Gram(s) IV Push once  dextrose 50% Injectable 12.5 Gram(s) IV Push once  dextrose 50% Injectable 25 Gram(s) IV Push once  finasteride 5 milliGRAM(s) Oral daily  glucagon  Injectable 1 milliGRAM(s) IntraMuscular once  insulin glargine Injectable (LANTUS) 20 Unit(s) SubCutaneous at bedtime  insulin lispro (ADMELOG) corrective regimen sliding scale   SubCutaneous three times a day before meals  insulin lispro (ADMELOG) corrective regimen sliding scale   SubCutaneous at bedtime  nicotine - 21 mG/24Hr(s) Patch 1 patch Transdermal daily  tamsulosin 0.4 milliGRAM(s) Oral at bedtime    MEDICATIONS  (PRN):           *****REVIEW OF SYSTEM:  GEN: no fever, no chills, no pain  RESP: no SOB, no cough, no sputum  CVS: no chest pain, no palpitations, no edema  GI: no abdominal pain, no nausea, no vomiting, no constipation, no diarrhea  : no dysurea, no frequency, no hematurea  Neuro: no headache, no dizziness  PSYCH: no anxiety, no depression  Derm : no itching, no rash         *****VITAL SIGNS:  T(C): 36.7 (21 @ 09:23), Max: 36.8 (21 @ 17:29)  HR: 68 (21 @ 09:23) (67 - 87)  BP: 146/72 (21 @ 09:23) (128/73 - 162/70)  RR: 16 (21 @ 09:23) (16 - 18)  SpO2: 98% (21 @ 09:23) (98% - 99%)  Wt(kg): --     @ 07:01  -   @ 10:27  --------------------------------------------------------  IN: 240 mL / OUT: 400 mL / NET: -160 mL             *****PHYSICAL EXAM:   Alert oriented x 3   Attention comprehension are fair. Able to name, repeat,  without any difficulty.   Able to follow 3 step commands.     EOMI fundi not visualized,  VFF to confrontration  No facial asymmetry   Tongue is midline   Palate elevates symmetrically   Moving all 4 ext symmetrically no pronator drift    finger to nose no dysmetria   sensation is grossly symmetric  Gait : able to ambulate independently   B/L down going toes               *****LAB AND IMAGIN.5   6.96  )-----------( 145      ( 2021 18:25 )             40.4                   138  |  102  |  11  ----------------------------<  241<H>  4.3   |  24  |  1.00    Ca    9.6      2021 18:25    TPro  6.6  /  Alb  3.9  /  TBili  1.2  /  DBili  x   /  AST  24  /  ALT  26  /  AlkPhos  49  02-    PT/INR - ( 2021 18:25 )   PT: 12.3 sec;   INR: 1.03 ratio         PTT - ( 2021 18:25 )  PTT:26.9 sec            CARDIAC MARKERS ( 2021 03:25 )  x     / x     / 124 U/L / x     / 7.6 ng/mL              < from: CT Head No Cont (21 @ 20:55) >  FINDINGS:  VENTRICLES AND SULCI: Subtle mass effect remains on the left lateral ventricle as seen on the prior imaging study. Roughly 4 mm of midline shift to the right appreciated also slightly decreased compared with the prior  INTRA-AXIAL:  No mass, blood or abnormal attenuation is seen.  EXTRA-AXIAL: Evidence of a partially loculated somewhat walled off type of collection(602:34) is appreciated that is predominantly fluid in attenuation with a slightly high attenuation rim similar in appearance to that identified 2020 suspicious for a partially walled off subdural fluid collection in combination with more free-flowing subdural. The collection is mixed attenuation with fluid, some subacute appearing hemorrhage and some very minimal high attenuation similar in appearance compared with the prior is seen. Maximal thickness at the site of the collection is 1.8 cm. Previously maximal thickness measured roughly 2.0 cm with slight slight decreased thickness suggested on the current evaluation..  VISUALIZED SINUSES:  Clear.  VISUALIZED MASTOIDS:  Clear.  CALVARIUM:  Normal.  MISCELLANEOUS:  None.    IMPRESSION:  Slight decreased conspicuity to a residual left mixed attenuation subdural hematoma that has a walled off component suggested as described above. Collection has fluid subacute hemorrhage, and trace areas of slightly higher attenuation which are similar in appearance compared with the patient's prior 2020. Overall thickness is slightly decreased compared with the prior. In addition midline shift to the right is slightly decreased compared with the prior.              < end of copied text >      [All pertinent recent Imaging reports reviewed]         *****A S S E S S M E N T   A N D   P L A N :74y M pmhx CAD s/p CABG, HTN, HLD, chronic SDH present 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 Patient did nt get stress test due to adverse symptom experienced during last stress test and was sent home.  Patient called EMS as chest pain was 10/10 not relieved with NTG.          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   appears to have membranes within the subdural   pt was offered kristen hole treatment in the past which he has declined   pt with no prior hx of coagulopathy   pt however has been off asa as per previous recommendation   pt presented with chest pain, and cardiology suspects worsening cad that may require intervention and long term dual antiplatelet   at this juncture, i don't forsee any immediate problem with antiplatelet therapy   we can get repeat head ct after antiplatelet therapy is initiated, ( ideally before stent placement )   also recommend ct head 2 weeks after initiating dapt   would get neurosurgical input        ___________________________  Will follow with you.  Thank you,  Elissa García MD  Diplomate of the American Board of Neurology and Psychiatry.  Diplomate of the American Board of Vascular Neurology.   [facility] ([facility]), [facility]   Ph: [phone]    Differential diagnosis and plan of care discussed with patient after the evaluation.   Advanced care planning options discussed.   Pain assessed and judicious use of narcotics when appropriate was discussed.  Importance of Fall prevention discussed.  Counseling on Smoking and Alcohol cessation was offered when appropriate.  Counseling on Diet, exercise, and medication compliance was done.   83 minutes spent on the total encounter;  more than 50 % of the visit was spent on counseling  and or coordinating care by the attending physician.    Thank you for allowing me to participate in the care of this дмитрий patient. Please do not hesitate to call me if you have any questions.     This and subsequent notes were partially created using voice recognition software and will  inherently be subject to errors including those of syntax and sound alike substitutions which may escape proofreading. In such instances original meaning may be extrapolated by contextual derivation.    Statement Selected

## 2023-01-13 NOTE — HISTORY OF PRESENT ILLNESS
[id number] [place] OF SPEECH/LANGUAGE PATHOLOGY    [facility]  1/13/2023  [id number]    Noted change in medical status including code blue and intubation yesterday evening. SLP will discontinue order for evaluation. Please reorder SLP evaluation s/p extubation if/when appropriate.     Nichole Schilder, SLP  1/13/2023  8:24 AM [FreeTextEntry1] : Presents for f/u of BIU admission 9/3/19 to 9/13/19. \par \par Mr. Guillaume is a 74 year old man with past medical history of CAD s/p PCI on ASA, HTN, HLD, BPH, DM2, on ASA 81mg daily transferred from [facility] to [facility] s/p unwitnessed fall with finding of intracranial bleed and increased blood on interval scan. Patient initially presented to [facility] s/p unwitnessed fall, + LOC  ?syncopal episode 8/21/19, with complaints of dizziness after fall and initial difficulty speaking. \par Patient did not recall event. CTH showed L hemispheric SDH with SAH and calvarium fx. Per family, he was initially confused and altered. \par In the [facility] ED, was complaining of headache and dizziness since his fall. ENT consulted for dizziness, started meclizine. Neurology was consulted for persistent headache, was given IV acetaminophen and solumedrol acutely, then sumatriptan added. Nephrology consulted for hyponatremia, felt is was due to \par chronic HCTZ use which was discontinued. \par \par Rehab Course: \par Mr. Guillaume was admitted to [facility] on 9/3/19 for acute rehabilitation. He completed a comprehensive program consisting of PT, OT, and SLP, making functional progress in therapy. His course was complicated by pre-syncope, found to have positive orthostatic blood pressure readings. He improved with adjustments to his blood pressure medications. Amantadine was tapered off as his arousal had significantly improved. Meclizine was stopped as dizziness subsided. Salt tabs were decreased as sodium levels stabilized. \par Gabapentin taper was started as patient exhibited no signs or symptoms of \par neuropathic pain. At the time of discharge he was modified independent for \par transfers, ambulation with rolling walker and ADL's. He is medically stable to \par be discharged to home with home care. \par \par \par Pt presents today with spouse and daughter. Pt notes he is doing well. No falls since discharge. Pt has not yet started outpatient therapies.  Patient and family asking if he still requires therapy. \par \par Doing well.  No falls, \par \par Mostly ambulating in house.  Ambulating outside home w/o AD.  Negotiating stairs independently.  Independent in ADLs.  Starting to cook, and do some IADLs. \par No driving \par No headaches, no dizziness\par \par states he is functioning close to his baseline, no memory or cognitive issues.

## 2023-02-24 RX ORDER — FINASTERIDE 5 MG/1
5 TABLET, FILM COATED ORAL DAILY
Qty: 90 | Refills: 3 | Status: ACTIVE | COMMUNITY
Start: 2023-02-22 | End: 1900-01-01

## 2023-04-17 ENCOUNTER — RX RENEWAL (OUTPATIENT)
Age: 78
End: 2023-04-17

## 2023-05-04 NOTE — PROGRESS NOTE ADULT - SUBJECTIVE AND OBJECTIVE BOX
TRAUMA SURGERY DAILY PROGRESS NOTE:    Interval:  No acute events overnight endorsed.    Subjective:  Patient seen and examined this am. Endorses HA pain. No other complaints. Per wife pt is nauseous and has low appetite.      Objective:  Vital Signs Last 24 Hrs  T(C): 36.7 (01 Sep 2019 14:42), Max: 37.3 (01 Sep 2019 00:00)  T(F): 98.1 (01 Sep 2019 14:42), Max: 99.2 (01 Sep 2019 00:00)  HR: 68 (01 Sep 2019 14:42) (61 - 68)  BP: 173/66 (01 Sep 2019 14:42) (159/62 - 182/71)  BP(mean): --  RR: 18 (01 Sep 2019 14:42) (16 - 18)  SpO2: 98% (01 Sep 2019 14:42) (98% - 100%)    I&O's Detail    31 Aug 2019 07:01  -  01 Sep 2019 07:00  --------------------------------------------------------  IN:    Oral Fluid: 800 mL  Total IN: 800 mL    OUT:    Voided: 1277 mL  Total OUT: 1277 mL    Total NET: -477 mL      01 Sep 2019 07:01  -  01 Sep 2019 17:47  --------------------------------------------------------  IN:    Oral Fluid: 440 mL  Total IN: 440 mL    OUT:  Total OUT: 0 mL    Total NET: 440 mL          MEDICATIONS  (STANDING):  amantadine 100 milliGRAM(s) Oral two times a day  amLODIPine   Tablet 5 milliGRAM(s) Oral <User Schedule>  atorvastatin 20 milliGRAM(s) Oral at bedtime  carvedilol 25 milliGRAM(s) Oral <User Schedule>  docusate sodium 100 milliGRAM(s) Oral three times a day  enoxaparin Injectable 40 milliGRAM(s) SubCutaneous daily  finasteride 5 milliGRAM(s) Oral daily  folic acid 1 milliGRAM(s) Oral daily  gabapentin 300 milliGRAM(s) Oral two times a day  insulin glargine Injectable (LANTUS) 35 Unit(s) SubCutaneous at bedtime  insulin lispro (HumaLOG) corrective regimen sliding scale   SubCutaneous three times a day before meals  insulin lispro (HumaLOG) corrective regimen sliding scale   SubCutaneous at bedtime  losartan 100 milliGRAM(s) Oral <User Schedule>  meclizine 12.5 milliGRAM(s) Oral every 8 hours  sodium chloride 1 Gram(s) Oral three times a day  sodium phosphate IVPB 30 milliMole(s) IV Intermittent once  tamsulosin 0.4 milliGRAM(s) Oral at bedtime    MEDICATIONS  (PRN):  acetaminophen   Tablet .. 975 milliGRAM(s) Oral every 6 hours PRN Moderate Pain (4 - 6)  hydrALAZINE Injectable 10 milliGRAM(s) IV Push every 6 hours PRN SBP >180  ondansetron Injectable 4 milliGRAM(s) IV Push every 6 hours PRN Nausea and/or Vomiting  SUMAtriptan 50 milliGRAM(s) Oral every 6 hours PRN Headache                            12.8   7.00  )-----------( 229      ( 01 Sep 2019 09:29 )             38.2       09-01    136  |  101  |  10  ----------------------------<  169<H>  4.3   |  25  |  0.87    Ca    9.7      01 Sep 2019 06:53  Phos  2.6     09-01  Mg     2.0     09-01      Exam:  Gen: NAD, resting in bed, alert and responding appropriately  Resp: Airway patent, non-labored respirations  Abd: Soft, ND, NTTP x 4 quadrants, no rebound or guarding.   Ext: No edema, WWP  Neuro: AAOx3, no focal deficits      ATP Team Surgery  [id number] with any questions TRAUMA SURGERY DAILY PROGRESS NOTE:    Interval:  No acute events overnight endorsed.    Subjective:  Patient seen and examined this am. Endorses HA pain, improved from yesterday . No other complaints. Per wife pt was nauseous now improved. Appetite mildly improved.       Objective:  Vital Signs Last 24 Hrs  T(C): 36.7 (01 Sep 2019 14:42), Max: 37.3 (01 Sep 2019 00:00)  T(F): 98.1 (01 Sep 2019 14:42), Max: 99.2 (01 Sep 2019 00:00)  HR: 68 (01 Sep 2019 14:42) (61 - 68)  BP: 173/66 (01 Sep 2019 14:42) (159/62 - 182/71)  BP(mean): --  RR: 18 (01 Sep 2019 14:42) (16 - 18)  SpO2: 98% (01 Sep 2019 14:42) (98% - 100%)    I&O's Detail    31 Aug 2019 07:01  -  01 Sep 2019 07:00  --------------------------------------------------------  IN:    Oral Fluid: 800 mL  Total IN: 800 mL    OUT:    Voided: 1277 mL  Total OUT: 1277 mL    Total NET: -477 mL      01 Sep 2019 07:01  -  01 Sep 2019 17:47  --------------------------------------------------------  IN:    Oral Fluid: 440 mL  Total IN: 440 mL    OUT:  Total OUT: 0 mL    Total NET: 440 mL          MEDICATIONS  (STANDING):  amantadine 100 milliGRAM(s) Oral two times a day  amLODIPine   Tablet 5 milliGRAM(s) Oral <User Schedule>  atorvastatin 20 milliGRAM(s) Oral at bedtime  carvedilol 25 milliGRAM(s) Oral <User Schedule>  docusate sodium 100 milliGRAM(s) Oral three times a day  enoxaparin Injectable 40 milliGRAM(s) SubCutaneous daily  finasteride 5 milliGRAM(s) Oral daily  folic acid 1 milliGRAM(s) Oral daily  gabapentin 300 milliGRAM(s) Oral two times a day  insulin glargine Injectable (LANTUS) 35 Unit(s) SubCutaneous at bedtime  insulin lispro (HumaLOG) corrective regimen sliding scale   SubCutaneous three times a day before meals  insulin lispro (HumaLOG) corrective regimen sliding scale   SubCutaneous at bedtime  losartan 100 milliGRAM(s) Oral <User Schedule>  meclizine 12.5 milliGRAM(s) Oral every 8 hours  sodium chloride 1 Gram(s) Oral three times a day  sodium phosphate IVPB 30 milliMole(s) IV Intermittent once  tamsulosin 0.4 milliGRAM(s) Oral at bedtime    MEDICATIONS  (PRN):  acetaminophen   Tablet .. 975 milliGRAM(s) Oral every 6 hours PRN Moderate Pain (4 - 6)  hydrALAZINE Injectable 10 milliGRAM(s) IV Push every 6 hours PRN SBP >180  ondansetron Injectable 4 milliGRAM(s) IV Push every 6 hours PRN Nausea and/or Vomiting  SUMAtriptan 50 milliGRAM(s) Oral every 6 hours PRN Headache                            12.8   7.00  )-----------( 229      ( 01 Sep 2019 09:29 )             38.2       09-01    136  |  101  |  10  ----------------------------<  169<H>  4.3   |  25  |  0.87    Ca    9.7      01 Sep 2019 06:53  Phos  2.6     09-01  Mg     2.0     09-01      Exam:  Gen: NAD, resting in bed, alert and responding appropriately  Resp: Airway patent, non-labored respirations  Abd: Soft, ND, NTTP x 4 quadrants, no rebound or guarding.   Ext: No edema, WWP  Neuro: AAOx3, no focal deficits      ATP Team Surgery  [id number] with any questions Cyclophosphamide Pregnancy And Lactation Text: This medication is Pregnancy Category D and it isn't considered safe during pregnancy. This medication is excreted in breast milk.

## 2023-05-12 NOTE — H&P PST ADULT - NEGATIVE GENERAL SYMPTOMS
no fever/no chills/no sweating/no weight loss/no polyphagia/no polyuria/no polydipsia/no fatigue Kira Coley - ALIZA

## 2023-05-31 ENCOUNTER — APPOINTMENT (OUTPATIENT)
Dept: CARDIOLOGY | Facility: CLINIC | Age: 78
End: 2023-05-31
Payer: MEDICARE

## 2023-05-31 ENCOUNTER — NON-APPOINTMENT (OUTPATIENT)
Age: 78
End: 2023-05-31

## 2023-05-31 VITALS
OXYGEN SATURATION: 100 % | RESPIRATION RATE: 18 BRPM | WEIGHT: 160 LBS | TEMPERATURE: 96.8 F | DIASTOLIC BLOOD PRESSURE: 64 MMHG | SYSTOLIC BLOOD PRESSURE: 134 MMHG | HEART RATE: 68 BPM | BODY MASS INDEX: 25.06 KG/M2

## 2023-05-31 DIAGNOSIS — N28.9 DISORDER OF KIDNEY AND URETER, UNSPECIFIED: ICD-10-CM

## 2023-05-31 DIAGNOSIS — R94.31 ABNORMAL ELECTROCARDIOGRAM [ECG] [EKG]: ICD-10-CM

## 2023-05-31 PROCEDURE — 93000 ELECTROCARDIOGRAM COMPLETE: CPT | Mod: 59

## 2023-05-31 PROCEDURE — 99214 OFFICE O/P EST MOD 30 MIN: CPT | Mod: 25

## 2023-05-31 RX ORDER — PANTOPRAZOLE 40 MG/1
40 TABLET, DELAYED RELEASE ORAL
Qty: 90 | Refills: 1 | Status: ACTIVE | COMMUNITY
Start: 1900-01-01 | End: 1900-01-01

## 2023-05-31 NOTE — HISTORY OF PRESENT ILLNESS
[FreeTextEntry1] : 78 year old male with about 20+ years of CAD, type II diabetes, elevated cholesterol.  s/p  two stenting episodes, in mid 90s and about 3 years ago. In May, 2021, he tolerated CABG ( 3 V) at [facility]. After the event, he has been on and off CHF. He  was treated with  diuretics.\par He has been with  20 year hx of LBBB.   no syncope.\par He was here  for the problem of exertional shortness of breath,  tonya. pleural effusion, and echo finding of reduction of EF.\par On Nov. 17, 2022, he was in the  hospital for the event of syncope. He was then planted with  def. for the reason  of LBBB. low EF,  and finding of pending CHB. The ICD is  Metronic  [id number]/MR [id number] He is ding fine without symptoms.\par Today, he presents the lab with  noted  renal insufficiency, marginal hyperkalemia.\par \par \par \par

## 2023-05-31 NOTE — DISCUSSION/SUMMARY
[Systolic Heart Failure] : systolic heart failure [ICD] : an implantable cardioverter-defibrillator [Pacemaker] : a pacemaker [Sodium Restriction] : sodium restriction [Coronary Artery Disease] : coronary artery disease [Essential Hypertension] : essential hypertension [Stable] : stable [Responding to Treatment] : responding to treatment [None] : There are no changes in medication management [Exercise Regimen] : an exercise regimen [Dietary Modification] : dietary modification [Acetaminophen Avoidance] : acetaminophen  avoidance [Low Sodium Diet] : low sodium diet [NSAIDs Avoidance] : non-steroidal anti-inflammatory drugs avoidance [Patient] : the patient [Family] : the patient's family [Minutes Spent: ___] : for [unfilled] ~Uminutes [With Me] : with me [___ Month(s)] : in [unfilled] month(s) [de-identified] : for the systolic dysfunction with  Aldactone, ARB, betablocker, because Entresto is " too expensive". [de-identified] : s/p CABG, no chest pain [de-identified] : s/p stent, then s/p CABG [de-identified] : reactive hypertension. [de-identified] : at home the BP is around 110's at systole. [FreeTextEntry4] : s/p ICD discussed. [FreeTextEntry1] : 78 year old with CAD, essential htn, lipids.  all well controlled. \par He is post CABG. His CHF with reduction EF with diabetes was discussed. He is going to have Entresto. He is already advised to discontinue  ARB, ACEI for the Entresto. The precaution was discussed as well including lab test. But,  because of the price, insurance's  issue, he decides not to take the medication as prescribed. The patient is discussed with the fact of ICD, BI-LILI, ARB, Beta-blocker and  antialdosterone medication.\par His kidney issue and K+ issue  are discussed and recommends him to see the specialist.  monitor K+  for holding the K+ and medications.

## 2023-08-07 NOTE — PROGRESS NOTE ADULT - PROBLEM SELECTOR PLAN 2
CT head with nondisplaced fracture seen through the sagittal suture which   extends into the right parietal bone and slightly into the superior left parietal bone.  - Pain control.  - ENT consulted for vertigo- recommending meclizine and vestibular rehab Name band;

## 2023-08-30 RX ORDER — FUROSEMIDE 20 MG/1
20 TABLET ORAL
Qty: 28 | Refills: 2 | Status: ACTIVE | COMMUNITY
Start: 2023-08-30 | End: 1900-01-01

## 2023-10-19 ENCOUNTER — NON-APPOINTMENT (OUTPATIENT)
Age: 78
End: 2023-10-19

## 2023-10-19 ENCOUNTER — APPOINTMENT (OUTPATIENT)
Dept: CARDIOLOGY | Facility: CLINIC | Age: 78
End: 2023-10-19
Payer: MEDICARE

## 2023-10-19 VITALS — DIASTOLIC BLOOD PRESSURE: 63 MMHG | SYSTOLIC BLOOD PRESSURE: 124 MMHG

## 2023-10-19 VITALS
RESPIRATION RATE: 18 BRPM | TEMPERATURE: 98 F | HEART RATE: 72 BPM | SYSTOLIC BLOOD PRESSURE: 145 MMHG | DIASTOLIC BLOOD PRESSURE: 68 MMHG | WEIGHT: 161 LBS | BODY MASS INDEX: 25.22 KG/M2 | OXYGEN SATURATION: 98 %

## 2023-10-19 DIAGNOSIS — Z86.39 PERSONAL HISTORY OF OTHER ENDOCRINE, NUTRITIONAL AND METABOLIC DISEASE: ICD-10-CM

## 2023-10-19 DIAGNOSIS — Z86.79 PERSONAL HISTORY OF OTHER DISEASES OF THE CIRCULATORY SYSTEM: ICD-10-CM

## 2023-10-19 DIAGNOSIS — R07.89 OTHER CHEST PAIN: ICD-10-CM

## 2023-10-19 DIAGNOSIS — Z95.1 PRESENCE OF AORTOCORONARY BYPASS GRAFT: ICD-10-CM

## 2023-10-19 DIAGNOSIS — I25.10 ATHEROSCLEROTIC HEART DISEASE OF NATIVE CORONARY ARTERY W/OUT ANGINA PECTORIS: ICD-10-CM

## 2023-10-19 DIAGNOSIS — I51.9 HEART DISEASE, UNSPECIFIED: ICD-10-CM

## 2023-10-19 DIAGNOSIS — Z95.810 PRESENCE OF AUTOMATIC (IMPLANTABLE) CARDIAC DEFIBRILLATOR: ICD-10-CM

## 2023-10-19 PROCEDURE — 93000 ELECTROCARDIOGRAM COMPLETE: CPT | Mod: 59

## 2023-10-19 PROCEDURE — 99214 OFFICE O/P EST MOD 30 MIN: CPT | Mod: 25

## 2023-10-19 RX ORDER — HYDROCHLOROTHIAZIDE 12.5 MG/1
12.5 TABLET ORAL DAILY
Qty: 90 | Refills: 1 | Status: ACTIVE | COMMUNITY
Start: 2023-10-19 | End: 1900-01-01

## 2023-10-19 RX ORDER — METOPROLOL SUCCINATE 100 MG/1
100 TABLET, EXTENDED RELEASE ORAL
Qty: 90 | Refills: 3 | Status: ACTIVE | COMMUNITY
Start: 2022-11-18 | End: 1900-01-01

## 2023-10-19 RX ORDER — OLMESARTAN MEDOXOMIL 20 MG/1
20 TABLET, FILM COATED ORAL
Qty: 90 | Refills: 1 | Status: ACTIVE | COMMUNITY
Start: 2019-04-01 | End: 1900-01-01

## 2023-10-19 RX ORDER — AMLODIPINE BESYLATE 5 MG/1
5 TABLET ORAL
Qty: 90 | Refills: 1 | Status: ACTIVE | COMMUNITY
Start: 2023-08-03 | End: 1900-01-01

## 2023-10-19 RX ORDER — SPIRONOLACTONE 25 MG/1
25 TABLET ORAL
Qty: 90 | Refills: 3 | Status: DISCONTINUED | COMMUNITY
Start: 2022-12-05 | End: 2023-10-19

## 2023-11-12 NOTE — ED ADULT NURSE NOTE - CAS TRG GEN SKIN COLOR
RN to bedside. Pt presents to this ED from home  w/ chief complaint of  syncopal episode. Pt has history of POTS for which she takes Metoprolol 25mg. Pt resting peacefully and denies needs or pain at this time. Resps even and unlabored, skin p/w/d, NAD.  
Normal for race

## 2023-11-15 ENCOUNTER — EMERGENCY (EMERGENCY)
Facility: HOSPITAL | Age: 78
LOS: 1 days | Discharge: HOME | End: 2023-11-15
Attending: EMERGENCY MEDICINE | Admitting: EMERGENCY MEDICINE
Payer: MEDICARE

## 2023-11-15 VITALS
OXYGEN SATURATION: 98 % | SYSTOLIC BLOOD PRESSURE: 152 MMHG | HEIGHT: 68 IN | TEMPERATURE: 98 F | DIASTOLIC BLOOD PRESSURE: 78 MMHG | RESPIRATION RATE: 16 BRPM | HEART RATE: 74 BPM | WEIGHT: 160.06 LBS

## 2023-11-15 VITALS
SYSTOLIC BLOOD PRESSURE: 157 MMHG | HEART RATE: 73 BPM | DIASTOLIC BLOOD PRESSURE: 76 MMHG | TEMPERATURE: 98 F | RESPIRATION RATE: 18 BRPM | OXYGEN SATURATION: 98 %

## 2023-11-15 DIAGNOSIS — I74.9 EMBOLISM AND THROMBOSIS OF UNSPECIFIED ARTERY: Chronic | ICD-10-CM

## 2023-11-15 DIAGNOSIS — Z95.1 PRESENCE OF AORTOCORONARY BYPASS GRAFT: Chronic | ICD-10-CM

## 2023-11-15 DIAGNOSIS — Z95.5 PRESENCE OF CORONARY ANGIOPLASTY IMPLANT AND GRAFT: Chronic | ICD-10-CM

## 2023-11-15 DIAGNOSIS — Z95.818 PRESENCE OF OTHER CARDIAC IMPLANTS AND GRAFTS: Chronic | ICD-10-CM

## 2023-11-15 LAB
ALBUMIN SERPL ELPH-MCNC: 3.9 G/DL — SIGNIFICANT CHANGE UP (ref 3.3–5)
ALBUMIN SERPL ELPH-MCNC: 3.9 G/DL — SIGNIFICANT CHANGE UP (ref 3.3–5)
ALP SERPL-CCNC: 71 U/L — SIGNIFICANT CHANGE UP (ref 40–120)
ALP SERPL-CCNC: 71 U/L — SIGNIFICANT CHANGE UP (ref 40–120)
ALT FLD-CCNC: 22 U/L — SIGNIFICANT CHANGE UP (ref 12–78)
ALT FLD-CCNC: 22 U/L — SIGNIFICANT CHANGE UP (ref 12–78)
ANION GAP SERPL CALC-SCNC: 5 MMOL/L — SIGNIFICANT CHANGE UP (ref 5–17)
ANION GAP SERPL CALC-SCNC: 5 MMOL/L — SIGNIFICANT CHANGE UP (ref 5–17)
APPEARANCE UR: CLEAR — SIGNIFICANT CHANGE UP
APPEARANCE UR: CLEAR — SIGNIFICANT CHANGE UP
AST SERPL-CCNC: 18 U/L — SIGNIFICANT CHANGE UP (ref 15–37)
AST SERPL-CCNC: 18 U/L — SIGNIFICANT CHANGE UP (ref 15–37)
BASOPHILS # BLD AUTO: 0.04 K/UL — SIGNIFICANT CHANGE UP (ref 0–0.2)
BASOPHILS # BLD AUTO: 0.04 K/UL — SIGNIFICANT CHANGE UP (ref 0–0.2)
BASOPHILS NFR BLD AUTO: 0.3 % — SIGNIFICANT CHANGE UP (ref 0–2)
BASOPHILS NFR BLD AUTO: 0.3 % — SIGNIFICANT CHANGE UP (ref 0–2)
BILIRUB SERPL-MCNC: 1.8 MG/DL — HIGH (ref 0.2–1.2)
BILIRUB SERPL-MCNC: 1.8 MG/DL — HIGH (ref 0.2–1.2)
BILIRUB UR-MCNC: NEGATIVE — SIGNIFICANT CHANGE UP
BILIRUB UR-MCNC: NEGATIVE — SIGNIFICANT CHANGE UP
BUN SERPL-MCNC: 17 MG/DL — SIGNIFICANT CHANGE UP (ref 7–23)
BUN SERPL-MCNC: 17 MG/DL — SIGNIFICANT CHANGE UP (ref 7–23)
CALCIUM SERPL-MCNC: 9.8 MG/DL — SIGNIFICANT CHANGE UP (ref 8.5–10.1)
CALCIUM SERPL-MCNC: 9.8 MG/DL — SIGNIFICANT CHANGE UP (ref 8.5–10.1)
CHLORIDE SERPL-SCNC: 104 MMOL/L — SIGNIFICANT CHANGE UP (ref 96–108)
CHLORIDE SERPL-SCNC: 104 MMOL/L — SIGNIFICANT CHANGE UP (ref 96–108)
CO2 SERPL-SCNC: 30 MMOL/L — SIGNIFICANT CHANGE UP (ref 22–31)
CO2 SERPL-SCNC: 30 MMOL/L — SIGNIFICANT CHANGE UP (ref 22–31)
COLOR SPEC: YELLOW — SIGNIFICANT CHANGE UP
COLOR SPEC: YELLOW — SIGNIFICANT CHANGE UP
CREAT SERPL-MCNC: 1.3 MG/DL — SIGNIFICANT CHANGE UP (ref 0.5–1.3)
CREAT SERPL-MCNC: 1.3 MG/DL — SIGNIFICANT CHANGE UP (ref 0.5–1.3)
DIFF PNL FLD: NEGATIVE — SIGNIFICANT CHANGE UP
DIFF PNL FLD: NEGATIVE — SIGNIFICANT CHANGE UP
EGFR: 56 ML/MIN/1.73M2 — LOW
EGFR: 56 ML/MIN/1.73M2 — LOW
EOSINOPHIL # BLD AUTO: 0.04 K/UL — SIGNIFICANT CHANGE UP (ref 0–0.5)
EOSINOPHIL # BLD AUTO: 0.04 K/UL — SIGNIFICANT CHANGE UP (ref 0–0.5)
EOSINOPHIL NFR BLD AUTO: 0.3 % — SIGNIFICANT CHANGE UP (ref 0–6)
EOSINOPHIL NFR BLD AUTO: 0.3 % — SIGNIFICANT CHANGE UP (ref 0–6)
GLUCOSE SERPL-MCNC: 141 MG/DL — HIGH (ref 70–99)
GLUCOSE SERPL-MCNC: 141 MG/DL — HIGH (ref 70–99)
GLUCOSE UR QL: NEGATIVE MG/DL — SIGNIFICANT CHANGE UP
GLUCOSE UR QL: NEGATIVE MG/DL — SIGNIFICANT CHANGE UP
HCT VFR BLD CALC: 43.9 % — SIGNIFICANT CHANGE UP (ref 39–50)
HCT VFR BLD CALC: 43.9 % — SIGNIFICANT CHANGE UP (ref 39–50)
HGB BLD-MCNC: 14.3 G/DL — SIGNIFICANT CHANGE UP (ref 13–17)
HGB BLD-MCNC: 14.3 G/DL — SIGNIFICANT CHANGE UP (ref 13–17)
IMM GRANULOCYTES NFR BLD AUTO: 0.4 % — SIGNIFICANT CHANGE UP (ref 0–0.9)
IMM GRANULOCYTES NFR BLD AUTO: 0.4 % — SIGNIFICANT CHANGE UP (ref 0–0.9)
KETONES UR-MCNC: NEGATIVE MG/DL — SIGNIFICANT CHANGE UP
KETONES UR-MCNC: NEGATIVE MG/DL — SIGNIFICANT CHANGE UP
LEUKOCYTE ESTERASE UR-ACNC: NEGATIVE — SIGNIFICANT CHANGE UP
LEUKOCYTE ESTERASE UR-ACNC: NEGATIVE — SIGNIFICANT CHANGE UP
LIDOCAIN IGE QN: 192 U/L — HIGH (ref 13–75)
LIDOCAIN IGE QN: 192 U/L — HIGH (ref 13–75)
LYMPHOCYTES # BLD AUTO: 1.77 K/UL — SIGNIFICANT CHANGE UP (ref 1–3.3)
LYMPHOCYTES # BLD AUTO: 1.77 K/UL — SIGNIFICANT CHANGE UP (ref 1–3.3)
LYMPHOCYTES # BLD AUTO: 14.2 % — SIGNIFICANT CHANGE UP (ref 13–44)
LYMPHOCYTES # BLD AUTO: 14.2 % — SIGNIFICANT CHANGE UP (ref 13–44)
MCHC RBC-ENTMCNC: 28.3 PG — SIGNIFICANT CHANGE UP (ref 27–34)
MCHC RBC-ENTMCNC: 28.3 PG — SIGNIFICANT CHANGE UP (ref 27–34)
MCHC RBC-ENTMCNC: 32.6 GM/DL — SIGNIFICANT CHANGE UP (ref 32–36)
MCHC RBC-ENTMCNC: 32.6 GM/DL — SIGNIFICANT CHANGE UP (ref 32–36)
MCV RBC AUTO: 86.9 FL — SIGNIFICANT CHANGE UP (ref 80–100)
MCV RBC AUTO: 86.9 FL — SIGNIFICANT CHANGE UP (ref 80–100)
MONOCYTES # BLD AUTO: 1.33 K/UL — HIGH (ref 0–0.9)
MONOCYTES # BLD AUTO: 1.33 K/UL — HIGH (ref 0–0.9)
MONOCYTES NFR BLD AUTO: 10.7 % — SIGNIFICANT CHANGE UP (ref 2–14)
MONOCYTES NFR BLD AUTO: 10.7 % — SIGNIFICANT CHANGE UP (ref 2–14)
NEUTROPHILS # BLD AUTO: 9.25 K/UL — HIGH (ref 1.8–7.4)
NEUTROPHILS # BLD AUTO: 9.25 K/UL — HIGH (ref 1.8–7.4)
NEUTROPHILS NFR BLD AUTO: 74.1 % — SIGNIFICANT CHANGE UP (ref 43–77)
NEUTROPHILS NFR BLD AUTO: 74.1 % — SIGNIFICANT CHANGE UP (ref 43–77)
NITRITE UR-MCNC: NEGATIVE — SIGNIFICANT CHANGE UP
NITRITE UR-MCNC: NEGATIVE — SIGNIFICANT CHANGE UP
NRBC # BLD: 0 /100 WBCS — SIGNIFICANT CHANGE UP (ref 0–0)
NRBC # BLD: 0 /100 WBCS — SIGNIFICANT CHANGE UP (ref 0–0)
PH UR: 7 — SIGNIFICANT CHANGE UP (ref 5–8)
PH UR: 7 — SIGNIFICANT CHANGE UP (ref 5–8)
PLATELET # BLD AUTO: 185 K/UL — SIGNIFICANT CHANGE UP (ref 150–400)
PLATELET # BLD AUTO: 185 K/UL — SIGNIFICANT CHANGE UP (ref 150–400)
POTASSIUM SERPL-MCNC: 4.4 MMOL/L — SIGNIFICANT CHANGE UP (ref 3.5–5.3)
POTASSIUM SERPL-MCNC: 4.4 MMOL/L — SIGNIFICANT CHANGE UP (ref 3.5–5.3)
POTASSIUM SERPL-SCNC: 4.4 MMOL/L — SIGNIFICANT CHANGE UP (ref 3.5–5.3)
POTASSIUM SERPL-SCNC: 4.4 MMOL/L — SIGNIFICANT CHANGE UP (ref 3.5–5.3)
PROT SERPL-MCNC: 8.1 G/DL — SIGNIFICANT CHANGE UP (ref 6–8.3)
PROT SERPL-MCNC: 8.1 G/DL — SIGNIFICANT CHANGE UP (ref 6–8.3)
PROT UR-MCNC: 100 MG/DL
PROT UR-MCNC: 100 MG/DL
RBC # BLD: 5.05 M/UL — SIGNIFICANT CHANGE UP (ref 4.2–5.8)
RBC # BLD: 5.05 M/UL — SIGNIFICANT CHANGE UP (ref 4.2–5.8)
RBC # FLD: 13.2 % — SIGNIFICANT CHANGE UP (ref 10.3–14.5)
RBC # FLD: 13.2 % — SIGNIFICANT CHANGE UP (ref 10.3–14.5)
SODIUM SERPL-SCNC: 139 MMOL/L — SIGNIFICANT CHANGE UP (ref 135–145)
SODIUM SERPL-SCNC: 139 MMOL/L — SIGNIFICANT CHANGE UP (ref 135–145)
SP GR SPEC: 1.02 — SIGNIFICANT CHANGE UP (ref 1–1.03)
SP GR SPEC: 1.02 — SIGNIFICANT CHANGE UP (ref 1–1.03)
UROBILINOGEN FLD QL: 1 MG/DL — SIGNIFICANT CHANGE UP (ref 0.2–1)
UROBILINOGEN FLD QL: 1 MG/DL — SIGNIFICANT CHANGE UP (ref 0.2–1)
WBC # BLD: 12.48 K/UL — HIGH (ref 3.8–10.5)
WBC # BLD: 12.48 K/UL — HIGH (ref 3.8–10.5)
WBC # FLD AUTO: 12.48 K/UL — HIGH (ref 3.8–10.5)
WBC # FLD AUTO: 12.48 K/UL — HIGH (ref 3.8–10.5)

## 2023-11-15 PROCEDURE — 85025 COMPLETE CBC W/AUTO DIFF WBC: CPT

## 2023-11-15 PROCEDURE — G1004: CPT

## 2023-11-15 PROCEDURE — 74177 CT ABD & PELVIS W/CONTRAST: CPT | Mod: ME

## 2023-11-15 PROCEDURE — 81001 URINALYSIS AUTO W/SCOPE: CPT

## 2023-11-15 PROCEDURE — 74177 CT ABD & PELVIS W/CONTRAST: CPT | Mod: 26,ME

## 2023-11-15 PROCEDURE — 99284 EMERGENCY DEPT VISIT MOD MDM: CPT | Mod: 25

## 2023-11-15 PROCEDURE — 83690 ASSAY OF LIPASE: CPT

## 2023-11-15 PROCEDURE — 87086 URINE CULTURE/COLONY COUNT: CPT

## 2023-11-15 PROCEDURE — 36415 COLL VENOUS BLD VENIPUNCTURE: CPT

## 2023-11-15 PROCEDURE — 99285 EMERGENCY DEPT VISIT HI MDM: CPT

## 2023-11-15 PROCEDURE — 80053 COMPREHEN METABOLIC PANEL: CPT

## 2023-11-15 RX ORDER — SODIUM CHLORIDE 9 MG/ML
500 INJECTION INTRAMUSCULAR; INTRAVENOUS; SUBCUTANEOUS ONCE
Refills: 0 | Status: COMPLETED | OUTPATIENT
Start: 2023-11-15 | End: 2023-11-15

## 2023-11-15 RX ORDER — PANTOPRAZOLE SODIUM 20 MG/1
1 TABLET, DELAYED RELEASE ORAL
Qty: 14 | Refills: 0
Start: 2023-11-15 | End: 2023-11-28

## 2023-11-15 RX ADMIN — SODIUM CHLORIDE 500 MILLILITER(S): 9 INJECTION INTRAMUSCULAR; INTRAVENOUS; SUBCUTANEOUS at 13:10

## 2023-11-15 NOTE — ED ADULT NURSE NOTE - NSFALLUNIVINTERV_ED_ALL_ED
Bed/Stretcher in lowest position, wheels locked, appropriate side rails in place/Call bell, personal items and telephone in reach/Instruct patient to call for assistance before getting out of bed/chair/stretcher/Non-slip footwear applied when patient is off stretcher/[place] to call system/Physically safe environment - no spills, clutter or unnecessary equipment/Purposeful proactive rounding/Room/bathroom lighting operational, light cord in reach

## 2023-11-15 NOTE — ED PROVIDER NOTE - CARE PROVIDER_API CALL
Mark Tobias  Gastroenterology  [address] [place] [address]  Phone: [phone]  Fax: [phone]  Follow Up Time: 1-3 Days

## 2023-11-15 NOTE — ED PROVIDER NOTE - CLINICAL SUMMARY MEDICAL DECISION MAKING FREE TEXT BOX
Lopez: pt with diffuse abd pain, CT suggestive of gastoduodenitis, seen by GI in ED, stable for dc and fu with gastro.

## 2023-11-15 NOTE — ED PROVIDER NOTE - PROGRESS NOTE DETAILS
PAtient evaluated by GI, recommends ppi, outpatiebnt follow up. Reevaluated patient at bedside.  Patient feeling much improved.  Discussed the results of all diagnostic testing in ED and copies of all available reports given.   An opportunity to ask questions was provided.  Discussed the importance of prompt, close medical follow-up.  Patient will return with any changes, concerns or persistent/worsening symptoms.  Understanding of all instructions verbalized.

## 2023-11-15 NOTE — ED ADULT NURSE NOTE - OBJECTIVE STATEMENT
a/ox4 patient came to ED c.o diffuse abd pain that began today at 3am. Patient states he took mylanta, pepcid and his symptoms were relieved temporarily. Afebrile, no n.v.d, Pending further labs and radiology reports.

## 2023-11-15 NOTE — CONSULT NOTE ADULT - ASSESSMENT
Abdominal pain  ?Gastroduodenitis vs PUD    CT and labs noted; discussed with pt and family at bedside  Pt now clinically improved  Recommend eventual egd, pt states he prefers outpatient egd and will follow with his primary GI  D/c planning on protonix 40mg BID  D/w pt and family at bedside  D/w ER team    I reviewed the overnight course of events on the unit, re-confirming the patient history. I discussed the care with the patient and their family  Differential diagnosis and plan of care discussed with patient after the evaluation  35 minutes spent on total encounter of which more than fifty percent of the encounter was spent counseling and/or coordinating care by the attending physician.  Advanced care planning was discussed with patient and family.  Advanced care planning forms were reviewed and discussed.  Risks, benefits and alternatives of gastroenterologic procedures were discussed in detail and all questions were answered.

## 2023-11-15 NOTE — ED ADULT NURSE REASSESSMENT NOTE - NS ED NURSE REASSESS COMMENT FT1
1645:  task rn:  the patient is discharged.  iv removed, site benign.  the patient was given all d/c papers including test results (labs and CT).  they were given GI name, but state they have their own and will likely see him.  He was instructed to stick with a bland diet for the next few days, and to avoid dairy to give stomach some time to rest.  INstructions given about prescribed medication including dosing and potential side effects.  the patient / spouse / daughter verbalized understanding of all instructions.  he ambulated out of the ed in stable condition, no distress and with all belongings.  vitals recorded.  silvia rocha

## 2023-11-15 NOTE — ED PROVIDER NOTE - NS ED ATTENDING STATEMENT MOD
This was a shared visit with the ENRRIQUE. I reviewed and verified the documentation and independently performed the documented:

## 2023-11-15 NOTE — ED PROVIDER NOTE - DIFFERENTIAL DIAGNOSIS
Differential Diagnosis diverticulitis, colitis, uti, obstruction, kidney stone, additional intra-abdominal pathology

## 2023-11-15 NOTE — ED PROVIDER NOTE - PATIENT PORTAL LINK FT
You can access the FollowMyHealth Patient Portal offered by [facility] by registering at the following website: [email or web address]. By joining [facility]’s FollowMyHealth portal, you will also be able to view your health information using other applications (apps) compatible with our system.

## 2023-11-15 NOTE — CONSULT NOTE ADULT - SUBJECTIVE AND OBJECTIVE BOX
[facility]  Zackary Pastor PA-C  [address] [place] [id number] [phone]      Chief Complaint:  Patient is a 78y old  Male who presents with a chief complaint of abdominal pain    HPI:  78-year-old male with history of CAD, CABG, hypertension, hyperlipidemia, diabetes, BPH, AICD sent to ED by his primary care doctor for abdominal pain for the past 2 days, worse since 3 AM this morning.  Pain is constant, pressure-like, diffuse abdomen, nonradiating.  Denies fever, chills, chest pain, shortness of breath, nausea, vomiting, diarrhea, urinary symptoms, flank pain.  Patient states pain was significantly worse at 3 AM this morning.  Patient took Tylenol, Pepcid and Mylanta with improvement of pain.  No history of previous abdominal surgeries    INTERVAL HPI:  Pt s/e in emergency department with 2 family members at bedside  Discussed same hx as above  Pt's pain now significantly improved  Hx of egd about 4-5 years ago, no known abnormalities  No prior hx of pancreatitis  Pt currently feels well without GI complaints    Allergies:  thalium dye (Rash)  No Known Drug Allergies      Medications:      PMHX/PSHX:  HTN (hypertension)    Diabetes mellitus    CAD (coronary artery disease)    Hyperlipidemia    BPH (benign prostatic hypertrophy)    BPH (benign prostatic hyperplasia)    Left bundle branch block    S/P primary angioplasty with coronary stent    S/P drug eluting coronary stent placement    S/P CABG x 3    Status post placement of implantable loop recorder    Arterial embolism        Family history:  Family history of diabetes mellitus (Sibling)      ROS:   General:  No fevers, chills, night sweats, fatigue,   Eyes:  Good vision, no reported pain  ENT:  No sore throat, pain, runny nose, dysphagia  CV:  No pain, palpitations, hypo/hypertension  Resp:  No dyspnea, cough, tachypnea, wheezing  GI:  +pain, No nausea, No vomiting, No diarrhea, No constipation, No weight loss, No fever, No pruritis, No rectal bleeding, No tarry stools, No dysphagia,  :  No pain, bleeding, incontinence, nocturia  Muscle:  No pain, weakness  Neuro:  No weakness, tingling, memory problems  Psych:  No fatigue, insomnia, mood problems, depression  Endocrine:  No polyuria, polydipsia, cold/heat intolerance  Heme:  No petechiae, ecchymosis, easy bruisability  Skin:  No rash, tattoos, scars, edema      PHYSICAL EXAM:   Vital Signs:  Vital Signs Last 24 Hrs  T(C): 36.7 (15 Nov 2023 15:56), Max: 36.7 (15 Nov 2023 15:56)  T(F): 98 (15 Nov 2023 15:56), Max: 98 (15 Nov 2023 15:56)  HR: 70 (15 Nov 2023 15:56) (70 - 74)  BP: 161/79 (15 Nov 2023 15:56) (152/78 - 161/79)  BP(mean): --  RR: 17 (15 Nov 2023 15:56) (16 - 17)  SpO2: 98% (15 Nov 2023 15:56) (98% - 98%)    Parameters below as of 15 Nov 2023 15:56  Patient On (Oxygen Delivery Method): room air      Daily Height in cm: 172.72 (15 Nov 2023 11:51)    Daily     GENERAL:  Appears stated age,   HEENT:  NC/AT,    CHEST:  Full & symmetric excursion,   HEART:  Regular rhythm  ABDOMEN:  Soft, non-tender, non-distended,   EXTEREMITIES:  no cyanosis,clubbing or edema  SKIN:  No rash  NEURO:  Alert,    LABS:                        14.3   12.48 )-----------( 185      ( 15 Nov 2023 13:10 )             43.9     11-15    139  |  104  |  17  ----------------------------<  141<H>  4.4   |  30  |  1.30    Ca    9.8      15 Nov 2023 13:10    TPro  8.1  /  Alb  3.9  /  TBili  1.8<H>  /  DBili  x   /  AST  18  /  ALT  22  /  AlkPhos  71  11-15    LIVER FUNCTIONS - ( 15 Nov 2023 13:10 )  Alb: 3.9 g/dL / Pro: 8.1 g/dL / ALK PHOS: 71 U/L / ALT: 22 U/L / AST: 18 U/L / GGT: x             Urinalysis Basic - ( 15 Nov 2023 13:10 )    Color: Yellow / Appearance: Clear / S.017 / pH: x  Gluc: 141 mg/dL / Ketone: Negative mg/dL  / Bili: Negative / Urobili: 1.0 mg/dL   Blood: x / Protein: 100 mg/dL / Nitrite: Negative   Leuk Esterase: Negative / RBC: 0 /HPF / WBC 0 /HPF   Sq Epi: x / Non Sq Epi: x / Bacteria: x    Lipase [id number]      Imaging:    < from: CT Abdomen and Pelvis w/ IV Cont (11.15.23 @ 14:32) >    ACC: [id number] EXAM:  CT ABDOMEN AND PELVIS IC   ORDERED BY: KENNEDY ANDREW     PROCEDURE DATE:  11/15/2023          INTERPRETATION:  CLINICAL INFORMATION: Abdominal pain    COMPARISON: None.    CONTRAST/COMPLICATIONS:  IV Contrast: Omnipaque 350  90 cc administered   10 cc discarded  Oral Contrast: NONE  Complications: None reported at time of study completion    PROCEDURE:  CT of the Abdomen and Pelvis was performed.  Sagittal and coronal reformats were performed.    FINDINGS:  LOWER CHEST: Few small clustered pulmonary nodules in the left upper lobe   likely infectious or inflammatory.. Mild cardiomegaly.    LIVER: Within normal limits.  BILE DUCTS: Normal caliber.  GALLBLADDER: Within normal limits.  SPLEEN: Within normal limits.  PANCREAS: Within normal limits.  ADRENALS: Within normal limits.  KIDNEYS/URETERS: Bilateral renal cysts.    BLADDER: Within normal limits.  REPRODUCTIVE ORGANS: Prostatomegaly    BOWEL: No bowel obstruction. Appendix is normal. There are inflammatory  changes surrounding the distal stomach and proximal duodenum with   adjacent mesenteric edema and small lymph nodes is focal wall thickening   of the adjacent ascending colon. The remainder of the colon appears   within normal limits. 3 cm lipoma in the proximal ileum  PERITONEUM: No ascites.  VESSELS: Within normal limits.  RETROPERITONEUM/LYMPH NODES: No lymphadenopathy.  ABDOMINAL WALL: Within normal limits.  BONES: Within normal limits.    IMPRESSION:  Inflammatory changes in the region of the distal stomach/duodenum with   focal thickening of the adjacent ascending colon. The primary source of   inflammation is unclear but this may represent a gastroduodenitis or   peptic ulcer disease given the region involved. Thickening of the   ascending colon is focal and likely reactive to the adjacent   inflammation, but colonoscopy is recommended when clinically appropriate.   Recommend exclusion of pancreatitis with lipase levels.    --- End of Report ---      VANDANA PURCELL MD; Attending Radiologist  This document has been electronically signed. Nov 15 2023  2:58PM    < end of copied text >

## 2023-11-16 LAB
CULTURE RESULTS: SIGNIFICANT CHANGE UP
CULTURE RESULTS: SIGNIFICANT CHANGE UP
SPECIMEN SOURCE: SIGNIFICANT CHANGE UP
SPECIMEN SOURCE: SIGNIFICANT CHANGE UP

## 2023-11-21 ENCOUNTER — NON-APPOINTMENT (OUTPATIENT)
Age: 78
End: 2023-11-21

## 2023-11-21 DIAGNOSIS — R93.5 ABNORMAL FINDINGS ON DIAGNOSTIC IMAGING OF OTHER ABDOMINAL REGIONS, INCLUDING RETROPERITONEUM: ICD-10-CM

## 2023-11-21 DIAGNOSIS — R10.9 UNSPECIFIED ABDOMINAL PAIN: ICD-10-CM

## 2023-11-23 NOTE — H&P PST ADULT - PROBLEM SELECTOR PLAN 4
Assistance OOB with selected safe patient handling equipment if applicable/Assistance with ambulation/Communicate risk of Fall with Harm to all staff, patient, and family/Monitor gait and stability/Provide patient with walking aids/Provide visual cue: red socks, yellow wristband, yellow gown, etc/Reinforce activity limits and safety measures with patient and family/Bed in lowest position, wheels locked, appropriate side rails in place/Call bell, personal items and telephone in reach/Instruct patient to call for assistance before getting out of bed/chair/stretcher/Non-slip footwear applied when patient is off stretcher/[place] to call system/Physically safe environment - no spills, clutter or unnecessary equipment/Purposeful Proactive Rounding/Room/bathroom lighting operational, light cord in reach pt will remain on low dose ASA  cardiac clearance requested (s/p CABG x3 in May, CAD, HTN, h/o LBBB)

## 2023-11-28 ENCOUNTER — APPOINTMENT (OUTPATIENT)
Dept: GASTROENTEROLOGY | Facility: HOSPITAL | Age: 78
End: 2023-11-28

## 2023-11-28 ENCOUNTER — RESULT REVIEW (OUTPATIENT)
Age: 78
End: 2023-11-28

## 2023-11-28 ENCOUNTER — OUTPATIENT (OUTPATIENT)
Dept: OUTPATIENT SERVICES | Facility: HOSPITAL | Age: 78
LOS: 1 days | Discharge: HOME | End: 2023-11-28
Payer: MEDICARE

## 2023-11-28 VITALS
SYSTOLIC BLOOD PRESSURE: 150 MMHG | DIASTOLIC BLOOD PRESSURE: 64 MMHG | HEART RATE: 60 BPM | OXYGEN SATURATION: 100 % | RESPIRATION RATE: 18 BRPM

## 2023-11-28 VITALS
RESPIRATION RATE: 20 BRPM | HEART RATE: 65 BPM | OXYGEN SATURATION: 99 % | SYSTOLIC BLOOD PRESSURE: 155 MMHG | DIASTOLIC BLOOD PRESSURE: 63 MMHG | TEMPERATURE: 98 F

## 2023-11-28 DIAGNOSIS — Z95.5 PRESENCE OF CORONARY ANGIOPLASTY IMPLANT AND GRAFT: Chronic | ICD-10-CM

## 2023-11-28 DIAGNOSIS — Z95.1 PRESENCE OF AORTOCORONARY BYPASS GRAFT: Chronic | ICD-10-CM

## 2023-11-28 DIAGNOSIS — R93.5 ABNORMAL FINDINGS ON DIAGNOSTIC IMAGING OF OTHER ABDOMINAL REGIONS, INCLUDING RETROPERITONEUM: ICD-10-CM

## 2023-11-28 DIAGNOSIS — Z95.818 PRESENCE OF OTHER CARDIAC IMPLANTS AND GRAFTS: Chronic | ICD-10-CM

## 2023-11-28 DIAGNOSIS — I74.9 EMBOLISM AND THROMBOSIS OF UNSPECIFIED ARTERY: Chronic | ICD-10-CM

## 2023-11-28 LAB
GLUCOSE BLDC GLUCOMTR-MCNC: 143 MG/DL — HIGH (ref 70–99)
GLUCOSE BLDC GLUCOMTR-MCNC: 143 MG/DL — HIGH (ref 70–99)
GLUCOSE BLDC GLUCOMTR-MCNC: 177 MG/DL — HIGH (ref 70–99)
GLUCOSE BLDC GLUCOMTR-MCNC: 177 MG/DL — HIGH (ref 70–99)

## 2023-11-28 PROCEDURE — 43239 EGD BIOPSY SINGLE/MULTIPLE: CPT

## 2023-11-28 PROCEDURE — 88305 TISSUE EXAM BY PATHOLOGIST: CPT | Mod: 26

## 2023-11-28 PROCEDURE — 93281 PM DEVICE PROGR EVAL MULTI: CPT | Mod: 26

## 2023-11-28 NOTE — PRE PROCEDURE NOTE - PRE PROCEDURE EVALUATION
Pre-Endoscopy Evaluation    Referring Physician:                                  Dr Miguel Biggs    Indication for Procedure:  abnormal CT (?DU)    Sedation by Anesthesia [X ]    PAST MEDICAL & SURGICAL HISTORY:  HTN (hypertension)      Diabetes mellitus  Type 2      CAD (coronary artery disease)      Hyperlipidemia      BPH (benign prostatic hypertrophy)      Left bundle branch block      S/P primary angioplasty with coronary stent  1990s      S/P drug eluting coronary stent placement  [place]      S/P CABG x 3  May 2021      Status post placement of implantable loop recorder  May 2021      Arterial embolism  MMA 2021          Latex allergy: [ ] yes [X] no    Smoking: [ ] yes  [X] no    AICD/PPM: [ ] yes   [X] no    Pertinent lab data:                      Physical Examination:  Daily Height in cm: 172.72 (28 Nov 2023 13:09)    Daily   Vital Signs Last 24 Hrs  T(C): 36.6 (28 Nov 2023 13:06), Max: 36.6 (28 Nov 2023 13:06)  T(F): 97.8 (28 Nov 2023 13:06), Max: 97.8 (28 Nov 2023 13:06)  HR: 65 (28 Nov 2023 13:06) (65 - 65)  BP: 155/63 (28 Nov 2023 13:06) (155/63 - 155/63)  BP(mean): --  RR: 20 (28 Nov 2023 13:06) (20 - 20)  SpO2: 99% (28 Nov 2023 13:06) (99% - 99%)    Parameters below as of 28 Nov 2023 13:06  Patient On (Oxygen Delivery Method): room air        Constitutional: NAD    HEENT: PERRLA, EOMI,       Neck:  No JVD    Respiratory: CTAB/L    Cardiovascular: S1 and S2    Gastrointestinal: BS+, soft, NT/ND    Extremities: No peripheral edema    Neurological: A/O x 3, no focal deficits    Psychiatric: Normal mood, normal affect    : No So    Skin: No rashes    Comments:    ASA Class: I [ ]  II [ ]  III [ X]  IV [ ]    The patient is a suitable candidate for the planned procedure unless box checked [ ]  No, explain:

## 2023-11-28 NOTE — ASU PREOP CHECKLIST - WAS PATIENT ON BETA BLOCKER?
Pt states " On  Monday they put me on Augmentin for strep throat , yesterday I started with upper back pain and cough, no fever " No

## 2023-11-30 LAB
SURGICAL PATHOLOGY STUDY: SIGNIFICANT CHANGE UP
SURGICAL PATHOLOGY STUDY: SIGNIFICANT CHANGE UP

## 2024-01-01 NOTE — ASU PATIENT PROFILE, ADULT - NSICDXPASTSURGICALHX_GEN_ALL_CORE_FT
PAST SURGICAL HISTORY:  S/P CABG x 3 May 2021    S/P drug eluting coronary stent placement [facility]    S/P primary angioplasty with coronary stent 1990s    Status post placement of implantable loop recorder May 2021    
N/A

## 2024-02-14 NOTE — ED PROVIDER NOTE - PMH
BPH (benign prostatic hypertrophy)    CAD (coronary artery disease)    Diabetes mellitus  Type 2  HTN (hypertension)    Hyperlipidemia    
No indicators present

## 2024-04-11 NOTE — ED ADULT TRIAGE NOTE - ADDITIONAL SAFETY/BANDS...
[facility]  Progress Note  Name: Silvio Drew I  MRN: [id number]  Unit/Bed#: [facility] 831-01 I Date of Admission: 4/10/2024   Date of Service: 4/11/2024 I Hospital Day: 1    Assessment/Plan   * Streptococcal bacteremia  Assessment & Plan  Presented to [facility] and was admitted to ICU with septic shock.   He was found to have polymicrobial bacteremia and dental infections, due to a lack of OMS services he was transferred to [facility].  Continue ceftriaxone and vancomycin  Consult OMS for dental evaluation and extraction if indicated  Check ZULY to evaluate for endocarditis and seeding of the pacemaker.  Infectious disease consulted    Visual disturbance  Assessment & Plan  Of left eye  Due to bacteremia there may be septic emboli to the retina, will consult with ophthalmology for evaluation    Anemia  Assessment & Plan  Likely due to chronic kidney disease, sepsis  Check anemia studies    Dental infection  Assessment & Plan  Apical lucencies present on CT scan  OMS consulted  Continue antibiotics as above  Patient scheduled to go to the OR on 4/12    Chronic systolic CHF (HCC)  Assessment & Plan  Wt Readings from Last 3 Encounters:   04/10/24 100 kg (220 lb 8 oz)   03/18/24 100 kg (221 lb)   01/11/24 100 kg (221 lb 3.2 oz)     EF of 36% w/ mild-moderate TR (h/o mitral valve annuloplasty noted)  Holding Lasix/Cozaar in the setting of acute kidney injury - holding Toprol-XL in setting of recovering hypotension/shock  Monitor/replete electrolyte deficiencies if present  Resume cardiac agents over the upcoming days if BP allows  Cardiac consult          Abnormal CT of the abdomen  Assessment & Plan  Incidental liver lesion noted   Will need an MRI of the liver when stable as an outpatient    Acute kidney injury superimposed on stage 3 chronic kidney disease (HCC)  Assessment & Plan  Lab Results   Component Value Date    EGFR 43 04/10/2024    EGFR 34 04/09/2024    EGFR 26 04/08/2024     CREATININE 1.56 (H) 04/10/2024    CREATININE 1.91 (H) 04/09/2024    CREATININE 2.36 (H) 04/08/2024   Baseline creatinine appears to be 1.4-1.6  VARGHESE on admission was likely due to septic shock  Creatinine has returned to baseline  Monitor BMP daily for now  Renally dose medications  Avoid nephrotoxins if possible    Septic shock (HCC)  Assessment & Plan  Present on admission at [facility], now resolved  Plan as above    Atrial fibrillation (HCC)  Assessment & Plan  Rate controlled with amiodarone  Warfarin on hold for surgery    Primary hypertension  Assessment & Plan  BP is acceptable  Continue furosemide losartan and metoprolol               VTE Pharmacologic Prophylaxis: VTE Score: 3 Moderate Risk (Score 3-4) - Pharmacological DVT Prophylaxis Contraindicated. Sequential Compression Devices Ordered.    Mobility:   Basic Mobility Inpatient Raw Score: 20  JH-HLM Goal: 6: Walk 10 steps or more  JH-HLM Achieved: 7: Walk 25 feet or more  JH-HLM Goal achieved. Continue to encourage appropriate mobility.    Patient Centered Rounds: I performed bedside rounds with nursing staff today.   Discussions with Specialists or Other Care Team Provider: OMS, cardiology    Education and Discussions with Family / Patient: Patient declined call to .     Total Time Spent on Date of Encounter in care of patient: 55 mins. This time was spent on one or more of the following: performing physical exam; counseling and coordination of care; obtaining or reviewing history; documenting in the medical record; reviewing/ordering tests, medications or procedures; communicating with other healthcare professionals and discussing with patient's family/caregivers.    Current Length of Stay: 1 day(s)  Current Patient Status: Inpatient   Certification Statement: The patient will continue to require additional inpatient hospital stay due to bacteremia  Discharge Plan: Anticipate discharge in >72 hrs to pending clinical improvement    Code  Status: Level 1 - Full Code    Subjective:   This is a very pleasant 68-year-old gentleman who was seen and evaluated today at bedside.  Patient does not have any acute concerns at this time.    Objective:     Vitals:   Temp (24hrs), Av °F (36.7 °C), Min:97.5 °F (36.4 °C), Max:98.6 °F (37 °C)    Temp:  [97.5 °F (36.4 °C)-98.6 °F (37 °C)] 98.6 °F (37 °C)  HR:  [77-90] 80  Resp:  [16-21] 16  BP: (105-138)/(69-80) 109/69  SpO2:  [95 %-97 %] 97 %  Body mass index is 33.99 kg/m².     Input and Output Summary (last 24 hours):     Intake/Output Summary (Last 24 hours) at 2024 1556  Last data filed at 2024 1453  Gross per 24 hour   Intake 735.83 ml   Output 800 ml   Net -64.17 ml       Physical Exam:   Physical Exam  Vitals reviewed.   Constitutional:       Appearance: He is ill-appearing.   HENT:      Head: Normocephalic.      Nose: No congestion.      Mouth/Throat:      Pharynx: No oropharyngeal exudate or posterior oropharyngeal erythema.   Eyes:      Conjunctiva/sclera: Conjunctivae normal.   Cardiovascular:      Rate and Rhythm: Normal rate and regular rhythm.   Pulmonary:      Effort: Pulmonary effort is normal.   Abdominal:      General: Abdomen is flat.      Palpations: Abdomen is soft.   Skin:     General: Skin is warm and dry.   Neurological:      Mental Status: He is alert and oriented to person, place, and time. Mental status is at baseline.          Additional Data:     Labs:  Results from last 7 days   Lab Units 24  0501   WBC Thousand/uL 5.94   HEMOGLOBIN g/dL 7.9*   HEMATOCRIT % 26.3*   PLATELETS Thousands/uL 188   SEGS PCT % 63   LYMPHO PCT % 23   MONO PCT % 8   EOS PCT % 3     Results from last 7 days   Lab Units 24  0501 24  0521 24  1820   SODIUM mmol/L 139   < > 139   POTASSIUM mmol/L 3.8   < > 4.4   CHLORIDE mmol/L 111*   < > 113*   CO2 mmol/L 23   < > 19*   BUN mg/dL 20   < > 45*   CREATININE mg/dL 1.51*   < > 3.39*   ANION GAP mmol/L 5   < > 7   CALCIUM mg/dL  8.1*   < > 8.0*   ALBUMIN g/dL  --   --  3.3*   TOTAL BILIRUBIN mg/dL  --   --  0.86   ALK PHOS U/L  --   --  76   ALT U/L  --   --  51   AST U/L  --   --  58*   GLUCOSE RANDOM mg/dL 88   < > 102    < > = values in this interval not displayed.     Results from last 7 days   Lab Units 04/11/24  0501   INR  1.57*             Results from last 7 days   Lab Units 04/09/24  0527 04/07/24  1442 04/06/24  1820   LACTIC ACID mmol/L  --   --  1.1   PROCALCITONIN ng/ml 6.87* 19.31* 16.26*       Lines/Drains:  Invasive Devices       Peripheral Intravenous Line  Duration             Peripheral IV 04/11/24 Dorsal (posterior);Proximal;Right Forearm <1 day                          Imaging: Reviewed radiology reports from this admission including: CT Head    Recent Cultures (last 7 days):   Results from last 7 days   Lab Units 04/08/24  1547 04/06/24  2349 04/06/24  1841 04/06/24  1838   BLOOD CULTURE  No Growth at 48 hrs.  No Growth at 48 hrs.  --  Streptococcus intermedius* Streptococcus intermedius*   GRAM STAIN RESULT   --   --  Gram positive cocci in pairs and chains*  Gram positive rods* Gram positive cocci in pairs and chains*  Gram positive rods*   URINE CULTURE   --  No Growth <1000 cfu/mL  --   --        Last 24 Hours Medication List:   Current Facility-Administered Medications   Medication Dose Route Frequency Provider Last Rate    acetaminophen  650 mg Oral Q6H PRN Manav Guillermo MD      amiodarone  200 mg Oral Daily Manav Guillermo MD      atorvastatin  20 mg Oral Daily With Dinner Manav Guillermo MD      cefTRIAXone  2,000 mg Intravenous Q24H Manav Guillermo MD 2,000 mg (04/11/24 1453)    furosemide  40 mg Intravenous Once REBEL Bowie      gabapentin  200 mg Oral TID Manav Guillermo MD      lidocaine  2 patch Topical Daily Manav Guillermo MD      multi-electrolyte  50 mL/hr Intravenous Continuous Manav Guillermo MD 50 mL/hr (04/11/24 0010)    pantoprazole  20 mg Oral Daily Before Breakfast Manav Guillermo MD       polyethylene glycol  17 g Oral Daily Manav Guillermo MD      potassium chloride  40 mEq Oral Once REBEL Bowie      vancomycin  12.5 mg/kg (Adjusted) Intravenous Q24H Kimberli Lazo DO          Today, Patient Was Seen By: Placido Jauregui MD    **Please Note: This note may have been constructed using a voice recognition system.**     Additional Safety/Bands:

## 2024-10-15 ENCOUNTER — APPOINTMENT (OUTPATIENT)
Dept: UROLOGY | Facility: CLINIC | Age: 79
End: 2024-10-15
Payer: MEDICARE

## 2024-10-15 VITALS
DIASTOLIC BLOOD PRESSURE: 68 MMHG | TEMPERATURE: 98.3 F | HEIGHT: 67 IN | OXYGEN SATURATION: 98 % | WEIGHT: 160 LBS | SYSTOLIC BLOOD PRESSURE: 139 MMHG | BODY MASS INDEX: 25.11 KG/M2 | HEART RATE: 70 BPM | RESPIRATION RATE: 17 BRPM

## 2024-10-15 DIAGNOSIS — N40.1 BENIGN PROSTATIC HYPERPLASIA WITH LOWER URINARY TRACT SYMPMS: ICD-10-CM

## 2024-10-15 DIAGNOSIS — R97.20 ELEVATED PROSTATE, SPECIFIC ANTIGEN [PSA]: ICD-10-CM

## 2024-10-15 PROCEDURE — 99212 OFFICE O/P EST SF 10 MIN: CPT

## 2024-10-15 PROCEDURE — G2211 COMPLEX E/M VISIT ADD ON: CPT

## 2024-11-06 NOTE — PROGRESS NOTE ADULT - I WAS PHYSICALLY PRESENT FOR THE KEY PORTIONS OF THE EVALUATION AND MANAGEMENT (E/M) SERVICE PROVIDED.  I AGREE WITH THE ABOVE HISTORY, PHYSICAL, AND PLAN WHICH I HAVE REVIEWED AND EDITED WHERE APPROPRIATE
Mounjaro    Prior auth initiated through [facility]  Insurance response time is 7-14 business days.   
Statement Selected

## 2025-03-04 ENCOUNTER — APPOINTMENT (OUTPATIENT)
Dept: UROLOGY | Facility: CLINIC | Age: 80
End: 2025-03-04
Payer: MEDICARE

## 2025-03-04 DIAGNOSIS — R39.9 UNSPECIFIED SYMPTOMS AND SIGNS INVOLVING THE GENITOURINARY SYSTEM: ICD-10-CM

## 2025-03-04 DIAGNOSIS — N39.43 POST-VOID DRIBBLING: ICD-10-CM

## 2025-03-04 DIAGNOSIS — R33.9 RETENTION OF URINE, UNSPECIFIED: ICD-10-CM

## 2025-03-04 DIAGNOSIS — R97.20 ELEVATED PROSTATE, SPECIFIC ANTIGEN [PSA]: ICD-10-CM

## 2025-03-04 DIAGNOSIS — R35.0 FREQUENCY OF MICTURITION: ICD-10-CM

## 2025-03-04 DIAGNOSIS — N40.1 BENIGN PROSTATIC HYPERPLASIA WITH LOWER URINARY TRACT SYMPMS: ICD-10-CM

## 2025-03-04 DIAGNOSIS — R39.15 URGENCY OF URINATION: ICD-10-CM

## 2025-03-04 DIAGNOSIS — R39.16 STRAINING TO VOID: ICD-10-CM

## 2025-03-04 DIAGNOSIS — N13.8 BENIGN PROSTATIC HYPERPLASIA WITH LOWER URINARY TRACT SYMPMS: ICD-10-CM

## 2025-03-04 PROCEDURE — 51798 US URINE CAPACITY MEASURE: CPT

## 2025-03-04 PROCEDURE — 99214 OFFICE O/P EST MOD 30 MIN: CPT

## 2025-03-04 RX ORDER — CIPROFLOXACIN HYDROCHLORIDE 500 MG/1
500 TABLET, FILM COATED ORAL TWICE DAILY
Qty: 10 | Refills: 0 | Status: ACTIVE | COMMUNITY
Start: 2025-03-04 | End: 1900-01-01

## 2025-03-04 RX ORDER — OMEPRAZOLE 20 MG/1
20 CAPSULE, DELAYED RELEASE ORAL
Refills: 0 | Status: ACTIVE | COMMUNITY

## 2025-03-04 RX ORDER — SPIRONOLACTONE 25 MG/1
25 TABLET, FILM COATED ORAL
Refills: 0 | Status: ACTIVE | COMMUNITY

## 2025-03-05 LAB
APPEARANCE: CLEAR
BACTERIA: NEGATIVE /HPF
BILIRUBIN URINE: NEGATIVE
BLOOD URINE: NEGATIVE
CAST: 1 /LPF
COLOR: NORMAL
EPITHELIAL CELLS: 2 /HPF
GLUCOSE QUALITATIVE U: NEGATIVE MG/DL
KETONES URINE: ABNORMAL MG/DL
LEUKOCYTE ESTERASE URINE: NEGATIVE
MICROSCOPIC-UA: NORMAL
NITRITE URINE: NEGATIVE
PH URINE: 5
PROTEIN URINE: 100 MG/DL
RED BLOOD CELLS URINE: 1 /HPF
SPECIFIC GRAVITY URINE: 1.02
UROBILINOGEN URINE: 0.2 MG/DL
WHITE BLOOD CELLS URINE: 1 /HPF

## 2025-03-07 NOTE — DISCUSSION/SUMMARY
[Unlikely Cardiac Ischemia (Low Prob.)] : chest pain unlikely to represent cardiac ischemia (low probability) [Improving] : improving [FreeTextEntry1] : 74 year old with CAD, essential htn, lipids.  all well controlled.  most recent echo and Holter are normal.  no active sx of ischemia.  carotid Doppler. \par hold off on stress test.\par \par 4/8/2019  left scapular pain which resolved on its own.  BP is well controlled.  chronic LBBB .  maintain current medications, encouraged medication compliance. - Consider chaplaincy referral for spiritual support  - Geriatrics and Palliative Medicine Team will continue to follow for support and symptom management at EOL if needs arise     - Consider adding IV dilaudid 0.3-0.5 mg q1 hr prn mod-severe pain/dyspnea, IV Ativan 0.5 mg q1 hr anxiety/agitation, IV glycopyrrolate 0.4 mg q6 hrs prn secretions if goal transitions to full comfort     An MD Danae  GAP Team Consults  Please call if we can be of assistance, [phone]

## 2025-04-02 ENCOUNTER — APPOINTMENT (OUTPATIENT)
Dept: UROLOGY | Facility: CLINIC | Age: 80
End: 2025-04-02

## 2025-04-02 VITALS
RESPIRATION RATE: 17 BRPM | BODY MASS INDEX: 25.11 KG/M2 | HEIGHT: 67 IN | HEART RATE: 70 BPM | WEIGHT: 160 LBS | OXYGEN SATURATION: 100 % | SYSTOLIC BLOOD PRESSURE: 113 MMHG | DIASTOLIC BLOOD PRESSURE: 59 MMHG

## 2025-04-02 DIAGNOSIS — N40.1 BENIGN PROSTATIC HYPERPLASIA WITH LOWER URINARY TRACT SYMPMS: ICD-10-CM

## 2025-04-02 PROCEDURE — 99213 OFFICE O/P EST LOW 20 MIN: CPT

## 2025-04-02 PROCEDURE — G2211 COMPLEX E/M VISIT ADD ON: CPT

## 2025-05-20 NOTE — ED ADULT NURSE NOTE - NS ED NURSE REPORT GIVEN DT
Patients daughter calling requesting for empagliflozin (JARDIANCE) 25 MG tablet to be sent to [facility] [place], [place] - [address] [[id number]]. Patients daughter stated they were here yesterday but rx was never sent.     Please advise.       
Rx sent. Pts daughter informed.   
26-Feb-2021 13:55

## (undated) DEVICE — BIOPSY FORCEP COLD DISP

## (undated) DEVICE — DRSG BANDAID 0.75X3"

## (undated) DEVICE — CLAMP BX HOT RAD JAW 3

## (undated) DEVICE — BITE BLOCK ADULT 20 X 27MM (GREEN)

## (undated) DEVICE — TUBING IV SET GRAVITY 3Y 100" MACRO

## (undated) DEVICE — SALIVA EJECTOR (BLUE)

## (undated) DEVICE — LUBRICATING JELLY HR ONE SHOT 3G

## (undated) DEVICE — DRSG CURITY GAUZE SPONGE 4 X 4" 12-PLY NON-STERILE

## (undated) DEVICE — DRSG 2X2

## (undated) DEVICE — ELCTR ECG CONDUCTIVE ADHESIVE

## (undated) DEVICE — LINE BREATHE SAMPLNG

## (undated) DEVICE — UNDERPAD LINEN SAVER 17 X 24"

## (undated) DEVICE — CONTAINER FORMALIN 80ML YELLOW

## (undated) DEVICE — GOWN LG

## (undated) DEVICE — DENTURE CUP PINK

## (undated) DEVICE — BIOPSY FORCEP RADIAL JAW 4 STANDARD WITH NEEDLE

## (undated) DEVICE — BASIN EMESIS 10IN GRADUATED MAUVE

## (undated) DEVICE — ATTACH DISTAL DISP

## (undated) DEVICE — PACK IV START WITH CHG

## (undated) DEVICE — TUBING MEDI-VAC W MAXIGRIP CONNECTORS 1/4"X6'

## (undated) DEVICE — CATH IV SAFE BC 22G X 1" (BLUE)